# Patient Record
Sex: MALE | Race: WHITE | NOT HISPANIC OR LATINO | Employment: OTHER | URBAN - METROPOLITAN AREA
[De-identification: names, ages, dates, MRNs, and addresses within clinical notes are randomized per-mention and may not be internally consistent; named-entity substitution may affect disease eponyms.]

---

## 2016-12-28 LAB
BASOPHILS # BLD AUTO: 0.01 10*3/MM3 (ref 0–0.1)
BASOPHILS NFR BLD AUTO: 0.3 % (ref 0–1.1)
DEPRECATED RDW RBC AUTO: 58.2 FL (ref 37–49)
EOSINOPHIL # BLD AUTO: 0.06 10*3/MM3 (ref 0–0.36)
EOSINOPHIL NFR BLD AUTO: 1.6 % (ref 1–5)
ERYTHROCYTE [DISTWIDTH] IN BLOOD BY AUTOMATED COUNT: 17.3 % (ref 11.7–14.5)
FERRITIN SERPL-MCNC: 17.2 NG/ML (ref 30–400)
HCT VFR BLD AUTO: 36 % (ref 40–49)
HGB BLD-MCNC: 11.7 G/DL (ref 13.5–16.5)
IMM GRANULOCYTES # BLD: 0.02 10*3/MM3 (ref 0–0.03)
IMM GRANULOCYTES NFR BLD: 0.5 % (ref 0–0.5)
IRON 24H UR-MRATE: 62 MCG/DL (ref 59–158)
IRON SATN MFR SERPL: 14 % (ref 14–48)
LYMPHOCYTES # BLD AUTO: 0.34 10*3/MM3 (ref 1–3.5)
LYMPHOCYTES NFR BLD AUTO: 9.3 % (ref 20–49)
MCH RBC QN AUTO: 30 PG (ref 27–33)
MCHC RBC AUTO-ENTMCNC: 32.5 G/DL (ref 32–35)
MCV RBC AUTO: 92.3 FL (ref 83–97)
MONOCYTES # BLD AUTO: 0.46 10*3/MM3 (ref 0.25–0.8)
MONOCYTES NFR BLD AUTO: 12.6 % (ref 4–12)
NEUTROPHILS # BLD AUTO: 2.76 10*3/MM3 (ref 1.5–7)
NEUTROPHILS NFR BLD AUTO: 75.7 % (ref 39–75)
NRBC BLD MANUAL-RTO: 0 /100 WBC (ref 0–0)
PLATELET # BLD AUTO: 46 10*3/MM3 (ref 150–375)
RBC # BLD AUTO: 3.9 10*6/MM3 (ref 4.3–5.5)
TIBC SERPL-MCNC: 454 MCG/DL (ref 249–505)
TRANSFERRIN SERPL-MCNC: 324 MG/DL (ref 200–360)
WBC NRBC COR # BLD: 3.65 10*3/MM3 (ref 4–10)

## 2017-01-01 ENCOUNTER — LAB (OUTPATIENT)
Dept: ONCOLOGY | Facility: HOSPITAL | Age: 72
End: 2017-01-01

## 2017-01-01 ENCOUNTER — RESULTS ENCOUNTER (OUTPATIENT)
Dept: GASTROENTEROLOGY | Facility: CLINIC | Age: 72
End: 2017-01-01

## 2017-01-01 ENCOUNTER — TELEPHONE (OUTPATIENT)
Dept: GASTROENTEROLOGY | Facility: CLINIC | Age: 72
End: 2017-01-01

## 2017-01-01 ENCOUNTER — OFFICE VISIT (OUTPATIENT)
Dept: ONCOLOGY | Facility: CLINIC | Age: 72
End: 2017-01-01

## 2017-01-01 ENCOUNTER — TELEPHONE (OUTPATIENT)
Dept: ONCOLOGY | Facility: HOSPITAL | Age: 72
End: 2017-01-01

## 2017-01-01 ENCOUNTER — INFUSION (OUTPATIENT)
Dept: ONCOLOGY | Facility: HOSPITAL | Age: 72
End: 2017-01-01

## 2017-01-01 ENCOUNTER — TELEPHONE (OUTPATIENT)
Dept: GENERAL RADIOLOGY | Facility: HOSPITAL | Age: 72
End: 2017-01-01

## 2017-01-01 ENCOUNTER — OFFICE VISIT (OUTPATIENT)
Dept: FAMILY MEDICINE CLINIC | Facility: CLINIC | Age: 72
End: 2017-01-01

## 2017-01-01 VITALS
TEMPERATURE: 97.7 F | HEIGHT: 69 IN | BODY MASS INDEX: 27.25 KG/M2 | HEART RATE: 49 BPM | WEIGHT: 184 LBS | RESPIRATION RATE: 14 BRPM | DIASTOLIC BLOOD PRESSURE: 48 MMHG | SYSTOLIC BLOOD PRESSURE: 125 MMHG

## 2017-01-01 VITALS
WEIGHT: 183.2 LBS | SYSTOLIC BLOOD PRESSURE: 115 MMHG | TEMPERATURE: 98.4 F | DIASTOLIC BLOOD PRESSURE: 53 MMHG | HEART RATE: 55 BPM | BODY MASS INDEX: 27.86 KG/M2

## 2017-01-01 VITALS
DIASTOLIC BLOOD PRESSURE: 72 MMHG | HEART RATE: 49 BPM | OXYGEN SATURATION: 98 % | TEMPERATURE: 97.8 F | SYSTOLIC BLOOD PRESSURE: 146 MMHG | WEIGHT: 182.2 LBS | RESPIRATION RATE: 16 BRPM | HEIGHT: 68 IN | BODY MASS INDEX: 27.61 KG/M2

## 2017-01-01 DIAGNOSIS — D50.0 IRON DEFICIENCY ANEMIA DUE TO CHRONIC BLOOD LOSS: ICD-10-CM

## 2017-01-01 DIAGNOSIS — D70.8 OTHER NEUTROPENIA (HCC): ICD-10-CM

## 2017-01-01 DIAGNOSIS — Z00.00 MEDICARE ANNUAL WELLNESS VISIT, SUBSEQUENT: Primary | ICD-10-CM

## 2017-01-01 DIAGNOSIS — K74.69 OTHER CIRRHOSIS OF LIVER (HCC): ICD-10-CM

## 2017-01-01 DIAGNOSIS — B20 HUMAN IMMUNODEFICIENCY VIRUS I INFECTION (HCC): ICD-10-CM

## 2017-01-01 DIAGNOSIS — D64.9 ANEMIA, UNSPECIFIED TYPE: ICD-10-CM

## 2017-01-01 DIAGNOSIS — K74.60 CIRRHOSIS OF LIVER WITHOUT ASCITES, UNSPECIFIED HEPATIC CIRRHOSIS TYPE (HCC): Primary | ICD-10-CM

## 2017-01-01 DIAGNOSIS — I25.10 CHRONIC CORONARY ARTERY DISEASE: ICD-10-CM

## 2017-01-01 DIAGNOSIS — IMO0001 IRON AND ITS COMPOUNDS CAUSING ADVERSE EFFECT IN THERAPEUTIC USE, SUBSEQUENT ENCOUNTER: Primary | ICD-10-CM

## 2017-01-01 DIAGNOSIS — R06.02 SHORTNESS OF BREATH: ICD-10-CM

## 2017-01-01 DIAGNOSIS — B20 THROMBOCYTOPENIA ASSOCIATED WITH AIDS (HCC): ICD-10-CM

## 2017-01-01 DIAGNOSIS — C61 PROSTATE CA (HCC): Primary | ICD-10-CM

## 2017-01-01 DIAGNOSIS — K74.60 CIRRHOSIS OF LIVER WITHOUT ASCITES, UNSPECIFIED HEPATIC CIRRHOSIS TYPE (HCC): ICD-10-CM

## 2017-01-01 DIAGNOSIS — D50.0 IRON DEFICIENCY ANEMIA DUE TO CHRONIC BLOOD LOSS: Primary | ICD-10-CM

## 2017-01-01 DIAGNOSIS — D69.59 THROMBOCYTOPENIA ASSOCIATED WITH AIDS (HCC): ICD-10-CM

## 2017-01-01 DIAGNOSIS — R73.09 ABNORMAL GLUCOSE: ICD-10-CM

## 2017-01-01 DIAGNOSIS — D61.818 PANCYTOPENIA (HCC): ICD-10-CM

## 2017-01-01 DIAGNOSIS — IMO0001 IRON AND ITS COMPOUNDS CAUSING ADVERSE EFFECT IN THERAPEUTIC USE, SUBSEQUENT ENCOUNTER: ICD-10-CM

## 2017-01-01 DIAGNOSIS — I77.9 PERIPHERAL ARTERIAL OCCLUSIVE DISEASE (HCC): ICD-10-CM

## 2017-01-01 DIAGNOSIS — M51.36 LUMBAR DEGENERATIVE DISC DISEASE: ICD-10-CM

## 2017-01-01 LAB
AFP-TM SERPL-MCNC: 3.9 NG/ML (ref 0–8.3)
ALBUMIN SERPL-MCNC: 4 G/DL (ref 3.5–4.8)
ALBUMIN UR-MCNC: 2.7 MG/L
ALBUMIN/GLOB SERPL: 1.1 {RATIO} (ref 1.2–2.2)
ALP SERPL-CCNC: 163 IU/L (ref 39–117)
ALT SERPL-CCNC: 46 IU/L (ref 0–44)
AST SERPL-CCNC: 92 IU/L (ref 0–40)
BASOPHILS # BLD AUTO: 0 X10E3/UL (ref 0–0.2)
BASOPHILS # BLD AUTO: 0.02 10*3/MM3 (ref 0–0.1)
BASOPHILS NFR BLD AUTO: 0 %
BASOPHILS NFR BLD AUTO: 0.7 % (ref 0–1.1)
BILIRUB SERPL-MCNC: 1.8 MG/DL (ref 0–1.2)
BILIRUB UR QL STRIP: NEGATIVE
BUN SERPL-MCNC: 11 MG/DL (ref 8–27)
BUN/CREAT SERPL: 10 (ref 10–24)
CALCIUM SERPL-MCNC: 9.3 MG/DL (ref 8.6–10.2)
CD3+CD4+ CELLS # BLD: 85 /UL (ref 359–1519)
CD3+CD4+ CELLS NFR BLD: 28.4 % (ref 30.8–58.5)
CHLORIDE SERPL-SCNC: 100 MMOL/L (ref 96–106)
CLARITY UR: CLEAR
CO2 SERPL-SCNC: 22 MMOL/L (ref 18–29)
COLOR UR: YELLOW
CREAT SERPL-MCNC: 1.08 MG/DL (ref 0.76–1.27)
DEPRECATED RDW RBC AUTO: 52.9 FL (ref 37–49)
EOSINOPHIL # BLD AUTO: 0.09 10*3/MM3 (ref 0–0.36)
EOSINOPHIL # BLD AUTO: 0.1 X10E3/UL (ref 0–0.4)
EOSINOPHIL # BLD AUTO: 3 %
EOSINOPHIL NFR BLD AUTO: 3.1 % (ref 1–5)
ERYTHROCYTE [DISTWIDTH] IN BLOOD BY AUTOMATED COUNT: 15.7 % (ref 12.3–15.4)
ERYTHROCYTE [DISTWIDTH] IN BLOOD BY AUTOMATED COUNT: 16 % (ref 11.7–14.5)
FERRITIN SERPL-MCNC: 26.2 NG/ML (ref 30–400)
GLOBULIN SER CALC-MCNC: 3.8 G/DL (ref 1.5–4.5)
GLUCOSE SERPL-MCNC: 131 MG/DL (ref 65–99)
GLUCOSE UR STRIP-MCNC: NEGATIVE MG/DL
HBA1C MFR BLD: 7.4 % (ref 4.8–5.6)
HCT VFR BLD AUTO: 37.5 % (ref 37.5–51)
HCT VFR BLD AUTO: 39.4 % (ref 40–49)
HGB BLD-MCNC: 12 G/DL (ref 12.6–17.7)
HGB BLD-MCNC: 12 G/DL (ref 13.5–16.5)
HGB UR QL STRIP.AUTO: NEGATIVE
HIV1 RNA # SERPL NAA+PROBE: 130 COPIES/ML
HOLD SPECIMEN: NORMAL
IMM GRANULOCYTES # BLD: 0 X10E3/UL (ref 0–0.1)
IMM GRANULOCYTES # BLD: 0.01 10*3/MM3 (ref 0–0.03)
IMM GRANULOCYTES NFR BLD: 0 %
IMM GRANULOCYTES NFR BLD: 0.3 % (ref 0–0.5)
INR PPP: 1.1 (ref 0.9–1.1)
IRON 24H UR-MRATE: 66 MCG/DL (ref 59–158)
IRON SATN MFR SERPL: 15 % (ref 14–48)
KETONES UR QL STRIP: NEGATIVE
LEUKOCYTE ESTERASE UR QL STRIP.AUTO: NEGATIVE
LOG10 HIV-1 RNA: 2.11 LOG10COPY/ML
LYMPHOCYTES # BLD AUTO: 0.23 10*3/MM3 (ref 1–3.5)
LYMPHOCYTES # BLD AUTO: 0.3 X10E3/UL (ref 0.7–3.1)
LYMPHOCYTES NFR BLD AUTO: 11 %
LYMPHOCYTES NFR BLD AUTO: 7.8 % (ref 20–49)
MCH RBC QN AUTO: 27.4 PG (ref 26.6–33)
MCH RBC QN AUTO: 27.6 PG (ref 27–33)
MCHC RBC AUTO-ENTMCNC: 30.5 G/DL (ref 32–35)
MCHC RBC AUTO-ENTMCNC: 32 G/DL (ref 31.5–35.7)
MCV RBC AUTO: 86 FL (ref 79–97)
MCV RBC AUTO: 90.6 FL (ref 83–97)
MONOCYTES # BLD AUTO: 0.3 X10E3/UL (ref 0.1–0.9)
MONOCYTES # BLD AUTO: 0.32 10*3/MM3 (ref 0.25–0.8)
MONOCYTES NFR BLD AUTO: 10 %
MONOCYTES NFR BLD AUTO: 10.9 % (ref 4–12)
MORPHOLOGY BLD-IMP: ABNORMAL
NEUTROPHILS # BLD AUTO: 2.26 10*3/MM3 (ref 1.5–7)
NEUTROPHILS # BLD AUTO: 2.4 X10E3/UL (ref 1.4–7)
NEUTROPHILS NFR BLD AUTO: 76 %
NEUTROPHILS NFR BLD AUTO: 77.2 % (ref 39–75)
NITRITE UR QL STRIP: NEGATIVE
NRBC BLD MANUAL-RTO: 0 /100 WBC (ref 0–0)
PH UR STRIP.AUTO: 7 [PH] (ref 4.5–8)
PLATELET # BLD AUTO: 45 10*3/MM3 (ref 150–375)
PLATELET # BLD AUTO: 51 X10E3/UL (ref 150–379)
PMV BLD AUTO: 10.5 FL (ref 8.9–12.1)
POTASSIUM SERPL-SCNC: 4.5 MMOL/L (ref 3.5–5.2)
PROT SERPL-MCNC: 7.8 G/DL (ref 6–8.5)
PROT UR QL STRIP: ABNORMAL
PROTHROMBIN TIME: 13.7 SECONDS (ref 11–13.5)
PSA SERPL-MCNC: 0.91 NG/ML (ref 0–4)
RBC # BLD AUTO: 4.35 10*6/MM3 (ref 4.3–5.5)
RBC # BLD AUTO: 4.38 X10E6/UL (ref 4.14–5.8)
SODIUM SERPL-SCNC: 139 MMOL/L (ref 134–144)
SP GR UR STRIP: 1.02 (ref 1–1.03)
T4 FREE SERPL-MCNC: 1.69 NG/DL (ref 0.93–1.7)
TESTOST FREE SERPL-MCNC: 2.5 PG/ML (ref 6.6–18.1)
TESTOST SERPL-MCNC: 37.3 NG/DL (ref 264–916)
TIBC SERPL-MCNC: 447 MCG/DL (ref 249–505)
TRANSFERRIN SERPL-MCNC: 319 MG/DL (ref 200–360)
TSH SERPL DL<=0.05 MIU/L-ACNC: 2.3 MIU/ML (ref 0.27–4.2)
UROBILINOGEN UR QL STRIP: ABNORMAL
WBC # BLD AUTO: 3.2 X10E3/UL (ref 3.4–10.8)
WBC NRBC COR # BLD: 2.93 10*3/MM3 (ref 4–10)

## 2017-01-01 PROCEDURE — 99213 OFFICE O/P EST LOW 20 MIN: CPT | Performed by: INTERNAL MEDICINE

## 2017-01-01 PROCEDURE — 25010000002 FERUMOXYTOL 510 MG/17ML SOLUTION 510 MG VIAL: Performed by: INTERNAL MEDICINE

## 2017-01-01 PROCEDURE — 82043 UR ALBUMIN QUANTITATIVE: CPT | Performed by: INTERNAL MEDICINE

## 2017-01-01 PROCEDURE — 84466 ASSAY OF TRANSFERRIN: CPT | Performed by: INTERNAL MEDICINE

## 2017-01-01 PROCEDURE — 83036 HEMOGLOBIN GLYCOSYLATED A1C: CPT | Performed by: INTERNAL MEDICINE

## 2017-01-01 PROCEDURE — 36415 COLL VENOUS BLD VENIPUNCTURE: CPT | Performed by: INTERNAL MEDICINE

## 2017-01-01 PROCEDURE — 83540 ASSAY OF IRON: CPT | Performed by: INTERNAL MEDICINE

## 2017-01-01 PROCEDURE — 84443 ASSAY THYROID STIM HORMONE: CPT | Performed by: INTERNAL MEDICINE

## 2017-01-01 PROCEDURE — 81003 URINALYSIS AUTO W/O SCOPE: CPT | Performed by: INTERNAL MEDICINE

## 2017-01-01 PROCEDURE — 96374 THER/PROPH/DIAG INJ IV PUSH: CPT

## 2017-01-01 PROCEDURE — 84439 ASSAY OF FREE THYROXINE: CPT | Performed by: INTERNAL MEDICINE

## 2017-01-01 PROCEDURE — 84153 ASSAY OF PSA TOTAL: CPT | Performed by: UROLOGY

## 2017-01-01 PROCEDURE — 85025 COMPLETE CBC W/AUTO DIFF WBC: CPT | Performed by: INTERNAL MEDICINE

## 2017-01-01 PROCEDURE — 82728 ASSAY OF FERRITIN: CPT | Performed by: INTERNAL MEDICINE

## 2017-01-01 PROCEDURE — 99213 OFFICE O/P EST LOW 20 MIN: CPT | Performed by: FAMILY MEDICINE

## 2017-01-01 PROCEDURE — G0439 PPPS, SUBSEQ VISIT: HCPCS | Performed by: FAMILY MEDICINE

## 2017-01-01 PROCEDURE — 63710000001 DIPHENHYDRAMINE PER 50 MG: Performed by: INTERNAL MEDICINE

## 2017-01-01 PROCEDURE — 85610 PROTHROMBIN TIME: CPT | Performed by: INTERNAL MEDICINE

## 2017-01-01 RX ORDER — SODIUM CHLORIDE 9 MG/ML
250 INJECTION, SOLUTION INTRAVENOUS ONCE
Status: COMPLETED | OUTPATIENT
Start: 2017-01-01 | End: 2017-01-01

## 2017-01-01 RX ORDER — SODIUM CHLORIDE 9 MG/ML
250 INJECTION, SOLUTION INTRAVENOUS ONCE
Status: CANCELLED | OUTPATIENT
Start: 2017-01-01

## 2017-01-01 RX ORDER — FAMOTIDINE 10 MG/ML
20 INJECTION, SOLUTION INTRAVENOUS ONCE
Status: DISCONTINUED | OUTPATIENT
Start: 2017-01-01 | End: 2017-01-01 | Stop reason: HOSPADM

## 2017-01-01 RX ORDER — LEVOTHYROXINE SODIUM 300 MCG
TABLET ORAL
Refills: 0 | Status: ON HOLD | COMMUNITY
Start: 2017-01-01 | End: 2018-01-01

## 2017-01-01 RX ORDER — DIPHENHYDRAMINE HCL 25 MG
25 CAPSULE ORAL ONCE
Status: DISCONTINUED | OUTPATIENT
Start: 2017-01-01 | End: 2017-01-01 | Stop reason: HOSPADM

## 2017-01-01 RX ORDER — DIPHENHYDRAMINE HCL 25 MG
25 CAPSULE ORAL ONCE
Status: CANCELLED | OUTPATIENT
Start: 2017-01-01

## 2017-01-01 RX ORDER — FAMOTIDINE 10 MG/ML
20 INJECTION, SOLUTION INTRAVENOUS ONCE
Status: CANCELLED | OUTPATIENT
Start: 2017-01-01

## 2017-01-01 RX ADMIN — FERUMOXYTOL 510 MG: 510 INJECTION INTRAVENOUS at 15:34

## 2017-01-01 RX ADMIN — FERUMOXYTOL 510 MG: 510 INJECTION INTRAVENOUS at 14:46

## 2017-01-01 RX ADMIN — SODIUM CHLORIDE 250 ML: 900 INJECTION, SOLUTION INTRAVENOUS at 15:29

## 2017-01-01 RX ADMIN — SODIUM CHLORIDE 250 ML: 900 INJECTION, SOLUTION INTRAVENOUS at 14:46

## 2017-01-03 ENCOUNTER — RESULTS ENCOUNTER (OUTPATIENT)
Dept: GASTROENTEROLOGY | Facility: CLINIC | Age: 72
End: 2017-01-03

## 2017-01-03 DIAGNOSIS — E87.5 HYPERKALEMIA: ICD-10-CM

## 2017-01-04 ENCOUNTER — INFUSION (OUTPATIENT)
Dept: ONCOLOGY | Facility: HOSPITAL | Age: 72
End: 2017-01-04
Attending: INTERNAL MEDICINE

## 2017-01-04 ENCOUNTER — HOSPITAL ENCOUNTER (OUTPATIENT)
Dept: ULTRASOUND IMAGING | Facility: HOSPITAL | Age: 72
Discharge: HOME | End: 2017-01-04
Attending: INTERNAL MEDICINE | Admitting: INTERNAL MEDICINE

## 2017-01-04 VITALS
BODY MASS INDEX: 25.88 KG/M2 | SYSTOLIC BLOOD PRESSURE: 105 MMHG | TEMPERATURE: 98.1 F | WEIGHT: 170.2 LBS | HEART RATE: 58 BPM | DIASTOLIC BLOOD PRESSURE: 69 MMHG

## 2017-01-04 DIAGNOSIS — K74.60 CIRRHOSIS OF LIVER WITHOUT ASCITES, UNSPECIFIED HEPATIC CIRRHOSIS TYPE (HCC): ICD-10-CM

## 2017-01-04 DIAGNOSIS — D50.0 IRON DEFICIENCY ANEMIA DUE TO CHRONIC BLOOD LOSS: Primary | ICD-10-CM

## 2017-01-04 PROCEDURE — 76705 ECHO EXAM OF ABDOMEN: CPT

## 2017-01-04 PROCEDURE — 25010000002 FERUMOXYTOL 510 MG/17ML SOLUTION 510 MG VIAL: Performed by: INTERNAL MEDICINE

## 2017-01-04 PROCEDURE — 96374 THER/PROPH/DIAG INJ IV PUSH: CPT | Performed by: INTERNAL MEDICINE

## 2017-01-04 RX ORDER — SODIUM CHLORIDE 9 MG/ML
250 INJECTION, SOLUTION INTRAVENOUS ONCE
Status: CANCELLED | OUTPATIENT
Start: 2017-01-11

## 2017-01-04 RX ORDER — SODIUM CHLORIDE 9 MG/ML
250 INJECTION, SOLUTION INTRAVENOUS ONCE
Status: COMPLETED | OUTPATIENT
Start: 2017-01-04 | End: 2017-01-04

## 2017-01-04 RX ADMIN — FERUMOXYTOL 510 MG: 510 INJECTION INTRAVENOUS at 14:49

## 2017-01-04 RX ADMIN — SODIUM CHLORIDE 250 ML: 900 INJECTION, SOLUTION INTRAVENOUS at 14:35

## 2017-01-12 ENCOUNTER — LAB (OUTPATIENT)
Dept: LAB | Facility: HOSPITAL | Age: 72
End: 2017-01-12

## 2017-01-12 ENCOUNTER — TELEPHONE (OUTPATIENT)
Dept: GASTROENTEROLOGY | Facility: CLINIC | Age: 72
End: 2017-01-12

## 2017-01-12 DIAGNOSIS — K74.60 CIRRHOSIS OF LIVER WITHOUT ASCITES, UNSPECIFIED HEPATIC CIRRHOSIS TYPE (HCC): ICD-10-CM

## 2017-01-12 DIAGNOSIS — E87.5 HYPERKALEMIA: ICD-10-CM

## 2017-01-12 DIAGNOSIS — N17.9 AKI (ACUTE KIDNEY INJURY) (HCC): Primary | ICD-10-CM

## 2017-01-12 LAB
ANION GAP SERPL CALCULATED.3IONS-SCNC: 10.8 MMOL/L
BUN BLD-MCNC: 19 MG/DL (ref 8–23)
BUN/CREAT SERPL: 12.9 (ref 7–25)
CALCIUM SPEC-SCNC: 9.8 MG/DL (ref 8.6–10.5)
CHLORIDE SERPL-SCNC: 96 MMOL/L (ref 98–107)
CO2 SERPL-SCNC: 22.2 MMOL/L (ref 22–29)
CREAT BLD-MCNC: 1.47 MG/DL (ref 0.76–1.27)
GFR SERPL CREATININE-BSD FRML MDRD: 47 ML/MIN/1.73
GLUCOSE BLD-MCNC: 335 MG/DL (ref 65–99)
INR PPP: 1.21 (ref 0.9–1.1)
POTASSIUM BLD-SCNC: 5.6 MMOL/L (ref 3.5–5.2)
PROTHROMBIN TIME: 14.8 SECONDS (ref 11.7–14.2)
SODIUM BLD-SCNC: 129 MMOL/L (ref 136–145)

## 2017-01-12 PROCEDURE — 36415 COLL VENOUS BLD VENIPUNCTURE: CPT

## 2017-01-12 PROCEDURE — 80048 BASIC METABOLIC PNL TOTAL CA: CPT | Performed by: INTERNAL MEDICINE

## 2017-01-12 PROCEDURE — 85610 PROTHROMBIN TIME: CPT

## 2017-01-12 NOTE — TELEPHONE ENCOUNTER
pls let him know that his potassium continues to be elevated but stable and his kidney function is worsening.  He needs to discontinue completely both of his water pills (lasix and aldactone as these can cause worsening kidney function) - he needs to have BMP repeated in 1 week

## 2017-01-12 NOTE — TELEPHONE ENCOUNTER
----- Message from Robyn Butler MD sent at 1/5/2017 10:34 AM EST -----  Liver ultrasound stable - no new findings

## 2017-01-13 NOTE — TELEPHONE ENCOUNTER
Called pt and advised per Dr Butler that his potassium continues to be elevated but stable and his kidney function is worsening.  He needs to discontinue completely both of his water pills ( lasix and aldactone as these can cause worsening kidney function ) he need to have a repeat bmp in one wk.  Pt verb understanding.

## 2017-01-17 ENCOUNTER — RESULTS ENCOUNTER (OUTPATIENT)
Dept: GASTROENTEROLOGY | Facility: CLINIC | Age: 72
End: 2017-01-17

## 2017-01-17 DIAGNOSIS — N17.9 AKI (ACUTE KIDNEY INJURY) (HCC): ICD-10-CM

## 2017-01-24 ENCOUNTER — TELEPHONE (OUTPATIENT)
Dept: GASTROENTEROLOGY | Facility: CLINIC | Age: 72
End: 2017-01-24

## 2017-01-24 NOTE — TELEPHONE ENCOUNTER
Spoke with the patient assistance line and verified new prescription for 90 days plus one refill. They will send it out today. Spoke with the patient to let him know the medication is on it's way.

## 2017-01-24 NOTE — TELEPHONE ENCOUNTER
Patient is out of his Xifaxan script. He gets it through the [employer] patient assistance program. I've sent in paperwork to renew his patient assistance. Okay to give him a 90 day supply and one refill? He takes it twice a day for HE.

## 2017-02-13 ENCOUNTER — TELEPHONE (OUTPATIENT)
Dept: ONCOLOGY | Facility: HOSPITAL | Age: 72
End: 2017-02-13

## 2017-02-13 DIAGNOSIS — D50.8 OTHER IRON DEFICIENCY ANEMIA: Primary | ICD-10-CM

## 2017-02-13 NOTE — TELEPHONE ENCOUNTER
----- Message from Franny Hernandez sent at 2/13/2017 11:32 AM EST -----   Pt is calling about having his lab orders sent to [facility] to have done      [phone]

## 2017-02-13 NOTE — TELEPHONE ENCOUNTER
Returned pt's call, no answer. Left message that lab orders are in the computer and he can go to [facility] to have them drawn.

## 2017-02-14 ENCOUNTER — TRANSCRIBE ORDERS (OUTPATIENT)
Dept: LAB | Facility: HOSPITAL | Age: 72
End: 2017-02-14

## 2017-02-14 ENCOUNTER — TELEPHONE (OUTPATIENT)
Dept: GASTROENTEROLOGY | Facility: CLINIC | Age: 72
End: 2017-02-14

## 2017-02-14 ENCOUNTER — APPOINTMENT (OUTPATIENT)
Dept: LAB | Facility: HOSPITAL | Age: 72
End: 2017-02-14

## 2017-02-14 ENCOUNTER — LAB (OUTPATIENT)
Dept: LAB | Facility: HOSPITAL | Age: 72
End: 2017-02-14

## 2017-02-14 DIAGNOSIS — E87.5 HYPERKALEMIA: ICD-10-CM

## 2017-02-14 DIAGNOSIS — C61 PROSTATE CANCER (HCC): ICD-10-CM

## 2017-02-14 DIAGNOSIS — C61 PROSTATE CANCER (HCC): Primary | ICD-10-CM

## 2017-02-14 DIAGNOSIS — K74.60 CIRRHOSIS OF LIVER WITHOUT ASCITES, UNSPECIFIED HEPATIC CIRRHOSIS TYPE (HCC): ICD-10-CM

## 2017-02-14 DIAGNOSIS — K74.60 CIRRHOSIS OF LIVER WITH ASCITES, UNSPECIFIED HEPATIC CIRRHOSIS TYPE (HCC): Primary | ICD-10-CM

## 2017-02-14 DIAGNOSIS — R18.8 CIRRHOSIS OF LIVER WITH ASCITES, UNSPECIFIED HEPATIC CIRRHOSIS TYPE (HCC): Primary | ICD-10-CM

## 2017-02-14 DIAGNOSIS — D50.8 OTHER IRON DEFICIENCY ANEMIA: ICD-10-CM

## 2017-02-14 LAB
ANION GAP SERPL CALCULATED.3IONS-SCNC: 7.6 MMOL/L
BASOPHILS # BLD AUTO: 0.02 10*3/MM3 (ref 0–0.2)
BASOPHILS NFR BLD AUTO: 1 % (ref 0–1.5)
BUN BLD-MCNC: 9 MG/DL (ref 8–23)
BUN/CREAT SERPL: 7.5 (ref 7–25)
CALCIUM SPEC-SCNC: 8.9 MG/DL (ref 8.6–10.5)
CHLORIDE SERPL-SCNC: 102 MMOL/L (ref 98–107)
CO2 SERPL-SCNC: 25.4 MMOL/L (ref 22–29)
CREAT BLD-MCNC: 1.2 MG/DL (ref 0.76–1.27)
DEPRECATED RDW RBC AUTO: 57 FL (ref 37–54)
EOSINOPHIL # BLD AUTO: 0.06 10*3/MM3 (ref 0–0.7)
EOSINOPHIL NFR BLD AUTO: 2.9 % (ref 0.3–6.2)
ERYTHROCYTE [DISTWIDTH] IN BLOOD BY AUTOMATED COUNT: 16.4 % (ref 11.5–14.5)
FERRITIN SERPL-MCNC: 25.81 NG/ML (ref 30–400)
GFR SERPL CREATININE-BSD FRML MDRD: 60 ML/MIN/1.73
GLUCOSE BLD-MCNC: 220 MG/DL (ref 65–99)
HCT VFR BLD AUTO: 33.9 % (ref 40.4–52.2)
HGB BLD-MCNC: 10.8 G/DL (ref 13.7–17.6)
IMM GRANULOCYTES # BLD: 0 10*3/MM3 (ref 0–0.03)
IMM GRANULOCYTES NFR BLD: 0 % (ref 0–0.5)
INR PPP: 1.33 (ref 0.9–1.1)
IRON 24H UR-MRATE: 64 MCG/DL (ref 59–158)
IRON SATN MFR SERPL: 16 % (ref 20–50)
LYMPHOCYTES # BLD AUTO: 0.24 10*3/MM3 (ref 0.9–4.8)
LYMPHOCYTES NFR BLD AUTO: 11.7 % (ref 19.6–45.3)
MCH RBC QN AUTO: 30.1 PG (ref 27–32.7)
MCHC RBC AUTO-ENTMCNC: 31.9 G/DL (ref 32.6–36.4)
MCV RBC AUTO: 94.4 FL (ref 79.8–96.2)
MONOCYTES # BLD AUTO: 0.27 10*3/MM3 (ref 0.2–1.2)
MONOCYTES NFR BLD AUTO: 13.2 % (ref 5–12)
NEUTROPHILS # BLD AUTO: 1.46 10*3/MM3 (ref 1.9–8.1)
NEUTROPHILS NFR BLD AUTO: 71.2 % (ref 42.7–76)
PLATELET # BLD AUTO: 37 10*3/MM3 (ref 140–500)
POTASSIUM BLD-SCNC: 4.3 MMOL/L (ref 3.5–5.2)
PROTHROMBIN TIME: 15.9 SECONDS (ref 11.7–14.2)
PSA SERPL-MCNC: 0.5 NG/ML (ref 0–4)
RBC # BLD AUTO: 3.59 10*6/MM3 (ref 4.6–6)
SODIUM BLD-SCNC: 135 MMOL/L (ref 136–145)
TIBC SERPL-MCNC: 404 MCG/DL (ref 298–536)
TRANSFERRIN SERPL-MCNC: 271 MG/DL (ref 200–360)
WBC NRBC COR # BLD: 2.05 10*3/MM3 (ref 4.5–10.7)

## 2017-02-14 PROCEDURE — 84443 ASSAY THYROID STIM HORMONE: CPT

## 2017-02-14 PROCEDURE — 80061 LIPID PANEL: CPT

## 2017-02-14 PROCEDURE — 85610 PROTHROMBIN TIME: CPT

## 2017-02-14 PROCEDURE — 82607 VITAMIN B-12: CPT

## 2017-02-14 PROCEDURE — 36415 COLL VENOUS BLD VENIPUNCTURE: CPT

## 2017-02-14 PROCEDURE — 83036 HEMOGLOBIN GLYCOSYLATED A1C: CPT

## 2017-02-14 PROCEDURE — 83540 ASSAY OF IRON: CPT

## 2017-02-14 PROCEDURE — 84466 ASSAY OF TRANSFERRIN: CPT

## 2017-02-14 PROCEDURE — 85025 COMPLETE CBC W/AUTO DIFF WBC: CPT

## 2017-02-14 PROCEDURE — 80053 COMPREHEN METABOLIC PANEL: CPT

## 2017-02-14 PROCEDURE — 82728 ASSAY OF FERRITIN: CPT

## 2017-02-14 PROCEDURE — 84153 ASSAY OF PSA TOTAL: CPT

## 2017-02-14 NOTE — TELEPHONE ENCOUNTER
Insurance may not pay for this bc he had recent nml lab in nov and he is not on any lipid lowering medication - he should check with his pcp-prob only needs to be checked annually

## 2017-02-14 NOTE — TELEPHONE ENCOUNTER
Call from pt.  He reports is going to [facility] today to have BMP done and would like to add on Lipid Panel.  Advise pt will send this request to DR Butler.  Pt verb understanding.

## 2017-02-14 NOTE — TELEPHONE ENCOUNTER
Call to pt. Advise per Dr Butler that insurance may not pay for this because he had recent normal lab in November and he is not on any lipid lowering medication.  Pt should check with his PCP - probably only needs to be checked annually.  Pt verb understanding.

## 2017-02-15 ENCOUNTER — TELEPHONE (OUTPATIENT)
Dept: GASTROENTEROLOGY | Facility: CLINIC | Age: 72
End: 2017-02-15

## 2017-02-15 ENCOUNTER — LAB (OUTPATIENT)
Dept: LAB | Facility: HOSPITAL | Age: 72
End: 2017-02-15

## 2017-02-15 ENCOUNTER — TRANSCRIBE ORDERS (OUTPATIENT)
Dept: ADMINISTRATIVE | Facility: HOSPITAL | Age: 72
End: 2017-02-15

## 2017-02-15 DIAGNOSIS — E03.9 UNSPECIFIED HYPOTHYROIDISM: ICD-10-CM

## 2017-02-15 DIAGNOSIS — K74.60 CIRRHOSIS OF LIVER WITH ASCITES, UNSPECIFIED HEPATIC CIRRHOSIS TYPE (HCC): Primary | ICD-10-CM

## 2017-02-15 DIAGNOSIS — IMO0002 SEVERE UNCONTROLLED DIABETES MELLITUS: ICD-10-CM

## 2017-02-15 DIAGNOSIS — R18.8 CIRRHOSIS OF LIVER WITH ASCITES, UNSPECIFIED HEPATIC CIRRHOSIS TYPE (HCC): Primary | ICD-10-CM

## 2017-02-15 DIAGNOSIS — IMO0002 SEVERE UNCONTROLLED DIABETES MELLITUS: Primary | ICD-10-CM

## 2017-02-15 LAB
ALBUMIN SERPL-MCNC: 3.7 G/DL (ref 3.5–5.2)
ALBUMIN/GLOB SERPL: 1.1 G/DL
ALP SERPL-CCNC: 155 U/L (ref 39–117)
ALT SERPL W P-5'-P-CCNC: 26 U/L (ref 1–41)
ANION GAP SERPL CALCULATED.3IONS-SCNC: 9.3 MMOL/L
AST SERPL-CCNC: 50 U/L (ref 1–40)
BILIRUB SERPL-MCNC: 1.2 MG/DL (ref 0.1–1.2)
BUN BLD-MCNC: 10 MG/DL (ref 8–23)
BUN/CREAT SERPL: 8.6 (ref 7–25)
CALCIUM SPEC-SCNC: 9.2 MG/DL (ref 8.6–10.5)
CHLORIDE SERPL-SCNC: 103 MMOL/L (ref 98–107)
CHOLEST SERPL-MCNC: 165 MG/DL (ref 0–200)
CO2 SERPL-SCNC: 25.7 MMOL/L (ref 22–29)
CREAT BLD-MCNC: 1.16 MG/DL (ref 0.76–1.27)
GFR SERPL CREATININE-BSD FRML MDRD: 62 ML/MIN/1.73
GLOBULIN UR ELPH-MCNC: 3.3 GM/DL
GLUCOSE BLD-MCNC: 219 MG/DL (ref 65–99)
HBA1C MFR BLD: 7.37 % (ref 4.8–5.6)
HDLC SERPL-MCNC: 44 MG/DL (ref 40–60)
LDLC SERPL CALC-MCNC: 96 MG/DL (ref 0–100)
LDLC/HDLC SERPL: 2.19 {RATIO}
POTASSIUM BLD-SCNC: 4.5 MMOL/L (ref 3.5–5.2)
PROT SERPL-MCNC: 7 G/DL (ref 6–8.5)
SODIUM BLD-SCNC: 138 MMOL/L (ref 136–145)
TRIGL SERPL-MCNC: 124 MG/DL (ref 0–150)
TSH SERPL DL<=0.05 MIU/L-ACNC: 2.44 MIU/ML (ref 0.27–4.2)
VIT B12 BLD-MCNC: 1386 PG/ML (ref 211–946)
VLDLC SERPL-MCNC: 24.8 MG/DL (ref 5–40)

## 2017-02-15 NOTE — TELEPHONE ENCOUNTER
----- Message from Robyn Butler MD sent at 2/15/2017  8:51 AM EST -----  His renal function has improved. K is now normal - pls confirm that he has stopped his diuretics (lasix/aldactone)- would continue to stay off these meds for now

## 2017-02-15 NOTE — TELEPHONE ENCOUNTER
Call to pt.  Advise per Dr Butler that his renal function has improved. Potassium is now normal. Pt states has stopped diuretics.  Advise per Dr Butler to continue to stay off these meds for now.  Pt asking if could take either lasix or aldactone every other day because feels that is gaining wt in stomach.  Pt does not check daily am wts - advise pt to check wts.  He asks again if could take diuretic every other day.  Advise pt will send this question to Dr Butler.  Pt verb understanding.

## 2017-02-16 ENCOUNTER — LAB (OUTPATIENT)
Dept: LAB | Facility: HOSPITAL | Age: 72
End: 2017-02-16
Attending: INTERNAL MEDICINE

## 2017-02-16 ENCOUNTER — OFFICE VISIT (OUTPATIENT)
Dept: ONCOLOGY | Facility: CLINIC | Age: 72
End: 2017-02-16
Attending: INTERNAL MEDICINE

## 2017-02-16 VITALS
HEART RATE: 48 BPM | DIASTOLIC BLOOD PRESSURE: 72 MMHG | OXYGEN SATURATION: 99 % | TEMPERATURE: 98.2 F | BODY MASS INDEX: 27.46 KG/M2 | SYSTOLIC BLOOD PRESSURE: 110 MMHG | HEIGHT: 68 IN | WEIGHT: 181.2 LBS | RESPIRATION RATE: 12 BRPM

## 2017-02-16 DIAGNOSIS — D61.818 PANCYTOPENIA (HCC): ICD-10-CM

## 2017-02-16 DIAGNOSIS — D69.59 THROMBOCYTOPENIA ASSOCIATED WITH AIDS (HCC): ICD-10-CM

## 2017-02-16 DIAGNOSIS — D50.0 IRON DEFICIENCY ANEMIA DUE TO CHRONIC BLOOD LOSS: Primary | ICD-10-CM

## 2017-02-16 DIAGNOSIS — B20 THROMBOCYTOPENIA ASSOCIATED WITH AIDS (HCC): ICD-10-CM

## 2017-02-16 DIAGNOSIS — D70.9 NEUTROPENIA, UNSPECIFIED TYPE (HCC): ICD-10-CM

## 2017-02-16 PROCEDURE — 99214 OFFICE O/P EST MOD 30 MIN: CPT | Performed by: INTERNAL MEDICINE

## 2017-02-16 RX ORDER — PEN NEEDLE, DIABETIC 31 GX5/16"
NEEDLE, DISPOSABLE MISCELLANEOUS
Refills: 3 | COMMUNITY
Start: 2017-01-11 | End: 2017-01-01

## 2017-02-16 RX ORDER — INSULIN GLARGINE 100 [IU]/ML
INJECTION, SOLUTION SUBCUTANEOUS
COMMUNITY
Start: 2017-02-10 | End: 2017-01-01

## 2017-02-16 RX ORDER — PEN NEEDLE, DIABETIC 32GX 5/32"
NEEDLE, DISPOSABLE MISCELLANEOUS
COMMUNITY
Start: 2017-02-14

## 2017-02-19 PROBLEM — D61.818 PANCYTOPENIA (HCC): Status: ACTIVE | Noted: 2017-02-19

## 2017-02-19 NOTE — PROGRESS NOTES
REASONS FOR FOLLOWUP:    1. Worsening pancytopenia, especially with leukocytopenia and thrombocytopenia. Bone marrow aspiration and biopsy obtained on 01/21/2014, with no evidence of hematologic malignancy, no myelodysplastic syndrome.  He has liver cirrhosis and splenomegaly.   2. Recurrent iron deficiency anemia Anemia, not able to tolerate oral iron supplementation, status post multiple Ferahemes.  He also had transfusion of PRBC in October 2014.   3. Empiric treatment for ITP using IVIG, in March 2014 and no response.        HISTORY OF PRESENT ILLNESS: Mr. Aranda is a 71-year-old  male returning today for 4-month reevaluation of recurrent iron deficiency. He reports profound fatigue, he denies any signs of bleeding.  For possible 6 months, patient roughly receives IV Feraheme treatment about every 2 months.  He continues taking HIV medication. His diabetes is not tightly controlled. Patient reports no recent bacterial infection fever chills, etc.  No easy bleeding.      Laboratory study 2 days ago on 02/14/2017 reported worsening hemoglobin 10.8, platelets 37,000, and neutrophils 1460 and lymphocytes 240.  He also had recurrent iron deficiency, with ferritin 25.8, iron saturation 16%.  His vitamin B12 level was supratherapeutic.       PAST MEDICAL HISTORY:    1. HIV diagnosed in 1997, on antiretroviral therapy; followed by Dr. Klarissa Thomas.    2. Liver cirrhosis and splenomegaly.    3. Diabetes.    4. Coronary artery disease.    5. Hypertension.    6. Pancytopenia.    7. Iron deficiency anemia.    8. Hypothyroidism.    9. History of prostate cancer, status post radiation therapy   10. Osteoporosis documented on DEXA bone density scan 02/29/2016.        HEMATOLOGIC HISTORY: History from previous dates can be found in a separate document.        Laboratory study on 10/05/2015 reported hemoglobin of 9.2, platelets 41,000 and WBC 3320 including neutrophils 1600, lymphocytes 400. Serum ferritin was 12.6, down  from 72.9 on 08/10/2015. Serum iron level was 39, also down from 69 on 08/10/2015. His HIV test was improved and negative PSA and improved lipid panel. CMP panel was unremarkable except marginally elevated bilirubin of 1.4 and glucose 135.        The patient was evaluated in our clinic on 10/15/2015. The patient is reporting fatigue. We will arrange for treatment of Feraheme. The patient will continue followup with Dr. Thomas every 3 months for laboratory studies, including iron panel.  Patient was given 2 doses of Feraheme.       04/19/2016 which showed iron deficiency, ferritin level was 10.4 NG/ML and iron level 36. She was given Feraheme treatment in May 2016.       His laboratory study last week on 08/16/2016 showed recurrent severe iron deficiency with a ferritin 6.52 NG/ML, and a serum iron 31. His hemoglobin was 8.8, platelets 57,000 and WBC of 3100. His B12 level was 1097 PG/ML. He also reported taking levothyroxine 300 µg daily. This patient also lost a significant weight about 20 pounds in the past 3 months, because he was on 2 different diuretics.  Patient was seen on 08/22/2016 and the schedule for 2 doses of Feraheme treatment.         MEDICATIONS: The current medication list was reviewed with the patient and updated in the EMR this date per the medical assistant. Medication dosages and frequencies were confirmed to be accurate.        ALLERGIES:    1. PENICILLIN.    2. PREDNISONE.        SOCIAL HISTORY: Remote smoking history. He does not drink. No history of drug use. He contracted HIV through sexual contact.        FAMILY HISTORY: Reviewed and is negative to this episode of care.        REVIEW OF SYSTEMS:     PAIN: intermittent abdomen discomfort, no sharp pains.    GENERAL: No change in appetite or weight, no fevers, chills or sweats.    SKIN: No rashes or nonhealing lesions.    HEME/LYMPH: See Hematology History.    EYES: No vision changes or diplopia.    ENT: No tinnitus, hearing loss, gum  "bleeding, epistaxis, hoarseness or dysphagia.    RESPIRATORY: No cough, shortness of breath, hemoptysis or wheezing.    CVS: No chest pain, palpitations, orthopnea, dyspnea on exertion.    GI: Abdomen distention secondary to ascites fluids. No nausea, vomiting, constipation, diarrhea, melena or hematochezia.    : No dysuria or hematuria.    MUSCULOSKELETAL: No bone pain or joint stiffness.    NEUROLOGICAL: No dizziness, global weakness, loss of consciousness or seizures.    PSYCHIATRIC: No increased nervousness, mood changes or depression.        VITAL SIGNS:    Vitals:    17 1434   BP: 110/72   Pulse: (!) 48  Comment: Pt states this is typical times 2 years   Resp: 12   Temp: 98.2 °F (36.8 °C)   TempSrc: Oral   SpO2: 99%   Weight: 181 lb 3.2 oz (82.2 kg)   Height: 68\" (172.7 cm)   PainSc: 5  Comment: right arm aches   ECO        PHYSICAL EXAMINATION:    GENERAL: Well-developed and well-nourished male in no acute distress. .   SKIN: Warm and dry without rashes, No purpura.   HEAD: Normocephalic.    EYES: Pupils equal, round and reactive to light. EOMs intact. Conjunctivae normal.    EARS: Hearing intact.    NOSE: No excoriations or nasal discharge.    MOUTH: Tongue is well-papillated; no stomatitis or ulcers. Lips normal.    THROAT: Oropharynx without lesions or exudates.    NECK: Supple with good range of motion; no thyromegaly or masses.    LYMPHATICS: No cervical, supraclavicular adenopathy.    CHEST: Lungs clear to auscultation.    CARDIAC: Regular rate and rhythm without murmurs, rubs or gallops.    ABDOMEN: Distended but nontender with no organomegaly or masses. Bowel sounds present.    EXTREMITIES: No clubbing, cyanosis. No edema.    NEURO: No focal neurological deficits.        LABORATORY DATA:    Lab Results   Component Value Date    WBC 2.05 (L) 2017    HGB 10.8 (L) 2017    HCT 33.9 (L) 2017    MCV 94.4 2017    PLT 37 (C) 2017     Lab Results   Component Value Date "    NEUTROABS 1.46 (L) 02/14/2017     Lab Results   Component Value Date    IRON 64 02/14/2017    TIBC 404 02/14/2017    FERRITIN 25.81 (L) 02/14/2017     Saturation 16%    Lab Results   Component Value Date    DSMGEXNB24 1386 (H) 02/14/2017     Lab Results   Component Value Date    GLUCOSE 220 (H) 02/14/2017    BUN 9 02/14/2017    CREATININE 1.20 02/14/2017    EGFRIFNONA 60 (L) 02/14/2017    EGFRIFAFRI  08/29/2016      Comment:      <15 Indicative of kidney failure.    BCR 7.5 02/14/2017    K 4.3 02/14/2017    CO2 25.4 02/14/2017    CALCIUM 8.9 02/14/2017    ALBUMIN 3.70 02/14/2017    LABIL2 1.1 02/14/2017    AST 50 (H) 02/14/2017    ALT 26 02/14/2017     SODIUM   Date Value Ref Range Status   02/14/2017 135 (L) 136 - 145 mmol/L Final   01/04/2016 136 136 - 145 mmol/L Final     POTASSIUM   Date Value Ref Range Status   02/14/2017 4.3 3.5 - 5.2 mmol/L Final   01/04/2016 4.4 3.5 - 5.2 mmol/L Final     TOTAL BILIRUBIN   Date Value Ref Range Status   02/14/2017 1.2 0.1 - 1.2 mg/dL Final   01/04/2016 1.1 0.1 - 1.2 mg/dL Final     ALKALINE PHOSPHATASE   Date Value Ref Range Status   02/14/2017 155 (H) 39 - 117 U/L Final   01/04/2016 141 (H) 39 - 117 U/L Final   ]  ASSESSMENT:      1.  Recurrent Iron deficiency anemia required repeated IV iron therapy, roughly about every 2 months.  He denies any visible bleeding, however it is seems like he leaks iron. He complains of significant fatigue. Laboratory study to days ago on 02/14/2017 showed worsening hemoglobin 10.8, and recurrent iron deficiency ferritin 25.8 and iron saturation 16%.  He received multiple IV iron therapy was in late August and early September 2016, then early November 2016 and early January 2017. Based on his history, I will schedule this patient return every 2 months with lab studies including CBC ferritin and iron level, and scheduled him for Feraheme treatment.      2.  Severe Thrombocytopenia and leukopenia/neutropenia related to his liver cirrhosis and  HIV treatment. His platelet counts fluctuates but at baseline level. His WBC is worse this time, mostly due to worsened neutrophil counts.  Will continue to observe.           PLAN:    1. Feraheme weekly for 2 doses ASAP.    2. Return every 2 months with lab studies including CBC ferritin and iron level, and scheduled him for Feraheme treatment.   3. Patient will return in 6 months for MD visit. He will have repeat laboratory studies and iv iron therapy.          JAMIE BOWMAN M.D., Ph.D.   02/16/2017      CC: Klarissa Thomas M.D.        Dragon disclaimer:  Much of this encounter note is an electronic transcription/translation of spoken language to printed text. The electronic translation of spoken language may permit erroneous, or at times, nonsensical words or phrases to be inadvertently transcribed; Although I have reviewed the note for such errors, some may still exist.

## 2017-02-21 DIAGNOSIS — IMO0001 IRON AND ITS COMPOUNDS CAUSING ADVERSE EFFECT IN THERAPEUTIC USE, SUBSEQUENT ENCOUNTER: ICD-10-CM

## 2017-02-21 DIAGNOSIS — D50.0 IRON DEFICIENCY ANEMIA DUE TO CHRONIC BLOOD LOSS: Primary | ICD-10-CM

## 2017-02-21 PROBLEM — T45.4X5A IRON AND ITS COMPOUNDS CAUSING ADVERSE EFFECT IN THERAPEUTIC USE: Status: ACTIVE | Noted: 2017-02-21

## 2017-02-21 RX ORDER — SODIUM CHLORIDE 9 MG/ML
250 INJECTION, SOLUTION INTRAVENOUS ONCE
Status: CANCELLED | OUTPATIENT
Start: 2017-03-01

## 2017-02-21 RX ORDER — FAMOTIDINE 10 MG/ML
20 INJECTION, SOLUTION INTRAVENOUS ONCE
Status: CANCELLED | OUTPATIENT
Start: 2017-02-22

## 2017-02-21 RX ORDER — DIPHENHYDRAMINE HCL 25 MG
25 CAPSULE ORAL ONCE
Status: CANCELLED | OUTPATIENT
Start: 2017-03-01

## 2017-02-21 RX ORDER — SODIUM CHLORIDE 9 MG/ML
250 INJECTION, SOLUTION INTRAVENOUS ONCE
Status: CANCELLED | OUTPATIENT
Start: 2017-02-22

## 2017-02-21 RX ORDER — DIPHENHYDRAMINE HCL 25 MG
25 CAPSULE ORAL ONCE
Status: CANCELLED | OUTPATIENT
Start: 2017-02-22

## 2017-02-22 ENCOUNTER — INFUSION (OUTPATIENT)
Dept: ONCOLOGY | Facility: HOSPITAL | Age: 72
End: 2017-02-22

## 2017-02-22 VITALS
TEMPERATURE: 98 F | HEART RATE: 52 BPM | DIASTOLIC BLOOD PRESSURE: 51 MMHG | SYSTOLIC BLOOD PRESSURE: 100 MMHG | WEIGHT: 186.2 LBS | BODY MASS INDEX: 28.31 KG/M2

## 2017-02-22 DIAGNOSIS — D50.0 IRON DEFICIENCY ANEMIA DUE TO CHRONIC BLOOD LOSS: ICD-10-CM

## 2017-02-22 DIAGNOSIS — IMO0001 IRON AND ITS COMPOUNDS CAUSING ADVERSE EFFECT IN THERAPEUTIC USE, SUBSEQUENT ENCOUNTER: Primary | ICD-10-CM

## 2017-02-22 LAB
ALBUMIN SERPL-MCNC: 3.3 G/DL (ref 3.5–5.2)
ALBUMIN/GLOB SERPL: 0.9 G/DL (ref 1.1–2.4)
ALP SERPL-CCNC: 156 U/L (ref 38–116)
ALT SERPL W P-5'-P-CCNC: 22 U/L (ref 0–41)
ANION GAP SERPL CALCULATED.3IONS-SCNC: 11.1 MMOL/L
AST SERPL-CCNC: 43 U/L (ref 0–40)
BASOPHILS # BLD AUTO: 0.01 10*3/MM3 (ref 0–0.1)
BASOPHILS NFR BLD AUTO: 0.5 % (ref 0–1.1)
BILIRUB SERPL-MCNC: 1.4 MG/DL (ref 0.1–1.2)
BUN BLD-MCNC: 11 MG/DL (ref 6–20)
BUN/CREAT SERPL: 11.6 (ref 7.3–30)
CALCIUM SPEC-SCNC: 8.7 MG/DL (ref 8.5–10.2)
CHLORIDE SERPL-SCNC: 103 MMOL/L (ref 98–107)
CO2 SERPL-SCNC: 22.9 MMOL/L (ref 22–29)
CREAT BLD-MCNC: 0.95 MG/DL (ref 0.7–1.3)
DEPRECATED RDW RBC AUTO: 58.3 FL (ref 37–49)
EOSINOPHIL # BLD AUTO: 0.04 10*3/MM3 (ref 0–0.36)
EOSINOPHIL NFR BLD AUTO: 2.1 % (ref 1–5)
ERYTHROCYTE [DISTWIDTH] IN BLOOD BY AUTOMATED COUNT: 16.1 % (ref 11.7–14.5)
GFR SERPL CREATININE-BSD FRML MDRD: 78 ML/MIN/1.73
GLOBULIN UR ELPH-MCNC: 3.6 GM/DL (ref 1.8–3.5)
GLUCOSE BLD-MCNC: 204 MG/DL (ref 74–124)
HCT VFR BLD AUTO: 32.4 % (ref 40–49)
HGB BLD-MCNC: 9.9 G/DL (ref 13.5–16.5)
IMM GRANULOCYTES # BLD: 0.01 10*3/MM3 (ref 0–0.03)
IMM GRANULOCYTES NFR BLD: 0.5 % (ref 0–0.5)
LYMPHOCYTES # BLD AUTO: 0.18 10*3/MM3 (ref 1–3.5)
LYMPHOCYTES NFR BLD AUTO: 9.5 % (ref 20–49)
MCH RBC QN AUTO: 29.9 PG (ref 27–33)
MCHC RBC AUTO-ENTMCNC: 30.6 G/DL (ref 32–35)
MCV RBC AUTO: 97.9 FL (ref 83–97)
MONOCYTES # BLD AUTO: 0.24 10*3/MM3 (ref 0.25–0.8)
MONOCYTES NFR BLD AUTO: 12.7 % (ref 4–12)
NEUTROPHILS # BLD AUTO: 1.41 10*3/MM3 (ref 1.5–7)
NEUTROPHILS NFR BLD AUTO: 74.7 % (ref 39–75)
NRBC BLD MANUAL-RTO: 0 /100 WBC (ref 0–0)
PLATELET # BLD AUTO: 37 10*3/MM3 (ref 150–375)
POTASSIUM BLD-SCNC: 3.9 MMOL/L (ref 3.5–4.7)
PROT SERPL-MCNC: 6.9 G/DL (ref 6.3–8)
RBC # BLD AUTO: 3.31 10*6/MM3 (ref 4.3–5.5)
SODIUM BLD-SCNC: 137 MMOL/L (ref 134–145)
WBC NRBC COR # BLD: 1.89 10*3/MM3 (ref 4–10)

## 2017-02-22 PROCEDURE — 36415 COLL VENOUS BLD VENIPUNCTURE: CPT

## 2017-02-22 PROCEDURE — 80053 COMPREHEN METABOLIC PANEL: CPT

## 2017-02-22 PROCEDURE — 96365 THER/PROPH/DIAG IV INF INIT: CPT

## 2017-02-22 PROCEDURE — 25010000002 FERUMOXYTOL 510 MG/17ML SOLUTION 510 MG VIAL: Performed by: INTERNAL MEDICINE

## 2017-02-22 PROCEDURE — 85025 COMPLETE CBC W/AUTO DIFF WBC: CPT

## 2017-02-22 RX ORDER — FAMOTIDINE 10 MG/ML
20 INJECTION, SOLUTION INTRAVENOUS ONCE
Status: DISCONTINUED | OUTPATIENT
Start: 2017-02-22 | End: 2017-02-28 | Stop reason: HOSPADM

## 2017-02-22 RX ORDER — DIPHENHYDRAMINE HCL 25 MG
25 CAPSULE ORAL ONCE
Status: DISCONTINUED | OUTPATIENT
Start: 2017-02-22 | End: 2017-02-28 | Stop reason: HOSPADM

## 2017-02-22 RX ORDER — SODIUM CHLORIDE 9 MG/ML
250 INJECTION, SOLUTION INTRAVENOUS ONCE
Status: COMPLETED | OUTPATIENT
Start: 2017-02-22 | End: 2017-02-22

## 2017-02-22 RX ADMIN — SODIUM CHLORIDE 250 ML: 900 INJECTION, SOLUTION INTRAVENOUS at 15:00

## 2017-02-22 RX ADMIN — FERUMOXYTOL 510 MG: 510 INJECTION INTRAVENOUS at 15:00

## 2017-02-22 NOTE — TELEPHONE ENCOUNTER
Do not take aldactone bc this will increase potassium.  Ok to try lasix every other day - will need to recheck kidney function to make sure he is tolerating this in 2-3 weeks - bmp ordered

## 2017-02-22 NOTE — TELEPHONE ENCOUNTER
"Call to pt.  Advise per Dr Butler that do not take aldactone because this will increase potassium.  Ok to try lasix every other day - will need to recheck kidney function to make sure he is tolerating this in 2-3 weeks.    Pt verb understanding.  Appt scheduled for 3/8/17 @ 1300 for lab.  Pt reports has been completing daily AM wts and has noted wt increase of \"about 8 lbs\".    Pt asking for reminder of which Dermatologist Dr Butler recommended to him with o/v of 12/6/16.  Message sent to Dr Butler.    Pt states may leave VM with answer.  "

## 2017-02-23 LAB
BASOPHILS # BLD AUTO: 0 X10E3/UL (ref 0–0.2)
BASOPHILS NFR BLD AUTO: 1 %
CD3+CD4+ CELLS # BLD: 91 /UL (ref 359–1519)
CD3+CD4+ CELLS NFR BLD: 30.3 % (ref 30.8–58.5)
EOSINOPHIL # BLD AUTO: 0 X10E3/UL (ref 0–0.4)
EOSINOPHIL # BLD AUTO: 2 %
ERYTHROCYTE [DISTWIDTH] IN BLOOD BY AUTOMATED COUNT: 16.7 % (ref 12.3–15.4)
HCT VFR BLD AUTO: 31.9 % (ref 37.5–51)
HGB BLD-MCNC: 10 G/DL (ref 12.6–17.7)
IMM GRANULOCYTES # BLD: 0 X10E3/UL (ref 0–0.1)
IMM GRANULOCYTES NFR BLD: 0 %
LYMPHOCYTES # BLD AUTO: 0.3 X10E3/UL (ref 0.7–3.1)
LYMPHOCYTES NFR BLD AUTO: 13 %
MCH RBC QN AUTO: 29.2 PG (ref 26.6–33)
MCHC RBC AUTO-ENTMCNC: 31.3 G/DL (ref 31.5–35.7)
MCV RBC AUTO: 93 FL (ref 79–97)
MONOCYTES # BLD AUTO: 0.2 X10E3/UL (ref 0.1–0.9)
MONOCYTES NFR BLD AUTO: 9 %
MORPHOLOGY BLD-IMP: ABNORMAL
NEUTROPHILS # BLD AUTO: 1.5 X10E3/UL (ref 1.4–7)
NEUTROPHILS NFR BLD AUTO: 75 %
PLATELET # BLD AUTO: 38 X10E3/UL (ref 150–379)
RBC # BLD AUTO: 3.43 X10E6/UL (ref 4.14–5.8)
WBC # BLD AUTO: 2 X10E3/UL (ref 3.4–10.8)

## 2017-02-24 LAB
HIV1 RNA # SERPL NAA+PROBE: <20 COPIES/ML
LOG10 HIV-1 RNA: NORMAL LOG10COPY/ML

## 2017-02-26 ENCOUNTER — RESULTS ENCOUNTER (OUTPATIENT)
Dept: GASTROENTEROLOGY | Facility: CLINIC | Age: 72
End: 2017-02-26

## 2017-02-26 DIAGNOSIS — R18.8 CIRRHOSIS OF LIVER WITH ASCITES, UNSPECIFIED HEPATIC CIRRHOSIS TYPE (HCC): ICD-10-CM

## 2017-02-26 DIAGNOSIS — K74.60 CIRRHOSIS OF LIVER WITH ASCITES, UNSPECIFIED HEPATIC CIRRHOSIS TYPE (HCC): ICD-10-CM

## 2017-03-01 ENCOUNTER — INFUSION (OUTPATIENT)
Dept: ONCOLOGY | Facility: HOSPITAL | Age: 72
End: 2017-03-01

## 2017-03-01 VITALS
DIASTOLIC BLOOD PRESSURE: 68 MMHG | WEIGHT: 186 LBS | SYSTOLIC BLOOD PRESSURE: 106 MMHG | BODY MASS INDEX: 28.28 KG/M2 | HEART RATE: 45 BPM | TEMPERATURE: 98 F

## 2017-03-01 DIAGNOSIS — D50.0 IRON DEFICIENCY ANEMIA DUE TO CHRONIC BLOOD LOSS: ICD-10-CM

## 2017-03-01 DIAGNOSIS — IMO0001 IRON AND ITS COMPOUNDS CAUSING ADVERSE EFFECT IN THERAPEUTIC USE, SUBSEQUENT ENCOUNTER: Primary | ICD-10-CM

## 2017-03-01 PROCEDURE — 96374 THER/PROPH/DIAG INJ IV PUSH: CPT

## 2017-03-01 PROCEDURE — 25010000002 FERUMOXYTOL 510 MG/17ML SOLUTION 510 MG VIAL: Performed by: INTERNAL MEDICINE

## 2017-03-01 RX ORDER — SODIUM CHLORIDE 9 MG/ML
250 INJECTION, SOLUTION INTRAVENOUS ONCE
Status: COMPLETED | OUTPATIENT
Start: 2017-03-01 | End: 2017-03-01

## 2017-03-01 RX ADMIN — SODIUM CHLORIDE 250 ML: 900 INJECTION, SOLUTION INTRAVENOUS at 14:38

## 2017-03-01 RX ADMIN — FERUMOXYTOL 510 MG: 510 INJECTION INTRAVENOUS at 14:39

## 2017-03-09 ENCOUNTER — TELEPHONE (OUTPATIENT)
Dept: GASTROENTEROLOGY | Facility: CLINIC | Age: 72
End: 2017-03-09

## 2017-03-09 LAB
BUN SERPL-MCNC: 15 MG/DL (ref 8–23)
BUN/CREAT SERPL: 12.7 (ref 7–25)
CALCIUM SERPL-MCNC: 10 MG/DL (ref 8.6–10.5)
CHLORIDE SERPL-SCNC: 102 MMOL/L (ref 98–107)
CO2 SERPL-SCNC: 24.1 MMOL/L (ref 22–29)
CREAT SERPL-MCNC: 1.18 MG/DL (ref 0.76–1.27)
GLUCOSE SERPL-MCNC: 202 MG/DL (ref 65–99)
POTASSIUM SERPL-SCNC: 4.8 MMOL/L (ref 3.5–5.2)
SODIUM SERPL-SCNC: 137 MMOL/L (ref 136–145)

## 2017-03-09 NOTE — TELEPHONE ENCOUNTER
Called pt and advised per Dr Butler that the kidney function stable and it ok to continue lasix only every other day.  Pt verb understanding.

## 2017-03-09 NOTE — TELEPHONE ENCOUNTER
----- Message from Robyn Butler MD sent at 3/9/2017 10:25 AM EST -----  Kidney function stable - ok to cont lasix only every other day

## 2017-03-24 ENCOUNTER — OFFICE VISIT (OUTPATIENT)
Dept: NEUROSURGERY | Facility: CLINIC | Age: 72
End: 2017-03-24

## 2017-03-24 VITALS
HEART RATE: 54 BPM | BODY MASS INDEX: 28.19 KG/M2 | HEIGHT: 68 IN | SYSTOLIC BLOOD PRESSURE: 105 MMHG | WEIGHT: 186 LBS | DIASTOLIC BLOOD PRESSURE: 55 MMHG

## 2017-03-24 DIAGNOSIS — M54.2 CERVICAL PAIN: ICD-10-CM

## 2017-03-24 DIAGNOSIS — M54.12 CERVICAL RADICULOPATHY: Primary | ICD-10-CM

## 2017-03-24 DIAGNOSIS — M54.50 LUMBAR PAIN ON PALPATION: ICD-10-CM

## 2017-03-24 DIAGNOSIS — M81.0 OSTEOPOROSIS: ICD-10-CM

## 2017-03-24 PROCEDURE — 99213 OFFICE O/P EST LOW 20 MIN: CPT | Performed by: NURSE PRACTITIONER

## 2017-03-24 NOTE — PROGRESS NOTES
Subjective   Patient ID: Kye Aranda is a 71 y.o. male is being seen for consultation today at the request of Klarissa Thomas MD for back pain. He is not currently taking any pain medication.    Back Pain   This is a chronic problem. The current episode started more than 1 year ago. The problem occurs daily. The problem has been gradually worsening since onset. The pain is present in the lumbar spine. The quality of the pain is described as aching. The pain does not radiate. The pain is at a severity of 6/10. The pain is moderate. The symptoms are aggravated by bending and lying down. Stiffness is present in the morning. Associated symptoms include pelvic pain and weakness (Difficulty walking; falls). Pertinent negatives include no abdominal pain, bladder incontinence, bowel incontinence, chest pain, dysuria, fever, headaches, leg pain, numbness, paresis, paresthesias, perianal numbness, tingling or weight loss. Risk factors include history of cancer and history of osteoporosis. He has tried heat and ice (TENS unit) for the symptoms. The treatment provided no relief.       The following portions of the patient's history were reviewed and updated as appropriate: allergies, current medications, past family history, past medical history, past social history, past surgical history and problem list.    Review of Systems   Constitutional: Positive for activity change. Negative for fever and weight loss.   Cardiovascular: Negative for chest pain.   Gastrointestinal: Negative for abdominal pain and bowel incontinence.   Genitourinary: Positive for pelvic pain. Negative for bladder incontinence and dysuria.   Musculoskeletal: Positive for arthralgias, back pain and neck pain.   Neurological: Positive for weakness (Difficulty walking; falls). Negative for tingling, numbness, headaches and paresthesias.   All other systems reviewed and are negative.      Objective   Physical Exam   Constitutional: He is oriented to person,  place, and time. He appears well-developed and well-nourished. He is cooperative. No distress.   HENT:   Head: Normocephalic and atraumatic.   Eyes: Conjunctivae are normal.   Neck: Normal range of motion. Neck supple.   Cardiovascular: Normal rate.    Pulmonary/Chest: Effort normal. No respiratory distress.   Abdominal: Soft. He exhibits no distension. There is no tenderness.   Musculoskeletal: Normal range of motion. He exhibits tenderness (In the lower lumbo sacral spine particularly on the right side). He exhibits no edema.   Neurological: He is alert and oriented to person, place, and time. He has normal strength. He displays normal reflexes. No sensory deficit. He exhibits normal muscle tone. Gait normal. Coordination and gait normal. GCS eye subscore is 4. GCS verbal subscore is 5. GCS motor subscore is 6.   Reflex Scores:       Tricep reflexes are 2+ on the right side and 2+ on the left side.       Bicep reflexes are 2+ on the right side and 2+ on the left side.       Brachioradialis reflexes are 2+ on the right side and 2+ on the left side.       Patellar reflexes are 2+ on the right side and 2+ on the left side.       Achilles reflexes are 2+ on the right side and 2+ on the left side.  Negative Mann's; negative clonus   Skin: Skin is warm and dry. No rash noted. He is not diaphoretic.   Psychiatric: He has a normal mood and affect. His speech is normal. Thought content normal.   Vitals reviewed.    Neurologic Exam     Mental Status   Oriented to person, place, and time.   Speech: speech is normal   Level of consciousness: alert    Motor Exam   Muscle bulk: normal  Overall muscle tone: normal    Strength   Strength 5/5 throughout.     Sensory Exam   Light touch normal.     Gait, Coordination, and Reflexes     Gait  Gait: normal    Reflexes   Right brachioradialis: 2+  Left brachioradialis: 2+  Right biceps: 2+  Left biceps: 2+  Right triceps: 2+  Left triceps: 2+  Right patellar: 2+  Left patellar:  2+  Right achilles: 2+  Left achilles: 2+  Right Mann: absent  Left Mann: absent  Right ankle clonus: absent  Left ankle clonus: absent      Assessment/Plan   Independent Review of Radiographic Studies:      I reviewed a whole body bone scan that was performed March 1, 2016 today in the office.  The bone scan was negative for malignancy or fracture.    I reviewed a DEXA scan performed February 29, 2016.  The study did show osteoporosis.    Medical Decision Making:       was seen today for neurosurgical opinion at the request of Dr. Klarissa Thomas.  Notes from today's visit will be forwarded upon completion.  The patient complains of intermittent back pain over the last 8 years.  He states that he is been having increasing falls over the last 2 months.  With the falls he has had increased episodes of back pain.  He also complains of neck pain with radiation into the right upper arm.     The patient has a history of multiple medical problems including osteoporosis; cirrhosis; pancytopenia; iron deficiency anemia, prostate cancer and HIV.     Given the history of falls Past medical history, I feel that an MRI of the cervical and lumbar spine is warranted.  The patient does have a history of prostate cancer but is requesting not to have his MRI performed with contrast.  He would rather not have to undergo IV insertion.  He does understand that the contrast would be more helpful in the event that there is any metastatic disease in his spine.  He understands that but still wishes to avoid the contrast.    We will get the MRIs without contrast.  I will see Mr. Aranda back in the office thereafter.      Kye was seen today for back pain.    Diagnoses and all orders for this visit:    Cervical radiculopathy  -     MRI Lumbar Spine Without Contrast; Future  -     MRI Cervical Spine Without Contrast; Future    Lumbar pain on palpation  -     MRI Lumbar Spine Without Contrast; Future  -     MRI Cervical Spine Without  Contrast; Future    Cervical pain  -     MRI Lumbar Spine Without Contrast; Future  -     MRI Cervical Spine Without Contrast; Future    Osteoporosis  -     MRI Lumbar Spine Without Contrast; Future  -     MRI Cervical Spine Without Contrast; Future      Return for after radiographic imaging.

## 2017-04-03 ENCOUNTER — HOSPITAL ENCOUNTER (OUTPATIENT)
Dept: MRI IMAGING | Facility: HOSPITAL | Age: 72
Discharge: HOME | End: 2017-04-03

## 2017-04-03 ENCOUNTER — HOSPITAL ENCOUNTER (OUTPATIENT)
Dept: MRI IMAGING | Facility: HOSPITAL | Age: 72
Discharge: HOME | End: 2017-04-03
Admitting: NURSE PRACTITIONER

## 2017-04-03 DIAGNOSIS — M81.0 OSTEOPOROSIS: ICD-10-CM

## 2017-04-03 DIAGNOSIS — M54.50 LUMBAR PAIN ON PALPATION: ICD-10-CM

## 2017-04-03 DIAGNOSIS — M54.12 CERVICAL RADICULOPATHY: ICD-10-CM

## 2017-04-03 DIAGNOSIS — M54.2 CERVICAL PAIN: ICD-10-CM

## 2017-04-03 PROCEDURE — 72141 MRI NECK SPINE W/O DYE: CPT

## 2017-04-03 PROCEDURE — 72148 MRI LUMBAR SPINE W/O DYE: CPT

## 2017-04-19 ENCOUNTER — OFFICE VISIT (OUTPATIENT)
Dept: NEUROSURGERY | Facility: CLINIC | Age: 72
End: 2017-04-19

## 2017-04-19 VITALS
BODY MASS INDEX: 28.19 KG/M2 | HEART RATE: 48 BPM | WEIGHT: 186 LBS | DIASTOLIC BLOOD PRESSURE: 55 MMHG | HEIGHT: 68 IN | SYSTOLIC BLOOD PRESSURE: 100 MMHG

## 2017-04-19 DIAGNOSIS — M54.50 LUMBAR PAIN ON PALPATION: Primary | ICD-10-CM

## 2017-04-19 DIAGNOSIS — M47.26 OSTEOARTHRITIS OF SPINE WITH RADICULOPATHY, LUMBAR REGION: ICD-10-CM

## 2017-04-19 PROCEDURE — 99213 OFFICE O/P EST LOW 20 MIN: CPT | Performed by: NURSE PRACTITIONER

## 2017-04-19 RX ORDER — INSULIN DEGLUDEC 200 U/ML
INJECTION, SOLUTION SUBCUTANEOUS
Refills: 3 | Status: ON HOLD | COMMUNITY
Start: 2017-03-31 | End: 2018-01-01

## 2017-04-19 NOTE — PROGRESS NOTES
Subjective   Patient ID: Kye Aranda is a 71 y.o. male is here today for follow-up on back pain after having an MRI.He is not currently taking any pain medication.    Back Pain   This is a recurrent problem. The current episode started more than 1 year ago. The problem occurs daily. The problem has been gradually worsening since onset. The pain is present in the lumbar spine and sacro-iliac. The quality of the pain is described as aching and shooting. The pain does not radiate. The pain is at a severity of 7/10. The pain is moderate. The pain is worse during the night. The symptoms are aggravated by bending and lying down. Stiffness is present in the morning. Associated symptoms include weakness (difficulty walking). Pertinent negatives include no bladder incontinence, bowel incontinence or weight loss. Risk factors include history of cancer and history of osteoporosis. He has tried heat and ice (tens unit) for the symptoms. The treatment provided no relief.       The following portions of the patient's history were reviewed and updated as appropriate: allergies, current medications, past family history, past medical history, past social history, past surgical history and problem list.    Review of Systems   Constitutional: Positive for activity change. Negative for weight loss.   Gastrointestinal: Negative for bowel incontinence.   Genitourinary: Negative for bladder incontinence.   Musculoskeletal: Positive for back pain.   Neurological: Positive for weakness (difficulty walking).   All other systems reviewed and are negative.      Objective   Physical Exam   Constitutional: He is oriented to person, place, and time. He appears well-developed and well-nourished. He is cooperative. No distress.   HENT:   Head: Normocephalic and atraumatic.   Eyes: Conjunctivae are normal.   Neck: Normal range of motion. Neck supple.   Cardiovascular: Normal rate.    Pulmonary/Chest: Effort normal. No respiratory distress.    Abdominal: Soft. He exhibits no distension. There is no tenderness.   Musculoskeletal: Normal range of motion. He exhibits no edema.   Neurological: He is alert and oriented to person, place, and time. He has normal strength. He displays normal reflexes. No sensory deficit. Gait normal. Coordination normal. GCS eye subscore is 4. GCS verbal subscore is 5. GCS motor subscore is 6.   Reflex Scores:       Tricep reflexes are 2+ on the right side and 2+ on the left side.       Bicep reflexes are 2+ on the right side and 2+ on the left side.       Brachioradialis reflexes are 2+ on the right side and 2+ on the left side.       Patellar reflexes are 2+ on the right side and 2+ on the left side.       Achilles reflexes are 2+ on the right side and 2+ on the left side.  Negative Mann's; negative clonus   Skin: Skin is warm and dry. No rash noted. He is not diaphoretic.   Psychiatric: He has a normal mood and affect. His speech is normal. Thought content normal.   Vitals reviewed.    Neurologic Exam     Mental Status   Oriented to person, place, and time.   Speech: speech is normal   Level of consciousness: alert    Motor Exam   Muscle bulk: normal  Overall muscle tone: normal    Strength   Strength 5/5 throughout.     Sensory Exam   Light touch normal.     Gait, Coordination, and Reflexes     Gait  Gait: normal    Reflexes   Right brachioradialis: 2+  Left brachioradialis: 2+  Right biceps: 2+  Left biceps: 2+  Right triceps: 2+  Left triceps: 2+  Right patellar: 2+  Left patellar: 2+  Right achilles: 2+  Left achilles: 2+  Right Mann: absent  Left Mann: absent  Right ankle clonus: absent  Left ankle clonus: absent      Assessment/Plan   Independent Review of Radiographic Studies:      I reviewed the MRI images of the cervical and lumbar spine without contrast performed April 5, 2017 today in the office.  The MRI showed multilevel degenerative changes in the cervical spine mild to moderate left foraminal  narrowing at C3-4, mild foraminal narrowing on the left at C4-5.  No evidence of disc herniation or canal compromise.  The lumbar MRI also showed multilevel degenerative changes particularly at L3-4, L4-5 and L5-S1.  There is moderate facet arthropathy at L4-5 with degenerative anterior spondylolisthesis of L4 on L5 by approximately 3 mm.  There is mild right greater than left foraminal narrowing at L4-5.    Medical Decision Making:      Mr. Aranda returns to the office with a new MRI of the cervical and lumbar spine.  The cervical MRI did not show anything surgical noted the lumbar.  His major complaint is of low back pain.  He denies any leg pain symptoms.  He states that his cervical symptoms have improved.    I recommended conservative management for his symptoms.  Given that his pain is primarily in the back and his symptoms are worse on exam with hyperextension and right and left lateral bending, I thought that facet injections might be a good option however the patient does have a history of diabetes and states that it has been very difficult to get his blood sugars at a more stable level.  For this reason we will hold off on injection therapies.  I therefore recommended some physical therapy and possible dry needling.  The patient is willing to give it a try.  If his symptoms worsen, he will call the office.  We will see him as needed from here.    yKe was seen today for back pain.    Diagnoses and all orders for this visit:    Lumbar pain on palpation  -     Ambulatory Referral to Physical Therapy Evaluate and treat (for core strengthening and DRY NEEDLING with other modalities and intro to HEP)    Osteoarthritis of spine with radiculopathy, lumbar region      Return if symptoms worsen or fail to improve.

## 2017-04-20 RX ORDER — METOPROLOL SUCCINATE 100 MG
TABLET, EXTENDED RELEASE 24 HR ORAL
Qty: 90 TABLET | Refills: 3 | Status: SHIPPED | OUTPATIENT
Start: 2017-04-20 | End: 2018-01-01

## 2017-04-21 DIAGNOSIS — D50.0 IRON DEFICIENCY ANEMIA DUE TO CHRONIC BLOOD LOSS: ICD-10-CM

## 2017-04-21 DIAGNOSIS — IMO0001 IRON AND ITS COMPOUNDS CAUSING ADVERSE EFFECT IN THERAPEUTIC USE, SUBSEQUENT ENCOUNTER: Primary | ICD-10-CM

## 2017-04-26 ENCOUNTER — INFUSION (OUTPATIENT)
Dept: ONCOLOGY | Facility: HOSPITAL | Age: 72
End: 2017-04-26

## 2017-04-26 VITALS
WEIGHT: 181.4 LBS | HEART RATE: 55 BPM | DIASTOLIC BLOOD PRESSURE: 62 MMHG | SYSTOLIC BLOOD PRESSURE: 103 MMHG | BODY MASS INDEX: 27.58 KG/M2

## 2017-04-26 DIAGNOSIS — R06.89 DIFFICULTY BREATHING: ICD-10-CM

## 2017-04-26 DIAGNOSIS — I25.10 CHRONIC CORONARY ARTERY DISEASE: ICD-10-CM

## 2017-04-26 DIAGNOSIS — D61.818 PANCYTOPENIA (HCC): ICD-10-CM

## 2017-04-26 DIAGNOSIS — D69.59 THROMBOCYTOPENIA ASSOCIATED WITH AIDS (HCC): ICD-10-CM

## 2017-04-26 DIAGNOSIS — C61 MALIGNANT NEOPLASM PROSTATE (HCC): ICD-10-CM

## 2017-04-26 DIAGNOSIS — E11.9 CONTROLLED TYPE 2 DIABETES MELLITUS WITHOUT COMPLICATION, UNSPECIFIED LONG TERM INSULIN USE STATUS: ICD-10-CM

## 2017-04-26 DIAGNOSIS — B20 THROMBOCYTOPENIA ASSOCIATED WITH AIDS (HCC): ICD-10-CM

## 2017-04-26 DIAGNOSIS — R06.02 SHORTNESS OF BREATH: ICD-10-CM

## 2017-04-26 DIAGNOSIS — IMO0001 IRON AND ITS COMPOUNDS CAUSING ADVERSE EFFECT IN THERAPEUTIC USE, SUBSEQUENT ENCOUNTER: Primary | ICD-10-CM

## 2017-04-26 DIAGNOSIS — I77.9 PERIPHERAL ARTERIAL OCCLUSIVE DISEASE (HCC): ICD-10-CM

## 2017-04-26 DIAGNOSIS — I73.9 INTERMITTENT CLAUDICATION (HCC): ICD-10-CM

## 2017-04-26 DIAGNOSIS — D50.0 IRON DEFICIENCY ANEMIA DUE TO CHRONIC BLOOD LOSS: ICD-10-CM

## 2017-04-26 LAB
BASOPHILS # BLD AUTO: 0.01 10*3/MM3 (ref 0–0.1)
BASOPHILS NFR BLD AUTO: 0.5 % (ref 0–1.1)
CHOLEST SERPL-MCNC: 189 MG/DL (ref 0–200)
DEPRECATED RDW RBC AUTO: 56.7 FL (ref 37–49)
EOSINOPHIL # BLD AUTO: 0.04 10*3/MM3 (ref 0–0.36)
EOSINOPHIL NFR BLD AUTO: 1.9 % (ref 1–5)
ERYTHROCYTE [DISTWIDTH] IN BLOOD BY AUTOMATED COUNT: 15.9 % (ref 11.7–14.5)
FERRITIN SERPL-MCNC: 34 NG/ML (ref 30–400)
HBA1C MFR BLD: 6.16 % (ref 4.8–5.6)
HCT VFR BLD AUTO: 37.1 % (ref 40–49)
HDLC SERPL-MCNC: 48 MG/DL (ref 40–60)
HGB BLD-MCNC: 11.7 G/DL (ref 13.5–16.5)
IMM GRANULOCYTES # BLD: 0.01 10*3/MM3 (ref 0–0.03)
IMM GRANULOCYTES NFR BLD: 0.5 % (ref 0–0.5)
IRON 24H UR-MRATE: 56 MCG/DL (ref 59–158)
IRON SATN MFR SERPL: 14 % (ref 14–48)
LDLC SERPL CALC-MCNC: 118 MG/DL (ref 0–100)
LDLC/HDLC SERPL: 2.46 {RATIO}
LYMPHOCYTES # BLD AUTO: 0.22 10*3/MM3 (ref 1–3.5)
LYMPHOCYTES NFR BLD AUTO: 10.3 % (ref 20–49)
MCH RBC QN AUTO: 30.4 PG (ref 27–33)
MCHC RBC AUTO-ENTMCNC: 31.5 G/DL (ref 32–35)
MCV RBC AUTO: 96.4 FL (ref 83–97)
MONOCYTES # BLD AUTO: 0.22 10*3/MM3 (ref 0.25–0.8)
MONOCYTES NFR BLD AUTO: 10.3 % (ref 4–12)
NEUTROPHILS # BLD AUTO: 1.63 10*3/MM3 (ref 1.5–7)
NEUTROPHILS NFR BLD AUTO: 76.5 % (ref 39–75)
NRBC BLD MANUAL-RTO: 0 /100 WBC (ref 0–0)
PLATELET # BLD AUTO: 38 10*3/MM3 (ref 150–375)
PSA SERPL-MCNC: 0.72 NG/ML (ref 0–4)
RBC # BLD AUTO: 3.85 10*6/MM3 (ref 4.3–5.5)
TIBC SERPL-MCNC: 391 MCG/DL (ref 249–505)
TRANSFERRIN SERPL-MCNC: 279 MG/DL (ref 200–360)
TRIGL SERPL-MCNC: 115 MG/DL (ref 0–150)
VLDLC SERPL-MCNC: 23 MG/DL (ref 5–40)
WBC NRBC COR # BLD: 2.13 10*3/MM3 (ref 4–10)

## 2017-04-26 PROCEDURE — 84153 ASSAY OF PSA TOTAL: CPT | Performed by: UROLOGY

## 2017-04-26 PROCEDURE — 25010000002 FERUMOXYTOL 510 MG/17ML SOLUTION 510 MG VIAL: Performed by: INTERNAL MEDICINE

## 2017-04-26 PROCEDURE — 83036 HEMOGLOBIN GLYCOSYLATED A1C: CPT | Performed by: UROLOGY

## 2017-04-26 PROCEDURE — 96374 THER/PROPH/DIAG INJ IV PUSH: CPT | Performed by: INTERNAL MEDICINE

## 2017-04-26 PROCEDURE — 80061 LIPID PANEL: CPT | Performed by: UROLOGY

## 2017-04-26 PROCEDURE — 82728 ASSAY OF FERRITIN: CPT | Performed by: INTERNAL MEDICINE

## 2017-04-26 PROCEDURE — 83540 ASSAY OF IRON: CPT | Performed by: INTERNAL MEDICINE

## 2017-04-26 PROCEDURE — 84466 ASSAY OF TRANSFERRIN: CPT | Performed by: INTERNAL MEDICINE

## 2017-04-26 PROCEDURE — 85025 COMPLETE CBC W/AUTO DIFF WBC: CPT | Performed by: INTERNAL MEDICINE

## 2017-04-26 RX ORDER — SODIUM CHLORIDE 9 MG/ML
250 INJECTION, SOLUTION INTRAVENOUS ONCE
Status: COMPLETED | OUTPATIENT
Start: 2017-04-26 | End: 2017-04-26

## 2017-04-26 RX ORDER — DIPHENHYDRAMINE HCL 25 MG
25 CAPSULE ORAL ONCE
Status: DISCONTINUED | OUTPATIENT
Start: 2017-04-26 | End: 2017-04-26 | Stop reason: HOSPADM

## 2017-04-26 RX ORDER — FAMOTIDINE 10 MG/ML
20 INJECTION, SOLUTION INTRAVENOUS ONCE
Status: DISCONTINUED | OUTPATIENT
Start: 2017-04-26 | End: 2017-04-26 | Stop reason: HOSPADM

## 2017-04-26 RX ADMIN — SODIUM CHLORIDE 250 ML: 900 INJECTION, SOLUTION INTRAVENOUS at 14:30

## 2017-04-26 RX ADMIN — FERUMOXYTOL 510 MG: 510 INJECTION INTRAVENOUS at 15:31

## 2017-04-27 LAB — MICROALBUMIN UR-MCNC: 29.5 UG/ML

## 2017-04-28 ENCOUNTER — TREATMENT (OUTPATIENT)
Dept: PHYSICAL THERAPY | Facility: CLINIC | Age: 72
End: 2017-04-28

## 2017-04-28 DIAGNOSIS — M54.50 CHRONIC BILATERAL LOW BACK PAIN WITHOUT SCIATICA: Primary | ICD-10-CM

## 2017-04-28 DIAGNOSIS — M51.36 DEGENERATIVE DISC DISEASE, LUMBAR: ICD-10-CM

## 2017-04-28 DIAGNOSIS — M99.03 LUMBOSACRAL DYSFUNCTION: ICD-10-CM

## 2017-04-28 DIAGNOSIS — G89.29 CHRONIC BILATERAL LOW BACK PAIN WITHOUT SCIATICA: Primary | ICD-10-CM

## 2017-04-28 PROCEDURE — 97110 THERAPEUTIC EXERCISES: CPT | Performed by: PHYSICAL THERAPIST

## 2017-04-28 PROCEDURE — G8978 MOBILITY CURRENT STATUS: HCPCS | Performed by: PHYSICAL THERAPIST

## 2017-04-28 PROCEDURE — 97161 PT EVAL LOW COMPLEX 20 MIN: CPT | Performed by: PHYSICAL THERAPIST

## 2017-04-28 PROCEDURE — G8979 MOBILITY GOAL STATUS: HCPCS | Performed by: PHYSICAL THERAPIST

## 2017-04-28 NOTE — PROGRESS NOTES
Physical Therapy Initial Evaluation and Plan of Care    Patient: Kye Aranda   : 1945  Diagnosis/ICD-10 Code:  No primary diagnosis found.  Referring practitioner: HANG Mckinney  Past medical Hx reviewed: 2017     Subjective Evaluation    History of Present Illness  Onset date: 7-8 YEARS OF BACK PAIN.  Mechanism of injury: No JANELL but over the last 7-8 years or so I have been experiencing more back pain.  I have a really hard time getting out of bed and having back pain in the mornings.  Getting to the bathroom is really difficult in the mornings and sometimes have to lean on furniture.  I did buy a cane but it doesn't really help much.  I usually use the over the counter creams (icy-hot) and store bought TENS unit to help with the pain.  The pain eases up as the day goes.  The pain isn't there all the time.    I sleep on my sides and wake up frequently due to using restroom (10x/night depending the night, hx of prostate CA).    Self care is okay but with more pain in the morning. No issues with driving, standing 1/2 hour is difficult but pain stays to the back.    At home not cutting grass and have difficulty with vacuuming. I get some pain afterwards.    I do have some decreased balance and have fallen going down the last few stairs.  I stumble and was able to break my fall with my arms.  I fell 2x like that in the last 6 months.         Pain  Current pain ratin  At best pain ratin  At worst pain ratin  Location: Across the lower back.   (Pressure)  Quality: tight  Relieving factors: heat and relaxation  Aggravating factors: lifting (15-20 lbs, bending at the waist.  )    Diagnostic Tests  MRI studies: abnormal    Patient Goals  Patient goals for therapy: increased strength, independence with ADLs/IADLs, decreased pain and increased motion        Objective     Static Posture     Thoracic Spine  Hypokyphosis.    Lumbar Spine   Flattened.     Active Range of Motion     Additional  Active Range of Motion Details  Flexion: 75%, Extension: <25%, SB: (B): < 25%, Rotation (B): 25%     Strength/Myotome Testing     Left Hip   Planes of Motion   Flexion: 3+  Extension: 4-  Abduction: 4-  Adduction: 3+  External rotation: 4-  Internal rotation: 4-    Right Hip   Planes of Motion   Flexion: 4-  Extension: 4-  Abduction: 4-  Adduction: 3+  External rotation: 4-  Internal rotation: 4-    Left Knee   Flexion: 4  Extension: 4    Right Knee   Flexion: 4  Extension: 4    Left Ankle/Foot   Dorsiflexion: 4+  Plantar flexion: 4- (knees bend to perform )  Inversion: 4  Eversion: 4-    Right Ankle/Foot   Dorsiflexion: 4+  Plantar flexion: 4- (knees bend to perform )  Inversion: 4  Eversion: 4-    Ambulation   Weight-Bearing Status   Weight-Bearing Status (Left): weight-bearing as tolerated     Ambulation: Level Surfaces   Ambulation without assistive device: independent    Ambulation: Stairs   Ascend stairs: independent  Pattern: reciprocal  Descend stairs: independent  Pattern: reciprocal  Railings: one rail  Curbs: independent    Observational Gait   Decreased walking speed and stride length.     Quality of Movement During Gait     Additional Quality of Movement During Gait Details  No trunk rotation in gait.  Good arm swing.      Functional Assessment     Single Leg Stance   Left: 2 seconds  Right: 1 seconds    General Comments     Hip Comments   (B) LE severe lack of muscle flexibility of HS, Hip abductors (hip ER motion < 20 degrees in 90/90 position)   Assessment & Plan     Assessment  Impairments: abnormal or restricted ROM, activity intolerance, impaired balance, impaired physical strength, lacks appropriate home exercise program, pain with function and weight-bearing intolerance  Assessment details: Pt presents to PT with symptoms consistent with hypomobility of the spine secondary to degenerative changes.  Additionally, (B) LE weakness and lack of flexibility have contributed to pain and compression of  the lumbosacral area.  He has not had any formal PT for the current issue and the pt would benefit from skilled PT intervention to address the deficits noted.  Utilization of traditional, conservative forms of PT are indicated prior to introduction of alternative modalities.      Barriers to therapy: Extensive history/chronicity of condition.    Prognosis: fair  Prognosis details:     SHORT TERM GOALS: Time for Goal Achievement: 3-4 weeks    1.  Patient to be compliant and progression of HEP                             2.  Pain level < 6/10 at worst with mentioned activities to improve function in the AM  3.  Increased thoracic, lumbar and SIJ mobility to allow for increased lumbar AROM by 25%with less pain.  4.  Increased LE strength to at least 4/5 all planes for improved bed transfers and WBing times.      LONG TERM GOALS: Time for Goal Achievement: 6-8 weeks  1.  Pt. to score < 25 % on Back Index  2.  Pain level < 4/10 at worst with all listed activities to return to normal.  3.  Lumbar AROM to WFL (75% all directions) to allow for return to household & self care activities w/o increased symptoms >4/10,  45 min.    4.  (B) LE and lower abdominal strength to 4+/5 to allow for pushing, pulling and activities to occur without pain (Standing, walking, household  & self care requirements).  5. Increased hip mobility/flexibility (hip ER to 40 degrees, 90/90 HS to > 70) to decrease strain on lumbosacral region.    6.  Increased LE/trunk stability to demonstrate SLS from 1-2 sec. To 10 sec.  For improved safety.              Plan  Therapy options: will be seen for skilled physical therapy services  Planned modality interventions: TENS, electrical stimulation/Russian stimulation, thermotherapy (hydrocollator packs), traction, ultrasound and cryotherapy  Other planned modality interventions: Dry Needling  Planned therapy interventions: body mechanics training, flexibility, manual therapy, neuromuscular re-education,  postural training, soft tissue mobilization, spinal/joint mobilization, strengthening, stretching, therapeutic activities, home exercise program and functional ROM exercises  Frequency: 2x week  Duration in weeks: 8  Treatment plan discussed with: patient        Manual Therapy:    -     mins  08347;  Therapeutic Exercise:    15     mins  71899;     Neuromuscular Olena:    -    mins  22050;    Therapeutic Activity:     -     mins  09182;     Gait Training:      -     mins  29549;     Ultrasound:     -     mins  33458;    Electrical Stimulation:    -     mins  97232 ( );  Dry Needling     -     mins self-pay    Timed Treatment:   15   mins   Total Treatment:     65   mins    PT SIGNATURE: VY Silva License #: [id number]    DATE TREATMENT INITIATED: 4/28/2017    Medicare Initial Certification  Certification Period: 7/27/2017  I certify that the therapy services are furnished while this patient is under my care.  The services outlined above are required by this patient, and will be reviewed every 90 days.     PHYSICIAN: Arabella Walden, HANG      DATE:     Please sign and return via fax to [phone].. Thank you, [facility].

## 2017-05-03 ENCOUNTER — INFUSION (OUTPATIENT)
Dept: ONCOLOGY | Facility: HOSPITAL | Age: 72
End: 2017-05-03

## 2017-05-03 VITALS
BODY MASS INDEX: 27.7 KG/M2 | DIASTOLIC BLOOD PRESSURE: 55 MMHG | SYSTOLIC BLOOD PRESSURE: 113 MMHG | TEMPERATURE: 97.7 F | HEART RATE: 45 BPM | WEIGHT: 182.2 LBS

## 2017-05-03 DIAGNOSIS — D69.59 THROMBOCYTOPENIA ASSOCIATED WITH AIDS (HCC): ICD-10-CM

## 2017-05-03 DIAGNOSIS — R06.89 DIFFICULTY BREATHING: ICD-10-CM

## 2017-05-03 DIAGNOSIS — D50.0 IRON DEFICIENCY ANEMIA DUE TO CHRONIC BLOOD LOSS: ICD-10-CM

## 2017-05-03 DIAGNOSIS — R06.02 SHORTNESS OF BREATH: ICD-10-CM

## 2017-05-03 DIAGNOSIS — D61.818 PANCYTOPENIA (HCC): ICD-10-CM

## 2017-05-03 DIAGNOSIS — C61 MALIGNANT NEOPLASM PROSTATE (HCC): ICD-10-CM

## 2017-05-03 DIAGNOSIS — B20 THROMBOCYTOPENIA ASSOCIATED WITH AIDS (HCC): ICD-10-CM

## 2017-05-03 DIAGNOSIS — IMO0001 IRON AND ITS COMPOUNDS CAUSING ADVERSE EFFECT IN THERAPEUTIC USE, SUBSEQUENT ENCOUNTER: ICD-10-CM

## 2017-05-03 DIAGNOSIS — I25.10 CHRONIC CORONARY ARTERY DISEASE: ICD-10-CM

## 2017-05-03 DIAGNOSIS — I73.9 INTERMITTENT CLAUDICATION (HCC): ICD-10-CM

## 2017-05-03 DIAGNOSIS — I77.9 PERIPHERAL ARTERIAL OCCLUSIVE DISEASE (HCC): ICD-10-CM

## 2017-05-03 DIAGNOSIS — E11.9 CONTROLLED TYPE 2 DIABETES MELLITUS WITHOUT COMPLICATION, UNSPECIFIED LONG TERM INSULIN USE STATUS: Primary | ICD-10-CM

## 2017-05-03 PROCEDURE — 25010000002 FERUMOXYTOL 510 MG/17ML SOLUTION 510 MG VIAL: Performed by: INTERNAL MEDICINE

## 2017-05-03 PROCEDURE — 96374 THER/PROPH/DIAG INJ IV PUSH: CPT | Performed by: INTERNAL MEDICINE

## 2017-05-03 RX ORDER — DIPHENHYDRAMINE HCL 25 MG
25 CAPSULE ORAL ONCE
Status: DISCONTINUED | OUTPATIENT
Start: 2017-05-03 | End: 2017-05-03 | Stop reason: HOSPADM

## 2017-05-03 RX ORDER — SODIUM CHLORIDE 9 MG/ML
250 INJECTION, SOLUTION INTRAVENOUS ONCE
Status: COMPLETED | OUTPATIENT
Start: 2017-05-03 | End: 2017-05-03

## 2017-05-03 RX ADMIN — SODIUM CHLORIDE 250 ML: 900 INJECTION, SOLUTION INTRAVENOUS at 14:52

## 2017-05-03 RX ADMIN — FERUMOXYTOL 510 MG: 510 INJECTION INTRAVENOUS at 14:58

## 2017-06-26 ENCOUNTER — TELEPHONE (OUTPATIENT)
Dept: GASTROENTEROLOGY | Facility: CLINIC | Age: 72
End: 2017-06-26

## 2017-06-26 DIAGNOSIS — K74.60 CIRRHOSIS OF LIVER WITHOUT ASCITES, UNSPECIFIED HEPATIC CIRRHOSIS TYPE (HCC): ICD-10-CM

## 2017-06-26 DIAGNOSIS — D50.9 IRON DEFICIENCY ANEMIA, UNSPECIFIED IRON DEFICIENCY ANEMIA TYPE: Primary | ICD-10-CM

## 2017-06-26 NOTE — TELEPHONE ENCOUNTER
----- Message from Monserrat White sent at 6/26/2017  3:01 PM EDT -----  Regarding: PT CALLED - QUESTION REGARDING LABS  Contact: [phone]  HE HAS INFUSION RL ON WED. DOES DR CARSON NEED ANY TYPE OF LABS FOR HIS NEXT O/V? IF SO PT ASK IF WE COULD SEND ORDER TO [facility]. PT HASN'T RL O/V HERE YET.

## 2017-06-27 DIAGNOSIS — D50.0 IRON DEFICIENCY ANEMIA DUE TO CHRONIC BLOOD LOSS: Primary | ICD-10-CM

## 2017-06-27 DIAGNOSIS — IMO0001 IRON AND ITS COMPOUNDS CAUSING ADVERSE EFFECT IN THERAPEUTIC USE, SUBSEQUENT ENCOUNTER: ICD-10-CM

## 2017-06-27 NOTE — TELEPHONE ENCOUNTER
Called pt and advised pt she does want him to have lab work .  Lab orders entered and pt to have done at [facility] office.

## 2017-06-28 ENCOUNTER — INFUSION (OUTPATIENT)
Dept: ONCOLOGY | Facility: HOSPITAL | Age: 72
End: 2017-06-28

## 2017-06-28 VITALS
TEMPERATURE: 98 F | DIASTOLIC BLOOD PRESSURE: 63 MMHG | SYSTOLIC BLOOD PRESSURE: 107 MMHG | BODY MASS INDEX: 28.37 KG/M2 | HEART RATE: 50 BPM | WEIGHT: 186.6 LBS

## 2017-06-28 DIAGNOSIS — IMO0001 IRON AND ITS COMPOUNDS CAUSING ADVERSE EFFECT IN THERAPEUTIC USE, SUBSEQUENT ENCOUNTER: ICD-10-CM

## 2017-06-28 DIAGNOSIS — E03.9 UNSPECIFIED HYPOTHYROIDISM: Primary | ICD-10-CM

## 2017-06-28 DIAGNOSIS — D50.0 IRON DEFICIENCY ANEMIA DUE TO CHRONIC BLOOD LOSS: ICD-10-CM

## 2017-06-28 DIAGNOSIS — B20 THROMBOCYTOPENIA ASSOCIATED WITH AIDS (HCC): ICD-10-CM

## 2017-06-28 DIAGNOSIS — C61 PROSTATE CANCER (HCC): ICD-10-CM

## 2017-06-28 DIAGNOSIS — D69.59 THROMBOCYTOPENIA ASSOCIATED WITH AIDS (HCC): ICD-10-CM

## 2017-06-28 DIAGNOSIS — E13.8 DIABETES MELLITUS OF OTHER TYPE WITH COMPLICATION: ICD-10-CM

## 2017-06-28 DIAGNOSIS — R97.20 ELEVATED PSA: ICD-10-CM

## 2017-06-28 DIAGNOSIS — D61.818 PANCYTOPENIA (HCC): ICD-10-CM

## 2017-06-28 LAB
BASOPHILS # BLD AUTO: 0.01 10*3/MM3 (ref 0–0.1)
BASOPHILS NFR BLD AUTO: 0.5 % (ref 0–1.1)
DEPRECATED RDW RBC AUTO: 55.1 FL (ref 37–49)
EOSINOPHIL # BLD AUTO: 0.05 10*3/MM3 (ref 0–0.36)
EOSINOPHIL NFR BLD AUTO: 2.3 % (ref 1–5)
ERYTHROCYTE [DISTWIDTH] IN BLOOD BY AUTOMATED COUNT: 16 % (ref 11.7–14.5)
FERRITIN SERPL-MCNC: 48.8 NG/ML (ref 30–400)
HCT VFR BLD AUTO: 36.8 % (ref 40–49)
HGB BLD-MCNC: 11.8 G/DL (ref 13.5–16.5)
IMM GRANULOCYTES # BLD: 0 10*3/MM3 (ref 0–0.03)
IMM GRANULOCYTES NFR BLD: 0 % (ref 0–0.5)
IRON 24H UR-MRATE: 87 MCG/DL (ref 59–158)
IRON SATN MFR SERPL: 24 % (ref 14–48)
LYMPHOCYTES # BLD AUTO: 0.21 10*3/MM3 (ref 1–3.5)
LYMPHOCYTES NFR BLD AUTO: 9.8 % (ref 20–49)
MCH RBC QN AUTO: 30.3 PG (ref 27–33)
MCHC RBC AUTO-ENTMCNC: 32.1 G/DL (ref 32–35)
MCV RBC AUTO: 94.6 FL (ref 83–97)
MONOCYTES # BLD AUTO: 0.22 10*3/MM3 (ref 0.25–0.8)
MONOCYTES NFR BLD AUTO: 10.3 % (ref 4–12)
NEUTROPHILS # BLD AUTO: 1.65 10*3/MM3 (ref 1.5–7)
NEUTROPHILS NFR BLD AUTO: 77.1 % (ref 39–75)
NRBC BLD MANUAL-RTO: 0 /100 WBC (ref 0–0)
PLATELET # BLD AUTO: 34 10*3/MM3 (ref 150–375)
PSA SERPL-MCNC: 1.11 NG/ML (ref 0–4)
RBC # BLD AUTO: 3.89 10*6/MM3 (ref 4.3–5.5)
TIBC SERPL-MCNC: 364 MCG/DL (ref 249–505)
TRANSFERRIN SERPL-MCNC: 260 MG/DL (ref 200–360)
WBC NRBC COR # BLD: 2.14 10*3/MM3 (ref 4–10)

## 2017-06-28 PROCEDURE — 84466 ASSAY OF TRANSFERRIN: CPT

## 2017-06-28 PROCEDURE — 85025 COMPLETE CBC W/AUTO DIFF WBC: CPT

## 2017-06-28 PROCEDURE — 83540 ASSAY OF IRON: CPT

## 2017-06-28 PROCEDURE — 36415 COLL VENOUS BLD VENIPUNCTURE: CPT | Performed by: INTERNAL MEDICINE

## 2017-06-28 PROCEDURE — 82728 ASSAY OF FERRITIN: CPT

## 2017-06-28 PROCEDURE — 84153 ASSAY OF PSA TOTAL: CPT | Performed by: INTERNAL MEDICINE

## 2017-06-28 NOTE — PROGRESS NOTES
Reviewed ferritin and iron profile. Iron sat 24. Pt will not qualify for feraheme. Pt not agreeable. Spoke with Dr. Lucas. Dr. Lucas states pt will not get feraheme today, reschedule for one month.

## 2017-06-29 LAB
AFP-TM SERPL-MCNC: 4.8 NG/ML (ref 0–8.3)
ALBUMIN SERPL-MCNC: 3.6 G/DL (ref 3.5–4.8)
ALBUMIN/GLOB SERPL: 0.9 {RATIO} (ref 1.2–2.2)
ALP SERPL-CCNC: 139 IU/L (ref 39–117)
ALT SERPL-CCNC: 38 IU/L (ref 0–44)
AST SERPL-CCNC: 72 IU/L (ref 0–40)
BILIRUB SERPL-MCNC: 1.4 MG/DL (ref 0–1.2)
BUN SERPL-MCNC: 16 MG/DL (ref 8–27)
BUN/CREAT SERPL: 15 (ref 10–24)
CALCIUM SERPL-MCNC: 8.8 MG/DL (ref 8.6–10.2)
CHLORIDE SERPL-SCNC: 101 MMOL/L (ref 96–106)
CO2 SERPL-SCNC: 19 MMOL/L (ref 18–29)
CREAT SERPL-MCNC: 1.05 MG/DL (ref 0.76–1.27)
GLOBULIN SER CALC-MCNC: 3.8 G/DL (ref 1.5–4.5)
GLUCOSE SERPL-MCNC: 181 MG/DL (ref 65–99)
INR PPP: NORMAL
POTASSIUM SERPL-SCNC: 5 MMOL/L (ref 3.5–5.2)
PROT SERPL-MCNC: 7.4 G/DL (ref 6–8.5)
PROTHROMBIN TIME: NORMAL S
SODIUM SERPL-SCNC: 136 MMOL/L (ref 134–144)

## 2017-07-05 ENCOUNTER — APPOINTMENT (OUTPATIENT)
Dept: ONCOLOGY | Facility: HOSPITAL | Age: 72
End: 2017-07-05

## 2017-07-07 ENCOUNTER — TELEPHONE (OUTPATIENT)
Dept: GASTROENTEROLOGY | Facility: CLINIC | Age: 72
End: 2017-07-07

## 2017-07-07 NOTE — TELEPHONE ENCOUNTER
Pt called and was asking for his lab results.  Also pt states he currently is not taking any diuretics . Confirmed with pt that he is not taking lasix either.  He is asking if he can go back on the spironlactone. Advised would send message to Dr Butler.

## 2017-07-11 ENCOUNTER — TELEPHONE (OUTPATIENT)
Dept: GASTROENTEROLOGY | Facility: CLINIC | Age: 72
End: 2017-07-11

## 2017-07-11 NOTE — TELEPHONE ENCOUNTER
----- Message from Robyn Butler MD sent at 7/11/2017 11:30 AM EDT -----  Please let him know that recent labs are stable from prior checks -

## 2017-07-11 NOTE — TELEPHONE ENCOUNTER
"Call to pt.  Advise per Dr Carrie box recent labs are stable from prior checks.    Pt verb understanding and asking what he should do about Lasix and Spironolactone (see note of 7/7).  States is noting that ascites \"coming back\".  Message to Dr Butler.  "

## 2017-07-12 NOTE — TELEPHONE ENCOUNTER
"Call to pt.  Advise per Dr Butler that recommend taking Lasix every other day.  Do not take Aldactone becaue this has increased potassium in the past.    Pt states that Lasix has not helped him get rid of fluid in the past, and wants to take Aldactone for 1-2 weeks \"to get rid of ascites\", and then go to Lasix QOD.  Unwilling to try Dr Butler recommendations as above.  Message to Dr Butler.  "

## 2017-07-12 NOTE — TELEPHONE ENCOUNTER
Recommend taking Lasix every other day.  Do not take  Aldactone because this has increased his potassium in the past.

## 2017-07-31 DIAGNOSIS — D50.0 IRON DEFICIENCY ANEMIA DUE TO CHRONIC BLOOD LOSS: Primary | ICD-10-CM

## 2017-08-02 ENCOUNTER — LAB (OUTPATIENT)
Dept: LAB | Facility: HOSPITAL | Age: 72
End: 2017-08-02

## 2017-08-02 ENCOUNTER — INFUSION (OUTPATIENT)
Dept: ONCOLOGY | Facility: HOSPITAL | Age: 72
End: 2017-08-02

## 2017-08-02 VITALS
BODY MASS INDEX: 28.86 KG/M2 | DIASTOLIC BLOOD PRESSURE: 49 MMHG | SYSTOLIC BLOOD PRESSURE: 105 MMHG | WEIGHT: 189.8 LBS | TEMPERATURE: 98.7 F | HEART RATE: 56 BPM

## 2017-08-02 DIAGNOSIS — D50.0 IRON DEFICIENCY ANEMIA DUE TO CHRONIC BLOOD LOSS: ICD-10-CM

## 2017-08-02 DIAGNOSIS — IMO0001 IRON AND ITS COMPOUNDS CAUSING ADVERSE EFFECT IN THERAPEUTIC USE, SUBSEQUENT ENCOUNTER: ICD-10-CM

## 2017-08-02 DIAGNOSIS — D50.0 IRON DEFICIENCY ANEMIA DUE TO CHRONIC BLOOD LOSS: Primary | ICD-10-CM

## 2017-08-02 LAB
BASOPHILS # BLD AUTO: 0.01 10*3/MM3 (ref 0–0.1)
BASOPHILS NFR BLD AUTO: 0.4 % (ref 0–1.1)
DEPRECATED RDW RBC AUTO: 55.2 FL (ref 37–49)
EOSINOPHIL # BLD AUTO: 0.07 10*3/MM3 (ref 0–0.36)
EOSINOPHIL NFR BLD AUTO: 3 % (ref 1–5)
ERYTHROCYTE [DISTWIDTH] IN BLOOD BY AUTOMATED COUNT: 16.4 % (ref 11.7–14.5)
FERRITIN SERPL-MCNC: 30 NG/ML (ref 30–400)
HCT VFR BLD AUTO: 31 % (ref 40–49)
HGB BLD-MCNC: 10 G/DL (ref 13.5–16.5)
IMM GRANULOCYTES # BLD: 0.01 10*3/MM3 (ref 0–0.03)
IMM GRANULOCYTES NFR BLD: 0.4 % (ref 0–0.5)
IRON 24H UR-MRATE: 63 MCG/DL (ref 59–158)
IRON SATN MFR SERPL: 17 % (ref 14–48)
LYMPHOCYTES # BLD AUTO: 0.26 10*3/MM3 (ref 1–3.5)
LYMPHOCYTES NFR BLD AUTO: 11.2 % (ref 20–49)
MCH RBC QN AUTO: 29.9 PG (ref 27–33)
MCHC RBC AUTO-ENTMCNC: 32.3 G/DL (ref 32–35)
MCV RBC AUTO: 92.8 FL (ref 83–97)
MONOCYTES # BLD AUTO: 0.31 10*3/MM3 (ref 0.25–0.8)
MONOCYTES NFR BLD AUTO: 13.3 % (ref 4–12)
NEUTROPHILS # BLD AUTO: 1.67 10*3/MM3 (ref 1.5–7)
NEUTROPHILS NFR BLD AUTO: 71.7 % (ref 39–75)
NRBC BLD MANUAL-RTO: 0 /100 WBC (ref 0–0)
PLATELET # BLD AUTO: 32 10*3/MM3 (ref 150–375)
RBC # BLD AUTO: 3.34 10*6/MM3 (ref 4.3–5.5)
TIBC SERPL-MCNC: 371 MCG/DL (ref 249–505)
TRANSFERRIN SERPL-MCNC: 265 MG/DL (ref 200–360)
TSH SERPL DL<=0.05 MIU/L-ACNC: 7.79 MIU/ML (ref 0.27–4.2)
WBC NRBC COR # BLD: 2.33 10*3/MM3 (ref 4–10)

## 2017-08-02 PROCEDURE — 25010000002 FERUMOXYTOL 510 MG/17ML SOLUTION 510 MG VIAL: Performed by: INTERNAL MEDICINE

## 2017-08-02 PROCEDURE — 84443 ASSAY THYROID STIM HORMONE: CPT | Performed by: INTERNAL MEDICINE

## 2017-08-02 PROCEDURE — 83540 ASSAY OF IRON: CPT

## 2017-08-02 PROCEDURE — 36415 COLL VENOUS BLD VENIPUNCTURE: CPT | Performed by: INTERNAL MEDICINE

## 2017-08-02 PROCEDURE — 84466 ASSAY OF TRANSFERRIN: CPT

## 2017-08-02 PROCEDURE — 96374 THER/PROPH/DIAG INJ IV PUSH: CPT

## 2017-08-02 PROCEDURE — 82728 ASSAY OF FERRITIN: CPT

## 2017-08-02 PROCEDURE — 85025 COMPLETE CBC W/AUTO DIFF WBC: CPT | Performed by: INTERNAL MEDICINE

## 2017-08-02 PROCEDURE — 36416 COLLJ CAPILLARY BLOOD SPEC: CPT | Performed by: INTERNAL MEDICINE

## 2017-08-02 RX ORDER — FAMOTIDINE 10 MG/ML
20 INJECTION, SOLUTION INTRAVENOUS ONCE
Status: DISCONTINUED | OUTPATIENT
Start: 2017-08-02 | End: 2017-08-02 | Stop reason: HOSPADM

## 2017-08-02 RX ORDER — SODIUM CHLORIDE 9 MG/ML
250 INJECTION, SOLUTION INTRAVENOUS ONCE
Status: DISCONTINUED | OUTPATIENT
Start: 2017-08-02 | End: 2017-08-02 | Stop reason: HOSPADM

## 2017-08-02 RX ORDER — DIPHENHYDRAMINE HCL 25 MG
25 CAPSULE ORAL ONCE
Status: DISCONTINUED | OUTPATIENT
Start: 2017-08-02 | End: 2017-08-02 | Stop reason: HOSPADM

## 2017-08-02 RX ADMIN — FERUMOXYTOL 510 MG: 510 INJECTION INTRAVENOUS at 15:22

## 2017-08-03 ENCOUNTER — APPOINTMENT (OUTPATIENT)
Dept: LAB | Facility: HOSPITAL | Age: 72
End: 2017-08-03

## 2017-08-03 LAB
BASOPHILS # BLD AUTO: 0 X10E3/UL (ref 0–0.2)
BASOPHILS NFR BLD AUTO: 0 %
CD3+CD4+ CELLS # BLD: 65 /UL (ref 359–1519)
CD3+CD4+ CELLS NFR BLD: 32.7 % (ref 30.8–58.5)
CHOLEST SERPL-MCNC: 187 MG/DL (ref 100–199)
EOSINOPHIL # BLD AUTO: 0 X10E3/UL (ref 0–0.4)
EOSINOPHIL # BLD AUTO: 2 %
ERYTHROCYTE [DISTWIDTH] IN BLOOD BY AUTOMATED COUNT: 16.6 % (ref 12.3–15.4)
HBA1C MFR BLD: 7.9 % (ref 4.8–5.6)
HCT VFR BLD AUTO: 30.7 % (ref 37.5–51)
HDLC SERPL-MCNC: 46 MG/DL
HGB BLD-MCNC: 9.8 G/DL (ref 12.6–17.7)
IMM GRANULOCYTES # BLD: 0 X10E3/UL (ref 0–0.1)
IMM GRANULOCYTES NFR BLD: 0 %
INR PPP: 1.1 (ref 0.8–1.2)
LDLC SERPL CALC-MCNC: 110 MG/DL (ref 0–99)
LYMPHOCYTES # BLD AUTO: 0.2 X10E3/UL (ref 0.7–3.1)
LYMPHOCYTES NFR BLD AUTO: 12 %
MCH RBC QN AUTO: 29.6 PG (ref 26.6–33)
MCHC RBC AUTO-ENTMCNC: 31.9 G/DL (ref 31.5–35.7)
MCV RBC AUTO: 93 FL (ref 79–97)
MICROALBUMIN UR-MCNC: 29.1 UG/ML
MONOCYTES # BLD AUTO: 0.2 X10E3/UL (ref 0.1–0.9)
MONOCYTES NFR BLD AUTO: 11 %
MORPHOLOGY BLD-IMP: ABNORMAL
NEUTROPHILS # BLD AUTO: 1.2 X10E3/UL (ref 1.4–7)
NEUTROPHILS NFR BLD AUTO: 75 %
PLATELET # BLD AUTO: 28 X10E3/UL (ref 150–379)
PROTHROMBIN TIME: 11.7 SEC (ref 9.1–12)
RBC # BLD AUTO: 3.31 X10E6/UL (ref 4.14–5.8)
TRIGL SERPL-MCNC: 154 MG/DL (ref 0–149)
VLDLC SERPL-MCNC: 31 MG/DL (ref 5–40)
WBC # BLD AUTO: 1.6 X10E3/UL (ref 3.4–10.8)

## 2017-08-04 RX ORDER — DIPHENHYDRAMINE HCL 25 MG
25 CAPSULE ORAL ONCE
Status: CANCELLED | OUTPATIENT
Start: 2017-08-09

## 2017-08-04 RX ORDER — SODIUM CHLORIDE 9 MG/ML
250 INJECTION, SOLUTION INTRAVENOUS ONCE
Status: CANCELLED | OUTPATIENT
Start: 2017-08-09

## 2017-08-05 LAB — HIV1 RNA SERPL QL NAA+PROBE: POSITIVE

## 2017-08-09 ENCOUNTER — INFUSION (OUTPATIENT)
Dept: ONCOLOGY | Facility: HOSPITAL | Age: 72
End: 2017-08-09

## 2017-08-09 ENCOUNTER — OFFICE VISIT (OUTPATIENT)
Dept: ONCOLOGY | Facility: CLINIC | Age: 72
End: 2017-08-09

## 2017-08-09 VITALS
WEIGHT: 186.6 LBS | DIASTOLIC BLOOD PRESSURE: 66 MMHG | HEART RATE: 58 BPM | BODY MASS INDEX: 28.37 KG/M2 | TEMPERATURE: 98.4 F | SYSTOLIC BLOOD PRESSURE: 110 MMHG

## 2017-08-09 DIAGNOSIS — D69.59 THROMBOCYTOPENIA ASSOCIATED WITH AIDS (HCC): ICD-10-CM

## 2017-08-09 DIAGNOSIS — D50.0 IRON DEFICIENCY ANEMIA DUE TO CHRONIC BLOOD LOSS: Primary | ICD-10-CM

## 2017-08-09 DIAGNOSIS — K74.60 CIRRHOSIS OF LIVER WITHOUT ASCITES, UNSPECIFIED HEPATIC CIRRHOSIS TYPE (HCC): ICD-10-CM

## 2017-08-09 DIAGNOSIS — D70.9 NEUTROPENIA, UNSPECIFIED TYPE (HCC): ICD-10-CM

## 2017-08-09 DIAGNOSIS — IMO0001 IRON AND ITS COMPOUNDS CAUSING ADVERSE EFFECT IN THERAPEUTIC USE, SUBSEQUENT ENCOUNTER: Primary | ICD-10-CM

## 2017-08-09 DIAGNOSIS — IMO0001 IRON AND ITS COMPOUNDS CAUSING ADVERSE EFFECT IN THERAPEUTIC USE, SEQUELA: ICD-10-CM

## 2017-08-09 DIAGNOSIS — D50.0 IRON DEFICIENCY ANEMIA DUE TO CHRONIC BLOOD LOSS: ICD-10-CM

## 2017-08-09 DIAGNOSIS — B20 THROMBOCYTOPENIA ASSOCIATED WITH AIDS (HCC): ICD-10-CM

## 2017-08-09 PROCEDURE — 25010000002 FERUMOXYTOL 510 MG/17ML SOLUTION 510 MG VIAL: Performed by: INTERNAL MEDICINE

## 2017-08-09 PROCEDURE — 96374 THER/PROPH/DIAG INJ IV PUSH: CPT

## 2017-08-09 PROCEDURE — 99213 OFFICE O/P EST LOW 20 MIN: CPT | Performed by: INTERNAL MEDICINE

## 2017-08-09 RX ORDER — SODIUM CHLORIDE 9 MG/ML
250 INJECTION, SOLUTION INTRAVENOUS ONCE
Status: COMPLETED | OUTPATIENT
Start: 2017-08-09 | End: 2017-08-09

## 2017-08-09 RX ORDER — DIPHENHYDRAMINE HCL 25 MG
25 CAPSULE ORAL ONCE
Status: DISCONTINUED | OUTPATIENT
Start: 2017-08-09 | End: 2017-08-09 | Stop reason: HOSPADM

## 2017-08-09 RX ADMIN — FERUMOXYTOL 510 MG: 510 INJECTION INTRAVENOUS at 13:34

## 2017-08-09 RX ADMIN — SODIUM CHLORIDE 250 ML: 900 INJECTION, SOLUTION INTRAVENOUS at 13:25

## 2017-08-09 NOTE — PROGRESS NOTES
REASONS FOR FOLLOWUP:    1. Worsening pancytopenia, especially with leukocytopenia and thrombocytopenia. Bone marrow aspiration and biopsy obtained on 01/21/2014, with no evidence of hematologic malignancy, no myelodysplastic syndrome.  He has liver cirrhosis and splenomegaly.   2. Recurrent iron deficiency anemia Anemia, not able to tolerate oral iron supplementation, status post multiple Ferahemes.  He also had transfusion of PRBC in October 2014.   3. Empiric treatment for ITP using IVIG, in March 2014 and no response.        HISTORY OF PRESENT ILLNESS: Mr. Aranda is a 71-year-old  male returning today for 6-month reevaluation of recurrent iron deficiency. He reports profound fatigue, he denies any signs of bleeding.  Patient was given IV iron therapy in early May 2017.  Laboratory study on 6/28/2017 showed a slightly improved ferritin at 40.8, and iron saturation 24% and improved hemoglobin at 11.8.  Last week on 8/2/2017 laboratory study showed worsening iron with ferritin 30, iron saturation 17, and a hemoglobin at 10.  He was started on Feraheme on.  2 2017.  He is due for second dose today.      For the past 6 months, patient roughly receives IV Feraheme treatment about every 2-3 months.  He continues taking HIV medication. His diabetes is not tightly controlled. Patient reports no recent bacterial infection fever chills, etc.  No easy bleeding.     PAST MEDICAL HISTORY:    1. HIV diagnosed in 1997, on antiretroviral therapy; followed by Dr. Klarissa Thomas.    2. Liver cirrhosis and splenomegaly.    3. Diabetes.    4. Coronary artery disease.    5. Hypertension.    6. Pancytopenia.    7. Iron deficiency anemia.    8. Hypothyroidism.    9. History of prostate cancer, status post radiation therapy   10. Osteoporosis documented on DEXA bone density scan 02/29/2016.        HEMATOLOGIC HISTORY: History from previous dates can be found in a separate document.        Laboratory study on 10/05/2015 reported  hemoglobin of 9.2, platelets 41,000 and WBC 3320 including neutrophils 1600, lymphocytes 400. Serum ferritin was 12.6, down from 72.9 on 08/10/2015. Serum iron level was 39, also down from 69 on 08/10/2015. His HIV test was improved and negative PSA and improved lipid panel. CMP panel was unremarkable except marginally elevated bilirubin of 1.4 and glucose 135.    The patient was evaluated in our clinic on 10/15/2015. The patient is reporting fatigue. We will arrange for treatment of Feraheme. The patient will continue followup with Dr. Thomas every 3 months for laboratory studies, including iron panel.  Patient was given 2 doses of Feraheme.       04/19/2016 which showed iron deficiency, ferritin level was 10.4 NG/ML and iron level 36. She was given Feraheme treatment in May 2016.       His laboratory study last week on 08/16/2016 showed recurrent severe iron deficiency with a ferritin 6.52 NG/ML, and a serum iron 31. His hemoglobin was 8.8, platelets 57,000 and WBC of 3100. His B12 level was 1097 PG/ML. He also reported taking levothyroxine 300 µg daily. This patient also lost a significant weight about 20 pounds in the past 3 months, because he was on 2 different diuretics.  Patient was seen on 08/22/2016 and the schedule for 2 doses of Feraheme treatment.      Laboratory study on 02/14/2017 reported worsening hemoglobin 10.8, platelets 37,000, and neutrophils 1460 and lymphocytes 240.  He also had recurrent iron deficiency, with ferritin 25.8, iron saturation 16%.  His vitamin B12 level was supratherapeutic.     He received multiple IV iron therapy was in late August and early September 2016, then early November 2016 and early January and May 2017.     Laboratory study on 6/28/2017 showed a slightly improved ferritin at 40.8, and iron saturation 24% and improved hemoglobin at 11.8.      MEDICATIONS: The current medication list was reviewed with the patient and updated in the EMR this date per the medical  "assistant. Medication dosages and frequencies were confirmed to be accurate.        ALLERGIES:    1. PENICILLIN.    2. PREDNISONE.        SOCIAL HISTORY: Remote smoking history. He does not drink. No history of drug use. He contracted HIV through sexual contact.        FAMILY HISTORY: Reviewed and is negative to this episode of care.        REVIEW OF SYSTEMS:     PAIN: intermittent abdomen discomfort, no sharp pains.    GENERAL: No change in appetite or weight, no fevers, chills or sweats.    SKIN: No rashes or nonhealing lesions.    HEME/LYMPH: See Hematology History.    EYES: No vision changes or diplopia.    ENT: No tinnitus, hearing loss, gum bleeding, epistaxis, hoarseness or dysphagia.    RESPIRATORY: No cough, shortness of breath, hemoptysis or wheezing.    CVS: No chest pain, palpitations, orthopnea, dyspnea on exertion. GI: Abdomen distention secondary to ascites fluids. No nausea, vomiting, constipation, diarrhea, melena or hematochezia.    : No dysuria or hematuria.    MUSCULOSKELETAL: No bone pain or joint stiffness.    NEUROLOGICAL: No dizziness, global weakness, loss of consciousness or seizures.    PSYCHIATRIC: No increased nervousness, mood changes or depression.        VITAL SIGNS:     Vitals    Vitals:     17    BP: 110/66   Pulse: 58  Comment: Pt states this is typical times 2 years   Resp: 12   Temp: 98.4 °F (36.9 °C)   TempSrc: Oral   SpO2:    Weight: 186 lb 3.2 oz (82.2 kg)   Height: 68\" (172.7 cm)   PainSc: 0         ECO        PHYSICAL EXAMINATION:    GENERAL: Well-developed and well-nourished male in no acute distress. .   SKIN: Warm and dry without rashes, No purpura.   HEAD: Normocephalic.    EYES: Pupils equal, round and reactive to light. EOMs intact. Conjunctivae normal.    EARS: Hearing intact.    NOSE: No excoriations or nasal discharge.    MOUTH: Tongue is well-papillated; no stomatitis or ulcers. Lips normal.    THROAT: Oropharynx without lesions or exudates.    NECK: " Supple with good range of motion; no thyromegaly or masses.    LYMPHATICS: No cervical, supraclavicular adenopathy.    CHEST: Lungs clear to auscultation.    CARDIAC: Regular rate and rhythm without murmurs, rubs or gallops.    ABDOMEN: Distended but nontender with no organomegaly or masses. Bowel sounds present.    EXTREMITIES: No clubbing, cyanosis. No edema.    NEURO: No focal neurological deficits.        LABORATORY DATA:    Lab Results   Component Value Date    WBC 1.6 (L) 08/02/2017    HGB 9.8 (L) 08/02/2017    HCT 30.7 (L) 08/02/2017    MCV 93 08/02/2017    PLT 28 (L) 08/02/2017     Lab Results   Component Value Date    NEUTROABS 1.2 (L) 08/02/2017     Lab Results   Component Value Date    IRON 63 08/02/2017    TIBC 371 08/02/2017    FERRITIN 30.00 08/02/2017     Saturation 16%    Lab Results   Component Value Date    FSOZCVSO24 1386 (H) 02/14/2017     Lab Results   Component Value Date    GLUCOSE 204 (H) 02/22/2017    BUN 16 06/28/2017    CREATININE 1.05 06/28/2017    EGFRIFNONA 72 06/28/2017    EGFRIFAFRI 83 06/28/2017    BCR 15 06/28/2017    K 5.0 06/28/2017    CO2 19 06/28/2017    CALCIUM 8.8 06/28/2017    PROTENTOTREF 7.4 06/28/2017    ALBUMIN 3.6 06/28/2017    LABIL2 0.9 (L) 06/28/2017    AST 72 (H) 06/28/2017    ALT 38 06/28/2017     Sodium   Date Value Ref Range Status   06/28/2017 136 134 - 144 mmol/L Final   02/22/2017 137 134 - 145 mmol/L Final     Potassium   Date Value Ref Range Status   06/28/2017 5.0 3.5 - 5.2 mmol/L Final   02/22/2017 3.9 3.5 - 4.7 mmol/L Final     Total Bilirubin   Date Value Ref Range Status   06/28/2017 1.4 (H) 0.0 - 1.2 mg/dL Final   02/22/2017 1.4 (H) 0.1 - 1.2 mg/dL Final     Alkaline Phosphatase   Date Value Ref Range Status   06/28/2017 139 (H) 39 - 117 IU/L Final   02/22/2017 156 (H) 38 - 116 U/L Final   ]      ASSESSMENT:      1.  Recurrent Iron deficiency anemia required repeated IV iron therapy, roughly about every 2-3 months.  He denies any visible bleeding,  however it seems like he leaks iron. He complains of significant fatigue. Laboratory study to days ago on 8/2/2017 showed worsening hemoglobin 9.8, and recurrent iron deficiency ferritin 30 and iron saturation 17%.   Based on his history, I will schedule this patient return in 3 months with lab studies including CBC ferritin and iron level.  He may need Feraheme treatment.      2.  Severe thrombocytopenia and leukopenia/neutropenia related to his liver cirrhosis and HIV treatment. Both of his platelet and ANC count fluctuates but at lower level.  Will continue to observe.           PLAN:    1. Feraheme dose #2 today.     2. Return in 3 months for MD visit with lab studies including CBC, ferritin and iron level.  May again need iv. Feraheme treatment.            JAMIE BOWMAN M.D., Ph.D.   08/09/2017      CC: Klarissa Thomas M.D.        Dragon disclaimer:  Much of this encounter note is an electronic transcription/translation of spoken language to printed text. The electronic translation of spoken language may permit erroneous, or at times, nonsensical words or phrases to be inadvertently transcribed; Although I have reviewed the note for such errors, some may still exist.

## 2017-08-10 ENCOUNTER — APPOINTMENT (OUTPATIENT)
Dept: LAB | Facility: HOSPITAL | Age: 72
End: 2017-08-10

## 2017-08-10 ENCOUNTER — APPOINTMENT (OUTPATIENT)
Dept: ONCOLOGY | Facility: CLINIC | Age: 72
End: 2017-08-10

## 2017-08-14 PROBLEM — K74.60 LIVER CIRRHOSIS (HCC): Status: ACTIVE | Noted: 2017-08-14

## 2017-08-22 ENCOUNTER — DOCUMENTATION (OUTPATIENT)
Dept: PHYSICAL THERAPY | Facility: CLINIC | Age: 72
End: 2017-08-22

## 2017-08-22 NOTE — PROGRESS NOTES
NAME: Kye Aranda     PATIENT MRN: [id number]  DATE: 8/22/2017    Discharge Note   Visits: 1    Subjective     N/A    Objective     Unable to formally assess objective measures or outcome measure due to unscheduled discharge.     Goals: Not Met    Assessment/Plan /Discharge status.    Pt was only seen for evaluation and did not make/keep any further appointments and will be discharged.         Ruben Lozano, PT  Physical Therapist  [place] #[id number]

## 2017-08-28 ENCOUNTER — TELEPHONE (OUTPATIENT)
Dept: GASTROENTEROLOGY | Facility: CLINIC | Age: 72
End: 2017-08-28

## 2017-08-28 NOTE — TELEPHONE ENCOUNTER
----- Message from Monserrat White sent at 8/28/2017  2:28 PM EDT -----  Regarding: PT CALLED - XIFAXAN 550MG SCRIPT  Contact: [phone]  [facility]. FAX#[phone].

## 2017-08-28 NOTE — TELEPHONE ENCOUNTER
Called pt and advised we would be calling his xifaxan refill to [facility]. Pt verb understanding.     Called [facility] at [phone] and spoke with pharmacist Oc and refill xifaxan 550mg take one tab po every 12 hrs #180 with 1 refill.

## 2017-08-28 NOTE — TELEPHONE ENCOUNTER
Called pt and advised pt would send message to Dr Butler regarding refilling of xifaxan.  Pt verb understanding and reports that [facility] number is [phone].

## 2017-09-06 ENCOUNTER — LAB (OUTPATIENT)
Dept: LAB | Facility: HOSPITAL | Age: 72
End: 2017-09-06
Attending: INTERNAL MEDICINE

## 2017-09-06 ENCOUNTER — TRANSCRIBE ORDERS (OUTPATIENT)
Dept: ADMINISTRATIVE | Facility: HOSPITAL | Age: 72
End: 2017-09-06

## 2017-09-06 ENCOUNTER — LAB (OUTPATIENT)
Dept: LAB | Facility: HOSPITAL | Age: 72
End: 2017-09-06
Attending: UROLOGY

## 2017-09-06 DIAGNOSIS — C61 MALIGNANT NEOPLASM OF PROSTATE (HCC): Primary | ICD-10-CM

## 2017-09-06 DIAGNOSIS — Z79.899 POLYPHARMACY: ICD-10-CM

## 2017-09-06 DIAGNOSIS — B20 HUMAN IMMUNODEFICIENCY VIRUS (HIV) DISEASE (HCC): Primary | ICD-10-CM

## 2017-09-06 DIAGNOSIS — B20 HUMAN IMMUNODEFICIENCY VIRUS (HIV) DISEASE (HCC): ICD-10-CM

## 2017-09-06 DIAGNOSIS — C61 MALIGNANT NEOPLASM OF PROSTATE (HCC): ICD-10-CM

## 2017-09-06 LAB
CALCIUM SPEC-SCNC: 9.7 MG/DL (ref 8.6–10.5)
PSA SERPL-MCNC: 0.89 NG/ML (ref 0–4)

## 2017-09-06 PROCEDURE — 84153 ASSAY OF PSA TOTAL: CPT

## 2017-09-06 PROCEDURE — 87536 HIV-1 QUANT&REVRSE TRNSCRPJ: CPT

## 2017-09-06 PROCEDURE — 36415 COLL VENOUS BLD VENIPUNCTURE: CPT

## 2017-09-06 PROCEDURE — 82310 ASSAY OF CALCIUM: CPT

## 2017-09-10 LAB
HIV1 RNA # SERPL NAA+PROBE: 80 COPIES/ML
LOG10 HIV-1 RNA: 1.9 LOG10COPY/ML

## 2017-09-14 ENCOUNTER — TRANSCRIBE ORDERS (OUTPATIENT)
Dept: ADMINISTRATIVE | Facility: HOSPITAL | Age: 72
End: 2017-09-14

## 2017-09-14 ENCOUNTER — LAB (OUTPATIENT)
Dept: LAB | Facility: HOSPITAL | Age: 72
End: 2017-09-14

## 2017-09-14 DIAGNOSIS — E03.9 UNSPECIFIED HYPOTHYROIDISM: ICD-10-CM

## 2017-09-14 DIAGNOSIS — E11.9 DIABETES MELLITUS WITHOUT COMPLICATION (HCC): ICD-10-CM

## 2017-09-14 DIAGNOSIS — E11.9 DIABETES MELLITUS WITHOUT COMPLICATION (HCC): Primary | ICD-10-CM

## 2017-09-14 LAB — TSH SERPL DL<=0.05 MIU/L-ACNC: 0.48 MIU/ML (ref 0.27–4.2)

## 2017-09-14 PROCEDURE — 36415 COLL VENOUS BLD VENIPUNCTURE: CPT

## 2017-09-14 PROCEDURE — 84443 ASSAY THYROID STIM HORMONE: CPT

## 2017-10-26 NOTE — TELEPHONE ENCOUNTER
----- Message from Monserrat White sent at 10/25/2017  4:08 PM EDT -----  Regarding: PT CALLED - ORDER  Contact: [phone]  PT HAVE INFUSION AT [facility]. NEED ORDER FOR LABS FROM DR CARSON. HE NORMALLY DOES THIS B4 HIS INFUSION TO GET WHATEVER LABS SHE NEED.

## 2017-10-26 NOTE — TELEPHONE ENCOUNTER
Call to pt.  States has upcoming appt at [facility] - will have lab drawn.  Asking if Dr Butler would like to have any labs drawn with that visit (easier for blood to be obtained with infusion visit).  Message to Dr Butler.

## 2017-10-27 NOTE — TELEPHONE ENCOUNTER
Order placed for cbc, cmp, inr, afp - message to DR Butler.    Call to pt to advise that labs ordered.  Verb understanding.

## 2017-11-07 NOTE — TELEPHONE ENCOUNTER
----- Message from Carey Callaway RN sent at 11/7/2017  1:46 PM EST -----  Please add infusion appt for tomorrow and the following week for fernanda, Per Dr Lucas. thanks

## 2017-11-07 NOTE — TELEPHONE ENCOUNTER
Pt called noting that he has no infusion appt for tomorrow for IV iron.  Per Dr Lucas's previous office note, pt to return in 3 months for labs and possible infusion.  Message sent to appt desk to add infusion appt for tomorrow and the following week

## 2017-11-13 NOTE — PROGRESS NOTES
REASONS FOR FOLLOWUP:    1. Worsening pancytopenia, especially with leukocytopenia and thrombocytopenia. Bone marrow aspiration and biopsy obtained on 01/21/2014, with no evidence of hematologic malignancy, no myelodysplastic syndrome.  He has liver cirrhosis and splenomegaly.   2. Recurrent iron deficiency anemia, not able to tolerate oral iron supplementation, status post multiple Ferahemes.  He also had transfusion of PRBC in October 2014.   3. Empiric treatment for ITP using IVIG, in March 2014 and no response.    4. Ongoing care for frequent iron deficiency required repeated intravenous iron therapy.       HISTORY OF PRESENT ILLNESS: Mr. Aranda is a 72-year-old  male returning today for 3-month reevaluation of recurrent iron deficiency.  He reports profound fatigue, he denies any signs of bleeding, including no melena, he admits occasional fresh blood on toilet paper but nothing significant.  Patient was given IV iron therapy frequently, the last time was in early August 2017.      For this year, patient roughly receives IV Feraheme treatment about every 2-3 months.  He continues taking HIV medication. His diabetes is not tightly controlled. Patient reports no recent bacterial infection fever chills, etc.  No easy bleeding.     PAST MEDICAL HISTORY:    1. HIV diagnosed in 1997, on antiretroviral therapy; followed by Dr. Klarissa Thomas.    2. Liver cirrhosis and splenomegaly.   3. Diabetes.    4. Coronary artery disease.    5. Hypertension.    6. Pancytopenia.    7. Iron deficiency anemia.    8. Hypothyroidism.    9. History of prostate cancer, status post radiation therapy   10. Osteoporosis documented on DEXA bone density scan 02/29/2016.        HEMATOLOGIC HISTORY: History from previous dates can be found in a separate document.        Laboratory study on 10/05/2015 reported hemoglobin of 9.2, platelets 41,000 and WBC 3320 including neutrophils 1600, lymphocytes 400. Serum ferritin was 12.6, down from  72.9 on 08/10/2015. Serum iron level was 39, also down from 69 on 08/10/2015. His HIV test was improved and negative PSA and improved lipid panel. CMP panel was unremarkable except marginally elevated bilirubin of 1.4 and glucose 135.    The patient was evaluated in our clinic on 10/15/2015. The patient is reporting fatigue. We will arrange for treatment of Feraheme. The patient will continue followup with Dr. Thomas every 3 months for laboratory studies, including iron panel.  Patient was given 2 doses of Feraheme.       04/19/2016 which showed iron deficiency, ferritin level was 10.4 NG/ML and iron level 36. She was given Feraheme treatment in May 2016.       His laboratory study last week on 08/16/2016 showed recurrent severe iron deficiency with a ferritin 6.52 NG/ML, and a serum iron 31. His hemoglobin was 8.8, platelets 57,000 and WBC of 3100. His B12 level was 1097 PG/ML. He also reported taking levothyroxine 300 µg daily. This patient also lost a significant weight about 20 pounds in the past 3 months, because he was on 2 different diuretics.  Patient was seen on 08/22/2016 and the schedule for 2 doses of Feraheme treatment.      Laboratory study on 02/14/2017 reported worsening hemoglobin 10.8, platelets 37,000, and neutrophils 1460 and lymphocytes 240.  He also had recurrent iron deficiency, with ferritin 25.8, iron saturation 16%.  His vitamin B12 level was supratherapeutic.     He received multiple IV iron therapy was in late August and early September 2016, then early November 2016 and early January and May 2017.     Laboratory study on 6/28/2017 showed a slightly improved ferritin at 40.8, and iron saturation 24% and improved hemoglobin at 11.8.     On 8/2/2017 laboratory study showed worsening iron with ferritin 30, iron saturation 17, and worsening hemoglobin at 10.  He was given 2 doses ferriheme in early August 2017.      MEDICATIONS: The current medication list was reviewed with the patient and  "updated in the EMR this date per the medical assistant. Medication dosages and frequencies were confirmed to be accurate.        ALLERGIES:    1. PENICILLIN.    2. PREDNISONE.        SOCIAL HISTORY: Remote smoking history. He does not drink. No history of drug use. He contracted HIV through sexual contact.        FAMILY HISTORY: Reviewed and is negative to this episode of care.        REVIEW OF SYSTEMS:     PAIN: intermittent abdomen discomfort, no sharp pains.    GENERAL: No change in appetite or weight, no fevers, chills or sweats.    SKIN: No rashes or nonhealing lesions.    HEME/LYMPH: See Hematology History.    EYES: No vision changes or diplopia.    ENT: No tinnitus, hearing loss, gum bleeding, epistaxis, hoarseness or dysphagia.    RESPIRATORY: No cough, shortness of breath, hemoptysis or wheezing.    CVS: No chest pain, palpitations, orthopnea, dyspnea on exertion. GI: Abdomen distention secondary to ascites fluids. No nausea, vomiting, constipation, diarrhea, melena or hematochezia.    : No dysuria or hematuria.    MUSCULOSKELETAL: No bone pain or joint stiffness.    NEUROLOGICAL: No dizziness, global weakness, loss of consciousness or seizures.    PSYCHIATRIC: No increased nervousness, mood changes or depression.        VITAL SIGNS:    Vitals:    17 1343   BP: 146/72   Pulse: (!) 49   Resp: 16   Temp: 97.8 °F (36.6 °C)   TempSrc: Oral   SpO2: 98%   Weight: 182 lb 3.2 oz (82.6 kg)   Height: 68\" (172.7 cm)  Comment: pt stated   PainSc: 5  Comment: been experiencing more intense back pain for the last couple of weeks.   PainLoc: Back   ECO        PHYSICAL EXAMINATION:    GENERAL: Well-developed and well-nourished male in no acute distress. .   SKIN: Warm and dry without rashes, No purpura.   HEAD: Normocephalic.    EYES: EOMs intact. Conjunctivae normal.    EARS: Hearing intact.    NOSE: No nasal discharge.    MOUTH: Tongue is well-papillated; no stomatitis or ulcers. Lips normal.    THROAT: " Oropharynx without lesions or exudates.    NECK: Supple with good range of motion; no thyromegaly or masses.    LYMPHATICS: No cervical, supraclavicular adenopathy.    CHEST: Lungs clear to auscultation.    CARDIAC: Regular rate and rhythm without murmurs, rubs or gallops.    ABDOMEN: Soft, nontender with no organomegaly or masses. Bowel sounds present.    EXTREMITIES: No clubbing, cyanosis. No edema.    NEURO: No focal neurological deficits.        LABORATORY DATA:    Lab Results   Component Value Date    WBC 3.2 (L) 11/08/2017    HGB 12.0 (L) 11/08/2017    HCT 37.5 11/08/2017    MCV 86 11/08/2017    PLT 51 (L) 11/08/2017     Lab Results   Component Value Date    NEUTROABS 2.4 11/08/2017     Lab Results   Component Value Date    IRON 66 11/08/2017    TIBC 447 11/08/2017    FERRITIN 26.20 (L) 11/08/2017     Saturation 15%      Lab Results   Component Value Date    GLUCOSE 204 (H) 02/22/2017    BUN 11 11/08/2017    CREATININE 1.08 11/08/2017    EGFRIFNONA 69 11/08/2017    EGFRIFAFRI 79 11/08/2017    BCR 10 11/08/2017    K 4.5 11/08/2017    CO2 22 11/08/2017    CALCIUM 9.3 11/08/2017    PROTENTOTREF 7.8 11/08/2017    ALBUMIN 4.0 11/08/2017    LABIL2 1.1 (L) 11/08/2017    AST 92 (H) 11/08/2017    ALT 46 (H) 11/08/2017     Sodium   Date Value Ref Range Status   11/08/2017 139 134 - 144 mmol/L Final   02/22/2017 137 134 - 145 mmol/L Final     Potassium   Date Value Ref Range Status   11/08/2017 4.5 3.5 - 5.2 mmol/L Final   02/22/2017 3.9 3.5 - 4.7 mmol/L Final     Total Bilirubin   Date Value Ref Range Status   11/08/2017 1.8 (H) 0.0 - 1.2 mg/dL Final   02/22/2017 1.4 (H) 0.1 - 1.2 mg/dL Final     Alkaline Phosphatase   Date Value Ref Range Status   11/08/2017 163 (H) 39 - 117 IU/L Final   02/22/2017 156 (H) 38 - 116 U/L Final   ]      ASSESSMENT:      1.  Recurrent Iron deficiency anemia required repeated IV iron therapy, roughly about every 2-3 months.  Laboratory study again reported iron deficiency today despite a  reasonable hemoglobin at 12.  Last time he received Feraheme treatment was early August 2017 roughly about 3 months ago.  He denies any visible bleeding besides may be occasional a bit of blood on toilet paper but nothing significant.  He complains of significant fatigue.  We'll treat him with Feraheme today and her next week.      Based on his history, I will schedule this patient return in 3 months with lab studies including CBC ferritin and iron level.  He likely will  need Feraheme treatment at that time.      2.  Severe thrombocytopenia and leukopenia/neutropenia related to his liver cirrhosis and HIV treatment. Both of his platelet and ANC count fluctuates, this time his WBC seems much better including his neutrophil counts.  He has no recurrent bacterial infection.  Will continue to observe.           PLAN:     1.  Start Feraheme today and repeat in 1 week.       2.  Return in 3 months for MD visit with lab studies including CBC, ferritin and iron level.  May again need iv. Feraheme treatment.     More than 15 minutes for patient care, over half of that time were for counseling.      JAMIE BOWMAN M.D., Ph.D.   11/08/2017      CC: Klarissa Thomas M.D.      Dictated using Dragon Dictation.

## 2017-11-15 NOTE — TELEPHONE ENCOUNTER
----- Message from Robyn Butler MD sent at 11/10/2017  3:23 PM EST -----  Liver labs show fairly stable elevations related to chronic liver disease

## 2017-11-15 NOTE — TELEPHONE ENCOUNTER
Called pt and advised per Dr Butler that liver labs show fairly stable elevations related to chronic liver disease.  Pt verb understanding.

## 2017-12-05 PROBLEM — M51.36 LUMBAR DEGENERATIVE DISC DISEASE: Status: ACTIVE | Noted: 2017-01-01

## 2017-12-05 NOTE — PATIENT INSTRUCTIONS
Medicare Wellness  Personal Prevention Plan of Service     Date of Office Visit:  2017  Encounter Provider:  Kendrick Griggs MD  Place of Service:  [facility]  Patient Name: Kye Aranda  :  1945    As part of the Medicare Wellness portion of your visit today, we are providing you with this personalized preventive plan of services (PPPS). This plan is based upon recommendations of the [place] Preventive Services Task Force (USPSTF) and the Advisory Committee on Immunization Practices (ACIP).    This lists the preventive care services that should be considered, and provides dates of when you are due. Items listed as completed are up-to-date and do not require any further intervention.    Health Maintenance   Topic Date Due   • [facility] Lung Cancer Screening  10/27/2001   • TDAP/TD VACCINES (1 - Tdap) 01/15/2018 (Originally 10/27/1964)   • COLONOSCOPY  2018 (Originally 2017)   • INFLUENZA VACCINE  2018 (Originally 2017)   • PNEUMOCOCCAL VACCINE (65+ HIGH RISK) (1 of 2 - PCV13) 2018 (Originally 10/27/2011)   • DIABETIC FOOT EXAM  2018 (Originally 5/3/2017)   • DIABETIC EYE EXAM  2018 (Originally 5/3/2017)   • HEMOGLOBIN A1C  2018   • LIPID PANEL  2018   • URINE MICROALBUMIN  2018   • MEDICARE ANNUAL WELLNESS  2018   • AAA SCREEN (ONE-TIME)  Completed   • HEPATITIS C SCREENING  Excluded   • ZOSTER VACCINE  Excluded       Orders Placed This Encounter   Procedures   • Ambulatory Referral to Pain Management     Referral Priority:   Routine     Referral Type:   Pain Management     Referral Reason:   Specialty Services Required     Requested Specialty:   Pain Medicine     Number of Visits Requested:   1       Return if symptoms worsen or fail to improve.

## 2017-12-05 NOTE — PROGRESS NOTES
"Subjective   Kye Aranda is a 72 y.o. male.     CC: Management of Chronic Lumbar Back Pain    History of Present Illness     Pt, new to me, comes in today to discuss some chronic lumbar back issues. Most recent MRI was 3/24 (reviewed today showing multi-level DDD. Has cirrhosis of the liver and is limited in medication use.      The following portions of the patient's history were reviewed and updated as appropriate: allergies, current medications, past family history, past medical history, past social history, past surgical history and problem list.    Review of Systems   Constitutional: Negative for fever.   Cardiovascular: Negative for chest pain.   Gastrointestinal: Negative for abdominal pain.   Genitourinary: Negative for dysuria.   Musculoskeletal: Positive for back pain.   Neurological: Positive for weakness and numbness. Negative for headaches.     /48  Pulse (!) 49  Temp 97.7 °F (36.5 °C) (Oral)   Resp 14  Ht 175.3 cm (69\")  Wt 83.5 kg (184 lb)  BMI 27.17 kg/m2    Objective   Physical Exam   Constitutional: He appears well-developed and well-nourished.   Neck: Neck supple. No thyromegaly present.   Cardiovascular: Normal rate and regular rhythm.    No murmur heard.  Pulmonary/Chest: Effort normal and breath sounds normal.   Abdominal: Bowel sounds are normal.   Musculoskeletal: He exhibits no tenderness.   Psychiatric: He has a normal mood and affect. His behavior is normal.   Nursing note and vitals reviewed.  MRI reports reviewed by me today.    Assessment/Plan   Kye was seen today for medicare wellness exam and back pain.    Diagnoses and all orders for this visit:    Medicare annual wellness visit, subsequent    Lumbar degenerative disc disease  -     Ambulatory Referral to Pain Management               "

## 2017-12-28 NOTE — TELEPHONE ENCOUNTER
See note of 8/28/17 - filled for 6 mo's.      Call to pt to advise of above.  Pt requesting # to call - advise was called to [employer] at .  Pt verb understanding.

## 2017-12-28 NOTE — TELEPHONE ENCOUNTER
Called pt and pt reports that [employer] is no longer handling the pt asst for xifaxan.  He reports that it now is a company called [employer].  Pt left phone numbers [phone] for pt assistance and [phone] for customer service.  Pt states he is out of xifaxan.  Advised pt he is due for a f/u appt. Pt made appt for 02/14 at 3pm with Dr Butler.  Also advised the pt that I will try and call the [employer] company in the am and in the meantime we will have 2 boxes of xifaxan 550mg samples at the  for him.  Pt verb understanding and states he will drop off the new paper work for the new pt asst.

## 2017-12-28 NOTE — TELEPHONE ENCOUNTER
----- Message from Kyle Mendez sent at 12/28/2017 12:03 PM EST -----  Regarding: REFILL   Contact: [phone]  PT CALLED NEED A REFILL FOR XIFAXAN 500 MG..     PHARM# [phone]

## 2017-12-29 NOTE — TELEPHONE ENCOUNTER
Called [facility] Pt asst customer service at [phone] and spoke with Ana who advised that she does not have a number for the pharmacy and does not know how to get in contact with them.  She states the pharmacy is called [facility].      Called pt and advised of the above. Advised the pt to let us know when he drops off the pt asst form..  Also advised the pt that we will contact the drug rep to see if we can acquire more samples to get him by.     Called Xifaxan rep Campbell Hurtado at [phone] and left vm requesting xifaxan samples to help the pt to get by till his pt asst form has been processed.

## 2017-12-29 NOTE — TELEPHONE ENCOUNTER
Pt dropped off form for pt asst from [facility].  Form completed and placed on Dr Butler's desk for signature.

## 2018-01-01 ENCOUNTER — HOSPITAL ENCOUNTER (EMERGENCY)
Facility: HOSPITAL | Age: 73
Discharge: HOME | End: 2018-03-29
Attending: EMERGENCY MEDICINE | Admitting: EMERGENCY MEDICINE

## 2018-01-01 ENCOUNTER — TELEPHONE (OUTPATIENT)
Dept: ORTHOPEDIC SURGERY | Facility: CLINIC | Age: 73
End: 2018-01-01

## 2018-01-01 ENCOUNTER — TELEPHONE (OUTPATIENT)
Dept: GASTROENTEROLOGY | Facility: CLINIC | Age: 73
End: 2018-01-01

## 2018-01-01 ENCOUNTER — TELEPHONE (OUTPATIENT)
Dept: ONCOLOGY | Facility: CLINIC | Age: 73
End: 2018-01-01

## 2018-01-01 ENCOUNTER — TRANSCRIBE ORDERS (OUTPATIENT)
Dept: ADMINISTRATIVE | Facility: HOSPITAL | Age: 73
End: 2018-01-01

## 2018-01-01 ENCOUNTER — INFUSION (OUTPATIENT)
Dept: ONCOLOGY | Facility: HOSPITAL | Age: 73
End: 2018-01-01

## 2018-01-01 ENCOUNTER — OFFICE VISIT (OUTPATIENT)
Dept: NEUROSURGERY | Facility: CLINIC | Age: 73
End: 2018-01-01

## 2018-01-01 ENCOUNTER — APPOINTMENT (OUTPATIENT)
Dept: ULTRASOUND IMAGING | Facility: HOSPITAL | Age: 73
End: 2018-01-01

## 2018-01-01 ENCOUNTER — TELEPHONE (OUTPATIENT)
Dept: NEUROSURGERY | Facility: CLINIC | Age: 73
End: 2018-01-01

## 2018-01-01 ENCOUNTER — OFFICE VISIT (OUTPATIENT)
Dept: GASTROENTEROLOGY | Facility: CLINIC | Age: 73
End: 2018-01-01

## 2018-01-01 ENCOUNTER — LAB (OUTPATIENT)
Dept: LAB | Facility: HOSPITAL | Age: 73
End: 2018-01-01

## 2018-01-01 ENCOUNTER — HOSPITAL ENCOUNTER (INPATIENT)
Facility: HOSPITAL | Age: 73
LOS: 5 days | Discharge: HOSPICE | End: 2018-10-16
Attending: EMERGENCY MEDICINE | Admitting: HOSPITALIST

## 2018-01-01 ENCOUNTER — APPOINTMENT (OUTPATIENT)
Dept: GENERAL RADIOLOGY | Facility: HOSPITAL | Age: 73
End: 2018-01-01

## 2018-01-01 ENCOUNTER — OFFICE VISIT (OUTPATIENT)
Dept: ONCOLOGY | Facility: CLINIC | Age: 73
End: 2018-01-01

## 2018-01-01 ENCOUNTER — HOSPITAL ENCOUNTER (OUTPATIENT)
Dept: CARDIOLOGY | Facility: HOSPITAL | Age: 73
Discharge: HOME | End: 2018-08-15
Attending: INTERNAL MEDICINE | Admitting: INTERNAL MEDICINE

## 2018-01-01 ENCOUNTER — APPOINTMENT (OUTPATIENT)
Dept: CARDIOLOGY | Facility: HOSPITAL | Age: 73
End: 2018-01-01
Attending: EMERGENCY MEDICINE

## 2018-01-01 ENCOUNTER — LAB (OUTPATIENT)
Dept: LAB | Facility: HOSPITAL | Age: 73
End: 2018-01-01
Attending: UROLOGY

## 2018-01-01 ENCOUNTER — APPOINTMENT (OUTPATIENT)
Dept: ONCOLOGY | Facility: CLINIC | Age: 73
End: 2018-01-01

## 2018-01-01 ENCOUNTER — OFFICE VISIT (OUTPATIENT)
Dept: CARDIOLOGY | Facility: CLINIC | Age: 73
End: 2018-01-01

## 2018-01-01 ENCOUNTER — READMISSION MANAGEMENT (OUTPATIENT)
Dept: CALL CENTER | Facility: HOSPITAL | Age: 73
End: 2018-01-01

## 2018-01-01 ENCOUNTER — TRANSCRIBE ORDERS (OUTPATIENT)
Dept: LAB | Facility: HOSPITAL | Age: 73
End: 2018-01-01

## 2018-01-01 ENCOUNTER — HOSPITAL ENCOUNTER (INPATIENT)
Facility: HOSPITAL | Age: 73
LOS: 7 days | End: 2018-10-23
Attending: HOSPITALIST | Admitting: INTERNAL MEDICINE

## 2018-01-01 ENCOUNTER — EPISODE CHANGES (OUTPATIENT)
Dept: CASE MANAGEMENT | Facility: OTHER | Age: 73
End: 2018-01-01

## 2018-01-01 ENCOUNTER — LAB (OUTPATIENT)
Dept: ONCOLOGY | Facility: HOSPITAL | Age: 73
End: 2018-01-01

## 2018-01-01 ENCOUNTER — APPOINTMENT (OUTPATIENT)
Dept: CT IMAGING | Facility: HOSPITAL | Age: 73
End: 2018-01-01

## 2018-01-01 ENCOUNTER — ANESTHESIA EVENT (OUTPATIENT)
Dept: PERIOP | Facility: HOSPITAL | Age: 73
End: 2018-01-01

## 2018-01-01 ENCOUNTER — ANESTHESIA (OUTPATIENT)
Dept: PERIOP | Facility: HOSPITAL | Age: 73
End: 2018-01-01

## 2018-01-01 ENCOUNTER — TELEPHONE (OUTPATIENT)
Dept: SOCIAL WORK | Facility: HOSPITAL | Age: 73
End: 2018-01-01

## 2018-01-01 ENCOUNTER — APPOINTMENT (OUTPATIENT)
Dept: ONCOLOGY | Facility: HOSPITAL | Age: 73
End: 2018-01-01

## 2018-01-01 ENCOUNTER — TELEPHONE (OUTPATIENT)
Dept: FAMILY MEDICINE CLINIC | Facility: CLINIC | Age: 73
End: 2018-01-01

## 2018-01-01 ENCOUNTER — DOCUMENTATION (OUTPATIENT)
Dept: ONCOLOGY | Facility: CLINIC | Age: 73
End: 2018-01-01

## 2018-01-01 ENCOUNTER — PREP FOR SURGERY (OUTPATIENT)
Dept: OTHER | Facility: HOSPITAL | Age: 73
End: 2018-01-01

## 2018-01-01 ENCOUNTER — HOSPITAL ENCOUNTER (EMERGENCY)
Facility: HOSPITAL | Age: 73
Discharge: HOME | End: 2018-08-08
Attending: EMERGENCY MEDICINE | Admitting: EMERGENCY MEDICINE

## 2018-01-01 ENCOUNTER — HOSPITAL ENCOUNTER (OUTPATIENT)
Dept: ULTRASOUND IMAGING | Facility: HOSPITAL | Age: 73
Discharge: HOME | End: 2018-03-07
Attending: INTERNAL MEDICINE

## 2018-01-01 ENCOUNTER — HOSPITAL ENCOUNTER (OUTPATIENT)
Dept: MRI IMAGING | Facility: HOSPITAL | Age: 73
Discharge: HOME | End: 2018-05-20
Admitting: NURSE PRACTITIONER

## 2018-01-01 ENCOUNTER — APPOINTMENT (OUTPATIENT)
Dept: LAB | Facility: HOSPITAL | Age: 73
End: 2018-01-01

## 2018-01-01 ENCOUNTER — OFFICE VISIT (OUTPATIENT)
Dept: FAMILY MEDICINE CLINIC | Facility: CLINIC | Age: 73
End: 2018-01-01

## 2018-01-01 ENCOUNTER — TELEPHONE (OUTPATIENT)
Dept: ONCOLOGY | Facility: HOSPITAL | Age: 73
End: 2018-01-01

## 2018-01-01 ENCOUNTER — HOSPITAL ENCOUNTER (INPATIENT)
Facility: HOSPITAL | Age: 73
LOS: 9 days | Discharge: NURSING, REHAB OR LONG-TERM CARE | End: 2018-04-18
Attending: EMERGENCY MEDICINE | Admitting: HOSPITALIST

## 2018-01-01 ENCOUNTER — LAB (OUTPATIENT)
Dept: LAB | Facility: HOSPITAL | Age: 73
End: 2018-01-01
Attending: INTERNAL MEDICINE

## 2018-01-01 ENCOUNTER — HOSPITAL ENCOUNTER (EMERGENCY)
Facility: HOSPITAL | Age: 73
Discharge: HOME | End: 2018-06-29
Attending: EMERGENCY MEDICINE | Admitting: EMERGENCY MEDICINE

## 2018-01-01 ENCOUNTER — HOSPITAL ENCOUNTER (OUTPATIENT)
Facility: HOSPITAL | Age: 73
Discharge: NURSING, REHAB OR LONG-TERM CARE | End: 2018-05-04
Attending: NEUROLOGICAL SURGERY | Admitting: NEUROLOGICAL SURGERY

## 2018-01-01 ENCOUNTER — HOSPITAL ENCOUNTER (OUTPATIENT)
Facility: HOSPITAL | Age: 73
Setting detail: OBSERVATION
LOS: 1 days | Discharge: HOME | End: 2018-06-12
Attending: EMERGENCY MEDICINE | Admitting: HOSPITALIST

## 2018-01-01 ENCOUNTER — TRANSCRIBE ORDERS (OUTPATIENT)
Dept: NEUROSURGERY | Facility: CLINIC | Age: 73
End: 2018-01-01

## 2018-01-01 ENCOUNTER — APPOINTMENT (OUTPATIENT)
Dept: GENERAL RADIOLOGY | Facility: HOSPITAL | Age: 73
End: 2018-01-01
Attending: HOSPITALIST

## 2018-01-01 ENCOUNTER — HOSPITAL ENCOUNTER (INPATIENT)
Facility: HOSPITAL | Age: 73
LOS: 5 days | Discharge: HOSPICE | End: 2018-09-16
Attending: HOSPITALIST | Admitting: HOSPITALIST

## 2018-01-01 ENCOUNTER — HOSPITAL ENCOUNTER (OUTPATIENT)
Dept: BONE DENSITY | Facility: HOSPITAL | Age: 73
Discharge: HOME | End: 2018-03-07
Attending: UROLOGY | Admitting: UROLOGY

## 2018-01-01 ENCOUNTER — RESULTS ENCOUNTER (OUTPATIENT)
Dept: GASTROENTEROLOGY | Facility: CLINIC | Age: 73
End: 2018-01-01

## 2018-01-01 ENCOUNTER — HOSPITAL ENCOUNTER (OUTPATIENT)
Facility: HOSPITAL | Age: 73
Discharge: HOME | End: 2018-06-01
Attending: NEUROLOGICAL SURGERY | Admitting: NEUROLOGICAL SURGERY

## 2018-01-01 ENCOUNTER — APPOINTMENT (OUTPATIENT)
Dept: MRI IMAGING | Facility: HOSPITAL | Age: 73
End: 2018-01-01

## 2018-01-01 VITALS
HEIGHT: 67 IN | TEMPERATURE: 97.9 F | WEIGHT: 158.1 LBS | OXYGEN SATURATION: 94 % | SYSTOLIC BLOOD PRESSURE: 155 MMHG | BODY MASS INDEX: 24.81 KG/M2 | RESPIRATION RATE: 16 BRPM | HEART RATE: 68 BPM | DIASTOLIC BLOOD PRESSURE: 70 MMHG

## 2018-01-01 VITALS
RESPIRATION RATE: 16 BRPM | WEIGHT: 184.4 LBS | SYSTOLIC BLOOD PRESSURE: 130 MMHG | HEIGHT: 67 IN | HEART RATE: 56 BPM | BODY MASS INDEX: 28.94 KG/M2 | DIASTOLIC BLOOD PRESSURE: 68 MMHG | TEMPERATURE: 98.3 F

## 2018-01-01 VITALS
TEMPERATURE: 98.2 F | WEIGHT: 188.2 LBS | HEART RATE: 54 BPM | BODY MASS INDEX: 29.19 KG/M2 | SYSTOLIC BLOOD PRESSURE: 146 MMHG | DIASTOLIC BLOOD PRESSURE: 76 MMHG

## 2018-01-01 VITALS
OXYGEN SATURATION: 92 % | DIASTOLIC BLOOD PRESSURE: 74 MMHG | BODY MASS INDEX: 26.13 KG/M2 | RESPIRATION RATE: 22 BRPM | HEIGHT: 66 IN | WEIGHT: 162.6 LBS | HEART RATE: 84 BPM | TEMPERATURE: 97.9 F | SYSTOLIC BLOOD PRESSURE: 136 MMHG

## 2018-01-01 VITALS
DIASTOLIC BLOOD PRESSURE: 60 MMHG | WEIGHT: 185 LBS | SYSTOLIC BLOOD PRESSURE: 125 MMHG | HEIGHT: 68 IN | BODY MASS INDEX: 28.04 KG/M2 | RESPIRATION RATE: 16 BRPM | OXYGEN SATURATION: 92 % | TEMPERATURE: 97.5 F | HEART RATE: 53 BPM

## 2018-01-01 VITALS
OXYGEN SATURATION: 93 % | SYSTOLIC BLOOD PRESSURE: 138 MMHG | DIASTOLIC BLOOD PRESSURE: 52 MMHG | TEMPERATURE: 98.6 F | BODY MASS INDEX: 26.68 KG/M2 | HEIGHT: 67 IN | WEIGHT: 170 LBS | RESPIRATION RATE: 16 BRPM | HEART RATE: 55 BPM

## 2018-01-01 VITALS
DIASTOLIC BLOOD PRESSURE: 70 MMHG | SYSTOLIC BLOOD PRESSURE: 110 MMHG | TEMPERATURE: 98.5 F | WEIGHT: 159 LBS | HEIGHT: 67 IN | BODY MASS INDEX: 24.96 KG/M2

## 2018-01-01 VITALS
TEMPERATURE: 97.5 F | HEIGHT: 67 IN | SYSTOLIC BLOOD PRESSURE: 142 MMHG | WEIGHT: 141 LBS | BODY MASS INDEX: 22.13 KG/M2 | DIASTOLIC BLOOD PRESSURE: 82 MMHG

## 2018-01-01 VITALS
TEMPERATURE: 98 F | OXYGEN SATURATION: 95 % | RESPIRATION RATE: 18 BRPM | SYSTOLIC BLOOD PRESSURE: 113 MMHG | BODY MASS INDEX: 24.5 KG/M2 | DIASTOLIC BLOOD PRESSURE: 56 MMHG | HEIGHT: 67 IN | WEIGHT: 156.09 LBS | HEART RATE: 50 BPM

## 2018-01-01 VITALS
WEIGHT: 159 LBS | DIASTOLIC BLOOD PRESSURE: 65 MMHG | HEIGHT: 67 IN | SYSTOLIC BLOOD PRESSURE: 128 MMHG | BODY MASS INDEX: 24.96 KG/M2 | HEART RATE: 56 BPM

## 2018-01-01 VITALS
HEART RATE: 66 BPM | RESPIRATION RATE: 16 BRPM | OXYGEN SATURATION: 97 % | HEIGHT: 67 IN | TEMPERATURE: 97.6 F | DIASTOLIC BLOOD PRESSURE: 79 MMHG | WEIGHT: 147 LBS | BODY MASS INDEX: 23.07 KG/M2 | SYSTOLIC BLOOD PRESSURE: 150 MMHG

## 2018-01-01 VITALS
WEIGHT: 157.13 LBS | TEMPERATURE: 98.1 F | BODY MASS INDEX: 24.66 KG/M2 | RESPIRATION RATE: 18 BRPM | OXYGEN SATURATION: 97 % | SYSTOLIC BLOOD PRESSURE: 128 MMHG | HEART RATE: 61 BPM | DIASTOLIC BLOOD PRESSURE: 35 MMHG | HEIGHT: 67 IN

## 2018-01-01 VITALS
WEIGHT: 149 LBS | RESPIRATION RATE: 16 BRPM | HEART RATE: 67 BPM | BODY MASS INDEX: 23.39 KG/M2 | TEMPERATURE: 98.1 F | DIASTOLIC BLOOD PRESSURE: 62 MMHG | SYSTOLIC BLOOD PRESSURE: 134 MMHG | HEIGHT: 67 IN

## 2018-01-01 VITALS
WEIGHT: 190 LBS | DIASTOLIC BLOOD PRESSURE: 74 MMHG | HEIGHT: 68 IN | BODY MASS INDEX: 28.79 KG/M2 | SYSTOLIC BLOOD PRESSURE: 120 MMHG

## 2018-01-01 VITALS
DIASTOLIC BLOOD PRESSURE: 56 MMHG | WEIGHT: 157 LBS | HEIGHT: 67 IN | HEART RATE: 48 BPM | BODY MASS INDEX: 24.64 KG/M2 | SYSTOLIC BLOOD PRESSURE: 133 MMHG

## 2018-01-01 VITALS
WEIGHT: 169.75 LBS | HEART RATE: 73 BPM | OXYGEN SATURATION: 94 % | HEIGHT: 67 IN | DIASTOLIC BLOOD PRESSURE: 72 MMHG | TEMPERATURE: 98.7 F | BODY MASS INDEX: 26.64 KG/M2 | RESPIRATION RATE: 18 BRPM | SYSTOLIC BLOOD PRESSURE: 144 MMHG

## 2018-01-01 VITALS
RESPIRATION RATE: 15 BRPM | SYSTOLIC BLOOD PRESSURE: 155 MMHG | BODY MASS INDEX: 23.31 KG/M2 | WEIGHT: 148.5 LBS | OXYGEN SATURATION: 99 % | HEIGHT: 67 IN | HEART RATE: 61 BPM | DIASTOLIC BLOOD PRESSURE: 60 MMHG | TEMPERATURE: 97.4 F

## 2018-01-01 VITALS
SYSTOLIC BLOOD PRESSURE: 123 MMHG | HEART RATE: 67 BPM | BODY MASS INDEX: 23.3 KG/M2 | HEIGHT: 66 IN | TEMPERATURE: 97.4 F | RESPIRATION RATE: 16 BRPM | WEIGHT: 145 LBS | OXYGEN SATURATION: 93 % | DIASTOLIC BLOOD PRESSURE: 59 MMHG

## 2018-01-01 VITALS
DIASTOLIC BLOOD PRESSURE: 62 MMHG | SYSTOLIC BLOOD PRESSURE: 114 MMHG | OXYGEN SATURATION: 98 % | DIASTOLIC BLOOD PRESSURE: 60 MMHG | WEIGHT: 188 LBS | HEART RATE: 46 BPM | BODY MASS INDEX: 29.51 KG/M2 | HEIGHT: 67 IN | SYSTOLIC BLOOD PRESSURE: 98 MMHG

## 2018-01-01 VITALS
RESPIRATION RATE: 16 BRPM | WEIGHT: 157.2 LBS | TEMPERATURE: 98.6 F | OXYGEN SATURATION: 97 % | HEART RATE: 74 BPM | DIASTOLIC BLOOD PRESSURE: 70 MMHG | BODY MASS INDEX: 24.67 KG/M2 | SYSTOLIC BLOOD PRESSURE: 130 MMHG | HEIGHT: 67 IN

## 2018-01-01 VITALS — WEIGHT: 169 LBS | BODY MASS INDEX: 26.53 KG/M2 | HEIGHT: 67 IN

## 2018-01-01 VITALS
HEIGHT: 67 IN | WEIGHT: 150.8 LBS | BODY MASS INDEX: 23.67 KG/M2 | DIASTOLIC BLOOD PRESSURE: 60 MMHG | SYSTOLIC BLOOD PRESSURE: 124 MMHG | TEMPERATURE: 98.1 F

## 2018-01-01 VITALS — TEMPERATURE: 103.9 F | DIASTOLIC BLOOD PRESSURE: 65 MMHG | SYSTOLIC BLOOD PRESSURE: 132 MMHG | OXYGEN SATURATION: 54 %

## 2018-01-01 VITALS
BODY MASS INDEX: 25.05 KG/M2 | WEIGHT: 159.6 LBS | TEMPERATURE: 97.9 F | OXYGEN SATURATION: 96 % | HEART RATE: 59 BPM | DIASTOLIC BLOOD PRESSURE: 78 MMHG | RESPIRATION RATE: 16 BRPM | SYSTOLIC BLOOD PRESSURE: 140 MMHG | HEIGHT: 67 IN

## 2018-01-01 VITALS
WEIGHT: 157 LBS | DIASTOLIC BLOOD PRESSURE: 60 MMHG | HEIGHT: 67 IN | HEART RATE: 58 BPM | SYSTOLIC BLOOD PRESSURE: 120 MMHG | BODY MASS INDEX: 24.64 KG/M2

## 2018-01-01 VITALS — SYSTOLIC BLOOD PRESSURE: 122 MMHG | DIASTOLIC BLOOD PRESSURE: 73 MMHG

## 2018-01-01 DIAGNOSIS — I25.10 CHRONIC CORONARY ARTERY DISEASE: ICD-10-CM

## 2018-01-01 DIAGNOSIS — S72.142A CLOSED COMMINUTED INTERTROCHANTERIC FRACTURE OF LEFT FEMUR, INITIAL ENCOUNTER (HCC): Primary | ICD-10-CM

## 2018-01-01 DIAGNOSIS — D70.3 NEUTROPENIA ASSOCIATED WITH INFECTION (HCC): ICD-10-CM

## 2018-01-01 DIAGNOSIS — D12.6 ADENOMATOUS POLYP OF COLON, UNSPECIFIED PART OF COLON: ICD-10-CM

## 2018-01-01 DIAGNOSIS — D69.6 THROMBOCYTOPENIA (HCC): ICD-10-CM

## 2018-01-01 DIAGNOSIS — R06.02 SHORTNESS OF BREATH: ICD-10-CM

## 2018-01-01 DIAGNOSIS — K74.60 CIRRHOSIS OF LIVER WITH ASCITES, UNSPECIFIED HEPATIC CIRRHOSIS TYPE (HCC): ICD-10-CM

## 2018-01-01 DIAGNOSIS — D69.59 THROMBOCYTOPENIA ASSOCIATED WITH AIDS (HCC): ICD-10-CM

## 2018-01-01 DIAGNOSIS — C61 PROSTATE CANCER (HCC): Primary | ICD-10-CM

## 2018-01-01 DIAGNOSIS — R18.8 CIRRHOSIS OF LIVER WITH ASCITES, UNSPECIFIED HEPATIC CIRRHOSIS TYPE (HCC): ICD-10-CM

## 2018-01-01 DIAGNOSIS — D61.818 PANCYTOPENIA (HCC): ICD-10-CM

## 2018-01-01 DIAGNOSIS — M51.36 LUMBAR DEGENERATIVE DISC DISEASE: ICD-10-CM

## 2018-01-01 DIAGNOSIS — B20 HIV (HUMAN IMMUNODEFICIENCY VIRUS INFECTION) (HCC): ICD-10-CM

## 2018-01-01 DIAGNOSIS — R68.89 DECREASED STRENGTH, ENDURANCE, AND MOBILITY: ICD-10-CM

## 2018-01-01 DIAGNOSIS — R53.83 FATIGUE, UNSPECIFIED TYPE: ICD-10-CM

## 2018-01-01 DIAGNOSIS — Z79.899 ENCOUNTER FOR LONG-TERM (CURRENT) USE OF MEDICATIONS: Primary | ICD-10-CM

## 2018-01-01 DIAGNOSIS — Z74.09 IMPAIRED MOBILITY: ICD-10-CM

## 2018-01-01 DIAGNOSIS — M54.50 LUMBAR SPINE PAIN: ICD-10-CM

## 2018-01-01 DIAGNOSIS — M80.08XA AGE-RELATED OSTEOPOROSIS WITH CURRENT PATHOLOGICAL FRACTURE OF VERTEBRA, INITIAL ENCOUNTER (HCC): Primary | ICD-10-CM

## 2018-01-01 DIAGNOSIS — R60.0 LEG EDEMA: Primary | ICD-10-CM

## 2018-01-01 DIAGNOSIS — C61 MALIGNANT NEOPLASM OF PROSTATE (HCC): ICD-10-CM

## 2018-01-01 DIAGNOSIS — K76.82 HEPATIC ENCEPHALOPATHY (HCC): ICD-10-CM

## 2018-01-01 DIAGNOSIS — R79.89 ELEVATED LFTS: ICD-10-CM

## 2018-01-01 DIAGNOSIS — K64.9 HEMORRHOIDS, UNSPECIFIED HEMORRHOID TYPE: ICD-10-CM

## 2018-01-01 DIAGNOSIS — IMO0001 IRON AND ITS COMPOUNDS CAUSING ADVERSE EFFECT IN THERAPEUTIC USE, SEQUELA: Primary | ICD-10-CM

## 2018-01-01 DIAGNOSIS — E87.1 HYPONATREMIA: Primary | ICD-10-CM

## 2018-01-01 DIAGNOSIS — M80.00XA AGE-RELATED OSTEOPOROSIS WITH CURRENT PATHOLOGICAL FRACTURE, INITIAL ENCOUNTER: ICD-10-CM

## 2018-01-01 DIAGNOSIS — IMO0001 IRON AND ITS COMPOUNDS CAUSING ADVERSE EFFECT IN THERAPEUTIC USE, SUBSEQUENT ENCOUNTER: Primary | ICD-10-CM

## 2018-01-01 DIAGNOSIS — E43 SEVERE PROTEIN-CALORIE MALNUTRITION (HCC): ICD-10-CM

## 2018-01-01 DIAGNOSIS — D61.818 PANCYTOPENIA (HCC): Primary | ICD-10-CM

## 2018-01-01 DIAGNOSIS — R10.9 ABDOMINAL PAIN, UNSPECIFIED ABDOMINAL LOCATION: ICD-10-CM

## 2018-01-01 DIAGNOSIS — R26.89 DECREASED MOBILITY: Primary | ICD-10-CM

## 2018-01-01 DIAGNOSIS — B20 HIV INFECTION (HCC): ICD-10-CM

## 2018-01-01 DIAGNOSIS — E11.9 DIABETES MELLITUS WITHOUT COMPLICATION (HCC): ICD-10-CM

## 2018-01-01 DIAGNOSIS — Z79.899 ENCOUNTER FOR LONG-TERM (CURRENT) USE OF MEDICATIONS: ICD-10-CM

## 2018-01-01 DIAGNOSIS — R53.1 DECREASED STRENGTH, ENDURANCE, AND MOBILITY: ICD-10-CM

## 2018-01-01 DIAGNOSIS — R18.8 CIRRHOSIS OF LIVER WITH ASCITES, UNSPECIFIED HEPATIC CIRRHOSIS TYPE (HCC): Primary | ICD-10-CM

## 2018-01-01 DIAGNOSIS — Z95.5 HISTORY OF CORONARY ARTERY STENT PLACEMENT: ICD-10-CM

## 2018-01-01 DIAGNOSIS — D50.0 IRON DEFICIENCY ANEMIA DUE TO CHRONIC BLOOD LOSS: ICD-10-CM

## 2018-01-01 DIAGNOSIS — K74.60 CIRRHOSIS OF LIVER WITH ASCITES, UNSPECIFIED HEPATIC CIRRHOSIS TYPE (HCC): Primary | ICD-10-CM

## 2018-01-01 DIAGNOSIS — Z74.09 DECREASED STRENGTH, ENDURANCE, AND MOBILITY: ICD-10-CM

## 2018-01-01 DIAGNOSIS — M54.6 ACUTE MIDLINE THORACIC BACK PAIN: ICD-10-CM

## 2018-01-01 DIAGNOSIS — D70.9 NEUTROPENIA, UNSPECIFIED TYPE (HCC): ICD-10-CM

## 2018-01-01 DIAGNOSIS — D12.4 ADENOMATOUS POLYP OF DESCENDING COLON: Primary | ICD-10-CM

## 2018-01-01 DIAGNOSIS — D50.0 IRON DEFICIENCY ANEMIA DUE TO CHRONIC BLOOD LOSS: Primary | ICD-10-CM

## 2018-01-01 DIAGNOSIS — IMO0001 IRON AND ITS COMPOUNDS CAUSING ADVERSE EFFECT IN THERAPEUTIC USE, SEQUELA: ICD-10-CM

## 2018-01-01 DIAGNOSIS — R18.8 OTHER ASCITES: ICD-10-CM

## 2018-01-01 DIAGNOSIS — B20 THROMBOCYTOPENIA ASSOCIATED WITH AIDS (HCC): ICD-10-CM

## 2018-01-01 DIAGNOSIS — K74.69 OTHER CIRRHOSIS OF LIVER (HCC): Primary | ICD-10-CM

## 2018-01-01 DIAGNOSIS — I77.9 PERIPHERAL ARTERIAL OCCLUSIVE DISEASE (HCC): ICD-10-CM

## 2018-01-01 DIAGNOSIS — E08.3521: ICD-10-CM

## 2018-01-01 DIAGNOSIS — I73.9 INTERMITTENT CLAUDICATION (HCC): ICD-10-CM

## 2018-01-01 DIAGNOSIS — R60.0 PEDAL EDEMA: Primary | ICD-10-CM

## 2018-01-01 DIAGNOSIS — B37.89 CANDIDA RASH OF GROIN: ICD-10-CM

## 2018-01-01 DIAGNOSIS — M80.00XD AGE-RELATED OSTEOPOROSIS WITH CURRENT PATHOLOGICAL FRACTURE WITH ROUTINE HEALING, SUBSEQUENT ENCOUNTER: Primary | ICD-10-CM

## 2018-01-01 DIAGNOSIS — Z12.5 PROSTATE CANCER SCREENING: ICD-10-CM

## 2018-01-01 DIAGNOSIS — E83.39 LOW BLOOD PHOSPHATE: ICD-10-CM

## 2018-01-01 DIAGNOSIS — Z09 SURGICAL FOLLOWUP VISIT: ICD-10-CM

## 2018-01-01 DIAGNOSIS — B20 HIV INFECTION (HCC): Primary | ICD-10-CM

## 2018-01-01 DIAGNOSIS — IMO0001 IRON AND ITS COMPOUNDS CAUSING ADVERSE EFFECT IN THERAPEUTIC USE, SUBSEQUENT ENCOUNTER: ICD-10-CM

## 2018-01-01 DIAGNOSIS — S22.080A T12 COMPRESSION FRACTURE (HCC): ICD-10-CM

## 2018-01-01 DIAGNOSIS — E03.2 HYPOTHYROIDISM DUE TO MEDICATION: ICD-10-CM

## 2018-01-01 DIAGNOSIS — R53.83 TIRED: ICD-10-CM

## 2018-01-01 DIAGNOSIS — N18.2 CKD (CHRONIC KIDNEY DISEASE) STAGE 2, GFR 60-89 ML/MIN: ICD-10-CM

## 2018-01-01 DIAGNOSIS — G89.29 CHRONIC BILATERAL LOW BACK PAIN WITHOUT SCIATICA: Primary | ICD-10-CM

## 2018-01-01 DIAGNOSIS — I25.10 CHRONIC CORONARY ARTERY DISEASE: Primary | ICD-10-CM

## 2018-01-01 DIAGNOSIS — K74.69 OTHER CIRRHOSIS OF LIVER (HCC): ICD-10-CM

## 2018-01-01 DIAGNOSIS — S22.080A T12 COMPRESSION FRACTURE (HCC): Primary | ICD-10-CM

## 2018-01-01 DIAGNOSIS — M80.08XA AGE-RELATED OSTEOPOROSIS WITH CURRENT PATHOLOGICAL FRACTURE OF VERTEBRA, INITIAL ENCOUNTER (HCC): ICD-10-CM

## 2018-01-01 DIAGNOSIS — R60.0 LEG EDEMA: ICD-10-CM

## 2018-01-01 DIAGNOSIS — M80.00XA OSTEOPOROSIS WITH CURRENT PATHOLOGICAL FRACTURE, UNSPECIFIED OSTEOPOROSIS TYPE, INITIAL ENCOUNTER: ICD-10-CM

## 2018-01-01 DIAGNOSIS — M80.00XA AGE-RELATED OSTEOPOROSIS WITH CURRENT PATHOLOGICAL FRACTURE, INITIAL ENCOUNTER: Primary | ICD-10-CM

## 2018-01-01 DIAGNOSIS — E03.9 HYPOTHYROIDISM, UNSPECIFIED TYPE: ICD-10-CM

## 2018-01-01 DIAGNOSIS — M54.6 ACUTE BILATERAL THORACIC BACK PAIN: ICD-10-CM

## 2018-01-01 DIAGNOSIS — E11.9 DIABETES MELLITUS WITHOUT COMPLICATION (HCC): Primary | ICD-10-CM

## 2018-01-01 DIAGNOSIS — M80.00XA OSTEOPOROSIS WITH CURRENT PATHOLOGICAL FRACTURE, UNSPECIFIED OSTEOPOROSIS TYPE, INITIAL ENCOUNTER: Primary | ICD-10-CM

## 2018-01-01 DIAGNOSIS — M54.50 LUMBAR SPINE PAIN: Primary | ICD-10-CM

## 2018-01-01 DIAGNOSIS — D69.59 SECONDARY THROMBOCYTOPENIA: ICD-10-CM

## 2018-01-01 DIAGNOSIS — R97.20 ABNORMAL PSA: ICD-10-CM

## 2018-01-01 DIAGNOSIS — K71.7 HEPATIC CIRRHOSIS DUE TO TOXIN: ICD-10-CM

## 2018-01-01 DIAGNOSIS — E11.01 TYPE 2 DIABETES MELLITUS WITH HYPEROSMOLAR COMA, WITHOUT LONG-TERM CURRENT USE OF INSULIN (HCC): ICD-10-CM

## 2018-01-01 DIAGNOSIS — S22.080A COMPRESSION FRACTURE OF T12 VERTEBRA (HCC): ICD-10-CM

## 2018-01-01 DIAGNOSIS — Z09 HOSPITAL DISCHARGE FOLLOW-UP: ICD-10-CM

## 2018-01-01 DIAGNOSIS — D69.6 THROMBOCYTOPENIA (HCC): Primary | ICD-10-CM

## 2018-01-01 DIAGNOSIS — C61 PROSTATE CANCER (HCC): ICD-10-CM

## 2018-01-01 DIAGNOSIS — R60.0 PEDAL EDEMA: ICD-10-CM

## 2018-01-01 DIAGNOSIS — Z01.818 PREOP EXAMINATION: Primary | ICD-10-CM

## 2018-01-01 DIAGNOSIS — M54.50 CHRONIC BILATERAL LOW BACK PAIN WITHOUT SCIATICA: Primary | ICD-10-CM

## 2018-01-01 DIAGNOSIS — R97.20 ELEVATED PSA: ICD-10-CM

## 2018-01-01 DIAGNOSIS — K76.6 PORTAL HYPERTENSION (HCC): ICD-10-CM

## 2018-01-01 DIAGNOSIS — K74.60 CIRRHOSIS OF LIVER WITHOUT ASCITES, UNSPECIFIED HEPATIC CIRRHOSIS TYPE (HCC): Primary | ICD-10-CM

## 2018-01-01 DIAGNOSIS — R06.89 DIFFICULTY BREATHING: ICD-10-CM

## 2018-01-01 DIAGNOSIS — D72.818 OTHER DECREASED WHITE BLOOD CELL (WBC) COUNT: Primary | ICD-10-CM

## 2018-01-01 DIAGNOSIS — N28.9 RENAL INSUFFICIENCY: ICD-10-CM

## 2018-01-01 DIAGNOSIS — M80.00XD AGE-RELATED OSTEOPOROSIS WITH CURRENT PATHOLOGICAL FRACTURE WITH ROUTINE HEALING, SUBSEQUENT ENCOUNTER: ICD-10-CM

## 2018-01-01 LAB
25(OH)D3 SERPL-MCNC: 46.7 NG/ML (ref 30–100)
ABO + RH BLD: NORMAL
ABO GROUP BLD: NORMAL
ABO GROUP BLD: NORMAL
AFP-TM SERPL-MCNC: 3 NG/ML (ref 0–8.3)
AFP-TM SERPL-MCNC: 3.2 NG/ML (ref 0–8.3)
ALBUMIN SERPL-MCNC: 2.9 G/DL (ref 3.5–5.2)
ALBUMIN SERPL-MCNC: 2.9 G/DL (ref 3.5–5.2)
ALBUMIN SERPL-MCNC: 3 G/DL (ref 3.5–5.2)
ALBUMIN SERPL-MCNC: 3 G/DL (ref 3.5–5.2)
ALBUMIN SERPL-MCNC: 3.1 G/DL (ref 3.5–5.2)
ALBUMIN SERPL-MCNC: 3.2 G/DL (ref 2.9–4.4)
ALBUMIN SERPL-MCNC: 3.3 G/DL (ref 3.5–5.2)
ALBUMIN SERPL-MCNC: 3.4 G/DL (ref 3.5–5.2)
ALBUMIN SERPL-MCNC: 3.4 G/DL (ref 3.5–5.2)
ALBUMIN SERPL-MCNC: 3.5 G/DL (ref 3.5–5.2)
ALBUMIN SERPL-MCNC: 3.6 G/DL (ref 3.5–5.2)
ALBUMIN SERPL-MCNC: 3.8 G/DL (ref 3.5–5.2)
ALBUMIN SERPL-MCNC: 3.8 G/DL (ref 3.5–5.2)
ALBUMIN SERPL-MCNC: 3.9 G/DL (ref 3.5–5.2)
ALBUMIN SERPL-MCNC: 4.3 G/DL (ref 3.5–5.2)
ALBUMIN/GLOB SERPL: 0.9 G/DL
ALBUMIN/GLOB SERPL: 1 G/DL
ALBUMIN/GLOB SERPL: 1 G/DL (ref 1.1–2.4)
ALBUMIN/GLOB SERPL: 1.1 G/DL
ALBUMIN/GLOB SERPL: 1.1 {RATIO} (ref 0.7–1.7)
ALBUMIN/GLOB SERPL: 1.2 G/DL
ALP SERPL-CCNC: 154 U/L (ref 39–117)
ALP SERPL-CCNC: 162 U/L (ref 39–117)
ALP SERPL-CCNC: 168 U/L (ref 39–117)
ALP SERPL-CCNC: 170 U/L (ref 39–117)
ALP SERPL-CCNC: 193 U/L (ref 39–117)
ALP SERPL-CCNC: 225 U/L (ref 39–117)
ALP SERPL-CCNC: 226 U/L (ref 39–117)
ALP SERPL-CCNC: 235 U/L (ref 39–117)
ALP SERPL-CCNC: 244 U/L (ref 39–117)
ALP SERPL-CCNC: 260 U/L (ref 39–117)
ALP SERPL-CCNC: 298 U/L (ref 39–117)
ALP SERPL-CCNC: 300 U/L (ref 39–117)
ALP SERPL-CCNC: 315 U/L (ref 38–116)
ALP SERPL-CCNC: 328 U/L (ref 39–117)
ALP SERPL-CCNC: 343 U/L (ref 39–117)
ALP SERPL-CCNC: 344 U/L (ref 39–117)
ALP SERPL-CCNC: 352 U/L (ref 39–117)
ALP SERPL-CCNC: 413 U/L (ref 39–117)
ALPHA1 GLOB FLD ELPH-MCNC: 0.2 G/DL (ref 0–0.4)
ALPHA2 GLOB SERPL ELPH-MCNC: 0.4 G/DL (ref 0.4–1)
ALT SERPL W P-5'-P-CCNC: 17 U/L (ref 1–41)
ALT SERPL W P-5'-P-CCNC: 18 U/L (ref 1–41)
ALT SERPL W P-5'-P-CCNC: 19 U/L (ref 1–41)
ALT SERPL W P-5'-P-CCNC: 23 U/L (ref 0–41)
ALT SERPL W P-5'-P-CCNC: 25 U/L (ref 1–41)
ALT SERPL W P-5'-P-CCNC: 26 U/L (ref 1–41)
ALT SERPL W P-5'-P-CCNC: 28 U/L (ref 1–41)
ALT SERPL W P-5'-P-CCNC: 31 U/L (ref 1–41)
ALT SERPL W P-5'-P-CCNC: 31 U/L (ref 1–41)
ALT SERPL W P-5'-P-CCNC: 33 U/L (ref 1–41)
ALT SERPL W P-5'-P-CCNC: 34 U/L (ref 1–41)
ALT SERPL W P-5'-P-CCNC: 34 U/L (ref 1–41)
ALT SERPL W P-5'-P-CCNC: 36 U/L (ref 1–41)
ALT SERPL W P-5'-P-CCNC: 37 U/L (ref 1–41)
ALT SERPL W P-5'-P-CCNC: 45 U/L (ref 1–41)
ALT SERPL W P-5'-P-CCNC: 46 U/L (ref 1–41)
ALT SERPL W P-5'-P-CCNC: 51 U/L (ref 1–41)
ALT SERPL W P-5'-P-CCNC: 55 U/L (ref 1–41)
AMMONIA BLD-SCNC: 30 UMOL/L (ref 16–60)
AMMONIA BLD-SCNC: 39 UMOL/L (ref 16–60)
AMMONIA BLD-SCNC: 70 UMOL/L (ref 16–60)
ANION GAP SERPL CALCULATED.3IONS-SCNC: 10 MMOL/L
ANION GAP SERPL CALCULATED.3IONS-SCNC: 10 MMOL/L
ANION GAP SERPL CALCULATED.3IONS-SCNC: 10.1 MMOL/L
ANION GAP SERPL CALCULATED.3IONS-SCNC: 10.2 MMOL/L
ANION GAP SERPL CALCULATED.3IONS-SCNC: 10.5 MMOL/L
ANION GAP SERPL CALCULATED.3IONS-SCNC: 11 MMOL/L
ANION GAP SERPL CALCULATED.3IONS-SCNC: 11 MMOL/L
ANION GAP SERPL CALCULATED.3IONS-SCNC: 11.1 MMOL/L
ANION GAP SERPL CALCULATED.3IONS-SCNC: 11.3 MMOL/L
ANION GAP SERPL CALCULATED.3IONS-SCNC: 11.5 MMOL/L
ANION GAP SERPL CALCULATED.3IONS-SCNC: 12.2 MMOL/L
ANION GAP SERPL CALCULATED.3IONS-SCNC: 12.3 MMOL/L
ANION GAP SERPL CALCULATED.3IONS-SCNC: 12.7 MMOL/L
ANION GAP SERPL CALCULATED.3IONS-SCNC: 14.1 MMOL/L
ANION GAP SERPL CALCULATED.3IONS-SCNC: 14.3 MMOL/L
ANION GAP SERPL CALCULATED.3IONS-SCNC: 6.5 MMOL/L
ANION GAP SERPL CALCULATED.3IONS-SCNC: 7.6 MMOL/L
ANION GAP SERPL CALCULATED.3IONS-SCNC: 7.7 MMOL/L
ANION GAP SERPL CALCULATED.3IONS-SCNC: 9 MMOL/L
ANION GAP SERPL CALCULATED.3IONS-SCNC: 9.1 MMOL/L
ANION GAP SERPL CALCULATED.3IONS-SCNC: 9.4 MMOL/L
ANION GAP SERPL CALCULATED.3IONS-SCNC: 9.5 MMOL/L
ANION GAP SERPL CALCULATED.3IONS-SCNC: 9.5 MMOL/L
ANION GAP SERPL CALCULATED.3IONS-SCNC: 9.7 MMOL/L
ANION GAP SERPL CALCULATED.3IONS-SCNC: 9.9 MMOL/L
ANISOCYTOSIS BLD QL: NORMAL
APTT PPP: 40 SECONDS (ref 22.7–35.4)
APTT PPP: 41.5 SECONDS (ref 22.7–35.4)
AST SERPL-CCNC: 37 U/L (ref 1–40)
AST SERPL-CCNC: 41 U/L (ref 1–40)
AST SERPL-CCNC: 42 U/L (ref 1–40)
AST SERPL-CCNC: 43 U/L (ref 1–40)
AST SERPL-CCNC: 47 U/L (ref 0–40)
AST SERPL-CCNC: 47 U/L (ref 1–40)
AST SERPL-CCNC: 48 U/L (ref 1–40)
AST SERPL-CCNC: 49 U/L (ref 1–40)
AST SERPL-CCNC: 51 U/L (ref 1–40)
AST SERPL-CCNC: 55 U/L (ref 1–40)
AST SERPL-CCNC: 56 U/L (ref 1–40)
AST SERPL-CCNC: 56 U/L (ref 1–40)
AST SERPL-CCNC: 57 U/L (ref 1–40)
AST SERPL-CCNC: 60 U/L (ref 1–40)
AST SERPL-CCNC: 61 U/L (ref 1–40)
AST SERPL-CCNC: 62 U/L (ref 1–40)
AST SERPL-CCNC: 67 U/L (ref 1–40)
AST SERPL-CCNC: 71 U/L (ref 1–40)
B-GLOBULIN SERPL ELPH-MCNC: 0.8 G/DL (ref 0.7–1.3)
B2 MICROGLOB SERPL-MCNC: 4 MG/L (ref 0.8–2.2)
BACTERIA SPEC AEROBE CULT: NORMAL
BACTERIA SPEC AEROBE CULT: NORMAL
BACTERIA UR QL AUTO: ABNORMAL /HPF
BACTERIA UR QL AUTO: NORMAL /HPF
BACTERIA UR QL AUTO: NORMAL /HPF
BASOPHILS # BLD AUTO: 0 10*3/MM3 (ref 0–0.2)
BASOPHILS # BLD AUTO: 0 X10E3/UL (ref 0–0.2)
BASOPHILS # BLD AUTO: 0.01 10*3/MM3 (ref 0–0.1)
BASOPHILS # BLD AUTO: 0.01 10*3/MM3 (ref 0–0.1)
BASOPHILS # BLD AUTO: 0.01 10*3/MM3 (ref 0–0.2)
BASOPHILS # BLD AUTO: 0.02 10*3/MM3 (ref 0–0.1)
BASOPHILS # BLD AUTO: 0.02 10*3/MM3 (ref 0–0.2)
BASOPHILS # BLD AUTO: 0.02 10*3/MM3 (ref 0–0.2)
BASOPHILS NFR BLD AUTO: 0 %
BASOPHILS NFR BLD AUTO: 0 %
BASOPHILS NFR BLD AUTO: 0 % (ref 0–1.5)
BASOPHILS NFR BLD AUTO: 0.2 % (ref 0–1.5)
BASOPHILS NFR BLD AUTO: 0.3 % (ref 0–1.1)
BASOPHILS NFR BLD AUTO: 0.3 % (ref 0–1.5)
BASOPHILS NFR BLD AUTO: 0.4 % (ref 0–1.1)
BASOPHILS NFR BLD AUTO: 0.4 % (ref 0–1.1)
BASOPHILS NFR BLD AUTO: 0.4 % (ref 0–1.5)
BASOPHILS NFR BLD AUTO: 0.4 % (ref 0–1.5)
BASOPHILS NFR BLD AUTO: 0.5 % (ref 0–1.5)
BASOPHILS NFR BLD AUTO: 0.6 % (ref 0–1.5)
BASOPHILS NFR BLD AUTO: 0.7 % (ref 0–1.5)
BASOPHILS NFR BLD AUTO: 1 %
BH BB BLOOD EXPIRATION DATE: NORMAL
BH BB BLOOD TYPE BARCODE: 2800
BH BB BLOOD TYPE BARCODE: 600
BH BB BLOOD TYPE BARCODE: 6200
BH BB BLOOD TYPE BARCODE: 7300
BH BB BLOOD TYPE BARCODE: 7300
BH BB BLOOD TYPE BARCODE: 8400
BH BB BLOOD TYPE BARCODE: 8400
BH BB BLOOD TYPE BARCODE: NORMAL
BH BB DISPENSE STATUS: NORMAL
BH BB PRODUCT CODE: NORMAL
BH BB UNIT NUMBER: NORMAL
BH CV ECHO MEAS - ACS: 1.9 CM
BH CV ECHO MEAS - AO MAX PG: 7 MMHG
BH CV ECHO MEAS - AO MEAN PG (FULL): 1 MMHG
BH CV ECHO MEAS - AO MEAN PG: 3 MMHG
BH CV ECHO MEAS - AO ROOT AREA (BSA CORRECTED): 1.8
BH CV ECHO MEAS - AO ROOT AREA: 8.6 CM^2
BH CV ECHO MEAS - AO ROOT DIAM: 3.3 CM
BH CV ECHO MEAS - AO V2 MAX: 132 CM/SEC
BH CV ECHO MEAS - AO V2 MEAN: 83 CM/SEC
BH CV ECHO MEAS - AO V2 VTI: 27.7 CM
BH CV ECHO MEAS - AVA(I,A): 2.5 CM^2
BH CV ECHO MEAS - AVA(I,D): 2.5 CM^2
BH CV ECHO MEAS - BSA(HAYCOCK): 1.8 M^2
BH CV ECHO MEAS - BSA: 1.8 M^2
BH CV ECHO MEAS - BZI_BMI: 24.7 KILOGRAMS/M^2
BH CV ECHO MEAS - BZI_METRIC_HEIGHT: 170.2 CM
BH CV ECHO MEAS - BZI_METRIC_WEIGHT: 71.7 KG
BH CV ECHO MEAS - EDV(CUBED): 97.3 ML
BH CV ECHO MEAS - EDV(MOD-SP2): 87 ML
BH CV ECHO MEAS - EDV(MOD-SP4): 79 ML
BH CV ECHO MEAS - EDV(TEICH): 97.3 ML
BH CV ECHO MEAS - EF(CUBED): 85.8 %
BH CV ECHO MEAS - EF(MOD-BP): 65 %
BH CV ECHO MEAS - EF(MOD-SP2): 65.5 %
BH CV ECHO MEAS - EF(MOD-SP4): 68.4 %
BH CV ECHO MEAS - EF(TEICH): 79.3 %
BH CV ECHO MEAS - ESV(CUBED): 13.8 ML
BH CV ECHO MEAS - ESV(MOD-SP2): 30 ML
BH CV ECHO MEAS - ESV(MOD-SP4): 25 ML
BH CV ECHO MEAS - ESV(TEICH): 20.2 ML
BH CV ECHO MEAS - FS: 47.8 %
BH CV ECHO MEAS - IVS/LVPW: 1
BH CV ECHO MEAS - IVSD: 1.2 CM
BH CV ECHO MEAS - LAT PEAK E' VEL: 5 CM/SEC
BH CV ECHO MEAS - LV DIASTOLIC VOL/BSA (35-75): 43.2 ML/M^2
BH CV ECHO MEAS - LV MASS(C)D: 205 GRAMS
BH CV ECHO MEAS - LV MASS(C)DI: 112.1 GRAMS/M^2
BH CV ECHO MEAS - LV MEAN PG: 2 MMHG
BH CV ECHO MEAS - LV SYSTOLIC VOL/BSA (12-30): 13.7 ML/M^2
BH CV ECHO MEAS - LV V1 MAX: 113 CM/SEC
BH CV ECHO MEAS - LV V1 MEAN: 70.5 CM/SEC
BH CV ECHO MEAS - LV V1 VTI: 24.2 CM
BH CV ECHO MEAS - LVIDD: 4.6 CM
BH CV ECHO MEAS - LVIDS: 2.4 CM
BH CV ECHO MEAS - LVLD AP2: 7.2 CM
BH CV ECHO MEAS - LVLD AP4: 7.5 CM
BH CV ECHO MEAS - LVLS AP2: 5.4 CM
BH CV ECHO MEAS - LVLS AP4: 5.8 CM
BH CV ECHO MEAS - LVOT AREA (M): 2.8 CM^2
BH CV ECHO MEAS - LVOT AREA: 2.8 CM^2
BH CV ECHO MEAS - LVOT DIAM: 1.9 CM
BH CV ECHO MEAS - LVPWD: 1.2 CM
BH CV ECHO MEAS - MED PEAK E' VEL: 11 CM/SEC
BH CV ECHO MEAS - MV A DUR: 0.16 SEC
BH CV ECHO MEAS - MV A MAX VEL: 73.1 CM/SEC
BH CV ECHO MEAS - MV DEC SLOPE: 129 CM/SEC^2
BH CV ECHO MEAS - MV DEC TIME: 0.31 SEC
BH CV ECHO MEAS - MV E MAX VEL: 57.8 CM/SEC
BH CV ECHO MEAS - MV E/A: 0.79
BH CV ECHO MEAS - MV MEAN PG: 1 MMHG
BH CV ECHO MEAS - MV P1/2T MAX VEL: 61.4 CM/SEC
BH CV ECHO MEAS - MV P1/2T: 139.4 MSEC
BH CV ECHO MEAS - MV V2 MEAN: 47.2 CM/SEC
BH CV ECHO MEAS - MV V2 VTI: 29.1 CM
BH CV ECHO MEAS - MVA P1/2T LCG: 3.6 CM^2
BH CV ECHO MEAS - MVA(P1/2T): 1.6 CM^2
BH CV ECHO MEAS - MVA(VTI): 2.4 CM^2
BH CV ECHO MEAS - PA ACC SLOPE: 28.5 CM/SEC^2
BH CV ECHO MEAS - PA ACC TIME: 0.1 SEC
BH CV ECHO MEAS - PA MAX PG: 4.9 MMHG
BH CV ECHO MEAS - PA PR(ACCEL): 33.1 MMHG
BH CV ECHO MEAS - PA V2 MAX: 111 CM/SEC
BH CV ECHO MEAS - PULM A REVS DUR: 0.12 SEC
BH CV ECHO MEAS - PULM A REVS VEL: 21.7 CM/SEC
BH CV ECHO MEAS - PULM DIAS VEL: 41.5 CM/SEC
BH CV ECHO MEAS - PULM S/D: 1.3
BH CV ECHO MEAS - PULM SYS VEL: 54.3 CM/SEC
BH CV ECHO MEAS - QP/QS: 0.76
BH CV ECHO MEAS - RAP SYSTOLE: 3 MMHG
BH CV ECHO MEAS - RV MEAN PG: 1 MMHG
BH CV ECHO MEAS - RV V1 MEAN: 51.8 CM/SEC
BH CV ECHO MEAS - RV V1 VTI: 16.6 CM
BH CV ECHO MEAS - RVOT AREA: 3.1 CM^2
BH CV ECHO MEAS - RVOT DIAM: 2 CM
BH CV ECHO MEAS - RVSP: 25 MMHG
BH CV ECHO MEAS - SI(AO): 129.5 ML/M^2
BH CV ECHO MEAS - SI(CUBED): 45.7 ML/M^2
BH CV ECHO MEAS - SI(LVOT): 37.5 ML/M^2
BH CV ECHO MEAS - SI(MOD-SP2): 31.2 ML/M^2
BH CV ECHO MEAS - SI(MOD-SP4): 29.5 ML/M^2
BH CV ECHO MEAS - SI(TEICH): 42.2 ML/M^2
BH CV ECHO MEAS - SV(AO): 236.9 ML
BH CV ECHO MEAS - SV(CUBED): 83.5 ML
BH CV ECHO MEAS - SV(LVOT): 68.6 ML
BH CV ECHO MEAS - SV(MOD-SP2): 57 ML
BH CV ECHO MEAS - SV(MOD-SP4): 54 ML
BH CV ECHO MEAS - SV(RVOT): 52.2 ML
BH CV ECHO MEAS - SV(TEICH): 77.2 ML
BH CV ECHO MEAS - TAPSE (>1.6): 1.7 CM2
BH CV ECHO MEAS - TR MAX VEL: 233 CM/SEC
BH CV ECHO MEASUREMENTS AVERAGE E/E' RATIO: 7.23
BH CV LOWER VASCULAR LEFT COMMON FEMORAL AUGMENT: NORMAL
BH CV LOWER VASCULAR LEFT COMMON FEMORAL COMPETENT: NORMAL
BH CV LOWER VASCULAR LEFT COMMON FEMORAL COMPRESS: NORMAL
BH CV LOWER VASCULAR LEFT COMMON FEMORAL PHASIC: NORMAL
BH CV LOWER VASCULAR LEFT COMMON FEMORAL SPONT: NORMAL
BH CV LOWER VASCULAR LEFT DISTAL FEMORAL COMPRESS: NORMAL
BH CV LOWER VASCULAR LEFT GASTRONEMIUS COMPRESS: NORMAL
BH CV LOWER VASCULAR LEFT GREATER SAPH AK COMPRESS: NORMAL
BH CV LOWER VASCULAR LEFT GREATER SAPH BK COMPRESS: NORMAL
BH CV LOWER VASCULAR LEFT LESSER SAPH COMPRESS: NORMAL
BH CV LOWER VASCULAR LEFT MID FEMORAL AUGMENT: NORMAL
BH CV LOWER VASCULAR LEFT MID FEMORAL COMPETENT: NORMAL
BH CV LOWER VASCULAR LEFT MID FEMORAL COMPRESS: NORMAL
BH CV LOWER VASCULAR LEFT MID FEMORAL PHASIC: NORMAL
BH CV LOWER VASCULAR LEFT MID FEMORAL SPONT: NORMAL
BH CV LOWER VASCULAR LEFT PERONEAL COMPRESS: NORMAL
BH CV LOWER VASCULAR LEFT POPLITEAL AUGMENT: NORMAL
BH CV LOWER VASCULAR LEFT POPLITEAL COMPETENT: NORMAL
BH CV LOWER VASCULAR LEFT POPLITEAL COMPRESS: NORMAL
BH CV LOWER VASCULAR LEFT POPLITEAL PHASIC: NORMAL
BH CV LOWER VASCULAR LEFT POPLITEAL SPONT: NORMAL
BH CV LOWER VASCULAR LEFT POSTERIOR TIBIAL COMPRESS: NORMAL
BH CV LOWER VASCULAR LEFT PROXIMAL FEMORAL COMPRESS: NORMAL
BH CV LOWER VASCULAR LEFT SAPHENOFEMORAL JUNCTION AUGMENT: NORMAL
BH CV LOWER VASCULAR LEFT SAPHENOFEMORAL JUNCTION COMPETENT: NORMAL
BH CV LOWER VASCULAR LEFT SAPHENOFEMORAL JUNCTION COMPRESS: NORMAL
BH CV LOWER VASCULAR LEFT SAPHENOFEMORAL JUNCTION PHASIC: NORMAL
BH CV LOWER VASCULAR LEFT SAPHENOFEMORAL JUNCTION SPONT: NORMAL
BH CV LOWER VASCULAR RIGHT COMMON FEMORAL AUGMENT: NORMAL
BH CV LOWER VASCULAR RIGHT COMMON FEMORAL COMPETENT: NORMAL
BH CV LOWER VASCULAR RIGHT COMMON FEMORAL COMPRESS: NORMAL
BH CV LOWER VASCULAR RIGHT COMMON FEMORAL PHASIC: NORMAL
BH CV LOWER VASCULAR RIGHT COMMON FEMORAL SPONT: NORMAL
BH CV LOWER VASCULAR RIGHT DISTAL FEMORAL COMPRESS: NORMAL
BH CV LOWER VASCULAR RIGHT GASTRONEMIUS COMPRESS: NORMAL
BH CV LOWER VASCULAR RIGHT GREATER SAPH AK COMPRESS: NORMAL
BH CV LOWER VASCULAR RIGHT GREATER SAPH BK COMPRESS: NORMAL
BH CV LOWER VASCULAR RIGHT LESSER SAPH COMPRESS: NORMAL
BH CV LOWER VASCULAR RIGHT MID FEMORAL AUGMENT: NORMAL
BH CV LOWER VASCULAR RIGHT MID FEMORAL COMPETENT: NORMAL
BH CV LOWER VASCULAR RIGHT MID FEMORAL COMPRESS: NORMAL
BH CV LOWER VASCULAR RIGHT MID FEMORAL PHASIC: NORMAL
BH CV LOWER VASCULAR RIGHT MID FEMORAL SPONT: NORMAL
BH CV LOWER VASCULAR RIGHT PERONEAL COMPRESS: NORMAL
BH CV LOWER VASCULAR RIGHT POPLITEAL AUGMENT: NORMAL
BH CV LOWER VASCULAR RIGHT POPLITEAL COMPETENT: NORMAL
BH CV LOWER VASCULAR RIGHT POPLITEAL COMPRESS: NORMAL
BH CV LOWER VASCULAR RIGHT POPLITEAL PHASIC: NORMAL
BH CV LOWER VASCULAR RIGHT POPLITEAL SPONT: NORMAL
BH CV LOWER VASCULAR RIGHT POSTERIOR TIBIAL COMPRESS: NORMAL
BH CV LOWER VASCULAR RIGHT PROXIMAL FEMORAL COMPRESS: NORMAL
BH CV LOWER VASCULAR RIGHT SAPHENOFEMORAL JUNCTION AUGMENT: NORMAL
BH CV LOWER VASCULAR RIGHT SAPHENOFEMORAL JUNCTION COMPETENT: NORMAL
BH CV LOWER VASCULAR RIGHT SAPHENOFEMORAL JUNCTION COMPRESS: NORMAL
BH CV LOWER VASCULAR RIGHT SAPHENOFEMORAL JUNCTION PHASIC: NORMAL
BH CV LOWER VASCULAR RIGHT SAPHENOFEMORAL JUNCTION SPONT: NORMAL
BH CV VAS BP RIGHT ARM: NORMAL MMHG
BH CV XLRA - TDI S': 11 CM/SEC
BILIRUB SERPL-MCNC: 1.1 MG/DL (ref 0.1–1.2)
BILIRUB SERPL-MCNC: 1.4 MG/DL (ref 0.1–1.2)
BILIRUB SERPL-MCNC: 1.5 MG/DL (ref 0.1–1.2)
BILIRUB SERPL-MCNC: 1.6 MG/DL (ref 0.1–1.2)
BILIRUB SERPL-MCNC: 1.7 MG/DL (ref 0.1–1.2)
BILIRUB SERPL-MCNC: 1.8 MG/DL (ref 0.1–1.2)
BILIRUB SERPL-MCNC: 2 MG/DL (ref 0.1–1.2)
BILIRUB SERPL-MCNC: 2.9 MG/DL (ref 0.1–1.2)
BILIRUB SERPL-MCNC: 4 MG/DL (ref 0.1–1.2)
BILIRUB UR QL STRIP: NEGATIVE
BLD GP AB SCN SERPL QL: NEGATIVE
BLD GP AB SCN SERPL QL: NEGATIVE
BUN BLD-MCNC: 10 MG/DL (ref 8–23)
BUN BLD-MCNC: 11 MG/DL (ref 8–23)
BUN BLD-MCNC: 12 MG/DL (ref 8–23)
BUN BLD-MCNC: 13 MG/DL (ref 8–23)
BUN BLD-MCNC: 13 MG/DL (ref 8–23)
BUN BLD-MCNC: 14 MG/DL (ref 8–23)
BUN BLD-MCNC: 15 MG/DL (ref 8–23)
BUN BLD-MCNC: 17 MG/DL (ref 8–23)
BUN BLD-MCNC: 20 MG/DL (ref 8–23)
BUN BLD-MCNC: 20 MG/DL (ref 8–23)
BUN BLD-MCNC: 21 MG/DL (ref 8–23)
BUN BLD-MCNC: 22 MG/DL (ref 8–23)
BUN BLD-MCNC: 8 MG/DL (ref 6–20)
BUN BLD-MCNC: 8 MG/DL (ref 8–23)
BUN BLD-MCNC: 9 MG/DL (ref 8–23)
BUN SERPL-MCNC: 19 MG/DL (ref 8–23)
BUN/CREAT SERPL: 10.7 (ref 7–25)
BUN/CREAT SERPL: 11 (ref 7–25)
BUN/CREAT SERPL: 11.5 (ref 7–25)
BUN/CREAT SERPL: 11.5 (ref 7–25)
BUN/CREAT SERPL: 11.9 (ref 7–25)
BUN/CREAT SERPL: 12.1 (ref 7–25)
BUN/CREAT SERPL: 12.3 (ref 7–25)
BUN/CREAT SERPL: 12.9 (ref 7–25)
BUN/CREAT SERPL: 13.1 (ref 7–25)
BUN/CREAT SERPL: 13.9 (ref 7–25)
BUN/CREAT SERPL: 16 (ref 7–25)
BUN/CREAT SERPL: 18.9 (ref 7–25)
BUN/CREAT SERPL: 20 (ref 7–25)
BUN/CREAT SERPL: 20.2 (ref 7–25)
BUN/CREAT SERPL: 6.6 (ref 7.3–30)
BUN/CREAT SERPL: 6.7 (ref 7–25)
BUN/CREAT SERPL: 7.2 (ref 7–25)
BUN/CREAT SERPL: 7.9 (ref 7–25)
BUN/CREAT SERPL: 8 (ref 7–25)
BUN/CREAT SERPL: 8 (ref 7–25)
BUN/CREAT SERPL: 8.3 (ref 7–25)
BUN/CREAT SERPL: 8.5 (ref 7–25)
BUN/CREAT SERPL: 8.6 (ref 7–25)
BUN/CREAT SERPL: 8.7 (ref 7–25)
BUN/CREAT SERPL: 9.8 (ref 7–25)
BUN/CREAT SERPL: 9.9 (ref 7–25)
CA-I BLD-MCNC: 5.5 MG/DL (ref 4.6–5.4)
CA-I SERPL ISE-MCNC: 1.37 MMOL/L (ref 1.15–1.35)
CALCIUM SERPL-MCNC: 10 MG/DL (ref 8.6–10.5)
CALCIUM SPEC-SCNC: 10 MG/DL (ref 8.6–10.5)
CALCIUM SPEC-SCNC: 10.4 MG/DL (ref 8.6–10.5)
CALCIUM SPEC-SCNC: 7.5 MG/DL (ref 8.6–10.5)
CALCIUM SPEC-SCNC: 7.5 MG/DL (ref 8.6–10.5)
CALCIUM SPEC-SCNC: 8 MG/DL (ref 8.6–10.5)
CALCIUM SPEC-SCNC: 8.3 MG/DL (ref 8.6–10.5)
CALCIUM SPEC-SCNC: 8.4 MG/DL (ref 8.6–10.5)
CALCIUM SPEC-SCNC: 8.5 MG/DL (ref 8.6–10.5)
CALCIUM SPEC-SCNC: 8.6 MG/DL (ref 8.6–10.5)
CALCIUM SPEC-SCNC: 8.7 MG/DL (ref 8.6–10.5)
CALCIUM SPEC-SCNC: 8.7 MG/DL (ref 8.6–10.5)
CALCIUM SPEC-SCNC: 8.8 MG/DL (ref 8.6–10.5)
CALCIUM SPEC-SCNC: 8.8 MG/DL (ref 8.6–10.5)
CALCIUM SPEC-SCNC: 8.9 MG/DL (ref 8.5–10.2)
CALCIUM SPEC-SCNC: 8.9 MG/DL (ref 8.6–10.5)
CALCIUM SPEC-SCNC: 9 MG/DL (ref 8.6–10.5)
CALCIUM SPEC-SCNC: 9 MG/DL (ref 8.6–10.5)
CALCIUM SPEC-SCNC: 9.1 MG/DL (ref 8.6–10.5)
CALCIUM SPEC-SCNC: 9.2 MG/DL (ref 8.6–10.5)
CALCIUM SPEC-SCNC: 9.3 MG/DL (ref 8.6–10.5)
CALCIUM SPEC-SCNC: 9.4 MG/DL (ref 8.6–10.5)
CALCIUM SPEC-SCNC: 9.5 MG/DL (ref 8.6–10.5)
CALCIUM SPEC-SCNC: 9.8 MG/DL (ref 8.6–10.5)
CALCIUM SPEC-SCNC: 9.9 MG/DL (ref 8.6–10.5)
CALCIUM SPEC-SCNC: 9.9 MG/DL (ref 8.6–10.5)
CD3+CD4+ CELLS # BLD: 56 /UL (ref 359–1519)
CD3+CD4+ CELLS # BLD: 58 /UL (ref 359–1519)
CD3+CD4+ CELLS # BLD: 95 /UL (ref 359–1519)
CD3+CD4+ CELLS NFR BLD: 27.9 % (ref 30.8–58.5)
CD3+CD4+ CELLS NFR BLD: 28.9 % (ref 30.8–58.5)
CD3+CD4+ CELLS NFR BLD: 31.6 % (ref 30.8–58.5)
CHLORIDE SERPL-SCNC: 100 MMOL/L (ref 98–107)
CHLORIDE SERPL-SCNC: 101 MMOL/L (ref 98–107)
CHLORIDE SERPL-SCNC: 102 MMOL/L (ref 98–107)
CHLORIDE SERPL-SCNC: 103 MMOL/L (ref 98–107)
CHLORIDE SERPL-SCNC: 104 MMOL/L (ref 98–107)
CHLORIDE SERPL-SCNC: 105 MMOL/L (ref 98–107)
CHLORIDE SERPL-SCNC: 106 MMOL/L (ref 98–107)
CHLORIDE SERPL-SCNC: 95 MMOL/L (ref 98–107)
CHLORIDE SERPL-SCNC: 96 MMOL/L (ref 98–107)
CHLORIDE SERPL-SCNC: 98 MMOL/L (ref 98–107)
CHLORIDE SERPL-SCNC: 98 MMOL/L (ref 98–107)
CHLORIDE SERPL-SCNC: 99 MMOL/L (ref 98–107)
CHLORIDE UR-SCNC: 43 MMOL/L
CHOLEST SERPL-MCNC: 126 MG/DL (ref 0–200)
CHOLEST SERPL-MCNC: 128 MG/DL (ref 0–200)
CHOLEST SERPL-MCNC: 133 MG/DL (ref 0–200)
CHOLEST SERPL-MCNC: 210 MG/DL (ref 0–200)
CHOLEST SERPL-MCNC: 212 MG/DL (ref 0–200)
CLARITY UR: CLEAR
CO2 SERPL-SCNC: 19.7 MMOL/L (ref 22–29)
CO2 SERPL-SCNC: 21 MMOL/L (ref 22–29)
CO2 SERPL-SCNC: 21.3 MMOL/L (ref 22–29)
CO2 SERPL-SCNC: 21.9 MMOL/L (ref 22–29)
CO2 SERPL-SCNC: 22.5 MMOL/L (ref 22–29)
CO2 SERPL-SCNC: 22.9 MMOL/L (ref 22–29)
CO2 SERPL-SCNC: 23.7 MMOL/L (ref 22–29)
CO2 SERPL-SCNC: 23.7 MMOL/L (ref 22–29)
CO2 SERPL-SCNC: 24.5 MMOL/L (ref 22–29)
CO2 SERPL-SCNC: 24.5 MMOL/L (ref 22–29)
CO2 SERPL-SCNC: 24.6 MMOL/L (ref 22–29)
CO2 SERPL-SCNC: 24.9 MMOL/L (ref 22–29)
CO2 SERPL-SCNC: 25 MMOL/L (ref 22–29)
CO2 SERPL-SCNC: 25.1 MMOL/L (ref 22–29)
CO2 SERPL-SCNC: 25.2 MMOL/L (ref 22–29)
CO2 SERPL-SCNC: 25.4 MMOL/L (ref 22–29)
CO2 SERPL-SCNC: 25.5 MMOL/L (ref 22–29)
CO2 SERPL-SCNC: 25.9 MMOL/L (ref 22–29)
CO2 SERPL-SCNC: 26 MMOL/L (ref 22–29)
CO2 SERPL-SCNC: 26.5 MMOL/L (ref 22–29)
CO2 SERPL-SCNC: 26.8 MMOL/L (ref 22–29)
CO2 SERPL-SCNC: 27 MMOL/L (ref 22–29)
CO2 SERPL-SCNC: 27.3 MMOL/L (ref 22–29)
CO2 SERPL-SCNC: 27.3 MMOL/L (ref 22–29)
CO2 SERPL-SCNC: 29.8 MMOL/L (ref 22–29)
CO2 SERPL-SCNC: 30 MMOL/L (ref 22–29)
COLOR UR: YELLOW
CREAT BLD-MCNC: 0.96 MG/DL (ref 0.76–1.27)
CREAT BLD-MCNC: 0.96 MG/DL (ref 0.76–1.27)
CREAT BLD-MCNC: 1.01 MG/DL (ref 0.76–1.27)
CREAT BLD-MCNC: 1.01 MG/DL (ref 0.76–1.27)
CREAT BLD-MCNC: 1.04 MG/DL (ref 0.76–1.27)
CREAT BLD-MCNC: 1.06 MG/DL (ref 0.76–1.27)
CREAT BLD-MCNC: 1.06 MG/DL (ref 0.76–1.27)
CREAT BLD-MCNC: 1.07 MG/DL (ref 0.76–1.27)
CREAT BLD-MCNC: 1.08 MG/DL (ref 0.76–1.27)
CREAT BLD-MCNC: 1.09 MG/DL (ref 0.76–1.27)
CREAT BLD-MCNC: 1.1 MG/DL (ref 0.76–1.27)
CREAT BLD-MCNC: 1.12 MG/DL (ref 0.76–1.27)
CREAT BLD-MCNC: 1.12 MG/DL (ref 0.76–1.27)
CREAT BLD-MCNC: 1.16 MG/DL (ref 0.76–1.27)
CREAT BLD-MCNC: 1.2 MG/DL (ref 0.76–1.27)
CREAT BLD-MCNC: 1.21 MG/DL (ref 0.76–1.27)
CREAT BLD-MCNC: 1.22 MG/DL (ref 0.7–1.3)
CREAT BLD-MCNC: 1.25 MG/DL (ref 0.76–1.27)
CREAT BLD-MCNC: 1.26 MG/DL (ref 0.76–1.27)
CREAT BLD-MCNC: 1.26 MG/DL (ref 0.76–1.27)
CREAT BLD-MCNC: 1.32 MG/DL (ref 0.76–1.27)
CREAT BLD-MCNC: 1.38 MG/DL (ref 0.76–1.27)
CREAT BLD-MCNC: 1.41 MG/DL (ref 0.76–1.27)
CREAT BLD-MCNC: 1.65 MG/DL (ref 0.76–1.27)
CREAT BLD-MCNC: 1.73 MG/DL (ref 0.76–1.27)
CREAT SERPL-MCNC: 1.19 MG/DL (ref 0.76–1.27)
CREAT UR-MCNC: 52.8 MG/DL
CRP SERPL-MCNC: 0.8 MG/DL (ref 0–0.5)
CYTO UR: NORMAL
CYTO UR: NORMAL
D-LACTATE SERPL-SCNC: 1.6 MMOL/L (ref 0.5–2)
DACRYOCYTES BLD QL SMEAR: NORMAL
DEPRECATED RDW RBC AUTO: 53 FL (ref 37–49)
DEPRECATED RDW RBC AUTO: 54.7 FL (ref 37–54)
DEPRECATED RDW RBC AUTO: 55 FL (ref 37–54)
DEPRECATED RDW RBC AUTO: 55.1 FL (ref 37–54)
DEPRECATED RDW RBC AUTO: 55.4 FL (ref 37–54)
DEPRECATED RDW RBC AUTO: 55.8 FL (ref 37–54)
DEPRECATED RDW RBC AUTO: 56 FL (ref 37–54)
DEPRECATED RDW RBC AUTO: 56.3 FL (ref 37–49)
DEPRECATED RDW RBC AUTO: 56.5 FL (ref 37–54)
DEPRECATED RDW RBC AUTO: 57.3 FL (ref 37–49)
DEPRECATED RDW RBC AUTO: 57.4 FL (ref 37–54)
DEPRECATED RDW RBC AUTO: 57.4 FL (ref 37–54)
DEPRECATED RDW RBC AUTO: 57.9 FL (ref 37–54)
DEPRECATED RDW RBC AUTO: 58.2 FL (ref 37–54)
DEPRECATED RDW RBC AUTO: 58.6 FL (ref 37–54)
DEPRECATED RDW RBC AUTO: 59.2 FL (ref 37–54)
DEPRECATED RDW RBC AUTO: 59.3 FL (ref 37–54)
DEPRECATED RDW RBC AUTO: 59.5 FL (ref 37–54)
DEPRECATED RDW RBC AUTO: 59.6 FL (ref 37–54)
DEPRECATED RDW RBC AUTO: 60.1 FL (ref 37–54)
DEPRECATED RDW RBC AUTO: 60.2 FL (ref 37–54)
DEPRECATED RDW RBC AUTO: 60.2 FL (ref 37–54)
DEPRECATED RDW RBC AUTO: 60.3 FL (ref 37–54)
DEPRECATED RDW RBC AUTO: 60.7 FL (ref 37–54)
DEPRECATED RDW RBC AUTO: 60.8 FL (ref 37–54)
DEPRECATED RDW RBC AUTO: 61.6 FL (ref 37–54)
DEPRECATED RDW RBC AUTO: 61.8 FL (ref 37–54)
DEPRECATED RDW RBC AUTO: 62.3 FL (ref 37–54)
DEPRECATED RDW RBC AUTO: 62.3 FL (ref 37–54)
DEPRECATED RDW RBC AUTO: 62.6 FL (ref 37–54)
DEPRECATED RDW RBC AUTO: 62.9 FL (ref 37–54)
DEPRECATED RDW RBC AUTO: 63.5 FL (ref 37–54)
DEPRECATED RDW RBC AUTO: 63.6 FL (ref 37–54)
DEPRECATED RDW RBC AUTO: 68.3 FL (ref 37–54)
DEPRECATED RDW RBC AUTO: 68.4 FL (ref 37–54)
DX PRELIMINARY: NORMAL
EOSINOPHIL # BLD AUTO: 0 10*3/MM3 (ref 0–0.7)
EOSINOPHIL # BLD AUTO: 0 X10E3/UL (ref 0–0.4)
EOSINOPHIL # BLD AUTO: 0.02 10*3/MM3 (ref 0–0.7)
EOSINOPHIL # BLD AUTO: 0.03 10*3/MM3 (ref 0–0.36)
EOSINOPHIL # BLD AUTO: 0.03 10*3/MM3 (ref 0–0.7)
EOSINOPHIL # BLD AUTO: 0.04 10*3/MM3 (ref 0–0.7)
EOSINOPHIL # BLD AUTO: 0.05 10*3/MM3 (ref 0–0.7)
EOSINOPHIL # BLD AUTO: 0.05 10*3/MM3 (ref 0–0.7)
EOSINOPHIL # BLD AUTO: 0.06 10*3/MM3 (ref 0–0.36)
EOSINOPHIL # BLD AUTO: 0.06 10*3/MM3 (ref 0–0.36)
EOSINOPHIL # BLD AUTO: 0.06 10*3/MM3 (ref 0–0.7)
EOSINOPHIL # BLD AUTO: 0.08 10*3/MM3 (ref 0–0.7)
EOSINOPHIL # BLD AUTO: 0.09 10*3/MM3 (ref 0–0.7)
EOSINOPHIL # BLD AUTO: 0.1 10*3/MM3 (ref 0–0.7)
EOSINOPHIL # BLD AUTO: 1 %
EOSINOPHIL NFR BLD AUTO: 0 % (ref 0.3–6.2)
EOSINOPHIL NFR BLD AUTO: 0.2 % (ref 0.3–6.2)
EOSINOPHIL NFR BLD AUTO: 0.7 % (ref 0.3–6.2)
EOSINOPHIL NFR BLD AUTO: 0.8 % (ref 0.3–6.2)
EOSINOPHIL NFR BLD AUTO: 0.8 % (ref 0.3–6.2)
EOSINOPHIL NFR BLD AUTO: 0.9 % (ref 0.3–6.2)
EOSINOPHIL NFR BLD AUTO: 1 % (ref 0.3–6.2)
EOSINOPHIL NFR BLD AUTO: 1 % (ref 0.3–6.2)
EOSINOPHIL NFR BLD AUTO: 1.1 % (ref 0.3–6.2)
EOSINOPHIL NFR BLD AUTO: 1.1 % (ref 0.3–6.2)
EOSINOPHIL NFR BLD AUTO: 1.1 % (ref 1–5)
EOSINOPHIL NFR BLD AUTO: 1.2 % (ref 0.3–6.2)
EOSINOPHIL NFR BLD AUTO: 1.3 % (ref 0.3–6.2)
EOSINOPHIL NFR BLD AUTO: 1.3 % (ref 1–5)
EOSINOPHIL NFR BLD AUTO: 1.4 % (ref 0.3–6.2)
EOSINOPHIL NFR BLD AUTO: 1.5 % (ref 0.3–6.2)
EOSINOPHIL NFR BLD AUTO: 1.5 % (ref 0.3–6.2)
EOSINOPHIL NFR BLD AUTO: 1.5 % (ref 1–5)
EOSINOPHIL NFR BLD AUTO: 1.6 % (ref 0.3–6.2)
EOSINOPHIL NFR BLD AUTO: 1.8 % (ref 0.3–6.2)
EOSINOPHIL NFR BLD AUTO: 1.8 % (ref 0.3–6.2)
EOSINOPHIL NFR BLD AUTO: 1.9 % (ref 0.3–6.2)
EOSINOPHIL NFR BLD AUTO: 2 % (ref 0.3–6.2)
EOSINOPHIL NFR BLD AUTO: 2.3 % (ref 0.3–6.2)
EOSINOPHIL NFR BLD AUTO: 2.4 % (ref 0.3–6.2)
EOSINOPHIL NFR BLD AUTO: 2.8 % (ref 0.3–6.2)
EOSINOPHIL SPEC QL MICRO: 0 % EOS/100 CELLS (ref 0–0)
ERYTHROCYTE [DISTWIDTH] IN BLOOD BY AUTOMATED COUNT: 16 % (ref 11.7–14.5)
ERYTHROCYTE [DISTWIDTH] IN BLOOD BY AUTOMATED COUNT: 16.4 % (ref 11.5–14.5)
ERYTHROCYTE [DISTWIDTH] IN BLOOD BY AUTOMATED COUNT: 16.5 % (ref 11.5–14.5)
ERYTHROCYTE [DISTWIDTH] IN BLOOD BY AUTOMATED COUNT: 16.5 % (ref 12.3–15.4)
ERYTHROCYTE [DISTWIDTH] IN BLOOD BY AUTOMATED COUNT: 16.6 % (ref 11.5–14.5)
ERYTHROCYTE [DISTWIDTH] IN BLOOD BY AUTOMATED COUNT: 17 % (ref 11.5–14.5)
ERYTHROCYTE [DISTWIDTH] IN BLOOD BY AUTOMATED COUNT: 17.1 % (ref 11.5–14.5)
ERYTHROCYTE [DISTWIDTH] IN BLOOD BY AUTOMATED COUNT: 17.1 % (ref 11.7–14.5)
ERYTHROCYTE [DISTWIDTH] IN BLOOD BY AUTOMATED COUNT: 17.3 % (ref 11.5–14.5)
ERYTHROCYTE [DISTWIDTH] IN BLOOD BY AUTOMATED COUNT: 17.3 % (ref 11.5–14.5)
ERYTHROCYTE [DISTWIDTH] IN BLOOD BY AUTOMATED COUNT: 17.3 % (ref 11.7–14.5)
ERYTHROCYTE [DISTWIDTH] IN BLOOD BY AUTOMATED COUNT: 17.3 % (ref 12.3–15.4)
ERYTHROCYTE [DISTWIDTH] IN BLOOD BY AUTOMATED COUNT: 17.5 % (ref 11.5–14.5)
ERYTHROCYTE [DISTWIDTH] IN BLOOD BY AUTOMATED COUNT: 17.6 % (ref 11.5–14.5)
ERYTHROCYTE [DISTWIDTH] IN BLOOD BY AUTOMATED COUNT: 17.6 % (ref 11.5–14.5)
ERYTHROCYTE [DISTWIDTH] IN BLOOD BY AUTOMATED COUNT: 17.7 % (ref 11.5–14.5)
ERYTHROCYTE [DISTWIDTH] IN BLOOD BY AUTOMATED COUNT: 17.7 % (ref 11.5–14.5)
ERYTHROCYTE [DISTWIDTH] IN BLOOD BY AUTOMATED COUNT: 17.8 % (ref 11.5–14.5)
ERYTHROCYTE [DISTWIDTH] IN BLOOD BY AUTOMATED COUNT: 17.8 % (ref 11.5–14.5)
ERYTHROCYTE [DISTWIDTH] IN BLOOD BY AUTOMATED COUNT: 17.9 % (ref 11.5–14.5)
ERYTHROCYTE [DISTWIDTH] IN BLOOD BY AUTOMATED COUNT: 17.9 % (ref 12.3–15.4)
ERYTHROCYTE [DISTWIDTH] IN BLOOD BY AUTOMATED COUNT: 18 % (ref 11.5–14.5)
ERYTHROCYTE [DISTWIDTH] IN BLOOD BY AUTOMATED COUNT: 18.1 % (ref 11.5–14.5)
ERYTHROCYTE [DISTWIDTH] IN BLOOD BY AUTOMATED COUNT: 18.2 % (ref 11.5–14.5)
ERYTHROCYTE [DISTWIDTH] IN BLOOD BY AUTOMATED COUNT: 18.4 % (ref 11.5–14.5)
ERYTHROCYTE [DISTWIDTH] IN BLOOD BY AUTOMATED COUNT: 18.6 % (ref 11.5–14.5)
ERYTHROCYTE [DISTWIDTH] IN BLOOD BY AUTOMATED COUNT: 18.8 % (ref 11.5–14.5)
ERYTHROCYTE [DISTWIDTH] IN BLOOD BY AUTOMATED COUNT: 19 % (ref 11.5–14.5)
ERYTHROCYTE [DISTWIDTH] IN BLOOD BY AUTOMATED COUNT: 19.8 % (ref 11.5–14.5)
ERYTHROCYTE [DISTWIDTH] IN BLOOD BY AUTOMATED COUNT: 20.2 % (ref 11.5–14.5)
ERYTHROCYTE [SEDIMENTATION RATE] IN BLOOD: 24 MM/HR (ref 0–20)
FERRITIN SERPL-MCNC: 112.5 NG/ML (ref 30–400)
FERRITIN SERPL-MCNC: 127.3 NG/ML (ref 30–400)
FERRITIN SERPL-MCNC: 23.4 NG/ML (ref 30–400)
FERRITIN SERPL-MCNC: 27.12 NG/ML (ref 30–400)
FERRITIN SERPL-MCNC: 30.9 NG/ML (ref 30–400)
FOLATE SERPL-MCNC: 12.52 NG/ML (ref 4.78–24.2)
GAMMA GLOB SERPL ELPH-MCNC: 1.7 G/DL (ref 0.4–1.8)
GFR SERPL CREATININE-BSD FRML MDRD: 39 ML/MIN/1.73
GFR SERPL CREATININE-BSD FRML MDRD: 41 ML/MIN/1.73
GFR SERPL CREATININE-BSD FRML MDRD: 50 ML/MIN/1.73
GFR SERPL CREATININE-BSD FRML MDRD: 51 ML/MIN/1.73
GFR SERPL CREATININE-BSD FRML MDRD: 53 ML/MIN/1.73
GFR SERPL CREATININE-BSD FRML MDRD: 56 ML/MIN/1.73
GFR SERPL CREATININE-BSD FRML MDRD: 56 ML/MIN/1.73
GFR SERPL CREATININE-BSD FRML MDRD: 57 ML/MIN/1.73
GFR SERPL CREATININE-BSD FRML MDRD: 59 ML/MIN/1.73
GFR SERPL CREATININE-BSD FRML MDRD: 59 ML/MIN/1.73
GFR SERPL CREATININE-BSD FRML MDRD: 60 ML/MIN/1.73
GFR SERPL CREATININE-BSD FRML MDRD: 62 ML/MIN/1.73
GFR SERPL CREATININE-BSD FRML MDRD: 65 ML/MIN/1.73
GFR SERPL CREATININE-BSD FRML MDRD: 65 ML/MIN/1.73
GFR SERPL CREATININE-BSD FRML MDRD: 66 ML/MIN/1.73
GFR SERPL CREATININE-BSD FRML MDRD: 67 ML/MIN/1.73
GFR SERPL CREATININE-BSD FRML MDRD: 67 ML/MIN/1.73
GFR SERPL CREATININE-BSD FRML MDRD: 68 ML/MIN/1.73
GFR SERPL CREATININE-BSD FRML MDRD: 69 ML/MIN/1.73
GFR SERPL CREATININE-BSD FRML MDRD: 69 ML/MIN/1.73
GFR SERPL CREATININE-BSD FRML MDRD: 70 ML/MIN/1.73
GFR SERPL CREATININE-BSD FRML MDRD: 73 ML/MIN/1.73
GFR SERPL CREATININE-BSD FRML MDRD: 73 ML/MIN/1.73
GFR SERPL CREATININE-BSD FRML MDRD: 77 ML/MIN/1.73
GFR SERPL CREATININE-BSD FRML MDRD: 77 ML/MIN/1.73
GFR SERPLBLD CREATININE-BSD FMLA CKD-EPI: 60 ML/MIN/1.73
GFR SERPLBLD CREATININE-BSD FMLA CKD-EPI: 73 ML/MIN/1.73
GLOBULIN SER CALC-MCNC: 3 G/DL (ref 2.2–3.9)
GLOBULIN UR ELPH-MCNC: 2.9 GM/DL
GLOBULIN UR ELPH-MCNC: 3 GM/DL
GLOBULIN UR ELPH-MCNC: 3 GM/DL
GLOBULIN UR ELPH-MCNC: 3.1 GM/DL
GLOBULIN UR ELPH-MCNC: 3.2 GM/DL
GLOBULIN UR ELPH-MCNC: 3.2 GM/DL
GLOBULIN UR ELPH-MCNC: 3.3 GM/DL
GLOBULIN UR ELPH-MCNC: 3.4 GM/DL
GLOBULIN UR ELPH-MCNC: 3.6 GM/DL
GLOBULIN UR ELPH-MCNC: 3.7 GM/DL
GLOBULIN UR ELPH-MCNC: 3.7 GM/DL (ref 1.8–3.5)
GLOBULIN UR ELPH-MCNC: 3.8 GM/DL
GLOBULIN UR ELPH-MCNC: 3.8 GM/DL
GLUCOSE BLD-MCNC: 108 MG/DL (ref 65–99)
GLUCOSE BLD-MCNC: 112 MG/DL (ref 65–99)
GLUCOSE BLD-MCNC: 120 MG/DL (ref 65–99)
GLUCOSE BLD-MCNC: 123 MG/DL (ref 65–99)
GLUCOSE BLD-MCNC: 128 MG/DL (ref 65–99)
GLUCOSE BLD-MCNC: 134 MG/DL (ref 65–99)
GLUCOSE BLD-MCNC: 135 MG/DL (ref 65–99)
GLUCOSE BLD-MCNC: 147 MG/DL (ref 65–99)
GLUCOSE BLD-MCNC: 149 MG/DL (ref 65–99)
GLUCOSE BLD-MCNC: 154 MG/DL (ref 65–99)
GLUCOSE BLD-MCNC: 157 MG/DL (ref 74–124)
GLUCOSE BLD-MCNC: 165 MG/DL (ref 65–99)
GLUCOSE BLD-MCNC: 176 MG/DL (ref 65–99)
GLUCOSE BLD-MCNC: 183 MG/DL (ref 65–99)
GLUCOSE BLD-MCNC: 185 MG/DL (ref 65–99)
GLUCOSE BLD-MCNC: 187 MG/DL (ref 65–99)
GLUCOSE BLD-MCNC: 198 MG/DL (ref 65–99)
GLUCOSE BLD-MCNC: 200 MG/DL (ref 65–99)
GLUCOSE BLD-MCNC: 203 MG/DL (ref 65–99)
GLUCOSE BLD-MCNC: 216 MG/DL (ref 65–99)
GLUCOSE BLD-MCNC: 219 MG/DL (ref 65–99)
GLUCOSE BLD-MCNC: 260 MG/DL (ref 65–99)
GLUCOSE BLD-MCNC: 93 MG/DL (ref 65–99)
GLUCOSE BLD-MCNC: 96 MG/DL (ref 65–99)
GLUCOSE BLD-MCNC: 97 MG/DL (ref 65–99)
GLUCOSE BLDC GLUCOMTR-MCNC: 107 MG/DL (ref 70–130)
GLUCOSE BLDC GLUCOMTR-MCNC: 112 MG/DL (ref 70–130)
GLUCOSE BLDC GLUCOMTR-MCNC: 112 MG/DL (ref 70–130)
GLUCOSE BLDC GLUCOMTR-MCNC: 114 MG/DL (ref 70–130)
GLUCOSE BLDC GLUCOMTR-MCNC: 122 MG/DL (ref 70–130)
GLUCOSE BLDC GLUCOMTR-MCNC: 132 MG/DL (ref 70–130)
GLUCOSE BLDC GLUCOMTR-MCNC: 135 MG/DL (ref 70–130)
GLUCOSE BLDC GLUCOMTR-MCNC: 135 MG/DL (ref 70–130)
GLUCOSE BLDC GLUCOMTR-MCNC: 139 MG/DL (ref 70–130)
GLUCOSE BLDC GLUCOMTR-MCNC: 139 MG/DL (ref 70–130)
GLUCOSE BLDC GLUCOMTR-MCNC: 141 MG/DL (ref 70–130)
GLUCOSE BLDC GLUCOMTR-MCNC: 144 MG/DL (ref 70–130)
GLUCOSE BLDC GLUCOMTR-MCNC: 145 MG/DL (ref 70–130)
GLUCOSE BLDC GLUCOMTR-MCNC: 149 MG/DL (ref 70–130)
GLUCOSE BLDC GLUCOMTR-MCNC: 151 MG/DL (ref 70–130)
GLUCOSE BLDC GLUCOMTR-MCNC: 157 MG/DL (ref 70–130)
GLUCOSE BLDC GLUCOMTR-MCNC: 159 MG/DL (ref 70–130)
GLUCOSE BLDC GLUCOMTR-MCNC: 164 MG/DL (ref 70–130)
GLUCOSE BLDC GLUCOMTR-MCNC: 165 MG/DL (ref 70–130)
GLUCOSE BLDC GLUCOMTR-MCNC: 168 MG/DL (ref 70–130)
GLUCOSE BLDC GLUCOMTR-MCNC: 170 MG/DL (ref 70–130)
GLUCOSE BLDC GLUCOMTR-MCNC: 170 MG/DL (ref 70–130)
GLUCOSE BLDC GLUCOMTR-MCNC: 175 MG/DL (ref 70–130)
GLUCOSE BLDC GLUCOMTR-MCNC: 176 MG/DL (ref 70–130)
GLUCOSE BLDC GLUCOMTR-MCNC: 176 MG/DL (ref 70–130)
GLUCOSE BLDC GLUCOMTR-MCNC: 181 MG/DL (ref 70–130)
GLUCOSE BLDC GLUCOMTR-MCNC: 183 MG/DL (ref 70–130)
GLUCOSE BLDC GLUCOMTR-MCNC: 185 MG/DL (ref 70–130)
GLUCOSE BLDC GLUCOMTR-MCNC: 187 MG/DL (ref 70–130)
GLUCOSE BLDC GLUCOMTR-MCNC: 188 MG/DL (ref 70–130)
GLUCOSE BLDC GLUCOMTR-MCNC: 190 MG/DL (ref 70–130)
GLUCOSE BLDC GLUCOMTR-MCNC: 191 MG/DL (ref 70–130)
GLUCOSE BLDC GLUCOMTR-MCNC: 192 MG/DL (ref 70–130)
GLUCOSE BLDC GLUCOMTR-MCNC: 193 MG/DL (ref 70–130)
GLUCOSE BLDC GLUCOMTR-MCNC: 193 MG/DL (ref 70–130)
GLUCOSE BLDC GLUCOMTR-MCNC: 197 MG/DL (ref 70–130)
GLUCOSE BLDC GLUCOMTR-MCNC: 200 MG/DL (ref 70–130)
GLUCOSE BLDC GLUCOMTR-MCNC: 202 MG/DL (ref 70–130)
GLUCOSE BLDC GLUCOMTR-MCNC: 203 MG/DL (ref 70–130)
GLUCOSE BLDC GLUCOMTR-MCNC: 209 MG/DL (ref 70–130)
GLUCOSE BLDC GLUCOMTR-MCNC: 209 MG/DL (ref 70–130)
GLUCOSE BLDC GLUCOMTR-MCNC: 211 MG/DL (ref 70–130)
GLUCOSE BLDC GLUCOMTR-MCNC: 211 MG/DL (ref 70–130)
GLUCOSE BLDC GLUCOMTR-MCNC: 212 MG/DL (ref 70–130)
GLUCOSE BLDC GLUCOMTR-MCNC: 214 MG/DL (ref 70–130)
GLUCOSE BLDC GLUCOMTR-MCNC: 215 MG/DL (ref 70–130)
GLUCOSE BLDC GLUCOMTR-MCNC: 218 MG/DL (ref 70–130)
GLUCOSE BLDC GLUCOMTR-MCNC: 219 MG/DL (ref 70–130)
GLUCOSE BLDC GLUCOMTR-MCNC: 220 MG/DL (ref 70–130)
GLUCOSE BLDC GLUCOMTR-MCNC: 222 MG/DL (ref 70–130)
GLUCOSE BLDC GLUCOMTR-MCNC: 222 MG/DL (ref 70–130)
GLUCOSE BLDC GLUCOMTR-MCNC: 223 MG/DL (ref 70–130)
GLUCOSE BLDC GLUCOMTR-MCNC: 223 MG/DL (ref 70–130)
GLUCOSE BLDC GLUCOMTR-MCNC: 227 MG/DL (ref 70–130)
GLUCOSE BLDC GLUCOMTR-MCNC: 228 MG/DL (ref 70–130)
GLUCOSE BLDC GLUCOMTR-MCNC: 232 MG/DL (ref 70–130)
GLUCOSE BLDC GLUCOMTR-MCNC: 233 MG/DL (ref 70–130)
GLUCOSE BLDC GLUCOMTR-MCNC: 234 MG/DL (ref 70–130)
GLUCOSE BLDC GLUCOMTR-MCNC: 239 MG/DL (ref 70–130)
GLUCOSE BLDC GLUCOMTR-MCNC: 245 MG/DL (ref 70–130)
GLUCOSE BLDC GLUCOMTR-MCNC: 254 MG/DL (ref 70–130)
GLUCOSE BLDC GLUCOMTR-MCNC: 254 MG/DL (ref 70–130)
GLUCOSE BLDC GLUCOMTR-MCNC: 256 MG/DL (ref 70–130)
GLUCOSE BLDC GLUCOMTR-MCNC: 259 MG/DL (ref 70–130)
GLUCOSE BLDC GLUCOMTR-MCNC: 266 MG/DL (ref 70–130)
GLUCOSE BLDC GLUCOMTR-MCNC: 266 MG/DL (ref 70–130)
GLUCOSE BLDC GLUCOMTR-MCNC: 271 MG/DL (ref 70–130)
GLUCOSE BLDC GLUCOMTR-MCNC: 274 MG/DL (ref 70–130)
GLUCOSE BLDC GLUCOMTR-MCNC: 280 MG/DL (ref 70–130)
GLUCOSE BLDC GLUCOMTR-MCNC: 291 MG/DL (ref 70–130)
GLUCOSE BLDC GLUCOMTR-MCNC: 70 MG/DL (ref 70–130)
GLUCOSE BLDC GLUCOMTR-MCNC: 85 MG/DL (ref 70–130)
GLUCOSE SERPL-MCNC: 266 MG/DL (ref 65–99)
GLUCOSE UR STRIP-MCNC: ABNORMAL MG/DL
HAV AB SER QL IA: POSITIVE
HBA1C MFR BLD: 4.7 % (ref 4.8–5.6)
HBA1C MFR BLD: 5 % (ref 4.8–5.6)
HBA1C MFR BLD: 5.4 % (ref 4.8–5.6)
HBA1C MFR BLD: 6.9 % (ref 4.8–5.6)
HBA1C MFR BLD: 7.35 % (ref 4.8–5.6)
HBA1C MFR BLD: 7.86 % (ref 4.8–5.6)
HBV SURFACE AG SERPL QL IA: NORMAL
HCT VFR BLD AUTO: 22.2 % (ref 40.4–52.2)
HCT VFR BLD AUTO: 22.3 % (ref 40.4–52.2)
HCT VFR BLD AUTO: 27.2 % (ref 40.4–52.2)
HCT VFR BLD AUTO: 27.7 % (ref 40.4–52.2)
HCT VFR BLD AUTO: 27.8 % (ref 40.4–52.2)
HCT VFR BLD AUTO: 28.3 % (ref 40.4–52.2)
HCT VFR BLD AUTO: 29.3 % (ref 40.4–52.2)
HCT VFR BLD AUTO: 29.6 % (ref 40.4–52.2)
HCT VFR BLD AUTO: 30.3 % (ref 37.5–51)
HCT VFR BLD AUTO: 30.6 % (ref 40.4–52.2)
HCT VFR BLD AUTO: 30.6 % (ref 40.4–52.2)
HCT VFR BLD AUTO: 31.3 % (ref 40.4–52.2)
HCT VFR BLD AUTO: 31.4 % (ref 40.4–52.2)
HCT VFR BLD AUTO: 31.7 % (ref 40–49)
HCT VFR BLD AUTO: 31.8 % (ref 37.5–51)
HCT VFR BLD AUTO: 31.9 % (ref 40.4–52.2)
HCT VFR BLD AUTO: 32 % (ref 40.4–52.2)
HCT VFR BLD AUTO: 32.1 % (ref 40.4–52.2)
HCT VFR BLD AUTO: 32.2 % (ref 40.4–52.2)
HCT VFR BLD AUTO: 32.5 % (ref 40.4–52.2)
HCT VFR BLD AUTO: 32.5 % (ref 40.4–52.2)
HCT VFR BLD AUTO: 33.3 % (ref 40.4–52.2)
HCT VFR BLD AUTO: 33.7 % (ref 40.4–52.2)
HCT VFR BLD AUTO: 33.8 % (ref 40.4–52.2)
HCT VFR BLD AUTO: 33.9 % (ref 40.4–52.2)
HCT VFR BLD AUTO: 34.3 % (ref 37.5–51)
HCT VFR BLD AUTO: 34.3 % (ref 40.4–52.2)
HCT VFR BLD AUTO: 34.4 % (ref 40.4–52.2)
HCT VFR BLD AUTO: 34.5 % (ref 40.4–52.2)
HCT VFR BLD AUTO: 34.8 % (ref 40.4–52.2)
HCT VFR BLD AUTO: 34.9 % (ref 40.4–52.2)
HCT VFR BLD AUTO: 35.4 % (ref 40.4–52.2)
HCT VFR BLD AUTO: 35.8 % (ref 40–49)
HCT VFR BLD AUTO: 36.6 % (ref 40.4–52.2)
HCT VFR BLD AUTO: 36.8 % (ref 40.4–52.2)
HCT VFR BLD AUTO: 37.9 % (ref 40.4–52.2)
HCT VFR BLD AUTO: 41.1 % (ref 40–49)
HCV AB SER DONR QL: NORMAL
HDLC SERPL-MCNC: 47 MG/DL (ref 40–60)
HDLC SERPL-MCNC: 52 MG/DL (ref 40–60)
HDLC SERPL-MCNC: 52 MG/DL (ref 40–60)
HDLC SERPL-MCNC: 53 MG/DL (ref 40–60)
HDLC SERPL-MCNC: 60 MG/DL (ref 40–60)
HEPATITIS C RNA-NAA: NEGATIVE
HGB BLD-MCNC: 10.1 G/DL (ref 13.7–17.6)
HGB BLD-MCNC: 10.1 G/DL (ref 13.7–17.6)
HGB BLD-MCNC: 10.2 G/DL (ref 13.7–17.6)
HGB BLD-MCNC: 10.3 G/DL (ref 13.7–17.6)
HGB BLD-MCNC: 10.4 G/DL (ref 13.7–17.6)
HGB BLD-MCNC: 10.5 G/DL (ref 13.7–17.6)
HGB BLD-MCNC: 10.6 G/DL (ref 13.7–17.6)
HGB BLD-MCNC: 10.6 G/DL (ref 13.7–17.6)
HGB BLD-MCNC: 10.7 G/DL (ref 13.7–17.6)
HGB BLD-MCNC: 10.7 G/DL (ref 13.7–17.6)
HGB BLD-MCNC: 10.7 G/DL (ref 13–17.7)
HGB BLD-MCNC: 10.9 G/DL (ref 13–17.7)
HGB BLD-MCNC: 11 G/DL (ref 13.7–17.6)
HGB BLD-MCNC: 11.3 G/DL (ref 13.5–16.5)
HGB BLD-MCNC: 11.3 G/DL (ref 13.7–17.6)
HGB BLD-MCNC: 11.7 G/DL (ref 13.7–17.6)
HGB BLD-MCNC: 12 G/DL (ref 13.7–17.6)
HGB BLD-MCNC: 12.6 G/DL (ref 13.5–16.5)
HGB BLD-MCNC: 7 G/DL (ref 13.7–17.6)
HGB BLD-MCNC: 7.1 G/DL (ref 13.7–17.6)
HGB BLD-MCNC: 8.2 G/DL (ref 13.7–17.6)
HGB BLD-MCNC: 8.5 G/DL (ref 13.7–17.6)
HGB BLD-MCNC: 8.8 G/DL (ref 13.7–17.6)
HGB BLD-MCNC: 9 G/DL (ref 13.7–17.6)
HGB BLD-MCNC: 9.1 G/DL (ref 13.7–17.6)
HGB BLD-MCNC: 9.3 G/DL (ref 13.7–17.6)
HGB BLD-MCNC: 9.3 G/DL (ref 13.7–17.6)
HGB BLD-MCNC: 9.5 G/DL (ref 13.7–17.6)
HGB BLD-MCNC: 9.6 G/DL (ref 13.5–16.5)
HGB BLD-MCNC: 9.6 G/DL (ref 13.7–17.6)
HGB BLD-MCNC: 9.7 G/DL (ref 13.7–17.6)
HGB BLD-MCNC: 9.7 G/DL (ref 13.7–17.6)
HGB BLD-MCNC: 9.7 G/DL (ref 13–17.7)
HGB BLD-MCNC: 9.8 G/DL (ref 13.7–17.6)
HGB BLD-MCNC: 9.9 G/DL (ref 13.7–17.6)
HGB UR QL STRIP.AUTO: ABNORMAL
HGB UR QL STRIP.AUTO: NEGATIVE
HGB UR QL STRIP.AUTO: NEGATIVE
HIV 1 & 2 AB SERPLBLD IA.RAPID: ABNORMAL
HIV 2 AB SERPLBLD QL IA.RAPID: ABNORMAL
HIV GENOSURE PRIME(R): NORMAL
HIV SUSC PNL ISLT GENOTYP: NORMAL
HIV1 AB SERPLBLD QL IA.RAPID: POSITIVE
HIV1 P24 AG SER QL: ABNORMAL
HIV1 RNA # SERPL NAA+PROBE: 40 COPIES/ML
HIV1 RNA # SERPL NAA+PROBE: 60 COPIES/ML
HIV1+2 AB SER QL: REACTIVE
HOLD SPECIMEN: NORMAL
HYALINE CASTS UR QL AUTO: ABNORMAL /LPF
HYALINE CASTS UR QL AUTO: NORMAL /LPF
HYALINE CASTS UR QL AUTO: NORMAL /LPF
HYPOCHROMIA BLD QL: NORMAL
HYPOCHROMIA BLD QL: NORMAL
IGA SERPL-MCNC: 201 MG/DL (ref 61–437)
IGG SERPL-MCNC: 1312 MG/DL (ref 700–1600)
IGM SERPL-MCNC: 192 MG/DL (ref 15–143)
IMM GRANULOCYTES # BLD: 0 10*3/MM3 (ref 0–0.03)
IMM GRANULOCYTES # BLD: 0 X10E3/UL (ref 0–0.1)
IMM GRANULOCYTES # BLD: 0.01 10*3/MM3 (ref 0–0.03)
IMM GRANULOCYTES # BLD: 0.02 10*3/MM3 (ref 0–0.03)
IMM GRANULOCYTES # BLD: 0.03 10*3/MM3 (ref 0–0.03)
IMM GRANULOCYTES # BLD: 0.03 10*3/MM3 (ref 0–0.03)
IMM GRANULOCYTES NFR BLD: 0 %
IMM GRANULOCYTES NFR BLD: 0 % (ref 0–0.5)
IMM GRANULOCYTES NFR BLD: 0.3 % (ref 0–0.5)
IMM GRANULOCYTES NFR BLD: 0.4 % (ref 0–0.5)
IMM GRANULOCYTES NFR BLD: 0.4 % (ref 0–0.5)
IMM GRANULOCYTES NFR BLD: 0.5 % (ref 0–0.5)
IMM GRANULOCYTES NFR BLD: 0.5 % (ref 0–0.5)
IMM GRANULOCYTES NFR BLD: 0.6 % (ref 0–0.5)
IMM GRANULOCYTES NFR BLD: 0.7 % (ref 0–0.5)
INR PPP: 1.34 (ref 0.9–1.1)
INR PPP: 1.35 (ref 0.9–1.1)
INR PPP: 1.44 (ref 0.9–1.1)
INR PPP: 1.44 (ref 0.9–1.1)
INR PPP: 1.45 (ref 0.9–1.1)
INR PPP: 1.46 (ref 0.9–1.1)
INR PPP: 1.47 (ref 0.9–1.1)
INR PPP: 1.47 (ref 0.9–1.1)
INR PPP: 1.48 (ref 0.9–1.1)
INTERPRETATION SERPL IEP-IMP: ABNORMAL
IRON 24H UR-MRATE: 32 MCG/DL (ref 59–158)
IRON 24H UR-MRATE: 39 MCG/DL (ref 59–158)
IRON 24H UR-MRATE: 45 MCG/DL (ref 59–158)
IRON 24H UR-MRATE: 86 MCG/DL (ref 59–158)
IRON 24H UR-MRATE: 96 MCG/DL (ref 59–158)
IRON SATN MFR SERPL: 10 % (ref 14–48)
IRON SATN MFR SERPL: 12 % (ref 20–50)
IRON SATN MFR SERPL: 14 % (ref 20–50)
IRON SATN MFR SERPL: 19 % (ref 20–50)
IRON SATN MFR SERPL: 21 % (ref 14–48)
KAPPA LC SERPL-MCNC: 56.8 MG/L (ref 3.3–19.4)
KAPPA LC/LAMBDA SER: 1.83 {RATIO} (ref 0.26–1.65)
KETONES UR QL STRIP: ABNORMAL
KETONES UR QL STRIP: NEGATIVE
KETONES UR QL STRIP: NEGATIVE
LAB AP CASE REPORT: NORMAL
LAB AP CASE REPORT: NORMAL
LAMBDA LC FREE SERPL-MCNC: 31.1 MG/L (ref 5.7–26.3)
LDLC SERPL CALC-MCNC: 119 MG/DL (ref 0–100)
LDLC SERPL CALC-MCNC: 132 MG/DL (ref 0–100)
LDLC SERPL CALC-MCNC: 56 MG/DL (ref 0–100)
LDLC SERPL CALC-MCNC: 64 MG/DL (ref 0–100)
LDLC SERPL CALC-MCNC: 68 MG/DL (ref 0–100)
LDLC/HDLC SERPL: 1.08 {RATIO}
LDLC/HDLC SERPL: 1.22 {RATIO}
LDLC/HDLC SERPL: 1.46 {RATIO}
LDLC/HDLC SERPL: 1.99 {RATIO}
LDLC/HDLC SERPL: 2.49 {RATIO}
LEFT ATRIUM VOLUME INDEX: 31 ML/M2
LEUKOCYTE ESTERASE UR QL STRIP.AUTO: NEGATIVE
LIPASE SERPL-CCNC: 111 U/L (ref 13–60)
LIPASE SERPL-CCNC: 139 U/L (ref 13–60)
LIPASE SERPL-CCNC: 161 U/L (ref 13–60)
LIPASE SERPL-CCNC: 88 U/L (ref 13–60)
LOG10 HIV-1 RNA: 1.6 LOG10COPY/ML
LOG10 HIV-1 RNA: 1.78 LOG10COPY/ML
LYMPHOCYTES # BLD AUTO: 0.1 10*3/MM3 (ref 0.9–4.8)
LYMPHOCYTES # BLD AUTO: 0.17 10*3/MM3 (ref 0.9–4.8)
LYMPHOCYTES # BLD AUTO: 0.18 10*3/MM3 (ref 1–3.5)
LYMPHOCYTES # BLD AUTO: 0.2 10*3/MM3 (ref 0.9–4.8)
LYMPHOCYTES # BLD AUTO: 0.2 10*3/MM3 (ref 0.9–4.8)
LYMPHOCYTES # BLD AUTO: 0.2 X10E3/UL (ref 0.7–3.1)
LYMPHOCYTES # BLD AUTO: 0.2 X10E3/UL (ref 0.7–3.1)
LYMPHOCYTES # BLD AUTO: 0.22 10*3/MM3 (ref 0.9–4.8)
LYMPHOCYTES # BLD AUTO: 0.22 10*3/MM3 (ref 0.9–4.8)
LYMPHOCYTES # BLD AUTO: 0.23 10*3/MM3 (ref 0.9–4.8)
LYMPHOCYTES # BLD AUTO: 0.24 10*3/MM3 (ref 1–3.5)
LYMPHOCYTES # BLD AUTO: 0.25 10*3/MM3 (ref 0.9–4.8)
LYMPHOCYTES # BLD AUTO: 0.26 10*3/MM3 (ref 0.9–4.8)
LYMPHOCYTES # BLD AUTO: 0.26 10*3/MM3 (ref 0.9–4.8)
LYMPHOCYTES # BLD AUTO: 0.28 10*3/MM3 (ref 0.9–4.8)
LYMPHOCYTES # BLD AUTO: 0.29 10*3/MM3 (ref 0.9–4.8)
LYMPHOCYTES # BLD AUTO: 0.3 X10E3/UL (ref 0.7–3.1)
LYMPHOCYTES # BLD AUTO: 0.32 10*3/MM3 (ref 0.9–4.8)
LYMPHOCYTES # BLD AUTO: 0.33 10*3/MM3 (ref 0.9–4.8)
LYMPHOCYTES # BLD AUTO: 0.33 10*3/MM3 (ref 0.9–4.8)
LYMPHOCYTES # BLD AUTO: 0.36 10*3/MM3 (ref 0.9–4.8)
LYMPHOCYTES # BLD AUTO: 0.39 10*3/MM3 (ref 1–3.5)
LYMPHOCYTES # BLD AUTO: 0.43 10*3/MM3 (ref 0.9–4.8)
LYMPHOCYTES # BLD AUTO: 0.44 10*3/MM3 (ref 0.9–4.8)
LYMPHOCYTES # BLD AUTO: 0.45 10*3/MM3 (ref 0.9–4.8)
LYMPHOCYTES # BLD AUTO: 0.51 10*3/MM3 (ref 0.9–4.8)
LYMPHOCYTES # BLD AUTO: 0.53 10*3/MM3 (ref 0.9–4.8)
LYMPHOCYTES # BLD AUTO: 0.75 10*3/MM3 (ref 0.9–4.8)
LYMPHOCYTES NFR BLD AUTO: 10.2 % (ref 19.6–45.3)
LYMPHOCYTES NFR BLD AUTO: 10.4 % (ref 19.6–45.3)
LYMPHOCYTES NFR BLD AUTO: 10.7 % (ref 19.6–45.3)
LYMPHOCYTES NFR BLD AUTO: 10.9 % (ref 19.6–45.3)
LYMPHOCYTES NFR BLD AUTO: 11 %
LYMPHOCYTES NFR BLD AUTO: 11 %
LYMPHOCYTES NFR BLD AUTO: 11.1 % (ref 19.6–45.3)
LYMPHOCYTES NFR BLD AUTO: 11.3 % (ref 19.6–45.3)
LYMPHOCYTES NFR BLD AUTO: 11.3 % (ref 19.6–45.3)
LYMPHOCYTES NFR BLD AUTO: 11.6 % (ref 19.6–45.3)
LYMPHOCYTES NFR BLD AUTO: 12.3 % (ref 19.6–45.3)
LYMPHOCYTES NFR BLD AUTO: 12.7 % (ref 19.6–45.3)
LYMPHOCYTES NFR BLD AUTO: 13 % (ref 19.6–45.3)
LYMPHOCYTES NFR BLD AUTO: 13.2 % (ref 19.6–45.3)
LYMPHOCYTES NFR BLD AUTO: 13.4 % (ref 19.6–45.3)
LYMPHOCYTES NFR BLD AUTO: 13.8 % (ref 19.6–45.3)
LYMPHOCYTES NFR BLD AUTO: 14.7 % (ref 19.6–45.3)
LYMPHOCYTES NFR BLD AUTO: 15.4 % (ref 19.6–45.3)
LYMPHOCYTES NFR BLD AUTO: 15.8 % (ref 19.6–45.3)
LYMPHOCYTES NFR BLD AUTO: 16 % (ref 19.6–45.3)
LYMPHOCYTES NFR BLD AUTO: 4.8 % (ref 19.6–45.3)
LYMPHOCYTES NFR BLD AUTO: 6.2 % (ref 20–49)
LYMPHOCYTES NFR BLD AUTO: 6.4 % (ref 19.6–45.3)
LYMPHOCYTES NFR BLD AUTO: 6.4 % (ref 19.6–45.3)
LYMPHOCYTES NFR BLD AUTO: 7 %
LYMPHOCYTES NFR BLD AUTO: 7.4 % (ref 20–49)
LYMPHOCYTES NFR BLD AUTO: 7.7 % (ref 19.6–45.3)
LYMPHOCYTES NFR BLD AUTO: 7.7 % (ref 19.6–45.3)
LYMPHOCYTES NFR BLD AUTO: 8.1 % (ref 20–49)
LYMPHOCYTES NFR BLD AUTO: 9.6 % (ref 19.6–45.3)
Lab: ABNORMAL
Lab: NORMAL
Lab: NORMAL
M-SPIKE: ABNORMAL G/DL
MACROCYTES BLD QL SMEAR: NORMAL
MAGNESIUM SERPL-MCNC: 2.2 MG/DL (ref 1.6–2.4)
MAGNESIUM SERPL-MCNC: 2.3 MG/DL (ref 1.6–2.4)
MAGNESIUM SERPL-MCNC: 2.3 MG/DL (ref 1.6–2.4)
MAXIMAL PREDICTED HEART RATE: 149 BPM
MCH RBC QN AUTO: 27.5 PG (ref 27–33)
MCH RBC QN AUTO: 27.6 PG (ref 26.6–33)
MCH RBC QN AUTO: 28.1 PG (ref 27–32.7)
MCH RBC QN AUTO: 28.1 PG (ref 27–32.7)
MCH RBC QN AUTO: 28.2 PG (ref 27–32.7)
MCH RBC QN AUTO: 28.2 PG (ref 27–33)
MCH RBC QN AUTO: 28.3 PG (ref 27–32.7)
MCH RBC QN AUTO: 28.4 PG (ref 27–32.7)
MCH RBC QN AUTO: 28.4 PG (ref 27–33)
MCH RBC QN AUTO: 28.5 PG (ref 27–32.7)
MCH RBC QN AUTO: 28.6 PG (ref 27–32.7)
MCH RBC QN AUTO: 28.7 PG (ref 27–32.7)
MCH RBC QN AUTO: 28.8 PG (ref 27–32.7)
MCH RBC QN AUTO: 28.9 PG (ref 27–32.7)
MCH RBC QN AUTO: 28.9 PG (ref 27–32.7)
MCH RBC QN AUTO: 29 PG (ref 27–32.7)
MCH RBC QN AUTO: 29.1 PG (ref 27–32.7)
MCH RBC QN AUTO: 29.1 PG (ref 27–32.7)
MCH RBC QN AUTO: 29.2 PG (ref 26.6–33)
MCH RBC QN AUTO: 29.2 PG (ref 27–32.7)
MCH RBC QN AUTO: 30.1 PG (ref 26.6–33)
MCHC RBC AUTO-ENTMCNC: 29.7 G/DL (ref 32.6–36.4)
MCHC RBC AUTO-ENTMCNC: 29.8 G/DL (ref 32.6–36.4)
MCHC RBC AUTO-ENTMCNC: 29.9 G/DL (ref 32.6–36.4)
MCHC RBC AUTO-ENTMCNC: 29.9 G/DL (ref 32.6–36.4)
MCHC RBC AUTO-ENTMCNC: 30 G/DL (ref 32.6–36.4)
MCHC RBC AUTO-ENTMCNC: 30.1 G/DL (ref 32.6–36.4)
MCHC RBC AUTO-ENTMCNC: 30.2 G/DL (ref 32.6–36.4)
MCHC RBC AUTO-ENTMCNC: 30.3 G/DL (ref 32.6–36.4)
MCHC RBC AUTO-ENTMCNC: 30.3 G/DL (ref 32–35)
MCHC RBC AUTO-ENTMCNC: 30.4 G/DL (ref 32.6–36.4)
MCHC RBC AUTO-ENTMCNC: 30.4 G/DL (ref 32.6–36.4)
MCHC RBC AUTO-ENTMCNC: 30.5 G/DL (ref 32.6–36.4)
MCHC RBC AUTO-ENTMCNC: 30.7 G/DL (ref 32.6–36.4)
MCHC RBC AUTO-ENTMCNC: 30.7 G/DL (ref 32–35)
MCHC RBC AUTO-ENTMCNC: 30.9 G/DL (ref 32.6–36.4)
MCHC RBC AUTO-ENTMCNC: 30.9 G/DL (ref 32.6–36.4)
MCHC RBC AUTO-ENTMCNC: 31 G/DL (ref 32.6–36.4)
MCHC RBC AUTO-ENTMCNC: 31.1 G/DL (ref 32.6–36.4)
MCHC RBC AUTO-ENTMCNC: 31.1 G/DL (ref 32.6–36.4)
MCHC RBC AUTO-ENTMCNC: 31.2 G/DL (ref 31.5–35.7)
MCHC RBC AUTO-ENTMCNC: 31.2 G/DL (ref 32.6–36.4)
MCHC RBC AUTO-ENTMCNC: 31.2 G/DL (ref 32.6–36.4)
MCHC RBC AUTO-ENTMCNC: 31.4 G/DL (ref 32.6–36.4)
MCHC RBC AUTO-ENTMCNC: 31.6 G/DL (ref 32–35)
MCHC RBC AUTO-ENTMCNC: 31.7 G/DL (ref 32.6–36.4)
MCHC RBC AUTO-ENTMCNC: 31.8 G/DL (ref 32.6–36.4)
MCHC RBC AUTO-ENTMCNC: 31.8 G/DL (ref 32.6–36.4)
MCHC RBC AUTO-ENTMCNC: 32 G/DL (ref 31.5–35.7)
MCHC RBC AUTO-ENTMCNC: 32.9 G/DL (ref 32.6–36.4)
MCHC RBC AUTO-ENTMCNC: 32.9 G/DL (ref 32.6–36.4)
MCHC RBC AUTO-ENTMCNC: 34.3 G/DL (ref 31.5–35.7)
MCV RBC AUTO: 86 FL (ref 79–97)
MCV RBC AUTO: 87.4 FL (ref 79.8–96.2)
MCV RBC AUTO: 87.4 FL (ref 79.8–96.2)
MCV RBC AUTO: 88 FL (ref 79–97)
MCV RBC AUTO: 89.3 FL (ref 83–97)
MCV RBC AUTO: 89.7 FL (ref 79.8–96.2)
MCV RBC AUTO: 89.8 FL (ref 79.8–96.2)
MCV RBC AUTO: 90.5 FL (ref 79.8–96.2)
MCV RBC AUTO: 90.5 FL (ref 79.8–96.2)
MCV RBC AUTO: 90.8 FL (ref 83–97)
MCV RBC AUTO: 91 FL (ref 79.8–96.2)
MCV RBC AUTO: 91.2 FL (ref 79.8–96.2)
MCV RBC AUTO: 91.2 FL (ref 79.8–96.2)
MCV RBC AUTO: 91.3 FL (ref 79.8–96.2)
MCV RBC AUTO: 91.6 FL (ref 79.8–96.2)
MCV RBC AUTO: 91.7 FL (ref 79.8–96.2)
MCV RBC AUTO: 92.1 FL (ref 79.8–96.2)
MCV RBC AUTO: 92.4 FL (ref 79.8–96.2)
MCV RBC AUTO: 92.4 FL (ref 79.8–96.2)
MCV RBC AUTO: 92.6 FL (ref 83–97)
MCV RBC AUTO: 92.8 FL (ref 79.8–96.2)
MCV RBC AUTO: 92.9 FL (ref 79.8–96.2)
MCV RBC AUTO: 92.9 FL (ref 79.8–96.2)
MCV RBC AUTO: 93 FL (ref 79.8–96.2)
MCV RBC AUTO: 93.1 FL (ref 79.8–96.2)
MCV RBC AUTO: 93.6 FL (ref 79.8–96.2)
MCV RBC AUTO: 93.7 FL (ref 79.8–96.2)
MCV RBC AUTO: 93.7 FL (ref 79.8–96.2)
MCV RBC AUTO: 94 FL (ref 79.8–96.2)
MCV RBC AUTO: 94 FL (ref 79–97)
MCV RBC AUTO: 94.1 FL (ref 79.8–96.2)
MCV RBC AUTO: 94.1 FL (ref 79.8–96.2)
MCV RBC AUTO: 94.4 FL (ref 79.8–96.2)
MCV RBC AUTO: 94.6 FL (ref 79.8–96.2)
MCV RBC AUTO: 95 FL (ref 79.8–96.2)
MCV RBC AUTO: 95.1 FL (ref 79.8–96.2)
MCV RBC AUTO: 95.3 FL (ref 79.8–96.2)
MCV RBC AUTO: 95.5 FL (ref 79.8–96.2)
MCV RBC AUTO: 95.5 FL (ref 79.8–96.2)
MCV RBC AUTO: 95.9 FL (ref 79.8–96.2)
MONOCYTES # BLD AUTO: 0.13 10*3/MM3 (ref 0.2–1.2)
MONOCYTES # BLD AUTO: 0.17 10*3/MM3 (ref 0.2–1.2)
MONOCYTES # BLD AUTO: 0.19 10*3/MM3 (ref 0.2–1.2)
MONOCYTES # BLD AUTO: 0.19 10*3/MM3 (ref 0.2–1.2)
MONOCYTES # BLD AUTO: 0.2 X10E3/UL (ref 0.1–0.9)
MONOCYTES # BLD AUTO: 0.2 X10E3/UL (ref 0.1–0.9)
MONOCYTES # BLD AUTO: 0.22 10*3/MM3 (ref 0.2–1.2)
MONOCYTES # BLD AUTO: 0.24 10*3/MM3 (ref 0.25–0.8)
MONOCYTES # BLD AUTO: 0.24 10*3/MM3 (ref 0.2–1.2)
MONOCYTES # BLD AUTO: 0.24 10*3/MM3 (ref 0.2–1.2)
MONOCYTES # BLD AUTO: 0.25 10*3/MM3 (ref 0.2–1.2)
MONOCYTES # BLD AUTO: 0.26 10*3/MM3 (ref 0.2–1.2)
MONOCYTES # BLD AUTO: 0.26 10*3/MM3 (ref 0.2–1.2)
MONOCYTES # BLD AUTO: 0.27 10*3/MM3 (ref 0.2–1.2)
MONOCYTES # BLD AUTO: 0.27 10*3/MM3 (ref 0.2–1.2)
MONOCYTES # BLD AUTO: 0.28 10*3/MM3 (ref 0.2–1.2)
MONOCYTES # BLD AUTO: 0.29 10*3/MM3 (ref 0.2–1.2)
MONOCYTES # BLD AUTO: 0.29 10*3/MM3 (ref 0.2–1.2)
MONOCYTES # BLD AUTO: 0.3 X10E3/UL (ref 0.1–0.9)
MONOCYTES # BLD AUTO: 0.31 10*3/MM3 (ref 0.2–1.2)
MONOCYTES # BLD AUTO: 0.35 10*3/MM3 (ref 0.2–1.2)
MONOCYTES # BLD AUTO: 0.35 10*3/MM3 (ref 0.2–1.2)
MONOCYTES # BLD AUTO: 0.36 10*3/MM3 (ref 0.2–1.2)
MONOCYTES # BLD AUTO: 0.36 10*3/MM3 (ref 0.2–1.2)
MONOCYTES # BLD AUTO: 0.38 10*3/MM3 (ref 0.25–0.8)
MONOCYTES # BLD AUTO: 0.38 10*3/MM3 (ref 0.2–1.2)
MONOCYTES # BLD AUTO: 0.4 10*3/MM3 (ref 0.2–1.2)
MONOCYTES # BLD AUTO: 0.41 10*3/MM3 (ref 0.2–1.2)
MONOCYTES # BLD AUTO: 0.5 10*3/MM3 (ref 0.2–1.2)
MONOCYTES # BLD AUTO: 0.65 10*3/MM3 (ref 0.2–1.2)
MONOCYTES # BLD AUTO: 0.77 10*3/MM3 (ref 0.25–0.8)
MONOCYTES # BLD AUTO: 1.06 10*3/MM3 (ref 0.2–1.2)
MONOCYTES NFR BLD AUTO: 10.2 % (ref 5–12)
MONOCYTES NFR BLD AUTO: 10.3 % (ref 5–12)
MONOCYTES NFR BLD AUTO: 10.4 % (ref 5–12)
MONOCYTES NFR BLD AUTO: 10.8 % (ref 4–12)
MONOCYTES NFR BLD AUTO: 10.8 % (ref 5–12)
MONOCYTES NFR BLD AUTO: 11 %
MONOCYTES NFR BLD AUTO: 11 % (ref 5–12)
MONOCYTES NFR BLD AUTO: 11.2 % (ref 5–12)
MONOCYTES NFR BLD AUTO: 11.9 % (ref 5–12)
MONOCYTES NFR BLD AUTO: 12 % (ref 5–12)
MONOCYTES NFR BLD AUTO: 12.7 % (ref 5–12)
MONOCYTES NFR BLD AUTO: 13.7 % (ref 5–12)
MONOCYTES NFR BLD AUTO: 14 % (ref 5–12)
MONOCYTES NFR BLD AUTO: 14.1 % (ref 5–12)
MONOCYTES NFR BLD AUTO: 14.6 % (ref 4–12)
MONOCYTES NFR BLD AUTO: 14.8 % (ref 5–12)
MONOCYTES NFR BLD AUTO: 15.3 % (ref 5–12)
MONOCYTES NFR BLD AUTO: 15.4 % (ref 5–12)
MONOCYTES NFR BLD AUTO: 16 % (ref 5–12)
MONOCYTES NFR BLD AUTO: 16.4 % (ref 5–12)
MONOCYTES NFR BLD AUTO: 16.7 % (ref 5–12)
MONOCYTES NFR BLD AUTO: 18.8 % (ref 5–12)
MONOCYTES NFR BLD AUTO: 5.5 % (ref 5–12)
MONOCYTES NFR BLD AUTO: 6.4 % (ref 5–12)
MONOCYTES NFR BLD AUTO: 6.9 % (ref 5–12)
MONOCYTES NFR BLD AUTO: 8 %
MONOCYTES NFR BLD AUTO: 8.6 % (ref 5–12)
MONOCYTES NFR BLD AUTO: 9 %
MONOCYTES NFR BLD AUTO: 9 % (ref 5–12)
MONOCYTES NFR BLD AUTO: 9.1 % (ref 5–12)
MONOCYTES NFR BLD AUTO: 9.8 % (ref 4–12)
MONOCYTES NFR BLD AUTO: 9.9 % (ref 5–12)
MORPHOLOGY BLD-IMP: ABNORMAL
NEUTROPHILS # BLD AUTO: 0.96 10*3/MM3 (ref 1.9–8.1)
NEUTROPHILS # BLD AUTO: 1.05 10*3/MM3 (ref 1.9–8.1)
NEUTROPHILS # BLD AUTO: 1.17 10*3/MM3 (ref 1.9–8.1)
NEUTROPHILS # BLD AUTO: 1.22 10*3/MM3 (ref 1.9–8.1)
NEUTROPHILS # BLD AUTO: 1.38 10*3/MM3 (ref 1.9–8.1)
NEUTROPHILS # BLD AUTO: 1.42 10*3/MM3 (ref 1.9–8.1)
NEUTROPHILS # BLD AUTO: 1.51 10*3/MM3 (ref 1.9–8.1)
NEUTROPHILS # BLD AUTO: 1.52 10*3/MM3 (ref 1.9–8.1)
NEUTROPHILS # BLD AUTO: 1.53 10*3/MM3 (ref 1.9–8.1)
NEUTROPHILS # BLD AUTO: 1.6 10*3/MM3 (ref 1.9–8.1)
NEUTROPHILS # BLD AUTO: 1.61 10*3/MM3 (ref 1.9–8.1)
NEUTROPHILS # BLD AUTO: 1.64 10*3/MM3 (ref 1.9–8.1)
NEUTROPHILS # BLD AUTO: 1.66 10*3/MM3 (ref 1.9–8.1)
NEUTROPHILS # BLD AUTO: 1.66 10*3/MM3 (ref 1.9–8.1)
NEUTROPHILS # BLD AUTO: 1.7 X10E3/UL (ref 1.4–7)
NEUTROPHILS # BLD AUTO: 1.76 10*3/MM3 (ref 1.5–7)
NEUTROPHILS # BLD AUTO: 1.8 X10E3/UL (ref 1.4–7)
NEUTROPHILS # BLD AUTO: 2.05 10*3/MM3 (ref 1.9–8.1)
NEUTROPHILS # BLD AUTO: 2.15 10*3/MM3 (ref 1.9–8.1)
NEUTROPHILS # BLD AUTO: 2.33 10*3/MM3 (ref 1.9–8.1)
NEUTROPHILS # BLD AUTO: 2.37 10*3/MM3 (ref 1.9–8.1)
NEUTROPHILS # BLD AUTO: 2.39 10*3/MM3 (ref 1.9–8.1)
NEUTROPHILS # BLD AUTO: 2.54 10*3/MM3 (ref 1.9–8.1)
NEUTROPHILS # BLD AUTO: 2.56 10*3/MM3 (ref 1.9–8.1)
NEUTROPHILS # BLD AUTO: 2.6 X10E3/UL (ref 1.4–7)
NEUTROPHILS # BLD AUTO: 3.06 10*3/MM3 (ref 1.9–8.1)
NEUTROPHILS # BLD AUTO: 3.18 10*3/MM3 (ref 1.5–7)
NEUTROPHILS # BLD AUTO: 3.24 10*3/MM3 (ref 1.9–8.1)
NEUTROPHILS # BLD AUTO: 3.53 10*3/MM3 (ref 1.9–8.1)
NEUTROPHILS # BLD AUTO: 3.73 10*3/MM3 (ref 1.9–8.1)
NEUTROPHILS # BLD AUTO: 4.01 10*3/MM3 (ref 1.5–7)
NEUTROPHILS # BLD AUTO: 7.95 10*3/MM3 (ref 1.9–8.1)
NEUTROPHILS NFR BLD AUTO: 66 % (ref 42.7–76)
NEUTROPHILS NFR BLD AUTO: 68.9 % (ref 42.7–76)
NEUTROPHILS NFR BLD AUTO: 69.1 % (ref 42.7–76)
NEUTROPHILS NFR BLD AUTO: 69.8 % (ref 42.7–76)
NEUTROPHILS NFR BLD AUTO: 70.2 % (ref 42.7–76)
NEUTROPHILS NFR BLD AUTO: 71 % (ref 42.7–76)
NEUTROPHILS NFR BLD AUTO: 71.4 % (ref 42.7–76)
NEUTROPHILS NFR BLD AUTO: 72.4 % (ref 42.7–76)
NEUTROPHILS NFR BLD AUTO: 72.5 % (ref 42.7–76)
NEUTROPHILS NFR BLD AUTO: 72.8 % (ref 42.7–76)
NEUTROPHILS NFR BLD AUTO: 72.9 % (ref 42.7–76)
NEUTROPHILS NFR BLD AUTO: 73.3 % (ref 42.7–76)
NEUTROPHILS NFR BLD AUTO: 73.4 % (ref 42.7–76)
NEUTROPHILS NFR BLD AUTO: 74 % (ref 42.7–76)
NEUTROPHILS NFR BLD AUTO: 74 % (ref 42.7–76)
NEUTROPHILS NFR BLD AUTO: 75.6 % (ref 42.7–76)
NEUTROPHILS NFR BLD AUTO: 75.9 % (ref 39–75)
NEUTROPHILS NFR BLD AUTO: 76.7 % (ref 42.7–76)
NEUTROPHILS NFR BLD AUTO: 77.1 % (ref 42.7–76)
NEUTROPHILS NFR BLD AUTO: 77.4 % (ref 42.7–76)
NEUTROPHILS NFR BLD AUTO: 77.8 % (ref 42.7–76)
NEUTROPHILS NFR BLD AUTO: 77.9 % (ref 42.7–76)
NEUTROPHILS NFR BLD AUTO: 79 %
NEUTROPHILS NFR BLD AUTO: 79 % (ref 39–75)
NEUTROPHILS NFR BLD AUTO: 79.8 % (ref 42.7–76)
NEUTROPHILS NFR BLD AUTO: 80 %
NEUTROPHILS NFR BLD AUTO: 80 %
NEUTROPHILS NFR BLD AUTO: 80.7 % (ref 42.7–76)
NEUTROPHILS NFR BLD AUTO: 81.1 % (ref 42.7–76)
NEUTROPHILS NFR BLD AUTO: 81.6 % (ref 42.7–76)
NEUTROPHILS NFR BLD AUTO: 81.9 % (ref 39–75)
NEUTROPHILS NFR BLD AUTO: 87.2 % (ref 42.7–76)
NITRITE UR QL STRIP: NEGATIVE
NRBC BLD MANUAL-RTO: 0 /100 WBC (ref 0–0)
NRBC BLD MANUAL-RTO: 1.1 /100 WBC (ref 0–0)
NT-PROBNP SERPL-MCNC: 362.8 PG/ML (ref 5–900)
NT-PROBNP SERPL-MCNC: 485.1 PG/ML (ref 5–900)
OVALOCYTES BLD QL SMEAR: NORMAL
PATH REPORT.ADDENDUM SPEC: NORMAL
PATH REPORT.FINAL DX SPEC: NORMAL
PATH REPORT.FINAL DX SPEC: NORMAL
PATH REPORT.GROSS SPEC: NORMAL
PATH REPORT.GROSS SPEC: NORMAL
PH UR STRIP.AUTO: 6 [PH] (ref 5–8)
PH UR STRIP.AUTO: 7 [PH] (ref 5–8)
PH UR STRIP.AUTO: 7.5 [PH] (ref 5–8)
PHOSPHATE SERPL-MCNC: 1.6 MG/DL (ref 2.5–4.5)
PHOSPHATE SERPL-MCNC: 2.3 MG/DL (ref 2.5–4.5)
PHOSPHATE SERPL-MCNC: 2.6 MG/DL (ref 2.5–4.5)
PLAT MORPH BLD: NORMAL
PLATELET # BLD AUTO: 104 10*3/MM3 (ref 140–500)
PLATELET # BLD AUTO: 25 10*3/MM3 (ref 140–500)
PLATELET # BLD AUTO: 26 10*3/MM3 (ref 140–500)
PLATELET # BLD AUTO: 28 10*3/MM3 (ref 140–500)
PLATELET # BLD AUTO: 30 10*3/MM3 (ref 140–500)
PLATELET # BLD AUTO: 32 10*3/MM3 (ref 140–500)
PLATELET # BLD AUTO: 33 10*3/MM3 (ref 140–500)
PLATELET # BLD AUTO: 33 10*3/MM3 (ref 140–500)
PLATELET # BLD AUTO: 34 10*3/MM3 (ref 140–500)
PLATELET # BLD AUTO: 35 10*3/MM3 (ref 140–500)
PLATELET # BLD AUTO: 35 10*3/MM3 (ref 140–500)
PLATELET # BLD AUTO: 35 10*3/MM3 (ref 150–375)
PLATELET # BLD AUTO: 36 10*3/MM3 (ref 140–500)
PLATELET # BLD AUTO: 37 10*3/MM3 (ref 140–500)
PLATELET # BLD AUTO: 40 10*3/MM3 (ref 140–500)
PLATELET # BLD AUTO: 41 10*3/MM3 (ref 150–375)
PLATELET # BLD AUTO: 42 10*3/MM3 (ref 140–500)
PLATELET # BLD AUTO: 43 10*3/MM3 (ref 140–500)
PLATELET # BLD AUTO: 44 10*3/MM3 (ref 140–500)
PLATELET # BLD AUTO: 45 10*3/MM3 (ref 140–500)
PLATELET # BLD AUTO: 47 10*3/MM3 (ref 140–500)
PLATELET # BLD AUTO: 47 10*3/MM3 (ref 140–500)
PLATELET # BLD AUTO: 47 X10E3/UL (ref 150–379)
PLATELET # BLD AUTO: 48 10*3/MM3 (ref 140–500)
PLATELET # BLD AUTO: 50 X10E3/UL (ref 150–379)
PLATELET # BLD AUTO: 51 X10E3/UL (ref 150–379)
PLATELET # BLD AUTO: 52 10*3/MM3 (ref 140–500)
PLATELET # BLD AUTO: 53 10*3/MM3 (ref 140–500)
PLATELET # BLD AUTO: 55 10*3/MM3 (ref 140–500)
PLATELET # BLD AUTO: 62 10*3/MM3 (ref 140–500)
PLATELET # BLD AUTO: 67 10*3/MM3 (ref 140–500)
PLATELET # BLD AUTO: 70 10*3/MM3 (ref 140–500)
PLATELET # BLD AUTO: 72 10*3/MM3 (ref 150–375)
PLATELET # BLD AUTO: 93 10*3/MM3 (ref 140–500)
PLATELETS.RETICULATED NFR BLD AUTO: 7 % (ref 0.9–6.5)
PMV BLD AUTO: 10.2 FL (ref 6–12)
PMV BLD AUTO: 10.2 FL (ref 6–12)
PMV BLD AUTO: 10.3 FL (ref 6–12)
PMV BLD AUTO: 10.6 FL (ref 6–12)
PMV BLD AUTO: 10.6 FL (ref 6–12)
PMV BLD AUTO: 10.7 FL (ref 6–12)
PMV BLD AUTO: 10.8 FL (ref 6–12)
PMV BLD AUTO: 10.8 FL (ref 6–12)
PMV BLD AUTO: 11 FL (ref 6–12)
PMV BLD AUTO: 11.3 FL (ref 6–12)
PMV BLD AUTO: 11.4 FL (ref 6–12)
PMV BLD AUTO: 11.4 FL (ref 8.9–12.1)
PMV BLD AUTO: 11.4 FL (ref 8.9–12.1)
PMV BLD AUTO: 12.5 FL (ref 6–12)
PMV BLD AUTO: ABNORMAL FL (ref 6–12)
POLYCHROMASIA BLD QL SMEAR: NORMAL
POTASSIUM BLD-SCNC: 3.1 MMOL/L (ref 3.5–5.2)
POTASSIUM BLD-SCNC: 3.1 MMOL/L (ref 3.5–5.2)
POTASSIUM BLD-SCNC: 3.4 MMOL/L (ref 3.5–4.7)
POTASSIUM BLD-SCNC: 3.4 MMOL/L (ref 3.5–5.2)
POTASSIUM BLD-SCNC: 3.5 MMOL/L (ref 3.5–5.2)
POTASSIUM BLD-SCNC: 3.6 MMOL/L (ref 3.5–5.2)
POTASSIUM BLD-SCNC: 3.7 MMOL/L (ref 3.5–5.2)
POTASSIUM BLD-SCNC: 3.8 MMOL/L (ref 3.5–5.2)
POTASSIUM BLD-SCNC: 3.9 MMOL/L (ref 3.5–5.2)
POTASSIUM BLD-SCNC: 4 MMOL/L (ref 3.5–5.2)
POTASSIUM BLD-SCNC: 4 MMOL/L (ref 3.5–5.2)
POTASSIUM BLD-SCNC: 4.2 MMOL/L (ref 3.5–5.2)
POTASSIUM BLD-SCNC: 4.3 MMOL/L (ref 3.5–5.2)
POTASSIUM BLD-SCNC: 4.4 MMOL/L (ref 3.5–5.2)
POTASSIUM BLD-SCNC: 4.5 MMOL/L (ref 3.5–5.2)
POTASSIUM BLD-SCNC: 4.6 MMOL/L (ref 3.5–5.2)
POTASSIUM SERPL-SCNC: 5.1 MMOL/L (ref 3.5–5.2)
PROCALCITONIN SERPL-MCNC: 0.11 NG/ML (ref 0.1–0.25)
PROT SERPL-MCNC: 6 G/DL (ref 6–8.5)
PROT SERPL-MCNC: 6.1 G/DL (ref 6–8.5)
PROT SERPL-MCNC: 6.2 G/DL (ref 6–8.5)
PROT SERPL-MCNC: 6.3 G/DL (ref 6–8.5)
PROT SERPL-MCNC: 6.5 G/DL (ref 6–8.5)
PROT SERPL-MCNC: 6.5 G/DL (ref 6–8.5)
PROT SERPL-MCNC: 6.6 G/DL (ref 6–8.5)
PROT SERPL-MCNC: 6.8 G/DL (ref 6–8.5)
PROT SERPL-MCNC: 6.9 G/DL (ref 6–8.5)
PROT SERPL-MCNC: 7.1 G/DL (ref 6–8.5)
PROT SERPL-MCNC: 7.1 G/DL (ref 6–8.5)
PROT SERPL-MCNC: 7.3 G/DL (ref 6.3–8)
PROT SERPL-MCNC: 7.3 G/DL (ref 6–8.5)
PROT SERPL-MCNC: 7.7 G/DL (ref 6–8.5)
PROT SERPL-MCNC: 7.9 G/DL (ref 6–8.5)
PROT UR QL STRIP: ABNORMAL
PROT UR-MCNC: 41 MG/DL
PROTHROMBIN TIME: 16.3 SECONDS (ref 11.7–14.2)
PROTHROMBIN TIME: 16.4 SECONDS (ref 11.7–14.2)
PROTHROMBIN TIME: 17.3 SECONDS (ref 11.7–14.2)
PROTHROMBIN TIME: 17.3 SECONDS (ref 11.7–14.2)
PROTHROMBIN TIME: 17.4 SECONDS (ref 11.7–14.2)
PROTHROMBIN TIME: 17.4 SECONDS (ref 11.7–14.2)
PROTHROMBIN TIME: 17.5 SECONDS (ref 11.7–14.2)
PROTHROMBIN TIME: 17.6 SECONDS (ref 11.7–14.2)
PROTHROMBIN TIME: 17.7 SECONDS (ref 11.7–14.2)
PSA FREE MFR SERPL: 6 %
PSA FREE SERPL-MCNC: 0.03 NG/ML
PSA SERPL-MCNC: 0.29 NG/ML (ref 0–4)
PSA SERPL-MCNC: 0.43 NG/ML (ref 0–4)
PSA SERPL-MCNC: 0.5 NG/ML (ref 0–4)
RBC # BLD AUTO: 2.45 10*6/MM3 (ref 4.6–6)
RBC # BLD AUTO: 2.89 10*6/MM3 (ref 4.6–6)
RBC # BLD AUTO: 3.01 10*6/MM3 (ref 4.6–6)
RBC # BLD AUTO: 3.1 10*6/MM3 (ref 4.6–6)
RBC # BLD AUTO: 3.1 10*6/MM3 (ref 4.6–6)
RBC # BLD AUTO: 3.13 10*6/MM3 (ref 4.6–6)
RBC # BLD AUTO: 3.17 10*6/MM3 (ref 4.6–6)
RBC # BLD AUTO: 3.17 10*6/MM3 (ref 4.6–6)
RBC # BLD AUTO: 3.27 10*6/MM3 (ref 4.6–6)
RBC # BLD AUTO: 3.31 10*6/MM3 (ref 4.6–6)
RBC # BLD AUTO: 3.34 10*6/MM3 (ref 4.6–6)
RBC # BLD AUTO: 3.35 10*6/MM3 (ref 4.6–6)
RBC # BLD AUTO: 3.38 10*6/MM3 (ref 4.6–6)
RBC # BLD AUTO: 3.39 10*6/MM3 (ref 4.6–6)
RBC # BLD AUTO: 3.41 10*6/MM3 (ref 4.6–6)
RBC # BLD AUTO: 3.43 10*6/MM3 (ref 4.6–6)
RBC # BLD AUTO: 3.47 10*6/MM3 (ref 4.6–6)
RBC # BLD AUTO: 3.48 10*6/MM3 (ref 4.6–6)
RBC # BLD AUTO: 3.49 10*6/MM3 (ref 4.3–5.5)
RBC # BLD AUTO: 3.5 10*6/MM3 (ref 4.6–6)
RBC # BLD AUTO: 3.52 X10E6/UL (ref 4.14–5.8)
RBC # BLD AUTO: 3.53 10*6/MM3 (ref 4.6–6)
RBC # BLD AUTO: 3.53 10*6/MM3 (ref 4.6–6)
RBC # BLD AUTO: 3.58 10*6/MM3 (ref 4.6–6)
RBC # BLD AUTO: 3.62 10*6/MM3 (ref 4.6–6)
RBC # BLD AUTO: 3.62 10*6/MM3 (ref 4.6–6)
RBC # BLD AUTO: 3.62 X10E6/UL (ref 4.14–5.8)
RBC # BLD AUTO: 3.63 10*6/MM3 (ref 4.6–6)
RBC # BLD AUTO: 3.63 10*6/MM3 (ref 4.6–6)
RBC # BLD AUTO: 3.65 10*6/MM3 (ref 4.6–6)
RBC # BLD AUTO: 3.66 X10E6/UL (ref 4.14–5.8)
RBC # BLD AUTO: 3.69 10*6/MM3 (ref 4.6–6)
RBC # BLD AUTO: 3.73 10*6/MM3 (ref 4.6–6)
RBC # BLD AUTO: 3.76 10*6/MM3 (ref 4.6–6)
RBC # BLD AUTO: 3.78 10*6/MM3 (ref 4.6–6)
RBC # BLD AUTO: 3.93 10*6/MM3 (ref 4.6–6)
RBC # BLD AUTO: 4.01 10*6/MM3 (ref 4.3–5.5)
RBC # BLD AUTO: 4.1 10*6/MM3 (ref 4.6–6)
RBC # BLD AUTO: 4.19 10*6/MM3 (ref 4.6–6)
RBC # BLD AUTO: 4.44 10*6/MM3 (ref 4.3–5.5)
RBC # UR: ABNORMAL /HPF
RBC # UR: NORMAL /HPF
RBC # UR: NORMAL /HPF
REF LAB TEST METHOD: ABNORMAL
REF LAB TEST METHOD: NORMAL
REF LAB TEST METHOD: NORMAL
RH BLD: NEGATIVE
RH BLD: NEGATIVE
SODIUM BLD-SCNC: 130 MMOL/L (ref 136–145)
SODIUM BLD-SCNC: 133 MMOL/L (ref 136–145)
SODIUM BLD-SCNC: 133 MMOL/L (ref 136–145)
SODIUM BLD-SCNC: 134 MMOL/L (ref 136–145)
SODIUM BLD-SCNC: 135 MMOL/L (ref 136–145)
SODIUM BLD-SCNC: 135 MMOL/L (ref 136–145)
SODIUM BLD-SCNC: 136 MMOL/L (ref 136–145)
SODIUM BLD-SCNC: 137 MMOL/L (ref 136–145)
SODIUM BLD-SCNC: 138 MMOL/L (ref 134–145)
SODIUM BLD-SCNC: 138 MMOL/L (ref 136–145)
SODIUM BLD-SCNC: 139 MMOL/L (ref 136–145)
SODIUM BLD-SCNC: 141 MMOL/L (ref 136–145)
SODIUM BLD-SCNC: 141 MMOL/L (ref 136–145)
SODIUM SERPL-SCNC: 132 MMOL/L (ref 136–145)
SODIUM UR-SCNC: 111 MMOL/L
SP GR UR STRIP: 1.01 (ref 1–1.03)
SP GR UR STRIP: 1.02 (ref 1–1.03)
SP GR UR STRIP: >=1.03 (ref 1–1.03)
SPECIMEN STATUS: NORMAL
SPHEROCYTES BLD QL SMEAR: NORMAL
SQUAMOUS #/AREA URNS HPF: ABNORMAL /HPF
SQUAMOUS #/AREA URNS HPF: NORMAL /HPF
SQUAMOUS #/AREA URNS HPF: NORMAL /HPF
STRESS TARGET HR: 127 BPM
T&S EXPIRATION DATE: NORMAL
T&S EXPIRATION DATE: NORMAL
T3FREE SERPL-MCNC: 1.94 PG/ML (ref 2–4.4)
T4 FREE SERPL-MCNC: 0.71 NG/DL (ref 0.93–1.7)
T4 FREE SERPL-MCNC: 1.52 NG/DL (ref 0.93–1.7)
TARGETS BLD QL SMEAR: NORMAL
TEST PERFORMANCE INFO SPEC: NORMAL
TESTOST FREE SERPL-MCNC: 1.6 PG/ML (ref 6.6–18.1)
TESTOST SERPL-MCNC: 7 NG/DL (ref 264–916)
TIBC SERPL-MCNC: 268 MCG/DL
TIBC SERPL-MCNC: 314 MCG/DL (ref 298–536)
TIBC SERPL-MCNC: 375 MCG/DL (ref 249–505)
TIBC SERPL-MCNC: 458 MCG/DL (ref 249–505)
TIBC SERPL-MCNC: 462 MCG/DL (ref 298–536)
TRANS CELLS #/AREA URNS HPF: ABNORMAL /HPF
TRANSFERRIN SERPL-MCNC: 180 MG/DL (ref 200–360)
TRANSFERRIN SERPL-MCNC: 211 MG/DL (ref 200–360)
TRANSFERRIN SERPL-MCNC: 268 MG/DL (ref 200–360)
TRANSFERRIN SERPL-MCNC: 310 MG/DL (ref 200–360)
TRANSFERRIN SERPL-MCNC: 327 MG/DL (ref 200–360)
TRIGL SERPL-MCNC: 134 MG/DL (ref 0–150)
TRIGL SERPL-MCNC: 154 MG/DL (ref 0–150)
TRIGL SERPL-MCNC: 62 MG/DL (ref 0–150)
TRIGL SERPL-MCNC: 88 MG/DL (ref 0–150)
TRIGL SERPL-MCNC: 89 MG/DL (ref 0–150)
TROPONIN T SERPL-MCNC: 0.02 NG/ML (ref 0–0.03)
TROPONIN T SERPL-MCNC: 0.02 NG/ML (ref 0–0.03)
TROPONIN T SERPL-MCNC: <0.01 NG/ML (ref 0–0.03)
TSH SERPL DL<=0.05 MIU/L-ACNC: 0.45 MIU/ML (ref 0.27–4.2)
TSH SERPL DL<=0.05 MIU/L-ACNC: 1.71 MIU/ML (ref 0.27–4.2)
TSH SERPL DL<=0.05 MIU/L-ACNC: 20.98 MIU/ML (ref 0.27–4.2)
TSH SERPL DL<=0.05 MIU/L-ACNC: 29.91 MIU/ML (ref 0.27–4.2)
TSH SERPL DL<=0.05 MIU/L-ACNC: 43.79 MIU/ML (ref 0.27–4.2)
TSH SERPL DL<=0.05 MIU/L-ACNC: 9.17 MIU/ML (ref 0.27–4.2)
UNIT  ABO: NORMAL
UNIT  RH: NORMAL
URATE SERPL-MCNC: 2.9 MG/DL (ref 3.4–7)
UROBILINOGEN UR QL STRIP: ABNORMAL
UUN 24H UR-MCNC: 314 MG/DL
VIT B12 BLD-MCNC: 1065 PG/ML (ref 211–946)
VLDLC SERPL-MCNC: 12.4 MG/DL (ref 5–40)
VLDLC SERPL-MCNC: 17.6 MG/DL (ref 5–40)
VLDLC SERPL-MCNC: 17.8 MG/DL (ref 5–40)
VLDLC SERPL-MCNC: 26.8 MG/DL (ref 5–40)
VLDLC SERPL-MCNC: 30.8 MG/DL (ref 5–40)
WBC # BLD AUTO: 2.1 X10E3/UL (ref 3.4–10.8)
WBC # BLD AUTO: 2.2 X10E3/UL (ref 3.4–10.8)
WBC # BLD AUTO: 3.2 X10E3/UL (ref 3.4–10.8)
WBC MORPH BLD: NORMAL
WBC NRBC COR # BLD: 1.39 10*3/MM3 (ref 4.5–10.7)
WBC NRBC COR # BLD: 1.44 10*3/MM3 (ref 4.5–10.7)
WBC NRBC COR # BLD: 1.77 10*3/MM3 (ref 4.5–10.7)
WBC NRBC COR # BLD: 1.77 10*3/MM3 (ref 4.5–10.7)
WBC NRBC COR # BLD: 1.93 10*3/MM3 (ref 4.5–10.7)
WBC NRBC COR # BLD: 1.96 10*3/MM3 (ref 4.5–10.7)
WBC NRBC COR # BLD: 2.04 10*3/MM3 (ref 4.5–10.7)
WBC NRBC COR # BLD: 2.07 10*3/MM3 (ref 4.5–10.7)
WBC NRBC COR # BLD: 2.08 10*3/MM3 (ref 4.5–10.7)
WBC NRBC COR # BLD: 2.09 10*3/MM3 (ref 4.5–10.7)
WBC NRBC COR # BLD: 2.1 10*3/MM3 (ref 4.5–10.7)
WBC NRBC COR # BLD: 2.13 10*3/MM3 (ref 4.5–10.7)
WBC NRBC COR # BLD: 2.14 10*3/MM3 (ref 4.5–10.7)
WBC NRBC COR # BLD: 2.14 10*3/MM3 (ref 4.5–10.7)
WBC NRBC COR # BLD: 2.23 10*3/MM3 (ref 4–10)
WBC NRBC COR # BLD: 2.28 10*3/MM3 (ref 4.5–10.7)
WBC NRBC COR # BLD: 2.38 10*3/MM3 (ref 4.5–10.7)
WBC NRBC COR # BLD: 2.44 10*3/MM3 (ref 4.5–10.7)
WBC NRBC COR # BLD: 2.67 10*3/MM3 (ref 4.5–10.7)
WBC NRBC COR # BLD: 2.82 10*3/MM3 (ref 4.5–10.7)
WBC NRBC COR # BLD: 2.83 10*3/MM3 (ref 4.5–10.7)
WBC NRBC COR # BLD: 2.84 10*3/MM3 (ref 4.5–10.7)
WBC NRBC COR # BLD: 2.84 10*3/MM3 (ref 4.5–10.7)
WBC NRBC COR # BLD: 3.04 10*3/MM3 (ref 4.5–10.7)
WBC NRBC COR # BLD: 3.26 10*3/MM3 (ref 4.5–10.7)
WBC NRBC COR # BLD: 3.46 10*3/MM3 (ref 4.5–10.7)
WBC NRBC COR # BLD: 3.46 10*3/MM3 (ref 4.5–10.7)
WBC NRBC COR # BLD: 3.59 10*3/MM3 (ref 4.5–10.7)
WBC NRBC COR # BLD: 3.79 10*3/MM3 (ref 4.5–10.7)
WBC NRBC COR # BLD: 3.8 10*3/MM3 (ref 4.5–10.7)
WBC NRBC COR # BLD: 3.88 10*3/MM3 (ref 4–10)
WBC NRBC COR # BLD: 4.18 10*3/MM3 (ref 4.5–10.7)
WBC NRBC COR # BLD: 4.33 10*3/MM3 (ref 4.5–10.7)
WBC NRBC COR # BLD: 4.79 10*3/MM3 (ref 4.5–10.7)
WBC NRBC COR # BLD: 5.28 10*3/MM3 (ref 4–10)
WBC NRBC COR # BLD: 5.34 10*3/MM3 (ref 4.5–10.7)
WBC NRBC COR # BLD: 9.8 10*3/MM3 (ref 4.5–10.7)
WBC UR QL AUTO: ABNORMAL /HPF
WBC UR QL AUTO: NORMAL /HPF
WBC UR QL AUTO: NORMAL /HPF
WHOLE BLOOD HOLD SPECIMEN: NORMAL

## 2018-01-01 PROCEDURE — 99232 SBSQ HOSP IP/OBS MODERATE 35: CPT | Performed by: NURSE PRACTITIONER

## 2018-01-01 PROCEDURE — 85610 PROTHROMBIN TIME: CPT | Performed by: NURSE PRACTITIONER

## 2018-01-01 PROCEDURE — 25010000002 HYDROMORPHONE 1 MG/ML SOLUTION: Performed by: ORTHOPAEDIC SURGERY

## 2018-01-01 PROCEDURE — 99215 OFFICE O/P EST HI 40 MIN: CPT | Performed by: INTERNAL MEDICINE

## 2018-01-01 PROCEDURE — 22513 PERQ VERTEBRAL AUGMENTATION: CPT | Performed by: NEUROLOGICAL SURGERY

## 2018-01-01 PROCEDURE — 63710000001 INSULIN ASPART PER 5 UNITS: Performed by: HOSPITALIST

## 2018-01-01 PROCEDURE — 84439 ASSAY OF FREE THYROXINE: CPT | Performed by: EMERGENCY MEDICINE

## 2018-01-01 PROCEDURE — 94799 UNLISTED PULMONARY SVC/PX: CPT

## 2018-01-01 PROCEDURE — 84466 ASSAY OF TRANSFERRIN: CPT | Performed by: INTERNAL MEDICINE

## 2018-01-01 PROCEDURE — 25010000002 LORAZEPAM PER 2 MG: Performed by: HOSPITALIST

## 2018-01-01 PROCEDURE — 0 IOPAMIDOL 41 % SOLUTION: Performed by: NEUROLOGICAL SURGERY

## 2018-01-01 PROCEDURE — 97110 THERAPEUTIC EXERCISES: CPT

## 2018-01-01 PROCEDURE — 25010000003 CEFAZOLIN IN DEXTROSE 2-4 GM/100ML-% SOLUTION: Performed by: NEUROLOGICAL SURGERY

## 2018-01-01 PROCEDURE — A9270 NON-COVERED ITEM OR SERVICE: HCPCS | Performed by: INTERNAL MEDICINE

## 2018-01-01 PROCEDURE — 82140 ASSAY OF AMMONIA: CPT | Performed by: INTERNAL MEDICINE

## 2018-01-01 PROCEDURE — 82962 GLUCOSE BLOOD TEST: CPT

## 2018-01-01 PROCEDURE — C1713 ANCHOR/SCREW BN/BN,TIS/BN: HCPCS | Performed by: NEUROLOGICAL SURGERY

## 2018-01-01 PROCEDURE — 25010000002 HYDROMORPHONE 1 MG/ML SOLUTION: Performed by: EMERGENCY MEDICINE

## 2018-01-01 PROCEDURE — 25010000002 HYDROMORPHONE 1 MG/ML SOLUTION: Performed by: HOSPITALIST

## 2018-01-01 PROCEDURE — 25010000002 FONDAPARINUX PER 0.5 MG: Performed by: HOSPITALIST

## 2018-01-01 PROCEDURE — 63710000001: Performed by: PHYSICIAN ASSISTANT

## 2018-01-01 PROCEDURE — 88364 INSITU HYBRIDIZATION (FISH): CPT

## 2018-01-01 PROCEDURE — 25010000002 FUROSEMIDE PER 20 MG: Performed by: EMERGENCY MEDICINE

## 2018-01-01 PROCEDURE — 85027 COMPLETE CBC AUTOMATED: CPT | Performed by: HOSPITALIST

## 2018-01-01 PROCEDURE — 96374 THER/PROPH/DIAG INJ IV PUSH: CPT

## 2018-01-01 PROCEDURE — 80053 COMPREHEN METABOLIC PANEL: CPT | Performed by: HOSPITALIST

## 2018-01-01 PROCEDURE — 25010000002 PROPOFOL 10 MG/ML EMULSION: Performed by: ANESTHESIOLOGY

## 2018-01-01 PROCEDURE — 63710000001 RIFAXIMIN 550 MG TABLET: Performed by: INTERNAL MEDICINE

## 2018-01-01 PROCEDURE — A9270 NON-COVERED ITEM OR SERVICE: HCPCS | Performed by: PHYSICIAN ASSISTANT

## 2018-01-01 PROCEDURE — 99024 POSTOP FOLLOW-UP VISIT: CPT | Performed by: NURSE PRACTITIONER

## 2018-01-01 PROCEDURE — 25010000002 ONDANSETRON PER 1 MG: Performed by: ORTHOPAEDIC SURGERY

## 2018-01-01 PROCEDURE — 82232 ASSAY OF BETA-2 PROTEIN: CPT | Performed by: INTERNAL MEDICINE

## 2018-01-01 PROCEDURE — A9270 NON-COVERED ITEM OR SERVICE: HCPCS | Performed by: NEUROLOGICAL SURGERY

## 2018-01-01 PROCEDURE — 86361 T CELL ABSOLUTE COUNT: CPT | Performed by: INTERNAL MEDICINE

## 2018-01-01 PROCEDURE — 73560 X-RAY EXAM OF KNEE 1 OR 2: CPT

## 2018-01-01 PROCEDURE — 88342 IMHCHEM/IMCYTCHM 1ST ANTB: CPT | Performed by: NEUROLOGICAL SURGERY

## 2018-01-01 PROCEDURE — P9035 PLATELET PHERES LEUKOREDUCED: HCPCS

## 2018-01-01 PROCEDURE — 85027 COMPLETE CBC AUTOMATED: CPT | Performed by: NEUROLOGICAL SURGERY

## 2018-01-01 PROCEDURE — 80048 BASIC METABOLIC PNL TOTAL CA: CPT | Performed by: INTERNAL MEDICINE

## 2018-01-01 PROCEDURE — 83036 HEMOGLOBIN GLYCOSYLATED A1C: CPT | Performed by: HOSPITALIST

## 2018-01-01 PROCEDURE — 87536 HIV-1 QUANT&REVRSE TRNSCRPJ: CPT

## 2018-01-01 PROCEDURE — 83540 ASSAY OF IRON: CPT | Performed by: INTERNAL MEDICINE

## 2018-01-01 PROCEDURE — 25010000002 SUCCINYLCHOLINE PER 20 MG: Performed by: NURSE ANESTHETIST, CERTIFIED REGISTERED

## 2018-01-01 PROCEDURE — 94762 N-INVAS EAR/PLS OXIMTRY CONT: CPT

## 2018-01-01 PROCEDURE — 63710000001 DOCUSATE SODIUM 100 MG CAPSULE: Performed by: NURSE PRACTITIONER

## 2018-01-01 PROCEDURE — 25010000002 METHYLPREDNISOLONE PER 125 MG

## 2018-01-01 PROCEDURE — 93005 ELECTROCARDIOGRAM TRACING: CPT | Performed by: INTERNAL MEDICINE

## 2018-01-01 PROCEDURE — 83605 ASSAY OF LACTIC ACID: CPT | Performed by: EMERGENCY MEDICINE

## 2018-01-01 PROCEDURE — 85007 BL SMEAR W/DIFF WBC COUNT: CPT | Performed by: NURSE PRACTITIONER

## 2018-01-01 PROCEDURE — 86850 RBC ANTIBODY SCREEN: CPT | Performed by: PHYSICIAN ASSISTANT

## 2018-01-01 PROCEDURE — 99214 OFFICE O/P EST MOD 30 MIN: CPT | Performed by: NEUROLOGICAL SURGERY

## 2018-01-01 PROCEDURE — 84481 FREE ASSAY (FT-3): CPT | Performed by: HOSPITALIST

## 2018-01-01 PROCEDURE — 85730 THROMBOPLASTIN TIME PARTIAL: CPT | Performed by: PHYSICIAN ASSISTANT

## 2018-01-01 PROCEDURE — 25010000002 FENTANYL CITRATE (PF) 100 MCG/2ML SOLUTION: Performed by: ANESTHESIOLOGY

## 2018-01-01 PROCEDURE — 86900 BLOOD TYPING SEROLOGIC ABO: CPT | Performed by: PHYSICIAN ASSISTANT

## 2018-01-01 PROCEDURE — 36415 COLL VENOUS BLD VENIPUNCTURE: CPT

## 2018-01-01 PROCEDURE — 87899 AGENT NOS ASSAY W/OPTIC: CPT | Performed by: INTERNAL MEDICINE

## 2018-01-01 PROCEDURE — 25010000002 VITAMIN K1 1 MG/1 ML SOLUTION: Performed by: HOSPITALIST

## 2018-01-01 PROCEDURE — 25010000002 FERUMOXYTOL 510 MG/17ML SOLUTION 510 MG VIAL: Performed by: INTERNAL MEDICINE

## 2018-01-01 PROCEDURE — 63710000001 FUROSEMIDE 20 MG TABLET: Performed by: INTERNAL MEDICINE

## 2018-01-01 PROCEDURE — 83883 ASSAY NEPHELOMETRY NOT SPEC: CPT | Performed by: INTERNAL MEDICINE

## 2018-01-01 PROCEDURE — 25010000002 MORPHINE PER 10 MG: Performed by: HOSPITALIST

## 2018-01-01 PROCEDURE — 85007 BL SMEAR W/DIFF WBC COUNT: CPT

## 2018-01-01 PROCEDURE — 83690 ASSAY OF LIPASE: CPT | Performed by: PHYSICIAN ASSISTANT

## 2018-01-01 PROCEDURE — 83735 ASSAY OF MAGNESIUM: CPT | Performed by: EMERGENCY MEDICINE

## 2018-01-01 PROCEDURE — 93010 ELECTROCARDIOGRAM REPORT: CPT | Performed by: INTERNAL MEDICINE

## 2018-01-01 PROCEDURE — 25010000002 ONDANSETRON PER 1 MG: Performed by: NURSE ANESTHETIST, CERTIFIED REGISTERED

## 2018-01-01 PROCEDURE — 85610 PROTHROMBIN TIME: CPT | Performed by: EMERGENCY MEDICINE

## 2018-01-01 PROCEDURE — 82140 ASSAY OF AMMONIA: CPT | Performed by: NURSE PRACTITIONER

## 2018-01-01 PROCEDURE — G0378 HOSPITAL OBSERVATION PER HR: HCPCS

## 2018-01-01 PROCEDURE — 85025 COMPLETE CBC W/AUTO DIFF WBC: CPT | Performed by: NEUROLOGICAL SURGERY

## 2018-01-01 PROCEDURE — 84439 ASSAY OF FREE THYROXINE: CPT | Performed by: HOSPITALIST

## 2018-01-01 PROCEDURE — 99285 EMERGENCY DEPT VISIT HI MDM: CPT

## 2018-01-01 PROCEDURE — 82728 ASSAY OF FERRITIN: CPT | Performed by: INTERNAL MEDICINE

## 2018-01-01 PROCEDURE — 84100 ASSAY OF PHOSPHORUS: CPT | Performed by: EMERGENCY MEDICINE

## 2018-01-01 PROCEDURE — 99232 SBSQ HOSP IP/OBS MODERATE 35: CPT | Performed by: INTERNAL MEDICINE

## 2018-01-01 PROCEDURE — G8979 MOBILITY GOAL STATUS: HCPCS

## 2018-01-01 PROCEDURE — 22515 PERQ VERTEBRAL AUGMENTATION: CPT | Performed by: NEUROLOGICAL SURGERY

## 2018-01-01 PROCEDURE — 85610 PROTHROMBIN TIME: CPT | Performed by: PHYSICIAN ASSISTANT

## 2018-01-01 PROCEDURE — 85007 BL SMEAR W/DIFF WBC COUNT: CPT | Performed by: INTERNAL MEDICINE

## 2018-01-01 PROCEDURE — 99223 1ST HOSP IP/OBS HIGH 75: CPT | Performed by: INTERNAL MEDICINE

## 2018-01-01 PROCEDURE — 72070 X-RAY EXAM THORAC SPINE 2VWS: CPT

## 2018-01-01 PROCEDURE — 76000 FLUOROSCOPY <1 HR PHYS/QHP: CPT

## 2018-01-01 PROCEDURE — 88365 INSITU HYBRIDIZATION (FISH): CPT

## 2018-01-01 PROCEDURE — 22510 PERQ CERVICOTHORACIC INJECT: CPT

## 2018-01-01 PROCEDURE — 85610 PROTHROMBIN TIME: CPT | Performed by: INTERNAL MEDICINE

## 2018-01-01 PROCEDURE — 80048 BASIC METABOLIC PNL TOTAL CA: CPT | Performed by: HOSPITALIST

## 2018-01-01 PROCEDURE — 88311 DECALCIFY TISSUE: CPT | Performed by: NEUROLOGICAL SURGERY

## 2018-01-01 PROCEDURE — 99214 OFFICE O/P EST MOD 30 MIN: CPT | Performed by: INTERNAL MEDICINE

## 2018-01-01 PROCEDURE — 85025 COMPLETE CBC W/AUTO DIFF WBC: CPT | Performed by: INTERNAL MEDICINE

## 2018-01-01 PROCEDURE — 87340 HEPATITIS B SURFACE AG IA: CPT | Performed by: INTERNAL MEDICINE

## 2018-01-01 PROCEDURE — 63710000001 FLUCONAZOLE 100 MG TABLET: Performed by: INTERNAL MEDICINE

## 2018-01-01 PROCEDURE — 85025 COMPLETE CBC W/AUTO DIFF WBC: CPT | Performed by: HOSPITALIST

## 2018-01-01 PROCEDURE — 25010000002 IOPAMIDOL 61 % SOLUTION: Performed by: EMERGENCY MEDICINE

## 2018-01-01 PROCEDURE — 99233 SBSQ HOSP IP/OBS HIGH 50: CPT | Performed by: INTERNAL MEDICINE

## 2018-01-01 PROCEDURE — 36430 TRANSFUSION BLD/BLD COMPNT: CPT

## 2018-01-01 PROCEDURE — 86900 BLOOD TYPING SEROLOGIC ABO: CPT | Performed by: EMERGENCY MEDICINE

## 2018-01-01 PROCEDURE — 72132 CT LUMBAR SPINE W/DYE: CPT

## 2018-01-01 PROCEDURE — 85025 COMPLETE CBC W/AUTO DIFF WBC: CPT | Performed by: PHYSICIAN ASSISTANT

## 2018-01-01 PROCEDURE — 80053 COMPREHEN METABOLIC PANEL: CPT | Performed by: EMERGENCY MEDICINE

## 2018-01-01 PROCEDURE — 63710000001: Performed by: NEUROLOGICAL SURGERY

## 2018-01-01 PROCEDURE — 63710000001 DOCUSATE SODIUM 100 MG CAPSULE: Performed by: NEUROLOGICAL SURGERY

## 2018-01-01 PROCEDURE — 25010000003 CEFAZOLIN IN DEXTROSE 2-4 GM/100ML-% SOLUTION: Performed by: ORTHOPAEDIC SURGERY

## 2018-01-01 PROCEDURE — 99284 EMERGENCY DEPT VISIT MOD MDM: CPT

## 2018-01-01 PROCEDURE — 63710000001 INSULIN ASPART PER 5 UNITS: Performed by: INTERNAL MEDICINE

## 2018-01-01 PROCEDURE — 86901 BLOOD TYPING SEROLOGIC RH(D): CPT | Performed by: EMERGENCY MEDICINE

## 2018-01-01 PROCEDURE — 99222 1ST HOSP IP/OBS MODERATE 55: CPT | Performed by: INTERNAL MEDICINE

## 2018-01-01 PROCEDURE — 25010000002 ONDANSETRON PER 1 MG: Performed by: HOSPITALIST

## 2018-01-01 PROCEDURE — 83880 ASSAY OF NATRIURETIC PEPTIDE: CPT | Performed by: PHYSICIAN ASSISTANT

## 2018-01-01 PROCEDURE — 80053 COMPREHEN METABOLIC PANEL: CPT | Performed by: NURSE PRACTITIONER

## 2018-01-01 PROCEDURE — 72148 MRI LUMBAR SPINE W/O DYE: CPT

## 2018-01-01 PROCEDURE — 85027 COMPLETE CBC AUTOMATED: CPT | Performed by: INTERNAL MEDICINE

## 2018-01-01 PROCEDURE — 99024 POSTOP FOLLOW-UP VISIT: CPT | Performed by: PHYSICIAN ASSISTANT

## 2018-01-01 PROCEDURE — 84153 ASSAY OF PSA TOTAL: CPT | Performed by: UROLOGY

## 2018-01-01 PROCEDURE — 63710000001 LEVOTHYROXINE 150 MCG TABLET: Performed by: NEUROLOGICAL SURGERY

## 2018-01-01 PROCEDURE — 63710000001 INSULIN DETEMER PER 5 UNITS: Performed by: HOSPITALIST

## 2018-01-01 PROCEDURE — 81001 URINALYSIS AUTO W/SCOPE: CPT | Performed by: INTERNAL MEDICINE

## 2018-01-01 PROCEDURE — 87040 BLOOD CULTURE FOR BACTERIA: CPT | Performed by: EMERGENCY MEDICINE

## 2018-01-01 PROCEDURE — 80048 BASIC METABOLIC PNL TOTAL CA: CPT

## 2018-01-01 PROCEDURE — 63710000001 OXYCODONE-ACETAMINOPHEN 7.5-325 MG TABLET: Performed by: NURSE PRACTITIONER

## 2018-01-01 PROCEDURE — 80048 BASIC METABOLIC PNL TOTAL CA: CPT | Performed by: NEUROLOGICAL SURGERY

## 2018-01-01 PROCEDURE — 80053 COMPREHEN METABOLIC PANEL: CPT | Performed by: PHYSICIAN ASSISTANT

## 2018-01-01 PROCEDURE — 99214 OFFICE O/P EST MOD 30 MIN: CPT | Performed by: FAMILY MEDICINE

## 2018-01-01 PROCEDURE — 97162 PT EVAL MOD COMPLEX 30 MIN: CPT

## 2018-01-01 PROCEDURE — 84443 ASSAY THYROID STIM HORMONE: CPT | Performed by: INTERNAL MEDICINE

## 2018-01-01 PROCEDURE — 84156 ASSAY OF PROTEIN URINE: CPT | Performed by: INTERNAL MEDICINE

## 2018-01-01 PROCEDURE — 85025 COMPLETE CBC W/AUTO DIFF WBC: CPT | Performed by: NURSE PRACTITIONER

## 2018-01-01 PROCEDURE — 71045 X-RAY EXAM CHEST 1 VIEW: CPT

## 2018-01-01 PROCEDURE — 25010000002 FUROSEMIDE PER 20 MG: Performed by: HOSPITALIST

## 2018-01-01 PROCEDURE — 25010000002 HYDROMORPHONE PER 4 MG: Performed by: INTERNAL MEDICINE

## 2018-01-01 PROCEDURE — 25010000002 PROPOFOL 10 MG/ML EMULSION: Performed by: NURSE ANESTHETIST, CERTIFIED REGISTERED

## 2018-01-01 PROCEDURE — 63710000001 OXYCODONE-ACETAMINOPHEN 7.5-325 MG TABLET: Performed by: PHYSICIAN ASSISTANT

## 2018-01-01 PROCEDURE — 36415 COLL VENOUS BLD VENIPUNCTURE: CPT | Performed by: NURSE PRACTITIONER

## 2018-01-01 PROCEDURE — A9270 NON-COVERED ITEM OR SERVICE: HCPCS | Performed by: NURSE PRACTITIONER

## 2018-01-01 PROCEDURE — 84443 ASSAY THYROID STIM HORMONE: CPT

## 2018-01-01 PROCEDURE — 63710000001 METOPROLOL SUCCINATE XL 50 MG TABLET SUSTAINED-RELEASE 24 HOUR: Performed by: NEUROLOGICAL SURGERY

## 2018-01-01 PROCEDURE — 85025 COMPLETE CBC W/AUTO DIFF WBC: CPT

## 2018-01-01 PROCEDURE — 84145 PROCALCITONIN (PCT): CPT | Performed by: EMERGENCY MEDICINE

## 2018-01-01 PROCEDURE — 76705 ECHO EXAM OF ABDOMEN: CPT

## 2018-01-01 PROCEDURE — 72146 MRI CHEST SPINE W/O DYE: CPT

## 2018-01-01 PROCEDURE — 99231 SBSQ HOSP IP/OBS SF/LOW 25: CPT | Performed by: INTERNAL MEDICINE

## 2018-01-01 PROCEDURE — 84443 ASSAY THYROID STIM HORMONE: CPT | Performed by: EMERGENCY MEDICINE

## 2018-01-01 PROCEDURE — 93000 ELECTROCARDIOGRAM COMPLETE: CPT | Performed by: PHYSICIAN ASSISTANT

## 2018-01-01 PROCEDURE — 84100 ASSAY OF PHOSPHORUS: CPT | Performed by: HOSPITALIST

## 2018-01-01 PROCEDURE — 73502 X-RAY EXAM HIP UNI 2-3 VIEWS: CPT

## 2018-01-01 PROCEDURE — 63710000001 INSULIN DETEMER PER 5 UNITS: Performed by: INTERNAL MEDICINE

## 2018-01-01 PROCEDURE — 25010000002 FENTANYL CITRATE (PF) 100 MCG/2ML SOLUTION: Performed by: NURSE ANESTHETIST, CERTIFIED REGISTERED

## 2018-01-01 PROCEDURE — 80053 COMPREHEN METABOLIC PANEL: CPT | Performed by: INTERNAL MEDICINE

## 2018-01-01 PROCEDURE — 83036 HEMOGLOBIN GLYCOSYLATED A1C: CPT | Performed by: INTERNAL MEDICINE

## 2018-01-01 PROCEDURE — 88307 TISSUE EXAM BY PATHOLOGIST: CPT | Performed by: NEUROLOGICAL SURGERY

## 2018-01-01 PROCEDURE — 93005 ELECTROCARDIOGRAM TRACING: CPT | Performed by: EMERGENCY MEDICINE

## 2018-01-01 PROCEDURE — 85007 BL SMEAR W/DIFF WBC COUNT: CPT | Performed by: EMERGENCY MEDICINE

## 2018-01-01 PROCEDURE — 74177 CT ABD & PELVIS W/CONTRAST: CPT

## 2018-01-01 PROCEDURE — 25010000002 MIDAZOLAM PER 1 MG: Performed by: ANESTHESIOLOGY

## 2018-01-01 PROCEDURE — 93005 ELECTROCARDIOGRAM TRACING: CPT | Performed by: PHYSICIAN ASSISTANT

## 2018-01-01 PROCEDURE — 85007 BL SMEAR W/DIFF WBC COUNT: CPT | Performed by: PHYSICIAN ASSISTANT

## 2018-01-01 PROCEDURE — 82570 ASSAY OF URINE CREATININE: CPT | Performed by: INTERNAL MEDICINE

## 2018-01-01 PROCEDURE — 25010000002 VANCOMYCIN PER 500 MG: Performed by: NEUROLOGICAL SURGERY

## 2018-01-01 PROCEDURE — 80048 BASIC METABOLIC PNL TOTAL CA: CPT | Performed by: PHYSICIAN ASSISTANT

## 2018-01-01 PROCEDURE — 81001 URINALYSIS AUTO W/SCOPE: CPT | Performed by: EMERGENCY MEDICINE

## 2018-01-01 PROCEDURE — 85018 HEMOGLOBIN: CPT | Performed by: HOSPITALIST

## 2018-01-01 PROCEDURE — 36415 COLL VENOUS BLD VENIPUNCTURE: CPT | Performed by: INTERNAL MEDICINE

## 2018-01-01 PROCEDURE — 86361 T CELL ABSOLUTE COUNT: CPT

## 2018-01-01 PROCEDURE — G8978 MOBILITY CURRENT STATUS: HCPCS

## 2018-01-01 PROCEDURE — P9016 RBC LEUKOCYTES REDUCED: HCPCS

## 2018-01-01 PROCEDURE — C1713 ANCHOR/SCREW BN/BN,TIS/BN: HCPCS | Performed by: ORTHOPAEDIC SURGERY

## 2018-01-01 PROCEDURE — 25010000002 IRON SUCROSE PER 1 MG: Performed by: INTERNAL MEDICINE

## 2018-01-01 PROCEDURE — 72100 X-RAY EXAM L-S SPINE 2/3 VWS: CPT

## 2018-01-01 PROCEDURE — 93306 TTE W/DOPPLER COMPLETE: CPT | Performed by: INTERNAL MEDICINE

## 2018-01-01 PROCEDURE — 74176 CT ABD & PELVIS W/O CONTRAST: CPT

## 2018-01-01 PROCEDURE — 63710000001 RIFAXIMIN 550 MG TABLET: Performed by: NEUROLOGICAL SURGERY

## 2018-01-01 PROCEDURE — 86803 HEPATITIS C AB TEST: CPT | Performed by: INTERNAL MEDICINE

## 2018-01-01 PROCEDURE — 80061 LIPID PANEL: CPT | Performed by: INTERNAL MEDICINE

## 2018-01-01 PROCEDURE — 87536 HIV-1 QUANT&REVRSE TRNSCRPJ: CPT | Performed by: INTERNAL MEDICINE

## 2018-01-01 PROCEDURE — 63710000001 METOPROLOL SUCCINATE XL 50 MG TABLET SUSTAINED-RELEASE 24 HOUR: Performed by: INTERNAL MEDICINE

## 2018-01-01 PROCEDURE — 25010000002 DIPHENHYDRAMINE PER 50 MG: Performed by: NEUROLOGICAL SURGERY

## 2018-01-01 PROCEDURE — 80061 LIPID PANEL: CPT | Performed by: HOSPITALIST

## 2018-01-01 PROCEDURE — 85055 RETICULATED PLATELET ASSAY: CPT | Performed by: INTERNAL MEDICINE

## 2018-01-01 PROCEDURE — 84550 ASSAY OF BLOOD/URIC ACID: CPT | Performed by: INTERNAL MEDICINE

## 2018-01-01 PROCEDURE — 99214 OFFICE O/P EST MOD 30 MIN: CPT | Performed by: PHYSICIAN ASSISTANT

## 2018-01-01 PROCEDURE — 86850 RBC ANTIBODY SCREEN: CPT | Performed by: EMERGENCY MEDICINE

## 2018-01-01 PROCEDURE — 71046 X-RAY EXAM CHEST 2 VIEWS: CPT

## 2018-01-01 PROCEDURE — 86140 C-REACTIVE PROTEIN: CPT | Performed by: EMERGENCY MEDICINE

## 2018-01-01 PROCEDURE — 94640 AIRWAY INHALATION TREATMENT: CPT

## 2018-01-01 PROCEDURE — 73030 X-RAY EXAM OF SHOULDER: CPT | Performed by: FAMILY MEDICINE

## 2018-01-01 PROCEDURE — 86900 BLOOD TYPING SEROLOGIC ABO: CPT

## 2018-01-01 PROCEDURE — 84100 ASSAY OF PHOSPHORUS: CPT | Performed by: INTERNAL MEDICINE

## 2018-01-01 PROCEDURE — 97530 THERAPEUTIC ACTIVITIES: CPT | Performed by: PHYSICAL THERAPIST

## 2018-01-01 PROCEDURE — 86334 IMMUNOFIX E-PHORESIS SERUM: CPT | Performed by: INTERNAL MEDICINE

## 2018-01-01 PROCEDURE — 63710000001 FLUCONAZOLE 100 MG TABLET: Performed by: NEUROLOGICAL SURGERY

## 2018-01-01 PROCEDURE — 80061 LIPID PANEL: CPT

## 2018-01-01 PROCEDURE — 84484 ASSAY OF TROPONIN QUANT: CPT | Performed by: PHYSICIAN ASSISTANT

## 2018-01-01 PROCEDURE — 97161 PT EVAL LOW COMPLEX 20 MIN: CPT

## 2018-01-01 PROCEDURE — 84165 PROTEIN E-PHORESIS SERUM: CPT | Performed by: INTERNAL MEDICINE

## 2018-01-01 PROCEDURE — C1769 GUIDE WIRE: HCPCS | Performed by: ORTHOPAEDIC SURGERY

## 2018-01-01 PROCEDURE — 99214 OFFICE O/P EST MOD 30 MIN: CPT | Performed by: NURSE PRACTITIONER

## 2018-01-01 PROCEDURE — 99221 1ST HOSP IP/OBS SF/LOW 40: CPT | Performed by: ORTHOPAEDIC SURGERY

## 2018-01-01 PROCEDURE — 84484 ASSAY OF TROPONIN QUANT: CPT | Performed by: HOSPITALIST

## 2018-01-01 PROCEDURE — 63710000001 INSULIN DETEMER PER 5 UNITS: Performed by: NEUROLOGICAL SURGERY

## 2018-01-01 PROCEDURE — P9037 PLATE PHERES LEUKOREDU IRRAD: HCPCS

## 2018-01-01 PROCEDURE — 25010000002 ONDANSETRON PER 1 MG: Performed by: ANESTHESIOLOGY

## 2018-01-01 PROCEDURE — 96374 THER/PROPH/DIAG INJ IV PUSH: CPT | Performed by: INTERNAL MEDICINE

## 2018-01-01 PROCEDURE — 82607 VITAMIN B-12: CPT | Performed by: INTERNAL MEDICINE

## 2018-01-01 PROCEDURE — 63710000001 OXYCODONE-ACETAMINOPHEN 7.5-325 MG TABLET: Performed by: NEUROLOGICAL SURGERY

## 2018-01-01 PROCEDURE — 81001 URINALYSIS AUTO W/SCOPE: CPT | Performed by: PHYSICIAN ASSISTANT

## 2018-01-01 PROCEDURE — G0432 EIA HIV-1/HIV-2 SCREEN: HCPCS | Performed by: INTERNAL MEDICINE

## 2018-01-01 PROCEDURE — 63710000001 FAMOTIDINE 20 MG TABLET: Performed by: INTERNAL MEDICINE

## 2018-01-01 PROCEDURE — 25010000002 KETOROLAC TROMETHAMINE PER 15 MG: Performed by: ANESTHESIOLOGY

## 2018-01-01 PROCEDURE — 82330 ASSAY OF CALCIUM: CPT

## 2018-01-01 PROCEDURE — 82306 VITAMIN D 25 HYDROXY: CPT | Performed by: INTERNAL MEDICINE

## 2018-01-01 PROCEDURE — 83690 ASSAY OF LIPASE: CPT | Performed by: HOSPITALIST

## 2018-01-01 PROCEDURE — 63710000001 DIPHENHYDRAMINE PER 50 MG: Performed by: INTERNAL MEDICINE

## 2018-01-01 PROCEDURE — 83690 ASSAY OF LIPASE: CPT | Performed by: EMERGENCY MEDICINE

## 2018-01-01 PROCEDURE — G0103 PSA SCREENING: HCPCS

## 2018-01-01 PROCEDURE — 84540 ASSAY OF URINE/UREA-N: CPT | Performed by: INTERNAL MEDICINE

## 2018-01-01 PROCEDURE — 86901 BLOOD TYPING SEROLOGIC RH(D): CPT | Performed by: PHYSICIAN ASSISTANT

## 2018-01-01 PROCEDURE — 83735 ASSAY OF MAGNESIUM: CPT | Performed by: HOSPITALIST

## 2018-01-01 PROCEDURE — 97162 PT EVAL MOD COMPLEX 30 MIN: CPT | Performed by: PHYSICAL THERAPIST

## 2018-01-01 PROCEDURE — 25010000002 HYDROMORPHONE PER 4 MG: Performed by: HOSPITALIST

## 2018-01-01 PROCEDURE — 82105 ALPHA-FETOPROTEIN SERUM: CPT | Performed by: INTERNAL MEDICINE

## 2018-01-01 PROCEDURE — 25010000002 HYDROMORPHONE PER 4 MG: Performed by: NURSE ANESTHETIST, CERTIFIED REGISTERED

## 2018-01-01 PROCEDURE — 25010000002 HYDROMORPHONE PER 4 MG: Performed by: NURSE PRACTITIONER

## 2018-01-01 PROCEDURE — 88342 IMHCHEM/IMCYTCHM 1ST ANTB: CPT

## 2018-01-01 PROCEDURE — 83880 ASSAY OF NATRIURETIC PEPTIDE: CPT | Performed by: INTERNAL MEDICINE

## 2018-01-01 PROCEDURE — 96372 THER/PROPH/DIAG INJ SC/IM: CPT

## 2018-01-01 PROCEDURE — 86923 COMPATIBILITY TEST ELECTRIC: CPT

## 2018-01-01 PROCEDURE — 63710000001 LEVOTHYROXINE 150 MCG TABLET: Performed by: INTERNAL MEDICINE

## 2018-01-01 PROCEDURE — 85014 HEMATOCRIT: CPT | Performed by: HOSPITALIST

## 2018-01-01 PROCEDURE — 25010000002 HYDROMORPHONE HCL PF 500 MG/50ML SOLUTION: Performed by: INTERNAL MEDICINE

## 2018-01-01 PROCEDURE — 99231 SBSQ HOSP IP/OBS SF/LOW 25: CPT | Performed by: NURSE PRACTITIONER

## 2018-01-01 PROCEDURE — 77080 DXA BONE DENSITY AXIAL: CPT

## 2018-01-01 PROCEDURE — 82436 ASSAY OF URINE CHLORIDE: CPT | Performed by: INTERNAL MEDICINE

## 2018-01-01 PROCEDURE — 25010000002 HYDROMORPHONE HCL PF 500 MG/50ML SOLUTION: Performed by: NEUROLOGICAL SURGERY

## 2018-01-01 PROCEDURE — 93306 TTE W/DOPPLER COMPLETE: CPT

## 2018-01-01 PROCEDURE — 25010000002 NEOSTIGMINE 0.5 MG/ML SOLUTION: Performed by: ANESTHESIOLOGY

## 2018-01-01 PROCEDURE — G0463 HOSPITAL OUTPT CLINIC VISIT: HCPCS | Performed by: ORTHOPAEDIC SURGERY

## 2018-01-01 PROCEDURE — 84300 ASSAY OF URINE SODIUM: CPT | Performed by: INTERNAL MEDICINE

## 2018-01-01 PROCEDURE — 85027 COMPLETE CBC AUTOMATED: CPT | Performed by: NURSE PRACTITIONER

## 2018-01-01 PROCEDURE — 25010000002 DEXAMETHASONE PER 1 MG: Performed by: ANESTHESIOLOGY

## 2018-01-01 PROCEDURE — 87521 HEPATITIS C PROBE&RVRS TRNSC: CPT | Performed by: INTERNAL MEDICINE

## 2018-01-01 PROCEDURE — 85007 BL SMEAR W/DIFF WBC COUNT: CPT | Performed by: NEUROLOGICAL SURGERY

## 2018-01-01 PROCEDURE — 25010000002 PHENYLEPHRINE PER 1 ML: Performed by: ANESTHESIOLOGY

## 2018-01-01 PROCEDURE — 76700 US EXAM ABDOM COMPLETE: CPT

## 2018-01-01 PROCEDURE — 25010000002 ONDANSETRON PER 1 MG: Performed by: PHYSICIAN ASSISTANT

## 2018-01-01 PROCEDURE — 93005 ELECTROCARDIOGRAM TRACING: CPT | Performed by: HOSPITALIST

## 2018-01-01 PROCEDURE — 85025 COMPLETE CBC W/AUTO DIFF WBC: CPT | Performed by: EMERGENCY MEDICINE

## 2018-01-01 PROCEDURE — 99222 1ST HOSP IP/OBS MODERATE 55: CPT | Performed by: PHYSICIAN ASSISTANT

## 2018-01-01 PROCEDURE — 82746 ASSAY OF FOLIC ACID SERUM: CPT | Performed by: INTERNAL MEDICINE

## 2018-01-01 PROCEDURE — 82784 ASSAY IGA/IGD/IGG/IGM EACH: CPT | Performed by: INTERNAL MEDICINE

## 2018-01-01 PROCEDURE — 87205 SMEAR GRAM STAIN: CPT | Performed by: INTERNAL MEDICINE

## 2018-01-01 PROCEDURE — 0QS706Z REPOSITION LEFT UPPER FEMUR WITH INTRAMEDULLARY INTERNAL FIXATION DEVICE, OPEN APPROACH: ICD-10-PCS | Performed by: ORTHOPAEDIC SURGERY

## 2018-01-01 PROCEDURE — 93970 EXTREMITY STUDY: CPT

## 2018-01-01 PROCEDURE — 83036 HEMOGLOBIN GLYCOSYLATED A1C: CPT

## 2018-01-01 PROCEDURE — 85049 AUTOMATED PLATELET COUNT: CPT | Performed by: ORTHOPAEDIC SURGERY

## 2018-01-01 PROCEDURE — 80053 COMPREHEN METABOLIC PANEL: CPT

## 2018-01-01 PROCEDURE — 88341 IMHCHEM/IMCYTCHM EA ADD ANTB: CPT

## 2018-01-01 PROCEDURE — 63710000001 SULFAMETHOXAZOLE-TRIMETHOPRIM 800-160 MG TABLET: Performed by: INTERNAL MEDICINE

## 2018-01-01 PROCEDURE — 85652 RBC SED RATE AUTOMATED: CPT | Performed by: EMERGENCY MEDICINE

## 2018-01-01 DEVICE — CEMENT C01A KYPHX HV-R BONE CEMENT EN
Type: IMPLANTABLE DEVICE | Site: SPINE LUMBAR | Status: FUNCTIONAL
Brand: KYPHON® HV-R® BONE CEMENT

## 2018-01-01 DEVICE — BONE CEMENT C01B HV-R WITH MIXER  US
Type: IMPLANTABLE DEVICE | Site: SPINE LUMBAR | Status: FUNCTIONAL
Brand: KYPHON® HV-R® BONE CEMENT AND KYPHON® MIXER PACK

## 2018-01-01 DEVICE — ZNN CMN NAIL 10MMX21.5CM 125L
Type: IMPLANTABLE DEVICE | Status: FUNCTIONAL
Brand: ZIMMER® NATURAL NAIL® SYSTEM

## 2018-01-01 DEVICE — ZNN CMN LAG SCREW 10.5X100
Type: IMPLANTABLE DEVICE | Status: FUNCTIONAL
Brand: ZIMMER® NATURAL NAIL® SYSTEM

## 2018-01-01 DEVICE — SCRW CORT FA FUL/THRD HEX3.5 TI 5X32.5MM: Type: IMPLANTABLE DEVICE | Status: FUNCTIONAL

## 2018-01-01 RX ORDER — METOPROLOL SUCCINATE 50 MG/1
50 TABLET, EXTENDED RELEASE ORAL 2 TIMES DAILY
Status: DISCONTINUED | OUTPATIENT
Start: 2018-01-01 | End: 2018-01-01 | Stop reason: HOSPADM

## 2018-01-01 RX ORDER — DIPHENHYDRAMINE HYDROCHLORIDE 50 MG/ML
25 INJECTION INTRAMUSCULAR; INTRAVENOUS EVERY 6 HOURS PRN
Status: DISCONTINUED | OUTPATIENT
Start: 2018-01-01 | End: 2018-01-01 | Stop reason: HOSPADM

## 2018-01-01 RX ORDER — PROMETHAZINE HYDROCHLORIDE 12.5 MG/1
12.5 SUPPOSITORY RECTAL EVERY 4 HOURS PRN
Status: DISCONTINUED | OUTPATIENT
Start: 2018-01-01 | End: 2018-01-01 | Stop reason: HOSPADM

## 2018-01-01 RX ORDER — PROMETHAZINE HYDROCHLORIDE 25 MG/ML
12.5 INJECTION, SOLUTION INTRAMUSCULAR; INTRAVENOUS ONCE AS NEEDED
Status: DISCONTINUED | OUTPATIENT
Start: 2018-01-01 | End: 2018-01-01 | Stop reason: HOSPADM

## 2018-01-01 RX ORDER — LORAZEPAM 2 MG/ML
2 INJECTION INTRAMUSCULAR
Status: DISCONTINUED | OUTPATIENT
Start: 2018-01-01 | End: 2018-01-01 | Stop reason: HOSPADM

## 2018-01-01 RX ORDER — VANCOMYCIN HYDROCHLORIDE 500 MG/10ML
INJECTION, POWDER, LYOPHILIZED, FOR SOLUTION INTRAVENOUS AS NEEDED
Status: DISCONTINUED | OUTPATIENT
Start: 2018-01-01 | End: 2018-01-01 | Stop reason: HOSPADM

## 2018-01-01 RX ORDER — FUROSEMIDE 10 MG/ML
80 INJECTION INTRAMUSCULAR; INTRAVENOUS ONCE
Status: COMPLETED | OUTPATIENT
Start: 2018-01-01 | End: 2018-01-01

## 2018-01-01 RX ORDER — SODIUM CHLORIDE, SODIUM LACTATE, POTASSIUM CHLORIDE, CALCIUM CHLORIDE 600; 310; 30; 20 MG/100ML; MG/100ML; MG/100ML; MG/100ML
100 INJECTION, SOLUTION INTRAVENOUS CONTINUOUS
Status: DISCONTINUED | OUTPATIENT
Start: 2018-01-01 | End: 2018-01-01

## 2018-01-01 RX ORDER — HYDROMORPHONE HCL 110MG/55ML
1.5 PATIENT CONTROLLED ANALGESIA SYRINGE INTRAVENOUS
Status: DISCONTINUED | OUTPATIENT
Start: 2018-01-01 | End: 2018-01-01

## 2018-01-01 RX ORDER — ONDANSETRON 4 MG/1
4 TABLET, ORALLY DISINTEGRATING ORAL EVERY 6 HOURS PRN
Status: DISCONTINUED | OUTPATIENT
Start: 2018-01-01 | End: 2018-01-01 | Stop reason: HOSPADM

## 2018-01-01 RX ORDER — PROMETHAZINE HYDROCHLORIDE 25 MG/1
12.5 TABLET ORAL EVERY 4 HOURS PRN
Status: CANCELLED | OUTPATIENT
Start: 2018-01-01

## 2018-01-01 RX ORDER — FUROSEMIDE 10 MG/ML
20 INJECTION INTRAMUSCULAR; INTRAVENOUS ONCE
Status: COMPLETED | OUTPATIENT
Start: 2018-01-01 | End: 2018-01-01

## 2018-01-01 RX ORDER — LORAZEPAM 0.5 MG/1
0.5 TABLET ORAL EVERY 6 HOURS PRN
Status: DISCONTINUED | OUTPATIENT
Start: 2018-01-01 | End: 2018-01-01 | Stop reason: HOSPADM

## 2018-01-01 RX ORDER — PANTOPRAZOLE SODIUM 40 MG/1
40 TABLET, DELAYED RELEASE ORAL
Status: DISCONTINUED | OUTPATIENT
Start: 2018-01-01 | End: 2018-01-01 | Stop reason: HOSPADM

## 2018-01-01 RX ORDER — MORPHINE SULFATE 15 MG/1
15 TABLET ORAL EVERY 4 HOURS PRN
Status: DISCONTINUED | OUTPATIENT
Start: 2018-01-01 | End: 2018-01-01 | Stop reason: HOSPADM

## 2018-01-01 RX ORDER — LEVOTHYROXINE SODIUM 0.15 MG/1
300 TABLET ORAL
Status: DISCONTINUED | OUTPATIENT
Start: 2018-01-01 | End: 2018-01-01 | Stop reason: HOSPADM

## 2018-01-01 RX ORDER — DIPHENHYDRAMINE HYDROCHLORIDE 50 MG/ML
12.5 INJECTION INTRAMUSCULAR; INTRAVENOUS
Status: DISCONTINUED | OUTPATIENT
Start: 2018-01-01 | End: 2018-01-01 | Stop reason: HOSPADM

## 2018-01-01 RX ORDER — ICOSAPENT ETHYL 1000 MG/1
1 CAPSULE ORAL 2 TIMES DAILY
Status: DISCONTINUED | OUTPATIENT
Start: 2018-01-01 | End: 2018-01-01 | Stop reason: HOSPADM

## 2018-01-01 RX ORDER — DIPHENHYDRAMINE HCL 25 MG
25 CAPSULE ORAL ONCE
Status: COMPLETED | OUTPATIENT
Start: 2018-01-01 | End: 2018-01-01

## 2018-01-01 RX ORDER — LORAZEPAM 2 MG/ML
0.5 INJECTION INTRAMUSCULAR
Status: DISCONTINUED | OUTPATIENT
Start: 2018-01-01 | End: 2018-01-01 | Stop reason: HOSPADM

## 2018-01-01 RX ORDER — ALUMINA, MAGNESIA, AND SIMETHICONE 2400; 2400; 240 MG/30ML; MG/30ML; MG/30ML
15 SUSPENSION ORAL EVERY 6 HOURS PRN
Status: DISCONTINUED | OUTPATIENT
Start: 2018-01-01 | End: 2018-01-01 | Stop reason: HOSPADM

## 2018-01-01 RX ORDER — HYDRALAZINE HYDROCHLORIDE 20 MG/ML
5 INJECTION INTRAMUSCULAR; INTRAVENOUS
Status: DISCONTINUED | OUTPATIENT
Start: 2018-01-01 | End: 2018-01-01 | Stop reason: HOSPADM

## 2018-01-01 RX ORDER — MORPHINE SULFATE 20 MG/ML
10 SOLUTION ORAL
Status: DISCONTINUED | OUTPATIENT
Start: 2018-01-01 | End: 2018-01-01 | Stop reason: HOSPADM

## 2018-01-01 RX ORDER — ONDANSETRON 4 MG/1
4 TABLET, FILM COATED ORAL EVERY 6 HOURS PRN
Status: DISCONTINUED | OUTPATIENT
Start: 2018-01-01 | End: 2018-01-01 | Stop reason: HOSPADM

## 2018-01-01 RX ORDER — SPIRONOLACTONE 100 MG/1
100 TABLET, FILM COATED ORAL DAILY
Status: DISCONTINUED | OUTPATIENT
Start: 2018-01-01 | End: 2018-01-01 | Stop reason: HOSPADM

## 2018-01-01 RX ORDER — GLYCOPYRROLATE 0.2 MG/ML
0.4 INJECTION INTRAMUSCULAR; INTRAVENOUS
Status: CANCELLED | OUTPATIENT
Start: 2018-01-01

## 2018-01-01 RX ORDER — LEVOTHYROXINE SODIUM 300 UG/1
300 TABLET ORAL EVERY MORNING
Status: ON HOLD | COMMUNITY
End: 2018-01-01

## 2018-01-01 RX ORDER — OXYCODONE AND ACETAMINOPHEN 7.5; 325 MG/1; MG/1
1 TABLET ORAL EVERY 4 HOURS PRN
Qty: 40 TABLET | Refills: 0 | COMMUNITY
Start: 2018-01-01 | End: 2018-01-01 | Stop reason: SDUPTHER

## 2018-01-01 RX ORDER — ROCURONIUM BROMIDE 10 MG/ML
INJECTION, SOLUTION INTRAVENOUS AS NEEDED
Status: DISCONTINUED | OUTPATIENT
Start: 2018-01-01 | End: 2018-01-01 | Stop reason: SURG

## 2018-01-01 RX ORDER — ONDANSETRON 2 MG/ML
4 INJECTION INTRAMUSCULAR; INTRAVENOUS EVERY 4 HOURS PRN
Status: DISCONTINUED | OUTPATIENT
Start: 2018-01-01 | End: 2018-01-01

## 2018-01-01 RX ORDER — PROMETHAZINE HYDROCHLORIDE 25 MG/ML
6.25 INJECTION, SOLUTION INTRAMUSCULAR; INTRAVENOUS EVERY 4 HOURS PRN
Status: CANCELLED | OUTPATIENT
Start: 2018-01-01

## 2018-01-01 RX ORDER — ZINC SULFATE 50(220)MG
220 CAPSULE ORAL DAILY
Qty: 30 CAPSULE | Refills: 11 | Status: SHIPPED | OUTPATIENT
Start: 2018-01-01

## 2018-01-01 RX ORDER — PROMETHAZINE HYDROCHLORIDE 25 MG/1
6.25 TABLET ORAL EVERY 4 HOURS PRN
Status: DISCONTINUED | OUTPATIENT
Start: 2018-01-01 | End: 2018-01-01 | Stop reason: HOSPADM

## 2018-01-01 RX ORDER — NICOTINE POLACRILEX 4 MG
15 LOZENGE BUCCAL
Status: DISCONTINUED | OUTPATIENT
Start: 2018-01-01 | End: 2018-01-01

## 2018-01-01 RX ORDER — PROMETHAZINE HYDROCHLORIDE 25 MG/1
6.25 TABLET ORAL EVERY 4 HOURS PRN
Status: CANCELLED | OUTPATIENT
Start: 2018-01-01

## 2018-01-01 RX ORDER — PROPOFOL 10 MG/ML
VIAL (ML) INTRAVENOUS AS NEEDED
Status: DISCONTINUED | OUTPATIENT
Start: 2018-01-01 | End: 2018-01-01 | Stop reason: SURG

## 2018-01-01 RX ORDER — ACETAMINOPHEN 325 MG/1
325 TABLET ORAL EVERY 4 HOURS PRN
Status: DISCONTINUED | OUTPATIENT
Start: 2018-01-01 | End: 2018-01-01

## 2018-01-01 RX ORDER — MORPHINE SULFATE 2 MG/ML
6 INJECTION, SOLUTION INTRAMUSCULAR; INTRAVENOUS
Status: DISCONTINUED | OUTPATIENT
Start: 2018-01-01 | End: 2018-01-01 | Stop reason: HOSPADM

## 2018-01-01 RX ORDER — TAMSULOSIN HYDROCHLORIDE 0.4 MG/1
0.4 CAPSULE ORAL DAILY
Status: DISCONTINUED | OUTPATIENT
Start: 2018-01-01 | End: 2018-01-01 | Stop reason: HOSPADM

## 2018-01-01 RX ORDER — OXYCODONE AND ACETAMINOPHEN 7.5; 325 MG/1; MG/1
1 TABLET ORAL EVERY 6 HOURS PRN
Status: DISCONTINUED | OUTPATIENT
Start: 2018-01-01 | End: 2018-01-01

## 2018-01-01 RX ORDER — DEXTROSE MONOHYDRATE 25 G/50ML
25 INJECTION, SOLUTION INTRAVENOUS
Status: DISCONTINUED | OUTPATIENT
Start: 2018-01-01 | End: 2018-01-01

## 2018-01-01 RX ORDER — HYDROCODONE BITARTRATE AND ACETAMINOPHEN 7.5; 325 MG/1; MG/1
1 TABLET ORAL ONCE AS NEEDED
Status: DISCONTINUED | OUTPATIENT
Start: 2018-01-01 | End: 2018-01-01 | Stop reason: HOSPADM

## 2018-01-01 RX ORDER — EPHEDRINE SULFATE 50 MG/ML
5 INJECTION, SOLUTION INTRAVENOUS ONCE AS NEEDED
Status: DISCONTINUED | OUTPATIENT
Start: 2018-01-01 | End: 2018-01-01 | Stop reason: HOSPADM

## 2018-01-01 RX ORDER — MAGNESIUM HYDROXIDE 1200 MG/15ML
LIQUID ORAL AS NEEDED
Status: DISCONTINUED | OUTPATIENT
Start: 2018-01-01 | End: 2018-01-01 | Stop reason: HOSPADM

## 2018-01-01 RX ORDER — HYDROMORPHONE HCL 110MG/55ML
2 PATIENT CONTROLLED ANALGESIA SYRINGE INTRAVENOUS ONCE
Status: COMPLETED | OUTPATIENT
Start: 2018-01-01 | End: 2018-01-01

## 2018-01-01 RX ORDER — METOPROLOL SUCCINATE 50 MG/1
50 TABLET, EXTENDED RELEASE ORAL 2 TIMES DAILY
Qty: 180 TABLET | Refills: 3 | Status: SHIPPED | OUTPATIENT
Start: 2018-01-01 | End: 2018-01-01 | Stop reason: SDUPTHER

## 2018-01-01 RX ORDER — NYSTATIN 100000 [USP'U]/G
POWDER TOPICAL 4 TIMES DAILY
Qty: 1 EACH | Refills: 5 | Status: SHIPPED | OUTPATIENT
Start: 2018-01-01

## 2018-01-01 RX ORDER — MIDAZOLAM HYDROCHLORIDE 1 MG/ML
1 INJECTION INTRAMUSCULAR; INTRAVENOUS
Status: DISCONTINUED | OUTPATIENT
Start: 2018-01-01 | End: 2018-01-01 | Stop reason: HOSPADM

## 2018-01-01 RX ORDER — POTASSIUM CHLORIDE 1.5 G/1.77G
40 POWDER, FOR SOLUTION ORAL AS NEEDED
Status: DISCONTINUED | OUTPATIENT
Start: 2018-01-01 | End: 2018-01-01

## 2018-01-01 RX ORDER — DOCUSATE SODIUM 100 MG/1
100 CAPSULE, LIQUID FILLED ORAL 2 TIMES DAILY PRN
Status: DISCONTINUED | OUTPATIENT
Start: 2018-01-01 | End: 2018-01-01

## 2018-01-01 RX ORDER — HYDROMORPHONE HCL 110MG/55ML
1.5 PATIENT CONTROLLED ANALGESIA SYRINGE INTRAVENOUS
Status: CANCELLED | OUTPATIENT
Start: 2018-01-01 | End: 2018-10-26

## 2018-01-01 RX ORDER — SODIUM CHLORIDE 9 MG/ML
250 INJECTION, SOLUTION INTRAVENOUS ONCE
Status: CANCELLED | OUTPATIENT
Start: 2018-01-01

## 2018-01-01 RX ORDER — MORPHINE SULFATE 20 MG/ML
20 SOLUTION ORAL
Status: DISCONTINUED | OUTPATIENT
Start: 2018-01-01 | End: 2018-01-01 | Stop reason: HOSPADM

## 2018-01-01 RX ORDER — ONDANSETRON 2 MG/ML
4 INJECTION INTRAMUSCULAR; INTRAVENOUS EVERY 6 HOURS PRN
Status: DISCONTINUED | OUTPATIENT
Start: 2018-01-01 | End: 2018-01-01

## 2018-01-01 RX ORDER — DIPHENOXYLATE HYDROCHLORIDE AND ATROPINE SULFATE 2.5; .025 MG/1; MG/1
1 TABLET ORAL
Status: DISCONTINUED | OUTPATIENT
Start: 2018-01-01 | End: 2018-01-01

## 2018-01-01 RX ORDER — METOPROLOL SUCCINATE 50 MG/1
50 TABLET, EXTENDED RELEASE ORAL 2 TIMES DAILY
Qty: 60 TABLET | Refills: 11 | Status: SHIPPED | OUTPATIENT
Start: 2018-01-01 | End: 2018-01-01 | Stop reason: SDUPTHER

## 2018-01-01 RX ORDER — GLYCOPYRROLATE 0.2 MG/ML
INJECTION INTRAMUSCULAR; INTRAVENOUS AS NEEDED
Status: DISCONTINUED | OUTPATIENT
Start: 2018-01-01 | End: 2018-01-01 | Stop reason: SURG

## 2018-01-01 RX ORDER — OXYCODONE HYDROCHLORIDE 5 MG/1
10 TABLET ORAL EVERY 4 HOURS PRN
Status: DISCONTINUED | OUTPATIENT
Start: 2018-01-01 | End: 2018-01-01

## 2018-01-01 RX ORDER — FLUCONAZOLE 100 MG/1
100 TABLET ORAL EVERY OTHER DAY
Status: DISCONTINUED | OUTPATIENT
Start: 2018-01-01 | End: 2018-01-01 | Stop reason: HOSPADM

## 2018-01-01 RX ORDER — PROMETHAZINE HYDROCHLORIDE 25 MG/1
25 SUPPOSITORY RECTAL ONCE AS NEEDED
Status: DISCONTINUED | OUTPATIENT
Start: 2018-01-01 | End: 2018-01-01 | Stop reason: HOSPADM

## 2018-01-01 RX ORDER — FENTANYL CITRATE 50 UG/ML
50 INJECTION, SOLUTION INTRAMUSCULAR; INTRAVENOUS
Status: DISCONTINUED | OUTPATIENT
Start: 2018-01-01 | End: 2018-01-01 | Stop reason: HOSPADM

## 2018-01-01 RX ORDER — POTASSIUM CHLORIDE 750 MG/1
40 CAPSULE, EXTENDED RELEASE ORAL AS NEEDED
Status: DISCONTINUED | OUTPATIENT
Start: 2018-01-01 | End: 2018-01-01

## 2018-01-01 RX ORDER — FUROSEMIDE 40 MG/1
40 TABLET ORAL DAILY
Status: DISCONTINUED | OUTPATIENT
Start: 2018-01-01 | End: 2018-01-01 | Stop reason: HOSPADM

## 2018-01-01 RX ORDER — ACETAMINOPHEN 160 MG/5ML
650 SOLUTION ORAL EVERY 4 HOURS PRN
Status: DISCONTINUED | OUTPATIENT
Start: 2018-01-01 | End: 2018-01-01 | Stop reason: HOSPADM

## 2018-01-01 RX ORDER — SODIUM CHLORIDE 9 MG/ML
250 INJECTION, SOLUTION INTRAVENOUS ONCE
Status: COMPLETED | OUTPATIENT
Start: 2018-01-01 | End: 2018-01-01

## 2018-01-01 RX ORDER — GUAIFENESIN 600 MG/1
1200 TABLET, EXTENDED RELEASE ORAL EVERY 12 HOURS SCHEDULED
Status: DISCONTINUED | OUTPATIENT
Start: 2018-01-01 | End: 2018-01-01 | Stop reason: HOSPADM

## 2018-01-01 RX ORDER — MIDAZOLAM HYDROCHLORIDE 1 MG/ML
2 INJECTION INTRAMUSCULAR; INTRAVENOUS
Status: DISCONTINUED | OUTPATIENT
Start: 2018-01-01 | End: 2018-01-01 | Stop reason: HOSPADM

## 2018-01-01 RX ORDER — HYDROCODONE BITARTRATE AND ACETAMINOPHEN 5; 325 MG/1; MG/1
1 TABLET ORAL EVERY 4 HOURS PRN
Status: DISCONTINUED | OUTPATIENT
Start: 2018-01-01 | End: 2018-01-01

## 2018-01-01 RX ORDER — SODIUM CHLORIDE 0.9 % (FLUSH) 0.9 %
1-10 SYRINGE (ML) INJECTION AS NEEDED
Status: DISCONTINUED | OUTPATIENT
Start: 2018-01-01 | End: 2018-01-01 | Stop reason: HOSPADM

## 2018-01-01 RX ORDER — FAMOTIDINE 40 MG/1
40 TABLET, FILM COATED ORAL DAILY
Status: DISCONTINUED | OUTPATIENT
Start: 2018-01-01 | End: 2018-01-01

## 2018-01-01 RX ORDER — LORAZEPAM 0.5 MG/1
0.5 TABLET ORAL EVERY 6 HOURS PRN
Status: DISCONTINUED | OUTPATIENT
Start: 2018-01-01 | End: 2018-01-01

## 2018-01-01 RX ORDER — DEXTROSE MONOHYDRATE 25 G/50ML
25 INJECTION, SOLUTION INTRAVENOUS
Status: DISCONTINUED | OUTPATIENT
Start: 2018-01-01 | End: 2018-01-01 | Stop reason: HOSPADM

## 2018-01-01 RX ORDER — LACTULOSE 10 G/15ML
20 SOLUTION ORAL 2 TIMES DAILY
Status: DISCONTINUED | OUTPATIENT
Start: 2018-01-01 | End: 2018-01-01

## 2018-01-01 RX ORDER — ACETAMINOPHEN 325 MG/1
650 TABLET ORAL ONCE
Status: DISCONTINUED | OUTPATIENT
Start: 2018-01-01 | End: 2018-01-01 | Stop reason: HOSPADM

## 2018-01-01 RX ORDER — HYDROMORPHONE HYDROCHLORIDE 1 MG/ML
0.5 INJECTION, SOLUTION INTRAMUSCULAR; INTRAVENOUS; SUBCUTANEOUS EVERY 4 HOURS PRN
Status: DISCONTINUED | OUTPATIENT
Start: 2018-01-01 | End: 2018-01-01 | Stop reason: HOSPADM

## 2018-01-01 RX ORDER — ONDANSETRON 2 MG/ML
4 INJECTION INTRAMUSCULAR; INTRAVENOUS ONCE AS NEEDED
Status: DISCONTINUED | OUTPATIENT
Start: 2018-01-01 | End: 2018-01-01 | Stop reason: HOSPADM

## 2018-01-01 RX ORDER — LEVOTHYROXINE SODIUM 0.15 MG/1
300 TABLET ORAL EVERY MORNING
Status: DISCONTINUED | OUTPATIENT
Start: 2018-01-01 | End: 2018-01-01 | Stop reason: HOSPADM

## 2018-01-01 RX ORDER — LORAZEPAM 2 MG/ML
2 CONCENTRATE ORAL
Status: DISCONTINUED | OUTPATIENT
Start: 2018-01-01 | End: 2018-01-01 | Stop reason: HOSPADM

## 2018-01-01 RX ORDER — OXYCODONE AND ACETAMINOPHEN 7.5; 325 MG/1; MG/1
1 TABLET ORAL ONCE AS NEEDED
Status: DISCONTINUED | OUTPATIENT
Start: 2018-01-01 | End: 2018-01-01 | Stop reason: HOSPADM

## 2018-01-01 RX ORDER — HYDROMORPHONE HYDROCHLORIDE 1 MG/ML
0.5 INJECTION, SOLUTION INTRAMUSCULAR; INTRAVENOUS; SUBCUTANEOUS
Status: DISCONTINUED | OUTPATIENT
Start: 2018-01-01 | End: 2018-01-01 | Stop reason: HOSPADM

## 2018-01-01 RX ORDER — ONDANSETRON 2 MG/ML
4 INJECTION INTRAMUSCULAR; INTRAVENOUS EVERY 6 HOURS PRN
Status: DISCONTINUED | OUTPATIENT
Start: 2018-01-01 | End: 2018-01-01 | Stop reason: HOSPADM

## 2018-01-01 RX ORDER — SODIUM CHLORIDE, SODIUM LACTATE, POTASSIUM CHLORIDE, CALCIUM CHLORIDE 600; 310; 30; 20 MG/100ML; MG/100ML; MG/100ML; MG/100ML
9 INJECTION, SOLUTION INTRAVENOUS CONTINUOUS
Status: DISCONTINUED | OUTPATIENT
Start: 2018-01-01 | End: 2018-01-01

## 2018-01-01 RX ORDER — ACETAMINOPHEN 325 MG/1
650 TABLET ORAL ONCE
Status: COMPLETED | OUTPATIENT
Start: 2018-01-01 | End: 2018-01-01

## 2018-01-01 RX ORDER — BISACODYL 10 MG
10 SUPPOSITORY, RECTAL RECTAL DAILY PRN
Status: DISCONTINUED | OUTPATIENT
Start: 2018-01-01 | End: 2018-01-01

## 2018-01-01 RX ORDER — POTASSIUM CHLORIDE 750 MG/1
TABLET, EXTENDED RELEASE ORAL
COMMUNITY
Start: 2018-01-01 | End: 2018-01-01 | Stop reason: SDUPTHER

## 2018-01-01 RX ORDER — METOPROLOL SUCCINATE 50 MG/1
TABLET, EXTENDED RELEASE ORAL
Qty: 180 TABLET | Refills: 10 | Status: SHIPPED | OUTPATIENT
Start: 2018-01-01 | End: 2018-01-01 | Stop reason: SDUPTHER

## 2018-01-01 RX ORDER — EMTRICITABINE AND TENOFOVIR DISOPROXIL FUMARATE 200; 300 MG/1; MG/1
1 TABLET, FILM COATED ORAL DAILY
Status: DISCONTINUED | OUTPATIENT
Start: 2018-01-01 | End: 2018-01-01 | Stop reason: HOSPADM

## 2018-01-01 RX ORDER — SULFAMETHOXAZOLE AND TRIMETHOPRIM 800; 160 MG/1; MG/1
1 TABLET ORAL EVERY OTHER DAY
Status: DISCONTINUED | OUTPATIENT
Start: 2018-01-01 | End: 2018-01-01 | Stop reason: HOSPADM

## 2018-01-01 RX ORDER — ACETAMINOPHEN 325 MG/1
650 TABLET ORAL EVERY 4 HOURS PRN
Status: DISCONTINUED | OUTPATIENT
Start: 2018-01-01 | End: 2018-01-01 | Stop reason: HOSPADM

## 2018-01-01 RX ORDER — BUPIVACAINE HYDROCHLORIDE AND EPINEPHRINE 2.5; 5 MG/ML; UG/ML
INJECTION, SOLUTION INFILTRATION; PERINEURAL AS NEEDED
Status: DISCONTINUED | OUTPATIENT
Start: 2018-01-01 | End: 2018-01-01 | Stop reason: HOSPADM

## 2018-01-01 RX ORDER — OXYCODONE HYDROCHLORIDE 5 MG/1
5 TABLET ORAL EVERY 4 HOURS PRN
Status: DISCONTINUED | OUTPATIENT
Start: 2018-01-01 | End: 2018-01-01 | Stop reason: HOSPADM

## 2018-01-01 RX ORDER — FAMOTIDINE 10 MG/ML
20 INJECTION, SOLUTION INTRAVENOUS ONCE
Status: DISCONTINUED | OUTPATIENT
Start: 2018-01-01 | End: 2018-01-01 | Stop reason: HOSPADM

## 2018-01-01 RX ORDER — TENOFOVIR DISOPROXIL FUMARATE 300 MG/1
300 TABLET, FILM COATED ORAL DAILY
COMMUNITY

## 2018-01-01 RX ORDER — OXYCODONE HCL 10 MG/1
10 TABLET, FILM COATED, EXTENDED RELEASE ORAL EVERY 12 HOURS SCHEDULED
Status: DISCONTINUED | OUTPATIENT
Start: 2018-01-01 | End: 2018-01-01

## 2018-01-01 RX ORDER — METHYLPREDNISOLONE SODIUM SUCCINATE 125 MG/2ML
INJECTION, POWDER, LYOPHILIZED, FOR SOLUTION INTRAMUSCULAR; INTRAVENOUS
Status: COMPLETED
Start: 2018-01-01 | End: 2018-01-01

## 2018-01-01 RX ORDER — LACTULOSE 10 G/15ML
20 SOLUTION ORAL 2 TIMES DAILY
Qty: 1892 ML | Refills: 0 | Status: SHIPPED | OUTPATIENT
Start: 2018-01-01 | End: 2018-01-01 | Stop reason: SDUPTHER

## 2018-01-01 RX ORDER — ONDANSETRON 2 MG/ML
INJECTION INTRAMUSCULAR; INTRAVENOUS AS NEEDED
Status: DISCONTINUED | OUTPATIENT
Start: 2018-01-01 | End: 2018-01-01 | Stop reason: SURG

## 2018-01-01 RX ORDER — GLYCOPYRROLATE 0.2 MG/ML
0.2 INJECTION INTRAMUSCULAR; INTRAVENOUS
Status: CANCELLED | OUTPATIENT
Start: 2018-01-01

## 2018-01-01 RX ORDER — NICOTINE POLACRILEX 4 MG
15 LOZENGE BUCCAL
Status: DISCONTINUED | OUTPATIENT
Start: 2018-01-01 | End: 2018-01-01 | Stop reason: HOSPADM

## 2018-01-01 RX ORDER — LIDOCAINE HYDROCHLORIDE 10 MG/ML
0.5 INJECTION, SOLUTION EPIDURAL; INFILTRATION; INTRACAUDAL; PERINEURAL ONCE AS NEEDED
Status: DISCONTINUED | OUTPATIENT
Start: 2018-01-01 | End: 2018-01-01 | Stop reason: HOSPADM

## 2018-01-01 RX ORDER — FLUCONAZOLE 100 MG/1
100 TABLET ORAL EVERY OTHER DAY
Status: DISCONTINUED | OUTPATIENT
Start: 2018-01-01 | End: 2018-01-01

## 2018-01-01 RX ORDER — DICYCLOMINE HCL 20 MG
20 TABLET ORAL EVERY 6 HOURS
Qty: 12 TABLET | Refills: 0 | Status: SHIPPED | OUTPATIENT
Start: 2018-01-01 | End: 2018-01-01

## 2018-01-01 RX ORDER — IPRATROPIUM BROMIDE AND ALBUTEROL SULFATE 2.5; .5 MG/3ML; MG/3ML
3 SOLUTION RESPIRATORY (INHALATION)
Status: DISCONTINUED | OUTPATIENT
Start: 2018-01-01 | End: 2018-01-01 | Stop reason: HOSPADM

## 2018-01-01 RX ORDER — LORAZEPAM 2 MG/ML
1 CONCENTRATE ORAL
Status: DISCONTINUED | OUTPATIENT
Start: 2018-01-01 | End: 2018-01-01 | Stop reason: HOSPADM

## 2018-01-01 RX ORDER — PHYTONADIONE 2 MG/ML
5 INJECTION, EMULSION INTRAMUSCULAR; INTRAVENOUS; SUBCUTANEOUS ONCE
Status: COMPLETED | OUTPATIENT
Start: 2018-01-01 | End: 2018-01-01

## 2018-01-01 RX ORDER — SPIRONOLACTONE 50 MG/1
50 TABLET, FILM COATED ORAL DAILY
Qty: 30 TABLET | Refills: 5 | Status: SHIPPED | OUTPATIENT
Start: 2018-01-01 | End: 2018-01-01 | Stop reason: SDUPTHER

## 2018-01-01 RX ORDER — MORPHINE SULFATE 2 MG/ML
4 INJECTION, SOLUTION INTRAMUSCULAR; INTRAVENOUS
Status: DISCONTINUED | OUTPATIENT
Start: 2018-01-01 | End: 2018-01-01 | Stop reason: HOSPADM

## 2018-01-01 RX ORDER — TENOFOVIR DISOPROXIL FUMARATE 300 MG/1
300 TABLET, FILM COATED ORAL DAILY
Status: DISCONTINUED | OUTPATIENT
Start: 2018-01-01 | End: 2018-01-01 | Stop reason: HOSPADM

## 2018-01-01 RX ORDER — MELATONIN
1000 DAILY
Status: DISCONTINUED | OUTPATIENT
Start: 2018-01-01 | End: 2018-01-01 | Stop reason: HOSPADM

## 2018-01-01 RX ORDER — SODIUM CHLORIDE, SODIUM LACTATE, POTASSIUM CHLORIDE, CALCIUM CHLORIDE 600; 310; 30; 20 MG/100ML; MG/100ML; MG/100ML; MG/100ML
75 INJECTION, SOLUTION INTRAVENOUS CONTINUOUS
Status: DISCONTINUED | OUTPATIENT
Start: 2018-01-01 | End: 2018-01-01 | Stop reason: HOSPADM

## 2018-01-01 RX ORDER — ACETAMINOPHEN 325 MG/1
650 TABLET ORAL EVERY 4 HOURS PRN
Status: DISCONTINUED | OUTPATIENT
Start: 2018-01-01 | End: 2018-01-01

## 2018-01-01 RX ORDER — FUROSEMIDE 20 MG/1
40 TABLET ORAL DAILY
Qty: 30 TABLET | Refills: 5 | Status: SHIPPED | OUTPATIENT
Start: 2018-01-01 | End: 2018-01-01 | Stop reason: SDUPTHER

## 2018-01-01 RX ORDER — ONDANSETRON 4 MG/1
4 TABLET, FILM COATED ORAL EVERY 6 HOURS PRN
Status: DISCONTINUED | OUTPATIENT
Start: 2018-01-01 | End: 2018-01-01

## 2018-01-01 RX ORDER — FLUCONAZOLE 100 MG/1
100 TABLET ORAL ONCE
Status: DISCONTINUED | OUTPATIENT
Start: 2018-01-01 | End: 2018-01-01

## 2018-01-01 RX ORDER — LABETALOL HYDROCHLORIDE 5 MG/ML
5 INJECTION, SOLUTION INTRAVENOUS
Status: DISCONTINUED | OUTPATIENT
Start: 2018-01-01 | End: 2018-01-01 | Stop reason: HOSPADM

## 2018-01-01 RX ORDER — PROMETHAZINE HYDROCHLORIDE 25 MG/ML
12.5 INJECTION, SOLUTION INTRAMUSCULAR; INTRAVENOUS EVERY 4 HOURS PRN
Status: DISCONTINUED | OUTPATIENT
Start: 2018-01-01 | End: 2018-01-01 | Stop reason: HOSPADM

## 2018-01-01 RX ORDER — LORAZEPAM 2 MG/ML
2 INJECTION INTRAMUSCULAR
Status: CANCELLED | OUTPATIENT
Start: 2018-01-01 | End: 2018-10-26

## 2018-01-01 RX ORDER — NALOXONE HCL 0.4 MG/ML
0.4 VIAL (ML) INJECTION
Status: DISCONTINUED | OUTPATIENT
Start: 2018-01-01 | End: 2018-01-01 | Stop reason: HOSPADM

## 2018-01-01 RX ORDER — MAGNESIUM CARB/ALUMINUM HYDROX 105-160MG
150 TABLET,CHEWABLE ORAL DAILY PRN
Status: DISCONTINUED | OUTPATIENT
Start: 2018-01-01 | End: 2018-01-01 | Stop reason: HOSPADM

## 2018-01-01 RX ORDER — FUROSEMIDE 20 MG/1
20 TABLET ORAL DAILY
Status: DISCONTINUED | OUTPATIENT
Start: 2018-01-01 | End: 2018-01-01

## 2018-01-01 RX ORDER — ACETAMINOPHEN 650 MG/1
650 SUPPOSITORY RECTAL EVERY 4 HOURS PRN
Status: CANCELLED | OUTPATIENT
Start: 2018-01-01

## 2018-01-01 RX ORDER — PANTOPRAZOLE SODIUM 40 MG/1
40 TABLET, DELAYED RELEASE ORAL DAILY
Qty: 30 TABLET | Refills: 0 | Status: SHIPPED | OUTPATIENT
Start: 2018-01-01 | End: 2018-01-01

## 2018-01-01 RX ORDER — DIPHENHYDRAMINE HCL 25 MG
25 CAPSULE ORAL DAILY
Status: COMPLETED | OUTPATIENT
Start: 2018-01-01 | End: 2018-01-01

## 2018-01-01 RX ORDER — SENNA AND DOCUSATE SODIUM 50; 8.6 MG/1; MG/1
2 TABLET, FILM COATED ORAL 2 TIMES DAILY PRN
Status: DISCONTINUED | OUTPATIENT
Start: 2018-01-01 | End: 2018-01-01

## 2018-01-01 RX ORDER — ZINC SULFATE 50(220)MG
220 CAPSULE ORAL DAILY
Qty: 30 CAPSULE | Refills: 0 | Status: SHIPPED | OUTPATIENT
Start: 2018-01-01 | End: 2018-01-01 | Stop reason: SDUPTHER

## 2018-01-01 RX ORDER — DIPHENHYDRAMINE HCL 25 MG
25 CAPSULE ORAL ONCE
Status: DISCONTINUED | OUTPATIENT
Start: 2018-01-01 | End: 2018-01-01 | Stop reason: HOSPADM

## 2018-01-01 RX ORDER — SODIUM CHLORIDE 0.9 % (FLUSH) 0.9 %
10 SYRINGE (ML) INJECTION AS NEEDED
Status: DISCONTINUED | OUTPATIENT
Start: 2018-01-01 | End: 2018-01-01 | Stop reason: HOSPADM

## 2018-01-01 RX ORDER — SENNA AND DOCUSATE SODIUM 50; 8.6 MG/1; MG/1
2 TABLET, FILM COATED ORAL 2 TIMES DAILY
COMMUNITY
End: 2018-01-01

## 2018-01-01 RX ORDER — LORAZEPAM 2 MG/ML
0.5 CONCENTRATE ORAL
Status: DISCONTINUED | OUTPATIENT
Start: 2018-01-01 | End: 2018-01-01 | Stop reason: HOSPADM

## 2018-01-01 RX ORDER — LORAZEPAM 2 MG/ML
1 INJECTION INTRAMUSCULAR
Status: DISCONTINUED | OUTPATIENT
Start: 2018-01-01 | End: 2018-01-01 | Stop reason: HOSPADM

## 2018-01-01 RX ORDER — TAMSULOSIN HYDROCHLORIDE 0.4 MG/1
0.4 CAPSULE ORAL NIGHTLY
Status: DISCONTINUED | OUTPATIENT
Start: 2018-01-01 | End: 2018-01-01 | Stop reason: HOSPADM

## 2018-01-01 RX ORDER — DIPHENHYDRAMINE HCL 25 MG
25 CAPSULE ORAL ONCE
Status: CANCELLED | OUTPATIENT
Start: 2018-01-01

## 2018-01-01 RX ORDER — DIPHENHYDRAMINE HYDROCHLORIDE 50 MG/ML
25 INJECTION INTRAMUSCULAR; INTRAVENOUS ONCE
Status: COMPLETED | OUTPATIENT
Start: 2018-01-01 | End: 2018-01-01

## 2018-01-01 RX ORDER — PROMETHAZINE HYDROCHLORIDE 25 MG/ML
6.25 INJECTION, SOLUTION INTRAMUSCULAR; INTRAVENOUS EVERY 4 HOURS PRN
Status: DISCONTINUED | OUTPATIENT
Start: 2018-01-01 | End: 2018-01-01 | Stop reason: HOSPADM

## 2018-01-01 RX ORDER — MORPHINE SULFATE 10 MG/ML
6 INJECTION INTRAMUSCULAR; INTRAVENOUS; SUBCUTANEOUS
Status: DISCONTINUED | OUTPATIENT
Start: 2018-01-01 | End: 2018-01-01 | Stop reason: HOSPADM

## 2018-01-01 RX ORDER — FLUCONAZOLE 100 MG/1
100 TABLET ORAL EVERY 24 HOURS
Status: DISCONTINUED | OUTPATIENT
Start: 2018-01-01 | End: 2018-01-01

## 2018-01-01 RX ORDER — FAMOTIDINE 10 MG/ML
20 INJECTION, SOLUTION INTRAVENOUS ONCE
Status: COMPLETED | OUTPATIENT
Start: 2018-01-01 | End: 2018-01-01

## 2018-01-01 RX ORDER — SODIUM CHLORIDE 0.9 % (FLUSH) 0.9 %
3-10 SYRINGE (ML) INJECTION AS NEEDED
Status: DISCONTINUED | OUTPATIENT
Start: 2018-01-01 | End: 2018-01-01 | Stop reason: HOSPADM

## 2018-01-01 RX ORDER — SODIUM CHLORIDE, SODIUM LACTATE, POTASSIUM CHLORIDE, CALCIUM CHLORIDE 600; 310; 30; 20 MG/100ML; MG/100ML; MG/100ML; MG/100ML
75 INJECTION, SOLUTION INTRAVENOUS CONTINUOUS
Status: DISCONTINUED | OUTPATIENT
Start: 2018-01-01 | End: 2018-01-01

## 2018-01-01 RX ORDER — SCOLOPAMINE TRANSDERMAL SYSTEM 1 MG/1
1 PATCH, EXTENDED RELEASE TRANSDERMAL
Status: CANCELLED | OUTPATIENT
Start: 2018-01-01

## 2018-01-01 RX ORDER — DIPHENHYDRAMINE HYDROCHLORIDE 50 MG/ML
25 INJECTION INTRAMUSCULAR; INTRAVENOUS ONCE
Status: DISCONTINUED | OUTPATIENT
Start: 2018-01-01 | End: 2018-01-01 | Stop reason: HOSPADM

## 2018-01-01 RX ORDER — CEFAZOLIN SODIUM 2 G/100ML
2 INJECTION, SOLUTION INTRAVENOUS EVERY 8 HOURS
Status: COMPLETED | OUTPATIENT
Start: 2018-01-01 | End: 2018-01-01

## 2018-01-01 RX ORDER — POTASSIUM CHLORIDE 750 MG/1
10 TABLET, FILM COATED, EXTENDED RELEASE ORAL DAILY
Qty: 30 TABLET | Refills: 5 | Status: SHIPPED | OUTPATIENT
Start: 2018-01-01 | End: 2018-01-01 | Stop reason: SDUPTHER

## 2018-01-01 RX ORDER — SULFAMETHOXAZOLE AND TRIMETHOPRIM 800; 160 MG/1; MG/1
1 TABLET ORAL
Status: DISCONTINUED | OUTPATIENT
Start: 2018-01-01 | End: 2018-01-01 | Stop reason: HOSPADM

## 2018-01-01 RX ORDER — NYSTATIN 100000 [USP'U]/G
POWDER TOPICAL EVERY 12 HOURS SCHEDULED
Status: DISCONTINUED | OUTPATIENT
Start: 2018-01-01 | End: 2018-01-01 | Stop reason: HOSPADM

## 2018-01-01 RX ORDER — LACTULOSE 10 G/15ML
20 SOLUTION ORAL 2 TIMES DAILY
Status: DISCONTINUED | OUTPATIENT
Start: 2018-01-01 | End: 2018-01-01 | Stop reason: HOSPADM

## 2018-01-01 RX ORDER — SPIRONOLACTONE 50 MG/1
50 TABLET, FILM COATED ORAL DAILY
COMMUNITY
End: 2018-01-01

## 2018-01-01 RX ORDER — PROCHLORPERAZINE MALEATE 10 MG
10 TABLET ORAL EVERY 6 HOURS PRN
COMMUNITY

## 2018-01-01 RX ORDER — DIPHENHYDRAMINE HCL 25 MG
25 CAPSULE ORAL EVERY 6 HOURS PRN
Status: DISCONTINUED | OUTPATIENT
Start: 2018-01-01 | End: 2018-01-01 | Stop reason: HOSPADM

## 2018-01-01 RX ORDER — OXYCODONE HYDROCHLORIDE 5 MG/1
5 TABLET ORAL EVERY 4 HOURS PRN
Qty: 10 TABLET | Refills: 0 | Status: SHIPPED | OUTPATIENT
Start: 2018-01-01 | End: 2018-01-01

## 2018-01-01 RX ORDER — CLONAZEPAM 0.5 MG/1
0.25 TABLET ORAL AS NEEDED
Status: DISCONTINUED | OUTPATIENT
Start: 2018-01-01 | End: 2018-01-01

## 2018-01-01 RX ORDER — GLYCOPYRROLATE 0.2 MG/ML
0.2 INJECTION INTRAMUSCULAR; INTRAVENOUS
Status: DISCONTINUED | OUTPATIENT
Start: 2018-01-01 | End: 2018-01-01 | Stop reason: HOSPADM

## 2018-01-01 RX ORDER — EMTRICITABINE AND TENOFOVIR DISOPROXIL FUMARATE 200; 300 MG/1; MG/1
1 TABLET, FILM COATED ORAL
Status: DISCONTINUED | OUTPATIENT
Start: 2018-01-01 | End: 2018-01-01 | Stop reason: HOSPADM

## 2018-01-01 RX ORDER — GUAIFENESIN AND DEXTROMETHORPHAN HYDROBROMIDE 600; 30 MG/1; MG/1
2 TABLET, EXTENDED RELEASE ORAL 2 TIMES DAILY PRN
Status: DISCONTINUED | OUTPATIENT
Start: 2018-01-01 | End: 2018-01-01

## 2018-01-01 RX ORDER — FUROSEMIDE 10 MG/ML
40 INJECTION INTRAMUSCULAR; INTRAVENOUS ONCE
Status: COMPLETED | OUTPATIENT
Start: 2018-01-01 | End: 2018-01-01

## 2018-01-01 RX ORDER — METOPROLOL SUCCINATE 50 MG/1
TABLET, EXTENDED RELEASE ORAL
Qty: 180 TABLET | Refills: 11 | Status: SHIPPED | OUTPATIENT
Start: 2018-01-01 | End: 2018-01-01 | Stop reason: SDUPTHER

## 2018-01-01 RX ORDER — HYDROMORPHONE HCL 110MG/55ML
0.5 PATIENT CONTROLLED ANALGESIA SYRINGE INTRAVENOUS
Status: DISCONTINUED | OUTPATIENT
Start: 2018-01-01 | End: 2018-01-01 | Stop reason: HOSPADM

## 2018-01-01 RX ORDER — HYDROMORPHONE HCL 110MG/55ML
2 PATIENT CONTROLLED ANALGESIA SYRINGE INTRAVENOUS
Status: DISCONTINUED | OUTPATIENT
Start: 2018-01-01 | End: 2018-01-01 | Stop reason: HOSPADM

## 2018-01-01 RX ORDER — PROMETHAZINE HYDROCHLORIDE 12.5 MG/1
6.25 SUPPOSITORY RECTAL EVERY 4 HOURS PRN
Status: CANCELLED | OUTPATIENT
Start: 2018-01-01

## 2018-01-01 RX ORDER — GUAIFENESIN AND DEXTROMETHORPHAN HYDROBROMIDE 600; 30 MG/1; MG/1
2 TABLET, EXTENDED RELEASE ORAL 2 TIMES DAILY
Status: DISCONTINUED | OUTPATIENT
Start: 2018-01-01 | End: 2018-01-01 | Stop reason: HOSPADM

## 2018-01-01 RX ORDER — PROMETHAZINE HYDROCHLORIDE 25 MG/1
25 TABLET ORAL ONCE AS NEEDED
Status: DISCONTINUED | OUTPATIENT
Start: 2018-01-01 | End: 2018-01-01 | Stop reason: HOSPADM

## 2018-01-01 RX ORDER — MORPHINE SULFATE 4 MG/ML
1 INJECTION, SOLUTION INTRAMUSCULAR; INTRAVENOUS EVERY 4 HOURS PRN
Status: DISCONTINUED | OUTPATIENT
Start: 2018-01-01 | End: 2018-01-01

## 2018-01-01 RX ORDER — ICOSAPENT ETHYL 1000 MG/1
1 CAPSULE ORAL 2 TIMES DAILY WITH MEALS
Status: DISCONTINUED | OUTPATIENT
Start: 2018-01-01 | End: 2018-01-01

## 2018-01-01 RX ORDER — SODIUM CHLORIDE 9 MG/ML
250 INJECTION, SOLUTION INTRAVENOUS AS NEEDED
Status: CANCELLED | OUTPATIENT
Start: 2018-01-01

## 2018-01-01 RX ORDER — FAMOTIDINE 10 MG/ML
INJECTION, SOLUTION INTRAVENOUS
Status: COMPLETED
Start: 2018-01-01 | End: 2018-01-01

## 2018-01-01 RX ORDER — SODIUM CHLORIDE 9 MG/ML
75 INJECTION, SOLUTION INTRAVENOUS CONTINUOUS
Status: DISCONTINUED | OUTPATIENT
Start: 2018-01-01 | End: 2018-01-01

## 2018-01-01 RX ORDER — TENOFOVIR DISOPROXIL FUMARATE 300 MG/1
300 TABLET, FILM COATED ORAL
Status: DISCONTINUED | OUTPATIENT
Start: 2018-01-01 | End: 2018-01-01 | Stop reason: HOSPADM

## 2018-01-01 RX ORDER — FENTANYL CITRATE 50 UG/ML
INJECTION, SOLUTION INTRAMUSCULAR; INTRAVENOUS
Status: COMPLETED
Start: 2018-01-01 | End: 2018-01-01

## 2018-01-01 RX ORDER — POTASSIUM CHLORIDE 750 MG/1
10 CAPSULE, EXTENDED RELEASE ORAL DAILY
Status: DISCONTINUED | OUTPATIENT
Start: 2018-01-01 | End: 2018-01-01 | Stop reason: HOSPADM

## 2018-01-01 RX ORDER — FENTANYL CITRATE 50 UG/ML
INJECTION, SOLUTION INTRAMUSCULAR; INTRAVENOUS AS NEEDED
Status: DISCONTINUED | OUTPATIENT
Start: 2018-01-01 | End: 2018-01-01 | Stop reason: SURG

## 2018-01-01 RX ORDER — HYDROCODONE BITARTRATE AND ACETAMINOPHEN 7.5; 325 MG/1; MG/1
TABLET ORAL
Refills: 0 | COMMUNITY
Start: 2018-01-01 | End: 2018-01-01

## 2018-01-01 RX ORDER — SENNA AND DOCUSATE SODIUM 50; 8.6 MG/1; MG/1
1 TABLET, FILM COATED ORAL NIGHTLY PRN
Status: DISCONTINUED | OUTPATIENT
Start: 2018-01-01 | End: 2018-01-01 | Stop reason: HOSPADM

## 2018-01-01 RX ORDER — PROMETHAZINE HYDROCHLORIDE 12.5 MG/1
6.25 SUPPOSITORY RECTAL EVERY 4 HOURS PRN
Status: DISCONTINUED | OUTPATIENT
Start: 2018-01-01 | End: 2018-01-01 | Stop reason: HOSPADM

## 2018-01-01 RX ORDER — SPIRONOLACTONE 50 MG/1
50 TABLET, FILM COATED ORAL DAILY
Qty: 30 TABLET | Refills: 0 | Status: SHIPPED | OUTPATIENT
Start: 2018-01-01 | End: 2018-01-01

## 2018-01-01 RX ORDER — SPIRONOLACTONE 50 MG/1
50 TABLET, FILM COATED ORAL DAILY
Status: DISCONTINUED | OUTPATIENT
Start: 2018-01-01 | End: 2018-01-01 | Stop reason: HOSPADM

## 2018-01-01 RX ORDER — CLONAZEPAM 0.5 MG/1
0.25 TABLET ORAL 2 TIMES DAILY PRN
Status: DISCONTINUED | OUTPATIENT
Start: 2018-01-01 | End: 2018-01-01 | Stop reason: HOSPADM

## 2018-01-01 RX ORDER — NALOXONE HCL 0.4 MG/ML
0.2 VIAL (ML) INJECTION AS NEEDED
Status: DISCONTINUED | OUTPATIENT
Start: 2018-01-01 | End: 2018-01-01 | Stop reason: HOSPADM

## 2018-01-01 RX ORDER — MORPHINE SULFATE 10 MG/ML
6 INJECTION INTRAMUSCULAR; INTRAVENOUS; SUBCUTANEOUS
Status: CANCELLED | OUTPATIENT
Start: 2018-01-01 | End: 2018-10-26

## 2018-01-01 RX ORDER — METOPROLOL SUCCINATE 50 MG/1
50 TABLET, EXTENDED RELEASE ORAL EVERY 12 HOURS SCHEDULED
Status: DISCONTINUED | OUTPATIENT
Start: 2018-01-01 | End: 2018-01-01 | Stop reason: HOSPADM

## 2018-01-01 RX ORDER — ZINC SULFATE 50(220)MG
220 CAPSULE ORAL DAILY
Status: DISCONTINUED | OUTPATIENT
Start: 2018-01-01 | End: 2018-01-01 | Stop reason: HOSPADM

## 2018-01-01 RX ORDER — SPIRONOLACTONE 50 MG/1
50 TABLET, FILM COATED ORAL DAILY
Qty: 30 TABLET | Refills: 5 | Status: SHIPPED | OUTPATIENT
Start: 2018-01-01 | End: 2018-01-01

## 2018-01-01 RX ORDER — ACETAMINOPHEN 325 MG/1
650 TABLET ORAL EVERY 4 HOURS PRN
Status: CANCELLED | OUTPATIENT
Start: 2018-01-01

## 2018-01-01 RX ORDER — CEFAZOLIN SODIUM 2 G/100ML
2 INJECTION, SOLUTION INTRAVENOUS ONCE
Status: COMPLETED | OUTPATIENT
Start: 2018-01-01 | End: 2018-01-01

## 2018-01-01 RX ORDER — PROMETHAZINE HYDROCHLORIDE 25 MG/1
12.5 TABLET ORAL ONCE AS NEEDED
Status: DISCONTINUED | OUTPATIENT
Start: 2018-01-01 | End: 2018-01-01 | Stop reason: HOSPADM

## 2018-01-01 RX ORDER — ACETAMINOPHEN 650 MG/1
650 SUPPOSITORY RECTAL EVERY 4 HOURS PRN
Status: DISCONTINUED | OUTPATIENT
Start: 2018-01-01 | End: 2018-01-01 | Stop reason: HOSPADM

## 2018-01-01 RX ORDER — SENNA AND DOCUSATE SODIUM 50; 8.6 MG/1; MG/1
2 TABLET, FILM COATED ORAL 2 TIMES DAILY
Qty: 120 TABLET | Refills: 0 | Status: SHIPPED | OUTPATIENT
Start: 2018-01-01 | End: 2018-01-01

## 2018-01-01 RX ORDER — BACLOFEN 10 MG/1
10 TABLET ORAL 3 TIMES DAILY PRN
Qty: 21 TABLET | Refills: 0 | Status: SHIPPED | OUTPATIENT
Start: 2018-01-01 | End: 2018-01-01

## 2018-01-01 RX ORDER — OXYCODONE HYDROCHLORIDE 15 MG/1
10 TABLET ORAL EVERY 4 HOURS PRN
COMMUNITY

## 2018-01-01 RX ORDER — FAMOTIDINE 20 MG/1
20 TABLET, FILM COATED ORAL ONCE
Status: COMPLETED | OUTPATIENT
Start: 2018-01-01 | End: 2018-01-01

## 2018-01-01 RX ORDER — UREA 10 %
1 LOTION (ML) TOPICAL NIGHTLY PRN
Status: DISCONTINUED | OUTPATIENT
Start: 2018-01-01 | End: 2018-01-01

## 2018-01-01 RX ORDER — HYDROCODONE BITARTRATE AND ACETAMINOPHEN 5; 325 MG/1; MG/1
1 TABLET ORAL EVERY 6 HOURS PRN
Status: DISCONTINUED | OUTPATIENT
Start: 2018-01-01 | End: 2018-01-01

## 2018-01-01 RX ORDER — SODIUM CHLORIDE 0.9 % (FLUSH) 0.9 %
3 SYRINGE (ML) INJECTION EVERY 12 HOURS SCHEDULED
Status: DISCONTINUED | OUTPATIENT
Start: 2018-01-01 | End: 2018-01-01 | Stop reason: HOSPADM

## 2018-01-01 RX ORDER — POTASSIUM CHLORIDE 750 MG/1
20 CAPSULE, EXTENDED RELEASE ORAL DAILY
Status: DISCONTINUED | OUTPATIENT
Start: 2018-01-01 | End: 2018-01-01

## 2018-01-01 RX ORDER — FLUMAZENIL 0.1 MG/ML
0.2 INJECTION INTRAVENOUS AS NEEDED
Status: DISCONTINUED | OUTPATIENT
Start: 2018-01-01 | End: 2018-01-01 | Stop reason: HOSPADM

## 2018-01-01 RX ORDER — LORAZEPAM 2 MG/ML
1 INJECTION INTRAMUSCULAR
Status: CANCELLED | OUTPATIENT
Start: 2018-01-01 | End: 2018-10-26

## 2018-01-01 RX ORDER — GLYCOPYRROLATE 0.2 MG/ML
0.4 INJECTION INTRAMUSCULAR; INTRAVENOUS
Status: DISCONTINUED | OUTPATIENT
Start: 2018-01-01 | End: 2018-01-01 | Stop reason: HOSPADM

## 2018-01-01 RX ORDER — CLONAZEPAM 0.5 MG/1
0.5 TABLET ORAL NIGHTLY PRN
Refills: 0 | COMMUNITY
Start: 2018-01-01

## 2018-01-01 RX ORDER — FONDAPARINUX SODIUM 2.5 MG/.5ML
2.5 INJECTION SUBCUTANEOUS
Status: DISCONTINUED | OUTPATIENT
Start: 2018-01-01 | End: 2018-01-01

## 2018-01-01 RX ORDER — HYDROCODONE BITARTRATE AND ACETAMINOPHEN 5; 325 MG/1; MG/1
1 TABLET ORAL ONCE AS NEEDED
Status: DISCONTINUED | OUTPATIENT
Start: 2018-01-01 | End: 2018-01-01 | Stop reason: HOSPADM

## 2018-01-01 RX ORDER — FUROSEMIDE 20 MG/1
20 TABLET ORAL AS NEEDED
Status: DISCONTINUED | OUTPATIENT
Start: 2018-01-01 | End: 2018-01-01 | Stop reason: HOSPADM

## 2018-01-01 RX ORDER — LACTULOSE 10 G/15ML
20 SOLUTION ORAL 2 TIMES DAILY
Qty: 1892 ML | Refills: 2 | Status: SHIPPED | OUTPATIENT
Start: 2018-01-01 | End: 2018-01-01 | Stop reason: SDUPTHER

## 2018-01-01 RX ORDER — ONDANSETRON 4 MG/1
4 TABLET, ORALLY DISINTEGRATING ORAL ONCE
Status: COMPLETED | OUTPATIENT
Start: 2018-01-01 | End: 2018-01-01

## 2018-01-01 RX ORDER — HYDROMORPHONE HYDROCHLORIDE 2 MG/1
4 TABLET ORAL
Status: DISCONTINUED | OUTPATIENT
Start: 2018-01-01 | End: 2018-01-01

## 2018-01-01 RX ORDER — LACTULOSE 10 G/15ML
20 SOLUTION ORAL 3 TIMES DAILY
Status: DISCONTINUED | OUTPATIENT
Start: 2018-01-01 | End: 2018-01-01 | Stop reason: HOSPADM

## 2018-01-01 RX ORDER — SPIRONOLACTONE 25 MG/1
25 TABLET ORAL DAILY
Status: DISCONTINUED | OUTPATIENT
Start: 2018-01-01 | End: 2018-01-01

## 2018-01-01 RX ORDER — FUROSEMIDE 20 MG/1
20 TABLET ORAL DAILY PRN
Status: DISCONTINUED | OUTPATIENT
Start: 2018-01-01 | End: 2018-01-01 | Stop reason: HOSPADM

## 2018-01-01 RX ORDER — FAMOTIDINE 10 MG/ML
20 INJECTION, SOLUTION INTRAVENOUS ONCE
Status: CANCELLED | OUTPATIENT
Start: 2018-01-01

## 2018-01-01 RX ORDER — SPIRONOLACTONE 50 MG/1
50 TABLET, FILM COATED ORAL DAILY
Status: DISCONTINUED | OUTPATIENT
Start: 2018-01-01 | End: 2018-01-01

## 2018-01-01 RX ORDER — POTASSIUM CHLORIDE 750 MG/1
40 CAPSULE, EXTENDED RELEASE ORAL ONCE
Status: COMPLETED | OUTPATIENT
Start: 2018-01-01 | End: 2018-01-01

## 2018-01-01 RX ORDER — IPRATROPIUM BROMIDE AND ALBUTEROL SULFATE 2.5; .5 MG/3ML; MG/3ML
3 SOLUTION RESPIRATORY (INHALATION)
Status: DISCONTINUED | OUTPATIENT
Start: 2018-01-01 | End: 2018-01-01

## 2018-01-01 RX ORDER — OXYCODONE AND ACETAMINOPHEN 7.5; 325 MG/1; MG/1
1 TABLET ORAL EVERY 4 HOURS PRN
Status: DISCONTINUED | OUTPATIENT
Start: 2018-01-01 | End: 2018-01-01 | Stop reason: HOSPADM

## 2018-01-01 RX ORDER — MORPHINE SULFATE 20 MG/ML
5 SOLUTION ORAL
Status: DISCONTINUED | OUTPATIENT
Start: 2018-01-01 | End: 2018-01-01 | Stop reason: HOSPADM

## 2018-01-01 RX ORDER — SCOLOPAMINE TRANSDERMAL SYSTEM 1 MG/1
1 PATCH, EXTENDED RELEASE TRANSDERMAL
Status: DISCONTINUED | OUTPATIENT
Start: 2018-01-01 | End: 2018-01-01 | Stop reason: HOSPADM

## 2018-01-01 RX ORDER — ZINC SULFATE 50(220)MG
220 CAPSULE ORAL DAILY
Qty: 30 CAPSULE | Refills: 11 | Status: SHIPPED | OUTPATIENT
Start: 2018-01-01 | End: 2018-01-01 | Stop reason: SDUPTHER

## 2018-01-01 RX ORDER — EPHEDRINE SULFATE 50 MG/ML
INJECTION, SOLUTION INTRAVENOUS AS NEEDED
Status: DISCONTINUED | OUTPATIENT
Start: 2018-01-01 | End: 2018-01-01 | Stop reason: SURG

## 2018-01-01 RX ORDER — FUROSEMIDE 20 MG/1
20 TABLET ORAL DAILY
Qty: 30 TABLET | Refills: 5 | Status: SHIPPED | OUTPATIENT
Start: 2018-01-01 | End: 2018-01-01 | Stop reason: SDUPTHER

## 2018-01-01 RX ORDER — OXYCODONE AND ACETAMINOPHEN 7.5; 325 MG/1; MG/1
1 TABLET ORAL EVERY 6 HOURS PRN
Status: DISCONTINUED | OUTPATIENT
Start: 2018-01-01 | End: 2018-01-01 | Stop reason: HOSPADM

## 2018-01-01 RX ORDER — LEVOTHYROXINE SODIUM 300 UG/1
150 TABLET ORAL EVERY MORNING
Start: 2018-01-01 | End: 2018-01-01

## 2018-01-01 RX ORDER — LEVOTHYROXINE SODIUM 0.15 MG/1
300 TABLET ORAL EVERY MORNING
Status: DISCONTINUED | OUTPATIENT
Start: 2018-01-01 | End: 2018-01-01

## 2018-01-01 RX ORDER — TENOFOVIR DISOPROXIL FUMARATE 300 MG/1
300 TABLET, FILM COATED ORAL DAILY
Status: DISCONTINUED | OUTPATIENT
Start: 2018-01-01 | End: 2018-01-01

## 2018-01-01 RX ORDER — DIPHENHYDRAMINE HYDROCHLORIDE 50 MG/ML
25 INJECTION INTRAMUSCULAR; INTRAVENOUS EVERY 6 HOURS PRN
Status: CANCELLED | OUTPATIENT
Start: 2018-01-01

## 2018-01-01 RX ORDER — PROMETHAZINE HYDROCHLORIDE 6.25 MG/5ML
12.5 SYRUP ORAL EVERY 4 HOURS PRN
Status: DISCONTINUED | OUTPATIENT
Start: 2018-01-01 | End: 2018-01-01 | Stop reason: HOSPADM

## 2018-01-01 RX ORDER — DIPHENOXYLATE HYDROCHLORIDE AND ATROPINE SULFATE 2.5; .025 MG/1; MG/1
1 TABLET ORAL
Status: DISCONTINUED | OUTPATIENT
Start: 2018-01-01 | End: 2018-01-01 | Stop reason: HOSPADM

## 2018-01-01 RX ORDER — FUROSEMIDE 20 MG/1
40 TABLET ORAL DAILY
Qty: 60 TABLET | Refills: 3 | Status: SHIPPED | OUTPATIENT
Start: 2018-01-01 | End: 2018-01-01 | Stop reason: DRUGHIGH

## 2018-01-01 RX ORDER — IPRATROPIUM BROMIDE AND ALBUTEROL SULFATE 2.5; .5 MG/3ML; MG/3ML
3 SOLUTION RESPIRATORY (INHALATION) ONCE AS NEEDED
Status: DISCONTINUED | OUTPATIENT
Start: 2018-01-01 | End: 2018-01-01 | Stop reason: HOSPADM

## 2018-01-01 RX ORDER — FUROSEMIDE 20 MG/1
20 TABLET ORAL DAILY
Status: DISCONTINUED | OUTPATIENT
Start: 2018-01-01 | End: 2018-01-01 | Stop reason: HOSPADM

## 2018-01-01 RX ORDER — ACETAMINOPHEN 160 MG/5ML
650 SOLUTION ORAL EVERY 4 HOURS PRN
Status: CANCELLED | OUTPATIENT
Start: 2018-01-01

## 2018-01-01 RX ORDER — POTASSIUM CHLORIDE 750 MG/1
10 TABLET, FILM COATED, EXTENDED RELEASE ORAL DAILY
Qty: 30 TABLET | Refills: 5 | Status: SHIPPED | OUTPATIENT
Start: 2018-01-01

## 2018-01-01 RX ORDER — NITROGLYCERIN 0.4 MG/1
0.4 TABLET SUBLINGUAL
Status: DISCONTINUED | OUTPATIENT
Start: 2018-01-01 | End: 2018-01-01 | Stop reason: HOSPADM

## 2018-01-01 RX ORDER — HYDROMORPHONE HYDROCHLORIDE 1 MG/ML
0.5 INJECTION, SOLUTION INTRAMUSCULAR; INTRAVENOUS; SUBCUTANEOUS
Status: DISCONTINUED | OUTPATIENT
Start: 2018-01-01 | End: 2018-01-01

## 2018-01-01 RX ORDER — LEVOTHYROXINE SODIUM 0.15 MG/1
150 TABLET ORAL EVERY MORNING
Status: DISCONTINUED | OUTPATIENT
Start: 2018-01-01 | End: 2018-01-01 | Stop reason: HOSPADM

## 2018-01-01 RX ORDER — MORPHINE SULFATE 20 MG/ML
20 SOLUTION ORAL
Status: CANCELLED | OUTPATIENT
Start: 2018-01-01 | End: 2018-10-26

## 2018-01-01 RX ORDER — LACTULOSE 10 G/15ML
20 SOLUTION ORAL 2 TIMES DAILY
Qty: 1892 ML | Refills: 2 | Status: SHIPPED | OUTPATIENT
Start: 2018-01-01

## 2018-01-01 RX ORDER — INSULIN DEGLUDEC 200 U/ML
35 INJECTION, SOLUTION SUBCUTANEOUS NIGHTLY
Refills: 3
Start: 2018-01-01

## 2018-01-01 RX ORDER — MORPHINE SULFATE 2 MG/ML
2 INJECTION, SOLUTION INTRAMUSCULAR; INTRAVENOUS
Status: DISCONTINUED | OUTPATIENT
Start: 2018-01-01 | End: 2018-01-01 | Stop reason: HOSPADM

## 2018-01-01 RX ORDER — TAMSULOSIN HYDROCHLORIDE 0.4 MG/1
0.4 CAPSULE ORAL NIGHTLY PRN
Status: DISCONTINUED | OUTPATIENT
Start: 2018-01-01 | End: 2018-01-01 | Stop reason: HOSPADM

## 2018-01-01 RX ORDER — FLUCONAZOLE 100 MG/1
100 TABLET ORAL EVERY OTHER DAY
COMMUNITY

## 2018-01-01 RX ORDER — DEXAMETHASONE SODIUM PHOSPHATE 10 MG/ML
INJECTION INTRAMUSCULAR; INTRAVENOUS AS NEEDED
Status: DISCONTINUED | OUTPATIENT
Start: 2018-01-01 | End: 2018-01-01 | Stop reason: SURG

## 2018-01-01 RX ORDER — ERGOCALCIFEROL 1.25 MG/1
50000 CAPSULE ORAL WEEKLY
COMMUNITY

## 2018-01-01 RX ORDER — MORPHINE SULFATE 20 MG/ML
5 SOLUTION ORAL
Status: CANCELLED | OUTPATIENT
Start: 2018-01-01 | End: 2018-10-26

## 2018-01-01 RX ORDER — DIPHENOXYLATE HYDROCHLORIDE AND ATROPINE SULFATE 2.5; .025 MG/1; MG/1
1 TABLET ORAL
Status: CANCELLED | OUTPATIENT
Start: 2018-01-01

## 2018-01-01 RX ORDER — FAMOTIDINE 10 MG/ML
20 INJECTION, SOLUTION INTRAVENOUS ONCE
Status: CANCELLED | OUTPATIENT
Start: 2018-01-01 | End: 2018-01-01

## 2018-01-01 RX ORDER — SENNA AND DOCUSATE SODIUM 50; 8.6 MG/1; MG/1
2 TABLET, FILM COATED ORAL 2 TIMES DAILY
Status: DISCONTINUED | OUTPATIENT
Start: 2018-01-01 | End: 2018-01-01 | Stop reason: HOSPADM

## 2018-01-01 RX ORDER — ONDANSETRON 2 MG/ML
4 INJECTION INTRAMUSCULAR; INTRAVENOUS ONCE
Status: COMPLETED | OUTPATIENT
Start: 2018-01-01 | End: 2018-01-01

## 2018-01-01 RX ORDER — OXYCODONE AND ACETAMINOPHEN 7.5; 325 MG/1; MG/1
1 TABLET ORAL EVERY 12 HOURS PRN
Qty: 20 TABLET | Refills: 0
Start: 2018-01-01 | End: 2018-01-01 | Stop reason: SDUPTHER

## 2018-01-01 RX ORDER — MORPHINE SULFATE 10 MG/ML
6 INJECTION INTRAMUSCULAR; INTRAVENOUS; SUBCUTANEOUS
Status: DISCONTINUED | OUTPATIENT
Start: 2018-01-01 | End: 2018-01-01

## 2018-01-01 RX ORDER — TAMSULOSIN HYDROCHLORIDE 0.4 MG/1
0.4 CAPSULE ORAL 2 TIMES DAILY
Status: DISCONTINUED | OUTPATIENT
Start: 2018-01-01 | End: 2018-01-01 | Stop reason: HOSPADM

## 2018-01-01 RX ORDER — DOCUSATE SODIUM 100 MG/1
100 CAPSULE, LIQUID FILLED ORAL 2 TIMES DAILY PRN
Status: DISCONTINUED | OUTPATIENT
Start: 2018-01-01 | End: 2018-01-01 | Stop reason: HOSPADM

## 2018-01-01 RX ORDER — MORPHINE SULFATE 15 MG/1
15 TABLET ORAL EVERY 6 HOURS PRN
Qty: 56 TABLET | Refills: 0 | Status: SHIPPED | OUTPATIENT
Start: 2018-01-01 | End: 2018-01-01

## 2018-01-01 RX ORDER — LORAZEPAM 2 MG/ML
0.5 CONCENTRATE ORAL
Status: CANCELLED | OUTPATIENT
Start: 2018-01-01 | End: 2018-10-26

## 2018-01-01 RX ORDER — DIAZEPAM 5 MG/1
5 TABLET ORAL ONCE
Status: COMPLETED | OUTPATIENT
Start: 2018-01-01 | End: 2018-01-01

## 2018-01-01 RX ORDER — OXYCODONE HCL 10 MG/1
10 TABLET, FILM COATED, EXTENDED RELEASE ORAL EVERY 12 HOURS SCHEDULED
Status: DISCONTINUED | OUTPATIENT
Start: 2018-01-01 | End: 2018-01-01 | Stop reason: HOSPADM

## 2018-01-01 RX ORDER — MORPHINE SULFATE 2 MG/ML
2 INJECTION, SOLUTION INTRAMUSCULAR; INTRAVENOUS EVERY 4 HOURS PRN
Status: DISCONTINUED | OUTPATIENT
Start: 2018-01-01 | End: 2018-01-01

## 2018-01-01 RX ORDER — ONDANSETRON 4 MG/1
4 TABLET, ORALLY DISINTEGRATING ORAL EVERY 6 HOURS PRN
Status: DISCONTINUED | OUTPATIENT
Start: 2018-01-01 | End: 2018-01-01

## 2018-01-01 RX ORDER — PROMETHAZINE HYDROCHLORIDE 25 MG/ML
12.5 INJECTION, SOLUTION INTRAMUSCULAR; INTRAVENOUS EVERY 4 HOURS PRN
Status: CANCELLED | OUTPATIENT
Start: 2018-01-01

## 2018-01-01 RX ORDER — ALBUTEROL SULFATE 2.5 MG/3ML
2.5 SOLUTION RESPIRATORY (INHALATION) EVERY 6 HOURS PRN
Status: DISCONTINUED | OUTPATIENT
Start: 2018-01-01 | End: 2018-01-01 | Stop reason: HOSPADM

## 2018-01-01 RX ORDER — METOPROLOL SUCCINATE 50 MG/1
50 TABLET, EXTENDED RELEASE ORAL 2 TIMES DAILY
Qty: 180 TABLET | Refills: 1 | Status: SHIPPED | OUTPATIENT
Start: 2018-01-01

## 2018-01-01 RX ORDER — TAMSULOSIN HYDROCHLORIDE 0.4 MG/1
0.4 CAPSULE ORAL AS NEEDED
Status: DISCONTINUED | OUTPATIENT
Start: 2018-01-01 | End: 2018-01-01 | Stop reason: HOSPADM

## 2018-01-01 RX ORDER — LORAZEPAM 2 MG/ML
0.5 INJECTION INTRAMUSCULAR
Status: CANCELLED | OUTPATIENT
Start: 2018-01-01 | End: 2018-10-26

## 2018-01-01 RX ORDER — METOPROLOL SUCCINATE 50 MG/1
50 TABLET, EXTENDED RELEASE ORAL
Status: DISCONTINUED | OUTPATIENT
Start: 2018-01-01 | End: 2018-01-01 | Stop reason: HOSPADM

## 2018-01-01 RX ORDER — LORAZEPAM 0.5 MG/1
0.5 TABLET ORAL EVERY 6 HOURS PRN
COMMUNITY

## 2018-01-01 RX ORDER — GABAPENTIN 100 MG/1
200 CAPSULE ORAL 3 TIMES DAILY
Status: DISCONTINUED | OUTPATIENT
Start: 2018-01-01 | End: 2018-01-01 | Stop reason: HOSPADM

## 2018-01-01 RX ORDER — FUROSEMIDE 20 MG/1
20 TABLET ORAL DAILY
Qty: 30 TABLET | Refills: 3
Start: 2018-01-01 | End: 2018-01-01 | Stop reason: SDUPTHER

## 2018-01-01 RX ORDER — SPIRONOLACTONE 50 MG/1
TABLET, FILM COATED ORAL
Qty: 30 TABLET | Refills: 0 | Status: SHIPPED | OUTPATIENT
Start: 2018-01-01

## 2018-01-01 RX ORDER — DIPHENHYDRAMINE HYDROCHLORIDE 50 MG/ML
25 INJECTION INTRAMUSCULAR; INTRAVENOUS ONCE
Status: CANCELLED | OUTPATIENT
Start: 2018-01-01 | End: 2018-01-01

## 2018-01-01 RX ORDER — MORPHINE SULFATE 15 MG/1
15 TABLET ORAL EVERY 4 HOURS PRN
Status: DISCONTINUED | OUTPATIENT
Start: 2018-01-01 | End: 2018-01-01

## 2018-01-01 RX ORDER — BISACODYL 5 MG/1
5 TABLET, DELAYED RELEASE ORAL DAILY PRN
Status: DISCONTINUED | OUTPATIENT
Start: 2018-01-01 | End: 2018-01-01 | Stop reason: HOSPADM

## 2018-01-01 RX ORDER — SULFAMETHOXAZOLE AND TRIMETHOPRIM 800; 160 MG/1; MG/1
1 TABLET ORAL EVERY 12 HOURS SCHEDULED
Status: DISCONTINUED | OUTPATIENT
Start: 2018-01-01 | End: 2018-01-01

## 2018-01-01 RX ORDER — CLONAZEPAM 0.5 MG/1
0.5 TABLET ORAL DAILY
Status: DISCONTINUED | OUTPATIENT
Start: 2018-01-01 | End: 2018-01-01 | Stop reason: HOSPADM

## 2018-01-01 RX ORDER — MORPHINE SULFATE 4 MG/ML
2 INJECTION, SOLUTION INTRAMUSCULAR; INTRAVENOUS EVERY 4 HOURS PRN
Status: DISCONTINUED | OUTPATIENT
Start: 2018-01-01 | End: 2018-01-01

## 2018-01-01 RX ORDER — OXYCODONE AND ACETAMINOPHEN 7.5; 325 MG/1; MG/1
1 TABLET ORAL EVERY 6 HOURS PRN
COMMUNITY
End: 2018-01-01 | Stop reason: HOSPADM

## 2018-01-01 RX ORDER — PROMETHAZINE HYDROCHLORIDE 6.25 MG/5ML
6.25 SYRUP ORAL EVERY 4 HOURS PRN
Status: CANCELLED | OUTPATIENT
Start: 2018-01-01

## 2018-01-01 RX ORDER — BACLOFEN 10 MG/1
10 TABLET ORAL 3 TIMES DAILY PRN
COMMUNITY
End: 2018-01-01

## 2018-01-01 RX ORDER — POTASSIUM CHLORIDE 750 MG/1
20 CAPSULE, EXTENDED RELEASE ORAL ONCE
Status: COMPLETED | OUTPATIENT
Start: 2018-01-01 | End: 2018-01-01

## 2018-01-01 RX ORDER — CLINDAMYCIN HYDROCHLORIDE 300 MG/1
CAPSULE ORAL
Refills: 0 | COMMUNITY
Start: 2018-01-01 | End: 2018-01-01

## 2018-01-01 RX ORDER — ICOSAPENT ETHYL 1000 MG/1
1 CAPSULE ORAL 2 TIMES DAILY WITH MEALS
Status: DISCONTINUED | OUTPATIENT
Start: 2018-01-01 | End: 2018-01-01 | Stop reason: HOSPADM

## 2018-01-01 RX ORDER — METHYLPREDNISOLONE SODIUM SUCCINATE 40 MG/ML
40 INJECTION, POWDER, LYOPHILIZED, FOR SOLUTION INTRAMUSCULAR; INTRAVENOUS ONCE
Status: COMPLETED | OUTPATIENT
Start: 2018-01-01 | End: 2018-01-01

## 2018-01-01 RX ORDER — LORAZEPAM 2 MG/ML
2 CONCENTRATE ORAL
Status: CANCELLED | OUTPATIENT
Start: 2018-01-01 | End: 2018-10-26

## 2018-01-01 RX ORDER — OXYCODONE HCL 10 MG/1
20 TABLET, FILM COATED, EXTENDED RELEASE ORAL NIGHTLY PRN
COMMUNITY

## 2018-01-01 RX ORDER — BISACODYL 10 MG
10 SUPPOSITORY, RECTAL RECTAL DAILY PRN
Status: DISCONTINUED | OUTPATIENT
Start: 2018-01-01 | End: 2018-01-01 | Stop reason: HOSPADM

## 2018-01-01 RX ORDER — SODIUM CHLORIDE 9 MG/ML
250 INJECTION, SOLUTION INTRAVENOUS ONCE
Status: DISCONTINUED | OUTPATIENT
Start: 2018-01-01 | End: 2018-01-01 | Stop reason: HOSPADM

## 2018-01-01 RX ORDER — MORPHINE SULFATE 15 MG/1
15 TABLET ORAL EVERY 6 HOURS PRN
Qty: 12 TABLET | Refills: 0 | Status: ON HOLD | OUTPATIENT
Start: 2018-01-01 | End: 2018-01-01

## 2018-01-01 RX ORDER — KETOROLAC TROMETHAMINE 30 MG/ML
INJECTION, SOLUTION INTRAMUSCULAR; INTRAVENOUS AS NEEDED
Status: DISCONTINUED | OUTPATIENT
Start: 2018-01-01 | End: 2018-01-01 | Stop reason: SURG

## 2018-01-01 RX ORDER — IPRATROPIUM BROMIDE AND ALBUTEROL SULFATE 2.5; .5 MG/3ML; MG/3ML
3 SOLUTION RESPIRATORY (INHALATION) EVERY 4 HOURS PRN
Status: DISCONTINUED | OUTPATIENT
Start: 2018-01-01 | End: 2018-01-01

## 2018-01-01 RX ORDER — GABAPENTIN 100 MG/1
200 CAPSULE ORAL 3 TIMES DAILY
COMMUNITY
End: 2018-01-01 | Stop reason: HOSPADM

## 2018-01-01 RX ORDER — SODIUM CHLORIDE 9 MG/ML
250 INJECTION, SOLUTION INTRAVENOUS AS NEEDED
Status: DISCONTINUED | OUTPATIENT
Start: 2018-01-01 | End: 2018-01-01 | Stop reason: HOSPADM

## 2018-01-01 RX ORDER — OXYCODONE HYDROCHLORIDE 5 MG/1
10 TABLET ORAL EVERY 4 HOURS PRN
Status: DISCONTINUED | OUTPATIENT
Start: 2018-01-01 | End: 2018-01-01 | Stop reason: HOSPADM

## 2018-01-01 RX ORDER — PROMETHAZINE HYDROCHLORIDE 25 MG/1
12.5 TABLET ORAL EVERY 4 HOURS PRN
Status: DISCONTINUED | OUTPATIENT
Start: 2018-01-01 | End: 2018-01-01 | Stop reason: HOSPADM

## 2018-01-01 RX ORDER — MORPHINE SULFATE 20 MG/ML
20 SOLUTION ORAL
Status: DISCONTINUED | OUTPATIENT
Start: 2018-01-01 | End: 2018-01-01

## 2018-01-01 RX ORDER — MORPHINE SULFATE 20 MG/ML
10 SOLUTION ORAL
Status: CANCELLED | OUTPATIENT
Start: 2018-01-01 | End: 2018-10-26

## 2018-01-01 RX ORDER — TENOFOVIR DISOPROXIL FUMARATE 300 MG/1
300 TABLET, FILM COATED ORAL
Status: DISCONTINUED | OUTPATIENT
Start: 2018-01-01 | End: 2018-01-01

## 2018-01-01 RX ORDER — SUCCINYLCHOLINE CHLORIDE 20 MG/ML
INJECTION INTRAMUSCULAR; INTRAVENOUS AS NEEDED
Status: DISCONTINUED | OUTPATIENT
Start: 2018-01-01 | End: 2018-01-01 | Stop reason: SURG

## 2018-01-01 RX ORDER — OXYCODONE HYDROCHLORIDE 10 MG/1
10 TABLET ORAL 2 TIMES DAILY PRN
Refills: 0 | COMMUNITY
Start: 2018-01-01 | End: 2018-01-01

## 2018-01-01 RX ORDER — MORPHINE SULFATE 15 MG/1
30 TABLET ORAL EVERY 4 HOURS PRN
Status: DISCONTINUED | OUTPATIENT
Start: 2018-01-01 | End: 2018-01-01 | Stop reason: HOSPADM

## 2018-01-01 RX ORDER — DOCUSATE SODIUM 100 MG/1
200 CAPSULE, LIQUID FILLED ORAL 2 TIMES DAILY
Status: DISCONTINUED | OUTPATIENT
Start: 2018-01-01 | End: 2018-01-01 | Stop reason: HOSPADM

## 2018-01-01 RX ORDER — PROMETHAZINE HYDROCHLORIDE 12.5 MG/1
12.5 SUPPOSITORY RECTAL EVERY 4 HOURS PRN
Status: CANCELLED | OUTPATIENT
Start: 2018-01-01

## 2018-01-01 RX ORDER — LORAZEPAM 2 MG/ML
1 CONCENTRATE ORAL
Status: CANCELLED | OUTPATIENT
Start: 2018-01-01 | End: 2018-10-26

## 2018-01-01 RX ORDER — SODIUM CHLORIDE 0.9 % (FLUSH) 0.9 %
3-10 SYRINGE (ML) INJECTION AS NEEDED
Status: DISCONTINUED | OUTPATIENT
Start: 2018-01-01 | End: 2018-01-01

## 2018-01-01 RX ORDER — PROMETHAZINE HYDROCHLORIDE 6.25 MG/5ML
12.5 SYRUP ORAL EVERY 4 HOURS PRN
Status: CANCELLED | OUTPATIENT
Start: 2018-01-01

## 2018-01-01 RX ORDER — OXYCODONE AND ACETAMINOPHEN 7.5; 325 MG/1; MG/1
1 TABLET ORAL EVERY 12 HOURS PRN
Qty: 20 TABLET | Refills: 0 | Status: SHIPPED | OUTPATIENT
Start: 2018-01-01 | End: 2018-01-01 | Stop reason: HOSPADM

## 2018-01-01 RX ORDER — LACTULOSE 10 G/15ML
20 SOLUTION ORAL 2 TIMES DAILY
Qty: 1892 ML | Refills: 0 | Status: SHIPPED | OUTPATIENT
Start: 2018-01-01 | End: 2018-01-01

## 2018-01-01 RX ORDER — METOPROLOL SUCCINATE 50 MG/1
50 TABLET, EXTENDED RELEASE ORAL 2 TIMES DAILY
Qty: 60 TABLET | Refills: 10 | Status: ON HOLD | OUTPATIENT
Start: 2018-01-01 | End: 2018-01-01 | Stop reason: SDUPTHER

## 2018-01-01 RX ORDER — LIDOCAINE HYDROCHLORIDE 10 MG/ML
0.5 INJECTION, SOLUTION EPIDURAL; INFILTRATION; INTRACAUDAL; PERINEURAL ONCE AS NEEDED
Status: COMPLETED | OUTPATIENT
Start: 2018-01-01 | End: 2018-01-01

## 2018-01-01 RX ORDER — PROMETHAZINE HYDROCHLORIDE 6.25 MG/5ML
6.25 SYRUP ORAL EVERY 4 HOURS PRN
Status: DISCONTINUED | OUTPATIENT
Start: 2018-01-01 | End: 2018-01-01 | Stop reason: HOSPADM

## 2018-01-01 RX ORDER — BACLOFEN 10 MG/1
10 TABLET ORAL 3 TIMES DAILY PRN
Status: DISCONTINUED | OUTPATIENT
Start: 2018-01-01 | End: 2018-01-01 | Stop reason: HOSPADM

## 2018-01-01 RX ORDER — SODIUM CHLORIDE, SODIUM LACTATE, POTASSIUM CHLORIDE, CALCIUM CHLORIDE 600; 310; 30; 20 MG/100ML; MG/100ML; MG/100ML; MG/100ML
100 INJECTION, SOLUTION INTRAVENOUS CONTINUOUS
Status: DISCONTINUED | OUTPATIENT
Start: 2018-01-01 | End: 2018-01-01 | Stop reason: HOSPADM

## 2018-01-01 RX ORDER — OXYCODONE AND ACETAMINOPHEN 7.5; 325 MG/1; MG/1
1 TABLET ORAL EVERY 4 HOURS PRN
Qty: 40 TABLET | Refills: 0 | COMMUNITY
Start: 2018-01-01 | End: 2018-01-01

## 2018-01-01 RX ORDER — CLONAZEPAM 1 MG/1
0.5 TABLET ORAL NIGHTLY PRN
Status: DISCONTINUED | OUTPATIENT
Start: 2018-01-01 | End: 2018-01-01

## 2018-01-01 RX ORDER — LEVOTHYROXINE SODIUM 300 UG/1
300 TABLET ORAL EVERY MORNING
Qty: 30 TABLET | Refills: 0 | Status: SHIPPED | OUTPATIENT
Start: 2018-01-01

## 2018-01-01 RX ORDER — MORPHINE SULFATE 2 MG/ML
1 INJECTION, SOLUTION INTRAMUSCULAR; INTRAVENOUS EVERY 4 HOURS PRN
Status: DISCONTINUED | OUTPATIENT
Start: 2018-01-01 | End: 2018-01-01

## 2018-01-01 RX ORDER — SODIUM CHLORIDE 0.9 % (FLUSH) 0.9 %
3 SYRINGE (ML) INJECTION EVERY 12 HOURS SCHEDULED
Status: DISCONTINUED | OUTPATIENT
Start: 2018-01-01 | End: 2018-01-01

## 2018-01-01 RX ORDER — DIPHENHYDRAMINE HCL 25 MG
25 CAPSULE ORAL EVERY 6 HOURS PRN
Status: CANCELLED | OUTPATIENT
Start: 2018-01-01

## 2018-01-01 RX ORDER — FUROSEMIDE 40 MG/1
20 TABLET ORAL DAILY
Refills: 5 | COMMUNITY
Start: 2018-01-01

## 2018-01-01 RX ADMIN — RIFAXIMIN 550 MG: 550 TABLET ORAL at 08:48

## 2018-01-01 RX ADMIN — OXYCODONE HYDROCHLORIDE 10 MG: 10 TABLET, FILM COATED, EXTENDED RELEASE ORAL at 20:35

## 2018-01-01 RX ADMIN — FENTANYL CITRATE 25 MCG: 50 INJECTION INTRAMUSCULAR; INTRAVENOUS at 16:01

## 2018-01-01 RX ADMIN — GUAIFENESIN AND DEXTROMETHORPHAN HYDROBROMIDE 2 TABLET: 600; 30 TABLET, EXTENDED RELEASE ORAL at 08:17

## 2018-01-01 RX ADMIN — HYDROMORPHONE HYDROCHLORIDE 0.5 MG: 1 INJECTION, SOLUTION INTRAMUSCULAR; INTRAVENOUS; SUBCUTANEOUS at 21:43

## 2018-01-01 RX ADMIN — ONDANSETRON 4 MG: 2 INJECTION INTRAMUSCULAR; INTRAVENOUS at 19:44

## 2018-01-01 RX ADMIN — MORPHINE SULFATE 15 MG: 15 TABLET ORAL at 08:46

## 2018-01-01 RX ADMIN — MORPHINE SULFATE 4 MG: 4 INJECTION INTRAVENOUS at 13:09

## 2018-01-01 RX ADMIN — RIFAXIMIN 550 MG: 550 TABLET ORAL at 09:04

## 2018-01-01 RX ADMIN — Medication 3 ML: at 08:16

## 2018-01-01 RX ADMIN — GLYCOPYRROLATE 0.4 MG: 0.2 INJECTION, SOLUTION INTRAMUSCULAR; INTRAVENOUS at 16:20

## 2018-01-01 RX ADMIN — IPRATROPIUM BROMIDE AND ALBUTEROL SULFATE 3 ML: .5; 3 SOLUTION RESPIRATORY (INHALATION) at 22:21

## 2018-01-01 RX ADMIN — FLUCONAZOLE 100 MG: 100 TABLET ORAL at 09:06

## 2018-01-01 RX ADMIN — HYDROMORPHONE HYDROCHLORIDE 1 MG: 1 INJECTION, SOLUTION INTRAMUSCULAR; INTRAVENOUS; SUBCUTANEOUS at 08:45

## 2018-01-01 RX ADMIN — INSULIN ASPART 2 UNITS: 100 INJECTION, SOLUTION INTRAVENOUS; SUBCUTANEOUS at 17:49

## 2018-01-01 RX ADMIN — LEVOTHYROXINE SODIUM 150 MCG: 150 TABLET ORAL at 08:45

## 2018-01-01 RX ADMIN — SPIRONOLACTONE 100 MG: 100 TABLET ORAL at 09:26

## 2018-01-01 RX ADMIN — METOPROLOL SUCCINATE 50 MG: 50 TABLET, FILM COATED, EXTENDED RELEASE ORAL at 20:38

## 2018-01-01 RX ADMIN — IPRATROPIUM BROMIDE AND ALBUTEROL SULFATE 3 ML: .5; 3 SOLUTION RESPIRATORY (INHALATION) at 08:34

## 2018-01-01 RX ADMIN — METOPROLOL SUCCINATE 50 MG: 50 TABLET, FILM COATED, EXTENDED RELEASE ORAL at 10:41

## 2018-01-01 RX ADMIN — INSULIN ASPART 3 UNITS: 100 INJECTION, SOLUTION INTRAVENOUS; SUBCUTANEOUS at 22:09

## 2018-01-01 RX ADMIN — LEVOTHYROXINE SODIUM 150 MCG: 150 TABLET ORAL at 09:06

## 2018-01-01 RX ADMIN — LEVOTHYROXINE SODIUM 300 MCG: 150 TABLET ORAL at 05:15

## 2018-01-01 RX ADMIN — DARUNAVIR 800 MG: 800 TABLET, FILM COATED ORAL at 10:48

## 2018-01-01 RX ADMIN — FUROSEMIDE 40 MG: 40 TABLET ORAL at 09:24

## 2018-01-01 RX ADMIN — OXYCODONE HYDROCHLORIDE AND ACETAMINOPHEN 1 TABLET: 7.5; 325 TABLET ORAL at 03:20

## 2018-01-01 RX ADMIN — INSULIN ASPART 3 UNITS: 100 INJECTION, SOLUTION INTRAVENOUS; SUBCUTANEOUS at 08:15

## 2018-01-01 RX ADMIN — OXYCODONE HYDROCHLORIDE AND ACETAMINOPHEN 1 TABLET: 7.5; 325 TABLET ORAL at 08:00

## 2018-01-01 RX ADMIN — RIFAXIMIN 550 MG: 550 TABLET ORAL at 10:55

## 2018-01-01 RX ADMIN — FLUCONAZOLE 100 MG: 100 TABLET ORAL at 08:48

## 2018-01-01 RX ADMIN — IPRATROPIUM BROMIDE AND ALBUTEROL SULFATE 3 ML: .5; 3 SOLUTION RESPIRATORY (INHALATION) at 19:49

## 2018-01-01 RX ADMIN — MIDAZOLAM 1 MG: 1 INJECTION INTRAMUSCULAR; INTRAVENOUS at 08:38

## 2018-01-01 RX ADMIN — EPHEDRINE SULFATE 5 MG: 50 INJECTION INTRAMUSCULAR; INTRAVENOUS; SUBCUTANEOUS at 18:47

## 2018-01-01 RX ADMIN — RALTEGRAVIR 400 MG: 400 TABLET, FILM COATED ORAL at 08:36

## 2018-01-01 RX ADMIN — FONDAPARINUX SODIUM 2.5 MG: 2.5 INJECTION, SOLUTION SUBCUTANEOUS at 13:19

## 2018-01-01 RX ADMIN — SPIRONOLACTONE 50 MG: 50 TABLET ORAL at 10:13

## 2018-01-01 RX ADMIN — INSULIN ASPART 5 UNITS: 100 INJECTION, SOLUTION INTRAVENOUS; SUBCUTANEOUS at 04:01

## 2018-01-01 RX ADMIN — INSULIN ASPART 4 UNITS: 100 INJECTION, SOLUTION INTRAVENOUS; SUBCUTANEOUS at 17:34

## 2018-01-01 RX ADMIN — LACTULOSE 20 G: 20 SOLUTION ORAL at 16:03

## 2018-01-01 RX ADMIN — RIFAXIMIN 550 MG: 550 TABLET ORAL at 21:00

## 2018-01-01 RX ADMIN — DOCUSATE SODIUM -SENNOSIDES 2 TABLET: 50; 8.6 TABLET, COATED ORAL at 08:59

## 2018-01-01 RX ADMIN — GLYCOPYRROLATE 0.4 MG: 0.2 INJECTION, SOLUTION INTRAMUSCULAR; INTRAVENOUS at 23:32

## 2018-01-01 RX ADMIN — HYDROMORPHONE HYDROCHLORIDE 0.5 MG: 1 INJECTION, SOLUTION INTRAMUSCULAR; INTRAVENOUS; SUBCUTANEOUS at 14:49

## 2018-01-01 RX ADMIN — SPIRONOLACTONE 50 MG: 50 TABLET, FILM COATED ORAL at 08:36

## 2018-01-01 RX ADMIN — MORPHINE SULFATE 4 MG: 4 INJECTION INTRAVENOUS at 08:41

## 2018-01-01 RX ADMIN — INSULIN ASPART 4 UNITS: 100 INJECTION, SOLUTION INTRAVENOUS; SUBCUTANEOUS at 12:04

## 2018-01-01 RX ADMIN — EMTRICITABINE AND TENOFOVIR DISOPROXIL FUMARATE 1 TABLET: 200; 300 TABLET, FILM COATED ORAL at 08:47

## 2018-01-01 RX ADMIN — RALTEGRAVIR 400 MG: 400 TABLET, FILM COATED ORAL at 21:30

## 2018-01-01 RX ADMIN — CEFAZOLIN SODIUM 2 G: 2 INJECTION, SOLUTION INTRAVENOUS at 05:01

## 2018-01-01 RX ADMIN — METOPROLOL SUCCINATE 50 MG: 50 TABLET, FILM COATED, EXTENDED RELEASE ORAL at 09:00

## 2018-01-01 RX ADMIN — RIFAXIMIN 550 MG: 550 TABLET ORAL at 10:48

## 2018-01-01 RX ADMIN — RIFAXIMIN 550 MG: 550 TABLET ORAL at 22:48

## 2018-01-01 RX ADMIN — SPIRONOLACTONE 50 MG: 50 TABLET, FILM COATED ORAL at 10:08

## 2018-01-01 RX ADMIN — TAMSULOSIN HYDROCHLORIDE 0.4 MG: 0.4 CAPSULE ORAL at 09:25

## 2018-01-01 RX ADMIN — LORAZEPAM 1 MG: 2 INJECTION INTRAMUSCULAR; INTRAVENOUS at 13:40

## 2018-01-01 RX ADMIN — GLYCOPYRROLATE 0.4 MG: 0.2 INJECTION, SOLUTION INTRAMUSCULAR; INTRAVENOUS at 12:21

## 2018-01-01 RX ADMIN — LORAZEPAM 0.5 MG: 2 INJECTION INTRAMUSCULAR; INTRAVENOUS at 17:00

## 2018-01-01 RX ADMIN — GUAIFENESIN AND DEXTROMETHORPHAN HYDROBROMIDE 2 TABLET: 600; 30 TABLET, EXTENDED RELEASE ORAL at 21:52

## 2018-01-01 RX ADMIN — TAMSULOSIN HYDROCHLORIDE 0.4 MG: 0.4 CAPSULE ORAL at 21:38

## 2018-01-01 RX ADMIN — SULFAMETHOXAZOLE AND TRIMETHOPRIM 160 MG: 800; 160 TABLET ORAL at 08:54

## 2018-01-01 RX ADMIN — HYDROMORPHONE HYDROCHLORIDE 0.5 MG: 1 INJECTION, SOLUTION INTRAMUSCULAR; INTRAVENOUS; SUBCUTANEOUS at 06:42

## 2018-01-01 RX ADMIN — RIFAXIMIN 550 MG: 550 TABLET ORAL at 09:05

## 2018-01-01 RX ADMIN — DOCUSATE SODIUM -SENNOSIDES 2 TABLET: 50; 8.6 TABLET, COATED ORAL at 10:11

## 2018-01-01 RX ADMIN — METOPROLOL SUCCINATE 50 MG: 50 TABLET, FILM COATED, EXTENDED RELEASE ORAL at 10:08

## 2018-01-01 RX ADMIN — GLYCOPYRROLATE 0.4 MG: 0.2 INJECTION, SOLUTION INTRAMUSCULAR; INTRAVENOUS at 15:36

## 2018-01-01 RX ADMIN — RIFAXIMIN 550 MG: 550 TABLET ORAL at 22:07

## 2018-01-01 RX ADMIN — IPRATROPIUM BROMIDE AND ALBUTEROL SULFATE 3 ML: .5; 3 SOLUTION RESPIRATORY (INHALATION) at 12:19

## 2018-01-01 RX ADMIN — INSULIN ASPART 3 UNITS: 100 INJECTION, SOLUTION INTRAVENOUS; SUBCUTANEOUS at 13:41

## 2018-01-01 RX ADMIN — INSULIN ASPART 3 UNITS: 100 INJECTION, SOLUTION INTRAVENOUS; SUBCUTANEOUS at 21:38

## 2018-01-01 RX ADMIN — LACTULOSE 20 G: 20 SOLUTION ORAL at 11:20

## 2018-01-01 RX ADMIN — MORPHINE SULFATE 2 MG: 2 INJECTION, SOLUTION INTRAMUSCULAR; INTRAVENOUS at 01:27

## 2018-01-01 RX ADMIN — IPRATROPIUM BROMIDE AND ALBUTEROL SULFATE 3 ML: .5; 3 SOLUTION RESPIRATORY (INHALATION) at 20:38

## 2018-01-01 RX ADMIN — EMTRICITABINE AND TENOFOVIR DISOPROXIL FUMARATE 1 TABLET: 200; 300 TABLET, FILM COATED ORAL at 08:17

## 2018-01-01 RX ADMIN — TAMSULOSIN HYDROCHLORIDE 0.4 MG: 0.4 CAPSULE ORAL at 10:41

## 2018-01-01 RX ADMIN — SULFAMETHOXAZOLE AND TRIMETHOPRIM 160 MG: 800; 160 TABLET ORAL at 09:00

## 2018-01-01 RX ADMIN — LORAZEPAM 1 MG: 2 INJECTION INTRAMUSCULAR; INTRAVENOUS at 05:26

## 2018-01-01 RX ADMIN — TENOFOVIR DISOPROXIL FUMARATE 300 MG: 300 TABLET, FILM COATED ORAL at 10:52

## 2018-01-01 RX ADMIN — RALTEGRAVIR 400 MG: 400 TABLET, FILM COATED ORAL at 09:34

## 2018-01-01 RX ADMIN — GLYCOPYRROLATE 0.4 MG: 0.2 INJECTION, SOLUTION INTRAMUSCULAR; INTRAVENOUS at 14:33

## 2018-01-01 RX ADMIN — RIFAXIMIN 550 MG: 550 TABLET ORAL at 09:00

## 2018-01-01 RX ADMIN — OXYCODONE HYDROCHLORIDE 10 MG: 10 TABLET, FILM COATED, EXTENDED RELEASE ORAL at 08:48

## 2018-01-01 RX ADMIN — ONDANSETRON 4 MG: 2 INJECTION INTRAMUSCULAR; INTRAVENOUS at 07:49

## 2018-01-01 RX ADMIN — GUAIFENESIN 1200 MG: 600 TABLET, EXTENDED RELEASE ORAL at 21:00

## 2018-01-01 RX ADMIN — INSULIN DETEMIR 40 UNITS: 100 INJECTION, SOLUTION SUBCUTANEOUS at 09:24

## 2018-01-01 RX ADMIN — SODIUM CHLORIDE 250 ML: 900 INJECTION, SOLUTION INTRAVENOUS at 14:55

## 2018-01-01 RX ADMIN — METOPROLOL SUCCINATE 50 MG: 50 TABLET, FILM COATED, EXTENDED RELEASE ORAL at 09:34

## 2018-01-01 RX ADMIN — CLONAZEPAM 0.5 MG: 0.5 TABLET ORAL at 10:08

## 2018-01-01 RX ADMIN — LORAZEPAM 2 MG: 2 INJECTION INTRAMUSCULAR; INTRAVENOUS at 02:21

## 2018-01-01 RX ADMIN — IPRATROPIUM BROMIDE AND ALBUTEROL SULFATE 3 ML: .5; 3 SOLUTION RESPIRATORY (INHALATION) at 16:36

## 2018-01-01 RX ADMIN — IPRATROPIUM BROMIDE AND ALBUTEROL SULFATE 3 ML: .5; 3 SOLUTION RESPIRATORY (INHALATION) at 16:33

## 2018-01-01 RX ADMIN — COBICISTAT 150 MG: 150 TABLET, FILM COATED ORAL at 09:27

## 2018-01-01 RX ADMIN — Medication 3 ML: at 09:05

## 2018-01-01 RX ADMIN — RIFAXIMIN 550 MG: 550 TABLET ORAL at 19:55

## 2018-01-01 RX ADMIN — FLUCONAZOLE 100 MG: 100 TABLET ORAL at 10:08

## 2018-01-01 RX ADMIN — SODIUM CHLORIDE, POTASSIUM CHLORIDE, SODIUM LACTATE AND CALCIUM CHLORIDE 9 ML/HR: 600; 310; 30; 20 INJECTION, SOLUTION INTRAVENOUS at 09:52

## 2018-01-01 RX ADMIN — SPIRONOLACTONE 50 MG: 50 TABLET, FILM COATED ORAL at 09:00

## 2018-01-01 RX ADMIN — OXYCODONE HYDROCHLORIDE 10 MG: 10 TABLET, FILM COATED, EXTENDED RELEASE ORAL at 20:52

## 2018-01-01 RX ADMIN — RALTEGRAVIR 400 MG: 400 TABLET, FILM COATED ORAL at 09:11

## 2018-01-01 RX ADMIN — COBICISTAT 150 MG: 150 TABLET, FILM COATED ORAL at 10:09

## 2018-01-01 RX ADMIN — OXYCODONE HYDROCHLORIDE AND ACETAMINOPHEN 1 TABLET: 7.5; 325 TABLET ORAL at 09:02

## 2018-01-01 RX ADMIN — INSULIN ASPART 4 UNITS: 100 INJECTION, SOLUTION INTRAVENOUS; SUBCUTANEOUS at 12:45

## 2018-01-01 RX ADMIN — ONDANSETRON 4 MG: 2 INJECTION INTRAMUSCULAR; INTRAVENOUS at 22:49

## 2018-01-01 RX ADMIN — EMTRICITABINE AND TENOFOVIR DISOPROXIL FUMARATE 1 TABLET: 200; 300 TABLET, FILM COATED ORAL at 08:54

## 2018-01-01 RX ADMIN — MORPHINE SULFATE 2 MG: 2 INJECTION, SOLUTION INTRAMUSCULAR; INTRAVENOUS at 15:12

## 2018-01-01 RX ADMIN — RIFAXIMIN 550 MG: 550 TABLET ORAL at 08:16

## 2018-01-01 RX ADMIN — FENTANYL CITRATE 100 MCG: 50 INJECTION INTRAMUSCULAR; INTRAVENOUS at 18:33

## 2018-01-01 RX ADMIN — GLYCOPYRROLATE 0.4 MG: 0.2 INJECTION, SOLUTION INTRAMUSCULAR; INTRAVENOUS at 05:42

## 2018-01-01 RX ADMIN — LORAZEPAM 1 MG: 2 INJECTION INTRAMUSCULAR; INTRAVENOUS at 08:43

## 2018-01-01 RX ADMIN — ONDANSETRON 4 MG: 2 INJECTION INTRAMUSCULAR; INTRAVENOUS at 11:53

## 2018-01-01 RX ADMIN — ONDANSETRON 4 MG: 2 INJECTION INTRAMUSCULAR; INTRAVENOUS at 09:47

## 2018-01-01 RX ADMIN — RALTEGRAVIR 400 MG: 400 TABLET, FILM COATED ORAL at 21:38

## 2018-01-01 RX ADMIN — RALTEGRAVIR 400 MG: 400 TABLET, FILM COATED ORAL at 08:54

## 2018-01-01 RX ADMIN — FENTANYL CITRATE 50 MCG: 50 INJECTION INTRAMUSCULAR; INTRAVENOUS at 20:16

## 2018-01-01 RX ADMIN — HYDROMORPHONE HYDROCHLORIDE 1 MG: 1 INJECTION, SOLUTION INTRAMUSCULAR; INTRAVENOUS; SUBCUTANEOUS at 00:16

## 2018-01-01 RX ADMIN — OXYCODONE HYDROCHLORIDE 10 MG: 5 TABLET ORAL at 03:35

## 2018-01-01 RX ADMIN — SPIRONOLACTONE 25 MG: 25 TABLET ORAL at 10:02

## 2018-01-01 RX ADMIN — LEVOTHYROXINE SODIUM 300 MCG: 150 TABLET ORAL at 05:10

## 2018-01-01 RX ADMIN — HYDROMORPHONE HYDROCHLORIDE 0.5 MG: 10 INJECTION INTRAMUSCULAR; INTRAVENOUS; SUBCUTANEOUS at 05:01

## 2018-01-01 RX ADMIN — LEVOTHYROXINE SODIUM 300 MCG: 150 TABLET ORAL at 10:09

## 2018-01-01 RX ADMIN — OXYCODONE HYDROCHLORIDE 10 MG: 10 TABLET, FILM COATED, EXTENDED RELEASE ORAL at 09:27

## 2018-01-01 RX ADMIN — OXYCODONE HYDROCHLORIDE 10 MG: 5 TABLET ORAL at 23:35

## 2018-01-01 RX ADMIN — INSULIN ASPART 3 UNITS: 100 INJECTION, SOLUTION INTRAVENOUS; SUBCUTANEOUS at 08:17

## 2018-01-01 RX ADMIN — OXYCODONE HYDROCHLORIDE AND ACETAMINOPHEN 1 TABLET: 7.5; 325 TABLET ORAL at 21:03

## 2018-01-01 RX ADMIN — GUAIFENESIN 1200 MG: 600 TABLET, EXTENDED RELEASE ORAL at 21:50

## 2018-01-01 RX ADMIN — LEVOTHYROXINE SODIUM 300 MCG: 150 TABLET ORAL at 08:37

## 2018-01-01 RX ADMIN — LACTULOSE 20 G: 20 SOLUTION ORAL at 14:15

## 2018-01-01 RX ADMIN — METOPROLOL SUCCINATE 50 MG: 50 TABLET, FILM COATED, EXTENDED RELEASE ORAL at 18:41

## 2018-01-01 RX ADMIN — SPIRONOLACTONE 50 MG: 50 TABLET, FILM COATED ORAL at 08:16

## 2018-01-01 RX ADMIN — PROPOFOL 150 MG: 10 INJECTION, EMULSION INTRAVENOUS at 12:09

## 2018-01-01 RX ADMIN — Medication 3 ML: at 08:17

## 2018-01-01 RX ADMIN — DARUNAVIR 800 MG: 800 TABLET, FILM COATED ORAL at 10:55

## 2018-01-01 RX ADMIN — FUROSEMIDE 40 MG: 40 TABLET ORAL at 10:52

## 2018-01-01 RX ADMIN — OXYCODONE HYDROCHLORIDE AND ACETAMINOPHEN 1 TABLET: 7.5; 325 TABLET ORAL at 16:28

## 2018-01-01 RX ADMIN — LORAZEPAM 0.5 MG: 0.5 TABLET ORAL at 01:22

## 2018-01-01 RX ADMIN — INSULIN ASPART 3 UNITS: 100 INJECTION, SOLUTION INTRAVENOUS; SUBCUTANEOUS at 16:08

## 2018-01-01 RX ADMIN — METOPROLOL SUCCINATE 50 MG: 50 TABLET, FILM COATED, EXTENDED RELEASE ORAL at 21:00

## 2018-01-01 RX ADMIN — LORAZEPAM 2 MG: 2 INJECTION INTRAMUSCULAR; INTRAVENOUS at 09:55

## 2018-01-01 RX ADMIN — TAMSULOSIN HYDROCHLORIDE 0.4 MG: 0.4 CAPSULE ORAL at 18:41

## 2018-01-01 RX ADMIN — LACTULOSE 20 G: 20 SOLUTION ORAL at 09:27

## 2018-01-01 RX ADMIN — SODIUM CHLORIDE 250 ML: 900 INJECTION, SOLUTION INTRAVENOUS at 14:44

## 2018-01-01 RX ADMIN — INSULIN DETEMIR 30 UNITS: 100 INJECTION, SOLUTION SUBCUTANEOUS at 10:31

## 2018-01-01 RX ADMIN — GLYCOPYRROLATE 0.4 MG: 0.2 INJECTION, SOLUTION INTRAMUSCULAR; INTRAVENOUS at 08:50

## 2018-01-01 RX ADMIN — HYDROMORPHONE HYDROCHLORIDE 1 MG: 1 INJECTION, SOLUTION INTRAMUSCULAR; INTRAVENOUS; SUBCUTANEOUS at 07:50

## 2018-01-01 RX ADMIN — RALTEGRAVIR 400 MG: 400 TABLET, FILM COATED ORAL at 08:47

## 2018-01-01 RX ADMIN — HYDROMORPHONE HYDROCHLORIDE 1 MG: 1 INJECTION, SOLUTION INTRAMUSCULAR; INTRAVENOUS; SUBCUTANEOUS at 09:01

## 2018-01-01 RX ADMIN — FLUCONAZOLE 100 MG: 100 TABLET ORAL at 10:09

## 2018-01-01 RX ADMIN — FENTANYL CITRATE 50 MCG: 50 INJECTION INTRAMUSCULAR; INTRAVENOUS at 19:00

## 2018-01-01 RX ADMIN — RALTEGRAVIR 400 MG: 400 TABLET, FILM COATED ORAL at 10:08

## 2018-01-01 RX ADMIN — OXYCODONE HYDROCHLORIDE AND ACETAMINOPHEN 1 TABLET: 7.5; 325 TABLET ORAL at 18:15

## 2018-01-01 RX ADMIN — MORPHINE SULFATE 15 MG: 15 TABLET ORAL at 20:59

## 2018-01-01 RX ADMIN — COBICISTAT 150 MG: 150 TABLET, FILM COATED ORAL at 00:30

## 2018-01-01 RX ADMIN — MORPHINE SULFATE 4 MG: 4 INJECTION INTRAVENOUS at 08:36

## 2018-01-01 RX ADMIN — HYDROMORPHONE HYDROCHLORIDE 1 MG: 1 INJECTION, SOLUTION INTRAMUSCULAR; INTRAVENOUS; SUBCUTANEOUS at 22:01

## 2018-01-01 RX ADMIN — MORPHINE SULFATE 4 MG: 4 INJECTION INTRAVENOUS at 05:10

## 2018-01-01 RX ADMIN — RALTEGRAVIR 400 MG: 400 TABLET, FILM COATED ORAL at 10:56

## 2018-01-01 RX ADMIN — INSULIN ASPART 3 UNITS: 100 INJECTION, SOLUTION INTRAVENOUS; SUBCUTANEOUS at 21:52

## 2018-01-01 RX ADMIN — TAMSULOSIN HYDROCHLORIDE 0.4 MG: 0.4 CAPSULE ORAL at 21:00

## 2018-01-01 RX ADMIN — MORPHINE SULFATE 4 MG: 4 INJECTION INTRAVENOUS at 14:45

## 2018-01-01 RX ADMIN — RALTEGRAVIR 400 MG: 400 TABLET, FILM COATED ORAL at 00:32

## 2018-01-01 RX ADMIN — COBICISTAT 150 MG: 150 TABLET, FILM COATED ORAL at 10:02

## 2018-01-01 RX ADMIN — DARUNAVIR 800 MG: 800 TABLET, FILM COATED ORAL at 09:26

## 2018-01-01 RX ADMIN — OXYCODONE HYDROCHLORIDE 10 MG: 10 TABLET, FILM COATED, EXTENDED RELEASE ORAL at 20:40

## 2018-01-01 RX ADMIN — LACTULOSE 20 G: 20 SOLUTION ORAL at 16:15

## 2018-01-01 RX ADMIN — INSULIN DETEMIR 20 UNITS: 100 INJECTION, SOLUTION SUBCUTANEOUS at 21:08

## 2018-01-01 RX ADMIN — FLUCONAZOLE 100 MG: 100 TABLET ORAL at 09:10

## 2018-01-01 RX ADMIN — HYDROMORPHONE HYDROCHLORIDE 0.5 MG: 1 INJECTION, SOLUTION INTRAMUSCULAR; INTRAVENOUS; SUBCUTANEOUS at 06:02

## 2018-01-01 RX ADMIN — DIPHENHYDRAMINE HYDROCHLORIDE 25 MG: 25 CAPSULE ORAL at 14:43

## 2018-01-01 RX ADMIN — FENTANYL CITRATE 50 MCG: 50 INJECTION, SOLUTION INTRAMUSCULAR; INTRAVENOUS at 14:48

## 2018-01-01 RX ADMIN — SODIUM CHLORIDE, POTASSIUM CHLORIDE, SODIUM LACTATE AND CALCIUM CHLORIDE 75 ML/HR: 600; 310; 30; 20 INJECTION, SOLUTION INTRAVENOUS at 15:15

## 2018-01-01 RX ADMIN — PANTOPRAZOLE SODIUM 40 MG: 40 TABLET, DELAYED RELEASE ORAL at 07:45

## 2018-01-01 RX ADMIN — IRON SUCROSE 200 MG: 20 INJECTION, SOLUTION INTRAVENOUS at 14:13

## 2018-01-01 RX ADMIN — RALTEGRAVIR 400 MG: 400 TABLET, FILM COATED ORAL at 20:57

## 2018-01-01 RX ADMIN — FAMOTIDINE 40 MG: 40 TABLET, FILM COATED ORAL at 08:16

## 2018-01-01 RX ADMIN — EPHEDRINE SULFATE 5 MG: 50 INJECTION INTRAMUSCULAR; INTRAVENOUS; SUBCUTANEOUS at 18:44

## 2018-01-01 RX ADMIN — SPIRONOLACTONE 100 MG: 100 TABLET ORAL at 10:54

## 2018-01-01 RX ADMIN — CEFAZOLIN SODIUM 2 G: 2 INJECTION, SOLUTION INTRAVENOUS at 18:23

## 2018-01-01 RX ADMIN — GLYCOPYRROLATE 0.4 MG: 0.2 INJECTION, SOLUTION INTRAMUSCULAR; INTRAVENOUS at 18:44

## 2018-01-01 RX ADMIN — POTASSIUM CHLORIDE 10 MEQ: 750 CAPSULE, EXTENDED RELEASE ORAL at 08:55

## 2018-01-01 RX ADMIN — DEXAMETHASONE SODIUM PHOSPHATE 8 MG: 10 INJECTION INTRAMUSCULAR; INTRAVENOUS at 15:54

## 2018-01-01 RX ADMIN — IRON SUCROSE 300 MG: 20 INJECTION, SOLUTION INTRAVENOUS at 14:44

## 2018-01-01 RX ADMIN — METOPROLOL SUCCINATE 50 MG: 50 TABLET, FILM COATED, EXTENDED RELEASE ORAL at 09:26

## 2018-01-01 RX ADMIN — TAMSULOSIN HYDROCHLORIDE 0.4 MG: 0.4 CAPSULE ORAL at 21:20

## 2018-01-01 RX ADMIN — DIAZEPAM 5 MG: 5 TABLET ORAL at 19:38

## 2018-01-01 RX ADMIN — CLONAZEPAM 0.5 MG: 0.5 TABLET ORAL at 10:06

## 2018-01-01 RX ADMIN — DIPHENHYDRAMINE HYDROCHLORIDE 25 MG: 50 INJECTION INTRAMUSCULAR; INTRAVENOUS at 11:42

## 2018-01-01 RX ADMIN — OXYCODONE HYDROCHLORIDE 5 MG: 5 TABLET ORAL at 06:45

## 2018-01-01 RX ADMIN — LACTULOSE 20 G: 20 SOLUTION ORAL at 10:39

## 2018-01-01 RX ADMIN — FUROSEMIDE 40 MG: 40 TABLET ORAL at 09:27

## 2018-01-01 RX ADMIN — FAMOTIDINE 20 MG: 10 INJECTION, SOLUTION INTRAVENOUS at 15:23

## 2018-01-01 RX ADMIN — METOPROLOL SUCCINATE 50 MG: 50 TABLET, FILM COATED, EXTENDED RELEASE ORAL at 09:06

## 2018-01-01 RX ADMIN — HYDROMORPHONE HYDROCHLORIDE 0.5 MG: 10 INJECTION INTRAMUSCULAR; INTRAVENOUS; SUBCUTANEOUS at 16:01

## 2018-01-01 RX ADMIN — EMTRICITABINE AND TENOFOVIR DISOPROXIL FUMARATE 1 TABLET: 200; 300 TABLET, FILM COATED ORAL at 08:36

## 2018-01-01 RX ADMIN — RIFAXIMIN 550 MG: 550 TABLET ORAL at 09:23

## 2018-01-01 RX ADMIN — OXYCODONE HYDROCHLORIDE AND ACETAMINOPHEN 1 TABLET: 7.5; 325 TABLET ORAL at 21:30

## 2018-01-01 RX ADMIN — RIFAXIMIN 550 MG: 550 TABLET ORAL at 21:20

## 2018-01-01 RX ADMIN — TAMSULOSIN HYDROCHLORIDE 0.4 MG: 0.4 CAPSULE ORAL at 21:50

## 2018-01-01 RX ADMIN — MORPHINE SULFATE 2 MG: 2 INJECTION, SOLUTION INTRAMUSCULAR; INTRAVENOUS at 19:03

## 2018-01-01 RX ADMIN — IPRATROPIUM BROMIDE AND ALBUTEROL SULFATE 3 ML: .5; 3 SOLUTION RESPIRATORY (INHALATION) at 08:22

## 2018-01-01 RX ADMIN — LACTULOSE 20 G: 20 SOLUTION ORAL at 08:55

## 2018-01-01 RX ADMIN — FUROSEMIDE 40 MG: 40 TABLET ORAL at 18:41

## 2018-01-01 RX ADMIN — ONDANSETRON 4 MG: 2 INJECTION INTRAMUSCULAR; INTRAVENOUS at 12:51

## 2018-01-01 RX ADMIN — EMTRICITABINE AND TENOFOVIR DISOPROXIL FUMARATE 1 TABLET: 200; 300 TABLET, FILM COATED ORAL at 01:03

## 2018-01-01 RX ADMIN — IPRATROPIUM BROMIDE AND ALBUTEROL SULFATE 3 ML: .5; 3 SOLUTION RESPIRATORY (INHALATION) at 11:36

## 2018-01-01 RX ADMIN — COBICISTAT 150 MG: 150 TABLET, FILM COATED ORAL at 09:33

## 2018-01-01 RX ADMIN — MORPHINE SULFATE 4 MG: 4 INJECTION INTRAVENOUS at 23:32

## 2018-01-01 RX ADMIN — ROCURONIUM BROMIDE 15 MG: 10 INJECTION INTRAVENOUS at 18:35

## 2018-01-01 RX ADMIN — FUROSEMIDE 40 MG: 10 INJECTION, SOLUTION INTRAMUSCULAR; INTRAVENOUS at 23:59

## 2018-01-01 RX ADMIN — HYDROMORPHONE HYDROCHLORIDE 1 MG: 1 INJECTION, SOLUTION INTRAMUSCULAR; INTRAVENOUS; SUBCUTANEOUS at 16:20

## 2018-01-01 RX ADMIN — Medication 3 ML: at 21:50

## 2018-01-01 RX ADMIN — INSULIN ASPART 3 UNITS: 100 INJECTION, SOLUTION INTRAVENOUS; SUBCUTANEOUS at 09:23

## 2018-01-01 RX ADMIN — HYDROMORPHONE HYDROCHLORIDE 0.5 MG: 10 INJECTION INTRAMUSCULAR; INTRAVENOUS; SUBCUTANEOUS at 07:38

## 2018-01-01 RX ADMIN — LACTULOSE 20 G: 20 SOLUTION ORAL at 10:52

## 2018-01-01 RX ADMIN — RIFAXIMIN 550 MG: 550 TABLET ORAL at 20:00

## 2018-01-01 RX ADMIN — EMTRICITABINE AND TENOFOVIR DISOPROXIL FUMARATE 1 TABLET: 200; 300 TABLET, FILM COATED ORAL at 09:00

## 2018-01-01 RX ADMIN — ACETAMINOPHEN 650 MG: 325 TABLET ORAL at 14:34

## 2018-01-01 RX ADMIN — GLYCOPYRROLATE 0.4 MG: 0.2 INJECTION, SOLUTION INTRAMUSCULAR; INTRAVENOUS at 05:26

## 2018-01-01 RX ADMIN — TENOFOVIR DISOPROXIL FUMARATE 300 MG: 300 TABLET, FILM COATED ORAL at 09:31

## 2018-01-01 RX ADMIN — ONDANSETRON 4 MG: 2 INJECTION INTRAMUSCULAR; INTRAVENOUS at 21:37

## 2018-01-01 RX ADMIN — POTASSIUM CHLORIDE 40 MEQ: 750 CAPSULE, EXTENDED RELEASE ORAL at 13:25

## 2018-01-01 RX ADMIN — DOCUSATE SODIUM 200 MG: 100 CAPSULE, LIQUID FILLED ORAL at 10:12

## 2018-01-01 RX ADMIN — MORPHINE SULFATE 30 MG: 15 TABLET ORAL at 09:26

## 2018-01-01 RX ADMIN — SODIUM CHLORIDE, POTASSIUM CHLORIDE, SODIUM LACTATE AND CALCIUM CHLORIDE 9 ML/HR: 600; 310; 30; 20 INJECTION, SOLUTION INTRAVENOUS at 13:41

## 2018-01-01 RX ADMIN — GUAIFENESIN 1200 MG: 600 TABLET, EXTENDED RELEASE ORAL at 08:17

## 2018-01-01 RX ADMIN — HYDROMORPHONE HYDROCHLORIDE 0.5 MG: 1 INJECTION, SOLUTION INTRAMUSCULAR; INTRAVENOUS; SUBCUTANEOUS at 16:48

## 2018-01-01 RX ADMIN — PROPOFOL 170 MG: 10 INJECTION, EMULSION INTRAVENOUS at 18:33

## 2018-01-01 RX ADMIN — HYDROMORPHONE HYDROCHLORIDE 2 MG: 2 INJECTION INTRAMUSCULAR; INTRAVENOUS; SUBCUTANEOUS at 12:02

## 2018-01-01 RX ADMIN — MORPHINE SULFATE 30 MG: 15 TABLET ORAL at 18:05

## 2018-01-01 RX ADMIN — EMTRICITABINE AND TENOFOVIR DISOPROXIL FUMARATE 1 TABLET: 200; 300 TABLET, FILM COATED ORAL at 14:21

## 2018-01-01 RX ADMIN — FUROSEMIDE 40 MG: 40 TABLET ORAL at 09:00

## 2018-01-01 RX ADMIN — DARUNAVIR 800 MG: 800 TABLET, FILM COATED ORAL at 16:07

## 2018-01-01 RX ADMIN — IPRATROPIUM BROMIDE AND ALBUTEROL SULFATE 3 ML: .5; 3 SOLUTION RESPIRATORY (INHALATION) at 15:34

## 2018-01-01 RX ADMIN — NYSTATIN: 100000 POWDER TOPICAL at 20:06

## 2018-01-01 RX ADMIN — RIFAXIMIN 550 MG: 550 TABLET ORAL at 09:26

## 2018-01-01 RX ADMIN — HYDROCODONE BITARTRATE AND ACETAMINOPHEN 1 TABLET: 5; 325 TABLET ORAL at 14:34

## 2018-01-01 RX ADMIN — SODIUM CHLORIDE, POTASSIUM CHLORIDE, SODIUM LACTATE AND CALCIUM CHLORIDE 100 ML/HR: 600; 310; 30; 20 INJECTION, SOLUTION INTRAVENOUS at 19:22

## 2018-01-01 RX ADMIN — RALTEGRAVIR 400 MG: 400 TABLET, FILM COATED ORAL at 18:40

## 2018-01-01 RX ADMIN — IPRATROPIUM BROMIDE AND ALBUTEROL SULFATE 3 ML: .5; 3 SOLUTION RESPIRATORY (INHALATION) at 08:58

## 2018-01-01 RX ADMIN — COBICISTAT 150 MG: 150 TABLET, FILM COATED ORAL at 08:16

## 2018-01-01 RX ADMIN — HYDROMORPHONE HYDROCHLORIDE 1 MG: 1 INJECTION, SOLUTION INTRAMUSCULAR; INTRAVENOUS; SUBCUTANEOUS at 20:58

## 2018-01-01 RX ADMIN — PROPOFOL 100 MG: 10 INJECTION, EMULSION INTRAVENOUS at 15:36

## 2018-01-01 RX ADMIN — SUCCINYLCHOLINE CHLORIDE 140 MG: 20 INJECTION, SOLUTION INTRAMUSCULAR; INTRAVENOUS; PARENTERAL at 18:33

## 2018-01-01 RX ADMIN — DARUNAVIR 800 MG: 800 TABLET, FILM COATED ORAL at 12:27

## 2018-01-01 RX ADMIN — DOCUSATE SODIUM -SENNOSIDES 2 TABLET: 50; 8.6 TABLET, COATED ORAL at 21:20

## 2018-01-01 RX ADMIN — FUROSEMIDE 20 MG: 20 TABLET ORAL at 08:17

## 2018-01-01 RX ADMIN — IPRATROPIUM BROMIDE AND ALBUTEROL SULFATE 3 ML: .5; 3 SOLUTION RESPIRATORY (INHALATION) at 12:27

## 2018-01-01 RX ADMIN — COBICISTAT 150 MG: 150 TABLET, FILM COATED ORAL at 10:10

## 2018-01-01 RX ADMIN — HYDROMORPHONE HYDROCHLORIDE 1 MG: 10 INJECTION, SOLUTION INTRAMUSCULAR; INTRAVENOUS; SUBCUTANEOUS at 15:53

## 2018-01-01 RX ADMIN — CLONAZEPAM 0.5 MG: 0.5 TABLET ORAL at 09:42

## 2018-01-01 RX ADMIN — RIFAXIMIN 550 MG: 550 TABLET ORAL at 09:06

## 2018-01-01 RX ADMIN — Medication 220 MG: at 14:15

## 2018-01-01 RX ADMIN — OXYCODONE HYDROCHLORIDE 10 MG: 5 TABLET ORAL at 13:01

## 2018-01-01 RX ADMIN — DARUNAVIR 800 MG: 800 TABLET, FILM COATED ORAL at 10:08

## 2018-01-01 RX ADMIN — METOPROLOL SUCCINATE 50 MG: 50 TABLET, FILM COATED, EXTENDED RELEASE ORAL at 22:07

## 2018-01-01 RX ADMIN — OXYCODONE HYDROCHLORIDE 5 MG: 5 TABLET ORAL at 18:07

## 2018-01-01 RX ADMIN — INSULIN ASPART 3 UNITS: 100 INJECTION, SOLUTION INTRAVENOUS; SUBCUTANEOUS at 08:50

## 2018-01-01 RX ADMIN — INSULIN ASPART 5 UNITS: 100 INJECTION, SOLUTION INTRAVENOUS; SUBCUTANEOUS at 12:19

## 2018-01-01 RX ADMIN — LEVOTHYROXINE SODIUM 300 MCG: 150 TABLET ORAL at 08:50

## 2018-01-01 RX ADMIN — HYDROMORPHONE HYDROCHLORIDE 0.5 MG: 1 INJECTION, SOLUTION INTRAMUSCULAR; INTRAVENOUS; SUBCUTANEOUS at 17:34

## 2018-01-01 RX ADMIN — RALTEGRAVIR 400 MG: 400 TABLET, FILM COATED ORAL at 08:16

## 2018-01-01 RX ADMIN — FUROSEMIDE 20 MG: 20 TABLET ORAL at 09:04

## 2018-01-01 RX ADMIN — HYDROMORPHONE HYDROCHLORIDE 0.5 MG: 10 INJECTION INTRAMUSCULAR; INTRAVENOUS; SUBCUTANEOUS at 22:46

## 2018-01-01 RX ADMIN — ALUMINUM HYDROXIDE, MAGNESIUM HYDROXIDE, AND DIMETHICONE 15 ML: 400; 400; 40 SUSPENSION ORAL at 18:51

## 2018-01-01 RX ADMIN — HYDROMORPHONE HYDROCHLORIDE 1 MG: 1 INJECTION, SOLUTION INTRAMUSCULAR; INTRAVENOUS; SUBCUTANEOUS at 06:57

## 2018-01-01 RX ADMIN — IPRATROPIUM BROMIDE AND ALBUTEROL SULFATE 3 ML: .5; 3 SOLUTION RESPIRATORY (INHALATION) at 06:57

## 2018-01-01 RX ADMIN — NYSTATIN: 100000 POWDER TOPICAL at 14:32

## 2018-01-01 RX ADMIN — ROCURONIUM BROMIDE 5 MG: 10 INJECTION INTRAVENOUS at 18:33

## 2018-01-01 RX ADMIN — PANTOPRAZOLE SODIUM 40 MG: 40 TABLET, DELAYED RELEASE ORAL at 06:45

## 2018-01-01 RX ADMIN — INSULIN DETEMIR 20 UNITS: 100 INJECTION, SOLUTION SUBCUTANEOUS at 20:38

## 2018-01-01 RX ADMIN — SODIUM CHLORIDE, POTASSIUM CHLORIDE, SODIUM LACTATE AND CALCIUM CHLORIDE: 600; 310; 30; 20 INJECTION, SOLUTION INTRAVENOUS at 19:11

## 2018-01-01 RX ADMIN — SULFAMETHOXAZOLE AND TRIMETHOPRIM 160 MG: 800; 160 TABLET ORAL at 08:46

## 2018-01-01 RX ADMIN — INSULIN ASPART 2 UNITS: 100 INJECTION, SOLUTION INTRAVENOUS; SUBCUTANEOUS at 17:17

## 2018-01-01 RX ADMIN — METOPROLOL SUCCINATE 50 MG: 50 TABLET, FILM COATED, EXTENDED RELEASE ORAL at 21:38

## 2018-01-01 RX ADMIN — MORPHINE SULFATE 30 MG: 15 TABLET ORAL at 08:16

## 2018-01-01 RX ADMIN — OXYCODONE HYDROCHLORIDE 10 MG: 5 TABLET ORAL at 19:21

## 2018-01-01 RX ADMIN — OXYCODONE HYDROCHLORIDE 10 MG: 5 TABLET ORAL at 23:28

## 2018-01-01 RX ADMIN — INSULIN ASPART 3 UNITS: 100 INJECTION, SOLUTION INTRAVENOUS; SUBCUTANEOUS at 18:16

## 2018-01-01 RX ADMIN — INSULIN DETEMIR 20 UNITS: 100 INJECTION, SOLUTION SUBCUTANEOUS at 21:38

## 2018-01-01 RX ADMIN — HYDROCODONE BITARTRATE AND ACETAMINOPHEN 1 TABLET: 5; 325 TABLET ORAL at 08:56

## 2018-01-01 RX ADMIN — OXYCODONE HYDROCHLORIDE AND ACETAMINOPHEN 1 TABLET: 7.5; 325 TABLET ORAL at 10:08

## 2018-01-01 RX ADMIN — MORPHINE SULFATE 2 MG: 2 INJECTION, SOLUTION INTRAMUSCULAR; INTRAVENOUS at 13:15

## 2018-01-01 RX ADMIN — SPIRONOLACTONE 100 MG: 100 TABLET ORAL at 10:42

## 2018-01-01 RX ADMIN — OXYCODONE HYDROCHLORIDE AND ACETAMINOPHEN 1 TABLET: 7.5; 325 TABLET ORAL at 13:28

## 2018-01-01 RX ADMIN — OXYCODONE HYDROCHLORIDE AND ACETAMINOPHEN 1 TABLET: 7.5; 325 TABLET ORAL at 06:56

## 2018-01-01 RX ADMIN — NYSTATIN: 100000 POWDER TOPICAL at 09:00

## 2018-01-01 RX ADMIN — OXYCODONE HYDROCHLORIDE 10 MG: 5 TABLET ORAL at 11:09

## 2018-01-01 RX ADMIN — GLYCOPYRROLATE 0.4 MG: 0.2 INJECTION, SOLUTION INTRAMUSCULAR; INTRAVENOUS at 15:30

## 2018-01-01 RX ADMIN — INSULIN ASPART 4 UNITS: 100 INJECTION, SOLUTION INTRAVENOUS; SUBCUTANEOUS at 11:25

## 2018-01-01 RX ADMIN — Medication 3 ML: at 21:02

## 2018-01-01 RX ADMIN — COBICISTAT 150 MG: 150 TABLET, FILM COATED ORAL at 09:35

## 2018-01-01 RX ADMIN — OXYCODONE HYDROCHLORIDE AND ACETAMINOPHEN 1 TABLET: 7.5; 325 TABLET ORAL at 22:32

## 2018-01-01 RX ADMIN — RALTEGRAVIR 400 MG: 400 TABLET, FILM COATED ORAL at 09:06

## 2018-01-01 RX ADMIN — FENTANYL CITRATE 25 MCG: 50 INJECTION INTRAMUSCULAR; INTRAVENOUS at 16:06

## 2018-01-01 RX ADMIN — RIFAXIMIN 550 MG: 550 TABLET ORAL at 20:52

## 2018-01-01 RX ADMIN — RIFAXIMIN 550 MG: 550 TABLET ORAL at 09:29

## 2018-01-01 RX ADMIN — RIFAXIMIN 550 MG: 550 TABLET ORAL at 21:50

## 2018-01-01 RX ADMIN — GLYCOPYRROLATE 0.4 MG: 0.2 INJECTION, SOLUTION INTRAMUSCULAR; INTRAVENOUS at 12:01

## 2018-01-01 RX ADMIN — INSULIN ASPART 2 UNITS: 100 INJECTION, SOLUTION INTRAVENOUS; SUBCUTANEOUS at 10:09

## 2018-01-01 RX ADMIN — IPRATROPIUM BROMIDE AND ALBUTEROL SULFATE 3 ML: .5; 3 SOLUTION RESPIRATORY (INHALATION) at 16:05

## 2018-01-01 RX ADMIN — TAMSULOSIN HYDROCHLORIDE 0.4 MG: 0.4 CAPSULE ORAL at 10:53

## 2018-01-01 RX ADMIN — CLONAZEPAM 0.5 MG: 0.5 TABLET ORAL at 08:50

## 2018-01-01 RX ADMIN — LORAZEPAM 0.5 MG: 2 INJECTION INTRAMUSCULAR; INTRAVENOUS at 18:44

## 2018-01-01 RX ADMIN — CEFAZOLIN SODIUM 2 G: 2 INJECTION, SOLUTION INTRAVENOUS at 19:55

## 2018-01-01 RX ADMIN — LEVOTHYROXINE SODIUM 300 MCG: 150 TABLET ORAL at 07:38

## 2018-01-01 RX ADMIN — HYDROMORPHONE HYDROCHLORIDE 0.5 MG: 10 INJECTION INTRAMUSCULAR; INTRAVENOUS; SUBCUTANEOUS at 01:37

## 2018-01-01 RX ADMIN — OXYCODONE HYDROCHLORIDE 10 MG: 5 TABLET ORAL at 08:56

## 2018-01-01 RX ADMIN — MIDAZOLAM HYDROCHLORIDE 2 MG: 2 INJECTION, SOLUTION INTRAMUSCULAR; INTRAVENOUS at 14:57

## 2018-01-01 RX ADMIN — FAMOTIDINE 40 MG: 40 TABLET, FILM COATED ORAL at 09:04

## 2018-01-01 RX ADMIN — DARUNAVIR 800 MG: 800 TABLET, FILM COATED ORAL at 09:00

## 2018-01-01 RX ADMIN — LEVOTHYROXINE SODIUM 300 MCG: 150 TABLET ORAL at 09:25

## 2018-01-01 RX ADMIN — LACTULOSE 20 G: 20 SOLUTION ORAL at 18:42

## 2018-01-01 RX ADMIN — OXYCODONE HYDROCHLORIDE AND ACETAMINOPHEN 1 TABLET: 7.5; 325 TABLET ORAL at 12:52

## 2018-01-01 RX ADMIN — INSULIN ASPART 4 UNITS: 100 INJECTION, SOLUTION INTRAVENOUS; SUBCUTANEOUS at 17:27

## 2018-01-01 RX ADMIN — EMTRICITABINE AND TENOFOVIR DISOPROXIL FUMARATE 1 TABLET: 200; 300 TABLET, FILM COATED ORAL at 09:10

## 2018-01-01 RX ADMIN — RIFAXIMIN 550 MG: 550 TABLET ORAL at 22:25

## 2018-01-01 RX ADMIN — RIFAXIMIN 550 MG: 550 TABLET ORAL at 20:38

## 2018-01-01 RX ADMIN — GLYCOPYRROLATE 0.4 MG: 0.2 INJECTION, SOLUTION INTRAMUSCULAR; INTRAVENOUS at 20:27

## 2018-01-01 RX ADMIN — IPRATROPIUM BROMIDE AND ALBUTEROL SULFATE 3 ML: .5; 3 SOLUTION RESPIRATORY (INHALATION) at 13:09

## 2018-01-01 RX ADMIN — RIFAXIMIN 550 MG: 550 TABLET ORAL at 00:40

## 2018-01-01 RX ADMIN — INSULIN ASPART 3 UNITS: 100 INJECTION, SOLUTION INTRAVENOUS; SUBCUTANEOUS at 11:56

## 2018-01-01 RX ADMIN — HYDROMORPHONE HYDROCHLORIDE 1 MG: 1 INJECTION, SOLUTION INTRAMUSCULAR; INTRAVENOUS; SUBCUTANEOUS at 13:40

## 2018-01-01 RX ADMIN — HYDROMORPHONE HYDROCHLORIDE 0.5 MG: 10 INJECTION INTRAMUSCULAR; INTRAVENOUS; SUBCUTANEOUS at 19:55

## 2018-01-01 RX ADMIN — MORPHINE SULFATE 2 MG: 4 INJECTION, SOLUTION INTRAMUSCULAR; INTRAVENOUS at 00:18

## 2018-01-01 RX ADMIN — POTASSIUM CHLORIDE 10 MEQ: 750 CAPSULE, EXTENDED RELEASE ORAL at 19:03

## 2018-01-01 RX ADMIN — MORPHINE SULFATE 4 MG: 4 INJECTION INTRAVENOUS at 13:16

## 2018-01-01 RX ADMIN — RALTEGRAVIR 400 MG: 400 TABLET, FILM COATED ORAL at 09:23

## 2018-01-01 RX ADMIN — COBICISTAT 150 MG: 150 TABLET, FILM COATED ORAL at 10:53

## 2018-01-01 RX ADMIN — SPIRONOLACTONE 50 MG: 50 TABLET, FILM COATED ORAL at 08:51

## 2018-01-01 RX ADMIN — INSULIN DETEMIR 30 UNITS: 100 INJECTION, SOLUTION SUBCUTANEOUS at 08:00

## 2018-01-01 RX ADMIN — INSULIN DETEMIR 30 UNITS: 100 INJECTION, SOLUTION SUBCUTANEOUS at 09:09

## 2018-01-01 RX ADMIN — RALTEGRAVIR 400 MG: 400 TABLET, FILM COATED ORAL at 01:11

## 2018-01-01 RX ADMIN — HYDROMORPHONE HYDROCHLORIDE 1 MG: 1 INJECTION, SOLUTION INTRAMUSCULAR; INTRAVENOUS; SUBCUTANEOUS at 01:56

## 2018-01-01 RX ADMIN — INSULIN ASPART 2 UNITS: 100 INJECTION, SOLUTION INTRAVENOUS; SUBCUTANEOUS at 09:29

## 2018-01-01 RX ADMIN — LORAZEPAM 1 MG: 2 INJECTION INTRAMUSCULAR; INTRAVENOUS at 18:58

## 2018-01-01 RX ADMIN — TAMSULOSIN HYDROCHLORIDE 0.4 MG: 0.4 CAPSULE ORAL at 21:23

## 2018-01-01 RX ADMIN — DARUNAVIR 800 MG: 800 TABLET, FILM COATED ORAL at 08:36

## 2018-01-01 RX ADMIN — FUROSEMIDE 20 MG: 20 TABLET ORAL at 08:16

## 2018-01-01 RX ADMIN — IPRATROPIUM BROMIDE AND ALBUTEROL SULFATE 3 ML: .5; 3 SOLUTION RESPIRATORY (INHALATION) at 07:36

## 2018-01-01 RX ADMIN — FUROSEMIDE 20 MG: 10 INJECTION, SOLUTION INTRAMUSCULAR; INTRAVENOUS at 13:13

## 2018-01-01 RX ADMIN — HYDROMORPHONE HYDROCHLORIDE 2 MG: 2 INJECTION INTRAMUSCULAR; INTRAVENOUS; SUBCUTANEOUS at 20:47

## 2018-01-01 RX ADMIN — PHENYLEPHRINE HYDROCHLORIDE 200 MCG: 10 INJECTION INTRAVENOUS at 15:47

## 2018-01-01 RX ADMIN — OXYCODONE HYDROCHLORIDE AND ACETAMINOPHEN 1 TABLET: 7.5; 325 TABLET ORAL at 14:20

## 2018-01-01 RX ADMIN — LEVOTHYROXINE SODIUM 300 MCG: 150 TABLET ORAL at 06:50

## 2018-01-01 RX ADMIN — METOPROLOL SUCCINATE 50 MG: 50 TABLET, FILM COATED, EXTENDED RELEASE ORAL at 08:37

## 2018-01-01 RX ADMIN — CLONAZEPAM 0.5 MG: 0.5 TABLET ORAL at 08:54

## 2018-01-01 RX ADMIN — FAMOTIDINE 20 MG: 10 INJECTION, SOLUTION INTRAVENOUS at 14:56

## 2018-01-01 RX ADMIN — DARUNAVIR 800 MG: 800 TABLET, FILM COATED ORAL at 13:15

## 2018-01-01 RX ADMIN — RIFAXIMIN 550 MG: 550 TABLET ORAL at 21:42

## 2018-01-01 RX ADMIN — MORPHINE SULFATE 4 MG: 4 INJECTION INTRAVENOUS at 18:58

## 2018-01-01 RX ADMIN — SODIUM CHLORIDE, POTASSIUM CHLORIDE, SODIUM LACTATE AND CALCIUM CHLORIDE 75 ML/HR: 600; 310; 30; 20 INJECTION, SOLUTION INTRAVENOUS at 21:57

## 2018-01-01 RX ADMIN — OXYCODONE HYDROCHLORIDE 10 MG: 10 TABLET, FILM COATED, EXTENDED RELEASE ORAL at 20:42

## 2018-01-01 RX ADMIN — INSULIN ASPART 3 UNITS: 100 INJECTION, SOLUTION INTRAVENOUS; SUBCUTANEOUS at 12:53

## 2018-01-01 RX ADMIN — HYDROMORPHONE HYDROCHLORIDE 1 MG: 1 INJECTION, SOLUTION INTRAMUSCULAR; INTRAVENOUS; SUBCUTANEOUS at 20:27

## 2018-01-01 RX ADMIN — MORPHINE SULFATE 2 MG: 4 INJECTION, SOLUTION INTRAMUSCULAR; INTRAVENOUS at 08:51

## 2018-01-01 RX ADMIN — HYDROMORPHONE HYDROCHLORIDE 1 MG: 1 INJECTION, SOLUTION INTRAMUSCULAR; INTRAVENOUS; SUBCUTANEOUS at 17:08

## 2018-01-01 RX ADMIN — Medication 3 ML: at 21:58

## 2018-01-01 RX ADMIN — CLONAZEPAM 0.5 MG: 1 TABLET ORAL at 21:53

## 2018-01-01 RX ADMIN — LORAZEPAM 1 MG: 2 INJECTION INTRAMUSCULAR; INTRAVENOUS at 13:17

## 2018-01-01 RX ADMIN — GLYCOPYRROLATE 0.4 MG: 0.2 INJECTION, SOLUTION INTRAMUSCULAR; INTRAVENOUS at 00:41

## 2018-01-01 RX ADMIN — OXYCODONE HYDROCHLORIDE AND ACETAMINOPHEN 1 TABLET: 7.5; 325 TABLET ORAL at 05:42

## 2018-01-01 RX ADMIN — FLUCONAZOLE 100 MG: 100 TABLET ORAL at 08:37

## 2018-01-01 RX ADMIN — RIFAXIMIN 550 MG: 550 TABLET ORAL at 10:08

## 2018-01-01 RX ADMIN — METOPROLOL SUCCINATE 50 MG: 50 TABLET, FILM COATED, EXTENDED RELEASE ORAL at 09:29

## 2018-01-01 RX ADMIN — FAMOTIDINE 20 MG: 10 INJECTION, SOLUTION INTRAVENOUS at 08:39

## 2018-01-01 RX ADMIN — TENOFOVIR DISOPROXIL FUMARATE 300 MG: 300 TABLET, COATED ORAL at 18:42

## 2018-01-01 RX ADMIN — LACTULOSE 20 G: 20 SOLUTION ORAL at 15:20

## 2018-01-01 RX ADMIN — SPIRONOLACTONE 25 MG: 25 TABLET ORAL at 08:16

## 2018-01-01 RX ADMIN — FUROSEMIDE 40 MG: 40 TABLET ORAL at 08:48

## 2018-01-01 RX ADMIN — LACTULOSE 20 G: 20 SOLUTION ORAL at 21:21

## 2018-01-01 RX ADMIN — HYDROMORPHONE HYDROCHLORIDE 1 MG: 1 INJECTION, SOLUTION INTRAMUSCULAR; INTRAVENOUS; SUBCUTANEOUS at 12:32

## 2018-01-01 RX ADMIN — DARUNAVIR 800 MG: 800 TABLET, FILM COATED ORAL at 10:07

## 2018-01-01 RX ADMIN — METOPROLOL SUCCINATE 50 MG: 50 TABLET, FILM COATED, EXTENDED RELEASE ORAL at 21:20

## 2018-01-01 RX ADMIN — POTASSIUM CHLORIDE 10 MEQ: 750 CAPSULE, EXTENDED RELEASE ORAL at 10:42

## 2018-01-01 RX ADMIN — POTASSIUM CHLORIDE 20 MEQ: 750 CAPSULE, EXTENDED RELEASE ORAL at 17:51

## 2018-01-01 RX ADMIN — MORPHINE SULFATE 30 MG: 15 TABLET ORAL at 08:59

## 2018-01-01 RX ADMIN — EMTRICITABINE AND TENOFOVIR DISOPROXIL FUMARATE 1 TABLET: 200; 300 TABLET, FILM COATED ORAL at 09:05

## 2018-01-01 RX ADMIN — RALTEGRAVIR 400 MG: 400 TABLET, FILM COATED ORAL at 21:20

## 2018-01-01 RX ADMIN — INSULIN ASPART 2 UNITS: 100 INJECTION, SOLUTION INTRAVENOUS; SUBCUTANEOUS at 20:37

## 2018-01-01 RX ADMIN — RIFAXIMIN 550 MG: 550 TABLET ORAL at 08:54

## 2018-01-01 RX ADMIN — RALTEGRAVIR 400 MG: 400 TABLET, FILM COATED ORAL at 09:26

## 2018-01-01 RX ADMIN — TENOFOVIR DISOPROXIL FUMARATE 300 MG: 300 TABLET, FILM COATED ORAL at 08:50

## 2018-01-01 RX ADMIN — ONDANSETRON 4 MG: 4 TABLET, ORALLY DISINTEGRATING ORAL at 19:38

## 2018-01-01 RX ADMIN — GLYCOPYRROLATE 0.2 MG: 0.2 INJECTION, SOLUTION INTRAMUSCULAR; INTRAVENOUS at 05:23

## 2018-01-01 RX ADMIN — OXYCODONE HYDROCHLORIDE 10 MG: 5 TABLET ORAL at 04:02

## 2018-01-01 RX ADMIN — METOPROLOL SUCCINATE 50 MG: 50 TABLET, FILM COATED, EXTENDED RELEASE ORAL at 08:50

## 2018-01-01 RX ADMIN — OXYCODONE HYDROCHLORIDE 10 MG: 5 TABLET ORAL at 18:15

## 2018-01-01 RX ADMIN — RIFAXIMIN 550 MG: 550 TABLET ORAL at 21:02

## 2018-01-01 RX ADMIN — SPIRONOLACTONE 50 MG: 50 TABLET, FILM COATED ORAL at 09:25

## 2018-01-01 RX ADMIN — IPRATROPIUM BROMIDE AND ALBUTEROL SULFATE 3 ML: .5; 3 SOLUTION RESPIRATORY (INHALATION) at 01:35

## 2018-01-01 RX ADMIN — OXYCODONE HYDROCHLORIDE 10 MG: 10 TABLET, FILM COATED, EXTENDED RELEASE ORAL at 09:11

## 2018-01-01 RX ADMIN — METHYLPREDNISOLONE SODIUM SUCCINATE 40 MG: 125 INJECTION, POWDER, FOR SOLUTION INTRAMUSCULAR; INTRAVENOUS at 12:02

## 2018-01-01 RX ADMIN — HYDROMORPHONE HYDROCHLORIDE 1 MG: 1 INJECTION, SOLUTION INTRAMUSCULAR; INTRAVENOUS; SUBCUTANEOUS at 19:56

## 2018-01-01 RX ADMIN — METOPROLOL SUCCINATE 50 MG: 50 TABLET, FILM COATED, EXTENDED RELEASE ORAL at 20:03

## 2018-01-01 RX ADMIN — RALTEGRAVIR 400 MG: 400 TABLET, FILM COATED ORAL at 20:05

## 2018-01-01 RX ADMIN — INSULIN ASPART 2 UNITS: 100 INJECTION, SOLUTION INTRAVENOUS; SUBCUTANEOUS at 08:16

## 2018-01-01 RX ADMIN — LORAZEPAM 0.5 MG: 0.5 TABLET ORAL at 23:27

## 2018-01-01 RX ADMIN — LEVOTHYROXINE SODIUM 300 MCG: 150 TABLET ORAL at 07:45

## 2018-01-01 RX ADMIN — SPIRONOLACTONE 50 MG: 50 TABLET, FILM COATED ORAL at 10:11

## 2018-01-01 RX ADMIN — COBICISTAT 150 MG: 150 TABLET, FILM COATED ORAL at 09:05

## 2018-01-01 RX ADMIN — IPRATROPIUM BROMIDE AND ALBUTEROL SULFATE 3 ML: .5; 3 SOLUTION RESPIRATORY (INHALATION) at 23:49

## 2018-01-01 RX ADMIN — SODIUM CHLORIDE, POTASSIUM CHLORIDE, SODIUM LACTATE AND CALCIUM CHLORIDE 75 ML/HR: 600; 310; 30; 20 INJECTION, SOLUTION INTRAVENOUS at 04:03

## 2018-01-01 RX ADMIN — RIFAXIMIN 550 MG: 550 TABLET ORAL at 08:37

## 2018-01-01 RX ADMIN — LORAZEPAM 0.5 MG: 2 INJECTION INTRAMUSCULAR; INTRAVENOUS at 09:00

## 2018-01-01 RX ADMIN — DARUNAVIR 800 MG: 800 TABLET, FILM COATED ORAL at 09:11

## 2018-01-01 RX ADMIN — INSULIN ASPART 2 UNITS: 100 INJECTION, SOLUTION INTRAVENOUS; SUBCUTANEOUS at 20:31

## 2018-01-01 RX ADMIN — RIFAXIMIN 550 MG: 550 TABLET ORAL at 20:40

## 2018-01-01 RX ADMIN — HYDROMORPHONE HYDROCHLORIDE 4 MG: 2 TABLET ORAL at 13:41

## 2018-01-01 RX ADMIN — FUROSEMIDE 20 MG: 20 TABLET ORAL at 09:36

## 2018-01-01 RX ADMIN — PANTOPRAZOLE SODIUM 40 MG: 40 TABLET, DELAYED RELEASE ORAL at 10:42

## 2018-01-01 RX ADMIN — HYDROMORPHONE HYDROCHLORIDE 1 MG: 1 INJECTION, SOLUTION INTRAMUSCULAR; INTRAVENOUS; SUBCUTANEOUS at 17:00

## 2018-01-01 RX ADMIN — INSULIN ASPART 4 UNITS: 100 INJECTION, SOLUTION INTRAVENOUS; SUBCUTANEOUS at 13:03

## 2018-01-01 RX ADMIN — HYDROMORPHONE HYDROCHLORIDE 0.5 MG: 1 INJECTION, SOLUTION INTRAMUSCULAR; INTRAVENOUS; SUBCUTANEOUS at 14:21

## 2018-01-01 RX ADMIN — EMTRICITABINE AND TENOFOVIR DISOPROXIL FUMARATE 1 TABLET: 200; 300 TABLET, FILM COATED ORAL at 09:34

## 2018-01-01 RX ADMIN — RIFAXIMIN 550 MG: 550 TABLET ORAL at 20:02

## 2018-01-01 RX ADMIN — HYDROCODONE BITARTRATE AND ACETAMINOPHEN 1 TABLET: 5; 325 TABLET ORAL at 01:11

## 2018-01-01 RX ADMIN — RIFAXIMIN 550 MG: 550 TABLET ORAL at 21:30

## 2018-01-01 RX ADMIN — GLYCOPYRROLATE 0.2 MG: 0.2 INJECTION INTRAMUSCULAR; INTRAVENOUS at 15:35

## 2018-01-01 RX ADMIN — FERUMOXYTOL 510 MG: 510 INJECTION INTRAVENOUS at 15:55

## 2018-01-01 RX ADMIN — HYDROMORPHONE HYDROCHLORIDE 1 MG: 1 INJECTION, SOLUTION INTRAMUSCULAR; INTRAVENOUS; SUBCUTANEOUS at 05:27

## 2018-01-01 RX ADMIN — FLUCONAZOLE 100 MG: 100 TABLET ORAL at 10:42

## 2018-01-01 RX ADMIN — RIFAXIMIN 550 MG: 550 TABLET ORAL at 08:58

## 2018-01-01 RX ADMIN — RIFAXIMIN 550 MG: 550 TABLET ORAL at 09:35

## 2018-01-01 RX ADMIN — LACTULOSE 20 G: 20 SOLUTION ORAL at 09:04

## 2018-01-01 RX ADMIN — COBICISTAT 150 MG: 150 TABLET, FILM COATED ORAL at 10:06

## 2018-01-01 RX ADMIN — IPRATROPIUM BROMIDE AND ALBUTEROL SULFATE 3 ML: .5; 3 SOLUTION RESPIRATORY (INHALATION) at 07:29

## 2018-01-01 RX ADMIN — RIFAXIMIN 550 MG: 550 TABLET ORAL at 10:11

## 2018-01-01 RX ADMIN — NEOSTIGMINE METHYLSULFATE 2 MG: 5 INJECTION, SOLUTION INTRAMUSCULAR; INTRAVENOUS; SUBCUTANEOUS at 13:01

## 2018-01-01 RX ADMIN — RALTEGRAVIR 400 MG: 400 TABLET, FILM COATED ORAL at 20:32

## 2018-01-01 RX ADMIN — POTASSIUM CHLORIDE 10 MEQ: 750 CAPSULE, EXTENDED RELEASE ORAL at 10:56

## 2018-01-01 RX ADMIN — FENTANYL CITRATE 50 MCG: 50 INJECTION INTRAMUSCULAR; INTRAVENOUS at 20:05

## 2018-01-01 RX ADMIN — RIFAXIMIN 550 MG: 550 TABLET ORAL at 20:37

## 2018-01-01 RX ADMIN — ACETAMINOPHEN 650 MG: 325 TABLET ORAL at 14:43

## 2018-01-01 RX ADMIN — GLYCOPYRROLATE 0.4 MG: 0.2 INJECTION, SOLUTION INTRAMUSCULAR; INTRAVENOUS at 08:36

## 2018-01-01 RX ADMIN — GLYCOPYRROLATE 0.4 MG: 0.2 INJECTION, SOLUTION INTRAMUSCULAR; INTRAVENOUS at 13:16

## 2018-01-01 RX ADMIN — FLUCONAZOLE 100 MG: 100 TABLET ORAL at 16:17

## 2018-01-01 RX ADMIN — RALTEGRAVIR 400 MG: 400 TABLET, FILM COATED ORAL at 09:00

## 2018-01-01 RX ADMIN — TENOFOVIR DISOPROXIL FUMARATE 300 MG: 300 TABLET, FILM COATED ORAL at 09:34

## 2018-01-01 RX ADMIN — LACTULOSE 20 G: 20 SOLUTION ORAL at 21:03

## 2018-01-01 RX ADMIN — FLUCONAZOLE 100 MG: 100 TABLET ORAL at 09:27

## 2018-01-01 RX ADMIN — HYDROMORPHONE HYDROCHLORIDE 1 MG: 1 INJECTION, SOLUTION INTRAMUSCULAR; INTRAVENOUS; SUBCUTANEOUS at 09:55

## 2018-01-01 RX ADMIN — INSULIN ASPART 2 UNITS: 100 INJECTION, SOLUTION INTRAVENOUS; SUBCUTANEOUS at 13:13

## 2018-01-01 RX ADMIN — COBICISTAT 150 MG: 150 TABLET, FILM COATED ORAL at 09:04

## 2018-01-01 RX ADMIN — RALTEGRAVIR 400 MG: 400 TABLET, FILM COATED ORAL at 10:49

## 2018-01-01 RX ADMIN — EMTRICITABINE AND TENOFOVIR DISOPROXIL FUMARATE 1 TABLET: 200; 300 TABLET, FILM COATED ORAL at 10:07

## 2018-01-01 RX ADMIN — GUAIFENESIN 1200 MG: 600 TABLET, EXTENDED RELEASE ORAL at 13:15

## 2018-01-01 RX ADMIN — FUROSEMIDE 40 MG: 40 TABLET ORAL at 10:42

## 2018-01-01 RX ADMIN — GUAIFENESIN 1200 MG: 600 TABLET, EXTENDED RELEASE ORAL at 08:39

## 2018-01-01 RX ADMIN — LEVOTHYROXINE SODIUM 300 MCG: 150 TABLET ORAL at 08:59

## 2018-01-01 RX ADMIN — LORAZEPAM 1 MG: 2 INJECTION INTRAMUSCULAR; INTRAVENOUS at 14:45

## 2018-01-01 RX ADMIN — OXYCODONE HYDROCHLORIDE 10 MG: 10 TABLET, FILM COATED, EXTENDED RELEASE ORAL at 21:42

## 2018-01-01 RX ADMIN — RIFAXIMIN 550 MG: 550 TABLET ORAL at 09:25

## 2018-01-01 RX ADMIN — INSULIN ASPART 8 UNITS: 100 INJECTION, SOLUTION INTRAVENOUS; SUBCUTANEOUS at 13:11

## 2018-01-01 RX ADMIN — DARUNAVIR 800 MG: 800 TABLET, FILM COATED ORAL at 08:47

## 2018-01-01 RX ADMIN — GLYCOPYRROLATE 0.2 MG: 0.2 INJECTION, SOLUTION INTRAMUSCULAR; INTRAVENOUS at 09:01

## 2018-01-01 RX ADMIN — LORAZEPAM 2 MG: 2 INJECTION INTRAMUSCULAR; INTRAVENOUS at 08:36

## 2018-01-01 RX ADMIN — INSULIN ASPART 4 UNITS: 100 INJECTION, SOLUTION INTRAVENOUS; SUBCUTANEOUS at 12:25

## 2018-01-01 RX ADMIN — DARUNAVIR 800 MG: 800 TABLET, FILM COATED ORAL at 08:59

## 2018-01-01 RX ADMIN — HYDROMORPHONE HYDROCHLORIDE 0.5 MG: 1 INJECTION, SOLUTION INTRAMUSCULAR; INTRAVENOUS; SUBCUTANEOUS at 09:43

## 2018-01-01 RX ADMIN — ALBUTEROL SULFATE 2.5 MG: 2.5 SOLUTION RESPIRATORY (INHALATION) at 23:38

## 2018-01-01 RX ADMIN — TAMSULOSIN HYDROCHLORIDE 0.4 MG: 0.4 CAPSULE ORAL at 08:39

## 2018-01-01 RX ADMIN — FAMOTIDINE 20 MG: 10 INJECTION, SOLUTION INTRAVENOUS at 14:47

## 2018-01-01 RX ADMIN — OXYCODONE HYDROCHLORIDE 10 MG: 5 TABLET ORAL at 18:54

## 2018-01-01 RX ADMIN — CLONAZEPAM 0.5 MG: 0.5 TABLET ORAL at 09:00

## 2018-01-01 RX ADMIN — MORPHINE SULFATE 15 MG: 15 TABLET ORAL at 01:01

## 2018-01-01 RX ADMIN — INSULIN ASPART 3 UNITS: 100 INJECTION, SOLUTION INTRAVENOUS; SUBCUTANEOUS at 21:34

## 2018-01-01 RX ADMIN — OXYCODONE HYDROCHLORIDE 10 MG: 10 TABLET, FILM COATED, EXTENDED RELEASE ORAL at 10:52

## 2018-01-01 RX ADMIN — IPRATROPIUM BROMIDE AND ALBUTEROL SULFATE 3 ML: .5; 3 SOLUTION RESPIRATORY (INHALATION) at 12:05

## 2018-01-01 RX ADMIN — METOPROLOL SUCCINATE 50 MG: 50 TABLET, FILM COATED, EXTENDED RELEASE ORAL at 19:56

## 2018-01-01 RX ADMIN — OXYCODONE HYDROCHLORIDE 10 MG: 5 TABLET ORAL at 18:37

## 2018-01-01 RX ADMIN — HYDROMORPHONE HYDROCHLORIDE 0.5 MG: 1 INJECTION, SOLUTION INTRAMUSCULAR; INTRAVENOUS; SUBCUTANEOUS at 20:37

## 2018-01-01 RX ADMIN — METOPROLOL SUCCINATE 50 MG: 50 TABLET, FILM COATED, EXTENDED RELEASE ORAL at 22:25

## 2018-01-01 RX ADMIN — OXYCODONE HYDROCHLORIDE 10 MG: 5 TABLET ORAL at 08:16

## 2018-01-01 RX ADMIN — FERUMOXYTOL 510 MG: 510 INJECTION INTRAVENOUS at 14:25

## 2018-01-01 RX ADMIN — MORPHINE SULFATE 30 MG: 15 TABLET ORAL at 07:22

## 2018-01-01 RX ADMIN — HYDROMORPHONE HYDROCHLORIDE 0.5 MG: 1 INJECTION, SOLUTION INTRAMUSCULAR; INTRAVENOUS; SUBCUTANEOUS at 08:45

## 2018-01-01 RX ADMIN — GLYCOPYRROLATE 0.4 MG: 0.2 INJECTION INTRAMUSCULAR; INTRAVENOUS at 13:01

## 2018-01-01 RX ADMIN — FUROSEMIDE 20 MG: 20 TABLET ORAL at 09:05

## 2018-01-01 RX ADMIN — LORAZEPAM 1 MG: 2 INJECTION INTRAMUSCULAR; INTRAVENOUS at 21:37

## 2018-01-01 RX ADMIN — HYDROMORPHONE HYDROCHLORIDE 1 MG: 1 INJECTION, SOLUTION INTRAMUSCULAR; INTRAVENOUS; SUBCUTANEOUS at 08:50

## 2018-01-01 RX ADMIN — INSULIN ASPART 3 UNITS: 100 INJECTION, SOLUTION INTRAVENOUS; SUBCUTANEOUS at 13:06

## 2018-01-01 RX ADMIN — INSULIN ASPART 3 UNITS: 100 INJECTION, SOLUTION INTRAVENOUS; SUBCUTANEOUS at 23:22

## 2018-01-01 RX ADMIN — TAMSULOSIN HYDROCHLORIDE 0.4 MG: 0.4 CAPSULE ORAL at 08:17

## 2018-01-01 RX ADMIN — FUROSEMIDE 20 MG: 20 TABLET ORAL at 20:02

## 2018-01-01 RX ADMIN — FENTANYL CITRATE 50 MCG: 50 INJECTION, SOLUTION INTRAMUSCULAR; INTRAVENOUS at 16:13

## 2018-01-01 RX ADMIN — TENOFOVIR DISOPROXIL FUMARATE 300 MG: 300 TABLET, FILM COATED ORAL at 10:43

## 2018-01-01 RX ADMIN — LORAZEPAM 0.5 MG: 2 INJECTION INTRAMUSCULAR; INTRAVENOUS at 12:01

## 2018-01-01 RX ADMIN — SPIRONOLACTONE 25 MG: 25 TABLET ORAL at 10:53

## 2018-01-01 RX ADMIN — METOPROLOL SUCCINATE 50 MG: 50 TABLET, FILM COATED, EXTENDED RELEASE ORAL at 10:56

## 2018-01-01 RX ADMIN — FUROSEMIDE 20 MG: 20 TABLET ORAL at 08:39

## 2018-01-01 RX ADMIN — IPRATROPIUM BROMIDE AND ALBUTEROL SULFATE 3 ML: .5; 3 SOLUTION RESPIRATORY (INHALATION) at 19:35

## 2018-01-01 RX ADMIN — HYDROMORPHONE HYDROCHLORIDE 0.5 MG: 1 INJECTION, SOLUTION INTRAMUSCULAR; INTRAVENOUS; SUBCUTANEOUS at 04:39

## 2018-01-01 RX ADMIN — OXYCODONE HYDROCHLORIDE 10 MG: 5 TABLET ORAL at 21:53

## 2018-01-01 RX ADMIN — COBICISTAT 150 MG: 150 TABLET, FILM COATED ORAL at 18:42

## 2018-01-01 RX ADMIN — FLUCONAZOLE 100 MG: 100 TABLET ORAL at 09:25

## 2018-01-01 RX ADMIN — COBICISTAT 150 MG: 150 TABLET, FILM COATED ORAL at 09:06

## 2018-01-01 RX ADMIN — FONDAPARINUX SODIUM 2.5 MG: 2.5 INJECTION, SOLUTION SUBCUTANEOUS at 10:02

## 2018-01-01 RX ADMIN — MORPHINE SULFATE 4 MG: 4 INJECTION INTRAVENOUS at 20:07

## 2018-01-01 RX ADMIN — FAMOTIDINE 20 MG: 20 TABLET, FILM COATED ORAL at 14:14

## 2018-01-01 RX ADMIN — TENOFOVIR DISOPROXIL FUMARATE 300 MG: 300 TABLET, FILM COATED ORAL at 08:56

## 2018-01-01 RX ADMIN — TAMSULOSIN HYDROCHLORIDE 0.4 MG: 0.4 CAPSULE ORAL at 13:16

## 2018-01-01 RX ADMIN — COBICISTAT 150 MG: 150 TABLET, FILM COATED ORAL at 08:00

## 2018-01-01 RX ADMIN — HYDROMORPHONE HYDROCHLORIDE 1 MG: 1 INJECTION, SOLUTION INTRAMUSCULAR; INTRAVENOUS; SUBCUTANEOUS at 05:19

## 2018-01-01 RX ADMIN — DARUNAVIR 800 MG: 800 TABLET, FILM COATED ORAL at 09:05

## 2018-01-01 RX ADMIN — CEFAZOLIN SODIUM 2 G: 2 INJECTION, SOLUTION INTRAVENOUS at 21:56

## 2018-01-01 RX ADMIN — INSULIN ASPART 2 UNITS: 100 INJECTION, SOLUTION INTRAVENOUS; SUBCUTANEOUS at 08:37

## 2018-01-01 RX ADMIN — HYDROMORPHONE HYDROCHLORIDE 0.5 MG: 1 INJECTION, SOLUTION INTRAMUSCULAR; INTRAVENOUS; SUBCUTANEOUS at 17:53

## 2018-01-01 RX ADMIN — FUROSEMIDE 40 MG: 10 INJECTION, SOLUTION INTRAMUSCULAR; INTRAVENOUS at 12:13

## 2018-01-01 RX ADMIN — RIFAXIMIN 550 MG: 550 TABLET ORAL at 20:31

## 2018-01-01 RX ADMIN — HYDROMORPHONE HYDROCHLORIDE 0.5 MG: 1 INJECTION, SOLUTION INTRAMUSCULAR; INTRAVENOUS; SUBCUTANEOUS at 12:25

## 2018-01-01 RX ADMIN — CLONAZEPAM 0.25 MG: 0.5 TABLET ORAL at 22:37

## 2018-01-01 RX ADMIN — HYDROMORPHONE HYDROCHLORIDE 2 MG: 2 INJECTION INTRAMUSCULAR; INTRAVENOUS; SUBCUTANEOUS at 00:41

## 2018-01-01 RX ADMIN — RIFAXIMIN 550 MG: 550 TABLET ORAL at 09:27

## 2018-01-01 RX ADMIN — RALTEGRAVIR 400 MG: 400 TABLET, FILM COATED ORAL at 20:02

## 2018-01-01 RX ADMIN — COBICISTAT 150 MG: 150 TABLET, FILM COATED ORAL at 08:53

## 2018-01-01 RX ADMIN — FUROSEMIDE 80 MG: 10 INJECTION, SOLUTION INTRAMUSCULAR; INTRAVENOUS at 22:08

## 2018-01-01 RX ADMIN — LACTULOSE 20 G: 20 SOLUTION ORAL at 08:59

## 2018-01-01 RX ADMIN — LACTULOSE 20 G: 20 SOLUTION ORAL at 08:49

## 2018-01-01 RX ADMIN — LORAZEPAM 1 MG: 2 INJECTION INTRAMUSCULAR; INTRAVENOUS at 00:59

## 2018-01-01 RX ADMIN — COBICISTAT 150 MG: 150 TABLET, FILM COATED ORAL at 08:55

## 2018-01-01 RX ADMIN — TAMSULOSIN HYDROCHLORIDE 0.4 MG: 0.4 CAPSULE ORAL at 22:07

## 2018-01-01 RX ADMIN — MORPHINE SULFATE 2 MG: 4 INJECTION, SOLUTION INTRAMUSCULAR; INTRAVENOUS at 05:05

## 2018-01-01 RX ADMIN — Medication 3 ML: at 21:11

## 2018-01-01 RX ADMIN — LORAZEPAM 2 MG: 2 INJECTION INTRAMUSCULAR; INTRAVENOUS at 23:32

## 2018-01-01 RX ADMIN — FAMOTIDINE 20 MG: 10 INJECTION, SOLUTION INTRAVENOUS at 12:02

## 2018-01-01 RX ADMIN — HYDROMORPHONE HYDROCHLORIDE 1 MG: 1 INJECTION, SOLUTION INTRAMUSCULAR; INTRAVENOUS; SUBCUTANEOUS at 21:37

## 2018-01-01 RX ADMIN — RALTEGRAVIR 400 MG: 400 TABLET, FILM COATED ORAL at 22:25

## 2018-01-01 RX ADMIN — MORPHINE SULFATE 30 MG: 15 TABLET ORAL at 18:42

## 2018-01-01 RX ADMIN — FONDAPARINUX SODIUM 2.5 MG: 2.5 INJECTION, SOLUTION SUBCUTANEOUS at 08:15

## 2018-01-01 RX ADMIN — RALTEGRAVIR 400 MG: 400 TABLET, FILM COATED ORAL at 21:25

## 2018-01-01 RX ADMIN — SODIUM CHLORIDE, POTASSIUM CHLORIDE, SODIUM LACTATE AND CALCIUM CHLORIDE: 600; 310; 30; 20 INJECTION, SOLUTION INTRAVENOUS at 09:49

## 2018-01-01 RX ADMIN — POTASSIUM CHLORIDE 40 MEQ: 750 CAPSULE, EXTENDED RELEASE ORAL at 22:34

## 2018-01-01 RX ADMIN — NYSTATIN: 100000 POWDER TOPICAL at 10:50

## 2018-01-01 RX ADMIN — FUROSEMIDE 20 MG: 20 TABLET ORAL at 09:06

## 2018-01-01 RX ADMIN — RALTEGRAVIR 400 MG: 400 TABLET, FILM COATED ORAL at 22:00

## 2018-01-01 RX ADMIN — SCOPOLAMINE 1 PATCH: 1 PATCH, EXTENDED RELEASE TRANSDERMAL at 11:32

## 2018-01-01 RX ADMIN — Medication 3 ML: at 13:01

## 2018-01-01 RX ADMIN — METHYLPREDNISOLONE SODIUM SUCCINATE 40 MG: 40 INJECTION, POWDER, LYOPHILIZED, FOR SOLUTION INTRAMUSCULAR; INTRAVENOUS at 12:02

## 2018-01-01 RX ADMIN — TAMSULOSIN HYDROCHLORIDE 0.4 MG: 0.4 CAPSULE ORAL at 08:48

## 2018-01-01 RX ADMIN — LEVOTHYROXINE SODIUM 300 MCG: 150 TABLET ORAL at 08:46

## 2018-01-01 RX ADMIN — METOPROLOL SUCCINATE 50 MG: 50 TABLET, FILM COATED, EXTENDED RELEASE ORAL at 09:24

## 2018-01-01 RX ADMIN — MORPHINE SULFATE 30 MG: 15 TABLET ORAL at 19:59

## 2018-01-01 RX ADMIN — ALUMINUM HYDROXIDE, MAGNESIUM HYDROXIDE, AND DIMETHICONE 15 ML: 400; 400; 40 SUSPENSION ORAL at 22:07

## 2018-01-01 RX ADMIN — MORPHINE SULFATE 4 MG: 4 INJECTION INTRAVENOUS at 02:21

## 2018-01-01 RX ADMIN — TENOFOVIR DISOPROXIL FUMARATE 300 MG: 300 TABLET, FILM COATED ORAL at 09:27

## 2018-01-01 RX ADMIN — RALTEGRAVIR 400 MG: 400 TABLET, FILM COATED ORAL at 20:38

## 2018-01-01 RX ADMIN — RIFAXIMIN 550 MG: 550 TABLET ORAL at 08:50

## 2018-01-01 RX ADMIN — RIFAXIMIN 550 MG: 550 TABLET ORAL at 10:52

## 2018-01-01 RX ADMIN — COBICISTAT 150 MG: 150 TABLET, FILM COATED ORAL at 08:52

## 2018-01-01 RX ADMIN — METOPROLOL SUCCINATE 50 MG: 50 TABLET, FILM COATED, EXTENDED RELEASE ORAL at 00:41

## 2018-01-01 RX ADMIN — SPIRONOLACTONE 25 MG: 25 TABLET ORAL at 09:04

## 2018-01-01 RX ADMIN — SODIUM CHLORIDE, POTASSIUM CHLORIDE, SODIUM LACTATE AND CALCIUM CHLORIDE 9 ML/HR: 600; 310; 30; 20 INJECTION, SOLUTION INTRAVENOUS at 14:57

## 2018-01-01 RX ADMIN — OXYCODONE HYDROCHLORIDE 10 MG: 5 TABLET ORAL at 08:17

## 2018-01-01 RX ADMIN — RALTEGRAVIR 400 MG: 400 TABLET, FILM COATED ORAL at 09:33

## 2018-01-01 RX ADMIN — FLUCONAZOLE 100 MG: 100 TABLET ORAL at 00:35

## 2018-01-01 RX ADMIN — INSULIN ASPART 3 UNITS: 100 INJECTION, SOLUTION INTRAVENOUS; SUBCUTANEOUS at 21:20

## 2018-01-01 RX ADMIN — DIPHENHYDRAMINE HYDROCHLORIDE 25 MG: 25 CAPSULE ORAL at 15:20

## 2018-01-01 RX ADMIN — ROCURONIUM BROMIDE 40 MG: 10 INJECTION INTRAVENOUS at 12:09

## 2018-01-01 RX ADMIN — HYDROMORPHONE HYDROCHLORIDE 1 MG: 1 INJECTION, SOLUTION INTRAMUSCULAR; INTRAVENOUS; SUBCUTANEOUS at 01:05

## 2018-01-01 RX ADMIN — RIFAXIMIN 550 MG: 550 TABLET ORAL at 10:02

## 2018-01-01 RX ADMIN — HYDROMORPHONE HYDROCHLORIDE 4 MG: 2 TABLET ORAL at 04:59

## 2018-01-01 RX ADMIN — DOCUSATE SODIUM 200 MG: 100 CAPSULE, LIQUID FILLED ORAL at 21:03

## 2018-01-01 RX ADMIN — INSULIN ASPART 4 UNITS: 100 INJECTION, SOLUTION INTRAVENOUS; SUBCUTANEOUS at 18:33

## 2018-01-01 RX ADMIN — DARUNAVIR 800 MG: 800 TABLET, FILM COATED ORAL at 08:54

## 2018-01-01 RX ADMIN — Medication 150 ML: at 18:28

## 2018-01-01 RX ADMIN — HYDROMORPHONE HYDROCHLORIDE 1 MG: 1 INJECTION, SOLUTION INTRAMUSCULAR; INTRAVENOUS; SUBCUTANEOUS at 05:12

## 2018-01-01 RX ADMIN — FENTANYL CITRATE 25 MCG: 50 INJECTION INTRAMUSCULAR; INTRAVENOUS at 15:33

## 2018-01-01 RX ADMIN — SULFAMETHOXAZOLE AND TRIMETHOPRIM 160 MG: 800; 160 TABLET ORAL at 16:07

## 2018-01-01 RX ADMIN — DIPHENHYDRAMINE HYDROCHLORIDE 25 MG: 25 CAPSULE ORAL at 15:24

## 2018-01-01 RX ADMIN — MORPHINE SULFATE 4 MG: 4 INJECTION INTRAVENOUS at 05:42

## 2018-01-01 RX ADMIN — CEFAZOLIN SODIUM 2 G: 2 INJECTION, SOLUTION INTRAVENOUS at 04:02

## 2018-01-01 RX ADMIN — METOPROLOL SUCCINATE 50 MG: 50 TABLET, FILM COATED, EXTENDED RELEASE ORAL at 10:10

## 2018-01-01 RX ADMIN — EMTRICITABINE AND TENOFOVIR DISOPROXIL FUMARATE 1 TABLET: 200; 300 TABLET, FILM COATED ORAL at 09:26

## 2018-01-01 RX ADMIN — RILPIVIRINE HYDROCHLORIDE 25 MG: 25 TABLET, FILM COATED ORAL at 21:36

## 2018-01-01 RX ADMIN — HYDROMORPHONE HYDROCHLORIDE 2 MG: 2 INJECTION INTRAMUSCULAR; INTRAVENOUS; SUBCUTANEOUS at 19:38

## 2018-01-01 RX ADMIN — INSULIN ASPART 2 UNITS: 100 INJECTION, SOLUTION INTRAVENOUS; SUBCUTANEOUS at 17:51

## 2018-01-01 RX ADMIN — TENOFOVIR DISOPROXIL FUMARATE 300 MG: 300 TABLET, FILM COATED ORAL at 08:15

## 2018-01-01 RX ADMIN — DIPHENHYDRAMINE HYDROCHLORIDE 25 MG: 50 INJECTION INTRAMUSCULAR; INTRAVENOUS at 15:21

## 2018-01-01 RX ADMIN — HYDROMORPHONE HYDROCHLORIDE 1 MG: 1 INJECTION, SOLUTION INTRAMUSCULAR; INTRAVENOUS; SUBCUTANEOUS at 05:22

## 2018-01-01 RX ADMIN — HYDROMORPHONE HYDROCHLORIDE 0.5 MG: 1 INJECTION, SOLUTION INTRAMUSCULAR; INTRAVENOUS; SUBCUTANEOUS at 22:11

## 2018-01-01 RX ADMIN — SODIUM CHLORIDE 250 ML: 900 INJECTION, SOLUTION INTRAVENOUS at 15:50

## 2018-01-01 RX ADMIN — SCOPOLAMINE 1 PATCH: 1 PATCH, EXTENDED RELEASE TRANSDERMAL at 01:02

## 2018-01-01 RX ADMIN — CLONAZEPAM 0.5 MG: 0.5 TABLET ORAL at 09:26

## 2018-01-01 RX ADMIN — INSULIN ASPART 3 UNITS: 100 INJECTION, SOLUTION INTRAVENOUS; SUBCUTANEOUS at 11:39

## 2018-01-01 RX ADMIN — RIFAXIMIN 550 MG: 550 TABLET ORAL at 09:11

## 2018-01-01 RX ADMIN — LEVOTHYROXINE SODIUM 300 MCG: 150 TABLET ORAL at 09:11

## 2018-01-01 RX ADMIN — CLONAZEPAM 0.5 MG: 0.5 TABLET ORAL at 08:47

## 2018-01-01 RX ADMIN — LACTULOSE 20 G: 20 SOLUTION ORAL at 21:42

## 2018-01-01 RX ADMIN — DARUNAVIR 800 MG: 800 TABLET, FILM COATED ORAL at 09:33

## 2018-01-01 RX ADMIN — LORAZEPAM 1 MG: 2 INJECTION INTRAMUSCULAR; INTRAVENOUS at 17:08

## 2018-01-01 RX ADMIN — OXYCODONE HYDROCHLORIDE AND ACETAMINOPHEN 1 TABLET: 7.5; 325 TABLET ORAL at 15:41

## 2018-01-01 RX ADMIN — SPIRONOLACTONE 50 MG: 50 TABLET ORAL at 09:06

## 2018-01-01 RX ADMIN — HYDROMORPHONE HYDROCHLORIDE 2 MG: 2 INJECTION INTRAMUSCULAR; INTRAVENOUS; SUBCUTANEOUS at 08:44

## 2018-01-01 RX ADMIN — HYDROMORPHONE HYDROCHLORIDE 1 MG: 1 INJECTION, SOLUTION INTRAMUSCULAR; INTRAVENOUS; SUBCUTANEOUS at 11:53

## 2018-01-01 RX ADMIN — INSULIN ASPART 2 UNITS: 100 INJECTION, SOLUTION INTRAVENOUS; SUBCUTANEOUS at 17:04

## 2018-01-01 RX ADMIN — ONDANSETRON 4 MG: 4 TABLET, FILM COATED ORAL at 17:27

## 2018-01-01 RX ADMIN — MORPHINE SULFATE 30 MG: 15 TABLET ORAL at 11:29

## 2018-01-01 RX ADMIN — CEFAZOLIN SODIUM 2 G: 2 INJECTION, SOLUTION INTRAVENOUS at 12:19

## 2018-01-01 RX ADMIN — LEVOTHYROXINE SODIUM 300 MCG: 150 TABLET ORAL at 06:18

## 2018-01-01 RX ADMIN — GLYCOPYRROLATE 0.4 MG: 0.2 INJECTION, SOLUTION INTRAMUSCULAR; INTRAVENOUS at 08:43

## 2018-01-01 RX ADMIN — IRON SUCROSE 300 MG: 20 INJECTION, SOLUTION INTRAVENOUS at 16:22

## 2018-01-01 RX ADMIN — MORPHINE SULFATE 2 MG: 4 INJECTION, SOLUTION INTRAMUSCULAR; INTRAVENOUS at 16:52

## 2018-01-01 RX ADMIN — METOPROLOL SUCCINATE 50 MG: 50 TABLET, FILM COATED, EXTENDED RELEASE ORAL at 20:31

## 2018-01-01 RX ADMIN — CLONAZEPAM 0.5 MG: 0.5 TABLET ORAL at 08:37

## 2018-01-01 RX ADMIN — LORAZEPAM 2 MG: 2 INJECTION INTRAMUSCULAR; INTRAVENOUS at 05:42

## 2018-01-01 RX ADMIN — HYDROMORPHONE HYDROCHLORIDE 0.5 MG: 2 INJECTION INTRAMUSCULAR; INTRAVENOUS; SUBCUTANEOUS at 20:29

## 2018-01-01 RX ADMIN — FENTANYL CITRATE 50 MCG: 50 INJECTION, SOLUTION INTRAMUSCULAR; INTRAVENOUS at 14:56

## 2018-01-01 RX ADMIN — HYDROMORPHONE HYDROCHLORIDE 0.5 MG: 1 INJECTION, SOLUTION INTRAMUSCULAR; INTRAVENOUS; SUBCUTANEOUS at 05:10

## 2018-01-01 RX ADMIN — TENOFOVIR DISOPROXIL FUMARATE 300 MG: 300 TABLET, FILM COATED ORAL at 11:20

## 2018-01-01 RX ADMIN — SPIRONOLACTONE 50 MG: 50 TABLET ORAL at 08:48

## 2018-01-01 RX ADMIN — INSULIN ASPART 2 UNITS: 100 INJECTION, SOLUTION INTRAVENOUS; SUBCUTANEOUS at 18:03

## 2018-01-01 RX ADMIN — INSULIN ASPART 3 UNITS: 100 INJECTION, SOLUTION INTRAVENOUS; SUBCUTANEOUS at 12:49

## 2018-01-01 RX ADMIN — COBICISTAT 150 MG: 150 TABLET, FILM COATED ORAL at 09:00

## 2018-01-01 RX ADMIN — METOPROLOL SUCCINATE 50 MG: 50 TABLET, FILM COATED, EXTENDED RELEASE ORAL at 09:27

## 2018-01-01 RX ADMIN — METOPROLOL SUCCINATE 50 MG: 50 TABLET, FILM COATED, EXTENDED RELEASE ORAL at 09:23

## 2018-01-01 RX ADMIN — LACTULOSE 20 G: 20 SOLUTION ORAL at 20:02

## 2018-01-01 RX ADMIN — ICOSAPENT ETHYL 1 G: 1000 CAPSULE ORAL at 09:06

## 2018-01-01 RX ADMIN — POTASSIUM CHLORIDE 10 MEQ: 750 CAPSULE, EXTENDED RELEASE ORAL at 08:47

## 2018-01-01 RX ADMIN — LORAZEPAM 1 MG: 2 INJECTION INTRAMUSCULAR; INTRAVENOUS at 20:47

## 2018-01-01 RX ADMIN — RALTEGRAVIR 400 MG: 400 TABLET, FILM COATED ORAL at 08:59

## 2018-01-01 RX ADMIN — MORPHINE SULFATE 2 MG: 4 INJECTION, SOLUTION INTRAMUSCULAR; INTRAVENOUS at 03:13

## 2018-01-01 RX ADMIN — FERUMOXYTOL 510 MG: 510 INJECTION INTRAVENOUS at 14:57

## 2018-01-01 RX ADMIN — RIFAXIMIN 550 MG: 550 TABLET ORAL at 21:52

## 2018-01-01 RX ADMIN — DARUNAVIR 800 MG: 800 TABLET, FILM COATED ORAL at 18:40

## 2018-01-01 RX ADMIN — ONDANSETRON 4 MG: 2 INJECTION INTRAMUSCULAR; INTRAVENOUS at 19:10

## 2018-01-01 RX ADMIN — HYDROMORPHONE HYDROCHLORIDE 1 MG: 1 INJECTION, SOLUTION INTRAMUSCULAR; INTRAVENOUS; SUBCUTANEOUS at 02:32

## 2018-01-01 RX ADMIN — INSULIN ASPART 4 UNITS: 100 INJECTION, SOLUTION INTRAVENOUS; SUBCUTANEOUS at 11:44

## 2018-01-01 RX ADMIN — METOPROLOL SUCCINATE 50 MG: 50 TABLET, FILM COATED, EXTENDED RELEASE ORAL at 21:52

## 2018-01-01 RX ADMIN — SPIRONOLACTONE 50 MG: 50 TABLET, FILM COATED ORAL at 08:50

## 2018-01-01 RX ADMIN — FERUMOXYTOL 510 MG: 510 INJECTION INTRAVENOUS at 14:55

## 2018-01-01 RX ADMIN — RALTEGRAVIR 400 MG: 400 TABLET, FILM COATED ORAL at 09:05

## 2018-01-01 RX ADMIN — HYDROMORPHONE HYDROCHLORIDE 2 MG: 2 INJECTION INTRAMUSCULAR; INTRAVENOUS; SUBCUTANEOUS at 05:27

## 2018-01-01 RX ADMIN — LACTULOSE 20 G: 20 SOLUTION ORAL at 08:15

## 2018-01-01 RX ADMIN — LEVOTHYROXINE SODIUM 300 MCG: 150 TABLET ORAL at 06:45

## 2018-01-01 RX ADMIN — DOCUSATE SODIUM 100 MG: 100 CAPSULE, LIQUID FILLED ORAL at 14:18

## 2018-01-01 RX ADMIN — RALTEGRAVIR 400 MG: 400 TABLET, FILM COATED ORAL at 20:42

## 2018-01-01 RX ADMIN — RALTEGRAVIR 400 MG: 400 TABLET, FILM COATED ORAL at 00:52

## 2018-01-01 RX ADMIN — FAMOTIDINE 40 MG: 40 TABLET, FILM COATED ORAL at 10:02

## 2018-01-01 RX ADMIN — INSULIN ASPART 2 UNITS: 100 INJECTION, SOLUTION INTRAVENOUS; SUBCUTANEOUS at 21:08

## 2018-01-01 RX ADMIN — SPIRONOLACTONE 100 MG: 100 TABLET ORAL at 09:00

## 2018-01-01 RX ADMIN — OXYCODONE HYDROCHLORIDE 10 MG: 10 TABLET, FILM COATED, EXTENDED RELEASE ORAL at 10:53

## 2018-01-01 RX ADMIN — SODIUM CHLORIDE 250 ML: 900 INJECTION, SOLUTION INTRAVENOUS at 14:24

## 2018-01-01 RX ADMIN — FONDAPARINUX SODIUM 2.5 MG: 2.5 INJECTION, SOLUTION SUBCUTANEOUS at 18:14

## 2018-01-01 RX ADMIN — HYDROMORPHONE HYDROCHLORIDE 1 MG: 1 INJECTION, SOLUTION INTRAMUSCULAR; INTRAVENOUS; SUBCUTANEOUS at 17:42

## 2018-01-01 RX ADMIN — KETOROLAC TROMETHAMINE 30 MG: 30 INJECTION, SOLUTION INTRAMUSCULAR; INTRAVENOUS at 15:54

## 2018-01-01 RX ADMIN — SODIUM CHLORIDE, POTASSIUM CHLORIDE, SODIUM LACTATE AND CALCIUM CHLORIDE 9 ML/HR: 600; 310; 30; 20 INJECTION, SOLUTION INTRAVENOUS at 14:46

## 2018-01-01 RX ADMIN — LEVOTHYROXINE SODIUM 300 MCG: 150 TABLET ORAL at 16:07

## 2018-01-01 RX ADMIN — RALTEGRAVIR 400 MG: 400 TABLET, FILM COATED ORAL at 10:10

## 2018-01-01 RX ADMIN — COBICISTAT 150 MG: 150 TABLET, FILM COATED ORAL at 08:38

## 2018-01-01 RX ADMIN — HYDROMORPHONE HYDROCHLORIDE 1 MG: 1 INJECTION, SOLUTION INTRAMUSCULAR; INTRAVENOUS; SUBCUTANEOUS at 00:40

## 2018-01-01 RX ADMIN — FERUMOXYTOL 510 MG: 510 INJECTION INTRAVENOUS at 15:17

## 2018-01-01 RX ADMIN — MORPHINE SULFATE 4 MG: 4 INJECTION INTRAVENOUS at 15:36

## 2018-01-01 RX ADMIN — MIDAZOLAM HYDROCHLORIDE 1 MG: 2 INJECTION, SOLUTION INTRAMUSCULAR; INTRAVENOUS at 14:48

## 2018-01-01 RX ADMIN — FLUCONAZOLE 100 MG: 100 TABLET ORAL at 08:17

## 2018-01-01 RX ADMIN — IPRATROPIUM BROMIDE AND ALBUTEROL SULFATE 3 ML: .5; 3 SOLUTION RESPIRATORY (INHALATION) at 15:55

## 2018-01-01 RX ADMIN — IPRATROPIUM BROMIDE AND ALBUTEROL SULFATE 3 ML: .5; 3 SOLUTION RESPIRATORY (INHALATION) at 23:12

## 2018-01-01 RX ADMIN — COBICISTAT 150 MG: 150 TABLET, FILM COATED ORAL at 14:21

## 2018-01-01 RX ADMIN — FAMOTIDINE 40 MG: 40 TABLET, FILM COATED ORAL at 10:52

## 2018-01-01 RX ADMIN — SPIRONOLACTONE 50 MG: 50 TABLET, FILM COATED ORAL at 09:29

## 2018-01-01 RX ADMIN — RIFAXIMIN 550 MG: 550 TABLET ORAL at 21:38

## 2018-01-01 RX ADMIN — LEVOTHYROXINE SODIUM 300 MCG: 150 TABLET ORAL at 07:11

## 2018-01-01 RX ADMIN — OXYCODONE HYDROCHLORIDE 10 MG: 5 TABLET ORAL at 22:48

## 2018-01-01 RX ADMIN — DIPHENHYDRAMINE HYDROCHLORIDE 25 MG: 25 CAPSULE ORAL at 14:34

## 2018-01-01 RX ADMIN — DARUNAVIR 800 MG: 800 TABLET, FILM COATED ORAL at 08:17

## 2018-01-01 RX ADMIN — TAMSULOSIN HYDROCHLORIDE 0.4 MG: 0.4 CAPSULE ORAL at 08:58

## 2018-01-01 RX ADMIN — LORAZEPAM 1 MG: 2 INJECTION INTRAMUSCULAR; INTRAVENOUS at 05:23

## 2018-01-01 RX ADMIN — OXYCODONE HYDROCHLORIDE 10 MG: 5 TABLET ORAL at 15:12

## 2018-01-01 RX ADMIN — ALUMINUM HYDROXIDE, MAGNESIUM HYDROXIDE, AND DIMETHICONE 15 ML: 400; 400; 40 SUSPENSION ORAL at 12:12

## 2018-01-01 RX ADMIN — MORPHINE SULFATE 2 MG: 2 INJECTION, SOLUTION INTRAMUSCULAR; INTRAVENOUS at 18:30

## 2018-01-01 RX ADMIN — ONDANSETRON 4 MG: 2 INJECTION INTRAMUSCULAR; INTRAVENOUS at 13:00

## 2018-01-01 RX ADMIN — FENTANYL CITRATE 25 MCG: 50 INJECTION INTRAMUSCULAR; INTRAVENOUS at 15:45

## 2018-01-01 RX ADMIN — IPRATROPIUM BROMIDE AND ALBUTEROL SULFATE 3 ML: .5; 3 SOLUTION RESPIRATORY (INHALATION) at 15:51

## 2018-01-01 RX ADMIN — LEVOTHYROXINE SODIUM 300 MCG: 150 TABLET ORAL at 08:00

## 2018-01-01 RX ADMIN — POTASSIUM CHLORIDE 40 MEQ: 750 CAPSULE, EXTENDED RELEASE ORAL at 18:43

## 2018-01-01 RX ADMIN — HYDROCODONE BITARTRATE AND ACETAMINOPHEN 1 TABLET: 5; 325 TABLET ORAL at 10:08

## 2018-01-01 RX ADMIN — POTASSIUM CHLORIDE 10 MEQ: 750 CAPSULE, EXTENDED RELEASE ORAL at 09:27

## 2018-01-01 RX ADMIN — PANTOPRAZOLE SODIUM 40 MG: 40 TABLET, DELAYED RELEASE ORAL at 08:48

## 2018-01-01 RX ADMIN — PHYTONADIONE 5 MG: 1 INJECTION, EMULSION INTRAMUSCULAR; INTRAVENOUS; SUBCUTANEOUS at 14:44

## 2018-01-01 RX ADMIN — RIFAXIMIN 550 MG: 550 TABLET ORAL at 20:05

## 2018-01-01 RX ADMIN — HYDROMORPHONE HYDROCHLORIDE 1 MG: 1 INJECTION, SOLUTION INTRAMUSCULAR; INTRAVENOUS; SUBCUTANEOUS at 20:52

## 2018-01-01 RX ADMIN — TAMSULOSIN HYDROCHLORIDE 0.4 MG: 0.4 CAPSULE ORAL at 09:26

## 2018-01-01 RX ADMIN — SULFAMETHOXAZOLE AND TRIMETHOPRIM 160 MG: 800; 160 TABLET ORAL at 08:50

## 2018-01-01 RX ADMIN — RALTEGRAVIR 400 MG: 400 TABLET, FILM COATED ORAL at 20:40

## 2018-01-01 RX ADMIN — EMTRICITABINE AND TENOFOVIR DISOPROXIL FUMARATE 1 TABLET: 200; 300 TABLET, FILM COATED ORAL at 08:50

## 2018-01-01 RX ADMIN — SPIRONOLACTONE 50 MG: 50 TABLET ORAL at 09:25

## 2018-01-01 RX ADMIN — RIFAXIMIN 550 MG: 550 TABLET ORAL at 08:46

## 2018-01-01 RX ADMIN — RIFAXIMIN 550 MG: 550 TABLET ORAL at 21:23

## 2018-01-01 RX ADMIN — DARUNAVIR 800 MG: 800 TABLET, FILM COATED ORAL at 08:48

## 2018-01-01 RX ADMIN — LEVOTHYROXINE SODIUM 300 MCG: 150 TABLET ORAL at 07:21

## 2018-01-01 RX ADMIN — INSULIN DETEMIR 20 UNITS: 100 INJECTION, SOLUTION SUBCUTANEOUS at 21:20

## 2018-01-01 RX ADMIN — DARUNAVIR 800 MG: 800 TABLET, FILM COATED ORAL at 08:52

## 2018-01-01 RX ADMIN — RALTEGRAVIR 400 MG: 400 TABLET, FILM COATED ORAL at 21:54

## 2018-01-01 RX ADMIN — METOPROLOL SUCCINATE 50 MG: 50 TABLET, FILM COATED, EXTENDED RELEASE ORAL at 08:17

## 2018-01-01 RX ADMIN — ONDANSETRON 4 MG: 2 INJECTION INTRAMUSCULAR; INTRAVENOUS at 03:29

## 2018-01-01 RX ADMIN — LIDOCAINE HYDROCHLORIDE 50 MG: 10 INJECTION, SOLUTION EPIDURAL; INFILTRATION; INTRACAUDAL; PERINEURAL at 12:09

## 2018-01-01 RX ADMIN — HYDROMORPHONE HYDROCHLORIDE 0.5 MG: 1 INJECTION, SOLUTION INTRAMUSCULAR; INTRAVENOUS; SUBCUTANEOUS at 21:33

## 2018-01-01 RX ADMIN — OXYCODONE HYDROCHLORIDE AND ACETAMINOPHEN 1 TABLET: 7.5; 325 TABLET ORAL at 14:18

## 2018-01-01 RX ADMIN — OXYCODONE HYDROCHLORIDE 10 MG: 10 TABLET, FILM COATED, EXTENDED RELEASE ORAL at 20:05

## 2018-01-01 RX ADMIN — ONDANSETRON 4 MG: 2 INJECTION INTRAMUSCULAR; INTRAVENOUS at 15:54

## 2018-01-01 RX ADMIN — EMTRICITABINE AND TENOFOVIR DISOPROXIL FUMARATE 1 TABLET: 200; 300 TABLET, FILM COATED ORAL at 16:12

## 2018-01-01 RX ADMIN — IOPAMIDOL 85 ML: 612 INJECTION, SOLUTION INTRAVENOUS at 23:41

## 2018-01-01 RX ADMIN — OXYCODONE HYDROCHLORIDE 10 MG: 5 TABLET ORAL at 17:49

## 2018-01-01 RX ADMIN — OXYCODONE HYDROCHLORIDE AND ACETAMINOPHEN 1 TABLET: 7.5; 325 TABLET ORAL at 11:18

## 2018-01-01 RX ADMIN — RALTEGRAVIR 400 MG: 400 TABLET, FILM COATED ORAL at 08:50

## 2018-01-01 RX ADMIN — CLONAZEPAM 0.5 MG: 1 TABLET ORAL at 22:01

## 2018-01-01 RX ADMIN — LEVOTHYROXINE SODIUM 300 MCG: 150 TABLET ORAL at 06:53

## 2018-01-01 RX ADMIN — OXYCODONE HYDROCHLORIDE AND ACETAMINOPHEN 1 TABLET: 7.5; 325 TABLET ORAL at 22:46

## 2018-01-01 RX ADMIN — LORAZEPAM 0.5 MG: 0.5 TABLET ORAL at 17:27

## 2018-01-01 RX ADMIN — GLYCOPYRROLATE 0.4 MG: 0.2 INJECTION, SOLUTION INTRAMUSCULAR; INTRAVENOUS at 03:48

## 2018-01-01 RX ADMIN — FUROSEMIDE 80 MG: 10 INJECTION, SOLUTION INTRAMUSCULAR; INTRAVENOUS at 10:32

## 2018-01-01 RX ADMIN — GLYCOPYRROLATE 0.4 MG: 0.2 INJECTION, SOLUTION INTRAMUSCULAR; INTRAVENOUS at 02:21

## 2018-01-01 RX ADMIN — HYDROMORPHONE HYDROCHLORIDE 1 MG: 1 INJECTION, SOLUTION INTRAMUSCULAR; INTRAVENOUS; SUBCUTANEOUS at 18:44

## 2018-01-01 RX ADMIN — IRON SUCROSE 300 MG: 20 INJECTION, SOLUTION INTRAVENOUS at 16:03

## 2018-01-01 RX ADMIN — RIFAXIMIN 550 MG: 550 TABLET ORAL at 20:42

## 2018-01-24 NOTE — PROGRESS NOTES
REASONS FOR FOLLOWUP:      1.  Pancytopenia, especially with leukocytopenia and thrombocytopenia. Bone marrow aspiration and biopsy obtained on 01/21/2014, with no evidence of hematologic malignancy, no myelodysplastic syndrome.  He has liver cirrhosis and splenomegaly.      2.   Frequently recurrent iron deficiency anemia, not able to tolerate oral iron supplementation, status post multiple Feraheme treatments.  He also had transfusion of PRBC in October 2014.      3.  Empiric treatment for ITP using IVIG, in March 2014 and no response.    4.  Ongoing care for frequent iron deficiency required repeated intravenous iron therapy.       HISTORY OF PRESENT ILLNESS: Mr. Aranda is a 72-year-old  male returning today for 10-week re-evaluation of recurrent iron deficiency.  He reports profound fatigue, he denies any signs of bleeding, including no melena, he admits occasional fresh blood on toilet paper but nothing significant.  Patient was given IV iron therapy frequently, the last time was in early and mid-November 2017.      For the year of 2017, patient roughly receives IV Feraheme treatment about every 2-3 months.  He continues taking HIV medication. His diabetes is not tightly controlled. Patient reports no recent bacterial infection fever chills, etc.  No easy bleeding.     PAST MEDICAL HISTORY:    1. HIV diagnosed in 1997, on antiretroviral therapy; followed by Dr. Klarissa Thomas.    2. Liver cirrhosis and splenomegaly.   3. Diabetes.    4. Coronary artery disease.    5. Hypertension.    6. Pancytopenia.    7. Iron deficiency anemia.    8. Hypothyroidism.    9. History of prostate cancer, status post radiation therapy   10. Osteoporosis documented on DEXA bone density scan 02/29/2016.        HEMATOLOGIC HISTORY: History from previous dates can be found in a separate document.        Laboratory study on 10/05/2015 reported hemoglobin of 9.2, platelets 41,000 and WBC 3320 including neutrophils 1600, lymphocytes  400. Serum ferritin was 12.6, down from 72.9 on 08/10/2015. Serum iron level was 39, also down from 69 on 08/10/2015. His HIV test was improved and negative PSA and improved lipid panel. CMP panel was unremarkable except marginally elevated bilirubin of 1.4 and glucose 135.      The patient was evaluated in our clinic on 10/15/2015. The patient is reporting fatigue. We will arrange for treatment of Feraheme. The patient will continue followup with Dr. Thomas every 3 months for laboratory studies, including iron panel.  Patient was given 2 doses of Feraheme.       04/19/2016 which showed iron deficiency, ferritin level was 10.4 NG/ML and iron level 36. She was given Feraheme treatment in May 2016.       His laboratory study last week on 08/16/2016 showed recurrent severe iron deficiency with a ferritin 6.52 NG/ML, and a serum iron 31. His hemoglobin was 8.8, platelets 57,000 and WBC of 3100. His B12 level was 1097 PG/ML. He also reported taking levothyroxine 300 µg daily. This patient also lost a significant weight about 20 pounds in the past 3 months, because he was on 2 different diuretics.  Patient was seen on 08/22/2016 and the schedule for 2 doses of Feraheme treatment.      Laboratory study on 02/14/2017 reported worsening hemoglobin 10.8, platelets 37,000, and neutrophils 1460 and lymphocytes 240.  He also had recurrent iron deficiency, with ferritin 25.8, iron saturation 16%.  His vitamin B12 level was supratherapeutic.     He received multiple IV iron therapy was in late August and early September 2016, then early November 2016 and early January and May 2017.     Laboratory study on 6/28/2017 showed a slightly improved ferritin at 40.8, and iron saturation 24% and improved hemoglobin at 11.8.     On 8/2/2017 laboratory study showed worsening iron with ferritin 30, iron saturation 17, and worsening hemoglobin at 10.  He was given 2 doses ferriheme in early August 2017.      On 11/8/2017, ferritin 26.2 ng/mL,  "iron 66 TIBC 447 iron saturation 15%, hemoglobin 12.0, MCV 86, MCHC 32.0, and platelets 86,000, WBC 3200 including neutrophils 2400 and lymphocytes 300.  Feraheme 2 doses was given afterwards.    MEDICATIONS: The current medication list was reviewed with the patient and updated in the EMR this date per the medical assistant. Medication dosages and frequencies were confirmed to be accurate.        ALLERGIES:    1. PENICILLIN.    2. PREDNISONE.        SOCIAL HISTORY: Remote smoking history. He does not drink. No history of drug use. He contracted HIV through sexual contact.        FAMILY HISTORY: Reviewed and is negative to this episode of care.        REVIEW OF SYSTEMS:     PAIN: intermittent abdomen discomfort, no sharp pains.    GENERAL: No change in appetite or weight, no fevers, chills or sweats.    SKIN: No rashes or nonhealing lesions.    HEME/LYMPH: See Hematology History.    EYES: No vision changes or diplopia.    ENT: No tinnitus, hearing loss, gum bleeding, epistaxis, hoarseness or dysphagia.    RESPIRATORY: No cough, shortness of breath, hemoptysis or wheezing.    CVS: No chest pain, palpitations, orthopnea, dyspnea on exertion. GI: Abdomen distention secondary to ascites fluids. No nausea, vomiting, constipation, diarrhea, melena or hematochezia.    : No dysuria or hematuria.    MUSCULOSKELETAL: No bone pain or joint stiffness.    NEUROLOGICAL: No dizziness, global weakness, loss of consciousness or seizures.    PSYCHIATRIC: No increased nervousness, mood changes or depression.        VITAL SIGNS:    Vitals:    18 1346   BP: 130/68   Pulse: 56   Resp: 16   Temp: 98.3 °F (36.8 °C)   Weight: 83.6 kg (184 lb 6.4 oz)   Height: 171 cm (67.32\")  Comment: NEW HT.   PainSc:   5   PainLoc: Back   ECO        PHYSICAL EXAMINATION:    GENERAL: Well-developed and well-nourished male in no acute distress. .   SKIN: Warm and dry without rashes, No purpura.   HEAD: Normocephalic.    EYES: EOMs intact. " Conjunctivae normal.    EARS: Hearing intact.    NOSE: No nasal discharge.    MOUTH: Tongue is well-papillated; no stomatitis or ulcers. Lips normal.    THROAT: Oropharynx without lesions or exudates.    CHEST: Lungs clear to auscultation.    CARDIAC: Regular rate and rhythm without murmurs, rubs or gallops.    ABDOMEN: Soft, nontender with no organomegaly or masses. Bowel sounds present.    EXTREMITIES: No clubbing, cyanosis. No edema.    NEURO: No focal neurological deficits.        LABORATORY DATA:    Lab Results   Component Value Date    WBC 3.88 (L) 01/24/2018    HGB 12.6 (L) 01/24/2018    HCT 41.1 01/24/2018    MCV 92.6 01/24/2018    PLT 35 (L) 01/24/2018     Lab Results   Component Value Date    NEUTROABS 3.18 01/24/2018     Lab Results   Component Value Date    IRON 96 01/24/2018    TIBC 458 01/24/2018    FERRITIN 30.90 01/24/2018     Saturation 21%        ASSESSMENT:      1.  Recurrent Iron deficiency anemia required frequent IV iron therapy, roughly about every 3 months.  Patient complains of significant fatigue.  Laboratory study reported stable mild anemia with hemoglobin 12.6, but again iron deficiency with a ferritin 30.9 ng/mL and iron saturation 21%.  Last time he received Feraheme treatment was early and mid November 2017 slightly more than 2 months ago.  He denies any visible bleeding besides may be occasional a bit of blood on toilet paper but nothing significant.      Discussed with him again today, we'll treat him with Feraheme today and repeat next week.  His iron really depletes very quickly.      Based on his history, I will schedule this patient return in 3 months with lab studies including CBC ferritin and iron level.  He likely will  need Feraheme treatment at that time.      2.  Severe thrombocytopenia and mild to moderate leukopenia/neutropenia related to his liver cirrhosis and HIV treatment. Both of his platelet and ANC count fluctuates, this time his WBC seems much better including  his neutrophil counts, but his platelets has significantly decreased.  He has no spontaneous bleeding.  Will continue to observe.           PLAN:     1.  Start Feraheme today and repeat in 1 week.       2.  Return in 3 months (no earlier than that) for MD visit with lab studies including CBC, ferritin and iron level.  Likely he will need iv. Feraheme treatment again.     More than 25 minutes for patient care, over half of that time were for counseling.        JAMIE BOWMAN M.D., Ph.D.   1/24/2018    CC: Klarissa Thomas M.D.      Dictated using Dragon Dictation.

## 2018-01-28 PROBLEM — D69.59 SECONDARY THROMBOCYTOPENIA: Status: ACTIVE | Noted: 2018-01-01

## 2018-02-12 PROBLEM — Z95.5 HISTORY OF CORONARY ARTERY STENT PLACEMENT: Status: ACTIVE | Noted: 2018-01-01

## 2018-02-12 NOTE — PROGRESS NOTES
Date of Office Visit: 2018  Encounter Provider: ENMA Clark  Place of Service: [facility]  Patient Name: Kye Aranda  :1945    Chief Complaint   Patient presents with   • Coronary Artery Disease     1 year follow up   :     HPI: Kye Aranda is a 72 y.o. male , new to me, who presents today for follow-up.  Old records have been obtained and reviewed by me.  He is a patient of Dr. Singh's with a past cardiac history significant for coronary artery disease, status post acute MI with stent placement to his obtuse marginal branch.  He has a chronic total occlusion of his RCA that has been managed medically.  He also has HIV, which has been well-controlled in the past.  He was last in our office to see Dr. Singh on 2016.  At that visit, he had complaints of angina and dyspnea or exertional.  Because of this, Dr. Singh check an echocardiogram and a stress test.  His echocardiogram showed normal LV function with an EF of 59% and no significant valvular abnormalities.  His myocardial perfusion stress test was normal with no evidence of ischemia.  No changes were made to his medical regimen, and he is here today for follow-up.   He is actually here today because he is worried about his blood pressure and his heart rate.  He did bring his blood pressure readings with him from home.  At home his blood pressure has been running in the 140s to 170s systolic and the 60s to 80s diastolic.  His heart rate has been anywhere from 40 bpm up to 72 bpm.  He is concerned about his low heart rate, especially because he has been more fatigued for the past 5 months or so.  He's had a few twinges of chest pain, however nothing that sounds anginal in nature.  He also does have some shortness of breath on exertion that seems to be stable.  He has started to develop ascites, and he does see Dr. Butler for his liver dysfunction.  He is scheduled to see her on Wednesday.  He is followed  by the [facility] and does have some documented anemia for which he receives iron infusions.  His last CBC was on 1/24/2018 and was actually pretty stable and he was just slightly anemic.        Past Medical History:   Diagnosis Date   • Anemia    • Cirrhosis    • Coronary artery disease    • Diabetes mellitus    • Difficulty breathing    • H/O complete eye exam 10/2017   • H/O transfusion of packed red blood cells    • HIV infection, symptomatic 1997   • Hyperlipidemia    • Hypertension    • Pancytopenia    • Peripheral artery disease    • Prostate cancer    • Thyroid disease        Past Surgical History:   Procedure Laterality Date   • BONE MARROW BIOPSY W/ ASPIRATION  01/21/2014   • CARDIAC CATHETERIZATION     • COLONOSCOPY  09/20/2012    non-thrombosed EH, sessile polyp in ascending colon 3 mm in size.   • CORONARY ANGIOPLASTY WITH STENT PLACEMENT  06/2013   • ENDOSCOPY  09/20/2012    grade 1 varicesmiddle and lower 3rd of esophagus. Moderate portal hypertensive gastropathy in entire stomach.   • MOUTH SURGERY     • PROSTATE BIOPSY     • TONSILLECTOMY     • UPPER GASTROINTESTINAL ENDOSCOPY  09/20/2012    grd 1 varices, portal hypertensive gastropathy       Social History     Social History   • Marital status: Single     Spouse name: N/A   • Number of children: N/A   • Years of education: College     Occupational History   •  Retired     [employer]     Social History Main Topics   • Smoking status: Former Smoker     Packs/day: 2.00     Years: 30.00     Types: Cigarettes     Quit date: 1/1/2006   • Smokeless tobacco: Never Used   • Alcohol use No   • Drug use: No   • Sexual activity: Defer     Other Topics Concern   • Not on file     Social History Narrative       Family History   Problem Relation Age of Onset   • Heart disease Other    • No Known Problems Mother    • No Known Problems Father    • No Known Problems Maternal Grandmother    • No Known Problems Maternal Grandfather    • No Known Problems  Paternal Grandmother    • No Known Problems Paternal Grandfather        Review of Systems   Constitution: Negative for chills, fever, malaise/fatigue, weight gain and weight loss.   HENT: Negative for ear pain, hearing loss, nosebleeds and sore throat.    Eyes: Negative for double vision, pain and visual disturbance.   Cardiovascular: Positive for dyspnea on exertion. Negative for chest pain, irregular heartbeat, leg swelling, near-syncope, orthopnea, palpitations, paroxysmal nocturnal dyspnea and syncope.   Respiratory: Negative for cough, shortness of breath, sleep disturbances due to breathing, snoring and wheezing.    Endocrine: Negative for cold intolerance, heat intolerance and polyuria.   Skin: Negative for itching and rash.   Musculoskeletal: Negative for joint pain, joint swelling and myalgias.   Gastrointestinal: Positive for bloating and abdominal pain. Negative for diarrhea, melena, nausea and vomiting.   Genitourinary: Negative for frequency, hematuria and hesitancy.   Neurological: Negative for excessive daytime sleepiness, headaches, light-headedness, numbness, paresthesias and seizures.   Psychiatric/Behavioral: Negative for altered mental status and depression.   Allergic/Immunologic: Negative.    All other systems reviewed and are negative.      No Known Allergies      Current Outpatient Prescriptions:   •  BD PEN NEEDLE ONEAL U/F 32G X 4 MM misc, , Disp: , Rfl:   •  clonazePAM (KlonoPIN) 0.5 MG tablet, Take 1 tablet by mouth daily., Disp: , Rfl:   •  fluconazole (DIFLUCAN) 100 MG tablet, Take 100 mg by mouth Daily., Disp: , Rfl:   •  furosemide (LASIX) 20 MG tablet, Take 1 tablet by mouth Daily., Disp: 30 tablet, Rfl: 3  •  HYDROcodone-acetaminophen (NORCO) 7.5-325 MG per tablet, TK 1 T PO Q 4-6 H PRN, Disp: , Rfl: 0  •  Icosapent Ethyl (VASCEPA) 1 G capsule, Take 1 g by mouth., Disp: , Rfl:   •  ISENTRESS 400 MG tablet, Take 400 mg by mouth., Disp: , Rfl:   •  PREZISTA 800 MG tablet tablet, Take  "800 mg by mouth., Disp: , Rfl:   •  PROCTOZONE-HC 2.5 % rectal cream, APPLY TO AFFECTED AREA THREE TIMES DAILY AFTER BOWEL MOVEMENT, Disp: 30 g, Rfl: 0  •  rifaximin (XIFAXAN) 550 MG tablet, Take 1 tablet by mouth every 12 (twelve) hours., Disp: 60 tablet, Rfl: 5  •  sulfamethoxazole-trimethoprim (BACTRIM DS,SEPTRA DS) 800-160 MG per tablet, Take 1 tablet by mouth Every Other Day As Needed., Disp: , Rfl:   •  SYNTHROID 300 MCG tablet, TK 1 T PO QD, Disp: , Rfl: 0  •  tamsulosin (FLOMAX) 0.4 MG capsule, Take 1 capsule by mouth nightly., Disp: , Rfl:   •  TRESIBA FLEXTOUCH 200 UNIT/ML solution pen-injector, INJECT 40-80 UNITS UNDER THE SKIN QD, Disp: , Rfl: 3  •  TRUVADA 200-300 MG per tablet, Take 200-300 mg by mouth., Disp: , Rfl:   •  TYBOST 150 MG tablet, Take 150 mg by mouth., Disp: , Rfl:   •  vitamin D (ERGOCALCIFEROL) 51238 UNITS capsule capsule, TK 1 C PO ONCE A WEEK, Disp: , Rfl: 0  •  metoprolol succinate XL (TOPROL-XL) 50 MG 24 hr tablet, Take 1 tablet by mouth 2 (Two) Times a Day., Disp: 60 tablet, Rfl: 11     Objective:     Vitals:    02/12/18 1527 02/12/18 1541   BP: 100/68 98/60   BP Location: Right arm Left arm   Pulse: (!) 46    SpO2: 98%    Weight: 85.3 kg (188 lb)    Height: 170.2 cm (67\")      Body mass index is 29.44 kg/(m^2).    PHYSICAL EXAM:    Physical Exam   Constitutional: He is oriented to person, place, and time. He appears well-developed and well-nourished. No distress.   HENT:   Head: Normocephalic and atraumatic.   Eyes: Pupils are equal, round, and reactive to light.   Neck: No JVD present. No thyromegaly present.   Cardiovascular: Normal rate, regular rhythm, normal heart sounds and intact distal pulses.    No murmur heard.  Pulmonary/Chest: Effort normal and breath sounds normal. No respiratory distress.   Abdominal: Soft. Bowel sounds are normal. He exhibits distension. There is no splenomegaly or hepatomegaly. There is no tenderness.   Musculoskeletal: Normal range of motion. He " exhibits no edema.   Neurological: He is alert and oriented to person, place, and time.   Skin: Skin is warm and dry. He is not diaphoretic. No erythema.   Psychiatric: He has a normal mood and affect. His behavior is normal. Judgment normal.         ECG 12 Lead  Date/Time: 2/12/2018 3:52 PM  Performed by: SAPPHIRE WHITAKER.  Authorized by: SAPPHIRE WHITAKER.   Comparison: compared with previous ECG from 4/27/2016  Similar to previous ECG  Rhythm: sinus rhythm  Ectopy: PVCs  BPM: 46  T depression: III, aVF and II  Q waves: III and aVF  Clinical impression: abnormal ECG  Comments: Indication: Coronary artery disease, status post stent placement.              Assessment:       Diagnosis Plan   1. Chronic coronary artery disease  ECG 12 Lead   2. History of coronary artery stent placement  ECG 12 Lead   3. Fatigue, unspecified type       Orders Placed This Encounter   Procedures   • ECG 12 Lead     This order was created via procedure documentation          Plan:       1.  Coronary Artery Disease  Assessment  • The patient has no angina    Subjective - Objective  • There has been a previous stent procedure using ELIEL  • Overall I think he's stable from a cardiac standpoint.  I think that his twinges of chest pain or not anginal in nature, and he had a normal myocardial perfusion stress test and echocardiogram 2 years ago.  Continue current medical regimen.    2.  Fatigue.  I do not think that this is cardiac in etiology.  He has many other medical issues that could be the cause of his fatigue, including this recent development of ascites and presumed liver dysfunction.  Fortunately he will be seeing Dr. Butler on Wednesday.  As far as his blood pressure and bradycardia goes, he does have some heart rates in the 40s.  However his fatigue is constant, and the majority of his heart rates are in the 50s to 70s.  I do think that his blood pressure cuff at home it might be an accurate.  His blood pressure in our office today is  in the high 90s systolic, and his home readings are in the 140s to 150s.  I've asked him to get his blood pressure cuff checked.  I think that if we split his dose of Toprol- mg and half throughout the day, and take 50 mg in the morning and 50 mg in the evening, we may achieve were consistent blood pressure heart rate controlled throughout the day.  He agrees with this plan.    I'm not going to make any other changes to his medical regimen today.  He will follow-up with Dr. Singh in 1 year or sooner if needed.    As always, it has been a pleasure to participate in your patient's care.      Sincerely,         Luna Hernández PA-C

## 2018-02-14 NOTE — PROGRESS NOTES
Chief Complaint   Patient presents with   • Follow-up   • Abdominal Pain       Kye Aranda is a  72 y.o. male here for a follow up visit for Abdominal distention and discomfort.  He complains of discomfort in the right lower quadrant which she attributes to his liver.  He has not had an ultrasound since 2016.  He was previously on Aldactone and Lasix.  Aldactone was stopped because he was hyperkalemic.  He also developed a mild renal impairment.  He now takes Lasix as needed.  He seems unclear about when he is supposed take this.  He does watch his salt in his diet.  He has not seen any blood in the stool.  His Xifaxan was finally approved.  He is undergoing facet injections for his back.  His white blood cell count has improved.  His platelet count has been lower, last check was 35.  His AFP was normal in November 2017..   Abdominal Pain   This is a new problem. The current episode started 1 to 4 weeks ago. The onset quality is undetermined. The problem occurs every several days. The problem has been gradually worsening. The pain is located in the RLQ. The pain is at a severity of 3/10. The quality of the pain is sharp. The abdominal pain does not radiate. Associated symptoms include arthralgias, frequency and myalgias. Pertinent negatives include no anorexia, belching, constipation, diarrhea, dysuria, fever, flatus, headaches, hematochezia, hematuria, nausea, vomiting or weight loss. The pain is aggravated by certain positions. The pain is relieved by nothing.     Past Medical History:   Diagnosis Date   • Anemia    • Cirrhosis    • Coronary artery disease    • Diabetes mellitus    • Difficulty breathing    • H/O complete eye exam 10/2017   • H/O transfusion of packed red blood cells    • HIV infection, symptomatic 1997   • Hyperlipidemia    • Hypertension    • Pancytopenia    • Peripheral artery disease    • Prostate cancer    • Thyroid disease      Past Surgical History:   Procedure Laterality Date   • BONE  MARROW BIOPSY W/ ASPIRATION  01/21/2014   • CARDIAC CATHETERIZATION     • COLONOSCOPY  09/20/2012    non-thrombosed EH, sessile polyp in ascending colon 3 mm in size.   • CORONARY ANGIOPLASTY WITH STENT PLACEMENT  06/2013   • ENDOSCOPY  09/20/2012    grade 1 varicesmiddle and lower 3rd of esophagus. Moderate portal hypertensive gastropathy in entire stomach.   • MOUTH SURGERY     • PROSTATE BIOPSY     • TONSILLECTOMY     • UPPER GASTROINTESTINAL ENDOSCOPY  09/20/2012    grd 1 varices, portal hypertensive gastropathy       Current Outpatient Prescriptions:   •  BD PEN NEEDLE ONEAL U/F 32G X 4 MM misc, , Disp: , Rfl:   •  clonazePAM (KlonoPIN) 0.5 MG tablet, Take 1 tablet by mouth daily., Disp: , Rfl:   •  fluconazole (DIFLUCAN) 100 MG tablet, Take 100 mg by mouth Daily., Disp: , Rfl:   •  furosemide (LASIX) 20 MG tablet, Take 1 tablet by mouth Daily., Disp: 30 tablet, Rfl: 3  •  Icosapent Ethyl (VASCEPA) 1 G capsule, Take 1 g by mouth., Disp: , Rfl:   •  ISENTRESS 400 MG tablet, Take 400 mg by mouth., Disp: , Rfl:   •  metoprolol succinate XL (TOPROL-XL) 50 MG 24 hr tablet, Take 1 tablet by mouth 2 (Two) Times a Day., Disp: 60 tablet, Rfl: 11  •  PREZISTA 800 MG tablet tablet, Take 800 mg by mouth., Disp: , Rfl:   •  PROCTOZONE-HC 2.5 % rectal cream, APPLY TO AFFECTED AREA THREE TIMES DAILY AFTER BOWEL MOVEMENT, Disp: 30 g, Rfl: 0  •  rifaximin (XIFAXAN) 550 MG tablet, Take 1 tablet by mouth every 12 (twelve) hours., Disp: 60 tablet, Rfl: 5  •  sulfamethoxazole-trimethoprim (BACTRIM DS,SEPTRA DS) 800-160 MG per tablet, Take 1 tablet by mouth Every Other Day As Needed., Disp: , Rfl:   •  SYNTHROID 300 MCG tablet, TK 1 T PO QD, Disp: , Rfl: 0  •  tamsulosin (FLOMAX) 0.4 MG capsule, Take 1 capsule by mouth nightly., Disp: , Rfl:   •  TRESIBA FLEXTOUCH 200 UNIT/ML solution pen-injector, INJECT 40-80 UNITS UNDER THE SKIN QD, Disp: , Rfl: 3  •  TRUVADA 200-300 MG per tablet, Take 200-300 mg by mouth., Disp: , Rfl:   •   TYBOST 150 MG tablet, Take 150 mg by mouth., Disp: , Rfl:   •  vitamin D (ERGOCALCIFEROL) 10559 UNITS capsule capsule, TK 1 C PO ONCE A WEEK, Disp: , Rfl: 0  •  spironolactone (ALDACTONE) 50 MG tablet, Take 1 tablet by mouth Daily., Disp: 30 tablet, Rfl: 5  PRN Meds:.  No Known Allergies  Social History     Social History   • Marital status: Single     Spouse name: N/A   • Number of children: N/A   • Years of education: College     Occupational History   •  Retired     [employer]     Social History Main Topics   • Smoking status: Former Smoker     Packs/day: 2.00     Years: 30.00     Types: Cigarettes     Quit date: 1/1/2006   • Smokeless tobacco: Never Used   • Alcohol use No   • Drug use: No   • Sexual activity: Defer     Other Topics Concern   • Not on file     Social History Narrative     Family History   Problem Relation Age of Onset   • Heart disease Other    • No Known Problems Mother    • No Known Problems Father    • No Known Problems Maternal Grandmother    • No Known Problems Maternal Grandfather    • No Known Problems Paternal Grandmother    • No Known Problems Paternal Grandfather      Review of Systems   Constitutional: Negative for fever and weight loss.   Gastrointestinal: Positive for abdominal pain. Negative for anorexia, constipation, diarrhea, flatus, hematochezia, nausea and vomiting.   Genitourinary: Positive for frequency. Negative for dysuria and hematuria.   Musculoskeletal: Positive for arthralgias and myalgias.   Neurological: Negative for headaches.     Vitals:    02/14/18 1501   BP: 120/74     Last Weight    02/14/18  1501   Weight: 86.2 kg (190 lb)     Physical Exam   Constitutional: He appears well-developed and well-nourished.   HENT:   Head: Normocephalic and atraumatic.   Eyes: Conjunctivae are normal. No scleral icterus.   Pulmonary/Chest: Effort normal.   Abdominal: Soft. He exhibits distension. There is no tenderness.   Musculoskeletal: He exhibits no edema.   Skin:  Skin is warm and dry. No pallor.   Multiple bruises scattered   Psychiatric: He has a normal mood and affect.     No images are attached to the encounter.  Diagnoses and all orders for this visit:    Cirrhosis of liver with ascites, unspecified hepatic cirrhosis type  -     US Abdomen Complete; Future    Adenomatous polyp of colon, unspecified part of colon    Thrombocytopenia    Other ascites    Other orders  -     Discontinue: spironolactone (ALDACTONE) 50 MG tablet; Take 1 tablet by mouth Daily.  -     spironolactone (ALDACTONE) 50 MG tablet; Take 1 tablet by mouth Daily.    Plan-  Continue 2 g sodium diet  Resume low-dose Lasix and Aldactone-BMP in 2 weeks.  Lasix 20 mg/Aldactone 50 mg  -Abdominal ultrasound to assess for ascites-if he does have ascites as I expect, he will need a paracentesis with fluid analysis  He is due for EGD and colonoscopy for esophageal variceal surveillance and history of adenomatous polyps.  This is been delayed due to leukopenia and depressed ANC in the past.  His platelet count is now less than 50,000 he will need platelet transfusion prior to these procedures.  He will let me know when he is ready to schedule these.

## 2018-02-27 NOTE — TELEPHONE ENCOUNTER
Pt for lab appt tomorrow.  See o/v note of 2/14 - BMP in 2 weeks.    Order placed - message to DR Butler.

## 2018-03-09 NOTE — TELEPHONE ENCOUNTER
Called pt and advised per Dr Butler that his labs are ok and to continue current dose of meds.     Pt verb understanding , but reports his back was hurting so he stopped taking the spironlactone and lasix about a week ago.  He is asking if he needs to restart.  Advised would send message to Dr Butler.

## 2018-03-09 NOTE — TELEPHONE ENCOUNTER
----- Message from Robyn Butler MD sent at 3/9/2018  2:13 PM EST -----  Labs ok - continue current dose medications

## 2018-03-12 NOTE — TELEPHONE ENCOUNTER
Called pt and pt did confirm that he was on the meds at the time his labs were drawn.  Pt is asking if he is to take one daily of both the lasix and spironlactone.  Advised would send message to Dr Butler.

## 2018-03-12 NOTE — TELEPHONE ENCOUNTER
Called pt and pt reports that he was taking lasix one and then the next day he was taking the aldactone.  Advised pt that the medications are likely controlling the fluid which is why there was not much on us.     Spoke with Dr Butler and who advised the pt can continue alternating the meds.  Called pt and advised of this pt verb understanding.

## 2018-03-12 NOTE — TELEPHONE ENCOUNTER
Again, please confirm that he was taking both medications at the time of the ultrasound.  If he was taking the medications then they are likely controlling the fluid which is why there wasn't much on ultrasound.

## 2018-03-12 NOTE — TELEPHONE ENCOUNTER
Called pt and advised per Dr Butler that yes he is to take both pills daily.     Pt verb understanding and is now asking what were the results of his u/s of the abd that he done on 03/07.  He states he saw the results and it did not show much fluid.  Pt states he does not really want to take the diuretics if at all possible.  Advised would send message back to Dr Butler. Pt verb understanding.

## 2018-03-23 NOTE — TELEPHONE ENCOUNTER
----- Message from Robyn Butler MD sent at 3/9/2018  4:47 PM EST -----  Ultrasound with changes consistent with cirrhosis - minimal ascites seen.  No new findings

## 2018-03-23 NOTE — TELEPHONE ENCOUNTER
Called pt and advised per Dr Butler that the us showed changes consistent with cirrhosis - min ascities seen. No new findings. Pt verb understanding.

## 2018-03-23 NOTE — TELEPHONE ENCOUNTER
Faxed request received from [facility] for Xifaxan - form to DR Butler for signature.

## 2018-04-09 PROBLEM — S22.080A T12 COMPRESSION FRACTURE (HCC): Status: ACTIVE | Noted: 2018-01-01

## 2018-04-09 PROBLEM — E83.39 HYPOPHOSPHATEMIA: Chronic | Status: ACTIVE | Noted: 2018-01-01

## 2018-04-09 PROBLEM — E03.9 HYPOTHYROIDISM: Chronic | Status: ACTIVE | Noted: 2018-01-01

## 2018-04-09 PROBLEM — M54.9 INTRACTABLE BACK PAIN: Status: ACTIVE | Noted: 2018-01-01

## 2018-04-09 PROBLEM — E11.9 TYPE 2 DIABETES MELLITUS (HCC): Chronic | Status: ACTIVE | Noted: 2018-01-01

## 2018-04-09 PROBLEM — R63.4 WEIGHT LOSS, ABNORMAL: Status: ACTIVE | Noted: 2018-01-01

## 2018-04-09 NOTE — H&P
Patient Care Team:  Kendrick Griggs MD as PCP - General (Family Medicine)  Carrie Lucas MD PhD as Consulting Physician (Hematology and Oncology)  Travis Mckenzie MD as Referring Physician (Internal Medicine)  Baldo Conway MD as Consulting Physician (Endocrinology)  Robyn Butler MD as Consulting Physician (Gastroenterology)    Chief complaint back pain    Subjective     Very pleasant 73yo gentleman with multiple significant medical problems (see below), who presents to ER c/o 4-5 week history of bilateral low back pain. No injury that he can recall. Worse with rising from bed or chair, worse with walking. No urinary symptoms. Reports 25lb weight loss since back pain started. Has noticed some rectal bleeding (mainly on toilet paper after BMs) the last couple weeks as well. He stopped taking his L-T4 about a week ago when he noted his TSH was elevated (he thought that meant his thyroid hormone level was too high).        Review of Systems   Constitutional: Positive for activity change, appetite change and fatigue. Negative for chills, diaphoresis and fever.   HENT: Negative for ear pain, facial swelling, hearing loss, mouth sores, nosebleeds, sinus pressure, sore throat, tinnitus, trouble swallowing and voice change.    Eyes: Negative for pain and visual disturbance.   Respiratory: Positive for shortness of breath (with exertion). Negative for cough, choking, chest tightness and stridor.    Cardiovascular: Positive for palpitations. Negative for chest pain and leg swelling.   Gastrointestinal: Positive for blood in stool. Negative for abdominal distention, abdominal pain, constipation, diarrhea, nausea and vomiting.   Endocrine: Negative for cold intolerance and heat intolerance.   Genitourinary: Negative for decreased urine volume, difficulty urinating, dysuria and hematuria.   Musculoskeletal: Positive for back pain. Negative for neck pain.   Skin: Negative for color change, pallor and rash.    Allergic/Immunologic: Positive for immunocompromised state. Negative for environmental allergies and food allergies.   Neurological: Negative for tremors, seizures, syncope, facial asymmetry, speech difficulty, light-headedness and numbness.   Hematological: Negative for adenopathy. Does not bruise/bleed easily.   Psychiatric/Behavioral: Negative for behavioral problems, confusion and hallucinations.        Past Medical History:   Diagnosis Date   • Anemia    • Cirrhosis    • Coronary artery disease    • Diabetes mellitus    • Difficulty breathing    • H/O complete eye exam 10/2017   • H/O transfusion of packed red blood cells    • HIV infection, symptomatic 1997   • Hyperlipidemia    • Hypertension    • Pancytopenia    • Peripheral artery disease    • Prostate cancer    • Thyroid disease      Past Surgical History:   Procedure Laterality Date   • BONE MARROW BIOPSY W/ ASPIRATION  01/21/2014   • CARDIAC CATHETERIZATION     • COLONOSCOPY  09/20/2012    non-thrombosed EH, sessile polyp in ascending colon 3 mm in size.   • CORONARY ANGIOPLASTY WITH STENT PLACEMENT  06/2013   • ENDOSCOPY  09/20/2012    grade 1 varicesmiddle and lower 3rd of esophagus. Moderate portal hypertensive gastropathy in entire stomach.   • MOUTH SURGERY     • PROSTATE BIOPSY     • TONSILLECTOMY     • UPPER GASTROINTESTINAL ENDOSCOPY  09/20/2012    grd 1 varices, portal hypertensive gastropathy     Family History   Problem Relation Age of Onset   • Heart disease Other    • No Known Problems Mother    • No Known Problems Father    • No Known Problems Maternal Grandmother    • No Known Problems Maternal Grandfather    • No Known Problems Paternal Grandmother    • No Known Problems Paternal Grandfather      Social History   Substance Use Topics   • Smoking status: Former Smoker     Packs/day: 2.00     Years: 30.00     Types: Cigarettes     Quit date: 1998   • Smokeless tobacco: Never Used   • Alcohol use No     Prescriptions Prior to Admission    Medication Sig Dispense Refill Last Dose   • baclofen (LIORESAL) 10 MG tablet Take 1 tablet by mouth 3 (Three) Times a Day As Needed for Muscle Spasms. 21 tablet 0 Past Week at Unknown time   • BD PEN NEEDLE ONEAL U/F 32G X 4 MM misc    Past Week at Unknown time   • clonazePAM (KlonoPIN) 0.5 MG tablet Take 1 tablet by mouth daily.   Patient Taking Differently at Unknown time   • fluconazole (DIFLUCAN) 100 MG tablet Take 100 mg by mouth Every Other Day.   4/8/2018 at Unknown time   • furosemide (LASIX) 20 MG tablet Take 1 tablet by mouth Daily. (Patient taking differently: Take 20 mg by mouth Daily. Pt states he takes when he has swelling in ankles) 30 tablet 3 Past Week at Unknown time   • Icosapent Ethyl (VASCEPA) 1 G capsule Take 1 g by mouth 2 (Two) Times a Day.   4/8/2018 at Unknown time   • ISENTRESS 400 MG tablet Take 400 mg by mouth 2 (Two) Times a Day.   4/8/2018 at Unknown time   • metoprolol succinate XL (TOPROL-XL) 50 MG 24 hr tablet Take 1 tablet by mouth 2 (Two) Times a Day. 60 tablet 11 4/8/2018 at Unknown time   • PREZISTA 800 MG tablet tablet Take 800 mg by mouth.   4/8/2018 at Unknown time   • rifaximin (XIFAXAN) 550 MG tablet Take 1 tablet by mouth every 12 (twelve) hours. 60 tablet 5 4/8/2018 at Unknown time   • spironolactone (ALDACTONE) 50 MG tablet Take 1 tablet by mouth Daily. 30 tablet 5 Past Week at Unknown time   • sulfamethoxazole-trimethoprim (BACTRIM DS,SEPTRA DS) 800-160 MG per tablet Take 1 tablet by mouth Every Other Day As Needed.   Past Month at Unknown time   • SYNTHROID 300 MCG tablet TK 1 T PO QD  0 Past Week at Unknown time   • tamsulosin (FLOMAX) 0.4 MG capsule Take 1 capsule by mouth nightly.   4/8/2018 at Unknown time   • TRESIBA FLEXTOUCH 200 UNIT/ML solution pen-injector INJECT 40-80 UNITS UNDER THE SKIN QD  3 Past Week at Unknown time   • TRUVADA 200-300 MG per tablet Take 200-300 mg by mouth.   4/8/2018 at Unknown time   • TYBOST 150 MG tablet Take 150 mg by mouth.    4/8/2018 at Unknown time   • vitamin D (ERGOCALCIFEROL) 01945 UNITS capsule capsule TK 1 C PO ONCE A WEEK  0 Past Week at Unknown time   • PROCTOZONE-HC 2.5 % rectal cream APPLY TO AFFECTED AREA THREE TIMES DAILY AFTER BOWEL MOVEMENT 30 g 0 Unknown at Unknown time     Allergies:  Methylprednisolone    Objective      Vital Signs  Temp:  [97.4 °F (36.3 °C)-98.4 °F (36.9 °C)] 98.4 °F (36.9 °C)  Heart Rate:  [62-76] 63  Resp:  [16-20] 16  BP: (121-159)/(59-89) 129/59    Physical Exam   Constitutional: He is oriented to person, place, and time. He appears well-developed and well-nourished. No distress.   HENT:   Head: Normocephalic and atraumatic.   Mouth/Throat: Oropharynx is clear and moist. No oropharyngeal exudate.   Eyes: Conjunctivae and EOM are normal. Pupils are equal, round, and reactive to light. Right eye exhibits no discharge. Left eye exhibits no discharge. No scleral icterus.   Neck: Normal range of motion. Neck supple. No JVD present. No thyromegaly present.   Cardiovascular: Normal rate, regular rhythm, normal heart sounds and intact distal pulses.    No murmur heard.  Pulmonary/Chest: Effort normal and breath sounds normal. No respiratory distress. He has no wheezes. He has no rales.   Abdominal: Soft. Bowel sounds are normal. He exhibits no distension. There is no tenderness.   Musculoskeletal: Normal range of motion. He exhibits no edema or deformity.   No TTP along spine, no stepoff   Lymphadenopathy:     He has no cervical adenopathy.   Neurological: He is alert and oriented to person, place, and time. No cranial nerve deficit. He exhibits normal muscle tone.   Skin: Skin is warm and dry. Capillary refill takes less than 2 seconds. No rash noted. He is not diaphoretic. No erythema.   Psychiatric: He has a normal mood and affect. His behavior is normal. Thought content normal.   Nursing note and vitals reviewed.      Results Review:   I reviewed the patient's new clinical results.  I reviewed the  patient's new imaging results and agree with the interpretation.  I reviewed the patient's other test results and agree with the interpretation  I personally viewed and interpreted the patient's EKG/Telemetry data  Discussed with patient, RN, and Dr. Miranda      Assessment/Plan     Principal Problem:    T12 compression fracture  Active Problems:    Chronic coronary artery disease    Peripheral arterial occlusive disease    Iron deficiency anemia due to chronic blood loss    Thrombocytopenia associated with AIDS    Leukocytopenia    Liver cirrhosis    Lumbar degenerative disc disease    Weight loss, abnormal    Hypothyroidism    Intractable back pain    Type 2 diabetes mellitus    Hypophosphatemia          Plan:   (ZEESHAN to see  Pt says he has osteoporosis, also has h/o prostate CA  Will need platelet support if any procedures planned  Hematology and GI to see  IS ordered given atelectasis seen on imaging  Will ask Nutrition to see given weight loss and low Phos  Restart his L-T4 therapy  Further orders to follow as suggested by evolving hospital course).       I discussed the patients findings and my recommendations with patient, nursing staff and consulting provider    Mariano Delacruz MD  04/09/18  1:22 PM    Time: 45min

## 2018-04-09 NOTE — ED PROVIDER NOTES
EMERGENCY DEPARTMENT ENCOUNTER    CHIEF COMPLAINT  Chief Complaint: Flank Pain  History given by: Patient  History limited by: none  Room Number: 35/35  PMD: Kendrick Griggs MD      HPI:  Pt is a 72 y.o. male who presents complaining of bilateral flank pain for the past 4 weeks that has worsened in the past few days. Pt confirms blood in stool, and states pain makes it difficult to ambulate. Pt states ambulation causes palpitations. Pt denies urinary or fecal incontinence. Pt confirms hx of anemia, HIV, and cirrhosis, and states he has lost 40 lbs since onset of symptoms.    Duration:  4 weeks  Onset: gradual  Timing: constant  Location: bilateral flank  Radiation: none  Quality: pain  Intensity/Severity: moderate  Progression: unchanged  Associated Symptoms: blood in stool, 40 lb weight loss, palpitations  Aggravating Factors: ambulation  Alleviating Factors: none  Previous Episodes: Pt has hx of anemia, cirrhosis, and HIV.  Treatment before arrival: none    PAST MEDICAL HISTORY  Active Ambulatory Problems     Diagnosis Date Noted   • Chronic coronary artery disease 04/27/2016   • Difficulty breathing 04/27/2016   • Intermittent claudication 04/27/2016   • Peripheral arterial occlusive disease 04/27/2016   • Shortness of breath 04/27/2016   • Iron deficiency anemia due to chronic blood loss 05/06/2016   • Thrombocytopenia associated with AIDS 05/06/2016   • Leukocytopenia 05/06/2016   • Neutropenia 05/06/2016   • Pancytopenia 02/19/2017   • Iron and its compounds causing adverse effect in therapeutic use 02/21/2017   • Liver cirrhosis 08/14/2017   • Lumbar degenerative disc disease 12/05/2017   • Secondary thrombocytopenia 01/28/2018   • History of coronary artery stent placement 02/12/2018     Resolved Ambulatory Problems     Diagnosis Date Noted   • No Resolved Ambulatory Problems     Past Medical History:   Diagnosis Date   • Anemia    • Cirrhosis    • Coronary artery disease    • Diabetes mellitus    •  Difficulty breathing    • H/O complete eye exam 10/2017   • H/O transfusion of packed red blood cells    • HIV infection, symptomatic 1997   • Hyperlipidemia    • Hypertension    • Pancytopenia    • Peripheral artery disease    • Prostate cancer    • Thyroid disease        PAST SURGICAL HISTORY  Past Surgical History:   Procedure Laterality Date   • BONE MARROW BIOPSY W/ ASPIRATION  01/21/2014   • CARDIAC CATHETERIZATION     • COLONOSCOPY  09/20/2012    non-thrombosed EH, sessile polyp in ascending colon 3 mm in size.   • CORONARY ANGIOPLASTY WITH STENT PLACEMENT  06/2013   • ENDOSCOPY  09/20/2012    grade 1 varicesmiddle and lower 3rd of esophagus. Moderate portal hypertensive gastropathy in entire stomach.   • MOUTH SURGERY     • PROSTATE BIOPSY     • TONSILLECTOMY     • UPPER GASTROINTESTINAL ENDOSCOPY  09/20/2012    grd 1 varices, portal hypertensive gastropathy       FAMILY HISTORY  Family History   Problem Relation Age of Onset   • Heart disease Other    • No Known Problems Mother    • No Known Problems Father    • No Known Problems Maternal Grandmother    • No Known Problems Maternal Grandfather    • No Known Problems Paternal Grandmother    • No Known Problems Paternal Grandfather        SOCIAL HISTORY  Social History     Social History   • Marital status: Single     Spouse name: N/A   • Number of children: N/A   • Years of education: College     Occupational History   •  Retired     [employer]     Social History Main Topics   • Smoking status: Former Smoker     Packs/day: 2.00     Years: 30.00     Types: Cigarettes     Quit date: 1/1/2006   • Smokeless tobacco: Never Used   • Alcohol use No   • Drug use: No   • Sexual activity: Defer     Other Topics Concern   • Not on file     Social History Narrative   • No narrative on file       ALLERGIES  Methylprednisolone    REVIEW OF SYSTEMS  Review of Systems   Constitutional: Positive for unexpected weight change (loss of 40 lbs). Negative for  activity change, appetite change and fever.   HENT: Negative for congestion and sore throat.    Eyes: Negative.    Respiratory: Negative for cough and shortness of breath.    Cardiovascular: Positive for palpitations. Negative for chest pain and leg swelling.   Gastrointestinal: Positive for blood in stool. Negative for abdominal pain, diarrhea and vomiting.   Endocrine: Negative.    Genitourinary: Positive for flank pain (bilateral flank pain). Negative for decreased urine volume, dysuria and enuresis.   Musculoskeletal: Negative for neck pain.   Skin: Negative for rash and wound.   Allergic/Immunologic: Negative.    Neurological: Negative for weakness, numbness and headaches.   Hematological: Negative.    Psychiatric/Behavioral: Negative.    All other systems reviewed and are negative.      PHYSICAL EXAM  ED Triage Vitals   Temp Heart Rate Resp BP SpO2   04/08/18 2124 04/08/18 2124 04/08/18 2157 04/08/18 2157 04/08/18 2124   98 °F (36.7 °C) 76 20 159/60 95 %      Temp src Heart Rate Source Patient Position BP Location FiO2 (%)   04/08/18 2124 04/08/18 2124 04/08/18 2157 04/08/18 2157 --   Tympanic Monitor Sitting Left arm        Physical Exam   Constitutional: He is oriented to person, place, and time and well-developed, well-nourished, and in no distress.   HENT:   Head: Normocephalic and atraumatic.   Eyes: EOM are normal. Pupils are equal, round, and reactive to light.   Neck: Normal range of motion. Neck supple.   Cardiovascular: Normal rate, regular rhythm and normal heart sounds.    Pulmonary/Chest: Effort normal and breath sounds normal. No respiratory distress.   Abdominal: Soft. He exhibits distension. There is no tenderness. There is no rebound and no guarding.   Musculoskeletal: Normal range of motion. He exhibits no edema.   Neurological: He is alert and oriented to person, place, and time. He has normal sensation and normal strength.   Pt is areflexive bilaterally below the waist.    Skin: Skin is  warm and dry.   Psychiatric: Mood and affect normal.   Nursing note and vitals reviewed.      LAB RESULTS  Lab Results (last 24 hours)     Procedure Component Value Units Date/Time    CBC & Differential [[id number]] Collected:  04/08/18 2243    Specimen:  Blood Updated:  04/08/18 2311    Narrative:       The following orders were created for panel order CBC & Differential.  Procedure                               Abnormality         Status                     ---------                               -----------         ------                     Scan Slide[[id number]]                                       Final result               CBC Auto Differential[[id number]]        Abnormal            Final result                 Please view results for these tests on the individual orders.    Comprehensive Metabolic Panel [[id number]]  (Abnormal) Collected:  04/08/18 2243    Specimen:  Blood Updated:  04/08/18 2322     Glucose 219 (H) mg/dL      BUN 12 mg/dL      Creatinine 1.09 mg/dL      Sodium 135 (L) mmol/L      Potassium 3.6 mmol/L      Chloride 99 mmol/L      CO2 24.9 mmol/L      Calcium 9.0 mg/dL      Total Protein 7.1 g/dL      Albumin 3.80 g/dL      ALT (SGPT) 55 (H) U/L      AST (SGOT) 61 (H) U/L      Alkaline Phosphatase 193 (H) U/L      Total Bilirubin 1.5 (H) mg/dL      eGFR Non African Amer 67 mL/min/1.73      Globulin 3.3 gm/dL      A/G Ratio 1.2 g/dL      BUN/Creatinine Ratio 11.0     Anion Gap 11.1 mmol/L     Narrative:       The MDRD GFR formula is only valid for adults with stable renal function between ages 18 and 70.    Lipase [[id number]]  (Abnormal) Collected:  04/08/18 2243    Specimen:  Blood Updated:  04/08/18 2322     Lipase 161 (H) U/L     CBC Auto Differential [[id number]]  (Abnormal) Collected:  04/08/18 2243    Specimen:  Blood Updated:  04/08/18 2311     WBC 2.84 (L) 10*3/mm3      RBC 3.53 (L) 10*6/mm3      Hemoglobin 10.1 (L) g/dL      Hematocrit 32.2 (L) %      MCV 91.2 fL      MCH 28.6 pg       "MCHC 31.4 (L) g/dL      RDW 16.5 (H) %      RDW-SD 55.0 (H) fl      MPV -- fL      Comment: .         Platelets 40 (C) 10*3/mm3      Neutrophil % 75.6 %      Lymphocyte % 12.7 (L) %      Monocyte % 9.9 %      Eosinophil % 1.8 %      Basophil % 0.0 %      Immature Grans % 0.0 %      Neutrophils, Absolute 2.15 10*3/mm3      Lymphocytes, Absolute 0.36 (L) 10*3/mm3      Monocytes, Absolute 0.28 10*3/mm3      Eosinophils, Absolute 0.05 10*3/mm3      Basophils, Absolute 0.00 10*3/mm3      Immature Grans, Absolute 0.00 10*3/mm3      nRBC 0.0 /100 WBC     Procalcitonin [[id number]]  (Normal) Collected:  04/08/18 2243    Specimen:  Blood Updated:  04/08/18 2326     Procalcitonin 0.11 ng/mL     Narrative:       As a Marker for Sepsis (Non-Neonates):   1. <0.5 ng/mL represents a low risk of severe sepsis and/or septic shock.  1. >2 ng/mL represents a high risk of severe sepsis and/or septic shock.    As a Marker for Lower Respiratory Tract Infections that require antibiotic therapy:  PCT on Admission     Antibiotic Therapy             6-12 Hrs later  > 0.5                Strongly Recommended            >0.25 - <0.5         Recommended  0.1 - 0.25           Discouraged                   Remeasure/reassess PCT  <0.1                 Strongly Discouraged          Remeasure/reassess PCT      As 28 day mortality risk marker: \"Change in Procalcitonin Result\" (> 80 % or <=80 %) if Day 0 (or Day 1) and Day 4 values are available. Refer to http://www.[employer]-pct-calculator.com/   Change in PCT <=80 %   A decrease of PCT levels below or equal to 80 % defines a positive change in PCT test result representing a higher risk for 28-day all-cause mortality of patients diagnosed with severe sepsis or septic shock.  Change in PCT > 80 %   A decrease of PCT levels of more than 80 % defines a negative change in PCT result representing a lower risk for 28-day all-cause mortality of patients diagnosed with severe sepsis or septic shock.             "    C-reactive Protein [[id number]]  (Abnormal) Collected:  04/08/18 2243    Specimen:  Blood Updated:  04/08/18 2322     C-Reactive Protein 0.80 (H) mg/dL     Sedimentation Rate [[id number]]  (Abnormal) Collected:  04/08/18 2243    Specimen:  Blood Updated:  04/08/18 2318     Sed Rate 24 (H) mm/hr     Phosphorus [[id number]]  (Abnormal) Collected:  04/08/18 2243    Specimen:  Blood Updated:  04/08/18 2322     Phosphorus 1.6 (L) mg/dL     Magnesium [[id number]]  (Normal) Collected:  04/08/18 2243    Specimen:  Blood Updated:  04/08/18 2321     Magnesium 2.2 mg/dL     TSH [[id number]]  (Abnormal) Collected:  04/08/18 2243    Specimen:  Blood Updated:  04/08/18 2326     TSH 43.790 (H) mIU/mL     T4, Free [[id number]]  (Abnormal) Collected:  04/08/18 2243    Specimen:  Blood Updated:  04/08/18 2326     Free T4 0.71 (L) ng/dL     Scan Slide [[id number]] Collected:  04/08/18 2243    Specimen:  Blood Updated:  04/08/18 2311     Dacrocytes Slight/1+     Ovalocytes Slight/1+     Polychromasia Slight/1+     WBC Morphology Normal     Platelet Morphology Normal    Lactic Acid, Plasma [[id number]]  (Normal) Collected:  04/08/18 2252    Specimen:  Blood from Arm, Right Updated:  04/08/18 2317     Lactate 1.6 mmol/L     Blood Culture - Blood, [[id number]] Collected:  04/08/18 2252    Specimen:  Blood from Arm, Right Updated:  04/08/18 2303    Blood Culture - Blood, [[id number]] Collected:  04/08/18 2313    Specimen:  Blood from Arm, Left Updated:  04/08/18 2323    Urinalysis With / Culture If Indicated - Urine, Clean Catch [[id number]]  (Abnormal) Collected:  04/09/18 0017    Specimen:  Urine from Urine, Clean Catch Updated:  04/09/18 0035     Color, UA Yellow     Appearance, UA Clear     pH, UA 6.0     Specific Gravity, UA >=1.030     Glucose, UA >=1000 mg/dL (3+) (A)     Ketones, UA Negative     Bilirubin, UA Negative     Blood, UA Negative     Protein, UA 30 mg/dL (1+) (A)     Leuk Esterase, UA Negative     Nitrite, UA Negative      Urobilinogen, UA 1.0 E.U./dL    Urinalysis, Microscopic Only - Urine, Clean Catch [[id number]] Collected:  04/09/18 0017    Specimen:  Urine from Urine, Clean Catch Updated:  04/09/18 0035     RBC, UA 0-2 /HPF      WBC, UA 0-2 /HPF      Bacteria, UA None Seen /HPF      Squamous Epithelial Cells, UA 0-2 /HPF      Hyaline Casts, UA 3-6 /LPF      Methodology Automated Microscopy          I ordered the above labs and reviewed the results    RADIOLOGY  CT Lumbar Spine With Contrast   Final Result   1. Recent/acute compression fracture of the T12 vertebra with 4-5 mm of   bony retropulsion contributing to mild-to-moderate canal narrowing,   slightly eccentric to the right.   2. Multilevel degenerative and arthritic changes as individually   discussed.   3. There is no abnormal enhancement       This report was finalized on 4/9/2018 12:12 AM by Zia Wolf MD.          CT Abdomen Pelvis With Contrast   Final Result   IMPRESSION :    1. Left lung base consolidation favored to reflect atelectasis rather   than pneumonia.   2. Combination of findings suggests cirrhosis and portal hypertension.   Follow-up of the liver is recommended due to the marked heterogeneity.   3. Tiny renal lesions felt to reflect cysts.   4. Mild colonic diverticulosis with areas of colonic wall thickening   favored to be due to nondistention. Endoscopy could better evaluate.   5. Nonspecific gallbladder wall thickening without calcified gallstones   or associated inflammation.   6. Mild prostate gland enlargement.   7. Mild ascites           This report was finalized on 4/9/2018 12:01 AM by Zia Wolf MD.          XR Chest 2 View   Final Result   Poor inspiration with minimal bibasilar atelectasis       This report was finalized on 4/8/2018 11:06 PM by Zia Wolf MD.               I ordered the above noted radiological studies. Interpreted by radiologist. Reviewed by me in PACS.       PROCEDURES  Procedures  EKG           EKG time:  2227  Rhythm/Rate: SR 65  P waves and IL: nml  QRS, axis: Q waves in 3 and AVF   ST and T waves: prolonged QT interval     Interpreted Contemporaneously by me, independently viewed  Q waves new, changed compared to prior in 2008      PROGRESS AND CONSULTS  ED Course     2202  Labs ordered for further evaluation.     2245  CXR, CT Abd Pelvis, and CT Lumbar Spine ordered.    0017  Pt rechecked and resting comfortably. Discussed results of labs and CT with plan to admit due to pt's difficulty ambulating and possibility of spinal infection. Pt understands and agrees with plan, all questions addressed.     0032  Call placed to [facility].    0043  Discussed pt's case with Dr. Montes De Oca ([facility]) who agreed with plan to admit pt to monitor bed for MRI of spine and scan of gallbladder for further evaluation.       MEDICAL DECISION MAKING  Results were reviewed/discussed with the patient and they were also made aware of online access. Pt also made aware that some labs, such as cultures, will not be resulted during ER visit and follow up with PMD is necessary.     MDM  Number of Diagnoses or Management Options  T12 compression fracture:      Amount and/or Complexity of Data Reviewed  Clinical lab tests: ordered and reviewed (WBC - 2.84  Platelets - 40  Lipase 161  Free T4 - 0.71  TSH - 43.790  Phosphorus - 1.6  Sed Rate - 24  C-reactive protein - 0.80)  Tests in the radiology section of CPT®: ordered and reviewed (CXR - poor inspiration with minimal bibasilar atelectasis   CT - mild diverticulosis, nonspecific gall bladder wall thickening, mild ascites  CT Spine - recent compression fracture of T12   )  Tests in the medicine section of CPT®: ordered and reviewed (See note)  Discuss the patient with other providers: yes (Dr. Montes De Oca ([facility]))           DIAGNOSIS  Final diagnoses:   T12 compression fracture       DISPOSITION  ADMISSION    Discussed treatment plan and reason for admission with pt/family and admitting physician.  Pt/family  voiced understanding of the plan for admission for further testing/treatment as needed.         Latest Documented Vital Signs:  As of 12:47 AM  BP- 136/64 HR- 65 Temp- 98 °F (36.7 °C) (Tympanic) O2 sat- 95%    --  Documentation assistance provided by martin Escalona for Dr. Hernandez.  Information recorded by the scribe was done at my direction and has been verified and validated by me.               Suzanne Escalona  04/09/18 0047       Balbir Hernandez MD  04/09/18 0224

## 2018-04-09 NOTE — CONSULTS
Subjective   REASONS FOR FOLLOWUP:       1.  Pancytopenia, especially with leukocytopenia and thrombocytopenia. Bone marrow aspiration and biopsy obtained on 01/21/2014, with no evidence of hematologic malignancy, no myelodysplastic syndrome.  He has liver cirrhosis and splenomegaly.      2.   Frequently recurrent iron deficiency anemia, not able to tolerate oral iron supplementation, status post multiple Feraheme treatments.  He also had transfusion of PRBC in October 2014.      3.  Empiric treatment for ITP using IVIG, in March 2014 and no response.    4.  Ongoing care for frequent iron deficiency required repeated intravenous iron therapy.   5. History of prostate cancer on Eligard and melena  6. osteoporosis  7.  HIV-positive 3 drugs with negative viral load as of 2/18-CD4 count 120 on Bactrim prophylaxis    REASON FOR CONSULTATION:  Pancytopenia and weight loss and low back pain with T12 compression fracture    Provide an opinion on any further workup or treatment                             REQUESTING PHYSICIAN:  ANIKA    RECORDS OBTAINED:  Records of the patients history including those obtained from the referring provider were reviewed and summarized in detail.    HISTORY OF PRESENT ILLNESS:  The patient is a 72 y.o. year old male who is here for an opinion about the above issue.    History of Present Illness patient is a 72-year-old male with long-standing HIV infection under good control by Dr. Klarissa Thomas on 3 different medications including Isentress.  Prezista and Truvada.  He is been seen in our office for intermittent iron deficiency and receives IV iron last dose in January 2018  He is known to have osteoporosis and is on Prolia every 6 months alternating with Eligard the urologist for his prostate cancer    Is admitted this time with a 25 pound weight loss over the last month associated with low back pain was no obvious triggering event and was noted on imaging to have a compression fracture of  the T12 vertebral body was no cord compression.  He denies lifting anything heavy or jumping off anything are hurting his back.  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 and portal hypertension and this is essentially stable    Past Medical History:   Diagnosis Date   • Anemia    • Cirrhosis    • Coronary artery disease    • Diabetes mellitus    • Difficulty breathing    • H/O complete eye exam 10/2017   • H/O transfusion of packed red blood cells    • HIV infection, symptomatic 1997   • Hyperlipidemia    • Hypertension    • Pancytopenia    • Peripheral artery disease    • Prostate cancer    • Thyroid disease         Past Surgical History:   Procedure Laterality Date   • BONE MARROW BIOPSY W/ ASPIRATION  01/21/2014   • CARDIAC CATHETERIZATION     • COLONOSCOPY  09/20/2012    non-thrombosed EH, sessile polyp in ascending colon 3 mm in size.   • CORONARY ANGIOPLASTY WITH STENT PLACEMENT  06/2013   • ENDOSCOPY  09/20/2012    grade 1 varicesmiddle and lower 3rd of esophagus. Moderate portal hypertensive gastropathy in entire stomach.   • MOUTH SURGERY     • PROSTATE BIOPSY     • TONSILLECTOMY     • UPPER GASTROINTESTINAL ENDOSCOPY  09/20/2012    grd 1 varices, portal hypertensive gastropathy      HEME HISTORY:  HEMATOLOGIC HISTORY: History from previous dates can be found in a separate document.        Laboratory study on 10/05/2015 reported hemoglobin of 9.2, platelets 41,000 and WBC 3320 including neutrophils 1600, lymphocytes 400. Serum ferritin was 12.6, down from 72.9 on 08/10/2015. Serum iron level was 39, also down from 69 on 08/10/2015. His HIV test was improved and negative PSA and improved lipid panel. CMP panel was unremarkable except marginally elevated bilirubin of 1.4 and glucose 135.       The patient was evaluated in our clinic on 10/15/2015. The  patient is reporting fatigue. We will arrange for treatment of Feraheme. The patient will continue followup with Dr. Thomas every 3 months for laboratory studies, including iron panel.  Patient was given 2 doses of Feraheme.       04/19/2016 which showed iron deficiency, ferritin level was 10.4 NG/ML and iron level 36. She was given Feraheme treatment in May 2016.       His laboratory study last week on 08/16/2016 showed recurrent severe iron deficiency with a ferritin 6.52 NG/ML, and a serum iron 31. His hemoglobin was 8.8, platelets 57,000 and WBC of 3100. His B12 level was 1097 PG/ML. He also reported taking levothyroxine 300 µg daily. This patient also lost a significant weight about 20 pounds in the past 3 months, because he was on 2 different diuretics.  Patient was seen on 08/22/2016 and the schedule for 2 doses of Feraheme treatment.      Laboratory study on 02/14/2017 reported worsening hemoglobin 10.8, platelets 37,000, and neutrophils 1460 and lymphocytes 240.  He also had recurrent iron deficiency, with ferritin 25.8, iron saturation 16%.  His vitamin B12 level was supratherapeutic.      He received multiple IV iron therapy was in late August and early September 2016, then early November 2016 and early January and May 2017.     Laboratory study on 6/28/2017 showed a slightly improved ferritin at 40.8, and iron saturation 24% and improved hemoglobin at 11.8.      On 8/2/2017 laboratory study showed worsening iron with ferritin 30, iron saturation 17, and worsening hemoglobin at 10.  He was given 2 doses ferriheme in early August 2017.       On 11/8/2017, ferritin 26.2 ng/mL, iron 66 TIBC 447 iron saturation 15%, hemoglobin 12.0, MCV 86, MCHC 32.0, and platelets 86,000, WBC 3200 including neutrophils 2400 and lymphocytes 300.  Feraheme 2 doses was given afterwards.       No current facility-administered medications on file prior to encounter.      Current Outpatient Prescriptions on File Prior to Encounter  "  Medication Sig Dispense Refill   • baclofen (LIORESAL) 10 MG tablet Take 1 tablet by mouth 3 (Three) Times a Day As Needed for Muscle Spasms. 21 tablet 0   • BD PEN NEEDLE ONEAL U/F 32G X 4 MM misc      • clonazePAM (KlonoPIN) 0.5 MG tablet Take 1 tablet by mouth daily.     • fluconazole (DIFLUCAN) 100 MG tablet Take 100 mg by mouth Every Other Day.     • furosemide (LASIX) 20 MG tablet Take 1 tablet by mouth Daily. (Patient taking differently: Take 20 mg by mouth Daily. Pt states he takes when he has swelling in ankles) 30 tablet 3   • Icosapent Ethyl (VASCEPA) 1 G capsule Take 1 g by mouth 2 (Two) Times a Day.     • ISENTRESS 400 MG tablet Take 400 mg by mouth 2 (Two) Times a Day.     • metoprolol succinate XL (TOPROL-XL) 50 MG 24 hr tablet Take 1 tablet by mouth 2 (Two) Times a Day. 60 tablet 11   • PREZISTA 800 MG tablet tablet Take 800 mg by mouth.     • rifaximin (XIFAXAN) 550 MG tablet Take 1 tablet by mouth every 12 (twelve) hours. 60 tablet 5   • spironolactone (ALDACTONE) 50 MG tablet Take 1 tablet by mouth Daily. 30 tablet 5   • sulfamethoxazole-trimethoprim (BACTRIM DS,SEPTRA DS) 800-160 MG per tablet Take 1 tablet by mouth Every Other Day As Needed.     • SYNTHROID 300 MCG tablet TK 1 T PO QD  0   • tamsulosin (FLOMAX) 0.4 MG capsule Take 1 capsule by mouth nightly.     • TRESIBA FLEXTOUCH 200 UNIT/ML solution pen-injector INJECT 40-80 UNITS UNDER THE SKIN QD  3   • TRUVADA 200-300 MG per tablet Take 200-300 mg by mouth.     • TYBOST 150 MG tablet Take 150 mg by mouth.     • vitamin D (ERGOCALCIFEROL) 30092 UNITS capsule capsule TK 1 C PO ONCE A WEEK  0   • PROCTOZONE-HC 2.5 % rectal cream APPLY TO AFFECTED AREA THREE TIMES DAILY AFTER BOWEL MOVEMENT 30 g 0        ALLERGIES:    Allergies   Allergen Reactions   • Methylprednisolone Other (See Comments)     \"Everything shuts down\"        Social History     Social History   • Marital status: Single   • Years of education: College     Occupational History "   •  Retired     [employer]     Social History Main Topics   • Smoking status: Former Smoker     Packs/day: 2.00     Years: 30.00     Types: Cigarettes     Quit date: 1998   • Smokeless tobacco: Never Used   • Alcohol use No   • Drug use: No   • Sexual activity: Defer     Other Topics Concern   • Not on file        Family History   Problem Relation Age of Onset   • Heart disease Other    • No Known Problems Mother    • No Known Problems Father    • No Known Problems Maternal Grandmother    • No Known Problems Maternal Grandfather    • No Known Problems Paternal Grandmother    • No Known Problems Paternal Grandfather         Review of Systems   Constitutional: Positive for unexpected weight change.   Musculoskeletal: Positive for back pain.        Objective     Vitals:    04/09/18 0102 04/09/18 0133 04/09/18 0253 04/09/18 0823   BP: 145/65 121/89 129/59    BP Location:   Left arm    Patient Position:   Lying    Pulse: 63 65 62 63   Resp:   18 16   Temp:   97.4 °F (36.3 °C) 98.4 °F (36.9 °C)   TempSrc:   Oral Oral   SpO2: 96% 96% 94% 96%   Weight:   75.3 kg (166 lb)    Height:         Current Status 1/24/2018   ECOG score 0       Physical Exam    GENERAL:  Well-developed, well-nourished in no acute distress.   SKIN:  Warm, dry without rashes, purpura or petechiae.  EYES:  Pupils equal, round and reactive to light.  EOMs intact.  Conjunctivae normal.  EARS:  Hearing intact.  NOSE:  Septum midline.  No excoriations or nasal discharge.  MOUTH:  Tongue is well-papillated; no stomatitis or ulcers.  Lips normal.  THROAT:  Oropharynx without lesions or exudates.  NECK:  Supple with good range of motion; no thyromegaly or masses, no JVD.  LYMPHATICS:  No cervical, supraclavicular, axillary or inguinal adenopathy.  CHEST:  Lungs clear to auscultation. Good airflow.  CARDIAC:  Regular rate and rhythm without murmurs, rubs or gallops. Normal S1,S2.  ABDOMEN:  Soft, nontender with no splenomegaly 6cm no  masses.  EXTREMITIES:  No clubbing, cyanosis or edema.  NEUROLOGICAL:  Cranial Nerves II-XII grossly intact.  No focal neurological deficits.  PSYCHIATRIC:  Normal affect and mood.        RECENT LABS:  Hematology WBC   Date Value Ref Range Status   04/09/2018 1.93 (L) 4.50 - 10.70 10*3/mm3 Final     RBC   Date Value Ref Range Status   04/09/2018 3.27 (L) 4.60 - 6.00 10*6/mm3 Final     Hemoglobin   Date Value Ref Range Status   04/09/2018 9.3 (L) 13.7 - 17.6 g/dL Final     Hematocrit   Date Value Ref Range Status   04/09/2018 29.6 (L) 40.4 - 52.2 % Final     Platelets   Date Value Ref Range Status   04/09/2018 35 (C) 140 - 500 10*3/mm3 Final        Lab Results   Component Value Date    NEUTROABS 1.38 (L) 04/09/2018       CT ABD/PEL  IMPRESSION :   1. Left lung base consolidation favored to reflect atelectasis rather  than pneumonia.  2. Combination of findings suggests cirrhosis and portal hypertension.  Follow-up of the liver is recommended due to the marked heterogeneity.  3. Tiny renal lesions felt to reflect cysts.  4. Mild colonic diverticulosis with areas of colonic wall thickening  favored to be due to nondistention. Endoscopy could better evaluate.  5. Nonspecific gallbladder wall thickening without calcified gallstones  or associated inflammation.  6. Mild prostate gland enlargement.  7. Mild ascites     CT lumbar   FINDINGS:  There is a recent if not acute compression fracture of the  T12 vertebra mostly along its superior aspect. It is comminuted and  exhibits up to approximately 60% height loss centrally. The posterior  most aspect of the T12 vertebra is fairly well preserved in height.  There is some bony retropulsion extending approximately 4-5 mm into the  central canal, greater to the right of midline producing  mild-to-moderate canal narrowing. The bony canal narrowing is best seen  on axial images 14 and 15. There are milder and old appearing Schmorl's  type deformities of the L3 and L4 superior  endplates without significant  height loss of either vertebra overall. The remaining lumbar vertebrae  are intact and normal in height  This report was finalized on 4/9/2018     Assessment/Plan   1.   T12 compression fracture without obvious injury in a patient with osteoporosis on Prolia         Doubt underlying malignancy but will need to get a biopsy when kyphoplasty is done to be sure  2.  Chronic HIV infection on 3 drug treatment with negative viral load with low CD4 count on prophylactic Bactrim-Truvada has been known to worsen bone density.      3..  Severe thrombocytopenia and mild to moderate leukopenia/neutropenia related to his liver cirrhosis, hypersplenism  and HIV treatment. Both of his platelet and ANC count fluctuates,    He has no spontaneous bleeding.  Will continue to observe.     3.Recurrent Iron deficiency anemia required frequent IV iron therapy, roughly about every 3 months.  Patient complains of significant fatigue.   Last time he received Feraheme treatment was Jan 2018  He denies any visible bleeding besides may be occasional a bit of blood on toilet paper but nothing significant.       4 prostate cancer treated with radiation plus Eligard and Prolia last PSA in February 0.4    5 weight loss etiology unclear negative CT the abdomen except for nodules in the cirrhotic liver AFP pending     6.  Osteoporosis due to Eligard plus Truvada on Prolia per urology    . . Plan  1. Neurosurgery for kyphoplasty and biopsy of T12  2.  Check immunofixation and vitamin D levels.  3.  Agree with upper and lower endoscopy with history of colon polyps and will follow with you may need chest CT if we have no other explanation for his weight loss  4.

## 2018-04-09 NOTE — CONSULTS
Adult Nutrition  Assessment/PES    Patient Name:  Kye Aranda  YOB: 1945  MRN: [id number]  Admit Date:  4/8/2018    Assessment Date:  4/9/2018    Comments:  Completed nutrition assessment triggered by nurse admission screen.          Adult Nutrition Assessment     Row Name 04/09/18 0812       Nutrition Prescription PO    Current PO Diet Regular    Common Modifiers Consistent Carbohydrate       PO Evaluation    Number of Days PO Intake Evaluated Insufficient Data   no intake recorded    Row Name 04/09/18 0811       Physical Findings    Skin other (see comments)   intact with bruises; Galileo score 19       Calculation Measurements    Weight Used For Calculations 75.3 kg (166 lb 0.1 oz)       Estimated/Assessed Needs    Additional Documentation Calorie Requirements (Group);Fluid Requirements (Group);Protein Requirements (Group)       Calorie Requirements    Estimated Calorie Requirement (kcal/day) 1883    Estimated Calorie Need Method kcal/kg    Estimated Calorie Requirement Comment 25 carl/kg       KCAL/KG    14 Kcal/Kg (kcal) 1054.2    15 Kcal/Kg (kcal) 1129.5    18 Kcal/Kg (kcal) 1355.4    20 Kcal/Kg (kcal) 1506    25 Kcal/Kg (kcal) 1882.5    30 Kcal/Kg (kcal) 2259    35 Kcal/Kg (kcal) 2635.5    40 Kcal/Kg (kcal) 3012    45 Kcal/Kg (kcal) 3388.5    50 Kcal/Kg (kcal) 3765       Poinsett-St. Jeor Equation    RMR (Poinsett-St. Jeor Equation) 1466.63       Protein Requirements    Est Protein Requirement Amount (gms/kg) 1.0 gm protein    Estimated Protein Requirements (gms/day) 75.3       Fluid Requirements    Estimated Fluid Requirements (mL/day) 1883    Estimated Fluid Requirement Method RDA Method    RDA Method (mL) 1883    Janeth-Claribel Method (over 20 kg) 3006    Row Name 04/09/18 0810       Labs/Procedures/Meds    Lab Results Reviewed reviewed    Lab Results Comments glu high; imaging and vitals reviewed; meds: insulin    Row Name 04/09/18 0809       Admit Weight    Admit Weight 72.6 kg (160 lb 0.9  oz)       Usual Body Weight (UBW)    Weight Loss unintentional    Weight Loss Time Frame Weight February, 2018 188#. Today's weight 166#. 22# weight loss (13,25%) x 2 months       Body Mass Index (BMI)    BMI Assessment BMI 25-29.9: overweight    Row Name 04/09/18 0806       Reason for Assessment    Reason For Assessment nurse/nurse practitioner consult    Diagnosis other (see comments)   compression fx T12 vertebra    Identified At Risk by Screening Criteria MST SCORE 2+;unintentional loss of 10 lbs or more in the past 2 mos    Row Name 04/09/18 0253       Anthropometrics    Weight 75.3 kg (166 lb)          Problem/Interventions:        Problem 1     Row Name 04/09/18 0813       Nutrition Diagnoses Problem 1    Problem 1 Nutrition Appropriate for Condition at this Time    Etiology (related to) Medical Diagnosis    Endocrine DM    Signs/Symptoms (evidenced by) Report/Observation    Reported/Observed By MD                    Intervention Goal     Row Name 04/09/18 0813       Intervention Goal    General Maintain nutrition    PO Tolerate PO;PO intake (%)    PO Intake % 75 %    Weight No significant weight loss            Nutrition Intervention     Row Name 04/09/18 0813       Nutrition Intervention    RD/Tech Action Follow Tx progress;Care plan reviewd              Education/Evaluation     Row Name 04/09/18 0813       Education    Education Will Instruct as appropriate       Monitor/Evaluation    Monitor Per protocol        Electronically signed by:  Cora Valencia RD  04/09/18 8:15 AM

## 2018-04-09 NOTE — ED NOTES
Pt to ED with c/o bilat flank pain x 5 weeks, palpitations on exertion, denies n/v/d, abd pain at this time, no cp while laying in bed      Keri Malik RN  04/08/18 1749

## 2018-04-09 NOTE — CONSULTS
[facility]  Initial Inpatient Consult Note    Referring Provider: [facility]    Reason for Consultation:     Subjective     History of present illness:    72 y.o. male, whom I have followed as an outpatient for cirrhosis for many years, admitted with worsening lower back pain.  He reports that the back pain has progressively worsened over the past 4-5 weeks.  It is limited his ability to ambulate and has caused nausea secondary to the severity of the pain.  Pain is constant and unrelenting and radiates to both sides of his back.  It is worse with movement.  He had no obvious trauma to his back.  He does have a history of osteopenia.  Imaging this admission shows a acute/subacute T12 compression fracture.    I followed him in the past for cirrhosis to be related to previous HIV medications.  He has been fairly stable recently with some mild issues related to water retention and we have had difficulty balancing the use of diuretics with mild increases in creatinine.  He has been on Xifaxan for mild encephalopathy.  His last EGD for variceal screening was in 2012.  He did not have varices at that time.  He also had a colonoscopy at that time which was performed for rectal bleeding.  Multiple adenomatous polyps were removed at that time and a three-year follow-up was recommended.  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  He is overdue for endoscopic follow-up.  He's had multiple competing health issues over the years that have made a follow-up difficult including cardiac stent placement requiring Plavix use for a period of time, and more recently significant leukopenia with neutropenia.  He's also had worsening thrombocytopenia recently.    He reports seeing some bright red blood when he wipes.  He denies seeing any blood in the stool or melena.  His hemoglobin is slightly down from his baseline which is  usually 10-12.  He is followed by Denise at the [facility] and received iron infusions.  He reports that he has vomited a couple times recently which is unusual for him but he has not seen any blood in his vomit.  He also took a few ibuprofen for a few days for his back pain but it did not cause any relief and so he stopped taking them.    Past Medical History:  Past Medical History:   Diagnosis Date   • Anemia    • Cirrhosis    • Coronary artery disease    • Diabetes mellitus    • Difficulty breathing    • H/O complete eye exam 10/2017   • H/O transfusion of packed red blood cells    • HIV infection, symptomatic 1997   • Hyperlipidemia    • Hypertension    • Pancytopenia    • Peripheral artery disease    • Prostate cancer    • Thyroid disease      Past Surgical History:  Past Surgical History:   Procedure Laterality Date   • BONE MARROW BIOPSY W/ ASPIRATION  01/21/2014   • CARDIAC CATHETERIZATION     • COLONOSCOPY  09/20/2012    non-thrombosed EH, sessile polyp in ascending colon 3 mm in size.   • CORONARY ANGIOPLASTY WITH STENT PLACEMENT  06/2013   • ENDOSCOPY  09/20/2012    grade 1 varicesmiddle and lower 3rd of esophagus. Moderate portal hypertensive gastropathy in entire stomach.   • MOUTH SURGERY     • PROSTATE BIOPSY     • TONSILLECTOMY     • UPPER GASTROINTESTINAL ENDOSCOPY  09/20/2012    grd 1 varices, portal hypertensive gastropathy      Social History:   Social History   Substance Use Topics   • Smoking status: Former Smoker     Packs/day: 2.00     Years: 30.00     Types: Cigarettes     Quit date: 1998   • Smokeless tobacco: Never Used   • Alcohol use No      Family History:  Family History   Problem Relation Age of Onset   • Heart disease Other    • No Known Problems Mother    • No Known Problems Father    • No Known Problems Maternal Grandmother    • No Known Problems Maternal Grandfather    • No Known Problems Paternal Grandmother    • No Known Problems Paternal Grandfather        Home  Meds:  Prescriptions Prior to Admission   Medication Sig Dispense Refill Last Dose   • baclofen (LIORESAL) 10 MG tablet Take 1 tablet by mouth 3 (Three) Times a Day As Needed for Muscle Spasms. 21 tablet 0 Past Week at Unknown time   • BD PEN NEEDLE ONEAL U/F 32G X 4 MM misc    Past Week at Unknown time   • clonazePAM (KlonoPIN) 0.5 MG tablet Take 1 tablet by mouth daily.   Patient Taking Differently at Unknown time   • fluconazole (DIFLUCAN) 100 MG tablet Take 100 mg by mouth Every Other Day.   4/8/2018 at Unknown time   • furosemide (LASIX) 20 MG tablet Take 1 tablet by mouth Daily. (Patient taking differently: Take 20 mg by mouth Daily. Pt states he takes when he has swelling in ankles) 30 tablet 3 Past Week at Unknown time   • Icosapent Ethyl (VASCEPA) 1 G capsule Take 1 g by mouth 2 (Two) Times a Day.   4/8/2018 at Unknown time   • ISENTRESS 400 MG tablet Take 400 mg by mouth 2 (Two) Times a Day.   4/8/2018 at Unknown time   • metoprolol succinate XL (TOPROL-XL) 50 MG 24 hr tablet Take 1 tablet by mouth 2 (Two) Times a Day. 60 tablet 11 4/8/2018 at Unknown time   • PREZISTA 800 MG tablet tablet Take 800 mg by mouth.   4/8/2018 at Unknown time   • rifaximin (XIFAXAN) 550 MG tablet Take 1 tablet by mouth every 12 (twelve) hours. 60 tablet 5 4/8/2018 at Unknown time   • spironolactone (ALDACTONE) 50 MG tablet Take 1 tablet by mouth Daily. 30 tablet 5 Past Week at Unknown time   • sulfamethoxazole-trimethoprim (BACTRIM DS,SEPTRA DS) 800-160 MG per tablet Take 1 tablet by mouth Every Other Day As Needed.   Past Month at Unknown time   • SYNTHROID 300 MCG tablet TK 1 T PO QD  0 Past Week at Unknown time   • tamsulosin (FLOMAX) 0.4 MG capsule Take 1 capsule by mouth nightly.   4/8/2018 at Unknown time   • TRESIBA FLEXTOUCH 200 UNIT/ML solution pen-injector INJECT 40-80 UNITS UNDER THE SKIN QD  3 Past Week at Unknown time   • TRUVADA 200-300 MG per tablet Take 200-300 mg by mouth.   4/8/2018 at Unknown time   • TYBOST  "150 MG tablet Take 150 mg by mouth.   4/8/2018 at Unknown time   • vitamin D (ERGOCALCIFEROL) 59662 UNITS capsule capsule TK 1 C PO ONCE A WEEK  0 Past Week at Unknown time   • PROCTOZONE-HC 2.5 % rectal cream APPLY TO AFFECTED AREA THREE TIMES DAILY AFTER BOWEL MOVEMENT 30 g 0 Unknown at Unknown time     Current Meds:     clonazePAM 0.5 mg Oral Daily   cobicistat 150 mg Oral Daily With Breakfast   [START ON 4/10/2018] fluconazole 100 mg Oral Once   insulin aspart 0-7 Units Subcutaneous 4x Daily With Meals & Nightly   levothyroxine 300 mcg Oral Q AM   metoprolol succinate XL 50 mg Oral Q12H   raltegravir 400 mg Oral BID   rifaximin 550 mg Oral Q12H   spironolactone 50 mg Oral Daily   tamsulosin 0.4 mg Oral Nightly     Allergies:  Allergies   Allergen Reactions   • Methylprednisolone Other (See Comments)     \"Everything shuts down\"     Review of Systems  Pertinent items are noted in HPI, all other systems reviewed and negative     Objective     Vital Signs  Temp:  [97.4 °F (36.3 °C)-98.4 °F (36.9 °C)] 98.4 °F (36.9 °C)  Heart Rate:  [62-76] 63  Resp:  [16-20] 16  BP: (121-159)/(59-89) 129/59  Physical Exam:  General Appearance:    Alert, cooperative, in no acute distress   Head:    Normocephalic, without obvious abnormality, atraumatic   Eyes:            Lids and lashes normal, conjunctivae and sclerae normal, no   icterus   Throat:   No oral lesions, no thrush, oral mucosa dry       Lungs:     Clear to auscultation,respirations regular, even and                   unlabored    Heart:    Regular rhythm and normal rate, normal S1 and S2   Chest Wall:    No abnormalities observed   Abdomen:     Normal bowel sounds, no masses, no organomegaly, soft        non-tender, non-distended, no guarding, no rebound                 tenderness   Rectal:     Deferred   Extremities:   no edema    Skin:   No bleeding, bruising or rash       Psychiatric:  Judgement and insight: normal   Orientation to person place and time: normal   " Mood and affect: normal   Results Review:   I reviewed the patient's new clinical results.      Results from last 7 days  Lab Units 04/09/18  0916 04/08/18  2243   WBC 10*3/mm3 1.93* 2.84*   HEMOGLOBIN g/dL 9.3* 10.1*   HEMATOCRIT % 29.6* 32.2*   PLATELETS 10*3/mm3 35* 40*       Results from last 7 days  Lab Units 04/09/18  0916 04/08/18  2243   SODIUM mmol/L 134* 135*   POTASSIUM mmol/L 3.6 3.6   CHLORIDE mmol/L 99 99   CO2 mmol/L 25.1 24.9   BUN mg/dL 12 12   CREATININE mg/dL 1.12 1.09   CALCIUM mg/dL 8.6 9.0   BILIRUBIN mg/dL 1.6* 1.5*   ALK PHOS U/L 170* 193*   ALT (SGPT) U/L 51* 55*   AST (SGOT) U/L 57* 61*   GLUCOSE mg/dL 198* 219*           Lab Results  Lab Value Date/Time   LIPASE 161 (H) 04/08/2018 2243       Radiology:  CT Lumbar Spine With Contrast   Final Result   1. Recent/acute compression fracture of the T12 vertebra with 4-5 mm of   bony retropulsion contributing to mild-to-moderate canal narrowing,   slightly eccentric to the right.   2. Multilevel degenerative and arthritic changes as individually   discussed.   3. There is no abnormal enhancement       This report was finalized on 4/9/2018 12:12 AM by Zia Wolf MD.          CT Abdomen Pelvis With Contrast   Final Result   IMPRESSION :    1. Left lung base consolidation favored to reflect atelectasis rather   than pneumonia.   2. Combination of findings suggests cirrhosis and portal hypertension.   Follow-up of the liver is recommended due to the marked heterogeneity.   3. Tiny renal lesions felt to reflect cysts.   4. Mild colonic diverticulosis with areas of colonic wall thickening   favored to be due to nondistention. Endoscopy could better evaluate.   5. Nonspecific gallbladder wall thickening without calcified gallstones   or associated inflammation.   6. Mild prostate gland enlargement.   7. Mild ascites           This report was finalized on 4/9/2018 12:01 AM by Zia Wolf MD.          XR Chest 2 View   Final Result   Poor  inspiration with minimal bibasilar atelectasis       This report was finalized on 4/8/2018 11:06 PM by Zia Wolf MD.              Assessment/Plan   Patient Active Problem List   Diagnosis   • Chronic coronary artery disease   • Difficulty breathing   • Intermittent claudication   • Peripheral arterial occlusive disease   • Shortness of breath   • Iron deficiency anemia due to chronic blood loss   • Thrombocytopenia associated with AIDS   • Leukocytopenia   • Neutropenia   • Pancytopenia   • Iron and its compounds causing adverse effect in therapeutic use   • Liver cirrhosis   • Lumbar degenerative disc disease   • Secondary thrombocytopenia   • History of coronary artery stent placement   • T12 compression fracture     Impression-  1.  Rectal bleeding-history of radiation proctitis treated with APC  2.  Pancytopenia  3.  Cirrhosis, cryptogenic but likely related to previous HIV medications  4.  T12 compression fracture   5.  Abnormal CT of the liver  6. HIV   7. Weight loss    Plan-  - Await neurosurgery input regarding his compression fracture  - Given difficulties encountered trying to get his EGD and colonoscopy done as an outpatient, it would be nice to get this done while he is in the hospital.  His ANC is currently acceptable, he will need platelet transfusion prior to procedures.  We'll have to wait until he is cleared from a neurosurgical standpoint before proceeding.  - We'll check an AFP given abnormal CT of the liver.  I would like to get an MRI of his liver as well for further evaluation of the heterogeneity seen on CT-if MRI is planned of his back we could get these concurrently  - Agree with nutrition consult given recent significant weight loss-he will a 2 g sodium diet given his history of cirrhosis and ascites    I d iscussed the patients findings and my recommendations with patient.    Robyn Butler MD

## 2018-04-09 NOTE — PROGRESS NOTES
Malnutrition Severity Assessment    Patient Name:  Kye Aranda  YOB: 1945  MRN: [id number]  Admit Date:  4/8/2018    Patient meets criteria for : Severe malnutrition    Comments:  Patient believes weight loss is due to back pain which was not chronic. Muscle loss is demonstrated by slightly depressed dorsal hand, knee cap less prominent (more rounded) and posterior calf not well developed. March weight 185#, current weight 166#. 19# weight loss (11.4%) x 1 month. He states PO intake has been less than 50% for about a month.    Malnutrition Type: Acute Illness/Injury Malnutrition     Malnutrition Type (last 8 hours)      Malnutrition Severity Assessment     Row Name 04/09/18 1442       Malnutrition Severity Assessment    Malnutrition Type Acute Illness/Injury Malnutrition    Row Name 04/09/18 1442       Physical Signs of Malnutrition (Acute)    Muscle Wasting Mild    Row Name 04/09/18 1442       Weight Status (Acute)    Weight Loss Severe (>5% / 1 mo)    Row Name 04/09/18 1442       Energy Intake Status (Acute)    Energy Intake Severe (< or equal to 50% / > or equal to 5d)    Row Name 04/09/18 1442       Criteria Met (Must meet criteria for severity in at least 2 of these categories: M Wasting, Fat Loss, Fluid, Secondary Signs, Wt. Status, Intake)    Patient meets criteria for  Severe malnutrition          Electronically signed by:  Cora Valencia RD  04/09/18 2:52 PM

## 2018-04-09 NOTE — PLAN OF CARE
Problem: Patient Care Overview  Goal: Plan of Care Review  Outcome: Ongoing (interventions implemented as appropriate)   04/09/18 6957   Coping/Psychosocial   Plan of Care Reviewed With patient   Plan of Care Review   Progress improving   OTHER   Outcome Summary admitted patient to unit, VSS, no c/o pain presently, neuro surgery consulted, resting well throughout shift, will continue to monitor       Problem: Fall Risk (Adult)  Goal: Identify Related Risk Factors and Signs and Symptoms  Outcome: Ongoing (interventions implemented as appropriate)      Problem: Pain, Chronic (Adult)  Goal: Identify Related Risk Factors and Signs and Symptoms  Outcome: Ongoing (interventions implemented as appropriate)

## 2018-04-10 PROBLEM — E44.1 MILD PROTEIN-CALORIE MALNUTRITION (HCC): Status: ACTIVE | Noted: 2018-01-01

## 2018-04-10 NOTE — CONSULTS
Inpatient Neurosurgery Consult  Consult performed by: CHASITY RICCI.  Consult ordered by: ERNIE VILLARREAL  Reason for consult: back pain x 5wks  Assessment/Recommendations: Mr. Aranda is a 72 yr old male with PMH HIV/AIDS, prostate cancer, cirrhosis, esophageal varices, DM, HTN, previous tob abuse with a 60pck/yr tob hxm, osteoporosis on prolia (DEXA scan in march showed T score of -2.5). Pt has a hx of chronic LBP that he describes as a mild to moderate ache.  About 5wks ago he began having more severe LBP that has slowly progressed.  The radicular pain or numbness or tingling or bowel or bladder incontinence or fever or chills or night pain.  The pain is mild as long as she is laying down but quite severe with any attempts to stand or walk.  An MRI of the lumbar and thoracic spine was completed which demonstrates a late subacute appearing compression fracture at T12 approximately 60% height loss and mild retropulsion with mild stenosis.    The patient does have chronic issues with thrombocytopenia and his current platelet count is 28.    Dr. Miranda asked us to evaluate Mr. Aranda in regards to a possible kyphoplasty with biopsy.  I did speak with her on the telephone today and expressed that we would need to platelet count to be around 100 or higher to consider surgery.  She is going to give him some platelets to see how he responds to see if it is plausible to get him to that level.  I explained the MRI results to the patient.  He does express a desire to have the kyphoplasty due to the pain but understands that we may not be able to.  We will make a final surgical recommendations tomorrow. Will also check coags.           Patient Care Team:  Kendrick Griggs MD as PCP - General (Family Medicine)  Carrie Lucas MD PhD as Consulting Physician (Hematology and Oncology)  Travis Mckenzie MD as Referring Physician (Internal Medicine)  Baldo Conway MD as Consulting Physician (Endocrinology)  Robyn Butler MD as  Consulting Physician (Gastroenterology)    Chief complaint:back pain x 5wks    Subjective     I did review the thoracic and lumbar MRI done without contrast as discussed above.      ..Lab             04/10/18                       0330          WBC          2.04*         HEMOGLOBIN   9.0*          HEMATOCRIT   29.3*         PLATELETS    28*             ..Lab             04/10/18                       0330          SODIUM       134*          POTASSIUM    3.9           CHLORIDE     100           CO2          24.5          BUN          12            CREATININE   1.01          GLUCOSE      185*          CALCIUM      8.4*                  Back Pain   This is a new problem. The current episode started more than 1 month ago. The problem occurs constantly. The problem has been gradually worsening since onset. The pain is present in the lumbar spine. The pain is at a severity of 7/10. The pain is severe. The pain is the same all the time. The symptoms are aggravated by position and standing. Pertinent negatives include no bladder incontinence, bowel incontinence, leg pain, perianal numbness or weakness. Risk factors include history of cancer and history of osteoporosis. He has tried analgesics for the symptoms. The treatment provided no relief.       Review of Systems   Gastrointestinal: Negative for bowel incontinence.   Genitourinary: Negative for bladder incontinence and difficulty urinating.   Musculoskeletal: Positive for back pain.   Neurological: Negative for weakness.   All other systems reviewed and are negative.       Past Medical History:   Diagnosis Date   • Anemia    • Cirrhosis    • Coronary artery disease    • Diabetes mellitus    • Difficulty breathing    • H/O complete eye exam 10/2017   • H/O transfusion of packed red blood cells    • HIV infection, symptomatic 1997   • Hyperlipidemia    • Hypertension    • Pancytopenia    • Peripheral artery disease    • Prostate cancer    • Thyroid disease    ,   Past  Surgical History:   Procedure Laterality Date   • BONE MARROW BIOPSY W/ ASPIRATION  01/21/2014   • CARDIAC CATHETERIZATION     • COLONOSCOPY  09/20/2012    non-thrombosed EH, sessile polyp in ascending colon 3 mm in size.   • CORONARY ANGIOPLASTY WITH STENT PLACEMENT  06/2013   • ENDOSCOPY  09/20/2012    grade 1 varicesmiddle and lower 3rd of esophagus. Moderate portal hypertensive gastropathy in entire stomach.   • MOUTH SURGERY     • PROSTATE BIOPSY     • TONSILLECTOMY     • UPPER GASTROINTESTINAL ENDOSCOPY  09/20/2012    grd 1 varices, portal hypertensive gastropathy   ,   Family History   Problem Relation Age of Onset   • Heart disease Other    • No Known Problems Mother    • No Known Problems Father    • No Known Problems Maternal Grandmother    • No Known Problems Maternal Grandfather    • No Known Problems Paternal Grandmother    • No Known Problems Paternal Grandfather    ,   Social History   Substance Use Topics   • Smoking status: Former Smoker     Packs/day: 2.00     Years: 30.00     Types: Cigarettes     Quit date: 1998   • Smokeless tobacco: Never Used   • Alcohol use No   ,   Prescriptions Prior to Admission   Medication Sig Dispense Refill Last Dose   • baclofen (LIORESAL) 10 MG tablet Take 1 tablet by mouth 3 (Three) Times a Day As Needed for Muscle Spasms. 21 tablet 0 Past Week at Unknown time   • BD PEN NEEDLE ONEAL U/F 32G X 4 MM misc    Past Week at Unknown time   • clonazePAM (KlonoPIN) 0.5 MG tablet Take 1 tablet by mouth daily.   Patient Taking Differently at Unknown time   • fluconazole (DIFLUCAN) 100 MG tablet Take 100 mg by mouth Every Other Day.   4/8/2018 at Unknown time   • furosemide (LASIX) 20 MG tablet Take 1 tablet by mouth Daily. (Patient taking differently: Take 20 mg by mouth Daily. Pt states he takes when he has swelling in ankles) 30 tablet 3 Past Week at Unknown time   • Icosapent Ethyl (VASCEPA) 1 G capsule Take 1 g by mouth 2 (Two) Times a Day.   4/8/2018 at Unknown time   •  ISENTRESS 400 MG tablet Take 400 mg by mouth 2 (Two) Times a Day.   4/8/2018 at Unknown time   • metoprolol succinate XL (TOPROL-XL) 50 MG 24 hr tablet Take 1 tablet by mouth 2 (Two) Times a Day. 60 tablet 11 4/8/2018 at Unknown time   • PREZISTA 800 MG tablet tablet Take 800 mg by mouth.   4/8/2018 at Unknown time   • rifaximin (XIFAXAN) 550 MG tablet Take 1 tablet by mouth every 12 (twelve) hours. 60 tablet 5 4/8/2018 at Unknown time   • spironolactone (ALDACTONE) 50 MG tablet Take 1 tablet by mouth Daily. 30 tablet 5 Past Week at Unknown time   • sulfamethoxazole-trimethoprim (BACTRIM DS,SEPTRA DS) 800-160 MG per tablet Take 1 tablet by mouth Every Other Day As Needed.   Past Month at Unknown time   • SYNTHROID 300 MCG tablet TK 1 T PO QD  0 Past Week at Unknown time   • tamsulosin (FLOMAX) 0.4 MG capsule Take 1 capsule by mouth nightly.   4/8/2018 at Unknown time   • TRESIBA FLEXTOUCH 200 UNIT/ML solution pen-injector INJECT 40-80 UNITS UNDER THE SKIN QD  3 Past Week at Unknown time   • TRUVADA 200-300 MG per tablet Take 200-300 mg by mouth.   4/8/2018 at Unknown time   • TYBOST 150 MG tablet Take 150 mg by mouth.   4/8/2018 at Unknown time   • vitamin D (ERGOCALCIFEROL) 72429 UNITS capsule capsule TK 1 C PO ONCE A WEEK  0 Past Week at Unknown time   • PROCTOZONE-HC 2.5 % rectal cream APPLY TO AFFECTED AREA THREE TIMES DAILY AFTER BOWEL MOVEMENT 30 g 0 Unknown at Unknown time   , Scheduled Meds:    clonazePAM 0.5 mg Oral Daily   cobicistat 150 mg Oral Daily With Breakfast   darunavir 800 mg Oral Daily With Breakfast   emtricitabine-tenofovir 1 tablet Oral Q24H   fluconazole 100 mg Oral Every Other Day   insulin aspart 0-7 Units Subcutaneous 4x Daily With Meals & Nightly   levothyroxine 300 mcg Oral Q AM   metoprolol succinate XL 50 mg Oral Q12H   raltegravir 400 mg Oral BID   rifaximin 550 mg Oral Q12H   spironolactone 50 mg Oral Daily   sulfamethoxazole-trimethoprim 1 tablet Oral Every Other Day   tamsulosin  0.4 mg Oral Nightly   , Continuous Infusions:   , PRN Meds:  •  acetaminophen  •  dextrose  •  dextrose  •  glucagon (human recombinant)  •  HYDROcodone-acetaminophen  •  HYDROmorphone  •  ondansetron  •  sodium chloride  •  sodium chloride and Allergies:  Methylprednisolone    Objective      Vital Signs  Temp:  [97.3 °F (36.3 °C)-98 °F (36.7 °C)] 97.5 °F (36.4 °C)  Heart Rate:  [55-62] 62  Resp:  [16] 16  BP: (118-136)/(50-63) 129/63    Physical Exam   Constitutional: He is oriented to person, place, and time. He appears well-developed and well-nourished.   HENT:   Head: Normocephalic and atraumatic.   Right Ear: External ear normal.   Left Ear: External ear normal.   Eyes: Conjunctivae and EOM are normal. Pupils are equal, round, and reactive to light. Right eye exhibits no discharge. Left eye exhibits no discharge.   Neck: Normal range of motion. Neck supple. No tracheal deviation present.   Pulmonary/Chest: Effort normal. No stridor. No respiratory distress.   Musculoskeletal: Normal range of motion. He exhibits no edema, tenderness or deformity.   Neurological: He is alert and oriented to person, place, and time. He has normal strength and normal reflexes. He displays no atrophy, no tremor and normal reflexes. No cranial nerve deficit or sensory deficit. He exhibits normal muscle tone. He displays a negative Romberg sign. He displays no seizure activity. Coordination and gait normal.   No long tract signs   Skin: Skin is warm and dry.   Psychiatric: He has a normal mood and affect. His behavior is normal. Judgment and thought content normal.   Nursing note and vitals reviewed.      Results Review:    I reviewed the patient's new clinical results.  I reviewed the patient's new imaging results and agree with the interpretation.        Assessment/Plan     Principal Problem:    T12 compression fracture  Active Problems:    Chronic coronary artery disease    Peripheral arterial occlusive disease    Iron deficiency  anemia due to chronic blood loss    Thrombocytopenia associated with AIDS    Leukocytopenia    Liver cirrhosis    Lumbar degenerative disc disease    Weight loss, abnormal    Hypothyroidism    Intractable back pain    Type 2 diabetes mellitus    Hypophosphatemia    Mild protein-calorie malnutrition      Assessment:  (5wk hx of LBP (upper L spine/lower T spine)  Osteoporosis on prolia (T score last month -2.5)  HIV/AIDS  Thrombocytopenia  Hx of prostate CA and hx of tob abuse (60pck/yr)  cirrhosis).     Plan:   (We will check T/L XR and also coags    Again, see how he responds to the plts.  Make a final decision regarding the safety of any kypho/bx thereafter. ).       I discussed the patients findings and my recommendations with patient and consulting provider    Lacey Harvey PA-C  04/10/18  4:46 PM    Time: 40min

## 2018-04-10 NOTE — PROGRESS NOTES
Discharge Planning Assessment  [facility]     Patient Name: Kye Aranda  MRN: [id number]  Today's Date: 4/10/2018    Admit Date: 4/8/2018          Discharge Needs Assessment     Row Name 04/10/18 9702       Living Environment    Lives With alone    Name(s) of Who Lives With Patient --   Friend Melissa Miller ( [phone]) does assist    Current Living Arrangements home/apartment/condo    Primary Care Provided by self    Provides Primary Care For no one    Family Caregiver if Needed friend(s)    Family Caregiver Names --   riend Melissa Angela ( [phone])    Quality of Family Relationships supportive       Resource/Environmental Concerns    Resource/Environmental Concerns none       Transition Planning    Patient/Family Anticipates Transition to home    Patient/Family Anticipated Services at Transition other (see comments)   To be determined    Transportation Anticipated family or friend will provide       Discharge Needs Assessment    Readmission Within the Last 30 Days no previous admission in last 30 days    Concerns to be Addressed denies needs/concerns at this time    Equipment Currently Used at Home cane, straight    Equipment Needed After Discharge cane, straight            Discharge Plan     Row Name 04/10/18 0427       Plan    Plan Home with support of friend    Patient/Family in Agreement with Plan yes    Plan Comments IMM 4/9/2018.  Face sheet verified. Prior to admission patient was independent with his ADL's, with some assistance from friend Melissa Miller ([phone]).  Patient states that he uses the [facility], denies issues obtaining or affording his medications.  Patient does not have a POA or living.  Patient has been using a cane to get around his house recently.   Discharge plan is to return home with assistance from his friend Melissa.  Will discuss if additional needs are necessary after determination on surgery.  Will continue to monitor for discharge needs.          Destination     No  service coordination in this encounter.      Durable Medical Equipment     No service coordination in this encounter.      Dialysis/Infusion     No service coordination in this encounter.      Home Medical Care     No service coordination in this encounter.      Social Care     No service coordination in this encounter.                Demographic Summary     Row Name 04/10/18 8599       General Information    Admission Type inpatient    Arrived From emergency department    Required Notices Provided Important Message from Medicare    Referral Source admission list    Reason for Consult discharge planning    Preferred Language English     Used During This Interaction no            Functional Status     Row Name 04/10/18 8773       Functional Status    Usual Activity Tolerance moderate    Current Activity Tolerance fair       Functional Status, IADL    Medications independent    Meal Preparation assistive person    Housekeeping assistive person    Laundry assistive person    Shopping assistive person       Mental Status    General Appearance WDL WDL       Mental Status Summary    Recent Changes in Mental Status/Cognitive Functioning no changes            Psychosocial    No documentation.           Abuse/Neglect    No documentation.           Legal    No documentation.           Substance Abuse    No documentation.           Patient Forms    No documentation.         Steff Isbell RN

## 2018-04-10 NOTE — PROGRESS NOTES
"   LOS: 1 day   Patient Care Team:  Kendrick Griggs MD as PCP - General (Family Medicine)  Carrie Lucas MD PhD as Consulting Physician (Hematology and Oncology)  Travis Mckenzie MD as Referring Physician (Internal Medicine)  Baldo Conway MD as Consulting Physician (Endocrinology)  Robyn Butler MD as Consulting Physician (Gastroenterology)    Chief Complaint: back pain    Subjective     Still c/o back pain today. No improvement. Tolerating diet. No BRBPR        Subjective:  Symptoms:  Stable.  No shortness of breath, malaise, cough, chest pain, weakness, headache, chest pressure, anorexia, diarrhea or anxiety.    Diet:  Adequate intake.  No nausea or vomiting.    Activity level: Impaired due to pain.    Pain:  He complains of pain that is moderate.  He reports pain is improving.  Pain is well controlled.        History taken from: patient chart    Objective     Vital Signs  Temp:  [97.7 °F (36.5 °C)] 97.7 °F (36.5 °C)  Heart Rate:  [55-62] 55  Resp:  [16] 16  BP: (118-138)/(50-70) 133/59    Objective:  General Appearance:  Comfortable and in no acute distress.    Vital signs: (most recent): Blood pressure 133/59, pulse 55, temperature 97.7 °F (36.5 °C), temperature source Oral, resp. rate 16, height 170.2 cm (67\"), weight 77.1 kg (170 lb), SpO2 96 %.  Vital signs are normal.  No fever.    Output: Producing urine and producing stool.    HEENT: Normal HEENT exam.    Lungs:  Normal effort and normal respiratory rate.  Breath sounds clear to auscultation.    Heart: Normal rate.  Regular rhythm.    Abdomen: Abdomen is soft.  Bowel sounds are normal.   There is no abdominal tenderness.     Extremities: There is no dependent edema.    Pulses: Distal pulses are intact.    Neurological: Patient is alert and oriented to person, place and time.    Skin:  Warm and dry.              Results Review:     I reviewed the patient's new clinical results.  I reviewed the patient's other test results and agree with the " interpretation  Discussed with patient    Medication Review: reviewed    Assessment/Plan     Principal Problem:    T12 compression fracture  Active Problems:    Chronic coronary artery disease    Peripheral arterial occlusive disease    Iron deficiency anemia due to chronic blood loss    Thrombocytopenia associated with AIDS    Leukocytopenia    Liver cirrhosis    Lumbar degenerative disc disease    Weight loss, abnormal    Hypothyroidism    Intractable back pain    Type 2 diabetes mellitus    Hypophosphatemia          Plan:   ([facility] to see, await their recs  Pt says he has osteoporosis, also has h/o prostate CA  Will need platelet support if any procedures planned  Hematology to see  Appreciate GI attention to pt  MRI abdomen for abnl appearance of liver on CT, C-scope and EGD planned  IS ordered given atelectasis seen on imaging  Nutrition following  Restarted his L-T4 therapy  Home HIV meds continued  Prophylactic Bactrim and Diflucan continued).       Mariano Delacruz MD  04/10/18  11:34 AM    Time: 20min

## 2018-04-10 NOTE — CONSULTS
Patient Name: Kye Aranda  :1945  72 y.o.    Date of Admission: 2018  Date of Consultation:  04/10/18  Encounter Provider: David Singh MD  Place of Service: [facility] CARDIOLOGY  Referring Provider: Pepe Montes De Oca MD  Patient Care Team:  Kendrick Griggs MD as PCP - General (Family Medicine)  Carrie Lucas MD PhD as Consulting Physician (Hematology and Oncology)  Travis Mckenzie MD as Referring Physician (Internal Medicine)  Baldo Conway MD as Consulting Physician (Endocrinology)  Robyn Butler MD as Consulting Physician (Gastroenterology)      Chief complaint: Pre operative Evaluation    History of Present Illness:  This is a 72 year old male of mine with hx of CAD s/p stent to his obtuse marginal branch and has a hx of chronic total occlusion of his RCA that is medically managed. He also has a hx of prostate cancer, osteoporosis, HIV, cirrhosis, thrombocytopenia, & anemia. In 2016 he had an echocardiogram done which showed EF of 59% with no significant valve abnormalities. He also had a stress test done which was negative for ischemia. He was last seen for an office visit with Luna Hernández on 18 where at this visit he was worried about his blood pressures as the systolic had been getting as high as the 170s, and he noticed his heart rate dropping intermittently into the 40s at times and felt more fatigued as well. It was also noted that he had developed some ascites and was following up with Dr. Butler for his liver dysfunction. At this time he was told to split his dose of Toprol XL- 100 mg throughout the day, where he would take 50 mg in the morning and 50 mg in the evening to help keep his blood pressures more consistent.     He came into the ER yesterday for bilateral flank pain and back pain that had worsened to the point where he was unable to ambulate. He denied trauma injury to that area. He also felt palpitations at times, and  thought that he lost 25 lbs within the past month with dark stools. Imaging found for him to have a compression fracture of the T12.    Neurosurgery has been consulted for possible kyphoplasty. GI was also consulted where there are plans for upper and lower endoscopy.     EKG pending. Blood cultures pending.     CT of abd/pelvis on 4/8/18-  IMPRESSION :   1. Left lung base consolidation favored to reflect atelectasis rather  than pneumonia.  2. Combination of findings suggests cirrhosis and portal hypertension.  Follow-up of the liver is recommended due to the marked heterogeneity.  3. Tiny renal lesions felt to reflect cysts.  4. Mild colonic diverticulosis with areas of colonic wall thickening  favored to be due to nondistention. Endoscopy could better evaluate.  5. Nonspecific gallbladder wall thickening without calcified gallstones  or associated inflammation.  6. Mild prostate gland enlargement.  7. Mild ascites    XR of chest on 4/8/18-  FINDINGS: PA and lateral views of the chest were obtained. Lungs poorly  aerated. There is minimal linear bibasilar atelectasis. There is no  convincing evidence of active air space disease process otherwise. No  edema or pleural fluid. Normal heart size. Faint vascular calculi.    IMPRESSION:  Poor inspiration with minimal bibasilar atelectasis    MRI of lumbar on 4/9/18-  IMPRESSION:  1. Mild Schmorl's type deformities of L3 and L4 superiorly. The L4  deformity appears to be fairly recent as evidenced by some associated  edema. No significant height loss or bony retropulsion however.  2. Minor disc osteophyte complexes at L4-5 and L5-S1. Neither results in  significant canal narrowing.    MRI of thoracic on 4/9/18-  IMPRESSION:  T12 compression fracture, likely late subacute-early chronic  with some bony retropulsion contributing to some mild narrowing of the  central canal, greater to the right of midline. Again it does not appear  pathologic but follow-up is  recommended.    Previous Testing-  Echocardiogram on 5/18/16-  Interpretation Summary     · All left ventricular wall segments contract normally.  · Left ventricular wall thickness is consistent with borderline concentric hypertrophy.  · Left ventricular function is normal. Estimated EF = 59%.  · Left ventricular diastolic dysfunction (grade II) consistent with pseudonormalization.  · Mild mitral valve regurgitation is present     Stress Test on 5/18/16-  Interpretation Summary     · Left ventricular ejection fraction is normal (Calculated EF = 56%).  · Myocardial perfusion imaging indicates a normal myocardial perfusion study with no evidence of ischemia.  · Impressions are consistent with a low risk study.         Past Medical History:   Diagnosis Date   • Anemia    • Cirrhosis    • Coronary artery disease    • Diabetes mellitus    • Difficulty breathing    • H/O complete eye exam 10/2017   • H/O transfusion of packed red blood cells    • HIV infection, symptomatic 1997   • Hyperlipidemia    • Hypertension    • Pancytopenia    • Peripheral artery disease    • Prostate cancer    • Thyroid disease        Past Surgical History:   Procedure Laterality Date   • BONE MARROW BIOPSY W/ ASPIRATION  01/21/2014   • CARDIAC CATHETERIZATION     • COLONOSCOPY  09/20/2012    non-thrombosed EH, sessile polyp in ascending colon 3 mm in size.   • CORONARY ANGIOPLASTY WITH STENT PLACEMENT  06/2013   • ENDOSCOPY  09/20/2012    grade 1 varicesmiddle and lower 3rd of esophagus. Moderate portal hypertensive gastropathy in entire stomach.   • MOUTH SURGERY     • PROSTATE BIOPSY     • TONSILLECTOMY     • UPPER GASTROINTESTINAL ENDOSCOPY  09/20/2012    grd 1 varices, portal hypertensive gastropathy         Prior to Admission medications    Medication Sig Start Date End Date Taking? Authorizing Provider   baclofen (LIORESAL) 10 MG tablet Take 1 tablet by mouth 3 (Three) Times a Day As Needed for Muscle Spasms. 3/29/18  Yes Suzanne CAICEDO  HANG Rucker   BD PEN NEEDLE ONEAL U/F 32G X 4 MM misc  2/14/17  Yes Historical Provider, MD   clonazePAM (KlonoPIN) 0.5 MG tablet Take 1 tablet by mouth daily. 5/29/13  Yes Historical Provider, MD   fluconazole (DIFLUCAN) 100 MG tablet Take 100 mg by mouth Every Other Day. 11/2/16  Yes Historical Provider, MD   furosemide (LASIX) 20 MG tablet Take 1 tablet by mouth Daily.  Patient taking differently: Take 20 mg by mouth Daily. Pt states he takes when he has swelling in ankles 12/28/16  Yes Robyn Butler MD   Icosapent Ethyl (VASCEPA) 1 G capsule Take 1 g by mouth 2 (Two) Times a Day.   Yes Historical Provider, MD   ISENTRESS 400 MG tablet Take 400 mg by mouth 2 (Two) Times a Day. 5/16/16  Yes Historical Provider, MD   metoprolol succinate XL (TOPROL-XL) 50 MG 24 hr tablet Take 1 tablet by mouth 2 (Two) Times a Day. 2/12/18  Yes ENMA Clark   PREZISTA 800 MG tablet tablet Take 800 mg by mouth. 5/16/16  Yes Historical Provider, MD   rifaximin (XIFAXAN) 550 MG tablet Take 1 tablet by mouth every 12 (twelve) hours. 8/29/16  Yes Robyn Butler MD   spironolactone (ALDACTONE) 50 MG tablet Take 1 tablet by mouth Daily. 2/14/18  Yes Robyn Butler MD   sulfamethoxazole-trimethoprim (BACTRIM DS,SEPTRA DS) 800-160 MG per tablet Take 1 tablet by mouth Every Other Day As Needed. 11/2/16  Yes Historical Provider, MD   SYNTHROID 300 MCG tablet TK 1 T PO QD 10/26/17  Yes Historical Provider, MD   tamsulosin (FLOMAX) 0.4 MG capsule Take 1 capsule by mouth nightly. 5/29/13  Yes Historical Provider, MD   TRENELY FLEXTOUCH 200 UNIT/ML solution pen-injector INJECT 40-80 UNITS UNDER THE SKIN QD 3/31/17  Yes Historical Provider, MD   TRUVADA 200-300 MG per tablet Take 200-300 mg by mouth. 5/16/16  Yes Historical Provider, MD   TYBOST 150 MG tablet Take 150 mg by mouth. 5/16/16  Yes Historical Provider, MD   vitamin D (ERGOCALCIFEROL) 40930 UNITS capsule capsule TK 1 C PO ONCE A WEEK 9/28/16  Yes Historical  "Provider, MD   PROCTOZONE-HC 2.5 % rectal cream APPLY TO AFFECTED AREA THREE TIMES DAILY AFTER BOWEL MOVEMENT 1/8/18   Robyn Butler MD       Allergies   Allergen Reactions   • Methylprednisolone Other (See Comments)     \"Everything shuts down\"       Social History     Social History   • Marital status: Single   • Years of education: College     Occupational History   •  Retired     [employer]     Social History Main Topics   • Smoking status: Former Smoker     Packs/day: 2.00     Years: 30.00     Types: Cigarettes     Quit date: 1998   • Smokeless tobacco: Never Used   • Alcohol use No   • Drug use: No   • Sexual activity: Defer     Other Topics Concern   • Not on file       Family History   Problem Relation Age of Onset   • Heart disease Other    • No Known Problems Mother    • No Known Problems Father    • No Known Problems Maternal Grandmother    • No Known Problems Maternal Grandfather    • No Known Problems Paternal Grandmother    • No Known Problems Paternal Grandfather        REVIEW OF SYSTEMS:   All systems reviewed.  Pertinent positives identified in HPI.  All other systems are negative.      Objective:     Vitals:    04/10/18 1500 04/10/18 1533 04/10/18 1556 04/10/18 1600   BP: 130/61 125/60 125/60 129/63   BP Location:       Patient Position:       Pulse: 61 60 57 62   Resp: 16 16 16 16   Temp: 98 °F (36.7 °C)  97.3 °F (36.3 °C) 97.5 °F (36.4 °C)   TempSrc:  Oral Oral Oral   SpO2: 93% 94% 94% 93%   Weight:       Height:         Body mass index is 26.63 kg/m².    General Appearance:    Alert, cooperative, in no acute distress   Head:    Normocephalic, without obvious abnormality, atraumatic   Eyes:            Lids and lashes normal, conjunctivae and sclerae normal, no   icterus, no pallor, corneas clear, PERRLA   Ears:    Ears appear intact with no abnormalities noted   Throat:   No oral lesions, no thrush, oral mucosa moist   Neck:   No adenopathy, supple, trachea midline, no " thyromegaly, no   carotid bruit, no JVD   Back:     No kyphosis present, no scoliosis present, no skin lesions, erythema or scars, no tenderness to percussion or palpation, range of motion normal   Lungs:     Clear to auscultation,respirations regular, even and unlabored    Heart:    Regular rhythm and normal rate, normal S1 and S2, no murmur, no gallop, no rub, no click   Chest Wall:    No abnormalities observed   Abdomen:     Normal bowel sounds, no masses, no organomegaly, soft        non-tender, non-distended, no guarding, no rebound  tenderness   Extremities:   Moves all extremities well, no edema, no cyanosis, no redness   Pulses:   Pulses palpable and equal bilaterally. Normal radial, carotid, femoral, dorsalis pedis and posterior tibial pulses bilaterally. Normal abdominal aorta   Skin:  Psychiatric:   No bleeding, bruising or rash    Alert and oriented x 3, normal mood and affect   Lab Review:       Results from last 7 days  Lab Units 04/10/18  0330   SODIUM mmol/L 134*   POTASSIUM mmol/L 3.9   CHLORIDE mmol/L 100   CO2 mmol/L 24.5   BUN mg/dL 12   CREATININE mg/dL 1.01   CALCIUM mg/dL 8.4*   BILIRUBIN mg/dL 1.6*   ALK PHOS U/L 168*   ALT (SGPT) U/L 45*   AST (SGOT) U/L 49*   GLUCOSE mg/dL 185*           Results from last 7 days  Lab Units 04/10/18  0330   WBC 10*3/mm3 2.04*   HEMOGLOBIN g/dL 9.0*   HEMATOCRIT % 29.3*   PLATELETS 10*3/mm3 28*           Results from last 7 days  Lab Units 04/08/18  2243   MAGNESIUM mg/dL 2.2                   EKG on 4/8/18-    I personally viewed and interpreted the patient's EKG/Telemetry data.        Assessment and Plan:       History of CAD, s/p stenting of his OM1.  He has a  of the RCA that we are managing medically.  He has prostate CA, osteoprosis, HIV, and cirrhosis.    He is stable from the cardiac standpoint without symptoms of angina or heart failure.  OK to proceed with kyphoplasty.    David Singh MD  04/10/18  5:12 PM

## 2018-04-10 NOTE — PROGRESS NOTES
Subjective   REASONS FOR FOLLOWUP:       1.  Pancytopenia, especially with leukocytopenia and thrombocytopenia. Bone marrow aspiration and biopsy obtained on 01/21/2014, with no evidence of hematologic malignancy, no myelodysplastic syndrome.  He has liver cirrhosis and splenomegaly.      2.   Frequently recurrent iron deficiency anemia, not able to tolerate oral iron supplementation, status post multiple Feraheme treatments.  He also had transfusion of PRBC in October 2014.      3.  Empiric treatment for ITP using IVIG, in March 2014 and no response.    4.  Ongoing care for frequent iron deficiency required repeated intravenous iron therapy.   5. History of prostate cancer on Eligard and melena  6. osteoporosis  7.  HIV-positive 3 drugs with negative viral load as of 2/18-CD4 count 120 on Bactrim prophylaxis      REASON FOR CONSULTATION:  Pancytopenia and weight loss and low back pain with T12 compression fracture    History of Present Illness  patient is a 72-year-old male with long-standing HIV infection under good control by Dr. Klarissa Thomas on 3 different medications including Isentress.  Prezista and Truvada.  He is been seen in our office for intermittent iron deficiency and receives IV iron last dose in January 2018  He is known to have osteoporosis and is on Prolia every 6 months alternating with Eligard the urologist for his prostate cancer     Is admitted this time with a 25 pound weight loss over the last month associated with low back pain was no obvious triggering event and was noted on imaging to have a compression fracture of the T12 vertebral body was no cord compression.  He denies lifting anything heavy or jumping off anything are hurting his back.  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 and portal hypertension and this is essentially  "stable       Past Medical History, Past Surgical History, Social History, Family History have been reviewed and are without significant changes except as mentioned.    Review of Systems   Constitutional: Positive for unexpected weight change.   Musculoskeletal: Positive for back pain.      A comprehensive 14 point review of systems was performed and was negative except as mentioned.    Medications:  The current medication list was reviewed in the EMR    ALLERGIES:    Allergies   Allergen Reactions   • Methylprednisolone Other (See Comments)     \"Everything shuts down\"       Objective      Vitals:    04/09/18 1500 04/09/18 2204 04/09/18 2207 04/10/18 0900   BP:  118/50  133/59   BP Location:  Right arm  Right arm   Patient Position:  Lying  Lying   Pulse:  55 56 55   Resp:  16  16   Temp:       TempSrc:       SpO2:  96%  96%   Weight: 77.1 kg (170 lb)      Height:         Current Status 1/24/2018   ECOG score 0       Physical Exam    GENERAL:  Well-developed, well-nourished in no acute distress.   SKIN:  Warm, dry without rashes, purpura or petechiae.  EYES:  Pupils equal, round and reactive to light.  EOMs intact.  Conjunctivae normal.  EARS:  Hearing intact.  NOSE:  Septum midline.  No excoriations or nasal discharge.  MOUTH:  Tongue is well-papillated; no stomatitis or ulcers.  Lips normal.  THROAT:  Oropharynx without lesions or exudates.  NECK:  Supple with good range of motion; no thyromegaly or masses, no JVD.  LYMPHATICS:  No cervical, supraclavicular, axillary or inguinal adenopathy.  CHEST:  Lungs clear to auscultation. Good airflow.  CARDIAC:  Regular rate and rhythm without murmurs, rubs or gallops. Normal S1,S2.  ABDOMEN:  Soft, nontender with splenomegaly 6cm no masses.  EXTREMITIES:  No clubbing, cyanosis or edema.  NEUROLOGICAL:  Cranial Nerves II-XII grossly intact.  No focal neurological deficits.  PSYCHIATRIC:  Normal affect and mood.        RECENT LABS:  Hematology WBC   Date Value Ref Range " Status   04/10/2018 2.04 (L) 4.50 - 10.70 10*3/mm3 Final     RBC   Date Value Ref Range Status   04/10/2018 3.17 (L) 4.60 - 6.00 10*6/mm3 Final     Hemoglobin   Date Value Ref Range Status   04/10/2018 9.0 (L) 13.7 - 17.6 g/dL Final     Hematocrit   Date Value Ref Range Status   04/10/2018 29.3 (L) 40.4 - 52.2 % Final     Platelets   Date Value Ref Range Status   04/10/2018 28 (C) 140 - 500 10*3/mm3 Final       Lab Results   Component Value Date    NEUTROABS 1.51 (L) 04/10/2018          B2m 4.0  Ferritin 27.1  Immunofixation pending    Assessment/Plan   1.   T12 compression fracture without obvious injury in a patient with osteoporosis on Prolia         Doubt underlying malignancy but will need to get a biopsy when kyphoplasty is done to be sure  2.  Chronic HIV infection on 3 drug treatment with negative viral load with low CD4 count on prophylactic Bactrim-Truvada has been known to worsen bone density.      3..  Severe thrombocytopenia and mild to moderate leukopenia/neutropenia related to his liver cirrhosis, hypersplenism  and HIV treatment. Both of his platelet and ANC count fluctuates,    He has no spontaneous bleeding.  Will continue to observe.      3.Recurrent Iron deficiency anemia required frequent IV iron therapy, roughly about every 3 months.  Patient complains of significant fatigue.   Last time he received Feraheme treatment was Jan 2018  He denies any visible bleeding besides may be occasional a bit of blood on toilet paper but nothing significant.       4 prostate cancer treated with radiation plus Eligard and Prolia last PSA in February 0.4     5 weight loss etiology unclear negative CT the abdomen except for nodules in the cirrhotic liver AFP pending     6.  Osteoporosis due to Eligard plus Truvada on Prolia per urology     . . Plan  1. Neurosurgery for kyphoplasty and biopsy of T12-We'll give 2 units of platelets today and assess his response but I don't think we will ever get above 100,000 and  we may have to accept giving him platelets during the procedure and getting his platelets about 70-80,000  2.  Check immunofixation   3.  Agree with upper and lower endoscopy with history of colon polyps and will follow with you may need chest CT if we have no other explanation for his weight loss  4.  Venofer for iron depletion                4/10/2018      CC:

## 2018-04-10 NOTE — PLAN OF CARE
Problem: Patient Care Overview  Goal: Plan of Care Review  Outcome: Ongoing (interventions implemented as appropriate)   04/10/18 3649   Coping/Psychosocial   Plan of Care Reviewed With patient   Plan of Care Review   Progress no change   OTHER   Outcome Summary complaints of back pain. alternating PO with IV. pltelets given today and xrays to be done. awaiting decision on kypho/bx. will cont to monitor.

## 2018-04-10 NOTE — PROGRESS NOTES
BGA/GI Progress Note   Chief Complaint:  Hematochezia, weight loss, abd pain    Subjective     Interval History:   Only complaint is back pain. BM this morning without blood. No abdominal pain     History taken from: patient chart RN    Review of Systems:    The following systems were reviewed and negative;  respiratory, cardiovascular and gastrointestinal    Objective     Vital Signs  Temp:  [97.7 °F (36.5 °C)] 97.7 °F (36.5 °C)  Heart Rate:  [55-62] 56  Resp:  [16] 16  BP: (118-138)/(50-70) 118/50  Body mass index is 26.63 kg/m².    Intake/Output Summary (Last 24 hours) at 04/10/18 0917  Last data filed at 04/10/18 0510   Gross per 24 hour   Intake              240 ml   Output              350 ml   Net             -110 ml     No intake/output data recorded.    Physical Exam:   General: patient awake, alert and cooperative   Eyes: Normal lids and lashes, no scleral icterus, no conjunctival pallor   Neck: supple, normal ROM, no tracheal deviation, no thyromegaly   Skin: warm and dry, not jaundiced   Cardiovascular: regular rhythm and rate, no murmurs auscultated   Pulm: clear to auscultation bilaterally, regular and unlabored   Abdomen: soft, nontender, nondistended; normal bowel sounds   Rectal: deferred   Extremities: no rash or edema   Neurologic: Normal mood and behavior    All Medications Have Been Reviewed     Results Review:       Results from last 7 days  Lab Units 04/10/18  0330 04/09/18  0916 04/08/18  2243   WBC 10*3/mm3 2.04* 1.93* 2.84*   HEMOGLOBIN g/dL 9.0* 9.3* 10.1*   HEMATOCRIT % 29.3* 29.6* 32.2*   PLATELETS 10*3/mm3 28* 35* 40*         Results from last 7 days  Lab Units 04/10/18  0330 04/09/18  0916 04/08/18  2243   SODIUM mmol/L 134* 134* 135*   POTASSIUM mmol/L 3.9 3.6 3.6   CHLORIDE mmol/L 100 99 99   CO2 mmol/L 24.5 25.1 24.9   BUN mg/dL 12 12 12   CREATININE mg/dL 1.01 1.12 1.09   CALCIUM mg/dL 8.4* 8.6 9.0   BILIRUBIN mg/dL 1.6* 1.6* 1.5*   ALK PHOS U/L 168* 170* 193*   ALT (SGPT)  U/L 45* 51* 55*   AST (SGOT) U/L 49* 57* 61*   GLUCOSE mg/dL 185* 198* 219*             RADIOLOGY:    Imaging Results (last 72 hours)     Procedure Component Value Units Date/Time    MRI Lumbar Spine Without Contrast [[id number]] Collected:  04/09/18 2200     Updated:  04/10/18 0547    Narrative:       LUMBAR SPINE MRI      HISTORY:compression fracture eval; S22.080A-Wedge compression fracture  of t11-T12 vertebra, initial encounter for closed fracture;  S22.080A-Wedge compression fracture of t11-T12 vertebra, initial  encounter for closed fracture; E03.9-Hypothyroidism, unspecified;  E83.39-Other disorders of phosphorus metabolism; D61.818-Other  pancytopenia     COMPARISON: None.     FINDINGS: Multiplanar images of the lumbar spine were obtained without  the use of gadolinium. Axial images were obtained through the 7  lower-most disc levels which will be labeled L1-S3 for purposes of  counting.     There are Schmorl's type deformities of the L3 and L4 superior  endplates. Some mild edema persists along the more anterior and central  aspect of the L4 endplate deformity suggesting that it is fairly recent.  The L3 fracture is clearly old. There is some central depression but  overall no significant degree of vertebral height loss. The remaining  lumbar vertebrae are unremarkable. Mild multilevel spurring is present.  Most of the intervertebral discs exhibit some T2 signal loss suggesting  some degenerative desiccation. Some mild disc height loss is present at  L5-S1. Remaining disks are well-maintained in height. A focal disc  herniation/extrusion is not appreciated. Minor disc osteophyte complexes  are present posteriorly at L4-5 and L5-S1 but neither produces a  significant degree of canal narrowing. There are some arthritic changes  in the lower lumbar facets       Impression:       1. Mild Schmorl's type deformities of L3 and L4 superiorly. The L4  deformity appears to be fairly recent as evidenced by some  associated  edema. No significant height loss or bony retropulsion however.  2. Minor disc osteophyte complexes at L4-5 and L5-S1. Neither results in  significant canal narrowing.     This report was finalized on 4/10/2018 5:43 AM by Zia Wolf MD.       MRI Thoracic Spine Without Contrast [[id number]] Collected:  04/09/18 2153     Updated:  04/10/18 0546    Narrative:       THORACIC SPINE MRI     CLINICAL HISTORY: Fracture.     COMPARISON: None.     FINDINGS: Multiplanar images of the thoracic spine obtained without  gadolinium. There is a moderate-to-severe compression fracture of the  T12 vertebra with up to approximately 60% height loss along its most  severe aspect centrally. It is likely late subacute-early chronic in  timeframe as some mild patchy edema persists mostly along the superior  endplate and to a lesser degree centrally within the vertebra. Areas of  diminished T1 and T2 signal likely related to sclerosis from some  healing. It does not appear to be pathologic but follow-up is  recommended. There is some retropulsion of the posterior wall,  approximately 4 to 5 mm into the central canal, greater to the right of  midline. Canal narrowing is mild however.     The remaining thoracic vertebrae are well-maintained in height. Marrow  signal is somewhat heterogeneous with areas of fatty replacement but  without any additional fracture or evidence of pathologic marrow  replacement process.     Mild multilevel degenerative changes are present. Minor disc osteophyte  complex at T3-4 produces slight indentation along the ventral aspect of  the thecal sac but canal narrowing is minimal. Otherwise no significant  focal disc herniation/extrusion or significant protrusion is not  demonstrated at any level. There is no abnormal cord signal.     Advanced multilevel degenerative changes noted in the cervical spine.  Dedicated cervical imaging could better assess.       Impression:       T12 compression fracture,  likely late subacute-early chronic  with some bony retropulsion contributing to some mild narrowing of the  central canal, greater to the right of midline. Again it does not appear  pathologic but follow-up is recommended.     This report was finalized on 4/10/2018 5:43 AM by Zia Wolf MD.       CT Lumbar Spine With Contrast [[id number]] Collected:  04/09/18 0004     Updated:  04/09/18 0015    Narrative:       CONTRAST CT SCAN LUMBAR SPINE     CLINICAL HISTORY: Low back pain, rapidly progressive neuro deficit     COMPARISON: Correlation was made with a lumbar MRI from April 3, 2017     TECHNIQUE: Radiation dose reduction techniques were utilized, including  automated exposure control and exposure modulation based on body size.  Axial noncontrast images of the lumbar spine were obtained with  contrast. Sagittal reformatted images were supplemented.     FINDINGS:  There is a recent if not acute compression fracture of the  T12 vertebra mostly along its superior aspect. It is comminuted and  exhibits up to approximately 60% height loss centrally. The posterior  most aspect of the T12 vertebra is fairly well preserved in height.  There is some bony retropulsion extending approximately 4-5 mm into the  central canal, greater to the right of midline producing  mild-to-moderate canal narrowing. The bony canal narrowing is best seen  on axial images 14 and 15. There are milder and old appearing Schmorl's  type deformities of the L3 and L4 superior endplates without significant  height loss of either vertebra overall. The remaining lumbar vertebrae  are intact and normal in height.    .     The lumbar vertebrae are well aligned.  Multilevel degenerative changes  are present. Disc height loss is more severe at L4-5, with moderate disc  height loss and vacuum disc phenomenon. Spurring is more pronounced at  L2-3 and L3-4.     Minimal annular disc bulge present at L2-3 not contributing to any  significant canal narrowing..  Mild broad-based protrusion present at  L3-4, asymmetric to the right. Canal narrowing is felt to be minimal.  Some mild facet arthropathy is present. Mild broad-based protrusion  present at L4-5 again with only minimal canal narrowing. There is  foraminal extension bilaterally. More advanced facet arthropathy present  bilaterally. There is bilateral foraminal stenosis due to the disc  protrusion and facet spurring milder facet arthropathy at L5-S1.     There is no abnormal contrast enhancement appreciated.                      Impression:       1. Recent/acute compression fracture of the T12 vertebra with 4-5 mm of  bony retropulsion contributing to mild-to-moderate canal narrowing,  slightly eccentric to the right.  2. Multilevel degenerative and arthritic changes as individually  discussed.  3. There is no abnormal enhancement     This report was finalized on 4/9/2018 12:12 AM by Zia Wolf MD.       CT Abdomen Pelvis With Contrast [[id number]] Collected:  04/08/18 2354     Updated:  04/09/18 0004    Narrative:       CT SCANS ABDOMEN AND PELVIS WITH IV CONTRAST.     HISTORY: Abd pain, fever, abscess suspected     COMPARISON: None.     TECHNIQUE: Radiation dose reduction techniques were utilized, including  automated exposure control and exposure modulation based on body size.  Axial images were obtained from the lung bases to the symphysis pubis  with IV contrast only.. Oral contrast was not administered per request.     FINDINGS ABDOMEN CT:  Left diaphragm is elevated with adjacent left lung  base consolidation favored to reflect atelectasis rather than pneumonia.  Liver is small, nodular and very heterogeneous with several tiny  hyperdense nodules likely as sequelae of cirrhosis and nodular  regeneration respectively. Continued surveillance will be needed to  exclude mass lesion. Gallbladder is partially contracted with wall  thickening but no calcified gallstones or pericholecystic inflammation.  There is  evidence of portal hypertension including Marked splenomegaly  noted at 19 cm in [id number] length but without mass lesion. There are  solitary small upper pole renal cysts. Remaining solid organs have an  unremarkable appearance.     FINDINGS PELVIS CT:  Mild ascites in both the abdomen and pelvis without  loculated fluid collection. Normal appendix. Mild colonic  diverticulosis. There are multifocal areas of mild colonic wall  thickening but this is likely due to nondistention. No convincing  pericolonic inflammation to suggest definite colitis. Endoscopy could  better evaluate. Mild prostate gland enlargement. Aorta is  nonaneurysmal.                Impression:       IMPRESSION :   1. Left lung base consolidation favored to reflect atelectasis rather  than pneumonia.  2. Combination of findings suggests cirrhosis and portal hypertension.  Follow-up of the liver is recommended due to the marked heterogeneity.  3. Tiny renal lesions felt to reflect cysts.  4. Mild colonic diverticulosis with areas of colonic wall thickening  favored to be due to nondistention. Endoscopy could better evaluate.  5. Nonspecific gallbladder wall thickening without calcified gallstones  or associated inflammation.  6. Mild prostate gland enlargement.  7. Mild ascites        This report was finalized on 4/9/2018 12:01 AM by Zia Wolf MD.       XR Chest 2 View [[id number]] Collected:  04/08/18 2305     Updated:  04/08/18 2309    Narrative:       PA AND LATERAL CHEST X-RAY     HISTORY: weakness     COMPARISON: None.     FINDINGS: PA and lateral views of the chest were obtained. Lungs poorly  aerated. There is minimal linear bibasilar atelectasis. There is no  convincing evidence of active air space disease process otherwise. No  edema or pleural fluid. Normal heart size. Faint vascular calculi.             Impression:       Poor inspiration with minimal bibasilar atelectasis     This report was finalized on 4/8/2018 11:06 PM by  Zia Wolf MD.             Assessment/Plan     Patient Active Problem List   Diagnosis Code   • Chronic coronary artery disease I25.10   • Difficulty breathing R06.89   • Intermittent claudication I73.9   • Peripheral arterial occlusive disease I77.9   • Shortness of breath R06.02   • Iron deficiency anemia due to chronic blood loss D50.0   • Thrombocytopenia associated with AIDS B20, D69.59   • Leukocytopenia D72.819   • Neutropenia D70.9   • Pancytopenia D61.818   • Iron and its compounds causing adverse effect in therapeutic use T45.4X5A   • Liver cirrhosis K74.60   • Lumbar degenerative disc disease M51.36   • 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  Denies abd pain    abd - s/nt/nd    Labs and imaging reviewed    Impression-  1.  Rectal bleeding-history of radiation proctitis treated with APC  2.  Pancytopenia  3.  Cirrhosis, cryptogenic but likely related to previous HIV medications  4.  T12 compression fracture   5.  Abnormal CT of the liver  6. HIV   7. Weight loss    Plan:  - labs stable  - await plan from neurosurgery - to see him today  - f/u AFP  - will need egd/c/s this admission when stable from neurosurgical standpoint for f/u cirrhosis, colon polyps and weight loss with recent intermittent rectal bleeding  - will need additional liver imaging when he can tolerate MRI to further evaluate liver abnormal appearance on CT

## 2018-04-10 NOTE — PLAN OF CARE
Problem: Patient Care Overview  Goal: Plan of Care Review  Outcome: Ongoing (interventions implemented as appropriate)   04/1945 04/10/18 0249   Coping/Psychosocial   Plan of Care Reviewed With patient --    Plan of Care Review   Progress --  no change   OTHER   Outcome Summary --  Pt had MRI at start of shift. Neuro surgery to see in am. VSS. Pain controlled with Norco and Dilaudid. Encouraged pt to have PO pain meds. Safety maintained. Continue to monitor.      Goal: Individualization and Mutuality  Outcome: Ongoing (interventions implemented as appropriate)    Goal: Discharge Needs Assessment  Outcome: Ongoing (interventions implemented as appropriate)    Goal: Interprofessional Rounds/Family Conf  Outcome: Ongoing (interventions implemented as appropriate)      Problem: Fall Risk (Adult)  Goal: Identify Related Risk Factors and Signs and Symptoms  Outcome: Outcome(s) achieved Date Met: 04/10/18    Goal: Absence of Fall  Outcome: Ongoing (interventions implemented as appropriate)      Problem: Pain, Chronic (Adult)  Goal: Identify Related Risk Factors and Signs and Symptoms  Outcome: Outcome(s) achieved Date Met: 04/10/18    Goal: Acceptable Pain/Comfort Level and Functional Ability  Outcome: Ongoing (interventions implemented as appropriate)

## 2018-04-11 NOTE — PROGRESS NOTES
LOS: 2 days   Patient Care Team:  Kendrick Griggs MD as PCP - General (Family Medicine)  Carrie Lucas MD PhD as Consulting Physician (Hematology and Oncology)  Travis Mckenzie MD as Referring Physician (Internal Medicine)  Baldo Conway MD as Consulting Physician (Endocrinology)  Robyn Butler MD as Consulting Physician (Gastroenterology)    Chief Complaint: compression fracture       Interval History: He is sleeping comfortably.  Stable from the cardiac standpoint.      Objective   Vital Signs  Temp:  [97.3 °F (36.3 °C)-98 °F (36.7 °C)] 97.5 °F (36.4 °C)  Heart Rate:  [56-86] 86  Resp:  [16-18] 18  BP: (101-136)/(43-63) 101/43    Intake/Output Summary (Last 24 hours) at 04/11/18 1029  Last data filed at 04/11/18 0801   Gross per 24 hour   Intake              461 ml   Output              700 ml   Net             -239 ml       Comfortable NAD  PERRL, conjunctiva clear  Neck supple, no JVD or thyromegaly appreciated  S1/S2 RRR, no m/r/g  Lungs CTA B, normal effort  Abdomen S/NT/ND (+) BS, no HSM appreciated  Extremities warm, no clubbing, cyanosis, or edema  No visible or palpable skin lesions  A/Ox4, mood and affect appropriate    Results Review:        Results from last 7 days  Lab Units 04/11/18  0412 04/10/18  0330 04/09/18  0916   SODIUM mmol/L 137 134* 134*   POTASSIUM mmol/L 4.0 3.9 3.6   CHLORIDE mmol/L 100 100 99   CO2 mmol/L 26.5 24.5 25.1   BUN mg/dL 14 12 12   CREATININE mg/dL 1.07 1.01 1.12   GLUCOSE mg/dL 200* 185* 198*   CALCIUM mg/dL 8.7 8.4* 8.6           Results from last 7 days  Lab Units 04/11/18  0412 04/10/18  1802 04/10/18  0330   WBC 10*3/mm3 2.09* 2.84* 2.04*   HEMOGLOBIN g/dL 8.2* 9.3* 9.0*   HEMATOCRIT % 27.2* 30.6* 29.3*   PLATELETS 10*3/mm3 34* 44* 28*       Results from last 7 days  Lab Units 04/11/18  0412   INR  1.47*           Results from last 7 days  Lab Units 04/08/18  2243   MAGNESIUM mg/dL 2.2           I reviewed the patient's new clinical results.  I personally  viewed and interpreted the patient's EKG/Telemetry data        Medication Review:     clonazePAM 0.5 mg Oral Daily   cobicistat 150 mg Oral Daily With Breakfast   darunavir 800 mg Oral Daily With Breakfast   emtricitabine-tenofovir 1 tablet Oral Q24H   fluconazole 100 mg Oral Every Other Day   insulin aspart 0-7 Units Subcutaneous 4x Daily With Meals & Nightly   levothyroxine 300 mcg Oral Q AM   metoprolol succinate XL 50 mg Oral Q12H   raltegravir 400 mg Oral BID   rifaximin 550 mg Oral Q12H   spironolactone 50 mg Oral Daily   sulfamethoxazole-trimethoprim 1 tablet Oral Every Other Day   tamsulosin 0.4 mg Oral Nightly            Assessment/Plan     Principal Problem:    T12 compression fracture  Active Problems:    Chronic coronary artery disease    Peripheral arterial occlusive disease    Iron deficiency anemia due to chronic blood loss    Thrombocytopenia associated with AIDS    Leukocytopenia    Liver cirrhosis    Lumbar degenerative disc disease    Weight loss, abnormal    Hypothyroidism    Intractable back pain    Type 2 diabetes mellitus    Hypophosphatemia    Mild protein-calorie malnutrition    History of HIV, cirrhosis, thrombocytopenia.  Has had an OM stent and a chronic occlusion of the RCA.  Awaiting treatment plan on his T12 compression fracture.  He is cleared for surgery from my standpoint if it becomes necessary.    David Singh MD  04/11/18  10:29 AM

## 2018-04-11 NOTE — PROGRESS NOTES
"   LOS: 2 days   Patient Care Team:  Kendrick Griggs MD as PCP - General (Family Medicine)  Carrie Lucas MD PhD as Consulting Physician (Hematology and Oncology)  Travis Mckenzie MD as Referring Physician (Internal Medicine)  Baldo Conway MD as Consulting Physician (Endocrinology)  Robyn Butler MD as Consulting Physician (Gastroenterology)    Chief Complaint: back pain    Subjective     Feels the same today. Really wants relief from his back pain        Subjective:  Symptoms:  Stable.  No shortness of breath, malaise, cough, chest pain, weakness, headache, chest pressure, anorexia, diarrhea or anxiety.    Diet:  Adequate intake.  No nausea or vomiting.    Activity level: Impaired due to pain.    Pain:  He complains of pain that is moderate.  He reports pain is improving.  Pain is well controlled.        History taken from: patient chart    Objective     Vital Signs  Temp:  [97.3 °F (36.3 °C)-98 °F (36.7 °C)] 97.5 °F (36.4 °C)  Heart Rate:  [56-86] 86  Resp:  [16-18] 18  BP: (101-136)/(43-63) 101/43    Objective:  General Appearance:  Comfortable and in no acute distress.    Vital signs: (most recent): Blood pressure 101/43, pulse 86, temperature 97.5 °F (36.4 °C), temperature source Oral, resp. rate 18, height 170.2 cm (67\"), weight 77.1 kg (170 lb), SpO2 96 %.  Vital signs are normal.  No fever.    Output: Producing urine and producing stool.    HEENT: Normal HEENT exam.    Lungs:  Normal effort and normal respiratory rate.  Breath sounds clear to auscultation.    Heart: Normal rate.  Regular rhythm.    Abdomen: Abdomen is soft.  Bowel sounds are normal.   There is no abdominal tenderness.     Extremities: There is no dependent edema.    Pulses: Distal pulses are intact.    Neurological: Patient is alert and oriented to person, place and time.    Skin:  Warm and dry.              Results Review:     I reviewed the patient's new clinical results.  I reviewed the patient's new imaging results and agree " with the interpretation.  I reviewed the patient's other test results and agree with the interpretation  Discussed with patient    Medication Review: reviewed    Assessment/Plan     Principal Problem:    T12 compression fracture  Active Problems:    Chronic coronary artery disease    Peripheral arterial occlusive disease    Iron deficiency anemia due to chronic blood loss    Thrombocytopenia associated with AIDS    Leukocytopenia    Liver cirrhosis    Lumbar degenerative disc disease    Weight loss, abnormal    Hypothyroidism    Intractable back pain    Type 2 diabetes mellitus    Hypophosphatemia    Mild protein-calorie malnutrition          Plan:   ([facility] following, await their recs regarding kyphoplasty  Pt says he has osteoporosis, also has h/o prostate CA  Will need platelet support if any procedures planned, defer to Heme  Appreciate GI attention to pt  MRI abdomen planned for abnl appearance of liver on CT, C-scope and EGD planned  IS ordered given atelectasis seen on imaging  Nutrition following  Start long-acting insulin at 20 units QPM, takes 40-80 units of Tresiba at home  Restarted his L-T4 therapy  Home HIV meds continued  Prophylactic Bactrim and Diflucan continued).       Mariano Delacruz MD  04/11/18  11:50 AM    Time: 25min

## 2018-04-11 NOTE — PROGRESS NOTES
BGA/GI Progress Note   Chief Complaint:  Hematochezia, weight loss, abd pain    Subjective     Interval History:   Continues with back pain.  No melena/hematochezia    History taken from: patient chart RN    Review of Systems:    The following systems were reviewed and negative;  respiratory, cardiovascular and gastrointestinal    Objective     Vital Signs  Temp:  [97.3 °F (36.3 °C)-98 °F (36.7 °C)] 97.5 °F (36.4 °C)  Heart Rate:  [56-86] 86  Resp:  [16-18] 18  BP: (101-136)/(43-63) 101/43  Body mass index is 26.63 kg/m².    Intake/Output Summary (Last 24 hours) at 04/11/18 0945  Last data filed at 04/11/18 0801   Gross per 24 hour   Intake              461 ml   Output              700 ml   Net             -239 ml     I/O this shift:  In: -   Out: 300 [Urine:300]    Physical Exam:   General: patient awake, alert and cooperative   Cardiovascular: regular rhythm and rate, no murmurs auscultated   Pulm: clear to auscultation bilaterally, regular and unlabored   Abdomen: soft, nontender, nondistended; normal bowel sounds   Rectal: deferred   Extremities: no rash or edema   Neurologic: Normal mood and behavior    All Medications Have Been Reviewed     Results Review:       Results from last 7 days  Lab Units 04/11/18  0412 04/10/18  1802 04/10/18  0330   WBC 10*3/mm3 2.09* 2.84* 2.04*   HEMOGLOBIN g/dL 8.2* 9.3* 9.0*   HEMATOCRIT % 27.2* 30.6* 29.3*   PLATELETS 10*3/mm3 34* 44* 28*         Results from last 7 days  Lab Units 04/11/18  0412 04/10/18  0330 04/09/18  0916   SODIUM mmol/L 137 134* 134*   POTASSIUM mmol/L 4.0 3.9 3.6   CHLORIDE mmol/L 100 100 99   CO2 mmol/L 26.5 24.5 25.1   BUN mg/dL 14 12 12   CREATININE mg/dL 1.07 1.01 1.12   CALCIUM mg/dL 8.7 8.4* 8.6   BILIRUBIN mg/dL 1.4* 1.6* 1.6*   ALK PHOS U/L 154* 168* 170*   ALT (SGPT) U/L 36 45* 51*   AST (SGOT) U/L 37 49* 57*   GLUCOSE mg/dL 200* 185* 198*         Results from last 7 days  Lab Units 04/11/18  0412   INR  1.47*       RADIOLOGY:    Imaging  Results (last 72 hours)     Procedure Component Value Units Date/Time    MRI Lumbar Spine Without Contrast [[id number]] Collected:  04/09/18 2200     Updated:  04/10/18 8443    Narrative:       LUMBAR SPINE MRI      HISTORY:compression fracture eval; S22.080A-Wedge compression fracture  of t11-T12 vertebra, initial encounter for closed fracture;  S22.080A-Wedge compression fracture of t11-T12 vertebra, initial  encounter for closed fracture; E03.9-Hypothyroidism, unspecified;  E83.39-Other disorders of phosphorus metabolism; D61.818-Other  pancytopenia     COMPARISON: None.     FINDINGS: Multiplanar images of the lumbar spine were obtained without  the use of gadolinium. Axial images were obtained through the 7  lower-most disc levels which will be labeled L1-S3 for purposes of  counting.     There are Schmorl's type deformities of the L3 and L4 superior  endplates. Some mild edema persists along the more anterior and central  aspect of the L4 endplate deformity suggesting that it is fairly recent.  The L3 fracture is clearly old. There is some central depression but  overall no significant degree of vertebral height loss. The remaining  lumbar vertebrae are unremarkable. Mild multilevel spurring is present.  Most of the intervertebral discs exhibit some T2 signal loss suggesting  some degenerative desiccation. Some mild disc height loss is present at  L5-S1. Remaining disks are well-maintained in height. A focal disc  herniation/extrusion is not appreciated. Minor disc osteophyte complexes  are present posteriorly at L4-5 and L5-S1 but neither produces a  significant degree of canal narrowing. There are some arthritic changes  in the lower lumbar facets       Impression:       1. Mild Schmorl's type deformities of L3 and L4 superiorly. The L4  deformity appears to be fairly recent as evidenced by some associated  edema. No significant height loss or bony retropulsion however.  2. Minor disc osteophyte complexes at  L4-5 and L5-S1. Neither results in  significant canal narrowing.     This report was finalized on 4/10/2018 5:43 AM by Zia Wolf MD.       MRI Thoracic Spine Without Contrast [[id number]] Collected:  04/09/18 2153     Updated:  04/10/18 0546    Narrative:       THORACIC SPINE MRI     CLINICAL HISTORY: Fracture.     COMPARISON: None.     FINDINGS: Multiplanar images of the thoracic spine obtained without  gadolinium. There is a moderate-to-severe compression fracture of the  T12 vertebra with up to approximately 60% height loss along its most  severe aspect centrally. It is likely late subacute-early chronic in  timeframe as some mild patchy edema persists mostly along the superior  endplate and to a lesser degree centrally within the vertebra. Areas of  diminished T1 and T2 signal likely related to sclerosis from some  healing. It does not appear to be pathologic but follow-up is  recommended. There is some retropulsion of the posterior wall,  approximately 4 to 5 mm into the central canal, greater to the right of  midline. Canal narrowing is mild however.     The remaining thoracic vertebrae are well-maintained in height. Marrow  signal is somewhat heterogeneous with areas of fatty replacement but  without any additional fracture or evidence of pathologic marrow  replacement process.     Mild multilevel degenerative changes are present. Minor disc osteophyte  complex at T3-4 produces slight indentation along the ventral aspect of  the thecal sac but canal narrowing is minimal. Otherwise no significant  focal disc herniation/extrusion or significant protrusion is not  demonstrated at any level. There is no abnormal cord signal.     Advanced multilevel degenerative changes noted in the cervical spine.  Dedicated cervical imaging could better assess.       Impression:       T12 compression fracture, likely late subacute-early chronic  with some bony retropulsion contributing to some mild narrowing of the  central  canal, greater to the right of midline. Again it does not appear  pathologic but follow-up is recommended.     This report was finalized on 4/10/2018 5:43 AM by Zia Wolf MD.       CT Lumbar Spine With Contrast [[id number]] Collected:  04/09/18 0004     Updated:  04/09/18 0015    Narrative:       CONTRAST CT SCAN LUMBAR SPINE     CLINICAL HISTORY: Low back pain, rapidly progressive neuro deficit     COMPARISON: Correlation was made with a lumbar MRI from April 3, 2017     TECHNIQUE: Radiation dose reduction techniques were utilized, including  automated exposure control and exposure modulation based on body size.  Axial noncontrast images of the lumbar spine were obtained with  contrast. Sagittal reformatted images were supplemented.     FINDINGS:  There is a recent if not acute compression fracture of the  T12 vertebra mostly along its superior aspect. It is comminuted and  exhibits up to approximately 60% height loss centrally. The posterior  most aspect of the T12 vertebra is fairly well preserved in height.  There is some bony retropulsion extending approximately 4-5 mm into the  central canal, greater to the right of midline producing  mild-to-moderate canal narrowing. The bony canal narrowing is best seen  on axial images 14 and 15. There are milder and old appearing Schmorl's  type deformities of the L3 and L4 superior endplates without significant  height loss of either vertebra overall. The remaining lumbar vertebrae  are intact and normal in height.    .     The lumbar vertebrae are well aligned.  Multilevel degenerative changes  are present. Disc height loss is more severe at L4-5, with moderate disc  height loss and vacuum disc phenomenon. Spurring is more pronounced at  L2-3 and L3-4.     Minimal annular disc bulge present at L2-3 not contributing to any  significant canal narrowing.. Mild broad-based protrusion present at  L3-4, asymmetric to the right. Canal narrowing is felt to be minimal.  Some  mild facet arthropathy is present. Mild broad-based protrusion  present at L4-5 again with only minimal canal narrowing. There is  foraminal extension bilaterally. More advanced facet arthropathy present  bilaterally. There is bilateral foraminal stenosis due to the disc  protrusion and facet spurring milder facet arthropathy at L5-S1.     There is no abnormal contrast enhancement appreciated.                      Impression:       1. Recent/acute compression fracture of the T12 vertebra with 4-5 mm of  bony retropulsion contributing to mild-to-moderate canal narrowing,  slightly eccentric to the right.  2. Multilevel degenerative and arthritic changes as individually  discussed.  3. There is no abnormal enhancement     This report was finalized on 4/9/2018 12:12 AM by Zia Wolf MD.       CT Abdomen Pelvis With Contrast [[id number]] Collected:  04/08/18 2354     Updated:  04/09/18 0004    Narrative:       CT SCANS ABDOMEN AND PELVIS WITH IV CONTRAST.     HISTORY: Abd pain, fever, abscess suspected     COMPARISON: None.     TECHNIQUE: Radiation dose reduction techniques were utilized, including  automated exposure control and exposure modulation based on body size.  Axial images were obtained from the lung bases to the symphysis pubis  with IV contrast only.. Oral contrast was not administered per request.     FINDINGS ABDOMEN CT:  Left diaphragm is elevated with adjacent left lung  base consolidation favored to reflect atelectasis rather than pneumonia.  Liver is small, nodular and very heterogeneous with several tiny  hyperdense nodules likely as sequelae of cirrhosis and nodular  regeneration respectively. Continued surveillance will be needed to  exclude mass lesion. Gallbladder is partially contracted with wall  thickening but no calcified gallstones or pericholecystic inflammation.  There is evidence of portal hypertension including Marked splenomegaly  noted at 19 cm in [id number] length but without mass  lesion. There are  solitary small upper pole renal cysts. Remaining solid organs have an  unremarkable appearance.     FINDINGS PELVIS CT:  Mild ascites in both the abdomen and pelvis without  loculated fluid collection. Normal appendix. Mild colonic  diverticulosis. There are multifocal areas of mild colonic wall  thickening but this is likely due to nondistention. No convincing  pericolonic inflammation to suggest definite colitis. Endoscopy could  better evaluate. Mild prostate gland enlargement. Aorta is  nonaneurysmal.                Impression:       IMPRESSION :   1. Left lung base consolidation favored to reflect atelectasis rather  than pneumonia.  2. Combination of findings suggests cirrhosis and portal hypertension.  Follow-up of the liver is recommended due to the marked heterogeneity.  3. Tiny renal lesions felt to reflect cysts.  4. Mild colonic diverticulosis with areas of colonic wall thickening  favored to be due to nondistention. Endoscopy could better evaluate.  5. Nonspecific gallbladder wall thickening without calcified gallstones  or associated inflammation.  6. Mild prostate gland enlargement.  7. Mild ascites        This report was finalized on 4/9/2018 12:01 AM by Zia Wolf MD.       XR Chest 2 View [[id number]] Collected:  04/08/18 2305     Updated:  04/08/18 2309    Narrative:       PA AND LATERAL CHEST X-RAY     HISTORY: weakness     COMPARISON: None.     FINDINGS: PA and lateral views of the chest were obtained. Lungs poorly  aerated. There is minimal linear bibasilar atelectasis. There is no  convincing evidence of active air space disease process otherwise. No  edema or pleural fluid. Normal heart size. Faint vascular calculi.             Impression:       Poor inspiration with minimal bibasilar atelectasis     This report was finalized on 4/8/2018 11:06 PM by Zia Wolf MD.             Assessment/Plan     Patient Active Problem List   Diagnosis Code   • Chronic coronary artery  disease I25.10   • Difficulty breathing R06.89   • Intermittent claudication I73.9   • Peripheral arterial occlusive disease I77.9   • Shortness of breath R06.02   • Iron deficiency anemia due to chronic blood loss D50.0   • Thrombocytopenia associated with AIDS B20, D69.59   • Leukocytopenia D72.819   • Neutropenia D70.9   • Pancytopenia D61.818   • Iron and its compounds causing adverse effect in therapeutic use T45.4X5A   • Liver cirrhosis K74.60   • Lumbar degenerative disc disease M51.36   • 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  Rectal bleeding-history of radiation proctitis treated with APC  2.  Pancytopenia  3.  Cirrhosis, cryptogenic but likely related to previous HIV medications  4.  T12 compression fracture   5.  Abnormal CT of the liver  6. HIV   7. Weight loss    Plan:  - ZEESHAN note reviewed - await final decision on possible kyphoplasty  - f/u AFP  - suspect rectal bleeding secondary to radiation proctotis exacerbated by his significant thrombocytopenia - start anusol supp  - will need additional liver imaging when he can tolerate MRI to further evaluate liver abnormal appearance on CT          Jose Alfredo Haney M.D.  [facility]  [address] [place] [address]  Office: [phone]

## 2018-04-11 NOTE — PLAN OF CARE
Problem: Patient Care Overview  Goal: Plan of Care Review  Outcome: Ongoing (interventions implemented as appropriate)   04/11/18 1542   Coping/Psychosocial   Plan of Care Reviewed With patient   Plan of Care Review   Progress no change   OTHER   Outcome Summary Continues to complain of back pain requiring both oral and IV pain medications     Goal: Individualization and Mutuality  Outcome: Ongoing (interventions implemented as appropriate)    Goal: Interprofessional Rounds/Family Conf  Outcome: Ongoing (interventions implemented as appropriate)      Problem: Fall Risk (Adult)  Goal: Absence of Fall  Outcome: Ongoing (interventions implemented as appropriate)      Problem: Pain, Chronic (Adult)  Goal: Acceptable Pain/Comfort Level and Functional Ability  Outcome: Ongoing (interventions implemented as appropriate)

## 2018-04-11 NOTE — PLAN OF CARE
Problem: Patient Care Overview  Goal: Plan of Care Review  Outcome: Ongoing (interventions implemented as appropriate)   04/11/18 0556   Coping/Psychosocial   Plan of Care Reviewed With patient   Plan of Care Review   Progress no change       Problem: Fall Risk (Adult)  Goal: Absence of Fall  Outcome: Ongoing (interventions implemented as appropriate)      Problem: Pain, Chronic (Adult)  Goal: Acceptable Pain/Comfort Level and Functional Ability  Outcome: Ongoing (interventions implemented as appropriate)

## 2018-04-11 NOTE — PROGRESS NOTES
"   LOS: 2 days   Patient Care Team:  Kendrick Griggs MD as PCP - General (Family Medicine)  Carrie Lucas MD PhD as Consulting Physician (Hematology and Oncology)  Travis Mckenzie MD as Referring Physician (Internal Medicine)  Baldo Conway MD as Consulting Physician (Endocrinology)  Robyn Butler MD as Consulting Physician (Gastroenterology)    Chief Complaint: back pain; VCF T12    Subjective     Sitting up on edge of bed eating lunch.  States his back pain is \"terrible.\"  The only thing that helps his pain is \"Dilaudid.\"  Explained Dr. Silva's reservations about surgery to patient.  Platelet count is lower than yesterday at 34; INR elevated at 1.47.  Dr. Silva feels that the bleeding risk is far too high.      Back Pain   This is a new problem. The current episode started 1 to 4 weeks ago. The problem occurs constantly. The problem is unchanged. The pain is present in the thoracic spine. The quality of the pain is described as aching. Radiates to: Right and left flank. Pertinent negatives include no bladder incontinence, bowel incontinence, leg pain, numbness or tingling. Risk factors include history of osteoporosis and history of cancer. He has tried analgesics and bed rest for the symptoms. The treatment provided mild relief.       Subjective:  Symptoms:  Stable.    Diet:  Adequate intake.    Activity level: Impaired due to pain.    Pain:  He reports pain is unchanged.  Pain is requiring pain medication.        History taken from: patient chart    Objective     Vital Signs  Temp:  [97.3 °F (36.3 °C)-98 °F (36.7 °C)] 97.5 °F (36.4 °C)  Heart Rate:  [56-86] 86  Resp:  [16-18] 18  BP: (101-136)/(43-63) 101/43    Objective:  General Appearance:  Comfortable.    Vital signs: (most recent): Blood pressure 144/63, pulse 66, temperature 98.4 °F (36.9 °C), temperature source Oral, resp. rate 18, height 170.2 cm (67\"), weight 77.1 kg (170 lb), SpO2 97 %.  Vital signs are normal.    Output: Producing urine.  "   HEENT: Normal HEENT exam.    Lungs:  Normal effort.    Heart: Normal rate.    Abdomen: Abdomen is soft.    Extremities: Normal range of motion.    Neurological: Patient is alert and oriented to person, place and time.  Normal strength.  GCS score is 15.  (Tender with palpation in the lower thoracic, upper lumbar region.).    Skin:  Warm and dry.              Results Review:     I reviewed the patient's new clinical results.  I reviewed the patient's new imaging results and agree with the interpretation. Discussed imaging and clinical results with Dr. Silva.      XR SPINE LUMBAR AP AND LATERAL-, XR SPINE THORACIC 2 VW    Stable T12 compression deformity; stable height loss at L3 and L4 (Schmorl's nodes at these levels on MRI).     Results for TROY ENGLAND (MRN [id number]) as of 4/11/2018 11:52   Ref. Range 4/11/2018 04:12   Protime Latest Ref Range: 11.7 - 14.2 Seconds 17.6 (H)   INR Latest Ref Range: 0.90 - 1.10  1.47 (H)      Ref. Range 4/10/2018 03:30 4/10/2018 18:02 4/11/2018 04:12   Platelets Latest Ref Range: 140 - 500 10*3/mm3 28 (C) 44 (L) 34 (C)       Assessment/Plan     Principal Problem:    T12 compression fracture  Active Problems:    Chronic coronary artery disease    Peripheral arterial occlusive disease    Iron deficiency anemia due to chronic blood loss    Thrombocytopenia associated with AIDS    Leukocytopenia    Liver cirrhosis    Lumbar degenerative disc disease    Weight loss, abnormal    Hypothyroidism    Intractable back pain    Type 2 diabetes mellitus    Hypophosphatemia    Mild protein-calorie malnutrition      Assessment & Plan     5 week hx of lower thoracic, upper lumbar pain  Thrombocytopenia  HIV/AIDS  Hx of osteoporosis on Prolia  Hx of prostate CA, tobacco abuse  Hx of cirrhosis    Dr. Silva is declining surgery due to high risk of bleeding. Will ask Dr. Hodge to evaluate and give opinion.       Arabella Walden, HANG  04/11/18  11:54 AM

## 2018-04-11 NOTE — PROGRESS NOTES
Subjective   REASONS FOR FOLLOWUP:       1.  Pancytopenia, especially with leukocytopenia and thrombocytopenia. Bone marrow aspiration and biopsy obtained on 01/21/2014, with no evidence of hematologic malignancy, no myelodysplastic syndrome.  He has liver cirrhosis and splenomegaly.      2.   Frequently recurrent iron deficiency anemia, not able to tolerate oral iron supplementation, status post multiple Feraheme treatments.  He also had transfusion of PRBC in October 2014.      3.  Empiric treatment for ITP using IVIG, in March 2014 and no response.    4.  Ongoing care for frequent iron deficiency required repeated intravenous iron therapy.   5. History of prostate cancer on Eligard and melena  6. osteoporosis  7.  HIV-positive 3 drugs with negative viral load as of 2/18-CD4 count 120 on Bactrim prophylaxis      REASON FOR CONSULTATION:  Pancytopenia and weight loss and low back pain with T12 compression fracture    History of Present Illness  patient is a 72-year-old male with long-standing HIV infection under good control by Dr. Klarissa Thomas on 3 different medications including Isentress.  Prezista and Truvada.  He is been seen in our office for intermittent iron deficiency and receives IV iron last dose in January 2018  He is known to have osteoporosis and is on Prolia every 6 months alternating with Eligard the urologist for his prostate cancer     Is admitted this time with a 25 pound weight loss over the last month associated with low back pain was no obvious triggering event and was noted on imaging to have a compression fracture of the T12 vertebral body was no cord compression.  He denies lifting anything heavy or jumping off anything are hurting his back.  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 and portal hypertension and this is essentially  "stable     4/11  Complains of persistent back pain  We gave him a trial of 2 units of platelets yesterday and is post transfusion platelet count went to 44,000 and is back to 34,000 today  IIFE pending pending    Neurosurgery unwilling to do kyphoplasty unless his platelets are closer to 100 which I told him he could never achieve due to his splenomegaly-patient is willing to take the risk of bleeding with 2 units of platelets plus  third unit infusing during the procedure and he will discuss this with neurosurgeryINR down to 1.47 was given additional vitamin K    Past Medical History, Past Surgical History, Social History, Family History have been reviewed and are without significant changes except as mentioned.    Review of Systems   Constitutional: Positive for unexpected weight change.   Cardiovascular: Positive for palpitations.   Genitourinary: Positive for flank pain.   Musculoskeletal: Positive for back pain.      A comprehensive 14 point review of systems was performed and was negative except as mentioned.    Medications:  The current medication list was reviewed in the EMR    ALLERGIES:    Allergies   Allergen Reactions   • Methylprednisolone Other (See Comments)     \"Everything shuts down\"       Objective      Vitals:    04/10/18 2017 04/10/18 2127 04/11/18 0013 04/11/18 0823   BP:  121/59 135/50 101/43   BP Location:   Right arm Left arm   Patient Position:   Lying Sitting   Pulse:  56 61 86   Resp: 16  16 18   Temp: 97.7 °F (36.5 °C)  98 °F (36.7 °C) 97.5 °F (36.4 °C)   TempSrc: Oral  Oral Oral   SpO2: 93%  94% 96%   Weight:       Height:         Current Status 1/24/2018   ECOG score 0       Physical Exam    GENERAL:  Well-developed, well-nourished in no acute distress.   SKIN:  Warm, dry without rashes, purpura or petechiae.  EYES:  Pupils equal, round and reactive to light.  EOMs intact.  Conjunctivae normal.  EARS:  Hearing intact.  NOSE:  Septum midline.  No excoriations or nasal discharge.  MOUTH:  " Tongue is well-papillated; no stomatitis or ulcers.  Lips normal.  THROAT:  Oropharynx without lesions or exudates.  NECK:  Supple with good range of motion; no thyromegaly or masses, no JVD.  LYMPHATICS:  No cervical, supraclavicular, axillary or inguinal adenopathy.  CHEST:  Lungs clear to auscultation. Good airflow.  CARDIAC:  Regular rate and rhythm without murmurs, rubs or gallops. Normal S1,S2.  ABDOMEN:  Soft, nontender with splenomegaly 6cm no masses.  EXTREMITIES:  No clubbing, cyanosis or edema.  NEUROLOGICAL:  Cranial Nerves II-XII grossly intact.  No focal neurological deficits.  PSYCHIATRIC:  Normal affect and mood.     unchanged     RECENT LABS:  Hematology WBC   Date Value Ref Range Status   04/11/2018 2.09 (L) 4.50 - 10.70 10*3/mm3 Final     RBC   Date Value Ref Range Status   04/11/2018 2.89 (L) 4.60 - 6.00 10*6/mm3 Final     Hemoglobin   Date Value Ref Range Status   04/11/2018 8.2 (L) 13.7 - 17.6 g/dL Final     Hematocrit   Date Value Ref Range Status   04/11/2018 27.2 (L) 40.4 - 52.2 % Final     Platelets   Date Value Ref Range Status   04/11/2018 34 (C) 140 - 500 10*3/mm3 Final     Plat 44K POST 2 U PLAT PHERESIS     Lab Results   Component Value Date    NEUTROABS 1.61 (L) 04/11/2018          B2m 4.0  Ferritin 27.1  Immunofixation pending    Assessment/Plan   1.   T12 compression fracture without obvious injury in a patient with osteoporosis on Prolia         Doubt underlying malignancy but will need to get a biopsy when kyphoplasty is done to be sure  2.  Chronic HIV infection on 3 drug treatment with negative viral load with low CD4 count on prophylactic Bactrim-Truvada has been known to worsen bone density.      3..  Severe thrombocytopenia and mild to moderate leukopenia/neutropenia related to his liver cirrhosis, hypersplenism  and HIV treatment. Both of his platelet and ANC count fluctuates,    He has no spontaneous bleeding.  Will continue to observe.      3.Recurrent Iron deficiency  anemia required frequent IV iron therapy, roughly about every 3 months.  Patient complains of significant fatigue.   Last time he received Feraheme treatment was Jan 2018  He denies any visible bleeding besides may be occasional a bit of blood on toilet paper but nothing significant.       4 prostate cancer treated with radiation plus Eligard and Prolia last PSA in February 0.4     5 weight loss etiology unclear negative CT the abdomen except for nodules in the cirrhotic liver AFP pending     6.  Osteoporosis due to Eligard plus Truvada on Prolia per urology     . . Plan  1. Platelet count went from 32-44,000 and  back to 34,000 with 2 units of platelets  I don't think we will ever get above 100,000 and we may have to accept giving him platelets during the procedure -patient to discuss with neurosurgery  2.  Check immunofixation   3.   Venofer for iron depletion                4/11/2018      CC:

## 2018-04-12 NOTE — PLAN OF CARE
Problem: Patient Care Overview  Goal: Plan of Care Review  Outcome: Ongoing (interventions implemented as appropriate)   04/12/18 1539   Coping/Psychosocial   Plan of Care Reviewed With patient   Plan of Care Review   Progress improving   OTHER   Outcome Summary continuing to monitor blood glucose closely. pain controlled by medication. pt alert and oriented. vss will continue to monitor     Goal: Individualization and Mutuality  Outcome: Ongoing (interventions implemented as appropriate)    Goal: Discharge Needs Assessment  Outcome: Ongoing (interventions implemented as appropriate)    Goal: Interprofessional Rounds/Family Conf  Outcome: Ongoing (interventions implemented as appropriate)      Problem: Fall Risk (Adult)  Goal: Absence of Fall  Outcome: Ongoing (interventions implemented as appropriate)      Problem: Pain, Chronic (Adult)  Goal: Acceptable Pain/Comfort Level and Functional Ability  Outcome: Ongoing (interventions implemented as appropriate)

## 2018-04-12 NOTE — PROGRESS NOTES
LOS: 3 days   Patient Care Team:  Kendrick Griggs MD as PCP - General (Family Medicine)  Carrie Lucas MD PhD as Consulting Physician (Hematology and Oncology)  Travis Mckenzie MD as Referring Physician (Internal Medicine)  Baldo Conway MD as Consulting Physician (Endocrinology)  Robyn Butler MD as Consulting Physician (Gastroenterology)    Chief Complaint: compression fracture       Interval History: His back still hurts.  Wants to have kyphoplasty, but his platelets are low.    Objective   Vital Signs  Temp:  [97.6 °F (36.4 °C)-98.4 °F (36.9 °C)] 98.4 °F (36.9 °C)  Heart Rate:  [56-66] 64  Resp:  [16-18] 16  BP: (120-144)/(48-63) 134/57    Intake/Output Summary (Last 24 hours) at 04/12/18 0958  Last data filed at 04/12/18 0746   Gross per 24 hour   Intake                0 ml   Output             1225 ml   Net            -1225 ml       Comfortable NAD  PERRL, conjunctiva clear  Neck supple, no JVD or thyromegaly appreciated  S1/S2 RRR, no m/r/g  Lungs CTA B, normal effort  Abdomen S/NT/ND (+) BS, no HSM appreciated  Extremities warm, no clubbing, cyanosis, or edema  No visible or palpable skin lesions  A/Ox4, mood and affect appropriate    Results Review:        Results from last 7 days  Lab Units 04/12/18  0414 04/11/18  0412 04/10/18  0330   SODIUM mmol/L 135* 137 134*   POTASSIUM mmol/L 4.4 4.0 3.9   CHLORIDE mmol/L 103 100 100   CO2 mmol/L 22.5 26.5 24.5   BUN mg/dL 11 14 12   CREATININE mg/dL 0.96 1.07 1.01   GLUCOSE mg/dL 165* 200* 185*   CALCIUM mg/dL 8.3* 8.7 8.4*           Results from last 7 days  Lab Units 04/12/18  0414 04/11/18  0412 04/10/18  1802   WBC 10*3/mm3 2.28* 2.09* 2.84*   HEMOGLOBIN g/dL 8.8* 8.2* 9.3*   HEMATOCRIT % 29.6* 27.2* 30.6*   PLATELETS 10*3/mm3 37* 34* 44*       Results from last 7 days  Lab Units 04/11/18  0412   INR  1.47*           Results from last 7 days  Lab Units 04/08/18  2243   MAGNESIUM mg/dL 2.2           I reviewed the patient's new clinical results.  I  personally viewed and interpreted the patient's EKG/Telemetry data        Medication Review:     clonazePAM 0.5 mg Oral Daily   cobicistat 150 mg Oral Daily With Breakfast   darunavir 800 mg Oral Daily With Breakfast   emtricitabine-tenofovir 1 tablet Oral Q24H   fluconazole 100 mg Oral Every Other Day   insulin aspart 0-7 Units Subcutaneous 4x Daily With Meals & Nightly   insulin detemir 20 Units Subcutaneous Nightly   levothyroxine 300 mcg Oral Q AM   metoprolol succinate XL 50 mg Oral Q12H   raltegravir 400 mg Oral BID   rifaximin 550 mg Oral Q12H   spironolactone 50 mg Oral Daily   sulfamethoxazole-trimethoprim 1 tablet Oral Every Other Day   tamsulosin 0.4 mg Oral Nightly            Assessment/Plan     Principal Problem:    T12 compression fracture  Active Problems:    Chronic coronary artery disease    Peripheral arterial occlusive disease    Iron deficiency anemia due to chronic blood loss    Thrombocytopenia associated with AIDS    Leukocytopenia    Liver cirrhosis    Lumbar degenerative disc disease    Weight loss, abnormal    Hypothyroidism    Intractable back pain    Type 2 diabetes mellitus    Hypophosphatemia    Mild protein-calorie malnutrition    History of HIV, cirrhosis, thrombocytopenia.  Has had an OM stent and a chronic occlusion of the RCA.  Awaiting treatment plan on his T12 compression fracture.  He is cleared for surgery from my standpoint if it becomes necessary.    David Singh MD  04/12/18  9:58 AM

## 2018-04-12 NOTE — CONSULTS
"Orthopedic Consult      Patient: Kye Aranda    Date of Admission: 4/8/2018 10:17 PM    YOB: 1945    Medical Record Number: [id number]    Attending Physician: Zia Mcleod MD    Consulting Physician: Zia Mcleod MD      Chief Complaints: Low back pain      History of Present Illness: 72 y.o. male admitted to [facility] to services of Zia Mcleod MD with low back pain which she states is been present about 5 weeks and appears coincident with onset of the T12 compression fracture of unknown etiology.  He has no prior history of compression fracture but has been diagnosed in the past with osteopenia.  He denies numbness tingling weakness or balance difficulties but states that the pain interferes with walking.  He was seen last week in the ER and discharged home in this time was admitted for back pain.  He has been followed in the pain clinic by Dr. Alberto Morin.  He only underwent some type of injection procedure 6 weeks ago.      Allergies:   Allergies   Allergen Reactions   • Methylprednisolone Other (See Comments)     \"Everything shuts down\"       Medications:   Home Medications:  No current facility-administered medications on file prior to encounter.      Current Outpatient Prescriptions on File Prior to Encounter   Medication Sig   • baclofen (LIORESAL) 10 MG tablet Take 1 tablet by mouth 3 (Three) Times a Day As Needed for Muscle Spasms.   • BD PEN NEEDLE ONEAL U/F 32G X 4 MM misc    • clonazePAM (KlonoPIN) 0.5 MG tablet Take 1 tablet by mouth daily.   • fluconazole (DIFLUCAN) 100 MG tablet Take 100 mg by mouth Every Other Day.   • furosemide (LASIX) 20 MG tablet Take 1 tablet by mouth Daily. (Patient taking differently: Take 20 mg by mouth Daily. Pt states he takes when he has swelling in ankles)   • Icosapent Ethyl (VASCEPA) 1 G capsule Take 1 g by mouth 2 (Two) Times a Day.   • ISENTRESS 400 MG tablet Take 400 mg by mouth 2 (Two) Times a Day.   • metoprolol succinate XL " (TOPROL-XL) 50 MG 24 hr tablet Take 1 tablet by mouth 2 (Two) Times a Day.   • PREZISTA 800 MG tablet tablet Take 800 mg by mouth.   • rifaximin (XIFAXAN) 550 MG tablet Take 1 tablet by mouth every 12 (twelve) hours.   • spironolactone (ALDACTONE) 50 MG tablet Take 1 tablet by mouth Daily.   • sulfamethoxazole-trimethoprim (BACTRIM DS,SEPTRA DS) 800-160 MG per tablet Take 1 tablet by mouth Every Other Day As Needed.   • SYNTHROID 300 MCG tablet TK 1 T PO QD   • tamsulosin (FLOMAX) 0.4 MG capsule Take 1 capsule by mouth nightly.   • TRESIBA FLEXTOUCH 200 UNIT/ML solution pen-injector INJECT 40-80 UNITS UNDER THE SKIN QD   • TRUVADA 200-300 MG per tablet Take 200-300 mg by mouth.   • TYBOST 150 MG tablet Take 150 mg by mouth.   • vitamin D (ERGOCALCIFEROL) 37147 UNITS capsule capsule TK 1 C PO ONCE A WEEK   • PROCTOZONE-HC 2.5 % rectal cream APPLY TO AFFECTED AREA THREE TIMES DAILY AFTER BOWEL MOVEMENT     Current Medications:  Scheduled Meds:  clonazePAM 0.5 mg Oral Daily   cobicistat 150 mg Oral Daily With Breakfast   darunavir 800 mg Oral Daily With Breakfast   emtricitabine-tenofovir 1 tablet Oral Q24H   fluconazole 100 mg Oral Every Other Day   insulin aspart 0-7 Units Subcutaneous 4x Daily With Meals & Nightly   insulin detemir 20 Units Subcutaneous Nightly   levothyroxine 300 mcg Oral Q AM   metoprolol succinate XL 50 mg Oral Q12H   raltegravir 400 mg Oral BID   rifaximin 550 mg Oral Q12H   spironolactone 50 mg Oral Daily   sulfamethoxazole-trimethoprim 1 tablet Oral Every Other Day   tamsulosin 0.4 mg Oral Nightly     Continuous Infusions:   PRN Meds:.•  acetaminophen  •  dextrose  •  dextrose  •  glucagon (human recombinant)  •  HYDROcodone-acetaminophen  •  HYDROmorphone  •  ondansetron  •  sodium chloride  •  sodium chloride    Past Medical History:   Diagnosis Date   • Anemia    • Cirrhosis    • Coronary artery disease    • Diabetes mellitus    • Difficulty breathing    • H/O complete eye exam 10/2017   •  H/O transfusion of packed red blood cells    • HIV infection, symptomatic 1997   • Hyperlipidemia    • Hypertension    • Pancytopenia    • Peripheral artery disease    • Prostate cancer    • Thyroid disease      Past Surgical History:   Procedure Laterality Date   • BONE MARROW BIOPSY W/ ASPIRATION  01/21/2014   • CARDIAC CATHETERIZATION     • COLONOSCOPY  09/20/2012    non-thrombosed EH, sessile polyp in ascending colon 3 mm in size.   • CORONARY ANGIOPLASTY WITH STENT PLACEMENT  06/2013   • ENDOSCOPY  09/20/2012    grade 1 varicesmiddle and lower 3rd of esophagus. Moderate portal hypertensive gastropathy in entire stomach.   • MOUTH SURGERY     • PROSTATE BIOPSY     • TONSILLECTOMY     • UPPER GASTROINTESTINAL ENDOSCOPY  09/20/2012    grd 1 varices, portal hypertensive gastropathy     Social History     Occupational History   •  Retired     [employer]     Social History Main Topics   • Smoking status: Former Smoker     Packs/day: 2.00     Years: 30.00     Types: Cigarettes     Quit date: 1998   • Smokeless tobacco: Never Used   • Alcohol use No   • Drug use: No   • Sexual activity: Defer    Social History     Social History Narrative   • No narrative on file     Family History   Problem Relation Age of Onset   • Heart disease Other    • No Known Problems Mother    • No Known Problems Father    • No Known Problems Maternal Grandmother    • No Known Problems Maternal Grandfather    • No Known Problems Paternal Grandmother    • No Known Problems Paternal Grandfather          Review of Systems:     Constitutional:  Denies fever, shaking or chills   Eyes:  Denies change in visual acuity   HEENT:  Denies nasal congestion or sore throat   Respiratory:  Denies cough or shortness of breath   Cardiovascular:  Denies chest pain or edema  Endocrine: Denies tremors, palpitations, intolerance of heat or cold, polyuria, polydipsia.  GI:  Denies abdominal pain, nausea, vomiting, bloody stools or diarrhea  :   Denies frequency, urgency, incontinence, retention, or nocturia.  Musculoskeletal:  Denies numbness tingling or loss of motor function except as above  Integument:  Denies rash, lesion or ulceration   Neurologic:  Denies headache or focal weakness, deficits  Heme:  Denies epistaxis, spontaneous or excessive bleeding, epistaxis, hematuria, melena, fatigue, enlarged or tender lymph nodes.      All other pertinent positives and negatives as noted above in HPI.    Physical Exam: 72 y.o. male    General:  Awake, alert. No acute distress.      Head/Neck:  Normocephalic, atraumatic. Hearing adequate for the exam.     Psych:  Affect and demeanor appropriate.    CV:  Pulse regular    Lungs:  Good chest expansion, breathing unlabored.    Abdomen:  Soft.  Non-tender, non-distended.    Extremities:      Back: He has a slight fullness in the right thoracolumbar area which I think may be postural.  There is no tenderness at the thoracolumbar junction or above.  He has mildly tender to palpation at the lumbosacral junction.  The skin about the back is intact.  There is no palpable deformity.  No lower extremity exhibits good strength and sensation.      Diagnostic Tests:    Admission on 04/08/2018   No results displayed because visit has over 200 results.        Lab Results (last 24 hours)     Procedure Component Value Units Date/Time    POC Glucose Once [[id number]]  (Abnormal) Collected:  04/12/18 0653    Specimen:  Blood Updated:  04/12/18 0655     Glucose 176 (H) mg/dL     Narrative:       Meter: [id number] : [id number] Alex Aparicio CNA    Comprehensive Metabolic Panel [[id number]]  (Abnormal) Collected:  04/12/18 0414    Specimen:  Blood Updated:  04/12/18 0518     Glucose 165 (H) mg/dL      BUN 11 mg/dL      Creatinine 0.96 mg/dL      Sodium 135 (L) mmol/L      Potassium 4.4 mmol/L      Chloride 103 mmol/L      CO2 22.5 mmol/L      Calcium 8.3 (L) mg/dL      Total Protein 6.0 g/dL      Albumin 2.90 (L) g/dL      ALT  (SGPT) 37 U/L      AST (SGOT) 42 (H) U/L      Alkaline Phosphatase 162 (H) U/L      Total Bilirubin 1.1 mg/dL      eGFR Non African Amer 77 mL/min/1.73      Globulin 3.1 gm/dL      A/G Ratio 0.9 g/dL      BUN/Creatinine Ratio 11.5     Anion Gap 9.5 mmol/L     Narrative:       The MDRD GFR formula is only valid for adults with stable renal function between ages 18 and 70.    CBC & Differential [[id number]] Collected:  04/12/18 0414    Specimen:  Blood Updated:  04/12/18 0459    Narrative:       The following orders were created for panel order CBC & Differential.  Procedure                               Abnormality         Status                     ---------                               -----------         ------                     Scan Slide[[id number]]                                                                  CBC Auto Differential[[id number]]        Abnormal            Final result                 Please view results for these tests on the individual orders.    CBC Auto Differential [[id number]]  (Abnormal) Collected:  04/12/18 0414    Specimen:  Blood Updated:  04/12/18 0459     WBC 2.28 (L) 10*3/mm3      RBC 3.10 (L) 10*6/mm3      Hemoglobin 8.8 (L) g/dL      Hematocrit 29.6 (L) %      MCV 95.5 fL      MCH 28.4 pg      MCHC 29.7 (L) g/dL      RDW 17.0 (H) %      RDW-SD 59.2 (H) fl      MPV -- fL      Comment: .        Platelets 37 (C) 10*3/mm3      Neutrophil % 70.2 %      Lymphocyte % 12.3 (L) %      Monocyte % 15.4 (H) %      Eosinophil % 1.3 %      Basophil % 0.4 %      Immature Grans % 0.0 %      Neutrophils, Absolute 1.60 (L) 10*3/mm3      Lymphocytes, Absolute 0.28 (L) 10*3/mm3      Monocytes, Absolute 0.35 10*3/mm3      Eosinophils, Absolute 0.03 10*3/mm3      Basophils, Absolute 0.01 10*3/mm3      Immature Grans, Absolute 0.00 10*3/mm3      nRBC 1.1 (H) /100 WBC     Blood Culture - Blood, [[id number]]  (Normal) Collected:  04/08/18 2313    Specimen:  Blood from Arm, Left Updated:  04/11/18 6470      Blood Culture No growth at 3 days    Blood Culture - Blood, [[id number]]  (Normal) Collected:  04/08/18 2252    Specimen:  Blood from Arm, Right Updated:  04/11/18 2316     Blood Culture No growth at 3 days    POC Glucose Once [[id number]]  (Abnormal) Collected:  04/11/18 2029    Specimen:  Blood Updated:  04/11/18 2030     Glucose 188 (H) mg/dL     Narrative:       Meter: [id number] : [id number] Yanely Sheldon RN    BRYANT,PE and FLC, Serum [[id number]]  (Abnormal) Collected:  04/10/18 0329    Specimen:  Blood Updated:  04/11/18 1908     IgG 1312 mg/dL      IgA 201 mg/dL      IgM 192 (H) mg/dL      Total Protein 6.2 g/dL      Albumin 3.2 g/dL      Alpha-1-Globulin 0.2 g/dL      Alpha-2-Globulin 0.4 g/dL      Beta Globulin 0.8 g/dL      Gamma Globulin 1.7 g/dL      M-Tj Not Observed g/dL      Globulin 3.0 g/dL      A/G Ratio 1.1     Immunofixation Reflex, Serum Comment     Comment: No monoclonality detected.        Please note Comment     Comment: Protein electrophoresis scan will follow via computer, mail, or   delivery.        Free Light Chain, Kappa 56.8 (H) mg/L      Free Lambda Light Chains 31.1 (H) mg/L      Kappa/Lambda Ratio 1.83 (H)    Narrative:       Performed at:  [address] [place], [place] [id number]  : Felix Olvera PhD, Phone:  [phone]    POC Glucose Once [[id number]]  (Abnormal) Collected:  04/11/18 1628    Specimen:  Blood Updated:  04/11/18 1630     Glucose 259 (H) mg/dL     Narrative:       Meter: [id number] : [id number] Chela QUIROGA    POC Glucose Once [[id number]]  (Abnormal) Collected:  04/11/18 1116    Specimen:  Blood Updated:  04/11/18 1120     Glucose 256 (H) mg/dL     Narrative:       Meter: [id number] : [id number] Chela QUIROGA          Imaging: Plain film x-rays of the thoracic and lumbar spine are reviewed and he has a T12 60% compression fracture with slight kyphosis.  MRI scan of the thoracic and lumbar spines are available  and show what appears to be an essentially healed T12 fracture with slight retropulsion, minimal anterolisthesis at L4 5 without concomitant stenosis and multilevel minimal degenerative change and otherwise unremarkable.  I reviewed the radiologist's reports with which I agree.    Assessment: He has a subacute or chronic T12 compression fracture but his pain is lumbosacral.  He has disc degeneration fairly garden variety.  Given the health issues involved including the platelet count of 34, the lumbosacral location of the pain as opposed to the fracture site at T12, and the MRI appearance of the fracture would suggest this is closer to heal than new along with the retropulsed bone at the same level, I just don't think he is a candidate for kyphoplasty and it simply won't work.    Plan:  I don't recommend kyphoplasty at this time for T12.  I recommend physical therapy for lumbosacral pain, mobilization and ambulation with or without a Warm 'n Form corset which may give some sense of comfort for the lumbosacral spine.  If he should develop further fractures then kyphoplasty might be considered if his health per meds, to prevent further kyphosis.  He will follow-up with Dr. Tenorio.    Date: 4/12/2018    Ruben Hodge MD    CC: Zia Mcleod MD

## 2018-04-12 NOTE — PLAN OF CARE
Problem: Patient Care Overview  Goal: Plan of Care Review  Outcome: Ongoing (interventions implemented as appropriate)   04/12/18 0415   Coping/Psychosocial   Plan of Care Reviewed With patient   Plan of Care Review   Progress no change   OTHER   Outcome Summary pt alert and oriented/ up with standby assist. complains of back pain, refusing oral pain medcation. managed with IV dilaudid. VSS, resting well throughout the night. will continue to monitor.      Goal: Individualization and Mutuality  Outcome: Ongoing (interventions implemented as appropriate)    Goal: Discharge Needs Assessment  Outcome: Ongoing (interventions implemented as appropriate)      Problem: Fall Risk (Adult)  Goal: Absence of Fall  Outcome: Ongoing (interventions implemented as appropriate)      Problem: Pain, Chronic (Adult)  Goal: Acceptable Pain/Comfort Level and Functional Ability  Outcome: Ongoing (interventions implemented as appropriate)

## 2018-04-12 NOTE — PROGRESS NOTES
Subjective   REASONS FOR FOLLOWUP:       1.  Pancytopenia, especially with leukocytopenia and thrombocytopenia. Bone marrow aspiration and biopsy obtained on 01/21/2014, with no evidence of hematologic malignancy, no myelodysplastic syndrome.  He has liver cirrhosis and splenomegaly.      2.   Frequently recurrent iron deficiency anemia, not able to tolerate oral iron supplementation, status post multiple Feraheme treatments.  He also had transfusion of PRBC in October 2014.      3.  Empiric treatment for ITP using IVIG, in March 2014 and no response.    4.  Ongoing care for frequent iron deficiency required repeated intravenous iron therapy.   5. History of prostate cancer on Eligard and melena  6. osteoporosis  7.  HIV-positive 3 drugs with negative viral load as of 2/18-CD4 count 120 on Bactrim prophylaxis      REASON FOR CONSULTATION:  Pancytopenia and weight loss and low back pain with T12 compression fracture    Flank Pain     Palpitations        patient is a 72-year-old male with long-standing HIV infection under good control by Dr. Klarissa Thomas on 3 different medications including Isentress.  Prezista and Truvada.  He is been seen in our office for intermittent iron deficiency and receives IV iron last dose in January 2018  He is known to have osteoporosis and is on Prolia every 6 months alternating with Eligard the urologist for his prostate cancer     Is admitted this time with a 25 pound weight loss over the last month associated with low back pain was no obvious triggering event and was noted on imaging to have a compression fracture of the T12 vertebral body was no cord compression.  He denies lifting anything heavy or jumping off anything are hurting his back.  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 and portal hypertension and this is  "essentially stable     4/11  Complains of persistent back pain  We gave him a trial of 2 units of platelets yesterday and is post transfusion platelet count went to 44,000 and is back to 34,000 today  IIFE pending pending    Neurosurgery unwilling to do kyphoplasty unless his platelets are closer to 100 which I told him he could never achieve due to his splenomegaly-patient is willing to take the risk of bleeding with 2 units of platelets plus  third unit infusing during the procedure and he will discuss this with neurosurgery INR down to 1.47 was given additional vitamin K    4/12  ZEESHAN does not feel kyphoplasty would help iand is too risky based on his platelet count  GI did not think he could tolerate endoscopy with his back pain  His pain is not well controlled and he needs scheduled medicines.  I would suggest MS Contin 15 every 12+ Dilaudid for breakthrough pain he may need to go to rehabilitation-may consult pain management  Received Venofer today - immunofixation negative    Past Medical History, Past Surgical History, Social History, Family History have been reviewed and are without significant changes except as mentioned.    Review of Systems   Constitutional: Positive for unexpected weight change.   Cardiovascular: Positive for palpitations.   Genitourinary: Positive for flank pain.   Musculoskeletal: Positive for back pain.      A comprehensive 14 point review of systems was performed and was negative except as mentioned.    Medications:  The current medication list was reviewed in the EMR    ALLERGIES:    Allergies   Allergen Reactions   • Methylprednisolone Other (See Comments)     \"Everything shuts down\"       Objective      Vitals:    04/11/18 1942 04/12/18 0026 04/12/18 0746 04/12/18 1407   BP: 120/48 133/48 134/57 130/59   BP Location: Left arm Right arm Left arm Left arm   Patient Position: Lying Lying Lying Lying   Pulse: 62 56 64 65   Resp: 18 16 16 16   Temp: 97.9 °F (36.6 °C) 97.6 °F (36.4 °C) " 98.4 °F (36.9 °C) 98.9 °F (37.2 °C)   TempSrc: Oral Oral Oral Oral   SpO2: 96% 95% 93% 94%   Weight:       Height:         Current Status 1/24/2018   ECOG score 0       Physical Exam    GENERAL:  Well-developed, well-nourished in no acute distress.   SKIN:  Warm, dry without rashes, purpura or petechiae.  EYES:  Pupils equal, round and reactive to light.  EOMs intact.  Conjunctivae normal.  EARS:  Hearing intact.  NOSE:  Septum midline.  No excoriations or nasal discharge.  MOUTH:  Tongue is well-papillated; no stomatitis or ulcers.  Lips normal.  THROAT:  Oropharynx without lesions or exudates.  NECK:  Supple with good range of motion; no thyromegaly or masses, no JVD.  LYMPHATICS:  No cervical, supraclavicular, axillary or inguinal adenopathy.  CHEST:  Lungs clear to auscultation. Good airflow.  CARDIAC:  Regular rate and rhythm without murmurs, rubs or gallops. Normal S1,S2.  ABDOMEN:  Soft, nontender with splenomegaly 6cm no masses.  EXTREMITIES:  No clubbing, cyanosis or edema.  NEUROLOGICAL:  Cranial Nerves II-XII grossly intact.  No focal neurological deficits.  PSYCHIATRIC:  Normal affect and mood.     unchanged     RECENT LABS:  Hematology WBC   Date Value Ref Range Status   04/12/2018 2.28 (L) 4.50 - 10.70 10*3/mm3 Final     RBC   Date Value Ref Range Status   04/12/2018 3.10 (L) 4.60 - 6.00 10*6/mm3 Final     Hemoglobin   Date Value Ref Range Status   04/12/2018 8.8 (L) 13.7 - 17.6 g/dL Final     Hematocrit   Date Value Ref Range Status   04/12/2018 29.6 (L) 40.4 - 52.2 % Final     Platelets   Date Value Ref Range Status   04/12/2018 37 (C) 140 - 500 10*3/mm3 Final     Plat 44K POST 2 U PLAT PHERESIS     Lab Results   Component Value Date    NEUTROABS 1.60 (L) 04/12/2018          B2m 4.0  Ferritin 27.1  Immunofixation negative    Assessment/Plan   1.   T12 compression fracture without obvious injury in a patient with osteoporosis on Prolia         Doubt underlying malignancy but will need to get a biopsy  when kyphoplasty is done to be sure  2.  Chronic HIV infection on 3 drug treatment with negative viral load with low CD4 count on prophylactic Bactrim-Truvada has been known to worsen bone density.      3..  Severe thrombocytopenia and mild to moderate leukopenia/neutropenia related to his liver cirrhosis, hypersplenism  and HIV treatment. Both of his platelet and ANC count fluctuates,    He has no spontaneous bleeding.  Will continue to observe.      3.Recurrent Iron deficiency anemia required frequent IV iron therapy, roughly about every 3 months.  Patient complains of significant fatigue.   Last time he received Feraheme treatment was Jan 2018  He denies any visible bleeding besides may be occasional a bit of blood on toilet paper but nothing significant.       4 prostate cancer treated with radiation plus Eligard and Prolia last PSA in February 0.4     5 weight loss etiology unclear negative CT the abdomen except for nodules in the cirrhotic liver AFP pending     6.  Osteoporosis due to Eligard plus Truvada on Prolia per urology     . . Plan  1. Platelet count went from 32-44,000 and  back to 34,000 with 2 units of platelets  I don't think we will ever get above 100,000 and we may have to accept giving him platelets during the procedure -patient to discuss with neurosurgery  2. Consult pain management     Nothing to add we will not follow -I doubt we will be able to get his platelet count over 50,000 and stable there for any length of time and we will need 3 units of     of platelets to get over 50,000 and will likely drop back to 30,000   the next day       4/12/2018      CC:

## 2018-04-12 NOTE — PROGRESS NOTES
BGA/GI Progress Note   Chief Complaint:  Hematochezia, weight loss, abd pain    Subjective     Interval History:   Continues with back pain. Frustrated with outcome of surgery evaluations.  No melena/hematochezia    History taken from: patient chart RN    Review of Systems:    The following systems were reviewed and negative;  respiratory, cardiovascular and gastrointestinal    Objective     Vital Signs  Temp:  [97.6 °F (36.4 °C)-98.4 °F (36.9 °C)] 98.4 °F (36.9 °C)  Heart Rate:  [56-66] 64  Resp:  [16-18] 16  BP: (120-144)/(48-63) 134/57  Body mass index is 26.63 kg/m².    Intake/Output Summary (Last 24 hours) at 04/12/18 0911  Last data filed at 04/12/18 0746   Gross per 24 hour   Intake                0 ml   Output             1225 ml   Net            -1225 ml     I/O this shift:  In: -   Out: 300 [Urine:300]    Physical Exam:   General: patient awake, alert and cooperative   Cardiovascular: regular rhythm and rate, no murmurs auscultated   Pulm: clear to auscultation bilaterally, regular and unlabored   Abdomen: soft, nontender, nondistended; normal bowel sounds   Rectal: deferred   Extremities: no rash or edema   Neurologic: Normal mood and behavior    All Medications Have Been Reviewed     Results Review:       Results from last 7 days  Lab Units 04/12/18  0414 04/11/18  0412 04/10/18  1802   WBC 10*3/mm3 2.28* 2.09* 2.84*   HEMOGLOBIN g/dL 8.8* 8.2* 9.3*   HEMATOCRIT % 29.6* 27.2* 30.6*   PLATELETS 10*3/mm3 37* 34* 44*         Results from last 7 days  Lab Units 04/12/18  0414 04/11/18  0412 04/10/18  0330   SODIUM mmol/L 135* 137 134*   POTASSIUM mmol/L 4.4 4.0 3.9   CHLORIDE mmol/L 103 100 100   CO2 mmol/L 22.5 26.5 24.5   BUN mg/dL 11 14 12   CREATININE mg/dL 0.96 1.07 1.01   CALCIUM mg/dL 8.3* 8.7 8.4*   BILIRUBIN mg/dL 1.1 1.4* 1.6*   ALK PHOS U/L 162* 154* 168*   ALT (SGPT) U/L 37 36 45*   AST (SGOT) U/L 42* 37 49*   GLUCOSE mg/dL 165* 200* 185*         Results from last 7 days  Lab Units  04/11/18  0412   INR  1.47*       RADIOLOGY:    Imaging Results (last 72 hours)     Procedure Component Value Units Date/Time    MRI Lumbar Spine Without Contrast [[id number]] Collected:  04/09/18 2200     Updated:  04/10/18 0547    Narrative:       LUMBAR SPINE MRI      HISTORY:compression fracture eval; S22.080A-Wedge compression fracture  of t11-T12 vertebra, initial encounter for closed fracture;  S22.080A-Wedge compression fracture of t11-T12 vertebra, initial  encounter for closed fracture; E03.9-Hypothyroidism, unspecified;  E83.39-Other disorders of phosphorus metabolism; D61.818-Other  pancytopenia     COMPARISON: None.     FINDINGS: Multiplanar images of the lumbar spine were obtained without  the use of gadolinium. Axial images were obtained through the 7  lower-most disc levels which will be labeled L1-S3 for purposes of  counting.     There are Schmorl's type deformities of the L3 and L4 superior  endplates. Some mild edema persists along the more anterior and central  aspect of the L4 endplate deformity suggesting that it is fairly recent.  The L3 fracture is clearly old. There is some central depression but  overall no significant degree of vertebral height loss. The remaining  lumbar vertebrae are unremarkable. Mild multilevel spurring is present.  Most of the intervertebral discs exhibit some T2 signal loss suggesting  some degenerative desiccation. Some mild disc height loss is present at  L5-S1. Remaining disks are well-maintained in height. A focal disc  herniation/extrusion is not appreciated. Minor disc osteophyte complexes  are present posteriorly at L4-5 and L5-S1 but neither produces a  significant degree of canal narrowing. There are some arthritic changes  in the lower lumbar facets       Impression:       1. Mild Schmorl's type deformities of L3 and L4 superiorly. The L4  deformity appears to be fairly recent as evidenced by some associated  edema. No significant height loss or bony  retropulsion however.  2. Minor disc osteophyte complexes at L4-5 and L5-S1. Neither results in  significant canal narrowing.     This report was finalized on 4/10/2018 5:43 AM by Zia Wolf MD.       MRI Thoracic Spine Without Contrast [[id number]] Collected:  04/09/18 2153     Updated:  04/10/18 0546    Narrative:       THORACIC SPINE MRI     CLINICAL HISTORY: Fracture.     COMPARISON: None.     FINDINGS: Multiplanar images of the thoracic spine obtained without  gadolinium. There is a moderate-to-severe compression fracture of the  T12 vertebra with up to approximately 60% height loss along its most  severe aspect centrally. It is likely late subacute-early chronic in  timeframe as some mild patchy edema persists mostly along the superior  endplate and to a lesser degree centrally within the vertebra. Areas of  diminished T1 and T2 signal likely related to sclerosis from some  healing. It does not appear to be pathologic but follow-up is  recommended. There is some retropulsion of the posterior wall,  approximately 4 to 5 mm into the central canal, greater to the right of  midline. Canal narrowing is mild however.     The remaining thoracic vertebrae are well-maintained in height. Marrow  signal is somewhat heterogeneous with areas of fatty replacement but  without any additional fracture or evidence of pathologic marrow  replacement process.     Mild multilevel degenerative changes are present. Minor disc osteophyte  complex at T3-4 produces slight indentation along the ventral aspect of  the thecal sac but canal narrowing is minimal. Otherwise no significant  focal disc herniation/extrusion or significant protrusion is not  demonstrated at any level. There is no abnormal cord signal.     Advanced multilevel degenerative changes noted in the cervical spine.  Dedicated cervical imaging could better assess.       Impression:       T12 compression fracture, likely late subacute-early chronic  with some bony  retropulsion contributing to some mild narrowing of the  central canal, greater to the right of midline. Again it does not appear  pathologic but follow-up is recommended.     This report was finalized on 4/10/2018 5:43 AM by Zia Wolf MD.       CT Lumbar Spine With Contrast [[id number]] Collected:  04/09/18 0004     Updated:  04/09/18 0015    Narrative:       CONTRAST CT SCAN LUMBAR SPINE     CLINICAL HISTORY: Low back pain, rapidly progressive neuro deficit     COMPARISON: Correlation was made with a lumbar MRI from April 3, 2017     TECHNIQUE: Radiation dose reduction techniques were utilized, including  automated exposure control and exposure modulation based on body size.  Axial noncontrast images of the lumbar spine were obtained with  contrast. Sagittal reformatted images were supplemented.     FINDINGS:  There is a recent if not acute compression fracture of the  T12 vertebra mostly along its superior aspect. It is comminuted and  exhibits up to approximately 60% height loss centrally. The posterior  most aspect of the T12 vertebra is fairly well preserved in height.  There is some bony retropulsion extending approximately 4-5 mm into the  central canal, greater to the right of midline producing  mild-to-moderate canal narrowing. The bony canal narrowing is best seen  on axial images 14 and 15. There are milder and old appearing Schmorl's  type deformities of the L3 and L4 superior endplates without significant  height loss of either vertebra overall. The remaining lumbar vertebrae  are intact and normal in height.    .     The lumbar vertebrae are well aligned.  Multilevel degenerative changes  are present. Disc height loss is more severe at L4-5, with moderate disc  height loss and vacuum disc phenomenon. Spurring is more pronounced at  L2-3 and L3-4.     Minimal annular disc bulge present at L2-3 not contributing to any  significant canal narrowing.. Mild broad-based protrusion present at  L3-4,  asymmetric to the right. Canal narrowing is felt to be minimal.  Some mild facet arthropathy is present. Mild broad-based protrusion  present at L4-5 again with only minimal canal narrowing. There is  foraminal extension bilaterally. More advanced facet arthropathy present  bilaterally. There is bilateral foraminal stenosis due to the disc  protrusion and facet spurring milder facet arthropathy at L5-S1.     There is no abnormal contrast enhancement appreciated.                      Impression:       1. Recent/acute compression fracture of the T12 vertebra with 4-5 mm of  bony retropulsion contributing to mild-to-moderate canal narrowing,  slightly eccentric to the right.  2. Multilevel degenerative and arthritic changes as individually  discussed.  3. There is no abnormal enhancement     This report was finalized on 4/9/2018 12:12 AM by Zia Wolf MD.       CT Abdomen Pelvis With Contrast [[id number]] Collected:  04/08/18 2354     Updated:  04/09/18 0004    Narrative:       CT SCANS ABDOMEN AND PELVIS WITH IV CONTRAST.     HISTORY: Abd pain, fever, abscess suspected     COMPARISON: None.     TECHNIQUE: Radiation dose reduction techniques were utilized, including  automated exposure control and exposure modulation based on body size.  Axial images were obtained from the lung bases to the symphysis pubis  with IV contrast only.. Oral contrast was not administered per request.     FINDINGS ABDOMEN CT:  Left diaphragm is elevated with adjacent left lung  base consolidation favored to reflect atelectasis rather than pneumonia.  Liver is small, nodular and very heterogeneous with several tiny  hyperdense nodules likely as sequelae of cirrhosis and nodular  regeneration respectively. Continued surveillance will be needed to  exclude mass lesion. Gallbladder is partially contracted with wall  thickening but no calcified gallstones or pericholecystic inflammation.  There is evidence of portal hypertension including  Marked splenomegaly  noted at 19 cm in [id number] length but without mass lesion. There are  solitary small upper pole renal cysts. Remaining solid organs have an  unremarkable appearance.     FINDINGS PELVIS CT:  Mild ascites in both the abdomen and pelvis without  loculated fluid collection. Normal appendix. Mild colonic  diverticulosis. There are multifocal areas of mild colonic wall  thickening but this is likely due to nondistention. No convincing  pericolonic inflammation to suggest definite colitis. Endoscopy could  better evaluate. Mild prostate gland enlargement. Aorta is  nonaneurysmal.                Impression:       IMPRESSION :   1. Left lung base consolidation favored to reflect atelectasis rather  than pneumonia.  2. Combination of findings suggests cirrhosis and portal hypertension.  Follow-up of the liver is recommended due to the marked heterogeneity.  3. Tiny renal lesions felt to reflect cysts.  4. Mild colonic diverticulosis with areas of colonic wall thickening  favored to be due to nondistention. Endoscopy could better evaluate.  5. Nonspecific gallbladder wall thickening without calcified gallstones  or associated inflammation.  6. Mild prostate gland enlargement.  7. Mild ascites        This report was finalized on 4/9/2018 12:01 AM by Zia Wolf MD.       XR Chest 2 View [[id number]] Collected:  04/08/18 2305     Updated:  04/08/18 2309    Narrative:       PA AND LATERAL CHEST X-RAY     HISTORY: weakness     COMPARISON: None.     FINDINGS: PA and lateral views of the chest were obtained. Lungs poorly  aerated. There is minimal linear bibasilar atelectasis. There is no  convincing evidence of active air space disease process otherwise. No  edema or pleural fluid. Normal heart size. Faint vascular calculi.             Impression:       Poor inspiration with minimal bibasilar atelectasis     This report was finalized on 4/8/2018 11:06 PM by Zia Wolf MD.             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  Rectal bleeding-history of radiation proctitis treated with APC  2.  Pancytopenia  3.  Cirrhosis, cryptogenic but likely related to previous HIV medications  4.  T12 compression fracture   5.  Abnormal CT of the liver  6. HIV   7. Weight loss    Plan:  - ZEESHAN and ortho notes reviewed - ZEESHAN has declined performing surgery, and ortho feels surgery is not indicated.    - at this point further GI workup, including endoscopy and MRI of liver are on hold due to pts back pain.  He would not tolerate bowel prep or positioning for endoscopy, nor would he tolerate prolonged scanning      time required for MRI.  Hopefully things may improve over time with aggressive physical therapy.      At this point would recommend continuing as needed anusol suppository for suspected radiation proctitis, and keep platelets above 50K if possible.  GI will follow peripherally.              Jose Alfredo Haney M.D.  [facility]  [address] [place] [address] - [address] [place] [address]  Office: [phone]

## 2018-04-12 NOTE — PROGRESS NOTES
Dr. Hodge's 2nd opinion appreciated. He did not think that a kypho was indicated at this time.       ..    Results from last 7 days  Lab Units 04/12/18  0414 04/11/18  0412 04/10/18  1802   WBC 10*3/mm3 2.28* 2.09* 2.84*   HEMOGLOBIN g/dL 8.8* 8.2* 9.3*   HEMATOCRIT % 29.6* 27.2* 30.6*   PLATELETS 10*3/mm3 37* 34* 44*         Subacute T12 VCF  Osteoporosis  cirrhosis  HIV  Pancytopenia/coagulopathy      Again, we do not feel that a kyphoplasty is safe given the high potential for bleeding and possible subsequent neurologic damage. Dr. Hodge has ordered a [facility] brace for comfort.  We are certainly more than happy to evaluate him in the future if the pain does not improve or if he gets worse.  Call as needed.

## 2018-04-12 NOTE — PROGRESS NOTES
Name: Kye Aranda ADMIT: 2018   : 1945  PCP: Kendrick Griggs MD    MRN: [id number] LOS: 3 days   AGE/SEX: 72 y.o. male  ROOM: [facility]   Subjective   CC: Back pain  No acute events.  Patient continues to have severe back pain with any sort of movement.  Not getting much relief from norco but is using dilaudid.  Taking some PO.  No n/v.    Objective   Vital Signs  Temp:  [97.6 °F (36.4 °C)-98.9 °F (37.2 °C)] 98.9 °F (37.2 °C)  Heart Rate:  [56-65] 65  Resp:  [16-18] 16  BP: (120-134)/(48-59) 130/59  SpO2:  [93 %-96 %] 94 %  on   ;   Device (Oxygen Therapy): room air  Body mass index is 26.63 kg/m².    Physical Exam   Constitutional: He is oriented to person, place, and time. No distress.   HENT:   Head: Normocephalic and atraumatic.   Mouth/Throat: Oropharynx is clear and moist.   Eyes: Conjunctivae and EOM are normal. Pupils are equal, round, and reactive to light.   Neck: Normal range of motion. Neck supple.   Cardiovascular: Normal rate, regular rhythm and intact distal pulses.    Pulmonary/Chest: Effort normal and breath sounds normal.   Abdominal: Soft. Bowel sounds are normal.   Musculoskeletal: He exhibits tenderness (lower thoracic spine). He exhibits no edema.   Neurological: He is alert and oriented to person, place, and time. He has normal strength. He displays no tremor (no asterixis). No sensory deficit.   Skin: Skin is warm and dry. He is not diaphoretic.   Psychiatric: He has a normal mood and affect. His behavior is normal.   Nursing note and vitals reviewed.      Results Review:       I reviewed the patient's new clinical results.    Results from last 7 days  Lab Units 18  0414 18  0412 04/10/18  1802 04/10/18  0330   WBC 10*3/mm3 2.28* 2.09* 2.84* 2.04*   HEMOGLOBIN g/dL 8.8* 8.2* 9.3* 9.0*   PLATELETS 10*3/mm3 37* 34* 44* 28*     Results from last 7 days  Lab Units 18  0414 18  0412 04/10/18  0330 18  0916   SODIUM mmol/L 135* 137 134* 134*   POTASSIUM  mmol/L 4.4 4.0 3.9 3.6   CHLORIDE mmol/L 103 100 100 99   CO2 mmol/L 22.5 26.5 24.5 25.1   BUN mg/dL 11 14 12 12   CREATININE mg/dL 0.96 1.07 1.01 1.12   GLUCOSE mg/dL 165* 200* 185* 198*   Estimated Creatinine Clearance: 77 mL/min (by C-G formula based on SCr of 0.96 mg/dL).  Results from last 7 days  Lab Units 04/12/18  0414 04/11/18  0412 04/10/18  0330 04/10/18  0329 04/09/18  0916 04/08/18  2243   CALCIUM mg/dL 8.3* 8.7 8.4*  --  8.6 9.0   ALBUMIN g/dL 2.90* 3.00* 2.90* 3.2 3.00* 3.80   MAGNESIUM mg/dL  --   --   --   --   --  2.2   PHOSPHORUS mg/dL  --   --   --   --   --  1.6*         clonazePAM 0.5 mg Oral Daily   cobicistat 150 mg Oral Daily With Breakfast   darunavir 800 mg Oral Daily With Breakfast   emtricitabine-tenofovir 1 tablet Oral Q24H   fluconazole 100 mg Oral Every Other Day   insulin aspart 0-7 Units Subcutaneous 4x Daily With Meals & Nightly   insulin detemir 20 Units Subcutaneous Nightly   levothyroxine 300 mcg Oral Q AM   metoprolol succinate XL 50 mg Oral Q12H   raltegravir 400 mg Oral BID   rifaximin 550 mg Oral Q12H   spironolactone 50 mg Oral Daily   sulfamethoxazole-trimethoprim 1 tablet Oral Every Other Day   tamsulosin 0.4 mg Oral Nightly      Diet Regular; Consistent Carbohydrate      Assessment/Plan      Active Hospital Problems (** Indicates Principal Problem)    Diagnosis Date Noted   • **T12 compression fracture [S22.080A] 04/09/2018   • Mild protein-calorie malnutrition [E44.1] 04/10/2018   • Weight loss, abnormal [R63.4] 04/09/2018   • Hypothyroidism [E03.9] 04/09/2018   • Intractable back pain [M54.9] 04/09/2018   • Type 2 diabetes mellitus [E11.9] 04/09/2018   • Hypophosphatemia [E83.39] 04/09/2018   • Lumbar degenerative disc disease [M51.36] 12/05/2017   • Liver cirrhosis [K74.60] 08/14/2017   • Iron deficiency anemia due to chronic blood loss [D50.0] 05/06/2016   • Thrombocytopenia associated with AIDS [B20, D69.59] 05/06/2016   • Leukocytopenia [D72.819] 05/06/2016   •  Chronic coronary artery disease [I25.10] 04/27/2016   • Peripheral arterial occlusive disease [I77.9] 04/27/2016      Resolved Hospital Problems    Diagnosis Date Noted Date Resolved   No resolved problems to display.   T12 CompressionFx  - ZEESHAN and spine surgery have seen, recommending conservative tx given his risk of surgery  - will switch norco to morphine IR, encouraged him to use PO meds, dilaudid for breakthrough  - surgery recommending brace, which has been ordered  - PT consult    Hypothyroidism  - started back on synthroid  - follow up TSH as outpatient in 4-6 weeks    Type 2 DM  - continue levemir with ssi at current dose    HIV  - continue home meds  - on prophylactic bactrim and diflucan    Liver Cirrhosis  - has associated TCP which is stable  - appreciate heme/onc recs    Rectal Bleeding  - continue anusol suppository  - GI has seen, no plans for further workup at this time given his spine issues    DVT Prophylaxis  - SCDs      Zia Mcleod MD  [facility]  04/12/18  6:07 PM

## 2018-04-13 NOTE — PROGRESS NOTES
Name: Kye Aranda ADMIT: 2018   : 1945  PCP: Kendrick Griggs MD    MRN: [id number] LOS: 4 days   AGE/SEX: 72 y.o. male  ROOM: [facility]   Subjective   CC: Back pain  No acute events.  Patient continues to have back pain, had a dose of oral morphine this AM which improved this.  Taking some PO.  No n/v.    Objective   Vital Signs  Temp:  [97.6 °F (36.4 °C)-98.9 °F (37.2 °C)] 98 °F (36.7 °C)  Heart Rate:  [57-63] 63  Resp:  [16-18] 16  BP: ()/(48-57) 118/57  SpO2:  [94 %-96 %] 95 %  on   ;   Device (Oxygen Therapy): room air  Body mass index is 26.63 kg/m².    Physical Exam   Constitutional: He is oriented to person, place, and time. No distress.   HENT:   Head: Normocephalic and atraumatic.   Mouth/Throat: Oropharynx is clear and moist.   Eyes: Conjunctivae and EOM are normal. Pupils are equal, round, and reactive to light.   Neck: Normal range of motion. Neck supple.   Cardiovascular: Normal rate, regular rhythm and intact distal pulses.    Pulmonary/Chest: Effort normal and breath sounds normal.   Abdominal: Soft. Bowel sounds are normal.   Musculoskeletal: He exhibits tenderness (lower thoracic spine). He exhibits no edema.   Neurological: He is alert and oriented to person, place, and time. He has normal strength. He displays no tremor (no asterixis). No sensory deficit.   Skin: Skin is warm and dry. He is not diaphoretic.   Psychiatric: He has a normal mood and affect. His behavior is normal.   Nursing note and vitals reviewed.      Results Review:       I reviewed the patient's new clinical results.    Results from last 7 days  Lab Units 18  0339 18  0414 18  0412 04/10/18  1802   WBC 10*3/mm3 2.38* 2.28* 2.09* 2.84*   HEMOGLOBIN g/dL 8.5* 8.8* 8.2* 9.3*   PLATELETS 10*3/mm3 36* 37* 34* 44*       Results from last 7 days  Lab Units 18  0414 18  0412 04/10/18  0330 18  0916   SODIUM mmol/L 135* 137 134* 134*   POTASSIUM mmol/L 4.4 4.0 3.9 3.6   CHLORIDE  mmol/L 103 100 100 99   CO2 mmol/L 22.5 26.5 24.5 25.1   BUN mg/dL 11 14 12 12   CREATININE mg/dL 0.96 1.07 1.01 1.12   GLUCOSE mg/dL 165* 200* 185* 198*   Estimated Creatinine Clearance: 77 mL/min (by C-G formula based on SCr of 0.96 mg/dL).    Results from last 7 days  Lab Units 04/12/18  0414 04/11/18  0412 04/10/18  0330 04/10/18  0329 04/09/18  0916 04/08/18  2243   CALCIUM mg/dL 8.3* 8.7 8.4*  --  8.6 9.0   ALBUMIN g/dL 2.90* 3.00* 2.90* 3.2 3.00* 3.80   MAGNESIUM mg/dL  --   --   --   --   --  2.2   PHOSPHORUS mg/dL  --   --   --   --   --  1.6*         clonazePAM 0.5 mg Oral Daily   cobicistat 150 mg Oral Daily With Breakfast   darunavir 800 mg Oral Daily With Breakfast   emtricitabine-tenofovir 1 tablet Oral Q24H   fluconazole 100 mg Oral Every Other Day   insulin aspart 0-7 Units Subcutaneous 4x Daily With Meals & Nightly   insulin detemir 20 Units Subcutaneous Nightly   levothyroxine 300 mcg Oral Q AM   metoprolol succinate XL 50 mg Oral Q12H   raltegravir 400 mg Oral BID   rifaximin 550 mg Oral Q12H   spironolactone 50 mg Oral Daily   sulfamethoxazole-trimethoprim 1 tablet Oral Every Other Day   tamsulosin 0.4 mg Oral Nightly      Diet Regular; Consistent Carbohydrate      Assessment/Plan      Active Hospital Problems (** Indicates Principal Problem)    Diagnosis Date Noted   • **T12 compression fracture [S22.080A] 04/09/2018   • Mild protein-calorie malnutrition [E44.1] 04/10/2018   • Weight loss, abnormal [R63.4] 04/09/2018   • Hypothyroidism [E03.9] 04/09/2018   • Intractable back pain [M54.9] 04/09/2018   • Type 2 diabetes mellitus [E11.9] 04/09/2018   • Hypophosphatemia [E83.39] 04/09/2018   • Lumbar degenerative disc disease [M51.36] 12/05/2017   • Liver cirrhosis [K74.60] 08/14/2017   • Iron deficiency anemia due to chronic blood loss [D50.0] 05/06/2016   • 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   T12 CompressionFx  - ZEESHAN and spine surgery have seen, recommending conservative tx given his risk of surgery  - norco switched to morphine IR, encourage him to use PO meds, dilaudid for breakthrough  - surgery recommending brace, which has been ordered  - PT consulted, to eval later today     Hypothyroidism  - started back on synthroid  - follow up TSH as outpatient in 4-6 weeks    Type 2 DM  - continue levemir with ssi at current dose    HIV  - continue home meds  - on prophylactic bactrim and diflucan    Liver Cirrhosis  - has associated TCP which is stable  - appreciate heme/onc recs    Rectal Bleeding  - continue anusol suppository  - GI has seen, no plans for further workup at this time given his spine issues    DVT Prophylaxis  - SCDs      Zia Mcleod MD  [facility]  04/13/18  4:07 PM

## 2018-04-13 NOTE — PROGRESS NOTES
Continued Stay Note  [facility]     Patient Name: Kye Aranda  MRN: [id number]  Today's Date: 4/13/2018    Admit Date: 4/8/2018          Discharge Plan     Row Name 04/13/18 1052       Plan    Plan Plan home .  PRAKASH Herman    Plan Comments Spoke with pt at bedside.  Pt denies any discharge needs at present.  Plan home.  PRAKASH Herman              Discharge Codes    No documentation.           Marla Chery RN

## 2018-04-13 NOTE — PLAN OF CARE
Problem: Patient Care Overview  Goal: Plan of Care Review  Outcome: Ongoing (interventions implemented as appropriate)   04/13/18 7527   Coping/Psychosocial   Plan of Care Reviewed With patient   Plan of Care Review   Progress improving   OTHER   Outcome Summary around-the-clock pain meds given--pain well-controlled w/ IV Dilaudid & PO morphine; right groin excoriation--barrier cream applied PRN; good urine output, waiting to have BM; supplemental insulin given w/ meals; PT eval ordered--pt declined d/t grogginess from pain meds, will be seen tomorrow     Goal: Individualization and Mutuality  Outcome: Ongoing (interventions implemented as appropriate)    Goal: Discharge Needs Assessment  Outcome: Ongoing (interventions implemented as appropriate)    Goal: Interprofessional Rounds/Family Conf  Outcome: Ongoing (interventions implemented as appropriate)      Problem: Fall Risk (Adult)  Goal: Absence of Fall  Outcome: Ongoing (interventions implemented as appropriate)      Problem: Pain, Chronic (Adult)  Goal: Acceptable Pain/Comfort Level and Functional Ability  Outcome: Ongoing (interventions implemented as appropriate)      Problem: Activity Intolerance (Adult)  Goal: Identify Related Risk Factors and Signs and Symptoms  Outcome: Ongoing (interventions implemented as appropriate)    Goal: Activity Tolerance  Outcome: Ongoing (interventions implemented as appropriate)    Goal: Effective Energy Conservation Techniques  Outcome: Ongoing (interventions implemented as appropriate)

## 2018-04-13 NOTE — PROGRESS NOTES
"    Patient Name: Kye Aranda  :1945  72 y.o.      Patient Care Team:  Kendrick Griggs MD as PCP - General (Family Medicine)  Carrie Lucas MD PhD as Consulting Physician (Hematology and Oncology)  Travis Mckenzie MD as Referring Physician (Internal Medicine)  Baldo Conway MD as Consulting Physician (Endocrinology)  Robyn Butler MD as Consulting Physician (Gastroenterology)    Chief Complaint: follow up coronary artery disease    Interval History: Still with considerable back pain. No cardiac complaints.        Objective   Vital Signs  Temp:  [97.6 °F (36.4 °C)-98.9 °F (37.2 °C)] 98 °F (36.7 °C)  Heart Rate:  [57-63] 63  Resp:  [16-18] 16  BP: ()/(48-57) 118/57    Intake/Output Summary (Last 24 hours) at 18 1440  Last data filed at 18 0550   Gross per 24 hour   Intake              480 ml   Output              525 ml   Net              -45 ml     Flowsheet Rows    Flowsheet Row First Filed Value   Admission Height 170.2 cm (67\") Documented at 2018   Admission Weight 72.6 kg (160 lb) Documented at 2018          Physical Exam:   General Appearance:    Alert, cooperative, in no acute distress   Lungs:     Clear to auscultation.  Normal respiratory effort and rate.      Heart:    Regular rhythm and normal rate, normal S1 and S2, no murmurs, gallops or rubs.     Chest Wall:    No abnormalities observed   Abdomen:     Soft, nontender, positive bowel sounds.     Extremities:   no cyanosis, clubbing or edema.  No marked joint deformities.  Adequate musculoskeletal strength.       Results Review:      Results from last 7 days  Lab Units 18  0414   SODIUM mmol/L 135*   POTASSIUM mmol/L 4.4   CHLORIDE mmol/L 103   CO2 mmol/L 22.5   BUN mg/dL 11   CREATININE mg/dL 0.96   GLUCOSE mg/dL 165*   CALCIUM mg/dL 8.3*           Results from last 7 days  Lab Units 18  0339   WBC 10*3/mm3 2.38*   HEMOGLOBIN g/dL 8.5*   HEMATOCRIT % 28.3*   PLATELETS 10*3/mm3 36* "       Results from last 7 days  Lab Units 04/11/18  0412   INR  1.47*       Results from last 7 days  Lab Units 04/08/18  2243   MAGNESIUM mg/dL 2.2                   Medication Review:     clonazePAM 0.5 mg Oral Daily   cobicistat 150 mg Oral Daily With Breakfast   darunavir 800 mg Oral Daily With Breakfast   emtricitabine-tenofovir 1 tablet Oral Q24H   fluconazole 100 mg Oral Every Other Day   insulin aspart 0-7 Units Subcutaneous 4x Daily With Meals & Nightly   insulin detemir 20 Units Subcutaneous Nightly   levothyroxine 300 mcg Oral Q AM   metoprolol succinate XL 50 mg Oral Q12H   raltegravir 400 mg Oral BID   rifaximin 550 mg Oral Q12H   spironolactone 50 mg Oral Daily   sulfamethoxazole-trimethoprim 1 tablet Oral Every Other Day   tamsulosin 0.4 mg Oral Nightly             Assessment/Plan   1. Compression fracture - not a surgical candidate at this time due to thrombocytopenia and risk of bleeding. Nonsurgical management has been recommended with brace. Still with considerable back discomfort.     2. history of coronary artery disease with prior OM stent and  of RCA.  -stable. No angina. Outpatient follow up with Dr. Singh.     3. HIV    Nothing further to add from a cardiac standpoint. Will sign off. Please call with questions.     HANG Jones  [facility]  04/13/18  2:40 PM

## 2018-04-13 NOTE — SIGNIFICANT NOTE
04/13/18 0936   Rehab Time/Intention   Evaluation Not Performed patient/family declined evaluation  (has just taken pain meds, requests checking back in pm)   Rehab Treatment   Discipline physical therapist   Recommendation   PT - Next Appointment 04/13/18

## 2018-04-13 NOTE — SIGNIFICANT NOTE
04/13/18 1513   Rehab Time/Intention   Evaluation Not Performed patient/family declined evaluation  (states he is too groggy from his new medicine. pt was encouraged to participate with therapy & he stated he would tomorrow.)   Rehab Treatment   Discipline physical therapist   Recommendation   PT - Next Appointment 04/14/18

## 2018-04-14 NOTE — PLAN OF CARE
Problem: Patient Care Overview  Goal: Plan of Care Review   04/14/18 1543   Coping/Psychosocial   Plan of Care Reviewed With patient   Plan of Care Review   Progress improving   PATIENT AMBULATED INTO HALLWAY.  MOTIVATED TO AMBULATE FURTHER AND TO WORK WITH PT.

## 2018-04-14 NOTE — PLAN OF CARE
Problem: Patient Care Overview  Goal: Plan of Care Review  Outcome: Ongoing (interventions implemented as appropriate)   04/14/18 0429   Coping/Psychosocial   Plan of Care Reviewed With patient   Plan of Care Review   Progress no change   OTHER   Outcome Summary Pt pain is managed well with PO morphine as long as he is at rest. With activity the Morphine does not suffice. Pt is having trouble with BM since being on all the pain medicine. VSS. afebrile.      Goal: Individualization and Mutuality  Outcome: Ongoing (interventions implemented as appropriate)    Goal: Discharge Needs Assessment  Outcome: Ongoing (interventions implemented as appropriate)    Goal: Interprofessional Rounds/Family Conf  Outcome: Ongoing (interventions implemented as appropriate)      Problem: Fall Risk (Adult)  Goal: Absence of Fall  Outcome: Ongoing (interventions implemented as appropriate)      Problem: Pain, Chronic (Adult)  Goal: Acceptable Pain/Comfort Level and Functional Ability  Outcome: Ongoing (interventions implemented as appropriate)      Problem: Activity Intolerance (Adult)  Goal: Identify Related Risk Factors and Signs and Symptoms  Outcome: Ongoing (interventions implemented as appropriate)    Goal: Activity Tolerance  Outcome: Ongoing (interventions implemented as appropriate)    Goal: Effective Energy Conservation Techniques  Outcome: Ongoing (interventions implemented as appropriate)

## 2018-04-14 NOTE — THERAPY EVALUATION
Acute Care - Physical Therapy Initial Evaluation  [facility]     Patient Name: Kye Aranda  : 1945  MRN: [id number]  Today's Date: 2018   Onset of Illness/Injury or Date of Surgery: 18  Date of Referral to PT: 18  Referring Physician: PHOEBE GARCIA      Admit Date: 2018    Visit Dx:     ICD-10-CM ICD-9-CM   1. T12 compression fracture S22.080A 805.2   2. Compression fracture of T12 vertebra S22.080A 805.2   3. Hypothyroidism, unspecified type E03.9 244.9   4. Low blood phosphate E83.39 275.3   5. Pancytopenia D61.818 284.19   6. Decreased strength, endurance, and mobility R53.1 780.79    Z74.09 780.99     Patient Active Problem List   Diagnosis   • Chronic coronary artery disease   • Difficulty breathing   • Intermittent claudication   • Peripheral arterial occlusive disease   • Shortness of breath   • Iron deficiency anemia due to chronic blood loss   • Thrombocytopenia associated with AIDS   • Leukocytopenia   • Neutropenia   • Pancytopenia   • Iron and its compounds causing adverse effect in therapeutic use   • Liver cirrhosis   • Lumbar degenerative disc disease   • Secondary thrombocytopenia   • History of coronary artery stent placement   • T12 compression fracture   • Weight loss, abnormal   • Hypothyroidism   • Intractable back pain   • Type 2 diabetes mellitus   • Hypophosphatemia   • Mild protein-calorie malnutrition     Past Medical History:   Diagnosis Date   • Anemia    • Cirrhosis    • Coronary artery disease    • Diabetes mellitus    • Difficulty breathing    • H/O complete eye exam 10/2017   • H/O transfusion of packed red blood cells    • HIV infection, symptomatic    • Hyperlipidemia    • Hypertension    • Pancytopenia    • Peripheral artery disease    • Prostate cancer    • Thyroid disease      Past Surgical History:   Procedure Laterality Date   • BONE MARROW BIOPSY W/ ASPIRATION  2014   • CARDIAC CATHETERIZATION     • COLONOSCOPY  2012     non-thrombosed EH, sessile polyp in ascending colon 3 mm in size.   • CORONARY ANGIOPLASTY WITH STENT PLACEMENT  06/2013   • ENDOSCOPY  09/20/2012    grade 1 varicesmiddle and lower 3rd of esophagus. Moderate portal hypertensive gastropathy in entire stomach.   • MOUTH SURGERY     • PROSTATE BIOPSY     • TONSILLECTOMY     • UPPER GASTROINTESTINAL ENDOSCOPY  09/20/2012    grd 1 varices, portal hypertensive gastropathy        PT ASSESSMENT (last 12 hours)      Physical Therapy Evaluation     Row Name 04/14/18 1531          PT Evaluation Time/Intention    Subjective Information complains of;weakness;pain;fatigue  -JR     Document Type evaluation  -JR     Mode of Treatment physical therapy  -JR     Total Evaluation Minutes, Physical Therapy 25  -JR     Patient Effort good  -JR     Symptoms Noted During/After Treatment fatigue  -JR     Row Name 04/14/18 1531          General Information    Patient Profile Reviewed? yes  -JR     Onset of Illness/Injury or Date of Surgery 04/08/18  -JR     Referring Physician PER DR. GARCIA  -JR     Patient Observations cooperative;agree to therapy  -JR     Prior Level of Function independent:;gait;all household mobility   AMBULATED TO BATHROOM AND BACK.  SEDENTARY.   -JR     Equipment Currently Used at Home walker, rolling  -JR     Pertinent History of Current Functional Problem T 12 COMPRESSION FX. UNABLE TO OPERATE DUE TO HIGH RISK.   -JR     Risks Reviewed patient:;LOB;nausea/vomiting;dizziness;increased discomfort;change in vital signs;increased drainage;lines disloged  -JR     Benefits Reviewed patient:;improve function;increase independence;decrease pain;increase balance;increase strength;decrease risk of DVT;improve skin integrity;increase knowledge  -JR     Barriers to Rehab previous functional deficit  -JR     Row Name 04/14/18 1531          Relationship/Environment    Primary Source of Support/Comfort significant other  -JR     Lives With significant other  -JR     Row Name  04/14/18 1531          Resource/Environmental Concerns    Current Living Arrangements home/apartment/condo  -     Row Name 04/14/18 1531          Cognitive Assessment/Intervention- PT/OT    Orientation Status (Cognition) oriented x 3  -JR     Follows Commands (Cognition) follows multi-step commands;75-90% accuracy;increased processing time needed;delayed response/completion  -     Safety Deficit (Cognitive) mild deficit  -     Row Name 04/14/18 1531          Bed Mobility Assessment/Treatment    Bed Mobility Assessment/Treatment bed mobility (all) activities  -     [place] Level (Bed Mobility) minimum assist (75% patient effort);verbal cues;nonverbal cues (demo/gesture);1 person assist  -     Bed Mobility, Safety Issues decreased use of arms for pushing/pulling;decreased use of legs for bridging/pushing  -     Assistive Device (Bed Mobility) bed rails  -     Row Name 04/14/18 1531          Transfer Assessment/Treatment    Transfer Assessment/Treatment stand-sit transfer;sit-stand transfer  -     Sit-Stand [place] (Transfers) nonverbal cues (demo/gesture);verbal cues;minimum assist (75% patient effort);1 person assist  -     Stand-Sit [place] (Transfers) minimum assist (75% patient effort);nonverbal cues (demo/gesture);verbal cues;1 person assist  -     Row Name 04/14/18 1531          Sit-Stand Transfer    Assistive Device (Sit-Stand Transfers) walker, front-wheeled  -[facility] Name 04/14/18 1531          Stand-Sit Transfer    Assistive Device (Stand-Sit Transfers) walker, front-wheeled  -[facility] Name 04/14/18 1531          Gait/Stairs Assessment/Training    [place] Level (Gait) nonverbal cues (demo/gesture);minimum assist (75% patient effort)  -     Assistive Device (Gait) walker, front-wheeled  -     Distance in Feet (Gait) 100  -JR     Deviations/Abnormal Patterns (Gait) gait speed decreased;jackie decreased;stride length decreased  -JR     Bilateral Gait Deviations  forward flexed posture  -JR     Row Name 04/14/18 1531          General ROM    GENERAL ROM COMMENTS WFLs FOR AROM FOR UEs AND LEs   -JR     Row Name 04/14/18 1531          General Assessment (Manual Muscle Testing)    General Manual Muscle Testing (MMT) Assessment upper extremity strength deficits identified;lower extremity strength deficits identified   3/5 IN LEs AND 4/5 IN UEs.   -JR     Row Name 04/14/18 1531          Static Standing Balance    Level of [place] (Supported Standing, Static Balance) minimal assist, 75% patient effort  -JR     Time Able to Maintain Position (Supported Standing, Static Balance) 1 to 2 minutes  -JR     Assistive Device Utilized (Supported Standing, Static Balance) rolling walker  -JR     Comment (Supported Standing, Static Balance) STOOD IN ORDER TO URINATE IN URINAL AT BEDSIDE.   -JR     Level of [place] (Unsupported Standing, Static Balance) minimal assist, 75% patient effort  -JR     Row Name 04/14/18 1531          Pain Scale: Numbers Pre/Post-Treatment    Pain Scale: Numbers, Pretreatment 5/10  -JR     Pain Scale: Numbers, Post-Treatment 5/10  -JR     Pain Location back  -JR     Pain Intervention(s) Repositioned;Ambulation/increased activity   TOLERATED PT INTERVENTION.   -JR     Row Name 04/14/18 1531          Plan of Care Review    Plan of Care Reviewed With patient  -JR     Row Name 04/14/18 1531          Physical Therapy Clinical Impression    Date of Referral to PT 04/13/18  -JR     PT Diagnosis (PT Clinical Impression) DECREASED MOBILITY, STRENGTH. AND ENDURANCE.    -JR     Prognosis (PT Clinical Impression) GOOD  -JR     Functional Level at Time of Evaluation (PT Clinical Impression) MIN ASSIST.    -JR     Patient/Family Goals Statement (PT Clinical Impression) GO HOME  -JR     Criteria for Skilled Interventions Met (PT Clinical Impression) yes;treatment indicated  -JR     Pathology/Pathophysiology Noted (Describe Specifically for Each System) musculoskeletal   -JR     Impairments Found (describe specific impairments) aerobic capacity/endurance;gait, locomotion, and balance  -JR     Functional Limitations in Following Categories (Describe Specific Limitations) home management;community/leisure  -JR     Rehab Potential (PT Clinical Summary) fair, will monitor progress closely  -JR     Predicted Duration of Therapy (PT) 1-2 MONTHS.   -JR     Care Plan Review (PT) evaluation/treatment results reviewed;care plan/treatment goals reviewed;risks/benefits reviewed  -JR     Row Name 04/14/18 1531          Vital Signs    O2 Delivery Pre Treatment supplemental O2   2L  -JR     O2 Delivery Intra Treatment supplemental O2   2L  -JR     O2 Delivery Post Treatment supplemental O2   2L  -JR     Row Name 04/14/18 1531 04/14/18 0800       Physical Therapy Goals    Bed Mobility Goal Selection (PT) bed mobility, PT goal 1  -JR --  -JR    Transfer Goal Selection (PT) transfer, PT goal 1  -JR --  -JR    Gait Training Goal Selection (PT) gait training, PT goal 1  -JR --  -JR    Stairs Goal Selection (PT)  -- --  -JR    Additional Documentation  -- --  -JR    Row Name 04/14/18 1531          Bed Mobility Goal 1 (PT)    Activity/Assistive Device (Bed Mobility Goal 1, PT) bed mobility activities, all  -JR     [place] Level/Cues Needed (Bed Mobility Goal 1, PT) supervision required  -JR     Time Frame (Bed Mobility Goal 1, PT) 1 week  -JR     Row Name 04/14/18 1531          Transfer Goal 1 (PT)    Activity/Assistive Device (Transfer Goal 1, PT) transfers, all  -JR     [place] Level/Cues Needed (Transfer Goal 1, PT) supervision required  -JR     Time Frame (Transfer Goal 1, PT) 1 week  -JR     Row Name 04/14/18 1531          Gait Training Goal 1 (PT)    Activity/Assistive Device (Gait Training Goal 1, PT) gait (walking locomotion)  -JR     [place] Level (Gait Training Goal 1, PT) supervision required  -JR     Distance (Gait Goal 1, PT) 300  -JR     Time Frame (Gait Training Goal 1, PT)  1 week  -     Row Name 04/14/18 1531          Positioning and Restraints    Pre-Treatment Position in bed  -JR     Post Treatment Position bed  -JR     In Bed notified nsg;supine;call light within reach;encouraged to call for assist;exit alarm on;SCD pump applied;legs elevated  -JR     Row Name 04/14/18 1531          Living Environment    Home Accessibility other (see comments)   NO ISSUES WITH HOME ACCESSIBILITY.    -       User Key  (r) = Recorded By, (t) = Taken By, (c) = Cosigned By    Initials Name Provider Type    JR Mariano Núñez PT Physical Therapist          Physical Therapy Education     Title: PT OT SLP Therapies (Done)     Topic: Physical Therapy (Done)     Point: Mobility training (Done)    Learning Progress Summary     Learner Status Readiness Method Response Comment Documented by    Patient Done Acceptance ROMIE MARTINS DU JR 04/14/18 1543          Point: Home exercise program (Done)    Learning Progress Summary     Learner Status Readiness Method Response Comment Documented by    Patient Done Acceptance ROMIE MARTINS DU JR 04/14/18 1543          Point: Body mechanics (Done)    Learning Progress Summary     Learner Status Readiness Method Response Comment Documented by    Patient Done Acceptance ROMIE MARTINS DU JR 04/14/18 1543          Point: Precautions (Done)    Learning Progress Summary     Learner Status Readiness Method Response Comment Documented by    Patient Done Acceptance ROMIE MARTINS DU JR 04/14/18 1543                      User Key     Initials Effective Dates Name Provider Type Bernardino     04/03/18 -  Mariano Núñez PT Physical Therapist PT                PT Recommendation and Plan  Planned Therapy Interventions (PT Eval): balance training, bed mobility training, gait training, strengthening, transfer training  Plan of Care Reviewed With: patient  Progress: improving          Outcome Measures     Row Name 04/14/18 1544             How much help from another person do you currently need...     Turning from your back to your side while in flat bed without using bedrails? 3  -JR      Moving from lying on back to sitting on the side of a flat bed without bedrails? 3  -JR      Moving to and from a bed to a chair (including a wheelchair)? 3  -JR      Standing up from a chair using your arms (e.g., wheelchair, bedside chair)? 3  -JR      Climbing 3-5 steps with a railing? 1  -JR      To walk in hospital room? 3  -JR      AM-PAC 6 Clicks Score 16  -JR         Functional Assessment    Outcome Measure Options AM-PAC 6 Clicks Basic Mobility (PT)  -JR        User Key  (r) = Recorded By, (t) = Taken By, (c) = Cosigned By    Initials Name Provider Type    JR Mariano Núñez, PT Physical Therapist           Time Calculation:         PT Charges     Row Name 04/14/18 1544             Time Calculation    Start Time 1503  -JR      Stop Time 1544  -JR      Time Calculation (min) 41 min  -JR      PT Received On 04/14/18  -      PT - Next Appointment 04/15/18  -      PT Goal Re-Cert Due Date 04/21/18  -        User Key  (r) = Recorded By, (t) = Taken By, (c) = Cosigned By    Initials Name Provider Type     Mariano Núñez, PT Physical Therapist          Therapy Charges for Today     Code Description Service Date Service Provider Modifiers Qty    [id number] HC PT EVAL MOD COMPLEXITY 2 4/14/2018 Mariano Núñez, PT GP 2          PT G-Codes  Outcome Measure Options: (S) AM-PAC 6 Clicks Basic Mobility (PT)      Mariano Núñez, PT  4/14/2018

## 2018-04-14 NOTE — PROGRESS NOTES
[facility]  Inpatient Progress Note    Reason for Follow Up:  Rectal bleeding, cryptogenic cirrhosis, pancytopenia, compression fracture T12    Subjective     Interval History:   Patient complains of back pain, no other 24 hour significant events    Current Facility-Administered Medications:   •  acetaminophen (TYLENOL) tablet 650 mg, 650 mg, Oral, Q4H PRN, Mariano Delacruz MD  •  clonazePAM (KlonoPIN) tablet 0.5 mg, 0.5 mg, Oral, Daily, Pepe Montes De Oca MD, 0.5 mg at 04/14/18 1006  •  cobicistat (TYBOST) 150 mg, 150 mg, Oral, Daily With Breakfast, Pepe Montes De Oca MD, 150 mg at 04/14/18 1006  •  darunavir ethanolate (PREZISTA) tablet 800 mg, 800 mg, Oral, Daily With Breakfast, Mariano Delacruz MD, 800 mg at 04/14/18 1007  •  dextrose (D50W) solution 25 g, 25 g, Intravenous, Q15 Min PRN, Pepe Montes De Oca MD  •  dextrose (GLUTOSE) oral gel 15 g, 15 g, Oral, Q15 Min PRN, Pepe Montes De Oca MD  •  emtricitabine-tenofovir (TRUVADA) 200-300 MG per tablet 1 tablet, 1 tablet, Oral, Q24H, Mariano Delacruz MD, 1 tablet at 04/14/18 1007  •  fluconazole (DIFLUCAN) tablet 100 mg, 100 mg, Oral, Every Other Day, Mariano Delacruz MD, 100 mg at 04/14/18 1008  •  glucagon (human recombinant) (GLUCAGEN DIAGNOSTIC) injection 1 mg, 1 mg, Subcutaneous, PRN, Pepe Montes De Oca MD  •  HYDROmorphone (DILAUDID) injection 0.5 mg, 0.5 mg, Intravenous, Q4H PRN, Pepe Montes De Oca MD, 0.5 mg at 04/13/18 1753  •  HYDROmorphone (DILAUDID) tablet 4 mg, 4 mg, Oral, Q3H PRN, Luke Miranda MD, 4 mg at 04/14/18 0459  •  insulin aspart (novoLOG) injection 0-7 Units, 0-7 Units, Subcutaneous, 4x Daily With Meals & Nightly, Pepe Montes De Oca MD, 2 Units at 04/13/18 2108  •  insulin detemir (LEVEMIR) injection 20 Units, 20 Units, Subcutaneous, Nightly, Mariano Delacruz MD, 20 Units at 04/13/18 2108  •  lactulose (CHRONULAC) 10 GM/15ML solution 20 g, 20 g, Oral, BID, Zia Mcleod MD, 20 g at 04/14/18 1039  •   levothyroxine (SYNTHROID, LEVOTHROID) tablet 300 mcg, 300 mcg, Oral, Q AM, Pepe Montes De Oca MD, 300 mcg at 04/14/18 1009  •  metoprolol succinate XL (TOPROL-XL) 24 hr tablet 50 mg, 50 mg, Oral, Q12H, Pepe Montes De Oca MD, 50 mg at 04/14/18 1010  •  Morphine (MSIR) tablet 15 mg, 15 mg, Oral, Q4H PRN **OR** Morphine (MSIR) tablet 30 mg, 30 mg, Oral, Q4H PRN, Zia Mcleod MD  •  ondansetron (ZOFRAN) injection 4 mg, 4 mg, Intravenous, Q6H PRN, Mariano Delacruz MD  •  raltegravir (ISENTRESS) tablet 400 mg, 400 mg, Oral, BID, Pepe Montes De Oca MD, 400 mg at 04/14/18 1010  •  rifaximin (XIFAXAN) tablet 550 mg, 550 mg, Oral, Q12H, Pepe Montes De Oca MD, 550 mg at 04/14/18 1011  •  sennosides-docusate sodium (SENOKOT-S) 8.6-50 MG tablet 2 tablet, 2 tablet, Oral, BID, Zia Mcleod MD, 2 tablet at 04/14/18 1011  •  sodium chloride 0.9 % flush 1-10 mL, 1-10 mL, Intravenous, PRN, Mariano Delacruz MD  •  sodium chloride 0.9 % flush 10 mL, 10 mL, Intravenous, PRN, Balbir Hernandez MD  •  spironolactone (ALDACTONE) tablet 50 mg, 50 mg, Oral, Daily, Pepe Montes De Oca MD, 50 mg at 04/14/18 1011  •  sulfamethoxazole-trimethoprim (BACTRIM DS,SEPTRA DS) 800-160 MG per tablet 160 mg, 1 tablet, Oral, Every Other Day, Mariano Delacruz MD, 160 mg at 04/13/18 0846  •  tamsulosin (FLOMAX) 24 hr capsule 0.4 mg, 0.4 mg, Oral, Nightly, Pepe Montes De Oca MD, 0.4 mg at 04/12/18 2138  Review of Systems:    He endorses back pain, weakness and fatigue no bleeding all other systems reviewed and negative    Objective     Vital Signs  Temp:  [97.8 °F (36.6 °C)-98.5 °F (36.9 °C)] 97.8 °F (36.6 °C)  Heart Rate:  [63-74] 74  Resp:  [16-18] 16  BP: (118-164)/(50-76) 164/76  Body mass index is 26.63 kg/m².    Intake/Output Summary (Last 24 hours) at 04/14/18 1316  Last data filed at 04/14/18 1031   Gross per 24 hour   Intake              360 ml   Output             1385 ml   Net            -1025 ml     I/O this shift:  In: -   Out: 360  [Urine:360]     Physical Exam:   General: patient awake, alert and cooperative   Eyes: Normal lids and lashes, no scleral icterus   Neck: supple, normal ROM   Skin: warm and dry, not jaundiced   Cardiovascular: regular rhythm and rate, no murmurs auscultated   Pulm: clear to auscultation bilaterally, regular and unlabored   Abdomen: soft, nontender, nondistended; normal bowel sounds   Rectal: deferred   Extremities: no rash or edema   Psychiatric: Normal mood and behavior; memory intact     Results Review:     I reviewed the patient's new clinical results.      Results from last 7 days  Lab Units 04/14/18  0502 04/13/18  0339 04/12/18  0414   WBC 10*3/mm3 3.46* 2.38* 2.28*   HEMOGLOBIN g/dL 9.9* 8.5* 8.8*   HEMATOCRIT % 33.3* 28.3* 29.6*   PLATELETS 10*3/mm3 47* 36* 37*       Results from last 7 days  Lab Units 04/12/18  0414 04/11/18  0412 04/10/18  0330   SODIUM mmol/L 135* 137 134*   POTASSIUM mmol/L 4.4 4.0 3.9   CHLORIDE mmol/L 103 100 100   CO2 mmol/L 22.5 26.5 24.5   BUN mg/dL 11 14 12   CREATININE mg/dL 0.96 1.07 1.01   CALCIUM mg/dL 8.3* 8.7 8.4*   BILIRUBIN mg/dL 1.1 1.4* 1.6*   ALK PHOS U/L 162* 154* 168*   ALT (SGPT) U/L 37 36 45*   AST (SGOT) U/L 42* 37 49*   GLUCOSE mg/dL 165* 200* 185*       Results from last 7 days  Lab Units 04/11/18  0412   INR  1.47*       Lab Results  Lab Value Date/Time   LIPASE 139 (H) 04/10/2018 0330   LIPASE 161 (H) 04/08/2018 2243       Radiology:  XR Spine Lumbar AP & Lateral   Final Result       Stable appearing compression deformities, no new fractures are   identified. Degenerative changes. Follow-up evaluation can be obtained   as indicated.       This report was finalized on 4/10/2018 9:06 PM by Dr. Galol Soliman MD.          XR Spine Thoracic 2 View   Final Result       Stable appearing compression deformities, no new fractures are   identified. Degenerative changes. Follow-up evaluation can be obtained   as indicated.       This report was finalized on  4/10/2018 9:06 PM by Dr. Gallo Soliman MD.          MRI Lumbar Spine Without Contrast   Final Result   1. Mild Schmorl's type deformities of L3 and L4 superiorly. The L4   deformity appears to be fairly recent as evidenced by some associated   edema. No significant height loss or bony retropulsion however.   2. Minor disc osteophyte complexes at L4-5 and L5-S1. Neither results in   significant canal narrowing.       This report was finalized on 4/10/2018 5:43 AM by Zia Wolf MD.          MRI Thoracic Spine Without Contrast   Final Result   T12 compression fracture, likely late subacute-early chronic   with some bony retropulsion contributing to some mild narrowing of the   central canal, greater to the right of midline. Again it does not appear   pathologic but follow-up is recommended.       This report was finalized on 4/10/2018 5:43 AM by Zia Wolf MD.          CT Lumbar Spine With Contrast   Final Result   1. Recent/acute compression fracture of the T12 vertebra with 4-5 mm of   bony retropulsion contributing to mild-to-moderate canal narrowing,   slightly eccentric to the right.   2. Multilevel degenerative and arthritic changes as individually   discussed.   3. There is no abnormal enhancement       This report was finalized on 4/9/2018 12:12 AM by Zia Wolf MD.          CT Abdomen Pelvis With Contrast   Final Result   IMPRESSION :    1. Left lung base consolidation favored to reflect atelectasis rather   than pneumonia.   2. Combination of findings suggests cirrhosis and portal hypertension.   Follow-up of the liver is recommended due to the marked heterogeneity.   3. Tiny renal lesions felt to reflect cysts.   4. Mild colonic diverticulosis with areas of colonic wall thickening   favored to be due to nondistention. Endoscopy could better evaluate.   5. Nonspecific gallbladder wall thickening without calcified gallstones   or associated inflammation.   6. Mild prostate gland enlargement.    7. Mild ascites           This report was finalized on 4/9/2018 12:01 AM by Zia Wolf MD.          XR Chest 2 View   Final Result   Poor inspiration with minimal bibasilar atelectasis       This report was finalized on 4/8/2018 11:06 PM by Zia Wolf MD.              Assessment/Plan     Patient Active Problem List   Diagnosis   • Chronic coronary artery disease   • Difficulty breathing   • Intermittent claudication   • Peripheral arterial occlusive disease   • Shortness of breath   • Iron deficiency anemia due to chronic blood loss   • Thrombocytopenia associated with AIDS   • Leukocytopenia   • Neutropenia   • Pancytopenia   • Iron and its compounds causing adverse effect in therapeutic use   • Liver cirrhosis   • Lumbar degenerative disc disease   • Secondary thrombocytopenia   • History of coronary artery stent placement   • T12 compression fracture   • Weight loss, abnormal   • Hypothyroidism   • Intractable back pain   • Type 2 diabetes mellitus   • Hypophosphatemia   • Mild protein-calorie malnutrition     Impression:  1.  Rectal bleeding-history of radiation proctitis treated with APC  2.  Pancytopenia  3.  Cirrhosis, cryptogenic but likely related to previous HIV medications  4.  T12 compression fracture   5.  Abnormal CT of the liver  6. HIV   7. Weight loss     Plan:     - at this point further GI workup, including endoscopy and MRI of liver are on hold due to pts back pain.  He would not tolerate bowel prep or positioning for endoscopy, nor would he tolerate prolonged scanning      time required for MRI.  Hopefully things may improve over time with aggressive physical therapy.      At this point would recommend continuing as needed anusol suppository for suspected radiation proctitis, and keep platelets above 50K if possible.        I discussed the patients findings and my recommendations with patient and nursing staff.    Jose Alfredo Jacobsen MD

## 2018-04-14 NOTE — PROGRESS NOTES
Name: Kye Aranda ADMIT: 2018   : 1945  PCP: Kendrick Griggs MD    MRN: [id number] LOS: 5 days   AGE/SEX: 72 y.o. male  ROOM: [facility]/   Subjective   CC: Back pain  Patient has been very drowsy this AM after taking PO dilaudid.  Vitals normal, doing a little better this afternoon.  Still having a significant amount of pain with ay sort of movement. Taking some PO.  No n/v.    Objective   Vital Signs  Temp:  [97.8 °F (36.6 °C)-98.5 °F (36.9 °C)] 97.8 °F (36.6 °C)  Heart Rate:  [64-74] 74  Resp:  [16-18] 16  BP: (119-164)/(50-76) 164/76  SpO2:  [90 %-96 %] 96 %  on  Flow (L/min):  [2] 2;   Device (Oxygen Therapy): nasal cannula  Body mass index is 26.63 kg/m².    Physical Exam   Constitutional: He is oriented to person, place, and time. No distress.   HENT:   Head: Normocephalic and atraumatic.   Mouth/Throat: Oropharynx is clear and moist.   Eyes: Conjunctivae and EOM are normal. Pupils are equal, round, and reactive to light.   Neck: Normal range of motion. Neck supple.   Cardiovascular: Normal rate, regular rhythm and intact distal pulses.    Pulmonary/Chest: Effort normal and breath sounds normal.   Abdominal: Soft. Bowel sounds are normal.   Musculoskeletal: He exhibits tenderness (lower thoracic spine). He exhibits no edema.   Neurological: He is alert and oriented to person, place, and time. He has normal strength. He displays no tremor (no asterixis). No sensory deficit.   Skin: Skin is warm and dry. He is not diaphoretic.   Psychiatric: He has a normal mood and affect. His behavior is normal.   Nursing note and vitals reviewed.      Results Review:       I reviewed the patient's new clinical results.    Results from last 7 days  Lab Units 18  0502 18  0339 18  0414 18  0412   WBC 10*3/mm3 3.46* 2.38* 2.28* 2.09*   HEMOGLOBIN g/dL 9.9* 8.5* 8.8* 8.2*   PLATELETS 10*3/mm3 47* 36* 37* 34*       Results from last 7 days  Lab Units 18  0414 18  0412 04/10/18  0330  04/09/18 0916   SODIUM mmol/L 135* 137 134* 134*   POTASSIUM mmol/L 4.4 4.0 3.9 3.6   CHLORIDE mmol/L 103 100 100 99   CO2 mmol/L 22.5 26.5 24.5 25.1   BUN mg/dL 11 14 12 12   CREATININE mg/dL 0.96 1.07 1.01 1.12   GLUCOSE mg/dL 165* 200* 185* 198*   Estimated Creatinine Clearance: 77 mL/min (by C-G formula based on SCr of 0.96 mg/dL).    Results from last 7 days  Lab Units 04/12/18  0414 04/11/18  0412 04/10/18  0330 04/10/18  0329 04/09/18  0916 04/08/18  2243   CALCIUM mg/dL 8.3* 8.7 8.4*  --  8.6 9.0   ALBUMIN g/dL 2.90* 3.00* 2.90* 3.2 3.00* 3.80   MAGNESIUM mg/dL  --   --   --   --   --  2.2   PHOSPHORUS mg/dL  --   --   --   --   --  1.6*         clonazePAM 0.5 mg Oral Daily   cobicistat 150 mg Oral Daily With Breakfast   darunavir 800 mg Oral Daily With Breakfast   emtricitabine-tenofovir 1 tablet Oral Q24H   fluconazole 100 mg Oral Every Other Day   insulin aspart 0-7 Units Subcutaneous 4x Daily With Meals & Nightly   insulin detemir 20 Units Subcutaneous Nightly   lactulose 20 g Oral BID   levothyroxine 300 mcg Oral Q AM   metoprolol succinate XL 50 mg Oral Q12H   raltegravir 400 mg Oral BID   rifaximin 550 mg Oral Q12H   sennosides-docusate sodium 2 tablet Oral BID   spironolactone 50 mg Oral Daily   sulfamethoxazole-trimethoprim 1 tablet Oral Every Other Day   tamsulosin 0.4 mg Oral Nightly      Diet Regular; Consistent Carbohydrate      Assessment/Plan      Active Hospital Problems (** Indicates Principal Problem)    Diagnosis Date Noted   • **T12 compression fracture [S22.080A] 04/09/2018   • Mild protein-calorie malnutrition [E44.1] 04/10/2018   • Weight loss, abnormal [R63.4] 04/09/2018   • Hypothyroidism [E03.9] 04/09/2018   • Intractable back pain [M54.9] 04/09/2018   • Type 2 diabetes mellitus [E11.9] 04/09/2018   • Hypophosphatemia [E83.39] 04/09/2018   • Lumbar degenerative disc disease [M51.36] 12/05/2017   • Liver cirrhosis [K74.60] 08/14/2017   • Iron deficiency anemia due to chronic blood  loss [D50.0] 05/06/2016   • 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   T12 CompressionFx  - ZEESHAN and spine surgery have seen, recommending conservative tx given his risk of surgery  - norco switched to morphine IR, encourage him to use PO meds, IV dilaudid for breakthrough with caution  - surgery recommending brace, which has been ordered  - PT consulted, to eval later today     Hypothyroidism  - started back on synthroid  - follow up TSH as outpatient in 4-6 weeks    Type 2 DM  - continue levemir with ssi at current dose    HIV  - continue home meds  - on prophylactic bactrim and diflucan    Liver Cirrhosis  - has associated TCP which is stable  - appreciate heme/onc recs    Rectal Bleeding  - continue anusol suppository  - GI has seen, no plans for further workup at this time given his spine issues    DVT Prophylaxis  - SCDs      Zia Mcleod MD  [facility]  04/14/18  4:14 PM

## 2018-04-15 NOTE — PROGRESS NOTES
Name: Kye Aranda ADMIT: 2018   : 1945  PCP: Kendrick Griggs MD    MRN: [id number] LOS: 6 days   AGE/SEX: 72 y.o. male  ROOM: [facility]/   Subjective   CC: Back pain  No acute events. Patient continues to have pain with movement but morphine is helping.  Taking PO well.  No n/v.  Had non-bloody BM.    Objective   Vital Signs  Temp:  [97.8 °F (36.6 °C)-98.5 °F (36.9 °C)] 98 °F (36.7 °C)  Heart Rate:  [58-69] 58  Resp:  [16-18] 18  BP: (111-146)/(46-73) 129/51  SpO2:  [92 %-97 %] 92 %  on  Flow (L/min):  [2] 2;   Device (Oxygen Therapy): room air  Body mass index is 26.63 kg/m².    Physical Exam   Constitutional: He is oriented to person, place, and time. No distress.   HENT:   Head: Normocephalic and atraumatic.   Mouth/Throat: Oropharynx is clear and moist.   Eyes: Conjunctivae and EOM are normal. Pupils are equal, round, and reactive to light.   Neck: Normal range of motion. Neck supple.   Cardiovascular: Normal rate, regular rhythm and intact distal pulses.    Pulmonary/Chest: Effort normal and breath sounds normal.   Abdominal: Soft. Bowel sounds are normal.   Musculoskeletal: He exhibits tenderness (lower thoracic spine). He exhibits no edema.   Neurological: He is alert and oriented to person, place, and time. He has normal strength. He displays no tremor (no asterixis). No sensory deficit.   Skin: Skin is warm and dry. He is not diaphoretic.   Psychiatric: He has a normal mood and affect. His behavior is normal.   Nursing note and vitals reviewed.      Results Review:       I reviewed the patient's new clinical results.    Results from last 7 days  Lab Units 04/15/18  0509 18  0502 18  0339 18  0414   WBC 10*3/mm3 2.83* 3.46* 2.38* 2.28*   HEMOGLOBIN g/dL 8.8* 9.9* 8.5* 8.8*   PLATELETS 10*3/mm3 30* 47* 36* 37*       Results from last 7 days  Lab Units 18  0414 18  0412 04/10/18  0330 18  0916   SODIUM mmol/L 135* 137 134* 134*   POTASSIUM mmol/L 4.4 4.0 3.9 3.6    CHLORIDE mmol/L 103 100 100 99   CO2 mmol/L 22.5 26.5 24.5 25.1   BUN mg/dL 11 14 12 12   CREATININE mg/dL 0.96 1.07 1.01 1.12   GLUCOSE mg/dL 165* 200* 185* 198*   Estimated Creatinine Clearance: 77 mL/min (by C-G formula based on SCr of 0.96 mg/dL).    Results from last 7 days  Lab Units 04/12/18  0414 04/11/18  0412 04/10/18  0330 04/10/18  0329 04/09/18  0916 04/08/18  2243   CALCIUM mg/dL 8.3* 8.7 8.4*  --  8.6 9.0   ALBUMIN g/dL 2.90* 3.00* 2.90* 3.2 3.00* 3.80   MAGNESIUM mg/dL  --   --   --   --   --  2.2   PHOSPHORUS mg/dL  --   --   --   --   --  1.6*         clonazePAM 0.5 mg Oral Daily   cobicistat 150 mg Oral Daily With Breakfast   darunavir 800 mg Oral Daily With Breakfast   emtricitabine-tenofovir 1 tablet Oral Q24H   fluconazole 100 mg Oral Every Other Day   insulin aspart 0-7 Units Subcutaneous 4x Daily With Meals & Nightly   insulin detemir 25 Units Subcutaneous Nightly   lactulose 20 g Oral BID   levothyroxine 300 mcg Oral Q AM   metoprolol succinate XL 50 mg Oral Q12H   raltegravir 400 mg Oral BID   rifaximin 550 mg Oral Q12H   sennosides-docusate sodium 2 tablet Oral BID   spironolactone 50 mg Oral Daily   sulfamethoxazole-trimethoprim 1 tablet Oral Every Other Day   tamsulosin 0.4 mg Oral Nightly      Diet Regular; Consistent Carbohydrate      Assessment/Plan      Active Hospital Problems (** Indicates Principal Problem)    Diagnosis Date Noted   • **T12 compression fracture [S22.080A] 04/09/2018   • Mild protein-calorie malnutrition [E44.1] 04/10/2018   • Weight loss, abnormal [R63.4] 04/09/2018   • Hypothyroidism [E03.9] 04/09/2018   • Intractable back pain [M54.9] 04/09/2018   • Type 2 diabetes mellitus [E11.9] 04/09/2018   • Hypophosphatemia [E83.39] 04/09/2018   • Lumbar degenerative disc disease [M51.36] 12/05/2017   • Liver cirrhosis [K74.60] 08/14/2017   • Iron deficiency anemia due to chronic blood loss [D50.0] 05/06/2016   • 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   T12 CompressionFx  - ZEESHAN and spine surgery have seen, recommending conservative tx given his risk of surgery  - norco switched to morphine IR, encourage him to use PO meds, IV dilaudid for breakthrough with caution  - surgery recommending brace, which has been ordered  - PT following    Hypothyroidism  - started back on synthroid  - follow up TSH as outpatient in 4-6 weeks    Type 2 DM  - continue ssi  - increase levemir to 25 units nightly    HIV  - continue home meds  - on prophylactic bactrim and diflucan    Liver Cirrhosis  - has associated TCP which is stable  - appreciate heme/onc recs    Rectal Bleeding  - continue anusol suppository  - GI has seen, no plans for further workup at this time given his spine issues    DVT Prophylaxis  - SCDs    Disposition  - SNF in a couple days    Zia Mcleod MD  [facility]  04/15/18  5:54 PM

## 2018-04-15 NOTE — PLAN OF CARE
Problem: Patient Care Overview  Goal: Plan of Care Review  Outcome: Ongoing (interventions implemented as appropriate)   04/15/18 0804   Coping/Psychosocial   Plan of Care Reviewed With patient   Plan of Care Review   Progress improving     Goal: Individualization and Mutuality  Outcome: Ongoing (interventions implemented as appropriate)    Goal: Discharge Needs Assessment  Outcome: Ongoing (interventions implemented as appropriate)    Goal: Interprofessional Rounds/Family Conf  Outcome: Ongoing (interventions implemented as appropriate)      Problem: Fall Risk (Adult)  Goal: Absence of Fall  Outcome: Ongoing (interventions implemented as appropriate)      Problem: Pain, Chronic (Adult)  Goal: Acceptable Pain/Comfort Level and Functional Ability  Outcome: Ongoing (interventions implemented as appropriate)      Problem: Activity Intolerance (Adult)  Goal: Identify Related Risk Factors and Signs and Symptoms  Outcome: Ongoing (interventions implemented as appropriate)    Goal: Activity Tolerance  Outcome: Ongoing (interventions implemented as appropriate)    Goal: Effective Energy Conservation Techniques  Outcome: Ongoing (interventions implemented as appropriate)

## 2018-04-15 NOTE — PROGRESS NOTES
[facility]  Inpatient Progress Note    Reason for Follow Up:  Rectal bleeding, pancytopenia, cirrhosis    Subjective     Interval History:   No 24-hour acute events    Current Facility-Administered Medications:   •  acetaminophen (TYLENOL) tablet 650 mg, 650 mg, Oral, Q4H PRN, Mariano Delacruz MD  •  clonazePAM (KlonoPIN) tablet 0.5 mg, 0.5 mg, Oral, Daily, Pepe Montes De Oca MD, 0.5 mg at 04/15/18 0854  •  cobicistat (TYBOST) 150 mg, 150 mg, Oral, Daily With Breakfast, Pepe Montes De Oca MD, 150 mg at 04/15/18 0855  •  darunavir ethanolate (PREZISTA) tablet 800 mg, 800 mg, Oral, Daily With Breakfast, Mariano Delacruz MD, 800 mg at 04/15/18 0854  •  dextrose (D50W) solution 25 g, 25 g, Intravenous, Q15 Min PRN, Pepe Montes De Oca MD  •  dextrose (GLUTOSE) oral gel 15 g, 15 g, Oral, Q15 Min PRN, Pepe Montes De Oca MD  •  emtricitabine-tenofovir (TRUVADA) 200-300 MG per tablet 1 tablet, 1 tablet, Oral, Q24H, Mariano Delacruz MD, 1 tablet at 04/15/18 0854  •  fluconazole (DIFLUCAN) tablet 100 mg, 100 mg, Oral, Every Other Day, Mariano Delacruz MD, 100 mg at 04/14/18 1008  •  glucagon (human recombinant) (GLUCAGEN DIAGNOSTIC) injection 1 mg, 1 mg, Subcutaneous, PRN, Pepe Montes De Oca MD  •  HYDROmorphone (DILAUDID) injection 0.5 mg, 0.5 mg, Intravenous, Q4H PRN, Pepe Montes De Oca MD, 0.5 mg at 04/13/18 1753  •  insulin aspart (novoLOG) injection 0-7 Units, 0-7 Units, Subcutaneous, 4x Daily With Meals & Nightly, Pepe Montes De Oca MD, 4 Units at 04/15/18 1125  •  insulin detemir (LEVEMIR) injection 20 Units, 20 Units, Subcutaneous, Nightly, Mariano Delacruz MD, 20 Units at 04/14/18 2120  •  lactulose (CHRONULAC) 10 GM/15ML solution 20 g, 20 g, Oral, BID, Zia Mcleod MD, 20 g at 04/15/18 0855  •  levothyroxine (SYNTHROID, LEVOTHROID) tablet 300 mcg, 300 mcg, Oral, Q AM, Pepe Montes De Oca MD, 300 mcg at 04/15/18 0721  •  magnesium citrate solution 150 mL, 150 mL, Oral, Daily PRN,  Zia Mcleod MD, 150 mL at 04/14/18 1828  •  metoprolol succinate XL (TOPROL-XL) 24 hr tablet 50 mg, 50 mg, Oral, Q12H, Pepe Montes De Oca MD, 50 mg at 04/14/18 2120  •  Morphine (MSIR) tablet 15 mg, 15 mg, Oral, Q4H PRN **OR** Morphine (MSIR) tablet 30 mg, 30 mg, Oral, Q4H PRN, Zia Mcleod MD, 30 mg at 04/15/18 1129  •  ondansetron (ZOFRAN) injection 4 mg, 4 mg, Intravenous, Q6H PRN, Mariano Delacruz MD  •  raltegravir (ISENTRESS) tablet 400 mg, 400 mg, Oral, BID, Pepe Montes De Oca MD, 400 mg at 04/15/18 0854  •  rifaximin (XIFAXAN) tablet 550 mg, 550 mg, Oral, Q12H, Pepe Montes De Oca MD, 550 mg at 04/15/18 0854  •  sennosides-docusate sodium (SENOKOT-S) 8.6-50 MG tablet 2 tablet, 2 tablet, Oral, BID, Zia Mcleod MD, 2 tablet at 04/14/18 2120  •  sodium chloride 0.9 % flush 1-10 mL, 1-10 mL, Intravenous, PRN, Mariano Delacruz MD  •  sodium chloride 0.9 % flush 10 mL, 10 mL, Intravenous, PRN, Balbir Hernandez MD  •  spironolactone (ALDACTONE) tablet 50 mg, 50 mg, Oral, Daily, Pepe Montes De Oca MD, 50 mg at 04/14/18 1011  •  sulfamethoxazole-trimethoprim (BACTRIM DS,SEPTRA DS) 800-160 MG per tablet 160 mg, 1 tablet, Oral, Every Other Day, Mariano Delacruz MD, 160 mg at 04/15/18 0854  •  tamsulosin (FLOMAX) 24 hr capsule 0.4 mg, 0.4 mg, Oral, Nightly, Pepe Montes De Oca MD, 0.4 mg at 04/14/18 2120  Review of Systems:    Complaining of back pain all other systems reviewed and negative    Objective     Vital Signs  Temp:  [97.8 °F (36.6 °C)-98.5 °F (36.9 °C)] 98 °F (36.7 °C)  Heart Rate:  [58-69] 58  Resp:  [16-18] 18  BP: (111-146)/(46-73) 129/51  Body mass index is 26.63 kg/m².    Intake/Output Summary (Last 24 hours) at 04/15/18 1410  Last data filed at 04/15/18 1401   Gross per 24 hour   Intake              150 ml   Output             1800 ml   Net            -1650 ml     I/O this shift:  In: -   Out: 1300 [Urine:1300]     Physical Exam:   General: patient awake, alert and cooperative   Eyes:  Normal lids and lashes, no scleral icterus   Neck: supple, normal ROM   Skin: warm and dry, not jaundiced   Cardiovascular: regular rhythm and rate, no murmurs auscultated   Pulm: clear to auscultation bilaterally, regular and unlabored   Abdomen: soft, nontender, nondistended; normal bowel sounds   Rectal: deferred   Extremities: no rash or edema   Psychiatric: Normal mood and behavior; memory intact     Results Review:     I reviewed the patient's new clinical results.      Results from last 7 days  Lab Units 04/15/18  0509 04/14/18  0502 04/13/18  0339   WBC 10*3/mm3 2.83* 3.46* 2.38*   HEMOGLOBIN g/dL 8.8* 9.9* 8.5*   HEMATOCRIT % 29.6* 33.3* 28.3*   PLATELETS 10*3/mm3 30* 47* 36*       Results from last 7 days  Lab Units 04/12/18  0414 04/11/18  0412 04/10/18  0330   SODIUM mmol/L 135* 137 134*   POTASSIUM mmol/L 4.4 4.0 3.9   CHLORIDE mmol/L 103 100 100   CO2 mmol/L 22.5 26.5 24.5   BUN mg/dL 11 14 12   CREATININE mg/dL 0.96 1.07 1.01   CALCIUM mg/dL 8.3* 8.7 8.4*   BILIRUBIN mg/dL 1.1 1.4* 1.6*   ALK PHOS U/L 162* 154* 168*   ALT (SGPT) U/L 37 36 45*   AST (SGOT) U/L 42* 37 49*   GLUCOSE mg/dL 165* 200* 185*       Results from last 7 days  Lab Units 04/11/18  0412   INR  1.47*       Lab Results  Lab Value Date/Time   LIPASE 139 (H) 04/10/2018 0330   LIPASE 161 (H) 04/08/2018 2243       Radiology:  XR Spine Lumbar AP & Lateral   Final Result       Stable appearing compression deformities, no new fractures are   identified. Degenerative changes. Follow-up evaluation can be obtained   as indicated.       This report was finalized on 4/10/2018 9:06 PM by Dr. Gallo Soliman MD.          XR Spine Thoracic 2 View   Final Result       Stable appearing compression deformities, no new fractures are   identified. Degenerative changes. Follow-up evaluation can be obtained   as indicated.       This report was finalized on 4/10/2018 9:06 PM by Dr. Gallo Soliman MD.          MRI Lumbar Spine Without Contrast    Final Result   1. Mild Schmorl's type deformities of L3 and L4 superiorly. The L4   deformity appears to be fairly recent as evidenced by some associated   edema. No significant height loss or bony retropulsion however.   2. Minor disc osteophyte complexes at L4-5 and L5-S1. Neither results in   significant canal narrowing.       This report was finalized on 4/10/2018 5:43 AM by Zia Wolf MD.          MRI Thoracic Spine Without Contrast   Final Result   T12 compression fracture, likely late subacute-early chronic   with some bony retropulsion contributing to some mild narrowing of the   central canal, greater to the right of midline. Again it does not appear   pathologic but follow-up is recommended.       This report was finalized on 4/10/2018 5:43 AM by Zia Wolf MD.          CT Lumbar Spine With Contrast   Final Result   1. Recent/acute compression fracture of the T12 vertebra with 4-5 mm of   bony retropulsion contributing to mild-to-moderate canal narrowing,   slightly eccentric to the right.   2. Multilevel degenerative and arthritic changes as individually   discussed.   3. There is no abnormal enhancement       This report was finalized on 4/9/2018 12:12 AM by Zia Wolf MD.          CT Abdomen Pelvis With Contrast   Final Result   IMPRESSION :    1. Left lung base consolidation favored to reflect atelectasis rather   than pneumonia.   2. Combination of findings suggests cirrhosis and portal hypertension.   Follow-up of the liver is recommended due to the marked heterogeneity.   3. Tiny renal lesions felt to reflect cysts.   4. Mild colonic diverticulosis with areas of colonic wall thickening   favored to be due to nondistention. Endoscopy could better evaluate.   5. Nonspecific gallbladder wall thickening without calcified gallstones   or associated inflammation.   6. Mild prostate gland enlargement.   7. Mild ascites           This report was finalized on 4/9/2018 12:01 AM by Zia Wolf  MD.          XR Chest 2 View   Final Result   Poor inspiration with minimal bibasilar atelectasis       This report was finalized on 4/8/2018 11:06 PM by Zia Wolf MD.              Assessment/Plan     Patient Active Problem List   Diagnosis   • Chronic coronary artery disease   • Difficulty breathing   • Intermittent claudication   • Peripheral arterial occlusive disease   • Shortness of breath   • Iron deficiency anemia due to chronic blood loss   • Thrombocytopenia associated with AIDS   • Leukocytopenia   • Neutropenia   • Pancytopenia   • Iron and its compounds causing adverse effect in therapeutic use   • Liver cirrhosis   • Lumbar degenerative disc disease   • Secondary thrombocytopenia   • History of coronary artery stent placement   • T12 compression fracture   • Weight loss, abnormal   • Hypothyroidism   • Intractable back pain   • Type 2 diabetes mellitus   • Hypophosphatemia   • Mild protein-calorie malnutrition      Impression:  1.  Rectal bleeding-history of radiation proctitis treated with APC  2.  Pancytopenia  3.  Cirrhosis, cryptogenic but likely related to previous HIV medications  4.  T12 compression fracture   5.  Abnormal CT of the liver  6. HIV   7. Weight loss     Plan:      at this point further GI workup, including endoscopy and MRI of liver are on hold due to pts back pain.  He would not tolerate bowel prep or positioning for endoscopy, nor would he tolerate prolonged scanning     time required for MRI.  Hopefully things may improve over time with aggressive physical therapy.      At this point would recommend continuing as needed anusol suppository for suspected radiation proctitis, and keep platelets above 50K if possible.  We will see as needed    I discussed the patients findings and my recommendations with patient and nursing staff.    Jose Alfredo Jacobsen MD

## 2018-04-15 NOTE — PLAN OF CARE
Problem: Patient Care Overview  Goal: Plan of Care Review   04/15/18 1132   Coping/Psychosocial   Plan of Care Reviewed With patient   Plan of Care Review   Progress improving   AMBULATING FURTHER AND APPEARS TO BE GETTING STRONGER.  STILL IN A LOT OF PAIN.  NOW OFF O2.

## 2018-04-15 NOTE — THERAPY TREATMENT NOTE
Acute Care - Physical Therapy Treatment Note  [facility]     Patient Name: Kye Aranda  : 1945  MRN: [id number]  Today's Date: 4/15/2018  Onset of Illness/Injury or Date of Surgery: 18  Date of Referral to PT: 18  Referring Physician: PHOEBE GARCIA    Admit Date: 2018    Visit Dx:    ICD-10-CM ICD-9-CM   1. T12 compression fracture S22.080A 805.2   2. Compression fracture of T12 vertebra S22.080A 805.2   3. Hypothyroidism, unspecified type E03.9 244.9   4. Low blood phosphate E83.39 275.3   5. Pancytopenia D61.818 284.19   6. Decreased strength, endurance, and mobility R53.1 780.79    Z74.09 780.99     Patient Active Problem List   Diagnosis   • Chronic coronary artery disease   • Difficulty breathing   • Intermittent claudication   • Peripheral arterial occlusive disease   • Shortness of breath   • Iron deficiency anemia due to chronic blood loss   • Thrombocytopenia associated with AIDS   • Leukocytopenia   • Neutropenia   • Pancytopenia   • Iron and its compounds causing adverse effect in therapeutic use   • Liver cirrhosis   • Lumbar degenerative disc disease   • Secondary thrombocytopenia   • History of coronary artery stent placement   • T12 compression fracture   • Weight loss, abnormal   • Hypothyroidism   • Intractable back pain   • Type 2 diabetes mellitus   • Hypophosphatemia   • Mild protein-calorie malnutrition       Therapy Treatment          Rehabilitation Treatment Summary     Row Name 04/15/18 1131             Treatment Time/Intention    Discipline physical therapist  -JR      Document Type therapy note (daily note)  -JR      Subjective Information complains of;pain;fatigue  -JR      Mode of Treatment physical therapy  -JR      Total Minutes, Physical Therapy Treatment 24  -JR      Patient Effort good  -JR      Comment --   LESS PAIN NOTED WHEN AMBULATING. TOOK SOME MORPHINE EARLIER  -JR      Recorded by [JR] Mariano Núñez, PT 04/15/18 1135 04/15/18 1135      Row Name  04/15/18 1131             Cognitive Assessment/Intervention- PT/OT    Orientation Status (Cognition) oriented x 3  -JR      Follows Commands (Cognition) WNL  -JR      Recorded by [JR] Mariano Núñez, PT 04/15/18 1135 04/15/18 1135      Row Name 04/15/18 1131             Bed Mobility Assessment/Treatment    [place] Level (Bed Mobility) contact guard assist;minimum assist (75% patient effort);1 person assist  -JR      Bed Mobility, Safety Issues other (see comments)   INCREASED PAIN WITH MOBILITY.    -JR      Assistive Device (Bed Mobility) bed rails  -JR      Recorded by [JR] Mariano Núñez, PT 04/15/18 1135 04/15/18 1135      Row Name 04/15/18 1131             Transfer Assessment/Treatment    Sit-Stand [place] (Transfers) nonverbal cues (demo/gesture);verbal cues;minimum assist (75% patient effort);1 person assist  -JR      Stand-Sit [place] (Transfers) nonverbal cues (demo/gesture);verbal cues;minimum assist (75% patient effort);1 person assist  -JR      Recorded by [JR] Mariano Núñez, PT 04/15/18 1135 04/15/18 1135      Row Name 04/15/18 1131             Sit-Stand Transfer    Assistive Device (Sit-Stand Transfers) walker, front-wheeled  -JR      Recorded by [JR] Mariano Núñez, PT 04/15/18 1135 04/15/18 1135      Row Name 04/15/18 1131             Stand-Sit Transfer    Assistive Device (Stand-Sit Transfers) walker, front-wheeled  -JR      Recorded by [JR] Mariano Núñez, PT 04/15/18 1135 04/15/18 1135      Row Name 04/15/18 1131             Gait/Stairs Assessment/Training    [place] Level (Gait) nonverbal cues (demo/gesture);verbal cues;minimum assist (75% patient effort);1 person assist  -JR      Assistive Device (Gait) walker, front-wheeled  -JR      Distance in Feet (Gait) 130  -JR      Deviations/Abnormal Patterns (Gait) gait speed decreased;stride length decreased;other (see comments)   AMBULATES VERY SLOWLY.    -JR      Bilateral Gait Deviations forward flexed posture  -JR       Recorded by [JR] Mariano Núñez, PT 04/15/18 1135 04/15/18 1135      Row Name 04/15/18 1131             Positioning and Restraints    Pre-Treatment Position in bed  -JR      Post Treatment Position bed  -JR      In Bed notified nsg;supine;call light within reach;encouraged to call for assist;SCD pump applied  -JR      Recorded by [JR] Mariano Núñez, PT 04/15/18 1135 04/15/18 1135      Row Name 04/15/18 1131             Pain Scale: Numbers Pre/Post-Treatment    Pain Scale: Numbers, Pretreatment 7/10  -JR      Pain Scale: Numbers, Post-Treatment 7/10  -JR      Pain Location back  -JR      Recorded by [JR] Mariano Núñez, PT 04/15/18 1135 04/15/18 1135      Row Name 04/15/18 1131             Plan of Care Review    Plan of Care Reviewed With patient  -JR      Recorded by [JR] Mariano Núñez, PT 04/15/18 1135 04/15/18 1135        User Key  (r) = Recorded By, (t) = Taken By, (c) = Cosigned By    Initials Name Effective Dates Discipline    JR Mariano Núñez, PT 04/03/18 -  PT                     Physical Therapy Education     Title: PT OT SLP Therapies (Done)     Topic: Physical Therapy (Done)     Point: Mobility training (Done)    Learning Progress Summary     Learner Status Readiness Method Response Comment Documented by    Patient Done ROMIE Lr DU JR 04/15/18 1135     Done Acceptance ROMIE MARTINS DU JR 04/14/18 1543          Point: Home exercise program (Done)    Learning Progress Summary     Learner Status Readiness Method Response Comment Documented by    Patient Done ROMIE Lr DU JR 04/15/18 1135     Done Acceptance ROMIE MARTINS DU JR 04/14/18 1543          Point: Body mechanics (Done)    Learning Progress Summary     Learner Status Readiness Method Response Comment Documented by    Patient Done ROMIE Lr DU JR 04/15/18 1135     Done Acceptance ROMIE MARTINS DU JR 04/14/18 1543          Point: Precautions (Done)    Learning Progress Summary     Learner Status Readiness Method Response Comment Documented by     Patient Done Eager ROMIE MARTINSMARLENI   04/15/18 1135     Done Acceptance E,D MARLENI PAL   04/14/18 1543                      User Key     Initials Effective Dates Name Provider Type [facility] 04/03/18 -  Mariano Núñez, PT Physical Therapist PT                    PT Recommendation and Plan  Planned Therapy Interventions (PT Eval): balance training, bed mobility training, gait training, strengthening, transfer training  Plan of Care Reviewed With: patient  Progress: improving          Outcome Measures     Row Name 04/15/18 1136 04/14/18 1544          How much help from another person do you currently need...    Turning from your back to your side while in flat bed without using bedrails? 3  -JR 3  -JR     Moving from lying on back to sitting on the side of a flat bed without bedrails? 3  -JR 3  -JR     Moving to and from a bed to a chair (including a wheelchair)? 3  -JR 3  -JR     Standing up from a chair using your arms (e.g., wheelchair, bedside chair)? 3  -JR 3  -JR     Climbing 3-5 steps with a railing? 2  -JR 1  -JR     To walk in hospital room? 3  -JR 3  -JR     AM-PAC 6 Clicks Score 17  -JR 16  -JR        Functional Assessment    Outcome Measure Options  -- AM-PAC 6 Clicks Basic Mobility (PT)  -JR       User Key  (r) = Recorded By, (t) = Taken By, (c) = Cosigned By    Initials Name Provider Type     Mariano Núñez, PT Physical Therapist           Time Calculation:         PT Charges     Row Name 04/15/18 1136             Time Calculation    Start Time 1105  -JR      Stop Time 1136  -      Time Calculation (min) 31 min  -JR      PT Received On 04/15/18  -      PT - Next Appointment 04/16/18  -        User Key  (r) = Recorded By, (t) = Taken By, (c) = Cosigned By    Initials Name Provider Type     Mariano Núñez, PT Physical Therapist          Therapy Charges for Today     Code Description Service Date Service Provider Modifiers Qty    76320151436  PT EVAL MOD COMPLEXITY 2 4/14/2018 Mariano Núñez  PT GP 2    47710262453 HC PT THER PROC EA 15 MIN 4/15/2018 Mariano Núñez, PT GP 2          PT G-Codes  Outcome Measure Options: (S) AM-PAC 6 Clicks Basic Mobility (PT)    Mariano Núñez, PT  4/15/2018

## 2018-04-16 NOTE — PLAN OF CARE
Problem: Patient Care Overview  Goal: Plan of Care Review  Outcome: Ongoing (interventions implemented as appropriate)   04/16/18 0228   Coping/Psychosocial   Plan of Care Reviewed With patient   Plan of Care Review   Progress improving   OTHER   Outcome Summary Pt has not received any pain medication this shift. Pain stays at about a 3 at rest. VSS. afebrile.      Goal: Individualization and Mutuality  Outcome: Ongoing (interventions implemented as appropriate)    Goal: Discharge Needs Assessment  Outcome: Ongoing (interventions implemented as appropriate)    Goal: Interprofessional Rounds/Family Conf  Outcome: Ongoing (interventions implemented as appropriate)      Problem: Fall Risk (Adult)  Goal: Absence of Fall  Outcome: Ongoing (interventions implemented as appropriate)      Problem: Pain, Chronic (Adult)  Goal: Acceptable Pain/Comfort Level and Functional Ability  Outcome: Ongoing (interventions implemented as appropriate)      Problem: Activity Intolerance (Adult)  Goal: Identify Related Risk Factors and Signs and Symptoms  Outcome: Ongoing (interventions implemented as appropriate)    Goal: Activity Tolerance  Outcome: Ongoing (interventions implemented as appropriate)    Goal: Effective Energy Conservation Techniques  Outcome: Ongoing (interventions implemented as appropriate)

## 2018-04-16 NOTE — DISCHARGE PLACEMENT REQUEST
"Troy England (72 y.o. Male)     Date of Birth Social Security Number Address Home Phone MRN    1945  [address] [id number] [phone] [id number]    Jain Marital Status          Latter day Single       Admission Date Admission Type Admitting Provider Attending Provider Department, Room/Bed    4/8/18 Emergency Mariano Delacruz MD Lykins, Matthew D, MD [address], 419/1    Discharge Date Discharge Disposition Discharge Destination                       Attending Provider:  Zia Mcleod MD    Allergies:  Methylprednisolone    Isolation:  None   Infection:  None   Code Status:  FULL    Ht:  170.2 cm (67\")   Wt:  77.1 kg (170 lb)    Admission Cmt:  None   Principal Problem:  T12 compression fracture [S22.080A]                 Active Insurance as of 4/8/2018     Primary Coverage     Payor Plan Insurance Group Employer/Plan Group    MEDICARE MEDICARE A & B      Payor Plan Address Payor Plan Phone Number Effective From Effective To    [address] [phone] 8/1/2001     [place] [address]       Subscriber Name Subscriber Birth Date Member ID       TROY ENGLAND 1945 [id number]           Secondary Coverage     Payor Plan Insurance Group Employer/Plan Group    [employer] MED SUPP AAR HEALTH CARE OPTIONS      Payor Plan Address Payor Plan Phone Number Effective From Effective To    [facility] [phone] 1/1/2016     [address]       [place] [address]       Subscriber Name Subscriber Birth Date Member ID       TROY ENGLAND 1945 [id number]                 Emergency Contacts      (Rel.) Home Phone Work Phone Mobile Phone    Melissa Miller (Significant Other) -- -- [phone]              "

## 2018-04-16 NOTE — PROGRESS NOTES
Name: Kye Aranda ADMIT: 2018   : 1945  PCP: Kendrick Griggs MD    MRN: [id number] LOS: 7 days   AGE/SEX: 72 y.o. male  ROOM: [facility]/   Subjective   CC: Back pain  No acute events. Doing a little better with pain, able to work with PT.  Taking PO well.  No n/v.  Having non-bloody BMs.  C/o dry cough.    Objective   Vital Signs  Temp:  [98 °F (36.7 °C)-98.5 °F (36.9 °C)] 98 °F (36.7 °C)  Heart Rate:  [60-66] 60  Resp:  [18] 18  BP: (124-145)/() 129/56  SpO2:  [90 %-91 %] 91 %  on  Flow (L/min):  [2] 2;   Device (Oxygen Therapy): room air  Body mass index is 26.63 kg/m².    Physical Exam   Constitutional: He is oriented to person, place, and time. No distress.   HENT:   Head: Normocephalic and atraumatic.   Mouth/Throat: Oropharynx is clear and moist.   Eyes: Conjunctivae and EOM are normal. Pupils are equal, round, and reactive to light.   Neck: Normal range of motion. Neck supple.   Cardiovascular: Normal rate, regular rhythm and intact distal pulses.    Pulmonary/Chest: Effort normal and breath sounds normal.   Abdominal: Soft. Bowel sounds are normal.   Musculoskeletal: He exhibits tenderness (lower thoracic spine). He exhibits no edema.   Neurological: He is alert and oriented to person, place, and time. He has normal strength. He displays no tremor (no asterixis). No sensory deficit.   Skin: Skin is warm and dry. He is not diaphoretic.   Psychiatric: He has a normal mood and affect. His behavior is normal.   Nursing note and vitals reviewed.      Results Review:       I reviewed the patient's new clinical results.    Results from last 7 days  Lab Units 18  0815 04/15/18  0509 18  0502 18  0339   WBC 10*3/mm3 4.79 2.83* 3.46* 2.38*   HEMOGLOBIN g/dL 10.3* 8.8* 9.9* 8.5*   PLATELETS 10*3/mm3 48* 30* 47* 36*       Results from last 7 days  Lab Units 18  0414 18  0412 04/10/18  0330   SODIUM mmol/L 135* 137 134*   POTASSIUM mmol/L 4.4 4.0 3.9   CHLORIDE mmol/L 103  100 100   CO2 mmol/L 22.5 26.5 24.5   BUN mg/dL 11 14 12   CREATININE mg/dL 0.96 1.07 1.01   GLUCOSE mg/dL 165* 200* 185*   Estimated Creatinine Clearance: 77 mL/min (by C-G formula based on SCr of 0.96 mg/dL).    Results from last 7 days  Lab Units 04/12/18  0414 04/11/18  0412 04/10/18  0330 04/10/18  0329   CALCIUM mg/dL 8.3* 8.7 8.4*  --    ALBUMIN g/dL 2.90* 3.00* 2.90* 3.2         clonazePAM 0.5 mg Oral Daily   cobicistat 150 mg Oral Daily With Breakfast   darunavir 800 mg Oral Daily With Breakfast   emtricitabine-tenofovir 1 tablet Oral Q24H   fluconazole 100 mg Oral Every Other Day   insulin aspart 0-7 Units Subcutaneous 4x Daily With Meals & Nightly   insulin detemir 25 Units Subcutaneous Nightly   lactulose 20 g Oral BID   levothyroxine 300 mcg Oral Q AM   metoprolol succinate XL 50 mg Oral Q12H   raltegravir 400 mg Oral BID   rifaximin 550 mg Oral Q12H   sennosides-docusate sodium 2 tablet Oral BID   spironolactone 50 mg Oral Daily   sulfamethoxazole-trimethoprim 1 tablet Oral Every Other Day   tamsulosin 0.4 mg Oral Nightly      Diet Regular; Consistent Carbohydrate      Assessment/Plan      Active Hospital Problems (** Indicates Principal Problem)    Diagnosis Date Noted   • **T12 compression fracture [S22.080A] 04/09/2018   • Mild protein-calorie malnutrition [E44.1] 04/10/2018   • Weight loss, abnormal [R63.4] 04/09/2018   • Hypothyroidism [E03.9] 04/09/2018   • Intractable back pain [M54.9] 04/09/2018   • Type 2 diabetes mellitus [E11.9] 04/09/2018   • Hypophosphatemia [E83.39] 04/09/2018   • Lumbar degenerative disc disease [M51.36] 12/05/2017   • Liver cirrhosis [K74.60] 08/14/2017   • Iron deficiency anemia due to chronic blood loss [D50.0] 05/06/2016   • Thrombocytopenia associated with AIDS [B20, D69.59] 05/06/2016   • Leukocytopenia [D72.819] 05/06/2016   • Chronic coronary artery disease [I25.10] 04/27/2016   • Peripheral arterial occlusive disease [I77.9] 04/27/2016      [facility]  Problems    Diagnosis Date Noted Date Resolved   No resolved problems to display.   T12 CompressionFx  - ZEESHAN and spine surgery have seen, recommending conservative tx given his risk of surgery  - continue morphine IR PRN-not using much IV dilaudid  - surgery recommending brace- d/w RN who will contact office about this  - PT following    Cough  - check CXR  - will treat symptomatically    Hypothyroidism  - started back on synthroid  - follow up TSH as outpatient in 4-6 weeks    Type 2 DM  - continue ssi  - levemir increased last night, will monitor BG today, may need to go up to 30units    HIV  - continue home meds  - on prophylactic bactrim and diflucan    Liver Cirrhosis  - has associated TCP which is stable  - appreciate heme/onc recs    Rectal Bleeding  - continue anusol suppository  - GI has seen, no plans for further workup at this time given his spine issues    DVT Prophylaxis  - SCDs    Disposition  - SNF in 1-2d.  CCP and PT following.    Zia Mcleod MD  [facility]  04/16/18  12:54 PM

## 2018-04-16 NOTE — PROGRESS NOTES
Continued Stay Note  [facility]     Patient Name: Kye Aranda  MRN: [id number]  Today's Date: 4/16/2018    Admit Date: 4/8/2018          Discharge Plan     Row Name 04/16/18 1534       Plan    Plan Rehab - [facility] first choice    Patient/Family in Agreement with Plan yes    Plan Comments Met with patient at bedside.  Discussed discharge plans, patient is agreeable that he will need some rehab services.  Patient selected [facility], as his first choice, [place] with [facility] notified.  Left patient vendor list, he will review and let me know additional choices.  Will continue to monitor for discharge needs.                Discharge Codes    No documentation.           Steff Isbell RN

## 2018-04-16 NOTE — PLAN OF CARE
Problem: Patient Care Overview  Goal: Plan of Care Review  Outcome: Ongoing (interventions implemented as appropriate)   04/16/18 6098   Coping/Psychosocial   Plan of Care Reviewed With patient   OTHER   Outcome Summary still needs cues for posture and tfers; pt reports decr back pain w/log roll technique

## 2018-04-16 NOTE — TELEPHONE ENCOUNTER
RNYayo, from [address] [place] [address] called the office asking about the warm n'form brace that POOJA was suppose to get for the patient. I advised that POOJA recommended for comfort and we can order one for the patient if he prefers to have one. However, she states that the patient is declining at this time, stating that it will not be helpful to him.     I told Yayo that at this time we will leave this open ended and they can call us if the patient changes his mind, but we are not going to order a brace and send someone over to fit him for it if he does not plan on using it.

## 2018-04-16 NOTE — THERAPY TREATMENT NOTE
Acute Care - Physical Therapy Treatment Note  [facility]     Patient Name: Kye Aranda  : 1945  MRN: [id number]  Today's Date: 2018  Onset of Illness/Injury or Date of Surgery: 18  Date of Referral to PT: 18  Referring Physician: PHOEBE GARCIA    Admit Date: 2018    Visit Dx:    ICD-10-CM ICD-9-CM   1. T12 compression fracture S22.080A 805.2   2. Compression fracture of T12 vertebra S22.080A 805.2   3. Hypothyroidism, unspecified type E03.9 244.9   4. Low blood phosphate E83.39 275.3   5. Pancytopenia D61.818 284.19   6. Decreased strength, endurance, and mobility R53.1 780.79    Z74.09 780.99     Patient Active Problem List   Diagnosis   • Chronic coronary artery disease   • Difficulty breathing   • Intermittent claudication   • Peripheral arterial occlusive disease   • Shortness of breath   • Iron deficiency anemia due to chronic blood loss   • Thrombocytopenia associated with AIDS   • Leukocytopenia   • Neutropenia   • Pancytopenia   • Iron and its compounds causing adverse effect in therapeutic use   • Liver cirrhosis   • Lumbar degenerative disc disease   • Secondary thrombocytopenia   • History of coronary artery stent placement   • T12 compression fracture   • Weight loss, abnormal   • Hypothyroidism   • Intractable back pain   • Type 2 diabetes mellitus   • Hypophosphatemia   • Mild protein-calorie malnutrition       Therapy Treatment          Rehabilitation Treatment Summary     Row Name 18 1346             Treatment Time/Intention    Discipline physical therapy assistant  -      Document Type therapy note (daily note)  -      Subjective Information complains of;fatigue;pain  -      Care Plan Review patient/other agree to care plan  -      Comment educ on log roll technique and possible bracing and heat to help w/back pain  -      Existing Precautions/Restrictions fall  -      Treatment Considerations/Comments back protocol due to pain  -      Recorded  by [] Keri Greenfield, PTA 04/16/18 1353 04/16/18 1353      Row Name 04/16/18 1346             Bed Mobility Assessment/Treatment    Supine-Sit [place] (Bed Mobility) minimum assist (75% patient effort);moderate assist (50% patient effort);verbal cues;nonverbal cues (demo/gesture)  -      Sit-Supine [place] (Bed Mobility) minimum assist (75% patient effort);verbal cues;nonverbal cues (demo/gesture)  -      Bed Mobility, Safety Issues decreased use of arms for pushing/pulling;decreased use of legs for bridging/pushing  -      Assistive Device (Bed Mobility) bed rails  -JM      Comment (Bed Mobility) pt states already helping pain w/log roll  -JM      Recorded by [] Keri Greenfield, PTA 04/16/18 1353 04/16/18 1353      Row Name 04/16/18 1346             Transfer Assessment/Treatment    Sit-Stand [place] (Transfers) minimum assist (75% patient effort);contact guard  -      Stand-Sit [place] (Transfers) supervision;verbal cues  -JM      Recorded by [JM] Keri Greenfield, LENA 04/16/18 1353 04/16/18 1353      Row Name 04/16/18 1346             Sit-Stand Transfer    Assistive Device (Sit-Stand Transfers) walker, front-wheeled  -      Recorded by [JM] Keri Greenfield, PTA 04/16/18 1353 04/16/18 1353      Row Name 04/16/18 1346             Stand-Sit Transfer    Assistive Device (Stand-Sit Transfers) walker, front-wheeled  -      Recorded by [JM] Keri Greenfield, LENA 04/16/18 1353 04/16/18 1353      Row Name 04/16/18 1346             Gait/Stairs Assessment/Training    [place] Level (Gait) contact guard;verbal cues  -      Assistive Device (Gait) walker, front-wheeled  -      Distance in Feet (Gait) 100  -JM      Deviations/Abnormal Patterns (Gait) antalgic;jackie decreased;stride length decreased  -      Bilateral Gait Deviations forward flexed posture   improved w/cues and back supported by therapst during amb  -JM      Recorded by [JM] Keri Greenfield, PTA 04/16/18 1353 04/16/18  1353      Row Name 04/16/18 1346             Positioning and Restraints    Pre-Treatment Position in bed  -JM      In Bed supine;call light within reach;encouraged to call for assist;exit alarm on   pillow under knees to relieve back pain  -      Recorded by [] Keri Jean Pierre, [facility] 04/16/18 1353 04/16/18 1353      Row Name 04/16/18 1346             Pain Scale: Numbers Pre/Post-Treatment    Pain Scale: Numbers, Pretreatment 8/10  -JM      Pain Scale: Numbers, Post-Treatment 5/10  -JM      Pain Location back  -JM      Pain Intervention(s) Medication (See MAR);Ambulation/increased activity;Repositioned;Rest  -JM      Recorded by [] Keri Greenfield, LENA 04/16/18 1353 04/16/18 1353        User Key  (r) = Recorded By, (t) = Taken By, (c) = Cosigned By    Initials Name Effective Dates Discipline     Keri Greenfield, [facility] 03/07/18 -  PT                     Physical Therapy Education     Title: PT OT SLP Therapies (Done)     Topic: Physical Therapy (Done)     Point: Mobility training (Done)    Learning Progress Summary     Learner Status Readiness Method Response Comment Documented by    Patient Done Acceptance E,TB,D VU,ADRIEL   04/16/18 1354     Done ROMIE Lr DU JR 04/15/18 1135     Done Acceptance ED MARLENI PAL JR 04/14/18 1543          Point: Home exercise program (Done)    Learning Progress Summary     Learner Status Readiness Method Response Comment Documented by    Patient Done Acceptance E,TB,D VU,ADRIEL   04/16/18 1354     Done Nile MARTINSD MARLENI PAL JR 04/15/18 1135     Done Acceptance ED MARLENI PAL JR 04/14/18 1543          Point: Body mechanics (Done)    Learning Progress Summary     Learner Status Readiness Method Response Comment Documented by    Patient Done Acceptance E,TB,D VU,ADRIEL   04/16/18 1354     Done Eager E,D VUMARLENI JR 04/15/18 1135     Done Acceptance E,D VU,MARLENI CHANG 04/14/18 1543          Point: Precautions (Done)    Learning Progress Summary     Learner Status Readiness Method Response Comment Documented  by    Patient Done Acceptance E,TB,D DEMARCO,NR   04/16/18 1354     Done Eager ROMIE MARTINS,MARLENI   04/15/18 1135     Done Acceptance ED DEMARCO,MARLENI   04/14/18 1543                      User Key     Initials Effective Dates Name Provider Type [facility] 03/07/18 -  Keri Greenfield PTA Physical Therapy Assistant PT     04/03/18 -  Mariano Núñez, PT Physical Therapist PT                    PT Recommendation and Plan     Plan of Care Reviewed With: patient  Outcome Summary: still needs cues for posture and tfers; pt reports decr back pain w/log roll technique          Outcome Measures     Row Name 04/15/18 1136 04/14/18 1544          How much help from another person do you currently need...    Turning from your back to your side while in flat bed without using bedrails? 3  -JR 3  -JR     Moving from lying on back to sitting on the side of a flat bed without bedrails? 3  -JR 3  -JR     Moving to and from a bed to a chair (including a wheelchair)? 3  -JR 3  -JR     Standing up from a chair using your arms (e.g., wheelchair, bedside chair)? 3  -JR 3  -JR     Climbing 3-5 steps with a railing? 2  -JR 1  -JR     To walk in hospital room? 3  -JR 3  -JR     AM-PAC 6 Clicks Score 17  -JR 16  -JR        Functional Assessment    Outcome Measure Options  -- AM-PAC 6 Clicks Basic Mobility (PT)  -       User Key  (r) = Recorded By, (t) = Taken By, (c) = Cosigned By    Initials Name Provider Type    JR Mariano Núñez, PT Physical Therapist           Time Calculation:         PT Charges     Row Name 04/16/18 1345             Time Calculation    Start Time 1320  -      Stop Time 1340  -      Time Calculation (min) 20 min  -MADY      PT Received On 04/16/18  -MADY      PT - Next Appointment 04/17/18  -MADY        User Key  (r) = Recorded By, (t) = Taken By, (c) = Cosigned By    Initials Name Provider Type    MADY Greenfield PTA Physical Therapy Assistant          Therapy Charges for Today     Code Description Service Date Service  Provider Modifiers Qty    12160831833 HC PT THER PROC EA 15 MIN 4/16/2018 Keri Greenfield, PTA GP 1          PT G-Codes  Outcome Measure Options: (S) AM-PAC 6 Clicks Basic Mobility (PT)    Keri Greenfield PTA  4/16/2018

## 2018-04-17 PROBLEM — E43 SEVERE PROTEIN-CALORIE MALNUTRITION (HCC): Status: ACTIVE | Noted: 2018-01-01

## 2018-04-17 NOTE — PROGRESS NOTES
Continued Stay Note  [facility]     Patient Name: Kye Aranda  MRN: [id number]  Today's Date: 4/17/2018    Admit Date: 4/8/2018          Discharge Plan     Row Name 04/17/18 6660       Plan    Plan Home with [facility] and private pay caregivers.     Plan Comments Extensive conversation regarding discharge plans, rehab facilities are not accepting patient due to high medication costs. Discussed friend/ family support patient granted me permission to speak with his friend Melissa Miller [phone].  Melissa says that patient can stay with her address is [address] [place] [address].  She says that she can fix breakfast each AM and she is home for lunch every day from 12-1.  Discussed home health options and she supports [facility] for RN and therapy services.  Also left copy of the In-Home Personal Care Agencies that she and patient could review and determine the needs at home.  Notified [facility] spoke with Bree she will review and follow.  Will continue to monitor for discharge needs.               Discharge Codes    No documentation.           Steff Isbell RN

## 2018-04-17 NOTE — PROGRESS NOTES
Name: Kye Aranda ADMIT: 2018   : 1945  PCP: Kendrick Griggs MD    MRN: [id number] LOS: 8 days   AGE/SEX: 72 y.o. male  [facility]   Subjective   CC: Back pain  No acute events. Pain is improving, able to work with PT.  Taking PO well.  No n/v.  Having non-bloody BMs.  C/o dry cough.    Objective   Vital Signs  Temp:  [98.1 °F (36.7 °C)-98.7 °F (37.1 °C)] 98.5 °F (36.9 °C)  Heart Rate:  [51-60] 60  Resp:  [16-18] 16  BP: (114-137)/(49-64) 137/61  SpO2:  [90 %-94 %] 90 %  on   ;   Device (Oxygen Therapy): room air  Body mass index is 26.63 kg/m².    Physical Exam   Constitutional: He is oriented to person, place, and time. No distress.   HENT:   Head: Normocephalic and atraumatic.   Mouth/Throat: Oropharynx is clear and moist.   Eyes: Conjunctivae and EOM are normal. Pupils are equal, round, and reactive to light.   Neck: Normal range of motion. Neck supple.   Cardiovascular: Normal rate, regular rhythm and intact distal pulses.    Pulmonary/Chest: Effort normal and breath sounds normal.   Abdominal: Soft. Bowel sounds are normal.   Musculoskeletal: He exhibits tenderness (lower thoracic spine). He exhibits no edema.   Neurological: He is alert and oriented to person, place, and time. He has normal strength. He displays no tremor (no asterixis). No sensory deficit.   Skin: Skin is warm and dry. He is not diaphoretic.   Psychiatric: He has a normal mood and affect. His behavior is normal.   Nursing note and vitals reviewed.      Results Review:       I reviewed the patient's new clinical results.    Results from last 7 days  Lab Units 18  0419 18  0815 04/15/18  0509 18  0502   WBC 10*3/mm3 3.26* 4.79 2.83* 3.46*   HEMOGLOBIN g/dL 9.5* 10.3* 8.8* 9.9*   PLATELETS 10*3/mm3 34* 48* 30* 47*       Results from last 7 days  Lab Units 18  0414 18  0412   SODIUM mmol/L 135* 137   POTASSIUM mmol/L 4.4 4.0   CHLORIDE mmol/L 103 100   CO2 mmol/L 22.5 26.5   BUN mg/dL 11 14    CREATININE mg/dL 0.96 1.07   GLUCOSE mg/dL 165* 200*   Estimated Creatinine Clearance: 77 mL/min (by C-G formula based on SCr of 0.96 mg/dL).    Results from last 7 days  Lab Units 04/12/18  0414 04/11/18  0412   CALCIUM mg/dL 8.3* 8.7   ALBUMIN g/dL 2.90* 3.00*         clonazePAM 0.5 mg Oral Daily   cobicistat 150 mg Oral Daily With Breakfast   darunavir 800 mg Oral Daily With Breakfast   emtricitabine-tenofovir 1 tablet Oral Q24H   fluconazole 100 mg Oral Every Other Day   guaifenesin-dextromethorphan 2 tablet Oral BID   insulin aspart 0-7 Units Subcutaneous 4x Daily With Meals & Nightly   insulin detemir 30 Units Subcutaneous Nightly   lactulose 20 g Oral BID   levothyroxine 300 mcg Oral Q AM   metoprolol succinate XL 50 mg Oral Q12H   raltegravir 400 mg Oral BID   rifaximin 550 mg Oral Q12H   sennosides-docusate sodium 2 tablet Oral BID   spironolactone 50 mg Oral Daily   sulfamethoxazole-trimethoprim 1 tablet Oral Every Other Day   tamsulosin 0.4 mg Oral Nightly      Diet Regular; Consistent Carbohydrate      Assessment/Plan      Active Hospital Problems (** Indicates Principal Problem)    Diagnosis Date Noted   • **T12 compression fracture [S22.080A] 04/09/2018   • Mild protein-calorie malnutrition [E44.1] 04/10/2018   • Weight loss, abnormal [R63.4] 04/09/2018   • Hypothyroidism [E03.9] 04/09/2018   • Intractable back pain [M54.9] 04/09/2018   • Type 2 diabetes mellitus [E11.9] 04/09/2018   • Hypophosphatemia [E83.39] 04/09/2018   • Lumbar degenerative disc disease [M51.36] 12/05/2017   • Liver cirrhosis [K74.60] 08/14/2017   • Iron deficiency anemia due to chronic blood loss [D50.0] 05/06/2016   • Thrombocytopenia associated with AIDS [B20, D69.59] 05/06/2016   • Leukocytopenia [D72.819] 05/06/2016   • Chronic coronary artery disease [I25.10] 04/27/2016   • Peripheral arterial occlusive disease [I77.9] 04/27/2016      Resolved Hospital Problems    Diagnosis Date Noted Date Resolved   No resolved problems  to display.   T12 CompressionFx  - ZEESHAN and spine surgery have seen, recommending conservative tx given his risk of surgery  - continue morphine IR PRN-not using IV dilaudid anymore  - surgery recommending brace- d/w RN who will contact office about this  - PT following    Cough  - check CXR is negative, not hypoxic and labs are stabel  - treat symptomatically    Hypothyroidism  - started back on synthroid  - follow up TSH as outpatient in 4-6 weeks    Type 2 DM  - continue ssi  - increase levemir to 30 units nightly    HIV  - continue home meds  - on prophylactic bactrim and diflucan    Liver Cirrhosis  - has associated TCP which is stable  - mild ascites noted which is stable-on spironolactone and lasix PRN (leg swelling)  - on xifaxan and lactulose  - appreciate heme/onc recs    Rectal Bleeding  - continue anusol suppository  - GI has seen, no plans for further workup at this time given his spine issues    DVT Prophylaxis  - SCDs    Disposition  - SNF vs home with HH and assistance hopefully tomorrow.  CCP on board.    Zia Mcleod MD  [facility]  04/17/18  3:12 PM

## 2018-04-17 NOTE — PLAN OF CARE
Problem: Patient Care Overview  Goal: Plan of Care Review  Outcome: Ongoing (interventions implemented as appropriate)   04/16/18 0228 04/16/18 1353 04/17/18 0420   Coping/Psychosocial   Plan of Care Reviewed With --  --  patient   Plan of Care Review   Progress improving --  --    OTHER   Outcome Summary --  still needs cues for posture and tfers; pt reports decr back pain w/log roll technique --      Goal: Individualization and Mutuality  Outcome: Ongoing (interventions implemented as appropriate)    Goal: Discharge Needs Assessment  Outcome: Ongoing (interventions implemented as appropriate)      Problem: Fall Risk (Adult)  Goal: Absence of Fall  Outcome: Ongoing (interventions implemented as appropriate)      Problem: Pain, Chronic (Adult)  Goal: Acceptable Pain/Comfort Level and Functional Ability  Outcome: Ongoing (interventions implemented as appropriate)      Problem: Activity Intolerance (Adult)  Goal: Identify Related Risk Factors and Signs and Symptoms  Outcome: Ongoing (interventions implemented as appropriate)    Goal: Activity Tolerance  Outcome: Ongoing (interventions implemented as appropriate)    Goal: Effective Energy Conservation Techniques  Outcome: Ongoing (interventions implemented as appropriate)

## 2018-04-17 NOTE — PLAN OF CARE
Problem: Patient Care Overview  Goal: Plan of Care Review  Outcome: Ongoing (interventions implemented as appropriate)   04/17/18 1600   Coping/Psychosocial   Plan of Care Reviewed With patient   Plan of Care Review   Progress improving   OTHER   Outcome Summary Pt encouraged to increase activity. c/o pain this am. Pt was up in chair this afternoon. blood glucose monitored and improving. vss will continue to monitor.     Goal: Individualization and Mutuality  Outcome: Ongoing (interventions implemented as appropriate)    Goal: Discharge Needs Assessment  Outcome: Ongoing (interventions implemented as appropriate)    Goal: Interprofessional Rounds/Family Conf  Outcome: Ongoing (interventions implemented as appropriate)      Problem: Fall Risk (Adult)  Goal: Absence of Fall  Outcome: Ongoing (interventions implemented as appropriate)      Problem: Pain, Chronic (Adult)  Goal: Acceptable Pain/Comfort Level and Functional Ability  Outcome: Ongoing (interventions implemented as appropriate)      Problem: Activity Intolerance (Adult)  Goal: Identify Related Risk Factors and Signs and Symptoms  Outcome: Ongoing (interventions implemented as appropriate)    Goal: Activity Tolerance  Outcome: Ongoing (interventions implemented as appropriate)    Goal: Effective Energy Conservation Techniques  Outcome: Ongoing (interventions implemented as appropriate)

## 2018-04-17 NOTE — THERAPY TREATMENT NOTE
Acute Care - Physical Therapy Treatment Note  [facility]     Patient Name: Kye Aranda  : 1945  MRN: [id number]  Today's Date: 2018  Onset of Illness/Injury or Date of Surgery: 18  Date of Referral to PT: 18  Referring Physician: PHOEBE GARCIA    Admit Date: 2018    Visit Dx:    ICD-10-CM ICD-9-CM   1. T12 compression fracture S22.080A 805.2   2. Compression fracture of T12 vertebra S22.080A 805.2   3. Hypothyroidism, unspecified type E03.9 244.9   4. Low blood phosphate E83.39 275.3   5. Pancytopenia D61.818 284.19   6. Decreased strength, endurance, and mobility R53.1 780.79    Z74.09 780.99     Patient Active Problem List   Diagnosis   • Chronic coronary artery disease   • Difficulty breathing   • Intermittent claudication   • Peripheral arterial occlusive disease   • Shortness of breath   • Iron deficiency anemia due to chronic blood loss   • Thrombocytopenia associated with AIDS   • Leukocytopenia   • Neutropenia   • Pancytopenia   • Iron and its compounds causing adverse effect in therapeutic use   • Liver cirrhosis   • Lumbar degenerative disc disease   • Secondary thrombocytopenia   • History of coronary artery stent placement   • T12 compression fracture   • Weight loss, abnormal   • Hypothyroidism   • Intractable back pain   • Type 2 diabetes mellitus   • Hypophosphatemia   • Mild protein-calorie malnutrition       Therapy Treatment          Rehabilitation Treatment Summary     Row Name 18 1345             Treatment Time/Intention    Discipline physical therapy assistant  -      Document Type therapy note (daily note)  -      Subjective Information complains of;fatigue  -      Care Plan Review patient/other agree to care plan  -      Existing Precautions/Restrictions fall;brace worn when out of bed   now has WNF  -      Treatment Considerations/Comments cues for log roll, assist to don brace  -      Recorded by [] Keri Greenfield, PTA 18 [id number]  04/17/18 1503      Row Name 04/17/18 1345             Bed Mobility Assessment/Treatment    Supine-Sit [place] (Bed Mobility) minimum assist (75% patient effort);verbal cues;nonverbal cues (demo/gesture)  -      Sit-Supine [place] (Bed Mobility) minimum assist (75% patient effort);verbal cues;nonverbal cues (demo/gesture)  -      Bed Mobility, Safety Issues decreased use of arms for pushing/pulling;decreased use of legs for bridging/pushing  -      Assistive Device (Bed Mobility) bed rails  -      Comment (Bed Mobility) still req educ for log rolling   tactile cues req, not perf log roll w/o those cues  -      Recorded by [] Keri Greenfield, [facility] 04/17/18 1503 04/17/18 1503      Row Name 04/17/18 1345             Transfer Assessment/Treatment    Sit-Stand [place] (Transfers) minimum assist (75% patient effort);contact guard  -      Stand-Sit [place] (Transfers) supervision;verbal cues  -      Recorded by [] Keri Greenfield, [facility] 04/17/18 1503 04/17/18 1503      Row Name 04/17/18 1345             Sit-Stand Transfer    Assistive Device (Sit-Stand Transfers) walker, front-wheeled  -      Recorded by [] Keri Greenfield, [facility] 04/17/18 1503 04/17/18 1503      Row Name 04/17/18 1345             Stand-Sit Transfer    Assistive Device (Stand-Sit Transfers) walker, front-wheeled  -      Recorded by [] Keri Greenfield, [facility] 04/17/18 1503 04/17/18 1503      Row Name 04/17/18 1345             Gait/Stairs Assessment/Training    [place] Level (Gait) contact guard;verbal cues  -      Assistive Device (Gait) walker, front-wheeled  -      Distance in Feet (Gait) 90  -JM      Deviations/Abnormal Patterns (Gait) antalgic;jackie decreased;stride length decreased  -      Bilateral Gait Deviations forward flexed posture   improved w/cues and back supported by therapst during amb  -JM      Comment (Gait/Stairs) assist to guide rwx , more fatigued today  -      Recorded by [JM] Keri  Jean Pierre, PTA 04/17/18 1503 04/17/18 1503      Row Name 04/17/18 1345             Positioning and Restraints    Pre-Treatment Position in bed  -      Post Treatment Position chair  -JM      In Chair reclined;call light within reach;encouraged to call for assist   no alarm upon arrival  -      Recorded by [JM] Keri Greenfield, PTA 04/17/18 1503 04/17/18 1503      Row Name 04/17/18 1345             Pain Scale: Numbers Pre/Post-Treatment    Pain Scale: Numbers, Pretreatment 3/10  -JM      Pain Scale: Numbers, Post-Treatment 3/10  -JM      Pain Location back  -      Recorded by [JM] Keri Greenfield, PTA 04/17/18 1503 04/17/18 1503        User Key  (r) = Recorded By, (t) = Taken By, (c) = Cosigned By    Initials Name Effective Dates Discipline     Keri Greenfield, hospitals 03/07/18 -  PT                     Physical Therapy Education     Title: PT OT SLP Therapies (Done)     Topic: Physical Therapy (Done)     Point: Mobility training (Done)    Learning Progress Summary     Learner Status Readiness Method Response Comment Documented by    Patient Done Acceptance E,TB,D VU,NR educ re: back safety  04/17/18 1504     Done Acceptance E,TB,D VU,NR   04/16/18 1354     Done Eager E,D VU,DU   04/15/18 1135     Done Acceptance E,D VU,DU   04/14/18 1543          Point: Home exercise program (Done)    Learning Progress Summary     Learner Status Readiness Method Response Comment Documented by    Patient Done Acceptance E,TB,D VU,NR educ re: back safety  04/17/18 1504     Done Acceptance E,TB,D VU,NR   04/16/18 1354     Done Eager E,D VU,DU   04/15/18 1135     Done Acceptance E,D VU,DU  JR 04/14/18 1543          Point: Body mechanics (Done)    Learning Progress Summary     Learner Status Readiness Method Response Comment Documented by    Patient Done Acceptance E,TB,D VU,NR educ re: back safety  04/17/18 1504     Done Acceptance E,TB,D VU,NR   04/16/18 1354     Done Eager E,D VU,DU   04/15/18 1135     Done  Acceptance ROMIE MARTINSMARLENI CHANG 04/14/18 1543          Point: Precautions (Done)    Learning Progress Summary     Learner Status Readiness Method Response Comment Documented by    Patient Done Acceptance LAKSHMI,ROMIE GALAVIZ,NR educ re: back safety  04/17/18 1504     Done Acceptance ANASTACIA MARTINS D VU,NR   04/16/18 1354     Done Eager ROMIE MARTINS,MARLENI CHANG 04/15/18 1135     Done Acceptance ROMIE MARTINS DU JR 04/14/18 1543                      User Key     Initials Effective Dates Name Provider Type Discipline     03/07/18 -  Keri Greenfield PTA Physical Therapy Assistant PT     04/03/18 -  Mariano Núñez, PT Physical Therapist PT                    PT Recommendation and Plan     Plan of Care Reviewed With: patient  Outcome Summary: incr fatigue, incr educ to perf log roll properly          Outcome Measures     Row Name 04/17/18 1500 04/15/18 1136 04/14/18 1544       How much help from another person do you currently need...    Turning from your back to your side while in flat bed without using bedrails? 3  - 3  -JR 3  -JR    Moving from lying on back to sitting on the side of a flat bed without bedrails? 3  - 3  -JR 3  -JR    Moving to and from a bed to a chair (including a wheelchair)? 3  - 3  -JR 3  -JR    Standing up from a chair using your arms (e.g., wheelchair, bedside chair)? 3  - 3  -JR 3  -JR    Climbing 3-5 steps with a railing? 1  - 2  -JR 1  -JR    To walk in hospital room? 3  - 3  -JR 3  -JR    AM-PAC 6 Clicks Score 16  - 17  -JR 16  -JR       Functional Assessment    Outcome Measure Options  --  -- AM-PAC 6 Clicks Basic Mobility (PT)  -JR      User Key  (r) = Recorded By, (t) = Taken By, (c) = Cosigned By    Initials Name Provider Type    MADY Greenfield PTA Physical Therapy Assistant     Mariano Núñez, PT Physical Therapist           Time Calculation:         PT Charges     Row Name 04/17/18 1504             Time Calculation    Start Time 1310  -      Stop Time 1334  -      Time Calculation (min) 24 min   -MADY      PT Received On 04/17/18  -MADY      PT - Next Appointment 04/18/18  -MADY        User Key  (r) = Recorded By, (t) = Taken By, (c) = Cosigned By    Initials Name Provider Type    MADY Greenfield PTA Physical Therapy Assistant          Therapy Charges for Today     Code Description Service Date Service Provider Modifiers Qty    21529823824 HC PT THER PROC EA 15 MIN 4/16/2018 Keri Greenfield PTA GP 1    28368435505 HC PT THER PROC EA 15 MIN 4/17/2018 Keri Greenfield PTA GP 2          PT G-Codes  Outcome Measure Options: (S) AM-PAC 6 Clicks Basic Mobility (PT)    Keri Greenfield PTA  4/17/2018

## 2018-04-17 NOTE — DISCHARGE PLACEMENT REQUEST
"Troy England (72 y.o. Male)     Date of Birth Social Security Number Address Home Phone MRN    1945  [id number] [id number] [phone] [id number]    [employer] Marital Status          [employer] Single       Admission Date Admission Type Admitting Provider Attending Provider Department, Room/Bed    4/8/18 Emergency Mariano Delacruz MD Lykins, Matthew D, MD [address], 419/1    Discharge Date Discharge Disposition Discharge Destination                       Attending Provider:  Zia Mcleod MD    Allergies:  Methylprednisolone    Isolation:  None   Infection:  None   Code Status:  FULL    Ht:  170.2 cm (67\")   Wt:  77.1 kg (170 lb)    Admission Cmt:  None   Principal Problem:  T12 compression fracture [S22.080A]                 Active Insurance as of 4/8/2018     Primary Coverage     Payor Plan Insurance Group Employer/Plan Group    MEDICARE MEDICARE A & B      Payor Plan Address Payor Plan Phone Number Effective From Effective To    [address] [phone] 8/1/2001     [place] [address]       Subscriber Name Subscriber Birth Date Member ID       TROY ENGLAND 1945 [id number]           Secondary Coverage     Payor Plan Insurance Group Employer/Plan Group    AARP MED SUPP AAR HEALTH CARE OPTIONS      Payor Plan Address Payor Plan Phone Number Effective From Effective To    [facility] [phone] 1/1/2016     [address]       [place] [address]       Subscriber Name Subscriber Birth Date Member ID       TROY ENGLAND 1945 [id number]                 Emergency Contacts      (Rel.) Home Phone Work Phone Mobile Phone    Melissa Miller (Significant Other) -- -- [phone]              "

## 2018-04-17 NOTE — PLAN OF CARE
Problem: Patient Care Overview  Goal: Plan of Care Review  Outcome: Ongoing (interventions implemented as appropriate)   04/17/18 9350   Coping/Psychosocial   Plan of Care Reviewed With patient   OTHER   Outcome Summary incr fatigue, incr educ to perf log roll properly

## 2018-04-18 PROBLEM — E83.39 HYPOPHOSPHATEMIA: Chronic | Status: RESOLVED | Noted: 2018-01-01 | Resolved: 2018-01-01

## 2018-04-18 PROBLEM — M54.9 INTRACTABLE BACK PAIN: Status: RESOLVED | Noted: 2018-01-01 | Resolved: 2018-01-01

## 2018-04-18 NOTE — DISCHARGE SUMMARY
Date of Admission: 4/8/2018  Date of Discharge:  4/18/2018  Primary Care Physician: Kendrick Griggs MD     Discharge Diagnosis:  Active Hospital Problems (** Indicates Principal Problem)    Diagnosis Date Noted   • **T12 compression fracture [S22.080A] 04/09/2018   • Severe protein-calorie malnutrition [E43] 04/10/2018   • Weight loss, abnormal [R63.4] 04/09/2018   • Hypothyroidism [E03.9] 04/09/2018   • Type 2 diabetes mellitus [E11.9] 04/09/2018   • Hypophosphatemia [E83.39] 04/09/2018   • Lumbar degenerative disc disease [M51.36] 12/05/2017   • Liver cirrhosis [K74.60] 08/14/2017   • Iron deficiency anemia due to chronic blood loss [D50.0] 05/06/2016   • Thrombocytopenia associated with AIDS [B20, D69.59] 05/06/2016   • Leukocytopenia [D72.819] 05/06/2016   • Chronic coronary artery disease [I25.10] 04/27/2016   • Peripheral arterial occlusive disease [I77.9] 04/27/2016      Resolved Hospital Problems    Diagnosis Date Noted Date Resolved   • Intractable back pain [M54.9] 04/09/2018 04/18/2018       Presenting Problem/History of Present Illness:  Low blood phosphate [E83.39]  Pancytopenia [D61.818]  Compression fracture of T12 vertebra [S22.080A]  T12 compression fracture [S22.080A]  Hypothyroidism, unspecified type [E03.9]     Hospital Course:  The patient is a 72 y.o. male who presented with severe back pain following an injury several weeks prior.  Please see admission H&P from 4/9/18 for further details.  He was found to have a T12 compression fracture.  He was started on pain control medications.  Neurosurgery and spine surgery both evaluated the patient and decided that given his multiple medical problems including his liver cirrhosis with coagulopathy and chronically low platelet count in the mid-20s to low 30s that surgery would be very high risk.  Conservative therapy was recommended.  He was provided a brace as well as pain medications which were adjusted while he was in the hospital.  He did work with  "physical therapy and made some progress but overall he was still very weak and required much assistance with ambulation.  He will be discharged to a rehab facility to continue his strengthening which will help with his back pain.      He additionally had rectal bleeding and was evaluated by gastroenterology.  He did receive anusol suppositories, after which this had resolved.  They elected not to perform endoscopic evaluation given his current spine issues and resolution of this problem.  His hemoglobin remained stable.  He follows with Dr. Butler for cirrhosis and he should continue to follow up with her as scheduled.  He does have recurrent iron deficiency anemia and receives IV iron therapy with the [facility].  He is to follow with them as scheduled.    His leukocytopenia and thrombocytopenia remained stable.  He was continued on his HARRT medications for his HIV/AIDS (last CD4 count 95 3/30/18). He follows Dr. Thomas for this and should continue as scheduled.    Of note, he did have an elevated TSH to 43.790.  He had been on synthroid for hypothyroidism and had been out of this.  He was restarted on synthroid and tolerated this well.  He will need a follow up TSH in 4-6 weeks as an outpatient.       Exam Today:  Blood pressure 138/52, pulse 55, temperature 98.6 °F (37 °C), temperature source Oral, resp. rate 16, height 170.2 cm (67\"), weight 77.1 kg (170 lb), SpO2 93 %.  Constitutional: He is oriented to person, place, and time. No distress.   Head: Normocephalic and atraumatic.   Mouth/Throat: Oropharynx is clear and moist.   Eyes: Conjunctivae and EOM are normal. Pupils are equal, round, and reactive to light.   Neck: Normal range of motion. Neck supple.   Cardiovascular: Normal rate, regular rhythm and intact distal pulses.    Pulmonary/Chest: Effort normal and breath sounds normal.   Abdominal: Soft. Bowel sounds are normal.   Musculoskeletal: He exhibits tenderness over the lower thoracic spine. He exhibits " no edema.   Neurological: He is alert and oriented to person, place, and time. He has normal strength. He displays no tremor (no asterixis). No sensory deficit.   Skin: Skin is warm and dry. He is not diaphoretic.   Psychiatric: He has a normal mood and affect. His behavior is normal.   Nursing note and vitals reviewed.    Procedures Performed:  THORACIC SPINE MRI-4/9/18  T12 compression fracture, likely late subacute-early chronic with some bony retropulsion contributing to some mild narrowing of the central canal, greater to the right of midline. Again it does not appear pathologic but follow-up is recommended.    Consults:   Consults     Date and Time Order Name Status Description    4/11/2018 1501 Inpatient Orthopedic Surgery Consult      4/10/2018 1149 Inpatient Cardiology Consult Completed     4/9/2018 0904 Inpatient Gastroenterology Consult      4/9/2018 0904 Hematology & Oncology Inpatient Consult Completed     4/9/2018 0352 Inpatient Neurosurgery Consult Completed     4/9/2018 0032 [facility] (on-call MD unless specified) Completed            Discharge Disposition:  Skilled Nursing Facility    Discharge Medications:   Kye Aranda   Home Medication Instructions MADELEINE:[id number]    Printed on:04/18/18 2292   Medication Information                      baclofen (LIORESAL) 10 MG tablet  Take 1 tablet by mouth 3 (Three) Times a Day As Needed for Muscle Spasms.             BD PEN NEEDLE ONEAL U/F 32G X 4 MM misc               clonazePAM (KlonoPIN) 0.5 MG tablet  Take 1 tablet by mouth daily.             fluconazole (DIFLUCAN) 100 MG tablet  Take 100 mg by mouth Every Other Day.             furosemide (LASIX) 20 MG tablet  Take 1 tablet by mouth Daily.             Icosapent Ethyl (VASCEPA) 1 G capsule  Take 1 g by mouth 2 (Two) Times a Day.             ISENTRESS 400 MG tablet  Take 400 mg by mouth 2 (Two) Times a Day.             lactulose (CHRONULAC) 10 GM/15ML solution  Take 30 mL by mouth 2 (Two) Times a Day.              metoprolol succinate XL (TOPROL-XL) 50 MG 24 hr tablet  Take 1 tablet by mouth 2 (Two) Times a Day.             Morphine (MSIR) 15 MG tablet  Take 15 mg by mouth Every 6 (Six) Hours As Needed for Moderate Pain  or Severe Pain .             PREZISTA 800 MG tablet tablet  Take 800 mg by mouth.             PROCTOZONE-HC 2.5 % rectal cream  APPLY TO AFFECTED AREA THREE TIMES DAILY AFTER BOWEL MOVEMENT             rifaximin (XIFAXAN) 550 MG tablet  Take 1 tablet by mouth every 12 (twelve) hours.             sennosides-docusate sodium (SENOKOT-S) 8.6-50 MG tablet  Take 2 tablets by mouth 2 (Two) Times a Day.             spironolactone (ALDACTONE) 50 MG tablet  Take 1 tablet by mouth Daily.             sulfamethoxazole-trimethoprim (BACTRIM DS,SEPTRA DS) 800-160 MG per tablet  Take 1 tablet by mouth Every Other Day As Needed.             SYNTHROID 300 MCG tablet  TK 1 T PO QD             tamsulosin (FLOMAX) 0.4 MG capsule  Take 1 capsule by mouth nightly.             TRESIBA FLEXTOUCH 200 UNIT/ML solution pen-injector  INJECT 40-80 UNITS UNDER THE SKIN QD             TRUVADA 200-300 MG per tablet  Take 200-300 mg by mouth.             TYBOST 150 MG tablet  Take 150 mg by mouth.             vitamin D (ERGOCALCIFEROL) 14896 UNITS capsule capsule  TK 1 C PO ONCE A WEEK                 Discharge Diet:   Diet Instructions     Diet: Specialty Diet, Consistent Carbohydrate, Cardiac; Thin Liquids, No Restrictions; Low Sodium; Thin       Discharge Diet:   Specialty Diet  Consistent Carbohydrate  Cardiac       Fluid Consistency:  Thin Liquids, No Restrictions    Specialty Diets:  Low Sodium    Fluid Consistency:  Thin          Activity at Discharge:   Activity Instructions     Activity as Tolerated             Follow-up Appointments:  Future Appointments  Date Time Provider Department Center   4/25/2018 12:50 PM LAB CHAIR 4 BINA OG  LAB [facility]   4/25/2018 1:20 PM Carrie Lucas MD PhD [facility]   4/25/2018  2:00 PM CHAIR 04 BINA Lemus LG  [facility]   5/2/2018 1:00 PM CHAIR 08 BINA Lemus MD  [facility]   8/22/2018 11:30 AM David Singh MD [facility] None     Additional Instructions for the Follow-ups that You Need to Schedule     Discharge Follow-up with PCP    As directed      Follow Up Details:  1 week               Zia Mcleod MD  04/18/18  1:58 PM    Time Spent on Discharge Activities: Greater than 30 minutes.

## 2018-04-18 NOTE — PLAN OF CARE
Problem: Patient Care Overview  Goal: Plan of Care Review  Outcome: Ongoing (interventions implemented as appropriate)   04/18/18 1440   Coping/Psychosocial   Plan of Care Reviewed With patient   OTHER   Outcome Summary pt w/incr lethargy; concerned about home alone in daytime, pt unsafe at present for home alone; safest plan is SNU initially, unsure INS approval

## 2018-04-18 NOTE — PLAN OF CARE
Problem: Patient Care Overview  Goal: Plan of Care Review  Outcome: Ongoing (interventions implemented as appropriate)   04/18/18 5409   Coping/Psychosocial   Plan of Care Reviewed With patient;significant other;daughter   Plan of Care Review   Progress no change   OTHER   Outcome Summary d/c per [facility]--transitioning to acute rehab facility ([facility]). vss. morphine PRN--only x1 dose administered today.     Goal: Individualization and Mutuality  Outcome: Ongoing (interventions implemented as appropriate)    Goal: Discharge Needs Assessment  Outcome: Ongoing (interventions implemented as appropriate)    Goal: Interprofessional Rounds/Family Conf  Outcome: Ongoing (interventions implemented as appropriate)      Problem: Fall Risk (Adult)  Goal: Absence of Fall  Outcome: Ongoing (interventions implemented as appropriate)      Problem: Pain, Chronic (Adult)  Goal: Acceptable Pain/Comfort Level and Functional Ability  Outcome: Ongoing (interventions implemented as appropriate)      Problem: Activity Intolerance (Adult)  Goal: Identify Related Risk Factors and Signs and Symptoms  Outcome: Ongoing (interventions implemented as appropriate)    Goal: Activity Tolerance  Outcome: Ongoing (interventions implemented as appropriate)    Goal: Effective Energy Conservation Techniques  Outcome: Ongoing (interventions implemented as appropriate)

## 2018-04-18 NOTE — PLAN OF CARE
Problem: Patient Care Overview  Goal: Plan of Care Review  Outcome: Ongoing (interventions implemented as appropriate)   04/18/18 0545   Coping/Psychosocial   Plan of Care Reviewed With patient   Plan of Care Review   Progress improving     Goal: Individualization and Mutuality  Outcome: Ongoing (interventions implemented as appropriate)    Goal: Discharge Needs Assessment  Outcome: Ongoing (interventions implemented as appropriate)      Problem: Fall Risk (Adult)  Goal: Absence of Fall  Outcome: Ongoing (interventions implemented as appropriate)      Problem: Pain, Chronic (Adult)  Goal: Acceptable Pain/Comfort Level and Functional Ability  Outcome: Ongoing (interventions implemented as appropriate)      Problem: Activity Intolerance (Adult)  Goal: Identify Related Risk Factors and Signs and Symptoms  Outcome: Ongoing (interventions implemented as appropriate)    Goal: Activity Tolerance  Outcome: Ongoing (interventions implemented as appropriate)    Goal: Effective Energy Conservation Techniques  Outcome: Ongoing (interventions implemented as appropriate)

## 2018-04-18 NOTE — THERAPY TREATMENT NOTE
Acute Care - Physical Therapy Treatment Note  [facility]     Patient Name: Kye Aranda  : 1945  MRN: [id number]  Today's Date: 2018  Onset of Illness/Injury or Date of Surgery: 18  Date of Referral to PT: 18  Referring Physician: PHOEBE GARCIA    Admit Date: 2018    Visit Dx:    ICD-10-CM ICD-9-CM   1. T12 compression fracture S22.080A 805.2   2. Compression fracture of T12 vertebra S22.080A 805.2   3. Hypothyroidism, unspecified type E03.9 244.9   4. Low blood phosphate E83.39 275.3   5. Pancytopenia D61.818 284.19   6. Decreased strength, endurance, and mobility R53.1 780.79    Z74.09 780.99     Patient Active Problem List   Diagnosis   • Chronic coronary artery disease   • Difficulty breathing   • Intermittent claudication   • Peripheral arterial occlusive disease   • Shortness of breath   • Iron deficiency anemia due to chronic blood loss   • Thrombocytopenia associated with AIDS   • Leukocytopenia   • Neutropenia   • Pancytopenia   • Iron and its compounds causing adverse effect in therapeutic use   • Liver cirrhosis   • Lumbar degenerative disc disease   • Secondary thrombocytopenia   • History of coronary artery stent placement   • T12 compression fracture   • Weight loss, abnormal   • Hypothyroidism   • Type 2 diabetes mellitus   • Hypophosphatemia   • Severe protein-calorie malnutrition       Therapy Treatment          Rehabilitation Treatment Summary     Row Name 18 1418             Treatment Time/Intention    Discipline physical therapy assistant  -      Document Type therapy note (daily note)  -      Comment incr lethargy, not safe for activity alone  -      Existing Precautions/Restrictions fall;brace worn when out of bed   now has WNF, assist to don correctly  -2      Treatment Considerations/Comments pt concerned that friend works in daytime-several hrs w/o assist   not indep enough for home alone , MD may change some meds  -      Equipment Issued to  Patient --   will need rwx/bsc/bedrail if dc home  -2      Recorded by [JM] Keri Greenfield, PTA 04/18/18 1425 04/18/18 1425  [JM2] Keri Greenfield, [facility] 04/18/18 1437 04/18/18 1437      Row Name 04/18/18 1418             Vital Signs    Pre SpO2 (%) 95  -JM      O2 Delivery Pre Treatment room air  -JM      Post SpO2 (%) 96  -JM      O2 Delivery Post Treatment room air  -JM      Recorded by [JM] Keri Greenfield, PTA 04/18/18 1425 04/18/18 1425      Row Name 04/18/18 1418             Safety Issues, Functional Mobility    Safety Issues Affecting Function (Mobility) judgment;positioning of assistive device;safety precaution awareness;safety precautions follow-through/compliance  -      Impairments Affecting Function (Mobility) strength;pain;postural/trunk control  -JM      Recorded by [JM] Keri Greenfield, PTA 04/18/18 1435 04/18/18 1435      Row Name 04/18/18 1418             Bed Mobility Assessment/Treatment    Supine-Sit [place] (Bed Mobility) minimum assist (75% patient effort);verbal cues;nonverbal cues (demo/gesture)  -      Sit-Supine [place] (Bed Mobility) minimum assist (75% patient effort);verbal cues;nonverbal cues (demo/gesture)  -      Bed Mobility, Safety Issues decreased use of arms for pushing/pulling;decreased use of legs for bridging/pushing  -      Assistive Device (Bed Mobility) bed rails  -JM      Recorded by [JM] Keri Greenfield, PTA 04/18/18 1425 04/18/18 1425      Row Name 04/18/18 1418             Transfer Assessment/Treatment    Sit-Stand [place] (Transfers) minimum assist (75% patient effort);contact guard;verbal cues  -      Stand-Sit [place] (Transfers) supervision;verbal cues  -2      Recorded by [JM] Keri Greenfield, PTA 04/18/18 1435 04/18/18 1435  [JM2] Keri Greenfield, PTA 04/18/18 1425 04/18/18 1425      Row Name 04/18/18 1418             Sit-Stand Transfer    Assistive Device (Sit-Stand Transfers) walker, front-wheeled  -      Recorded by [JM]  Keri Greenfield, [facility] 04/18/18 1425 04/18/18 1425      Row Name 04/18/18 1418             Stand-Sit Transfer    Assistive Device (Stand-Sit Transfers) walker, front-wheeled  -      Recorded by [] Keri Greenfield, [facility] 04/18/18 1425 04/18/18 1425      Row Name 04/18/18 1418             Gait/Stairs Assessment/Training    [place] Level (Gait) contact guard;verbal cues  -      Assistive Device (Gait) walker, front-wheeled  -      Distance in Feet (Gait) 75  -JM2      Pattern (Gait) step-to  -JM2      Deviations/Abnormal Patterns (Gait) antalgic;jackie decreased;stride length decreased  -      Bilateral Gait Deviations forward flexed posture  -2      Recorded by [] Keri Greenfield, [facility] 04/18/18 1425 04/18/18 1425  [2] Keri Greenfield, [facility] 04/18/18 1435 04/18/18 1435      Row Name 04/18/18 1418             Positioning and Restraints    Pre-Treatment Position in bed  -      Post Treatment Position bed  -      In Bed supine;call light within reach;encouraged to call for assist;exit alarm on;with other staff   evaluator from [facility] [employer] present  -      Recorded by [] Keri Greenfield, [facility] 04/18/18 1435 04/18/18 1435      Row Name 04/18/18 1418             Pain Scale: Numbers Pre/Post-Treatment    Pain Scale: Numbers, Pretreatment 3/10  -JM      Pain Scale: Numbers, Post-Treatment 4/10  -JM      Pain Location back  -      Pain Intervention(s) Medication (See MAR);Repositioned;Ambulation/increased activity  -      Recorded by [] Keri Greenfield, [facility] 04/18/18 1425 04/18/18 1425        User Key  (r) = Recorded By, (t) = Taken By, (c) = Cosigned By    Initials Name Effective Dates Discipline     Keri Greenfield, [facility] 03/07/18 -  PT                     Physical Therapy Education     Title: PT OT SLP Therapies (Done)     Topic: Physical Therapy (Done)     Point: Mobility training (Done)    Learning Progress Summary     Learner Status Readiness Method Response Comment Documented by    Patient Done  Acceptance E,D VU,NR log roll techn educ again today-pt forgets day to day  04/18/18 1437     Done Acceptance E,TB,D VU,NR educ re: back safety  04/17/18 1504     Done Acceptance E,TB,D VU,NR   04/16/18 1354     Done Eager E,D VU,DU   04/15/18 1135     Done Acceptance E,D VU,DU   04/14/18 1543          Point: Home exercise program (Done)    Learning Progress Summary     Learner Status Readiness Method Response Comment Documented by    Patient Done Acceptance E,D VU,NR log roll techn educ again today-pt forgets day to day  04/18/18 1437     Done Acceptance E,TB,D VU,NR educ re: back safety  04/17/18 1504     Done Acceptance E,TB,D VU,NR   04/16/18 1354     Done Eager E,D VU,DU   04/15/18 1135     Done Acceptance E,D VU,DU   04/14/18 1543          Point: Body mechanics (Done)    Learning Progress Summary     Learner Status Readiness Method Response Comment Documented by    Patient Done Acceptance E,D VU,NR log roll techn educ again today-pt forgets day to day  04/18/18 1437     Done Acceptance E,TB,D VU,NR educ re: back safety  04/17/18 1504     Done Acceptance E,TB,D VU,NR   04/16/18 1354     Done Eager E,D VU,DU   04/15/18 1135     Done Acceptance E,D VU,DU   04/14/18 1543          Point: Precautions (Done)    Learning Progress Summary     Learner Status Readiness Method Response Comment Documented by    Patient Done Acceptance E,D VU,NR log roll techn educ again today-pt forgets day to day  04/18/18 1437     Done Acceptance E,TB,D VU,NR educ re: back safety  04/17/18 1504     Done Acceptance E,TB,D VU,NR   04/16/18 1354     Done Eager E,D VU,DU   04/15/18 1135     Done Acceptance E,D VU,DU   04/14/18 1543                      User Key     Initials Effective Dates Name Provider Type Discipline     03/07/18 -  Keri Greenfield PTA Physical Therapy Assistant PT     04/03/18 -  Mariano Núñez, PT Physical Therapist PT                    PT Recommendation and Plan     Plan of  Care Reviewed With: patient  Outcome Summary: incr fatigue, incr educ to perf log roll properly          Outcome Measures     Row Name 04/18/18 1400 04/17/18 1500          How much help from another person do you currently need...    Turning from your back to your side while in flat bed without using bedrails? 3  -JM 3  -JM     Moving from lying on back to sitting on the side of a flat bed without bedrails? 3  -JM 3  -JM     Moving to and from a bed to a chair (including a wheelchair)? 3  -JM 3  -JM     Standing up from a chair using your arms (e.g., wheelchair, bedside chair)? 3  -JM 3  -JM     Climbing 3-5 steps with a railing? 1  -JM 1  -JM     To walk in hospital room? 3  -JM 3  -JM     AM-PAC 6 Clicks Score 16  -JM 16  -JM       User Key  (r) = Recorded By, (t) = Taken By, (c) = Cosigned By    Initials Name Provider Type    MADY Greenfield PTA Physical Therapy Assistant           Time Calculation:         PT Charges     Row Name 04/18/18 1435             Time Calculation    Start Time 1325  -      Stop Time 1410  -      Time Calculation (min) 45 min  -MADY      PT Received On 04/18/18  -MADY      PT - Next Appointment 04/19/18  -MADY        User Key  (r) = Recorded By, (t) = Taken By, (c) = Cosigned By    Initials Name Provider Type    MADY Greenfield PTA Physical Therapy Assistant          Therapy Charges for Today     Code Description Service Date Service Provider Modifiers Qty    02217814803 HC PT THER PROC EA 15 MIN 4/17/2018 Keri Greenfield PTA GP 2    60605775098 HC PT THER PROC EA 15 MIN 4/18/2018 Keri Greenfield PTA GP 3          PT G-Codes  Outcome Measure Options: (S) AM-PAC 6 Clicks Basic Mobility (PT)    Keri Greenfield PTA  4/18/2018

## 2018-04-23 NOTE — PROGRESS NOTES
Case Management Discharge Note    Final Note: [facility],     Destination - Selection Complete     Service Request Status Selected Specialties Address Phone Number Fax Number    [facility] Selected Skilled Nursing Facility [address] [facility] [address] [phone]    [facility] Declined  med cost N/A [address] [facility] [address] [phone]      Durable Medical Equipment     No service coordination in this encounter.      Dialysis/Infusion     No service coordination in this encounter.      Home Medical Care     Service Request Status Selected Specialties Address Phone Number Fax Number    [facility] Pending - Request Sent N/A [address] [facility] [address] [address] [phone]      Social Care     No service coordination in this encounter.        Other:  (Private car )    Final Discharge Disposition Code: 03 - skilled nursing facility (SNF)

## 2018-04-23 NOTE — PROGRESS NOTES
Continued Stay Note  [facility]     Patient Name: Kye Aranda  MRN: [id number]  Today's Date: 4/23/2018    Admit Date: 4/8/2018          Discharge Plan     Row Name 04/23/18 1034       Plan    Plan Rehab - [facility]     Plan Comments Addendum:     Final Note Anitha Robert,     Row Name 04/23/18 1032       Plan    Final Discharge Disposition Code 03 - skilled nursing facility (SNF)              Discharge Codes    No documentation.       Expected Discharge Date and Time     Expected Discharge Date Expected Discharge Time    Apr 18, 2018             Steff Isbell RN

## 2018-04-25 PROBLEM — M84.48XD PATHOLOGICAL FRACTURE OF THORACIC VERTEBRA WITH ROUTINE HEALING: Status: ACTIVE | Noted: 2018-01-01

## 2018-04-25 NOTE — TELEPHONE ENCOUNTER
----- Message from Kerry Archibald RN sent at 4/25/2018  3:23 PM EDT -----  Appointment for next week feraheme can be cancelled per dr Lucas

## 2018-04-26 NOTE — TELEPHONE ENCOUNTER
I received a call from Dr. Lucas regarding this patient's complaints of continued severe thoracic pain while in his office yesterday.  He was hospitalized in early April and an MRI of the thoracic spine revealed a T12 compression fracture.  At that time he is platelet level was too low, INR too high to consider kyphoplasty. Dr. Lucas stated that he would be more than happy to assist with correcting platelets, INR if needed.  He is requesting that we re-evaluate Mr. Aranda ASAP for possible kyphoplasty procedure.    Could you please find a spot in Dr. Davis's schedule next week and contact the patient with the appointment?    Thank you

## 2018-04-27 PROBLEM — M54.6 ACUTE BILATERAL THORACIC BACK PAIN: Status: ACTIVE | Noted: 2018-01-01

## 2018-04-27 PROBLEM — M80.00XA AGE-RELATED OSTEOPOROSIS WITH CURRENT PATHOLOGICAL FRACTURE: Status: ACTIVE | Noted: 2018-01-01

## 2018-04-27 PROBLEM — M80.08XA AGE-RELATED OSTEOPOROSIS WITH CURRENT PATHOLOGICAL FRACTURE OF VERTEBRA (HCC): Status: ACTIVE | Noted: 2018-01-01

## 2018-04-27 NOTE — PROGRESS NOTES
Subjective   Patient ID: Kye Aranda is a 72 y.o. male is here today for follow-up on back pain.    Dr. Lucas spoke with Arabella on 4/26/18 regarding the patient's complaint of severe thoracic pain. The patient had an MRI this month and was found to have a T12 compression fracture. Per Arabella the patient needs to be re-evaluated by Dr. Davis.    Back Pain   This is a chronic problem. The current episode started more than 1 year ago. The problem occurs daily. The pain is present in the thoracic spine and lumbar spine. The pain does not radiate. Pertinent negatives include no bladder incontinence, bowel incontinence, leg pain, numbness or tingling.       The following portions of the patient's history were reviewed and updated as appropriate: allergies, current medications, past family history, past medical history, past social history, past surgical history and problem list.    Review of Systems   Gastrointestinal: Negative for bowel incontinence.   Genitourinary: Negative for bladder incontinence.   Musculoskeletal: Positive for back pain.   Neurological: Negative for tingling and numbness.   All other systems reviewed and are negative.    This patient has a number of medical problems including osteoporosis, iron deficiency anemia, thrombocytopenia, HIV disease. He was seen in the hospital with a T12 compression fracture but with a low platelet count in the 30,000s and 40,000s. Dr. Silva and Dr. Hodge saw him and did not feel he was a candidate for surgery. We were contacted at our office by Dr. Lucas who is his hematologist. He asked us if we would consider doing this surgery if he could transfuse him. I think that is certainly doable but I would need to have the platelet count close to 100,000. The highest it has been recently is 72,000. He does have a known history of osteoporosis and takes Prolia. He does say his pain is quite miserable. He has been in subacute rehabilitation. He is in a wheelchair today and  prior to this he said he was moving around quite well. The pain is in the thoracolumbar junction as expected without radiation of pain into the legs. I told him I would do the kyphoplasty but I do need to speak with Dr. Lucas and only if the platelet count can be elevated with platelet transfusions.         Objective   Physical Exam   Constitutional: He is oriented to person, place, and time. He appears well-developed and well-nourished.   HENT:   Head: Normocephalic and atraumatic.   Eyes: Conjunctivae and EOM are normal. Pupils are equal, round, and reactive to light.   Fundoscopic exam:       The right eye shows no papilledema. The right eye shows venous pulsations.        The left eye shows no papilledema. The left eye shows venous pulsations.   Neck: Carotid bruit is not present.   Neurological: He is oriented to person, place, and time. He has a normal Finger-Nose-Finger Test and a normal Heel to Shin Test. Gait normal.   Reflex Scores:       Tricep reflexes are 2+ on the right side and 2+ on the left side.       Bicep reflexes are 2+ on the right side and 2+ on the left side.       Brachioradialis reflexes are 2+ on the right side and 2+ on the left side.       Patellar reflexes are 2+ on the right side and 2+ on the left side.       Achilles reflexes are 2+ on the right side and 2+ on the left side.  Psychiatric: His speech is normal.     Neurologic Exam     Mental Status   Oriented to person, place, and time.   Registration of memory: Good recent and remote memory.   Attention: normal. Concentration: normal.   Speech: speech is normal   Level of consciousness: alert  Knowledge: consistent with education.     Cranial Nerves     CN II   Visual fields full to confrontation.   Visual acuity: normal    CN III, IV, VI   Pupils are equal, round, and reactive to light.  Extraocular motions are normal.     CN V   Facial sensation intact.   Right corneal reflex: normal  Left corneal reflex: normal    CN VII   Facial  expression full, symmetric.   Right facial weakness: none  Left facial weakness: none    CN VIII   Hearing: intact    CN IX, X   Palate: symmetric    CN XI   Right sternocleidomastoid strength: normal  Left sternocleidomastoid strength: normal    CN XII   Tongue: not atrophic  Tongue deviation: none    Motor Exam   Muscle bulk: normal  Right arm tone: normal  Left arm tone: normal  Right leg tone: normal  Left leg tone: normal    Strength   Strength 5/5 except as noted.     Sensory Exam   Light touch normal.     Gait, Coordination, and Reflexes     Gait  Gait: normal    Coordination   Finger to nose coordination: normal  Heel to shin coordination: normal    Reflexes   Right brachioradialis: 2+  Left brachioradialis: 2+  Right biceps: 2+  Left biceps: 2+  Right triceps: 2+  Left triceps: 2+  Right patellar: 2+  Left patellar: 2+  Right achilles: 2+  Left achilles: 2+  Right : 2+  Left : 2+      Assessment/Plan   Independent Review of Radiographic Studies:    I reviewed the initial MRI done earlier this month which does show an acute T12 osteoporotic compression fracture with minimal retropulsion.  It is acute.  Agree with the result.      Medical Decision Making:    I described and recommended a T12 kyphoplasty assuming Dr Lucas can transfuse this patient with platelets and get his platelet count above 100 or close to it.  If that can be achieved and I think we can move forward with a kyphoplasty and keep him overnight.  The goal of surgery is stabilization of the fracture to decrease pain and improve the quality of life.  The patient knows that the surgery will not prevent another fracture and that he or she may need additional surgery in the future.  The risks include, but are not limited to, infection, hemorrhage requiring transfusion or reoperation, extravasation of cement causing spinal cord injury, pulmonary embolus, incomplete relief of symptoms, potential need for additional surgeries in the future,  stroke, paralysis, coma, and death.  The patient agrees to proceed.    Kye was seen today for back pain.    Diagnoses and all orders for this visit:    Age-related osteoporosis with current pathological fracture with routine healing, subsequent encounter    Acute bilateral thoracic back pain      Return for 10 days after surgery with Jennifer

## 2018-04-27 NOTE — PROGRESS NOTES
Katy at Dr Roca office calling in regard to patient , Kye Manoj  States patient is to have Kyphoplasty on Thursday 05/03/18 and that Dr Lucas wanted patient to have platelets prior to procedure  Message to Dr Lucas to advise.

## 2018-04-29 PROBLEM — D69.6 THROMBOCYTOPENIA (HCC): Status: ACTIVE | Noted: 2018-01-01

## 2018-05-01 NOTE — TELEPHONE ENCOUNTER
Accordingly spoke to patient, he is scheduled for kyphoplasty on 5/3/2018 around noon time, however he is scheduled to report to surgery Center at 8:30 AM.     I called and spoke to automatic staff Mrs. Bullard, requested preparing 3 units of platelets, transfuse 2 units prior to kyphoplasty and 1 unit platelets post kyphoplasty on 5/3/2018.  I ordered premedication with both intravenous Pepcid and Benadryl.     JAMIE BOWMAN M.D., Ph.D.    5/1/2018 at 17:45 PM

## 2018-05-03 PROBLEM — S22.000A COMPRESSION FRACTURE OF BODY OF THORACIC VERTEBRA (HCC): Status: ACTIVE | Noted: 2018-01-01

## 2018-05-03 NOTE — PERIOPERATIVE NURSING NOTE
Notified Dr Amador of new platelet count of 70,000.  OK'd to proceed with surgery, no additional orders

## 2018-05-03 NOTE — PERIOPERATIVE NURSING NOTE
CALLED DR BOWMAN WITH AN UPDATE. NOTIFIED PTS HIVES HAVE RESOLVED AND NO LONGER C/O ITCHING.     CBC COMPLETED AFTER 2 UNITS OF PLATELETS AND PLATELETS CAME BACK AT 67. NOTIFIED PT IS TO GET A 3RD UNIT OF PLATELETS IN PACU. OKAY TO PROCEED WITH SURGERY.

## 2018-05-03 NOTE — PERIOPERATIVE NURSING NOTE
Chastity nurse at [facility] reviewed med list and confirmed all meds/dosages and last dose date/time. See med rec.

## 2018-05-03 NOTE — OP NOTE
Preoperative diagnosis: 1. Acute T12 osteoporotic compression fracture; 2. Thrombocytopenia    Postoperative diagnosis: Same as above    Procedures performed: T12 kyphoplasty    Surgeon: Ryan    First Assistant: none    Anesthesia: GET    EBL: 10 cc    Complications: none    Specimen sent: T12 bone    Drains: none    Findings: osteoporotic bone    Postoperative condition: stable    Indications for the operation:The patient is a 72-year-old gentleman with multiple medical problems including osteoporosis and chronic thrombocytopenia. He suffered an acute osteoporotic compression fracture with severe pain. He was a candidate for kyphoplasty, assuming we could get his platelets to a reasonable level prior to surgery, and after discussion with his hematologist and platelet transfusions we brought him to the operating room for a kyphoplasty.        Informed consent: He understood that the goal of surgery is stabilization of the fracture to decrease pain and improve the quality of life.  The patient knows that the surgery will not prevent another fracture and that he or she may need additional surgery in the future.  The risks include, but are not limited to, infection, hemorrhage requiring transfusion or reoperation, extravasation of cement causing spinal cord injury, pulmonary embolus, incomplete relief of symptoms, potential need for additional surgeries in the future, stroke, paralysis, coma, and death.  The patient agrees to proceed.    Details of the operation:DESCRIPTION OF PROCEDURE: The patient had been given today 3 units of platelets and the best that we could do was to get his platelet count up to 70,000. I felt that that was sufficient and we brought him to the operating room and he remained on his gurney in the supine position. After induction and endotracheal intubation he got 2 g of Kefzol as per the SCIP protocol. Venous access was secured. He was rolled over in the prone position on a radiolucent  [place] spinal table. All pressure points were padded including the brachial plexus. The thoracic lumbar region was then prepped and draped in the usual sterile fashion. We brought in 2 C-arms in the PA and lateral view and draped them in a sterile fashion. We aligned them orthogonal to the T12 level so we could see the pedicle entry sites bilaterally at T12. I injected each site with 5 mL of 0.25% Marcaine and made a small stab incision with a #11 skin knife at those levels. Using the modified Jamshidi needle I cannulated first the right and then the left T12 pedicle using a transpedicular technique, getting 2 mm in the posterior cortex and getting a bone biopsy on each side. The bone was obviously osteoporotic. Surprisingly, there was actually very little bleeding. I used 15 mm balloons and inflated them in sequence of 25 PSI up to 200 PSI, getting a moderate amount of elevation; I did not really want to overinflate and I did not want to fracture the endplate. The balloons were deflated and removed and I put in 2 cannulas' worth of cement right and left. That was about all I thought I could get in without causing extravasation, but I thought that was sufficient, and removed the needles and got permanent records. The incisions were closed with staples and a sterile, clean, dry, dressing was placed. The patient tolerated the procedure well. There was actually very minimal bleeding. He was rolled over in the supine position, extubated, and taken to the recovery room in satisfactory condition. He will be staying overnight and getting a platelet transfusion again afterwards.

## 2018-05-03 NOTE — PERIOPERATIVE NURSING NOTE
SPOKE WITH DR LEE PER PHONE. NOTIFIED PTS HIVES HAVE RESOLVED AND DR BOWMAN ORDERED 40MG SOLU-MEDROL. PLATELETS CAME BACK AT 67 AFTER THE 2 UNITS OF PLATELETS IN PREOP. 2 MORE UNITS OF PLATELETS TO BE GIVEN IN PREOP. CALL DR LEE WITH CBC RESULTS ONCE PLATELETS COMPLETE.

## 2018-05-03 NOTE — PERIOPERATIVE NURSING NOTE
NOE IN BLOOD BANK CALLED TO CLARIFY PLATELET ORDER. 1ST UNIT READY AND AWAITING AVAILABILITY OF 2ND UNIT. WILL CALL WHEN READY.

## 2018-05-03 NOTE — ANESTHESIA PREPROCEDURE EVALUATION
Anesthesia Evaluation     Patient summary reviewed and Nursing notes reviewed                Airway   Mallampati: III  Neck ROM: limited  Possible difficult intubation  Dental      Pulmonary    (+) a smoker Former, shortness of breath,   Cardiovascular     ECG reviewed  Rhythm: irregular  Rate: normal    (+) hypertension, CAD, cardiac stents PVD, hyperlipidemia,       Neuro/Psych- negative ROS  GI/Hepatic/Renal/Endo    (+)   liver disease, diabetes mellitus type 2, hypothyroidism,     Musculoskeletal     (+) back pain,   Abdominal    Substance History - negative use     OB/GYN negative ob/gyn ROS         Other   (+) arthritis   history of cancer                    Anesthesia Plan    ASA 3     general   (AIDS related pathos  CAD with stents  SR with LVH PVC's  Low platelets receiving pre op platelets  cirrhosis)  intravenous induction   Anesthetic plan and risks discussed with patient.

## 2018-05-03 NOTE — NURSING NOTE
"DR BOWMAN RETURNED CALL. NOTIFIED PT IS IN PREOP AND RECEIVED 2 UNITS OF PLATELETS. SHORTLY AFTER COMPLETION OF 2ND UNIT, PT BROKE OUT IN HIVES ON HIS FACE THEN C/O ITCHING ON HIS RIGHT SIDE. INFORMED HIM DR LEE WANTED HIM CALLED FOR ORDERS.     INFORMED DR BOWMAN  METHYLPREDNISOLONOE IS LISTED AS AN ALLERGY (REACTION--\"EVERYTHING SHUTS DOWN.\") BUT PT STATES HE IS ALLERGIC TO PREDNISOLONE. PT UNSURE WHICH IS AN ALLERGY AND THE REACTION. PER DR BOWMAN, GIVE 40MG IV X1 OF SOLU-MEDROL AND TO KEEP HIM UPDATED.   "

## 2018-05-03 NOTE — NURSING NOTE
SPOKE WITH DR MAYEN PER PHONE. INFORMED HIM PT RECEIVED HIS 2 UNITS OF PLATELETS IN PREOP AND SHORTLY AFTER THE COMPLETION OF THE 2ND, NOTICED HIVES ON PTS FACE. INFORMED HIM PT DENIES ANY COMPLAINTS. PT HAS METHYLPREDNISONE LISTED AS AN ALLERGY, BUT STATED HE IS ALLERGIC TO PREDNISOLONE. PT UNSURE OF WHICH ONE IS AN ALLERGY AND UNSURE OF THE REACTION.     PER DR LEE, CALL DR. BOWMAN FOR ORDERS TO TREAT HIVES.

## 2018-05-03 NOTE — PERIOPERATIVE NURSING NOTE
"NOTIFIED DR GRACIE LEE WANTS AN ADDITIONAL 2 UNITS IN PREOP. DR BOWMAN STATED HE \"IS OKAY WITH THAT\" AND TO CALL IF NEEDED WITH ANY OTHER PROBLEMS.   "

## 2018-05-03 NOTE — PERIOPERATIVE NURSING NOTE
Notified dr. Davis that patient has 2 hives on face after 3rd unit of platelets.  Orders noted.    Notified Dr. Lucas that patient has 2 hives on face after 3rd unit of platelets.  Orders noted.

## 2018-05-03 NOTE — ANESTHESIA PROCEDURE NOTES
Airway  Urgency: elective    Airway not difficult    General Information and Staff    Patient location during procedure: OR  Anesthesiologist: ELIZA MIDDLETON  CRNA: ABRAHAM TAYLOR    Indications and Patient Condition  Indications for airway management: airway protection    Preoxygenated: yes  MILS not maintained throughout  Mask difficulty assessment: 1 - vent by mask    Final Airway Details  Final airway type: endotracheal airway      Successful airway: ETT  Cuffed: yes   Successful intubation technique: direct laryngoscopy  Facilitating devices/methods: intubating stylet  Endotracheal tube insertion site: oral  Blade: Camryn  Blade size: #3  ETT size: 8.0 mm  Cormack-Lehane Classification: grade I - full view of glottis  Placement verified by: chest auscultation   Cuff volume (mL): 9  Measured from: lips  ETT to lips (cm): 23  Number of attempts at approach: 1    Additional Comments  PreO2 100% face mask, IV induction, easy mask, DVL x1, cords noted, tube through, cuff up, EBBSH, +etCO2, = chest movement, tube secured in place, atraumatic, teeth and lips intact as preop.

## 2018-05-03 NOTE — BRIEF OP NOTE
KYPHOPLASTY 1-2 LEVELS  Progress Note    Kye Aranda  5/3/2018    Pre-op Diagnosis:   Age-related osteoporosis with current pathological fracture of vertebra, initial encounter [M80.08XA] T12       Post-Op Diagnosis Codes:     * Age-related osteoporosis with current pathological fracture of vertebra, initial encounter [M80.08XA] same as above    Procedure/CPT® Codes:      Procedure(s):  T 12 KYPHOPLASTY     Surgeon(s):  Joey Davis MD    Anesthesia: General    Staff:   Circulator: Jolie Monge RN  Radiology Technologist: Keila Gnozalez; Ju Jacinto, RRT  Scrub Person: Fidelina Callaway  Vendor Representative: Jason Samuel    Estimated Blood Loss: 10 cc    Urine Voided: none    Specimens:                ID Type Source Tests Collected by Time   B : T12 VERTEBRAL BODY Bone Spine, Thoracic TISSUE PATHOLOGY EXAM Joey Davis MD 5/3/2018 1858         Drains:  none    Findings: osteoporotic bone    Complications: none      Joey Davis MD     Date: 5/3/2018  Time: 7:16 PM

## 2018-05-04 NOTE — CONSULTS
Name: Kye Aranda ADMIT: 5/3/2018   : 1945  PCP: Kendrick Griggs MD    MRN: [id number] LOS: 0 days   AGE/SEX: 72 y.o. male  ROOM: [facility]/     No chief complaint on file.  consult re: DM2    Subjective   Mr. Aranda is a 72 y.o. male with a history of diabetes was admitted to neurosurgery and underwent t12 kyphoplasty today. He reports BG has been well controlled at home recently and has not had any recent changes to his DM regimen. He denies hypoglycemic symptoms. He does report takes bactrim and fluconazole prophylaxis for HIV/Cirrhosis/Pancytopenia. He is followed by Dr Lucas for pancytopenia and Dr Thomas for HIV.    History of Present Illness    Past Medical History:   Diagnosis Date   • Anemia    • Cirrhosis    • Coronary artery disease    • Diabetes mellitus    • Difficulty breathing    • H/O complete eye exam 10/2017   • H/O transfusion of packed red blood cells    • HIV infection, symptomatic    • Hyperlipidemia    • Hypertension    • Pancytopenia    • Peripheral artery disease    • Prostate cancer    • Thyroid disease      Past Surgical History:   Procedure Laterality Date   • BONE MARROW BIOPSY W/ ASPIRATION  2014   • CARDIAC CATHETERIZATION     • COLONOSCOPY  2012    non-thrombosed EH, sessile polyp in ascending colon 3 mm in size.   • CORONARY ANGIOPLASTY WITH STENT PLACEMENT  2013   • ENDOSCOPY  2012    grade 1 varicesmiddle and lower 3rd of esophagus. Moderate portal hypertensive gastropathy in entire stomach.   • MOUTH SURGERY     • PROSTATE BIOPSY     • TONSILLECTOMY     • UPPER GASTROINTESTINAL ENDOSCOPY  2012    grd 1 varices, portal hypertensive gastropathy     Family History   Problem Relation Age of Onset   • Heart disease Other    • No Known Problems Mother    • No Known Problems Father    • No Known Problems Maternal Grandmother    • No Known Problems Maternal Grandfather    • No Known Problems Paternal Grandmother    • No Known Problems Paternal  Grandfather      Social History   Substance Use Topics   • Smoking status: Former Smoker     Packs/day: 1.50     Years: 30.00     Types: Cigarettes     Quit date: 1994   • Smokeless tobacco: Never Used   • Alcohol use No     Prescriptions Prior to Admission   Medication Sig Dispense Refill Last Dose   • BD PEN NEEDLE ONEAL U/F 32G X 4 MM misc    5/3/2018 at Unknown time   • fluconazole (DIFLUCAN) 100 MG tablet Take 100 mg by mouth Every Other Day.   5/1/2018   • furosemide (LASIX) 20 MG tablet Take 1 tablet by mouth Daily. 30 tablet 3 5/2/2018 at 0800   • gabapentin (NEURONTIN) 100 MG capsule Take 200 mg by mouth 3 (Three) Times a Day.   5/2/2018 at 0800   • hydrocortisone 2.5 % cream Apply 1 application topically As Needed.   5/1/2018   • Icosapent Ethyl (VASCEPA) 1 G capsule Take 1 g by mouth 2 (Two) Times a Day.   5/2/2018 at 2000   • ISENTRESS 400 MG tablet Take 400 mg by mouth 2 (Two) Times a Day.   5/2/2018 at 2000   • lactulose (CHRONULAC) 10 GM/15ML solution Take 30 mL by mouth 2 (Two) Times a Day. 1892 mL 0 5/1/2018 at 2000   • levothyroxine (SYNTHROID) 300 MCG tablet Take 300 mcg by mouth Every Morning.   5/2/2018 at 0800   • metoprolol succinate XL (TOPROL-XL) 50 MG 24 hr tablet Take 1 tablet by mouth 2 (Two) Times a Day. 60 tablet 11 5/2/2018 at 2000   • oxyCODONE-acetaminophen (PERCOCET) 7.5-325 MG per tablet Take 1 tablet by mouth Every 6 (Six) Hours As Needed.   5/2/2018 at 1320   • PREZISTA 800 MG tablet tablet Take 800 mg by mouth Daily.   5/2/2018 at 0800   • rifaximin (XIFAXAN) 550 MG tablet Take 1 tablet by mouth every 12 (twelve) hours. 60 tablet 5 5/2/2018 at 2000   • sennosides-docusate sodium (SENOKOT-S) 8.6-50 MG tablet Take 2 tablets by mouth 2 (Two) Times a Day.   5/2/2018 at 0800   • spironolactone (ALDACTONE) 50 MG tablet Take 50 mg by mouth Daily.   5/2/2018 at 0800   • tamsulosin (FLOMAX) 0.4 MG capsule Take 1 capsule by mouth nightly.   5/2/2018 at 2200   • TRESIBA FLEXTOUCH 200  "UNIT/ML solution pen-injector Inject 35 Units under the skin Every Night. (Patient taking differently: Inject 40 Units under the skin Every Morning.)  3 5/2/2018 at 0800   • TRUVADA 200-300 MG per tablet Take 200-300 mg by mouth Daily.   5/2/2018 at 0800   • TYBOST 150 MG tablet Take 150 mg by mouth Daily With Breakfast.   5/2/2018 at 0800   • vitamin D (ERGOCALCIFEROL) 81631 units capsule capsule Take 50,000 Units by mouth 1 (One) Time Per Week. thursdays 4/26/2018   • baclofen (LIORESAL) 10 MG tablet Take 10 mg by mouth 3 (Three) Times a Day As Needed for Muscle Spasms.   More than a month at Unknown time   • insulin aspart (novoLOG) 100 UNIT/ML injection Inject 0-7 Units under the skin 4 (Four) Times a Day With Meals & at Bedtime.  12 Taking   • sulfamethoxazole-trimethoprim (BACTRIM DS,SEPTRA DS) 800-160 MG per tablet Take 1 tablet by mouth Every Other Day As Needed.   SEVERAL WEEKS AGO     Allergies:    Allergies   Allergen Reactions   • Methylprednisolone Other (See Comments)     \"Everything shuts down\"   • Prednisolone Other (See Comments)     PT UNSURE--STATED HE WAS TOLD NOT TO TAKE IT AGAIN.        Review of Systems   Constitutional: Negative for chills and fever.   HENT: Negative for sore throat and trouble swallowing.    Eyes: Negative for pain and visual disturbance.   Respiratory: Negative for cough and shortness of breath.    Cardiovascular: Negative for chest pain and palpitations.   Gastrointestinal: Negative for diarrhea and nausea.   Endocrine: Negative for cold intolerance and heat intolerance.   Genitourinary: Negative for difficulty urinating and dysuria.   Musculoskeletal: Negative for neck pain and neck stiffness.   Skin: Negative for pallor and rash.   Allergic/Immunologic: Negative for environmental allergies and food allergies.   Neurological: Negative for seizures and syncope.   Hematological: Negative for adenopathy.   Psychiatric/Behavioral: Negative for agitation and confusion.    "     Objective    Vital Signs  Temp:  [97.6 °F (36.4 °C)-98.4 °F (36.9 °C)] 97.6 °F (36.4 °C)  Heart Rate:  [52-88] 84  Resp:  [14-18] 18  BP: (125-179)/(57-89) 152/71  SpO2:  [90 %-100 %] 98 %  on  Flow (L/min):  [2-4] 2;   Device (Oxygen Therapy): nasal cannula  Body mass index is 24.61 kg/m².    Physical Exam   Constitutional: He is oriented to person, place, and time. He appears well-developed. No distress.   HENT:   Head: Normocephalic.   Nose: Nose normal.   Eyes: Pupils are equal, round, and reactive to light.   left lid lag   Neck: Neck supple.   Cardiovascular: Normal rate and regular rhythm.    Pulmonary/Chest: Effort normal. No stridor. He has decreased breath sounds. He has no wheezes.   Abdominal: Soft. He exhibits no distension.   Musculoskeletal: He exhibits no edema or tenderness.   Neurological: He is alert and oriented to person, place, and time.   Skin: Skin is warm and dry. He is not diaphoretic.   Psychiatric: He has a normal mood and affect. His behavior is normal.   Nursing note and vitals reviewed.      Results Review:   I reviewed the patient's new clinical results. Reviewed imaging, agree with interpretation. Reviewed prior records.    Lab Results (last 24 hours)     Procedure Component Value Units Date/Time    POC Glucose Once [[id number]]  (Abnormal) Collected:  05/03/18 0932    Specimen:  Blood Updated:  05/03/18 0933     Glucose 151 (H) mg/dL     Narrative:       Meter: [id number] : [id number] Ozzy QUIROGA    CBC & Differential [[id number]] Collected:  05/03/18 1145    Specimen:  Blood Updated:  05/03/18 1214    Narrative:       The following orders were created for panel order CBC & Differential.  Procedure                               Abnormality         Status                     ---------                               -----------         ------                     CBC Auto Differential[[id number]]        Abnormal            Final result                 Please view results for  these tests on the individual orders.    CBC Auto Differential [[id number]]  (Abnormal) Collected:  05/03/18 1145    Specimen:  Blood Updated:  05/03/18 1214     WBC 3.46 (L) 10*3/mm3      RBC 3.93 (L) 10*6/mm3      Hemoglobin 11.3 (L) g/dL      Hematocrit 36.6 (L) %      MCV 93.1 fL      MCH 28.8 pg      MCHC 30.9 (L) g/dL      RDW 18.1 (H) %      RDW-SD 62.3 (H) fl      MPV 10.2 fL      Platelets 67 (L) 10*3/mm3      Neutrophil % 74.0 %      Lymphocyte % 14.7 (L) %      Monocyte % 10.4 %      Eosinophil % 0.9 %      Basophil % 0.0 %      Immature Grans % 0.0 %      Neutrophils, Absolute 2.56 10*3/mm3      Lymphocytes, Absolute 0.51 (L) 10*3/mm3      Monocytes, Absolute 0.36 10*3/mm3      Eosinophils, Absolute 0.03 10*3/mm3      Basophils, Absolute 0.00 10*3/mm3      Immature Grans, Absolute 0.00 10*3/mm3     POC Glucose Once [[id number]]  (Abnormal) Collected:  05/03/18 1344    Specimen:  Blood Updated:  05/03/18 1346     Glucose 139 (H) mg/dL     Narrative:       Meter: [id number] : [id number] Juve Oseguera RN    CBC (No Diff) [[id number]]  (Abnormal) Collected:  05/03/18 1657    Specimen:  Blood Updated:  05/03/18 1722     WBC 2.44 (L) 10*3/mm3      RBC 3.73 (L) 10*6/mm3      Hemoglobin 10.6 (L) g/dL      Hematocrit 34.9 (L) %      MCV 93.6 fL      MCH 28.4 pg      MCHC 30.4 (L) g/dL      RDW 18.1 (H) %      RDW-SD 62.6 (H) fl      MPV 10.3 fL      Platelets 70 (L) 10*3/mm3     Tissue Pathology Exam [[id number]] Collected:  05/03/18 1858    Specimen:  Bone from Spine, Thoracic Updated:  05/03/18 2054    POC Glucose Once [[id number]]  (Abnormal) Collected:  05/03/18 2002    Specimen:  Blood Updated:  05/03/18 2004     Glucose 212 (H) mg/dL     Narrative:       Meter: [id number] : [id number] Elysia Balderas RN    POC Glucose Once [[id number]]  (Abnormal) Collected:  05/03/18 2155    Specimen:  Blood Updated:  05/03/18 2159     Glucose 215 (H) mg/dL     Narrative:       Meter: [id number] : [id number] Little  Lili QUIROGA          Kyphoplasty Vertebroplasty   Final Result        Assessment/Plan      Active Hospital Problems (** Indicates Principal Problem)    Diagnosis Date Noted   • **Age-related osteoporosis with current pathological fracture of vertebra [M80.08XA] 04/27/2018   • Compression fracture of body of thoracic vertebra [M48.54XA] 05/03/2018      Resolved Hospital Problems    Diagnosis Date Noted Date Resolved   No resolved problems to display.     - DM2: A1c 6.9. Continue home regimen. Start SSI.  - HIV/Cirrhosis/Pancytopenia: Continue home prophylactic medications. Repeat CBC in morning. Consider ID, Hematology consults if needed.  - T12 compression fracture: Per primary service    Thank you for the consult and allowing me to participate in Mr Aranda's care. [facility] will continue to follow. Please call with any questions or concerns.  I discussed the patients findings and my recommendations with patient.    Bishnu Winston MD  [facility]  05/04/18  1:03 AM

## 2018-05-04 NOTE — DISCHARGE PLACEMENT REQUEST
"Troy England (72 y.o. Male)     Date of Birth Social Security Number Address Home Phone MRN    1945  [id number] [id number] [phone] [id number]    [employer] Marital Status          [place] Single       Admission Date Admission Type Admitting Provider Attending Provider Department, Room/Bed    5/3/18 Elective Joey Davis MD Villanueva, Wayne, MD [address], P584/1    Discharge Date Discharge Disposition Discharge Destination         Skilled Nursing Facility (DC - External)              Attending Provider:  Joey Davis MD    Allergies:  Methylprednisolone, Prednisolone    Isolation:  None   Infection:  None   Code Status:  FULL    Ht:  170.2 cm (67\")   Wt:  71.3 kg (157 lb 2 oz)    Admission Cmt:  None   Principal Problem:  Age-related osteoporosis with current pathological fracture of vertebra [M80.08XA] More...                 Active Insurance as of 5/3/2018     Primary Coverage     Payor Plan Insurance Group Employer/Plan Group    MEDICARE MEDICARE A & B      Payor Plan Address Payor Plan Phone Number Effective From Effective To    [address] [phone] 8/1/2001     [place] [address]       Subscriber Name Subscriber Birth Date Member ID       TROY ENGLAND 1945 [id number]           Secondary Coverage     Payor Plan Insurance Group Employer/Plan Group    AARP MED SUPP AARP HEALTH CARE OPTIONS      Payor Plan Address Payor Plan Phone Number Effective From Effective To    [facility] [phone] 1/1/2016     [address]       [place] [address]       Subscriber Name Subscriber Birth Date Member ID       TROY ENGLAND 1945 [id number]                 Emergency Contacts      (Rel.) Home Phone Work Phone Mobile Phone    Buchanan DamMelissa (Significant Other) -- -- [phone]    Olga England (Daughter) -- -- [phone]              "

## 2018-05-04 NOTE — ANESTHESIA POSTPROCEDURE EVALUATION
Patient: Kye Aranda    Procedure Summary     Date:  05/03/18 Room / Location:  [facility] OR [address] [place] [address] MAIN OR    Anesthesia Start:  1823 Anesthesia Stop:  1934    Procedure:  T 12 KYPHOPLASTY 1-2 LEVELS (Bilateral Spine Lumbar) Diagnosis:       Age-related osteoporosis with current pathological fracture of vertebra, initial encounter      (Age-related osteoporosis with current pathological fracture of vertebra, initial encounter [M80.08XA])    Surgeon:  Joey Davis MD Provider:  Breezy Landin MD    Anesthesia Type:  general ASA Status:  3          Anesthesia Type: general  Last vitals  BP   154/67 (05/03/18 2035)   Temp   36.6 °C (97.8 °F) (05/03/18 2020)   Pulse   79 (05/03/18 2035)   Resp   16 (05/03/18 2035)     SpO2   97 % (05/03/18 2035)     Post Anesthesia Care and Evaluation    Patient location during evaluation: PACU  Patient participation: complete - patient participated  Level of consciousness: awake  Pain score: 2  Pain management: adequate  Airway patency: patent  Anesthetic complications: No anesthetic complications  PONV Status: none  Cardiovascular status: acceptable  Respiratory status: acceptable  Hydration status: acceptable

## 2018-05-04 NOTE — PROGRESS NOTES
Continued Stay Note   [facility]     Patient Name: Kye Aranda  MRN: [id number]  Today's Date: 5/4/2018    Admit Date: 5/3/2018          Discharge Plan     Row Name 05/04/18 1218       Plan    Plan Follow for eval from [facility] and if no beds available he will return to [facility].     Patient/Family in Agreement with Plan yes    Plan Comments The patient was informed that the cost for [facility] transport is $369 plus $15 per mile and [facility] wheelchair transport is $45 dollars.  CCP will follow for eval from [facility] and will assist with the patient's d/c to either a skilled bed at [facility] or [facility] with [facility] HH following.  DOMENICO Evans    Row Name 05/04/18 1151       Plan    Plan The patient requested a referral to [facility] and if no beds available states that he will return to [facility].     Patient/Family in Agreement with Plan yes    Plan Comments Met with the patient at bedside; explained role of CCP, verified facesheet and discussed discharge planning needs.  The patient states that he is from [facility], which was confirmed by [facility] that he is from a skilled bed with no bedhold and can return.  The patient requested a referral be made to [facility] and voicemail was left for Mariah.  The patient states that if no bed is available at [facility] that he would return to [facility].  The patient resides at home alone, uses no DME, has 2 steps with no handrails to enter his 2 story home with 13 steps and 1 handrail.  The patient's PCP is Kendrick Griggs, [facility] and he denies any trouble remembering to take his medication or with affording his medication.  The patient denies any HH history, was provided with HH/SNF list and states that he would be agreeable to [facility] to follow for home needs upon completion of rehab and referral was made to [facility].  The patient states that he has grab bars in his shower, has no POA documents and was informed  that insurance coverage is not guaranteed for an ambulance transport.  The patient states that he may use a wheelchair transport or his friend Melissa will tranport him by private vehicle.  [facility] will follow up with referral made to [facility] and will assist with the patient's d/c to either a skilled bed at [facility] or [facility] with [facility] following.  DOMENICO Evans              Discharge Codes    No documentation.       Expected Discharge Date and Time     Expected Discharge Date Expected Discharge Time    May 4, 2018             DOMENICO Fitzpatrick

## 2018-05-04 NOTE — THERAPY EVALUATION
Acute Care - Physical Therapy Initial Evaluation   [facility]     Patient Name: Kye Aranda  : 1945  MRN: [id number]  Today's Date: 2018         Referring Physician: Ryan      Admit Date: 5/3/2018    Visit Dx:     ICD-10-CM ICD-9-CM   1. Decreased mobility R26.89 781.99   2. Thrombocytopenia D69.6 287.5   3. Age-related osteoporosis with current pathological fracture of vertebra, initial encounter M80.08XA 733.13     733.01     Patient Active Problem List   Diagnosis   • Chronic coronary artery disease   • Difficulty breathing   • Intermittent claudication   • Peripheral arterial occlusive disease   • Shortness of breath   • Iron deficiency anemia due to chronic blood loss   • Thrombocytopenia associated with AIDS   • Leukocytopenia   • Neutropenia   • Pancytopenia   • Iron and its compounds causing adverse effect in therapeutic use   • Liver cirrhosis   • Lumbar degenerative disc disease   • Secondary thrombocytopenia   • History of coronary artery stent placement   • T12 compression fracture   • Weight loss, abnormal   • Hypothyroidism   • Type 2 diabetes mellitus   • Severe protein-calorie malnutrition   • Pathological fracture of thoracic vertebra with routine healing   • Age-related osteoporosis with current pathological fracture   • Acute midline thoracic back pain   • Age-related osteoporosis with current pathological fracture of vertebra   • Thrombocytopenia   • Compression fracture of body of thoracic vertebra     Past Medical History:   Diagnosis Date   • Anemia    • Cirrhosis    • Coronary artery disease    • Diabetes mellitus    • Difficulty breathing    • H/O complete eye exam 10/2017   • H/O transfusion of packed red blood cells    • HIV infection, symptomatic    • Hyperlipidemia    • Hypertension    • Pancytopenia    • Peripheral artery disease    • Prostate cancer    • Thyroid disease      Past Surgical History:   Procedure Laterality Date   • BONE MARROW BIOPSY W/  ASPIRATION  01/21/2014   • CARDIAC CATHETERIZATION     • COLONOSCOPY  09/20/2012    non-thrombosed EH, sessile polyp in ascending colon 3 mm in size.   • CORONARY ANGIOPLASTY WITH STENT PLACEMENT  06/2013   • ENDOSCOPY  09/20/2012    grade 1 varicesmiddle and lower 3rd of esophagus. Moderate portal hypertensive gastropathy in entire stomach.   • MOUTH SURGERY     • PROSTATE BIOPSY     • TONSILLECTOMY     • UPPER GASTROINTESTINAL ENDOSCOPY  09/20/2012    grd 1 varices, portal hypertensive gastropathy        PT ASSESSMENT (last 12 hours)      Physical Therapy Evaluation     Row Name 05/04/18 0958          PT Evaluation Time/Intention    Subjective Information no complaints  -MG     Document Type evaluation  -MG     Mode of Treatment physical therapy  -MG     Patient Effort good  -MG     Symptoms Noted During/After Treatment none  -MG     Row Name 05/04/18 0958          General Information    Patient Profile Reviewed? yes  -MG     Referring Physician Ryan  -MG     Patient Observations alert;cooperative;agree to therapy  -MG     Patient/Family Observations supine in bed, no distress  -MG     Prior Level of Function --   was in rehab, walking with FWW, w/c for longer distances  -MG     Pertinent History of Current Functional Problem POD1 T12 kyphoplasty   -MG     Existing Precautions/Restrictions fall;spinal  -MG     Barriers to Rehab none identified  -MG     Row Name 05/04/18 0958          Relationship/Environment    Lives With facility resident  -MG     Row Name 05/04/18 0958          Resource/Environmental Concerns    Current Living Arrangements --   plans to return to rehab; lives alone   -MG     Row Name 05/04/18 0958          Cognitive Assessment/Intervention- PT/OT    Orientation Status (Cognition) oriented x 4  -MG     Follows Commands (Cognition) WNL  -MG     Safety Deficit (Cognitive) mild deficit;awareness of need for assistance;insight into deficits/self awareness;problem solving  -MG     Row Name  05/04/18 0958          Safety Issues, Functional Mobility    Safety Issues Affecting Function (Mobility) awareness of need for assistance;insight into deficits/self awareness  -MG     Impairments Affecting Function (Mobility) balance;endurance/activity tolerance;pain;strength  -MG     Row Name 05/04/18 0958          Bed Mobility Assessment/Treatment    Bed Mobility Assessment/Treatment supine-sit  -MG     Supine-Sit Locke (Bed Mobility) supervision  -MG     Assistive Device (Bed Mobility) bed rails;head of bed elevated  -MG     Comment (Bed Mobility) cues for log roll technique   -MG     Row Name 05/04/18 0958          Transfer Assessment/Treatment    Transfer Assessment/Treatment sit-stand transfer;stand-sit transfer  -MG     Sit-Stand Locke (Transfers) contact guard  -MG     Stand-Sit Locke (Transfers) contact guard  -MG     Row Name 05/04/18 0958          Sit-Stand Transfer    Assistive Device (Sit-Stand Transfers) walker, front-wheeled  -MG     Row Name 05/04/18 0958          Stand-Sit Transfer    Assistive Device (Stand-Sit Transfers) walker, front-wheeled  -MG     Row Name 05/04/18 0958          Gait/Stairs Assessment/Training    Locke Level (Gait) contact guard;1 person to manage equipment  -MG     Assistive Device (Gait) walker, front-wheeled  -MG     Distance in Feet (Gait) 150  -MG     Pattern (Gait) swing-through  -MG     Comment (Gait/Stairs) narrow base of support, slow jackie, frequently stops, difficulty multi-tasking   -MG     Row Name 05/04/18 0958          General ROM    GENERAL ROM COMMENTS LEs limited due to back pain   -MG     Row Name 05/04/18 0958          General Assessment (Manual Muscle Testing)    Comment, General Manual Muscle Testing (MMT) Assessment genearlized LE weakness   -MG     Row Name 05/04/18 0958          Pain Assessment    Additional Documentation Pain Scale: Word Pre/Post-Treatment (Group)  -MG     Row Name 05/04/18 0958          Pain Scale:  Numbers Pre/Post-Treatment    Pain Location back  -MG     Pain Intervention(s) Repositioned;Ambulation/increased activity  -MG     Row Name 05/04/18 0958          Pain Scale: Word Pre/Post-Treatment    Pain: Word Scale, Pretreatment 2 - mild pain  -MG     Pain: Word Scale, Post-Treatment 4 - moderate pain  -MG     Pre/Post Treatment Pain Comment notified RN to get pt pain meds  -MG     Row Name             Wound 05/03/18 1901 Other (See comments) back incision    Wound - Properties Group Date first assessed: 05/03/18  -SL Time first assessed: 1901  -SL Side: Other (See comments)  -SL Location: back  -SL Type: incision  -SL    Row Name 05/04/18 0958          Physical Therapy Clinical Impression    Patient/Family Goals Statement (PT Clinical Impression) get stronger, return to rehab  -MG     Criteria for Skilled Interventions Met (PT Clinical Impression) yes;treatment indicated  -MG     Impairments Found (describe specific impairments) aerobic capacity/endurance;gait, locomotion, and balance;muscle performance  -MG     Rehab Potential (PT Clinical Summary) good, to achieve stated therapy goals  -MG     Patient/Family Concerns, Anticipated Discharge Disposition (PT) pt wants to return to rehab  -MG     Row Name 05/04/18 0958          Vital Signs    O2 Delivery Pre Treatment room air  -MG     Row Name 05/04/18 0958          Physical Therapy Goals    Bed Mobility Goal Selection (PT) bed mobility, PT goal 1  -MG     Transfer Goal Selection (PT) transfer, PT goal 1  -MG     Gait Training Goal Selection (PT) gait training, PT goal 1  -MG     Row Name 05/04/18 0958          Bed Mobility Goal 1 (PT)    Activity/Assistive Device (Bed Mobility Goal 1, PT) sit to supine;supine to sit  -MG     Hico Level/Cues Needed (Bed Mobility Goal 1, PT) conditional independence  -MG     Time Frame (Bed Mobility Goal 1, PT) 5 days  -MG     Row Name 05/04/18 0958          Transfer Goal 1 (PT)    Activity/Assistive Device (Transfer Goal  1, PT) sit-to-stand/stand-to-sit;bed-to-chair/chair-to-bed;walker, rolling  -MG     Coffee Level/Cues Needed (Transfer Goal 1, PT) supervision required  -MG     Time Frame (Transfer Goal 1, PT) 5 days  -MG     Row Name 05/04/18 0958          Gait Training Goal 1 (PT)    Activity/Assistive Device (Gait Training Goal 1, PT) gait (walking locomotion);walker, rolling  -MG     Coffee Level (Gait Training Goal 1, PT) supervision required  -MG     Distance (Gait Goal 1, PT) 200  -MG     Time Frame (Gait Training Goal 1, PT) 5 days  -MG     Row Name 05/04/18 0958          Positioning and Restraints    Pre-Treatment Position in bed  -MG     Post Treatment Position chair  -MG     In Chair reclined;call light within reach;encouraged to call for assist;exit alarm on;notified nsg  -MG       User Key  (r) = Recorded By, (t) = Taken By, (c) = Cosigned By    Initials Name Provider Type     Jolie Monge, RN Registered Nurse     Megan Gosselin, PT Physical Therapist          Physical Therapy Education     Title: PT OT SLP Therapies (Active)     Topic: Physical Therapy (Active)     Point: Mobility training (Done)    Learning Progress Summary     Learner Status Readiness Method Response Comment Documented by    Patient Done Acceptance E VU,NR  MG 05/04/18 1004          Point: Precautions (Done)    Learning Progress Summary     Learner Status Readiness Method Response Comment Documented by    Patient Done Acceptance E VU,NR  MG 05/04/18 1004                      User Key     Initials Effective Dates Name Provider Type Discipline     04/03/18 -  Megan Gosselin, PT Physical Therapist PT                PT Recommendation and Plan  Anticipated Discharge Disposition (PT): skilled nursing facility (SNF)  Planned Therapy Interventions (PT Eval): balance training, bed mobility training, gait training, home exercise program, strengthening, transfer training  Therapy Frequency (PT Clinical Impression): daily  Outcome  Summary/Treatment Plan (PT)  Anticipated Discharge Disposition (PT): skilled nursing facility (SNF)  Patient/Family Concerns, Anticipated Discharge Disposition (PT): pt wants to return to rehab  Outcome Summary: Pt POD1 T12 kyphoplasty for compression fracture. Pt was previously at rehab and would like to return there as he lives alone. He is currently not safe to return home and would benefit  from short stay at SNF. He requires assist of 1 due to decerased balance and strength. Will see pt to improve independence. Will progress towards goals as tolerated.           Outcome Measures     Row Name 05/04/18 1000             How much help from another person do you currently need...    Turning from your back to your side while in flat bed without using bedrails? 3  -MG      Moving from lying on back to sitting on the side of a flat bed without bedrails? 3  -MG      Moving to and from a bed to a chair (including a wheelchair)? 3  -MG      Standing up from a chair using your arms (e.g., wheelchair, bedside chair)? 3  -MG      Climbing 3-5 steps with a railing? 2  -MG      To walk in hospital room? 3  -MG      AM-PAC 6 Clicks Score 17  -MG         Functional Assessment    Outcome Measure Options AM-PAC 6 Clicks Basic Mobility (PT)  -MG        User Key  (r) = Recorded By, (t) = Taken By, (c) = Cosigned By    Initials Name Provider Type    MG Megan Gosselin, PT Physical Therapist           Time Calculation:         PT Charges     Row Name 05/04/18 1006             Time Calculation    Start Time 0942  -MG      Stop Time 0956  -MG      Time Calculation (min) 14 min  -MG      PT Received On 05/04/18  -MG      PT - Next Appointment 05/05/18  -MG      PT Goal Re-Cert Due Date 05/09/18  -MG        User Key  (r) = Recorded By, (t) = Taken By, (c) = Cosigned By    Initials Name Provider Type    MG Megan Gosselin, PT Physical Therapist          Therapy Charges for Today     Code Description Service Date Service Provider Modifiers  Qty    93448267723 HC PT MOBILITY CURRENT 5/4/2018 Megan Gosselin, PT GP, CK 1    97726015184 HC PT MOBILITY PROJECTED 5/4/2018 Megan Gosselin, PT GP, CI 1    11491198643 HC PT EVAL LOW COMPLEXITY 2 5/4/2018 Megan Gosselin, PT GP 1    76093892313 HC PT THER PROC EA 15 MIN 5/4/2018 Megan Gosselin, PT GP 1          PT G-Codes  PT Professional Judgement Used?: Yes  Outcome Measure Options: AM-PAC 6 Clicks Basic Mobility (PT)  Score: 17  Functional Limitation: Mobility: Walking and moving around  Mobility: Walking and Moving Around Current Status (): At least 40 percent but less than 60 percent impaired, limited or restricted  Mobility: Walking and Moving Around Goal Status (): At least 1 percent but less than 20 percent impaired, limited or restricted      Megan Gosselin, PT  5/4/2018

## 2018-05-04 NOTE — PROGRESS NOTES
Continued Stay Note   [facility]     Patient Name: Kye Aranda  MRN: [id number]  Today's Date: 5/4/2018    Admit Date: 5/3/2018          Discharge Plan     Row Name 05/04/18 1415       Plan    Plan Skilled bed at [facility].     Patient/Family in Agreement with Plan yes    Plan Comments Spoke to Mariah Dorsey who stated that [facility] is unable to accept the patient due to the patient's Medicare reimbursement not covering the cost of the patient's medications.  The patient was informed and is agreeable to returning to [facility] and stated that his friend will transport him.  The patient states that he will also return the wheelchair in his room to [facility] if staff can assist him with putting the wheelchair in the car.  [facility] was informed that patient would be returning to a skilled bed at [facility].  DOMENICO Evans    Row Name 05/04/18 1218       Plan    Plan Follow for eval from [facility] and if no beds available he will return to [facility].     Patient/Family in Agreement with Plan yes    Plan Comments The patient was informed that the cost for [facility] transport is $369 plus $15 per mile and [facility] wheelchair transport is $45 dollars.  CCP will follow for eval from [facility] and will assist with the patient's d/c to either a skilled bed at [facility] or [facility] with [facility] following.  DOMENICO Evans    Row Name 05/04/18 3016       Plan    Plan The patient requested a referral to [facility] and if no beds available states that he will return to [facility].     Patient/Family in Agreement with Plan yes    Plan Comments Met with the patient at bedside; explained role of CCP, verified facesheet and discussed discharge planning needs.  The patient states that he is from [facility], which was confirmed by Shila that he is from a skilled bed with no bedhold and can return.  The patient requested a referral be made to [facility] and voicemail was left for Mariah.   The patient states that if no bed is available at [facility] that he would return to [facility].  The patient resides at home alone, uses no DME, has 2 steps with no handrails to enter his 2 story home with 13 steps and 1 handrail.  The patient's PCP is Kendrick Griggs, [facility] and he denies any trouble remembering to take his medication or with affording his medication.  The patient denies any HH history, was provided with HH/SNF list and states that he would be agreeable to [facility] to follow for home needs upon completion of rehab and referral was made to [facility].  The patient states that he has grab bars in his shower, has no POA documents and was informed that insurance coverage is not guaranteed for an ambulance transport.  The patient states that he may use a wheelchair transport or his friend Melissa will tranport him by private vehicle.  CCP will follow up with referral made to [facility] and will assist with the patient's d/c to either a skilled bed at [facility] or [facility] with [facility] following.  DOMENICO Evans              Discharge Codes    No documentation.       Expected Discharge Date and Time     Expected Discharge Date Expected Discharge Time    May 4, 2018             DOMENICO Fitzpatrick

## 2018-05-04 NOTE — PLAN OF CARE
Problem: Patient Care Overview  Goal: Plan of Care Review   05/04/18 1004   OTHER   Outcome Summary Pt POD1 T12 kyphoplasty for compression fracture. Pt was previously at rehab and would like to return there as he lives alone. He is currently not safe to return home and would benefit from short stay at SNF. He requires assist of 1 due to decerased balance and strength. Will see pt to improve independence. Will progress towards goals as tolerated.

## 2018-05-04 NOTE — PLAN OF CARE
Problem: Pain, Acute (Adult)  Goal: Identify Related Risk Factors and Signs and Symptoms  Outcome: Ongoing (interventions implemented as appropriate)    Goal: Acceptable Pain Control/Comfort Level  Outcome: Ongoing (interventions implemented as appropriate)   05/04/18 0511   Pain, Acute (Adult)   Acceptable Pain Control/Comfort Level making progress toward outcome

## 2018-05-04 NOTE — DISCHARGE SUMMARY
Date of admission: 4/3/18    Date of discharge: 4/4/18    Hospital course:  Mr. Aranda underwent an uncomplicated T12 kyphoplasty yesterday. He is doing well this morning. He is afebrile, ambulating, voiding ok.  His pain is better than it was preop and is well controlled on oral pain medication this am.     His plt count is also stable this am:  ..    Results from last 7 days  Lab Units 05/04/18  0538   WBC 10*3/mm3 2.67*   HEMOGLOBIN g/dL 9.7*   HEMATOCRIT % 32.1*   PLATELETS 10*3/mm3 62*         He will be discharged back to the NH today for continued rehab. He can mobilize as tolerated, just no lifting more than 10lbs or any repetitive bending or twisting.     His wound must be cleaned with peroxide bid, dressing changed daily. He can shower daily. Call immediately with any fever or chills, wound redness or swelling or discharge.    Please see the d/c med rec for a complete list of his discharge medications.

## 2018-05-04 NOTE — PLAN OF CARE
Problem: Fall Risk (Adult)  Goal: Identify Related Risk Factors and Signs and Symptoms   05/04/18 0512   Fall Risk (Adult)   Related Risk Factors (Fall Risk) environment unfamiliar;gait/mobility problems;age-related changes   Signs and Symptoms (Fall Risk) presence of risk factors     Goal: Absence of Fall   05/04/18 1234   Fall Risk (Adult)   Absence of Fall making progress toward outcome       Problem: Pain, Acute (Adult)  Goal: Identify Related Risk Factors and Signs and Symptoms   05/04/18 0511 05/04/18 1234   Pain, Acute (Adult)   Related Risk Factors (Acute Pain) surgery --    Signs and Symptoms (Acute Pain) --  verbalization of pain descriptors       Problem: Skin Injury Risk (Adult)  Goal: Skin Health and Integrity   05/04/18 1234   Skin Injury Risk (Adult)   Skin Health and Integrity making progress toward outcome

## 2018-05-04 NOTE — PLAN OF CARE
Problem: Patient Care Overview  Goal: Plan of Care Review  Outcome: Ongoing (interventions implemented as appropriate)   05/04/18 0512   Coping/Psychosocial   Plan of Care Reviewed With patient   Plan of Care Review   Progress improving   OTHER   Outcome Summary Rested quietly throughout the night. rates his pain a 3 out of 10. refuses pain medication at this time. vss. no drainage from surgical sites. there is some edema that remains on his rt mid back. ice pack aplied on and off since surgery. no other changes in assessment.        Problem: Fall Risk (Adult)  Goal: Identify Related Risk Factors and Signs and Symptoms  Outcome: Ongoing (interventions implemented as appropriate)   05/04/18 0512   Fall Risk (Adult)   Related Risk Factors (Fall Risk) environment unfamiliar;gait/mobility problems;age-related changes   Signs and Symptoms (Fall Risk) presence of risk factors     Goal: Absence of Fall  Outcome: Ongoing (interventions implemented as appropriate)   05/04/18 0512   Fall Risk (Adult)   Absence of Fall making progress toward outcome

## 2018-05-07 NOTE — PROGRESS NOTES
Case Management Discharge Note    Final Note: Skilled bed at [facility] by private auto.  DOMENICO Evans    Destination - Selection Complete     Service Request Status Selected Specialties Address Phone Number Fax Number    [facility] [address] [facility] [address] [phone]    [facility] Declined N/A [address] [address] [phone]        DOMENICO Fitzpatrick 5/4/2018 1414    The patient's Medicare reimbursement will not cover the cost of his medications.  Per Mariah Dorsey.                 Durable Medical Equipment     No service coordination in this encounter.      Dialysis/Infusion     No service coordination in this encounter.      Home Medical Care     Service Request Status Selected Specialties Address Phone Number Fax Number    [facility] Pending - Request Sent N/A [address] [id number] [phone]      Social Care     No service coordination in this encounter.        Other: Other (Private auto)    Final Discharge Disposition Code: 03 - skilled nursing facility (SNF)

## 2018-05-09 NOTE — PROGRESS NOTES
Subjective   Patient ID: Kye Aranda is a 72 y.o. male is here today for follow-up. He is 2 weeks out from having a T12 kyphoplasty by Dr. Davis on 05/03/2018    Today the patient states he is till having a lot of pain in his lower back. He is currently taking Percocet 7.5-325 MG every 4 hours. Patient said there has been some redness around his incision but other then that there hasn't been any problems . He denies having a fever.    The incision looks clean and dry. No swelling or drainage. Patient denies having fever.   Mr. Aranda is here for re-evaluation after T12 kyphoplasty. He is having new pain in the lumbar region. He denies any recent falls.       Back Pain   This is a new problem. The current episode started more than 1 year ago. The problem occurs daily. The problem has been waxing and waning since onset. The pain is present in the lumbar spine. The quality of the pain is described as shooting, stabbing and aching. The pain is at a severity of 7/10. The pain is worse during the night. The symptoms are aggravated by bending, coughing, position and twisting. Stiffness is present in the morning. Associated symptoms include weight loss. Pertinent negatives include no abdominal pain, bladder incontinence, bowel incontinence, chest pain, dysuria, fever, leg pain or numbness. Risk factors include history of cancer and history of osteoporosis. He has tried analgesics and muscle relaxant for the symptoms. The treatment provided no relief.       The following portions of the patient's history were reviewed and updated as appropriate: allergies, current medications, past family history, past medical history, past social history, past surgical history and problem list.    Review of Systems   Constitutional: Positive for weight loss. Negative for fever.   Respiratory: Negative for chest tightness and shortness of breath.    Cardiovascular: Negative for chest pain.   Gastrointestinal: Negative for abdominal pain  and bowel incontinence.   Genitourinary: Negative for bladder incontinence and dysuria.   Musculoskeletal: Positive for back pain.   Neurological: Negative for numbness.   All other systems reviewed and are negative.      Objective   Physical Exam   Constitutional: He is oriented to person, place, and time. He appears well-developed and well-nourished. He is cooperative. No distress.   HENT:   Head: Normocephalic and atraumatic.   Eyes: Conjunctivae are normal.   Neck: Normal range of motion. Neck supple.   Cardiovascular: Normal rate.    Pulmonary/Chest: Effort normal. No respiratory distress.   Abdominal: Soft. He exhibits no distension. There is no tenderness.   Musculoskeletal: Normal range of motion. He exhibits tenderness (Lumbar spine is tender with palpation. The tenderness is below the previous kyphoplasty site). He exhibits no edema.   Neurological: He is alert and oriented to person, place, and time. He has normal strength. He displays normal reflexes. No sensory deficit. He exhibits normal muscle tone. Coordination normal. GCS eye subscore is 4. GCS verbal subscore is 5. GCS motor subscore is 6.   No motor or sensory deficit. Negative Mann's; negative clonus.    Skin: Skin is warm and dry. No rash noted. He is not diaphoretic.   Psychiatric: He has a normal mood and affect. Thought content normal.   Vitals reviewed.      Assessment/Plan      Review of Pathology:    T12 bone pathology was negative for malignancy.     Independent Review of Radiographic Studies:      I reviewed the pre-operative MRI images of the lumbar spine today in the office. There was evidence of a compression deformity at L4 that appeared to be recent but there was no bone edema at that time.     Medical Decision Making:      Mr. Aranda is here for post surgical evaluation from T12 kyphoplasty. He has noticed new worsening pain in the lumbar region. It radiates into the both flank regions. The patient is currently out of pain  medication.     Given the findings of L4 deformity, I believe a new MRI of the lumbar spine is warranted. Mr. Aranda was given a refill on the Percocet for continued pain control. He will return to the office after MRI for re-evaluation with Dr. Davis.       Kye was seen today for follow-up.    Diagnoses and all orders for this visit:    Osteoporosis with current pathological fracture, unspecified osteoporosis type, initial encounter  -     MRI Lumbar Spine Without Contrast; Future    Lumbar spine pain  -     MRI Lumbar Spine Without Contrast; Future    Other orders  -     oxyCODONE-acetaminophen (PERCOCET) 7.5-325 MG per tablet; Take 1 tablet by mouth Every 12 (Twelve) Hours As Needed for Severe Pain .      Return for after radiographic imaging.

## 2018-05-21 NOTE — TELEPHONE ENCOUNTER
Taylor from [facility] calls and states that pt does not wish to have HH at this time [phone]

## 2018-05-22 NOTE — PROGRESS NOTES
Subjective   Patient ID: Kye Aranda is a 72 y.o. male is here today for follow-up on back pain. Mr. Aranda is 3 weeks out from having a T12 Kyphoplasty on 5/3/18 by Dr. Davis.    At the last visit the patient reported new onset low back pain.    Today the patient is here with a new MRI of the lumbar spine. He reports that there have been no changes with his symptoms since the last visit.    Back Pain   This is a chronic problem. The current episode started more than 1 year ago. The problem occurs daily. The problem is unchanged. The pain is present in the lumbar spine and thoracic spine. The pain does not radiate. Pertinent negatives include no bladder incontinence, bowel incontinence, leg pain, numbness, tingling or weakness.       The following portions of the patient's history were reviewed and updated as appropriate: allergies, current medications, past family history, past medical history, past social history, past surgical history and problem list.    Review of Systems   Gastrointestinal: Negative for bowel incontinence.   Genitourinary: Negative for bladder incontinence.   Musculoskeletal: Positive for back pain.   Neurological: Negative for tingling, weakness and numbness.   All other systems reviewed and are negative.    It has been just about 3 weeks since his T12 kyphoplasty. He needed a platelet transfusion beforehand, and it worked out from a technical standpoint, and he may have done well for a while, but apparently he has been having more pain, and a new MRI showed a new compression fracture at T11 and L1. These were definitely not there on the MRI prior to the past kyphoplasty. He is quite miserable, and the pain correlates in the thoracolumbar junction. He feels he might have gotten too much therapy at the subacute facility where he went back to after his overnight stay. Other than living with it and sending him to pain management, I do not think there is any other real choice other than to  repeat the kyphoplasty at T11 and L1 and hope for the best. He takes Prolia every 6 months. We will go ahead and do that next week with a preoperative platelet transfusion as we did before.      Objective   Physical Exam   Constitutional: He is oriented to person, place, and time. He appears well-developed and well-nourished.   HENT:   Head: Normocephalic and atraumatic.   Eyes: Conjunctivae and EOM are normal. Pupils are equal, round, and reactive to light.   Fundoscopic exam:       The right eye shows no papilledema. The right eye shows venous pulsations.        The left eye shows no papilledema. The left eye shows venous pulsations.   Neck: Carotid bruit is not present.   Neurological: He is oriented to person, place, and time. He has a normal Finger-Nose-Finger Test and a normal Heel to Shin Test. Gait normal.   Reflex Scores:       Tricep reflexes are 2+ on the right side and 2+ on the left side.       Bicep reflexes are 2+ on the right side and 2+ on the left side.       Brachioradialis reflexes are 2+ on the right side and 2+ on the left side.       Patellar reflexes are 2+ on the right side and 2+ on the left side.       Achilles reflexes are 2+ on the right side and 2+ on the left side.  Psychiatric: His speech is normal.     Neurologic Exam     Mental Status   Oriented to person, place, and time.   Registration of memory: Good recent and remote memory.   Attention: normal. Concentration: normal.   Speech: speech is normal   Level of consciousness: alert  Knowledge: consistent with education.     Cranial Nerves     CN II   Visual fields full to confrontation.   Visual acuity: normal    CN III, IV, VI   Pupils are equal, round, and reactive to light.  Extraocular motions are normal.     CN V   Facial sensation intact.   Right corneal reflex: normal  Left corneal reflex: normal    CN VII   Facial expression full, symmetric.   Right facial weakness: none  Left facial weakness: none    CN VIII   Hearing:  intact    CN IX, X   Palate: symmetric    CN XI   Right sternocleidomastoid strength: normal  Left sternocleidomastoid strength: normal    CN XII   Tongue: not atrophic  Tongue deviation: none    Motor Exam   Muscle bulk: normal  Right arm tone: normal  Left arm tone: normal  Right leg tone: normal  Left leg tone: normal    Strength   Strength 5/5 except as noted.     Sensory Exam   Light touch normal.     Gait, Coordination, and Reflexes     Gait  Gait: normal    Coordination   Finger to nose coordination: normal  Heel to shin coordination: normal    Reflexes   Right brachioradialis: 2+  Left brachioradialis: 2+  Right biceps: 2+  Left biceps: 2+  Right triceps: 2+  Left triceps: 2+  Right patellar: 2+  Left patellar: 2+  Right achilles: 2+  Left achilles: 2+  Right : 2+  Left : 2+      Assessment/Plan   Independent Review of Radiographic Studies:    The new MRI showed a new compression fracture both above and below at T11 and L1.  No significant retropulsion.      Medical Decision Making:    I described and recommended another kyphoplasty at both L1 and T11. I don't there is much other choice.  The goal of surgery is stabilization of the fracture to decrease pain and improve the quality of life.  The patient knows that the surgery will not prevent another fracture and that he or she may need additional surgery in the future.  The risks include, but are not limited to, infection, hemorrhage requiring transfusion or reoperation, extravasation of cement causing spinal cord injury, pulmonary embolus, incomplete relief of symptoms, potential need for additional surgeries in the future, stroke, paralysis, coma, and death.  The patient agrees to proceed.  Need to proceed with preoperative platelet transfusions again an overnight stay.  He doesn't want to use a brace afterwards and since she'll be going home we can back off on postoperative therapy.      Kye was seen today for back pain.    Diagnoses and all  orders for this visit:    Age-related osteoporosis with current pathological fracture, initial encounter  -     Case request; Standing  -     Case request    Lumbar spine pain    Acute bilateral thoracic back pain      Return for 10-12 days with Jennifer

## 2018-05-25 PROBLEM — M54.50 LUMBAR SPINE PAIN: Status: ACTIVE | Noted: 2018-01-01

## 2018-05-29 NOTE — ANESTHESIA PREPROCEDURE EVALUATION
Anesthesia Evaluation     Patient summary reviewed and Nursing notes reviewed   NPO Solid Status: > 8 hours  NPO Liquid Status: > 2 hours           Airway   Mallampati: II  no difficulty expected  Dental - normal exam     Comment: Protruding teeth    Pulmonary     breath sounds clear to auscultation  (+) shortness of breath,   Cardiovascular     ECG reviewed  Rhythm: regular  Rate: normal    (+) hypertension, CAD, cardiac stents PVD, hyperlipidemia,     ROS comment: Interpretation Summary     · Left ventricular ejection fraction is normal (Calculated EF = 56%).  · Myocardial perfusion imaging indicates a normal myocardial perfusion study with no evidence of ischemia.  · Impressions are consistent with a low risk study.        Neuro/Psych  GI/Hepatic/Renal/Endo    (+)   liver disease, hypothyroidism,     Musculoskeletal     (+) back pain,   Abdominal    Substance History      OB/GYN          Other                        Anesthesia Plan    ASA 3   (Successful airway: ETT  Cuffed: yes   Successful intubation technique: direct laryngoscopy  Facilitating devices/methods: intubating stylet  Endotracheal tube insertion site: oral  Blade: Camryn  Blade size: #3  ETT size: 8.0 mm  Cormack-Lehane Classification: grade I - full view of glottis  Placement verified by: chest auscultation   Cuff volume (mL): 9  Measured from: lips  ETT to lips (cm): 23  Number of attempts at approach: 1--3 weeks ago)  intravenous and inhalational induction   Anesthetic plan and risks discussed with patient.    · All left ventricular wall segments contract normally.  · Left ventricular wall thickness is consistent with borderline concentric hypertrophy.  · Left ventricular function is normal. Estimated EF = 59%.  · Left ventricular diastolic dysfunction (grade II) consistent with pseudonormalization.  Mild mitral valve regurgitation is present

## 2018-05-29 NOTE — ANESTHESIA PROCEDURE NOTES
Airway  Urgency: elective    Airway not difficult    General Information and Staff    Patient location during procedure: OR  Anesthesiologist: NINO BRADY    Indications and Patient Condition  Indications for airway management: airway protection    Preoxygenated: yes  Mask difficulty assessment: 1 - vent by mask    Final Airway Details  Final airway type: endotracheal airway      Successful airway: ETT  Cuffed: yes   Successful intubation technique: direct laryngoscopy  Blade: [place]  Blade size: #2  ETT size: 7.5 mm  Cormack-Lehane Classification: grade I - full view of glottis  Placement verified by: chest auscultation and capnometry   Number of attempts at approach: 1

## 2018-05-29 NOTE — ANESTHESIA POSTPROCEDURE EVALUATION
Patient: Kye Aranda    Procedure Summary     Date:  05/29/18 Room / Location:  [facility] OR 19 INV / [facility] HYBRID OR 18/19    Anesthesia Start:  1205 Anesthesia Stop:  1310    Procedure:  KYPHOPLASTY, L1, T11 kyphoplsty (N/A Spine Lumbar) Diagnosis:       Age-related osteoporosis with current pathological fracture, initial encounter      (Age-related osteoporosis with current pathological fracture, initial encounter [M80.00XA])    Provider:  Joey Davis MD Provider:  Bernice Ruby MD    Anesthesia Type:  Not recorded ASA Status:  3          Anesthesia Type: No value filed.  Last vitals  BP   127/96 (05/29/18 1340)   Temp   36.7 °C (98 °F) (05/29/18 1312)   Pulse   50 (05/29/18 1340)   Resp   20 (05/29/18 1340)     SpO2   96 % (05/29/18 1340)     Post Anesthesia Care and Evaluation    Patient location during evaluation: PACU  Patient participation: complete - patient participated  Level of consciousness: awake and alert  Pain management: adequate  Airway patency: patent  Anesthetic complications: No anesthetic complications    Cardiovascular status: acceptable  Respiratory status: acceptable  Hydration status: acceptable    Comments: --------------------            05/29/18               1340     --------------------   BP:       127/96     Pulse:      50       Resp:       20       Temp:                SpO2:      96%      --------------------

## 2018-06-01 NOTE — PLAN OF CARE
Problem: Patient Care Overview  Goal: Plan of Care Review   06/01/18 1150   Coping/Psychosocial   Plan of Care Reviewed With patient   Plan of Care Review   Progress improving   OTHER   Outcome Summary Pt progressing w ambulation this am. Pt requires less assistance to safely ambulate down hallway using RWx.

## 2018-06-01 NOTE — THERAPY TREATMENT NOTE
Acute Care - Physical Therapy Treatment Note   [facility]     Patient Name: Kye Aranda  : 1945  MRN: [id number]  Today's Date: 2018  Onset of Illness/Injury or Date of Surgery: 18  Date of Referral to PT: 18  Referring Physician: Ryan     Admit Date: 2018    Visit Dx:    ICD-10-CM ICD-9-CM   1. Age-related osteoporosis with current pathological fracture, initial encounter M80.00XA 733.10     733.01   2. Thrombocytopenia D69.6 287.5     Patient Active Problem List   Diagnosis   • Chronic coronary artery disease   • Difficulty breathing   • Intermittent claudication   • Peripheral arterial occlusive disease   • Shortness of breath   • Iron deficiency anemia due to chronic blood loss   • Thrombocytopenia associated with AIDS   • Leukocytopenia   • Neutropenia   • Pancytopenia   • Iron and its compounds causing adverse effect in therapeutic use   • Liver cirrhosis   • Lumbar degenerative disc disease   • Secondary thrombocytopenia   • History of coronary artery stent placement   • T12 compression fracture   • Weight loss, abnormal   • Hypothyroidism   • Type 2 diabetes mellitus   • Severe protein-calorie malnutrition   • Pathological fracture of thoracic vertebra with routine healing   • Age-related osteoporosis with current pathological fracture   • Acute bilateral thoracic back pain   • Age-related osteoporosis with current pathological fracture of vertebra   • Thrombocytopenia   • Compression fracture of body of thoracic vertebra   • Lumbar spine pain       Therapy Treatment          Rehabilitation Treatment Summary     Row Name 18 1148             Treatment Time/Intention    Discipline physical therapist  -MD      Document Type therapy note (daily note)  -MD      Subjective Information no complaints  -MD      Mode of Treatment physical therapy  -MD      Patient/Family Observations Pt standing in BR doorway showing no signs of acute distress.  -MD      Therapy Frequency  (PT Clinical Impression) daily  -MD      Patient Effort good  -MD      Existing Precautions/Restrictions fall;spinal  -MD      Recorded by [MD] Bernice Disla, PT 06/01/18 1149      Row Name 06/01/18 1148             Cognitive Assessment/Intervention- PT/OT    Orientation Status (Cognition) oriented x 4  -MD      Follows Commands (Cognition) WFL  -MD      Recorded by [MD] Bernice Disla, PT 06/01/18 1149      Row Name 06/01/18 1148             Transfer Assessment/Treatment    Stand-Sit [place] (Transfers) verbal cues;contact guard;stand by assist  -MD      Recorded by [MD] Bernice Disla, PT 06/01/18 1149      Row Name 06/01/18 1148             Stand-Sit Transfer    Assistive Device (Stand-Sit Transfers) walker, front-wheeled  -MD      Recorded by [MD] Bernice Disla, PT 06/01/18 1149      Row Name 06/01/18 1148             Gait/Stairs Assessment/Training    [place] Level (Gait) verbal cues;stand by assist  -MD      Assistive Device (Gait) walker, front-wheeled  -MD      Distance in Feet (Gait) 250  -MD      Pattern (Gait) step-through  -MD      Deviations/Abnormal Patterns (Gait) gait speed decreased;stride length decreased  -MD      Recorded by [MD] Bernice Disla, PT 06/01/18 1149      Row Name 06/01/18 1148             Positioning and Restraints    Pre-Treatment Position standing in room  -MD      Post Treatment Position chair  -MD      In Chair sitting;call light within reach  -MD      Recorded by [MD] Bernice Disla, PT 06/01/18 1149      Row Name 06/01/18 1148             Pain Scale: Numbers Pre/Post-Treatment    Pain Scale: Numbers, Pretreatment 0/10 - no pain  -MD      Recorded by [MD] Bernice Disla, PT 06/01/18 1149      Row Name                Wound 05/29/18 1326 Other (See comments) back incision    Wound - Properties Group Date first assessed: 05/29/18 [MC] Time first assessed: 1326 [MC] Side: Other (See comments) [MC] Location: back [MC] Type: incision [MC] Recorded by:  [MC] Carol Cho RN 05/29/18 1326    Row Name                 Wound 05/03/18 1901 Other (See comments) back incision    Wound - Properties Group Date first assessed: 05/03/18 [SL] Time first assessed: 1901 [SL] Side: Other (See comments) [SL] Location: back [SL] Type: incision [SL] Recorded by:  [SL] Jolie Monge RN 05/03/18 1901      User Key  (r) = Recorded By, (t) = Taken By, (c) = Cosigned By    Initials Name Effective Dates Discipline    MD Bernice Disla, PT 04/03/18 -  PT    SL Jolie Monge, PRAKASH 06/16/16 -  Nurse    RICK Cho RN 02/23/18 -  Nurse          Wound 05/03/18 1901 Other (See comments) back incision (Active)   Dressing Appearance dried drainage 6/1/2018  8:50 AM   Closure GERARDO 6/1/2018  8:50 AM   Base dressing in place, unable to visualize 6/1/2018  8:50 AM   Periwound intact 5/31/2018  8:58 PM   Periwound Temperature warm 5/31/2018  8:58 PM   Periwound Skin Turgor soft 5/31/2018  8:58 PM   Drainage Characteristics/Odor serosanguineous 5/31/2018  8:58 PM   Drainage Amount scant 5/31/2018  8:58 PM   Dressing Care, Wound gauze;transparent film 6/1/2018  8:50 AM       Wound 05/29/18 1326 Other (See comments) back incision (Active)   Dressing Appearance dried drainage 6/1/2018  8:50 AM   Closure GERARDO 6/1/2018  8:50 AM   Base dressing in place, unable to visualize 6/1/2018  8:50 AM   Periwound intact 6/1/2018  8:50 AM   Periwound Temperature warm 6/1/2018  8:50 AM   Periwound Skin Turgor soft 6/1/2018  8:50 AM   Drainage Characteristics/Odor serosanguineous 6/1/2018  8:50 AM   Drainage Amount scant 6/1/2018  8:50 AM   Dressing Care, Wound gauze;transparent film 6/1/2018  8:50 AM             Physical Therapy Education     Title: PT OT SLP Therapies (Active)     Topic: Physical Therapy (Active)     Point: Mobility training (Done)    Learning Progress Summary     Learner Status Readiness Method Response Comment Documented by    Patient Done Acceptance E,TB VU   05/30/18 7554          Point: Body mechanics (Done)    Learning  Progress Summary     Learner Status Readiness Method Response Comment Documented by    Patient Done Acceptance E,ANASTACIA PAL   05/30/18 1130          Point: Precautions (Done)    Learning Progress Summary     Learner Status Readiness Method Response Comment Documented by    Patient Done Acceptance E DEMARCO NEVILLE 06/01/18 1150     Done Acceptance E DEMARCO NEVILLE 05/31/18 1523     Done Acceptance EANASTACIA DEMARCO   05/30/18 1130                      User Key     Initials Effective Dates Name Provider Type Discipline    MD 04/03/18 -  Bernice Disla, PT Physical Therapist PT     05/15/18 -  SHAMIKA Olvera, PT Student PT Student PT                    PT Recommendation and Plan  Therapy Frequency (PT Clinical Impression): daily  Plan of Care Reviewed With: patient  Progress: improving  Outcome Summary: Pt progressing w ambulation this am.  Pt requires less assistance to safely ambulate down hallway using RWx.          Outcome Measures     Row Name 06/01/18 1100 05/31/18 1500 05/30/18 1100       How much help from another person do you currently need...    Turning from your back to your side while in flat bed without using bedrails? 3  -MD 3  -MD 3  -MD dyson) DELMI jenkins) MD (c)    Moving from lying on back to sitting on the side of a flat bed without bedrails? 3  -MD 3  -MD 3  -MD dyson) DELMI jenkins) MD (c)    Moving to and from a bed to a chair (including a wheelchair)? 3  -MD 3  -MD 3  -MD dyson) DELMI NEVILLE (t) (c)    Standing up from a chair using your arms (e.g., wheelchair, bedside chair)? 3  -MD 3  -MD 3  -MD dyson) DELMI bailey (t))    Climbing 3-5 steps with a railing? 3  -MD 2  -MD 2  -MD dyson) DELMI jenkins) MD (c)    To walk in hospital room? 3  -MD 3  -MD 4  -MD (r) DELMI (kelly) MD (c)    AM-PAC 6 Clicks Score 18  -MD 17  -MD 18  -MD (massiel) DELMI (kelly)       Functional Assessment    Outcome Measure Options AM-PAC 6 Clicks Basic Mobility (PT)  -MD AM-PAC 6 Clicks Basic Mobility (PT)  -MD AM-PAC 6 Clicks Basic Mobility (PT)  -MD (massiel) DELMI (kelly) MD (c)      User Key  (r) = Recorded By, (t) = Taken  By, (c) = Cosigned By    Initials Name Provider Type    MD Bernice Disla, PT Physical Therapist    DELMI Olvera, PT Student PT Student           Time Calculation:         PT Charges     Row Name 06/01/18 1147             Time Calculation    Start Time 1115  -MD      Stop Time 1130  -MD      Time Calculation (min) 15 min  -MD      PT Received On 06/01/18  -MD      PT - Next Appointment 06/02/18  -MD        User Key  (r) = Recorded By, (t) = Taken By, (c) = Cosigned By    Initials Name Provider Type    MD Bernice Disla, PT Physical Therapist          Therapy Charges for Today     Code Description Service Date Service Provider Modifiers Qty    24588672803 HC PT THER PROC EA 15 MIN 5/31/2018 Bernice Disla, PT GP 1    38676047192 HC PT THER PROC EA 15 MIN 6/1/2018 Bernice Disla, PT GP 1          PT G-Codes  PT Professional Judgement Used?: Yes  Outcome Measure Options: AM-PAC 6 Clicks Basic Mobility (PT)  Functional Limitation: Mobility: Walking and moving around  Mobility: Walking and Moving Around Current Status (): At least 40 percent but less than 60 percent impaired, limited or restricted  Mobility: Walking and Moving Around Goal Status (): At least 20 percent but less than 40 percent impaired, limited or restricted    Bernice Disla PT  6/1/2018

## 2018-06-01 NOTE — PROGRESS NOTES
Case Management Discharge Note    Final Note: Home w/ S.O.    Destination     No service coordination in this encounter.      Durable Medical Equipment     No service coordination in this encounter.      Dialysis/Infusion     No service coordination in this encounter.      Home Medical Care     No service coordination in this encounter.      Social Care     No service coordination in this encounter.        Other: Other    Final Discharge Disposition Code: 01 - home or self-care

## 2018-06-01 NOTE — DISCHARGE SUMMARY
Date of admission: May 29, 2018    Date of discharge: June 1, 2018    Hospital course: Mr. Aranda was admitted on May 29, 2018 with an acute T2 11 and L1 compression fracture.  He underwent a T11 and L1 kyphoplasty without complication.  Because of his chronic issues with pancytopenia, most specifically thrombocytopenia, he did receive platelets preoperatively.  Initially he refused additional platelets postoperatively but yesterday his platelet count was 33 and he did receive another unit of platelets.  His platelet count today was back to 42 and white blood cell was also up as well.  He was also followed by hematology and they have cleared him for discharge and he already has a follow-up appointment with them in July.  He was also followed by internal medicine for his history of hypertension, diabetes, cirrhosis, chronic kidney disease and HIV.  He is back on all of his usual medications for the aforementioned comorbidities and doing well.  Internal medicine also cleared him for discharge home.  He is afebrile and ambulating with therapy and although he still has pain it is improved compared to preoperatively and tolerable on oral Percocet.  He is aware of the fact that because of his baseline osteoporosis she is high risk for additional fractures.  We did discuss the possibility of using a Warm 'n Form brace but he has tried this in the past and felt that more bothersome than helpful.  He will be discharged home today and will follow up with us as scheduled next week.    In regards to restrictions she is aware of the fact that he must refrain from lifting over 5 pounds or doing any repetitive bending or twisting.  He may shower daily and will clean the incisions with soap and water daily as well as hydrogen peroxide twice a day and call with any fever or chills or incisional redness swelling or drainage.    ..    Results from last 7 days  Lab Units 06/01/18  0513 05/31/18  0558 05/30/18  0552   WBC 10*3/mm3 2.13*  1.77* 1.77*   HEMOGLOBIN g/dL 9.9* 9.6* 9.9*   HEMATOCRIT % 31.9* 30.6* 31.9*   PLATELETS 10*3/mm3 42* 33* 35*

## 2018-06-01 NOTE — PLAN OF CARE
Problem: Patient Care Overview  Goal: Plan of Care Review  Outcome: Ongoing (interventions implemented as appropriate)   06/01/18 0533   Coping/Psychosocial   Plan of Care Reviewed With patient   Plan of Care Review   Progress improving   OTHER   Outcome Summary patient remains alert and oriented. ambulating with one assist. reports that pain is well controlled with medications. cbc ordered this morning to recheck platelets. possible discharge home today. incision site remains dry and intact with no drainage. vitals remain stable. will continue to monitor.       Problem: Fall Risk (Adult)  Goal: Identify Related Risk Factors and Signs and Symptoms  Outcome: Outcome(s) achieved Date Met: 06/01/18    Goal: Absence of Fall  Outcome: Ongoing (interventions implemented as appropriate)

## 2018-06-01 NOTE — NURSING NOTE
Pt to be D/C'd home today. [facility] transportation home will be available at 6pm this evening. Pt currently resting in chair. Continue to monitor. REYMUNDO Aparicio RN

## 2018-06-01 NOTE — PROGRESS NOTES
"[facility] INPATIENT PROGRESS NOTE  Subjective     CC: Thrombocytopenia     Interval history: Feeling better today with improved pain control. No bleeding. No shortness of breath. Ambulating.     General ROS: negative for - chills, fatigue, fever or weight loss  Respiratory ROS: no cough, shortness of breath, or wheezing     Medications:  The current medication list was reviewed in the EMR.    Allergies:    Allergies   Allergen Reactions   • Methylprednisolone Other (See Comments)     \"Everything shuts down\"   • Prednisolone Other (See Comments)     PT UNSURE--STATED HE WAS TOLD NOT TO TAKE IT AGAIN.        Objective      Vitals:    06/01/18 0953   BP: 120/62   Pulse: 63   Resp: 18   Temp: 98.8 °F (37.1 °C)   SpO2: 92%        Physical exam:   GENERAL: male comfortable, no acute distress  SKIN:  Warm, without rashes, purpura or petechiae.   EYES:  EOMs intact.  Conjunctivae normal. Pupils equal and reactive to light.   EARS:  Hearing intact.  RESP:  Lungs clear to percussion and auscultation. Good airflow. Normal effort.   CARDIAC:  Regular rate and rhythm without murmurs, rubs or gallops. Normal S1,S2. Lower extremity edema:  No  PSYCHIATRIC:  Normal affect and mood; alert and oriented x 3; Insight and judgement appropriate        RECENT LABS:      Results from last 7 days  Lab Units 06/01/18  0513 05/31/18  0558 05/30/18  0552   WBC 10*3/mm3 2.13* 1.77* 1.77*   HEMOGLOBIN g/dL 9.9* 9.6* 9.9*   HEMATOCRIT % 31.9* 30.6* 31.9*   PLATELETS 10*3/mm3 42* 33* 35*       Results from last 7 days  Lab Units 05/30/18  0552 05/29/18  0654   SODIUM mmol/L 137 141   POTASSIUM mmol/L 3.8 4.5   CHLORIDE mmol/L 101 104   CO2 mmol/L 27.0 27.3   BUN mg/dL 8 11   CREATININE mg/dL 1.20 1.32*   CALCIUM mg/dL 9.3 10.0   GLUCOSE mg/dL 128* 108*           Assessment/Plan   This is a 72 y.o. male with:     1. Chronic thrombocytopenia and leukopenia from cirrhosis and HIV treatment              * status post 2 units platelets " pre-operatively and 1 unit post op              * No signs ongoing loss                2. Anemia, recurrent iron deficiency anemia requiring frequent IV iron therapy (about every 3 months)              * Iron low   * Given Venofer while here x 1 dose     3. Compression fractures secondary to osteoporosis              * status post kyphoplasty 5/29 of T11/L1              * status post kyphoplasty 5/3 of T12     4. HIV    OK for discharge from heme standpoint. Has follow up with Dr Lucas in July. He can call before hand if he has any issues.             Corina Snider MD  6/1/2018  11:29 AM

## 2018-06-01 NOTE — PROGRESS NOTES
" LOS: 0 days     Name: Kye Aranda  Age: 72 y.o.  Sex: male  :  1945  MRN: [id number]         Primary Care Physician: Kendrick Griggs MD    Subjective   Subjective  No new complaints.  Anxious for discharge today.    Objective   Vital Signs  Temp:  [98.1 °F (36.7 °C)-99.1 °F (37.3 °C)] 98.8 °F (37.1 °C)  Heart Rate:  [52-68] 63  Resp:  [14-18] 18  BP: (102-130)/(58-65) 120/62  Body mass index is 26.59 kg/m².    Objective:  General Appearance:  Comfortable and in no acute distress.    Vital signs: (most recent): Blood pressure 120/62, pulse 63, temperature 98.8 °F (37.1 °C), temperature source Oral, resp. rate 18, height 170.2 cm (67\"), weight 77 kg (169 lb 12.1 oz), SpO2 92 %.    Lungs:  Normal effort and normal respiratory rate.    Heart: Normal rate.  Regular rhythm.    Abdomen: Abdomen is soft.  Bowel sounds are normal.   There is no abdominal tenderness.     Extremities: There is no dependent edema or local swelling.    Neurological: Patient is alert and oriented to person, place and time.    Skin:  Warm and dry.              Results Review:       I reviewed the patient's new clinical results.      Results from last 7 days  Lab Units 18  0513 18  0558 18  0552 18  0654   WBC 10*3/mm3 2.13* 1.77* 1.77* 2.82*   HEMOGLOBIN g/dL 9.9* 9.6* 9.9* 12.0*   PLATELETS 10*3/mm3 42* 33* 35* 52*       Results from last 7 days  Lab Units 18  0552 18  0654   SODIUM mmol/L 137 141   POTASSIUM mmol/L 3.8 4.5   CHLORIDE mmol/L 101 104   CO2 mmol/L 27.0 27.3   BUN mg/dL 8 11   CREATININE mg/dL 1.20 1.32*   CALCIUM mg/dL 9.3 10.0   GLUCOSE mg/dL 128* 108*                 Scheduled Meds:     acetaminophen 650 mg Oral Once   cobicistat 150 mg Oral Daily With Breakfast   darunavir ethanolate 800 mg Oral Daily   diphenhydrAMINE 25 mg Oral Once   diphenhydrAMINE 25 mg Intravenous Once   docusate sodium 200 mg Oral BID   emtricitabine-tenofovir 1 tablet Oral Daily   famotidine 20 mg Intravenous " Once   fluconazole 100 mg Oral Every Other Day   furosemide 20 mg Oral Daily   icosapent ethyl 1 g Oral BID With Meals   insulin aspart 0-9 Units Subcutaneous 4x Daily With Meals & Nightly   Insulin Degludec 35 Units Subcutaneous Nightly   insulin detemir 30 Units Subcutaneous QAM   levothyroxine 300 mcg Oral QAM   metoprolol succinate XL 50 mg Oral BID   raltegravir 400 mg Oral BID   rifaximin 550 mg Oral Q12H   sulfamethoxazole-trimethoprim 1 tablet Oral Every Other Day     PRN Meds:   •  acetaminophen  •  dextrose  •  dextrose  •  furosemide  •  glucagon (human recombinant)  •  hydrocortisone  •  [DISCONTINUED] HYDROmorphone **AND** naloxone  •  ondansetron **OR** ondansetron ODT **OR** ondansetron  •  oxyCODONE-acetaminophen  •  sennosides-docusate sodium  •  sodium chloride  •  sodium chloride  •  tamsulosin  Continuous Infusions:       Assessment/Plan   Principal Problem:    Age-related osteoporosis with current pathological fracture  Active Problems:    Iron deficiency anemia due to chronic blood loss    Thrombocytopenia associated with AIDS    Leukocytopenia    Compression fracture of body of thoracic vertebra      Assessment & Plan    1. HIV/AIDS  - Continue outpatient medications for HIV as well as prophylactic fluconazole and Bactrim     2. Cirrhosis  - Back on lasix  - restarted xifaxan     3. HTN  - restarted metoprolol with hold parameters     4. DM2  - Blood sugars are controlled on current regimen     5. Pancytopenia  - Hematology managing     6. CKD3  - Creatinine at baseline.    Okay for discharge from medical standpoint.  Will sign off.      Larry Vance MD  [facility]  06/01/18  11:16 AM

## 2018-06-04 NOTE — TELEPHONE ENCOUNTER
BLAIRE RICCI WHILE BEING DISCHARGED FROM [facility] GIVEN POST OP APPT FOR 6/7/18 RADHAMES THINKS THAT IS TOO SOON FOR POST OP APPT. AND PATIENT WANTS IT MOVED FURTHER OUT HE IS STILL IN A LOT OF PAIN AND NOT VERY MOBLIE PLEASE ADVISE [phone]

## 2018-06-05 NOTE — TELEPHONE ENCOUNTER
Pt said you mentioned him needing his appointment moved out? He will only be 9 days out from T11 and L1 Kyphoplasty he is scheduled to see Arabella this Thursday.  He said he is still having pain in back as before the surgery. No leg pain, No issues with the incision.

## 2018-06-05 NOTE — TELEPHONE ENCOUNTER
I did not say that (maybe someone else did) but if he wants to move it to next week and there is room he can.

## 2018-06-06 NOTE — TELEPHONE ENCOUNTER
Patient called and complained of severe pain. He is 8 days out from a T11 and L1 kyphoplasty. He is scheduled to come into the office tomorrow  06/07/2018. He wanted to know if he could be directly admitted into the ER. I advised him that if he is having severe pain to go to the ER.     Per verbal conversation with Lacey, she says he can go to the ER or wait to see us tomorrow.       Called patient and he said he will see us tomorrow.

## 2018-06-06 NOTE — PROGRESS NOTES
Subjective   Patient ID: Kye Aranda is a 72 y.o. male is here today for follow-up. He is 9 days out from having a T11 and L1 kyphoplasty by Dr. Davis on 05/29/2018. Today the patient states he is sore around incision site and worsening pain at the previous kyphoplasty side as well as below the kyphoplasty site.  He is currently taking Percocet 7.5-325 mg 1-2 pills a day. On a scale of 0-10 when patient is moving he rates his pain an 8. When he is sitting he rates his pain a 3. The incision looks clean and dry. No redness, swelling or drainage. Patient denies having fever.    Mr. Aranda is here for post op evaluation after T11 and L1 kyphoplasty procedure.  He reports no relief in pain and is also beginning to notice some pain in lower in the lumbar region.  He denies any leg pain.  He is taking at least 2-3 Percocet a day.  He is asking for refill.  He is also asking for a repeat MRI of his lumbar spine to make sure that he has not developed another compression fracture.      Back Pain   This is a recurrent problem. The current episode started 1 to 4 weeks ago. The problem occurs constantly. The problem has been waxing and waning since onset. The pain is present in the lumbar spine. The quality of the pain is described as aching, shooting and stabbing. The pain does not radiate. The pain is at a severity of 9/10. The pain is the same all the time. The symptoms are aggravated by coughing and position. Stiffness is present in the morning. Associated symptoms include pelvic pain and weight loss. Pertinent negatives include no abdominal pain, bladder incontinence, bowel incontinence, chest pain, fever, headaches, leg pain, numbness, paresis, paresthesias, perianal numbness, tingling or weakness. Risk factors include history of cancer, history of osteoporosis and recent trauma.       The following portions of the patient's history were reviewed and updated as appropriate: allergies, current medications, past family  history, past medical history, past social history, past surgical history and problem list.    Review of Systems   Constitutional: Positive for weight loss. Negative for fever.   Respiratory: Negative for chest tightness and shortness of breath.    Cardiovascular: Negative for chest pain.   Gastrointestinal: Negative for abdominal pain and bowel incontinence.   Genitourinary: Positive for pelvic pain. Negative for bladder incontinence.   Musculoskeletal: Positive for back pain.   Neurological: Negative for tingling, weakness, numbness, headaches and paresthesias.   All other systems reviewed and are negative.      Objective   Physical Exam   Vitals reviewed.      Assessment/Plan     Medical Decision Making:      Mr. Aranda is 9 days out from T11, L1 kyphoplasty procedure for compression fractures.  He also had a T12 kyphoplasty for compression fracture 3 weeks prior to that.  He has severe osteoporosis and also thrombocytopenia.  He has required IV platelets in order to proceed with the kyphoplasty procedures in the past. Mr. Aranda Knisley recent falls or trauma.  He is complaining of continued low back pain even below L1.    Mr. Aranda was reminded that it is only been 9 days since his kyphoplasty procedure.  He is adamant that he have a repeat MRI of the lumbar spine to look for a recurrent fracture.  I explained that even if he were to have another fracture, the history is shown that the kyphoplasty procedures may in fact be hindering his progress especially if they are causing new fractures to occur.    I did order the MRI however I strongly encouraged Mr. Aranda and his wife to give it more time to see if his symptoms improve.  He was also encouraged again to use the warm and form brace that he was prescribed previously. He has enough Percocet for now but may need a refill in the next week or so.  I explained to Mr. Aranda that he may never be completely out of pain and for that reason I have recommended a referral to a  pain management specialist for further management of medications and any other treatments that may be helpful.    If he proceeds with the MRI, I encouraged Mr. Aranda to call the office for re-evaluation or for any other questions or concerns.     Kye was seen today for post-op.    Diagnoses and all orders for this visit:    Lumbar spine pain  -     Ambulatory Referral to Pain Management  -     MRI Lumbar Spine Without Contrast; Future    Surgical followup visit    Age-related osteoporosis with current pathological fracture with routine healing, subsequent encounter  -     Ambulatory Referral to Pain Management  -     MRI Lumbar Spine Without Contrast; Future      Return for after radiographic imaging.

## 2018-06-07 NOTE — TELEPHONE ENCOUNTER
Patients wife called today and she is unable to make it to his appointment today at 3:45. She says that she is at work all day and he is unable to take care of hisself. He ankle are swollen. She said yesterday that he was so sick and vomiting. She wanted to know if he could go to a rehab or home health care could come in.

## 2018-06-10 PROBLEM — R60.0 PEDAL EDEMA: Status: ACTIVE | Noted: 2018-01-01

## 2018-06-10 NOTE — ED NOTES
Attempted to obtain blood sample with no success. 2nd RN requested to obtain blood.      Deysi Olivo, PRAKASH  06/10/18 1945

## 2018-06-10 NOTE — ED PROVIDER NOTES
EMERGENCY DEPARTMENT ENCOUNTER    CHIEF COMPLAINT  Chief Complaint: BLE swelling  History given by: pt/ family is present at bedside  History limited by: N/A  Room Number: 36/36  PMD: Kendrick Griggs MD      HPI:  Pt is a 72 y.o. male who presents complaining of constant BLE swelling that began earlier today. Pt denies any known injury/ trauma that would have caused the current episode. Pt also c/o increased abdominal distension, but denies CP, SOA, or any other pertinent symptoms. Pt has a hx of cirrhosis. Pt takes Lasix daily and denies missing any recent doses of this medication.     Duration:  Began earlier today   Onset: gradual   Timing: constant   Location: BLE  Radiation: None reported  Quality: BLE swelling   Intensity/Severity: moderate  Progression: unchanged   Associated Symptoms: increased abdominal distension   Aggravating Factors: None reported  Alleviating Factors: None reported   Previous Episodes: Pt has a hx of cirrhosis.   Treatment before arrival: Pt takes Lasix daily and denies missing any recent doses of this medication.     PAST MEDICAL HISTORY  Active Ambulatory Problems     Diagnosis Date Noted   • Chronic coronary artery disease 04/27/2016   • Difficulty breathing 04/27/2016   • Intermittent claudication 04/27/2016   • Peripheral arterial occlusive disease 04/27/2016   • Shortness of breath 04/27/2016   • Iron deficiency anemia due to chronic blood loss 05/06/2016   • Thrombocytopenia associated with AIDS 05/06/2016   • Leukocytopenia 05/06/2016   • Neutropenia 05/06/2016   • Pancytopenia 02/19/2017   • Iron and its compounds causing adverse effect in therapeutic use 02/21/2017   • Liver cirrhosis 08/14/2017   • Lumbar degenerative disc disease 12/05/2017   • Secondary thrombocytopenia 01/28/2018   • History of coronary artery stent placement 02/12/2018   • T12 compression fracture 04/09/2018   • Weight loss, abnormal 04/09/2018   • Hypothyroidism 04/09/2018   • Type 2 diabetes mellitus  04/09/2018   • Severe protein-calorie malnutrition 04/10/2018   • Pathological fracture of thoracic vertebra with routine healing 04/25/2018   • Age-related osteoporosis with current pathological fracture 04/27/2018   • Acute bilateral thoracic back pain 04/27/2018   • Age-related osteoporosis with current pathological fracture of vertebra 04/27/2018   • Thrombocytopenia 04/29/2018   • Compression fracture of body of thoracic vertebra 05/03/2018   • Lumbar spine pain 05/25/2018     Resolved Ambulatory Problems     Diagnosis Date Noted   • Intractable back pain 04/09/2018   • Hypophosphatemia 04/09/2018     Past Medical History:   Diagnosis Date   • Anemia    • Cirrhosis    • Coronary artery disease    • Diabetes mellitus    • H/O complete eye exam 10/2017   • History of prostate cancer 2012   • HIV (human immunodeficiency virus infection)    • Hyperlipidemia    • Hypertension    • Lumbar stress fracture    • Osteoporosis    • Pancytopenia    • Peripheral artery disease    • SOB (shortness of breath) on exertion    • Thyroid disease        PAST SURGICAL HISTORY  Past Surgical History:   Procedure Laterality Date   • BONE MARROW BIOPSY W/ ASPIRATION  01/21/2014   • CARDIAC CATHETERIZATION     • COLONOSCOPY  09/20/2012    non-thrombosed EH, sessile polyp in ascending colon 3 mm in size.   • CORONARY ANGIOPLASTY WITH STENT PLACEMENT  06/2013   • ENDOSCOPY  09/20/2012    grade 1 varicesmiddle and lower 3rd of esophagus. Moderate portal hypertensive gastropathy in entire stomach.   • KYPHOPLASTY Bilateral 5/3/2018    Procedure: T 12 KYPHOPLASTY 1-2 LEVELS;  Surgeon: Joey Davis MD;  Location: [facility] OR;  Service: Neurosurgery   • KYPHOPLASTY N/A 5/29/2018    Procedure: KYPHOPLASTY, L1, T11 kyphoplsty;  Surgeon: Joey Davis MD;  Location: [facility] OR 18/19;  Service: Neurosurgery   • MOUTH SURGERY     • PROSTATE BIOPSY     • TONSILLECTOMY     • UPPER GASTROINTESTINAL ENDOSCOPY  09/20/2012    grd 1  varices, portal hypertensive gastropathy       FAMILY HISTORY  Family History   Problem Relation Age of Onset   • Heart disease Other    • No Known Problems Mother    • No Known Problems Father    • No Known Problems Maternal Grandmother    • No Known Problems Maternal Grandfather    • No Known Problems Paternal Grandmother    • No Known Problems Paternal Grandfather    • Malig Hyperthermia Neg Hx        SOCIAL HISTORY  Social History     Social History   • Marital status: Single     Spouse name: N/A   • Number of children: N/A   • Years of education: College     Occupational History   •  Retired     [employer]     Social History Main Topics   • Smoking status: Former Smoker     Packs/day: 1.50     Years: 30.00     Types: Cigarettes     Quit date: 1994   • Smokeless tobacco: Never Used   • Alcohol use No   • Drug use: No   • Sexual activity: Defer     Other Topics Concern   • Not on file     Social History Narrative   • No narrative on file       ALLERGIES  Methylprednisolone and Prednisolone    REVIEW OF SYSTEMS  Review of Systems   Constitutional: Negative.  Negative for chills and fever.   HENT: Negative.  Negative for sore throat.    Eyes: Negative.    Respiratory: Negative.  Negative for cough and shortness of breath.    Cardiovascular: Positive for leg swelling. Negative for chest pain.   Gastrointestinal: Positive for abdominal distention.   Genitourinary: Negative.  Negative for dysuria.   Musculoskeletal: Negative.  Negative for back pain.   Skin: Negative.  Negative for rash.   Neurological: Negative.  Negative for headaches.   All other systems reviewed and are negative.      PHYSICAL EXAM  ED Triage Vitals   Temp Heart Rate Resp BP SpO2   06/10/18 1820 06/10/18 1820 06/10/18 1820 06/10/18 1833 06/10/18 1820   97.4 °F (36.3 °C) 54 18 133/60 95 %      Temp src Heart Rate Source Patient Position BP Location FiO2 (%)   -- -- -- -- --              Physical Exam   Constitutional: He is oriented  to person, place, and time. No distress.   HENT:   Head: Normocephalic and atraumatic.   Eyes: EOM are normal. Pupils are equal, round, and reactive to light.   Neck: Normal range of motion. Neck supple.   Cardiovascular: Normal rate, regular rhythm, normal heart sounds and intact distal pulses.    Pulmonary/Chest: Effort normal and breath sounds normal. No respiratory distress.   Lungs clear to auscultation bilaterally    Abdominal: Soft. There is no tenderness. There is no rebound and no guarding.   Musculoskeletal: Normal range of motion. He exhibits edema (2+ BLE). He exhibits no tenderness (No calf tenderness ) or deformity (No gross deformity to BLE).   Neurological: He is alert and oriented to person, place, and time. He has normal sensation and normal strength.   Skin: Skin is warm and dry.   Discoloration to the anterior aspect of the medial malleolus bilaterally    Psychiatric: Mood and affect normal.   Nursing note and vitals reviewed.      LAB RESULTS  Lab Results (last 24 hours)     Procedure Component Value Units Date/Time    CBC & Differential [[id number]] Collected:  06/10/18 2050    Specimen:  Blood Updated:  06/10/18 2118    Narrative:       The following orders were created for panel order CBC & Differential.  Procedure                               Abnormality         Status                     ---------                               -----------         ------                     Scan Slide[[id number]]                                       Final result               CBC Auto Differential[[id number]]        Abnormal            Final result                 Please view results for these tests on the individual orders.    Comprehensive Metabolic Panel [[id number]]  (Abnormal) Collected:  06/10/18 2050    Specimen:  Blood Updated:  06/10/18 2129     Glucose 97 mg/dL      BUN 12 mg/dL      Creatinine 1.21 mg/dL      Sodium 139 mmol/L      Potassium 3.5 mmol/L      Chloride 102 mmol/L      CO2 26.8 mmol/L       Calcium 9.0 mg/dL      Total Protein 6.5 g/dL      Albumin 3.30 (L) g/dL      ALT (SGPT) 19 U/L      AST (SGOT) 47 (H) U/L      Alkaline Phosphatase 235 (H) U/L      Total Bilirubin 1.4 (H) mg/dL      eGFR Non African Amer 59 (L) mL/min/1.73      Globulin 3.2 gm/dL      A/G Ratio 1.0 g/dL      BUN/Creatinine Ratio 9.9     Anion Gap 10.2 mmol/L     Narrative:       The MDRD GFR formula is only valid for adults with stable renal function between ages 18 and 70.    Protime-INR [[id number]]  (Abnormal) Collected:  06/10/18 2050    Specimen:  Blood Updated:  06/10/18 2113     Protime 17.5 (H) Seconds      INR 1.47 (H)    CBC Auto Differential [[id number]]  (Abnormal) Collected:  06/10/18 2050    Specimen:  Blood Updated:  06/10/18 2118     WBC 1.44 (C) 10*3/mm3      RBC 3.53 (L) 10*6/mm3      Hemoglobin 10.2 (L) g/dL      Hematocrit 32.5 (L) %      MCV 92.1 fL      MCH 28.9 pg      MCHC 31.4 (L) g/dL      RDW 18.4 (H) %      RDW-SD 62.6 (H) fl      Platelets 26 (C) 10*3/mm3      Neutrophil % 72.9 %      Lymphocyte % 16.0 (L) %      Monocyte % 9.0 %      Eosinophil % 1.4 %      Basophil % 0.7 %      Immature Grans % 0.0 %      Neutrophils, Absolute 1.05 (L) 10*3/mm3      Lymphocytes, Absolute 0.23 (L) 10*3/mm3      Monocytes, Absolute 0.13 (L) 10*3/mm3      Eosinophils, Absolute 0.02 10*3/mm3      Basophils, Absolute 0.01 10*3/mm3      Immature Grans, Absolute 0.00 10*3/mm3     Scan Slide [[id number]] Collected:  06/10/18 2050    Specimen:  Blood Updated:  06/10/18 2118     Ovalocytes Slight/1+     Target Cells Slight/1+     WBC Morphology Normal     Platelet Morphology Normal          I ordered the above labs and reviewed the results    RADIOLOGY  BLE Duplex venous studies negative acute         I ordered the above noted radiological studies. Interpreted by radiologist. Reviewed by me in PACS.       PROCEDURES  Procedures      PROGRESS AND CONSULTS     1907  Ordered labs and BLE Duplex Venous Studies for further  evaluation.     2149  Ordered Lasix for BLE swelling.     2215  Rechecked pt who is resting comfortably and in no acute distress. Discussed lab/ radiology results- Doppler Studies show no evidence for a blood clot and pt's BLE is most likely secondary to his cirrhosis. Pt expresses desire to be admitted. Discussed plan to consult medicine for pending disposition.     2230  Placed consult to [facility].    2235  Discussed pt's case with Dr. Pedraza [[facility]] who agrees to admit.     2240  Rechecked pt who is in no acute distress. Discussed consult with [facility] who agrees to admit. Pt understands and agrees to plan, all questions addressed at this time.   ---Reassessment: Pt is stable at time of admission.     MEDICAL DECISION MAKING  Results were reviewed/discussed with the patient and they were also made aware of online access. Pt also made aware that some labs, such as cultures, will not be resulted during ER visit and follow up with PMD is necessary.     MDM  Number of Diagnoses or Management Options  Cirrhosis of liver with ascites, unspecified hepatic cirrhosis type:   Pedal edema:   Thrombocytopenia:      Amount and/or Complexity of Data Reviewed  Clinical lab tests: ordered and reviewed (WBC = 1.44  Platelets = 26)  Tests in the radiology section of CPT®: reviewed and ordered (Negative BLE Duplex Venous Studies  No evidence for a blood clot )  Discussion of test results with the performing providers: yes (Dr. Pedraza ([facility]))  Decide to obtain previous medical records or to obtain history from someone other than the patient: yes  Independent visualization of images, tracings, or specimens: yes           DIAGNOSIS  Final diagnoses:   Pedal edema   Cirrhosis of liver with ascites, unspecified hepatic cirrhosis type   Thrombocytopenia       DISPOSITION  ADMISSION    Discussed treatment plan and reason for admission with pt/family and admitting physician.  Pt/family voiced understanding of the plan for admission for further  testing/treatment as needed.     Latest Documented Vital Signs:  As of 10:36 PM  BP- 133/60 HR- 55 Temp- 97.4 °F (36.3 °C) O2 sat- 94%    --  Documentation assistance provided by martin Justice for Dr. Hernandez.  Information recorded by the scribe was done at my direction and has been verified and validated by me.            Jesus Manuel Justice  06/10/18 7514       Balbir Hernandez MD  06/10/18 6314

## 2018-06-11 PROBLEM — R18.8 CIRRHOSIS OF LIVER WITH ASCITES (HCC): Status: ACTIVE | Noted: 2017-08-14

## 2018-06-11 PROBLEM — R60.1 ANASARCA: Status: ACTIVE | Noted: 2018-01-01

## 2018-06-11 PROBLEM — B20 HIV (HUMAN IMMUNODEFICIENCY VIRUS INFECTION) (HCC): Status: ACTIVE | Noted: 2018-01-01

## 2018-06-11 PROBLEM — K76.6 PORTAL HYPERTENSION (HCC): Status: ACTIVE | Noted: 2018-01-01

## 2018-06-11 PROBLEM — N18.2 CKD (CHRONIC KIDNEY DISEASE) STAGE 2, GFR 60-89 ML/MIN: Status: ACTIVE | Noted: 2018-01-01

## 2018-06-11 NOTE — PLAN OF CARE
Problem: Patient Care Overview  Goal: Plan of Care Review  Outcome: Ongoing (interventions implemented as appropriate)   06/11/18 0210   Coping/Psychosocial   Plan of Care Reviewed With patient   Plan of Care Review   Progress no change   OTHER   Outcome Summary New ER admit from home with c/o soa/lower leg edema/given IV Lasix in ER/diuersing well/uses own walker and assist x 1/weak/s/p recent back surgery/falls protocol initated/poor appetite/Nutritional consult per database/also would like HH upon D/C-consult to [facility]/GI consulted/for am labs/skin fragile/bruised/acumax pump placed on bed/Receiving platelets/hx chronic pain/has po narcotic pain med ordered prn/will continue to monitor      06/11/18 0210   Coping/Psychosocial   Plan of Care Reviewed With patient   Plan of Care Review   Progress no change   OTHER   Outcome Summary New ER admit from home with c/o soa/lower leg edema/given IV Lasix in ER/diuersing well/uses own walker and assist x 1/weak/s/p recent back surgery/falls protocol initated/poor appetite/Nutritional consult per database/also would like HH upon D/C-consult to CCP/GI consulted/for am labs/skin fragile/bruised/acumax pump placed on bed/Receiving platelets/hx chronic pain/has po narcotic pain med ordered prn/will continue to monitor       Problem: Fall Risk (Adult)  Goal: Identify Related Risk Factors and Signs and Symptoms  Outcome: Outcome(s) achieved Date Met: 06/11/18    Goal: Absence of Fall  Outcome: Ongoing (interventions implemented as appropriate)      Problem: Skin Injury Risk (Adult)  Goal: Identify Related Risk Factors and Signs and Symptoms  Outcome: Outcome(s) achieved Date Met: 06/11/18    Goal: Skin Health and Integrity  Outcome: Ongoing (interventions implemented as appropriate)      Problem: Mobility, Physical Impaired (Adult)  Goal: Identify Related Risk Factors and Signs and Symptoms  Outcome: Outcome(s) achieved Date Met: 06/11/18    Goal: Enhanced Mobility Skills  Outcome:  Ongoing (interventions implemented as appropriate)    Goal: Enhanced Functional Ability  Outcome: Ongoing (interventions implemented as appropriate)      Problem: Nutrition, Imbalanced: Inadequate Oral Intake (Adult)  Goal: Identify Related Risk Factors and Signs and Symptoms  Outcome: Outcome(s) achieved Date Met: 06/11/18    Goal: Improved Oral Intake  Outcome: Ongoing (interventions implemented as appropriate)    Goal: Prevent Further Weight Loss  Outcome: Ongoing (interventions implemented as appropriate)      Problem: Pain, Chronic (Adult)  Goal: Identify Related Risk Factors and Signs and Symptoms  Outcome: Outcome(s) achieved Date Met: 06/11/18    Goal: Acceptable Pain/Comfort Level and Functional Ability  Outcome: Ongoing (interventions implemented as appropriate)

## 2018-06-11 NOTE — PROGRESS NOTES
Malnutrition Severity Assessment    Patient Name:  Kye Aranda  YOB: 1945  MRN: [id number]  Admit Date:  6/10/2018    Patient meets criteria for : Severe malnutrition    Comments:  Patient admitted with thrombocytopenia, pedal edema, cirrhosis with ascites and Type 2 Diabetes Mellitus. History includes anemia, CAD, prostate cancer, HIV, hyperlipidemia, hypertension and chronic renal insufficiency. Patient says his weight loss is not fluid but muscle. He is 88% his usual body weight of 180# based on stated admitting weight. He told nursing he has lost 25# (15.6%) x 3 months. I have requested a current weight.    Malnutrition Type: Chronic Illness Malnutrition     Malnutrition Type (last 8 hours)      Malnutrition Severity Assessment     Row Name 06/11/18 1024       Malnutrition Severity Assessment    Malnutrition Type Chronic Illness Malnutrition    Row Name 06/11/18 1024       Physical Signs of Malnutrition (Chronic)    Muscle Wasting Mild    Row Name 06/11/18 1024       Weight Status (Chronic)    %UBW Mild (86-90%)    Weight Loss Severe (>7.5% / 3 mo)    Row Name 06/11/18 1024       Criteria Met (Must meet criteria for severity in at least 2 of these categories: M Wasting, Fat Loss, Fluid, Secondary Signs, Wt. Status, Intake)    Patient meets criteria for  Severe malnutrition          Electronically signed by:  Cora Valencia RD  06/11/18 10:27 AM

## 2018-06-11 NOTE — CONSULTS
Adult Nutrition  Assessment/PES    Patient Name:  Kye Aranda  YOB: 1945  MRN: [id number]  Admit Date:  6/10/2018    Assessment Date:  6/11/2018    Comments:  Completed nutrition consult triggered by nurse admission screen.          Reason for Assessment     Row Name 06/11/18 1011          Reason for Assessment    Reason For Assessment nurse/nurse practitioner consult     Diagnosis liver disease     Identified At Risk by Screening Criteria MST SCORE 2+;unintentional loss of 10 lbs or more in the past 2 mos               Anthropometrics     Row Name 06/11/18 1012          Admit Weight    Admit Weight Method stated     Admit Weight 72.6 kg (160 lb 0.9 oz)        Usual Body Weight (UBW)    Usual Body Weight 81.8 kg (180 lb 5.7 oz)     % of Usual Body Weight Assessment 85-95% - mild deficit     Weight Loss unintentional     Weight Loss Time Frame Patient told nrsg 25# (15.6%) within the last 3 months        Body Mass Index (BMI)    BMI Assessment BMI 25-29.9: overweight             Labs/Tests/Procedures/Meds     Row Name 06/11/18 1016          Labs/Procedures/Meds    Lab Results Reviewed reviewed     Lab Results Comments crt, glu high; H/H, K+, phos, plts, WBC low        Diagnostic Tests/Procedures    Diagnostic Test/Procedure Reviewed reviewed        Medications    Pertinent Medications Reviewed reviewed     Pertinent Medications Comments antibiotic, diuretic, insulin             Physical Findings     Row Name 06/11/18 1017          Physical Findings    Overall Physical Appearance loss of muscle mass     Skin other (see comments)   bruised, skin tear; Galileo score 19             Estimated/Assessed Needs     Row Name 06/11/18 1018          Calculation Measurements    Weight Used For Calculations 72.6 kg (160 lb 0.9 oz)        Estimated/Assessed Needs    Additional Documentation Calorie Requirements (Group);Fluid Requirements (Group);Protein Requirements (Group)        Calorie Requirements    Estimated  Calorie Requirement (kcal/day) 1815     Estimated Calorie Need Method kcal/kg     Estimated Calorie Requirement Comment 25 carl/kg        KCAL/KG    14 Kcal/Kg (kcal) 1016.4     15 Kcal/Kg (kcal) 1089     18 Kcal/Kg (kcal) 1306.8     20 Kcal/Kg (kcal) 1452     25 Kcal/Kg (kcal) 1815     30 Kcal/Kg (kcal) 2178     35 Kcal/Kg (kcal) 2541     40 Kcal/Kg (kcal) 2904     45 Kcal/Kg (kcal) 3267     50 Kcal/Kg (kcal) 3630        St John-St. Jeor Equation    RMR (St John-St. Jeor Equation) 1439.63        Protein Requirements    Est Protein Requirement Amount (gms/kg) 1.0 gm protein     Estimated Protein Requirements (gms/day) 72.6        Fluid Requirements    Estimated Fluid Requirements (mL/day) 1815     Estimated Fluid Requirement Method RDA Method     RDA Method (mL) 1815     Janeth-Claribel Method (over 20 kg) 2952             Nutrition Prescription Ordered     Row Name 06/11/18 1020          Nutrition Prescription PO    Current PO Diet Regular             Evaluation of Received Nutrient/Fluid Intake     Row Name 06/11/18 1020 06/11/18 1018       Calculation Measurements    Weight Used For Calculations  -- 72.6 kg (160 lb 0.9 oz)       PO Evaluation    % PO Intake 100% snack 6/11  --            Evaluation of Prescribed Nutrient/Fluid Intake     Row Name 06/11/18 1018          Calculation Measurements    Weight Used For Calculations 72.6 kg (160 lb 0.9 oz)             Malnutrition Severity Assessment     Row Name 06/11/18 1024          Malnutrition Severity Assessment    Malnutrition Type Chronic Illness Malnutrition        Physical Signs of Malnutrition (Chronic)    Muscle Wasting Mild        Weight Status (Chronic)    %UBW Mild (86-90%)     Weight Loss Severe (>7.5% / 3 mo)        Criteria Met (Must meet criteria for severity in at least 2 of these categories: M Wasting, Fat Loss, Fluid, Secondary Signs, Wt. Status, Intake)    Patient meets criteria for  Severe malnutrition         Problem/Interventions:        Problem 1      Row Name 06/11/18 1021          Nutrition Diagnoses Problem 1    Problem 1 Unintended Weight Loss     Etiology (related to) Medical Diagnosis     Hepatic Cirrhosis     Infectious Disease HIV/AIDS     Signs/Symptoms (evidenced by) Unintended Weight Change     Unintended Weight Change Loss     Number of Pounds Lost 25#     Weight loss time period 3 months                     Intervention Goal     Row Name 06/11/18 1020          Intervention Goal    General Maintain nutrition     PO Tolerate PO;PO intake (%)     PO Intake % 75 %     Weight No significant weight loss             Nutrition Intervention     Row Name 06/11/18 1020          Nutrition Intervention    RD/Tech Action Supplement provided;Follow Tx progress;Care plan reviewd             Nutrition Prescription     Row Name 06/11/18 1021          Nutrition Prescription PO    PO Prescription Begin/change supplement     Supplement Ensure     Supplement Frequency 2 times a day     New PO Prescription Ordered? Yes        Other Orders    Obtain Weight Now     Obtain Weight Ordered? No, recommended             Education/Evaluation     Row Name 06/11/18 1021          Education    Education Will Instruct as appropriate        Monitor/Evaluation    Monitor Per protocol         Electronically signed by:  Cora Valencia RD  06/11/18 10:26 AM

## 2018-06-11 NOTE — H&P
Name: Kye Aranda ADMIT: 6/10/2018   : 1945  PCP: Kendrick Griggs MD    MRN: [id number] LOS: 0 days   AGE/SEX: 72 y.o. male  ROOM: 3/1     Chief Complaint   Patient presents with   • Leg Swelling     BILATERAL LEG SWELLING AND FOOT SWELLING        Subjective   Mr. Aranda is a 72 y.o. former smoker with a history of cirrhosis that presents to [facility] complaining of generalized swelling but mainly in legs. He was here early this month after kyphoplasty surgery. He was discharged on  and presents to the emergency department last night with swelling in his legs for the last 2-3 days. He takes Lasix as needed at home and has not been taking it regularly since surgery. He does report being compliant with his low-sodium diet. Previously taken off Aldactone due to worsening renal failure. Denies any pain or cramping in his legs. No chest pain or shortness of breath. He has ongoing back pain even following kyphoplasty surgery. He was seen by the neurosurgical nurse practitioner 4 days ago postop. No immediate obvious complications. He denies any fever or chills. No significant abdominal distention. No change in bowel or urinary habits.        History of Present Illness    Past Medical History:   Diagnosis Date   • Anemia    • Cirrhosis    • Coronary artery disease    • Diabetes mellitus    • H/O complete eye exam 10/2017   • History of prostate cancer    • HIV (human immunodeficiency virus infection)    • Hyperlipidemia    • Hypertension    • Lumbar stress fracture    • Osteoporosis    • Pancytopenia    • Peripheral artery disease    • SOB (shortness of breath) on exertion    • Thyroid disease      Past Surgical History:   Procedure Laterality Date   • BONE MARROW BIOPSY W/ ASPIRATION  2014   • CARDIAC CATHETERIZATION     • COLONOSCOPY  2012    non-thrombosed EH, sessile polyp in ascending colon 3 mm in size.   • CORONARY ANGIOPLASTY WITH STENT PLACEMENT  2013   •  ENDOSCOPY  09/20/2012    grade 1 varicesmiddle and lower 3rd of esophagus. Moderate portal hypertensive gastropathy in entire stomach.   • KYPHOPLASTY Bilateral 5/3/2018    Procedure: T 12 KYPHOPLASTY 1-2 LEVELS;  Surgeon: Joey Davis MD;  Location: [facility] MAIN OR;  Service: Neurosurgery   • KYPHOPLASTY N/A 5/29/2018    Procedure: KYPHOPLASTY, L1, T11 kyphoplsty;  Surgeon: Joey Davis MD;  [facility];  Service: Neurosurgery   • MOUTH SURGERY     • PROSTATE BIOPSY     • TONSILLECTOMY     • UPPER GASTROINTESTINAL ENDOSCOPY  09/20/2012    grd 1 varices, portal hypertensive gastropathy     Family History   Problem Relation Age of Onset   • Heart disease Other    • No Known Problems Mother    • No Known Problems Father    • No Known Problems Maternal Grandmother    • No Known Problems Maternal Grandfather    • No Known Problems Paternal Grandmother    • No Known Problems Paternal Grandfather    • Malig Hyperthermia Neg Hx      Social History   Substance Use Topics   • Smoking status: Former Smoker     Packs/day: 1.50     Years: 30.00     Types: Cigarettes     Quit date: 1994   • Smokeless tobacco: Never Used   • Alcohol use No     Prescriptions Prior to Admission   Medication Sig Dispense Refill Last Dose   • BD PEN NEEDLE ONEAL U/F 32G X 4 MM misc    Taking   • clonazePAM (KlonoPIN) 0.5 MG tablet Take 0.25 mg by mouth As Needed (TAKES ONCE EVERY 2-3 DAYS PRN).  0 Taking   • fluconazole (DIFLUCAN) 100 MG tablet Take 100 mg by mouth Every Other Day. TOOK 6/10/2018   Taking   • furosemide (LASIX) 20 MG tablet Take 1 tablet by mouth Daily. (Patient taking differently: Take 20 mg by mouth As Needed.) 30 tablet 3 Taking   • hydrocortisone 2.5 % cream Apply 1 application topically Daily. USES DAILY   Taking   • Icosapent Ethyl (VASCEPA) 1 G capsule Take 1 g by mouth 2 (Two) Times a Day.   Taking   • ISENTRESS 400 MG tablet Take 400 mg by mouth 2 (Two) Times a Day.   Taking   • levothyroxine  "(SYNTHROID) 300 MCG tablet Take 300 mcg by mouth Every Morning. HAS APPT WITH ENDOCRONOLOGIST/THINKS IT ILL BE CHANGD  [facility]   Taking   • metoprolol succinate XL (TOPROL-XL) 50 MG 24 hr tablet Take 1 tablet by mouth 2 (Two) Times a Day. 180 tablet 1 Taking   • oxyCODONE-acetaminophen (PERCOCET) 7.5-325 MG per tablet Take 1 tablet by mouth Every 4 (Four) Hours As Needed for Moderate Pain . 40 tablet 0 Taking   • PREZISTA 800 MG tablet tablet Take 800 mg by mouth Daily.   Taking   • rifaximin (XIFAXAN) 550 MG tablet Take 1 tablet by mouth every 12 (twelve) hours. 60 tablet 5 Taking   • sulfamethoxazole-trimethoprim (BACTRIM DS,SEPTRA DS) 800-160 MG per tablet Take 1 tablet by mouth Every Other Day As Needed. LAST TAKEN Saturday 6/9/2018   Taking   • tamsulosin (FLOMAX) 0.4 MG capsule Take 1 capsule by mouth As Needed.   Taking   • TRESIBA FLEXTOUCH 200 UNIT/ML solution pen-injector Inject 35 Units under the skin Every Night. (Patient taking differently: Inject 40 Units under the skin Every Morning.)  3 Taking   • TRUVADA 200-300 MG per tablet Take 200-300 mg by mouth Daily.   Taking   • TYBOST 150 MG tablet Take 150 mg by mouth Daily With Breakfast.   Taking   • vitamin D (ERGOCALCIFEROL) 25828 units capsule capsule Take 50,000 Units by mouth 1 (One) Time Per Week. TAKES EVERY SUNDAY   Taking     Allergies:    Allergies   Allergen Reactions   • Methylprednisolone Other (See Comments)     \"Everything shuts down\"   • Prednisolone Other (See Comments)     PT UNSURE--STATED HE WAS TOLD NOT TO TAKE IT AGAIN.        Review of Systems   Constitutional: Positive for unexpected weight change.   HENT: Negative.    Eyes: Negative.    Respiratory: Negative.    Cardiovascular: Positive for leg swelling.   Gastrointestinal: Positive for abdominal distention and blood in stool (Occasional - h/o radiation proctitis). Negative for abdominal pain.   Endocrine: Negative.    Genitourinary: Negative.    Musculoskeletal: Positive for " back pain.   Skin: Negative.    Neurological: Positive for weakness.   Hematological: Negative.    Psychiatric/Behavioral: Negative.         Objective    Vital Signs  Temp:  [97.4 °F (36.3 °C)-98.6 °F (37 °C)] 98.5 °F (36.9 °C)  Heart Rate:  [50-59] 59  Resp:  [16-18] 18  BP: (108-133)/(45-60) 110/45  SpO2:  [94 %-98 %] 95 %  on   ;   Device (Oxygen Therapy): room air  Body mass index is 25.06 kg/m².    Physical Exam   Constitutional: He is oriented to person, place, and time. He appears well-developed and well-nourished. He appears ill (chronically).   HENT:   Head: Normocephalic and atraumatic.   Eyes: Conjunctivae and EOM are normal. No scleral icterus.   Neck: Normal range of motion. Neck supple. No JVD present.   Cardiovascular: Normal rate and regular rhythm.    No murmur heard.  Pulmonary/Chest: Effort normal. No respiratory distress. He has decreased breath sounds.   Abdominal: Soft. Bowel sounds are normal. He exhibits distension, fluid wave and ascites (mild). There is no tenderness.   Musculoskeletal: He exhibits edema.   Neurological: He is alert and oriented to person, place, and time. No cranial nerve deficit.   Skin: Skin is warm and dry.   Psychiatric: He has a normal mood and affect. His behavior is normal.   Vitals reviewed.      Results Review:  I reviewed the patient's new clinical results.  I reviewed the patient's new imaging results and agree with the interpretation.  I reviewed the patient's other test results and agree with the interpretation  I personally viewed and interpreted the patient's EKG/Telemetry data  Lab Results (last 24 hours)     Procedure Component Value Units Date/Time    CBC & Differential [[id number]] Collected:  06/10/18 2050    Specimen:  Blood Updated:  06/10/18 2118    Narrative:       The following orders were created for panel order CBC & Differential.  Procedure                               Abnormality         Status                     ---------                                -----------         ------                     Scan Slide[[id number]]                                       Final result               CBC Auto Differential[[id number]]        Abnormal            Final result                 Please view results for these tests on the individual orders.    Comprehensive Metabolic Panel [[id number]]  (Abnormal) Collected:  06/10/18 2050    Specimen:  Blood Updated:  06/10/18 2129     Glucose 97 mg/dL      BUN 12 mg/dL      Creatinine 1.21 mg/dL      Sodium 139 mmol/L      Potassium 3.5 mmol/L      Chloride 102 mmol/L      CO2 26.8 mmol/L      Calcium 9.0 mg/dL      Total Protein 6.5 g/dL      Albumin 3.30 (L) g/dL      ALT (SGPT) 19 U/L      AST (SGOT) 47 (H) U/L      Alkaline Phosphatase 235 (H) U/L      Total Bilirubin 1.4 (H) mg/dL      eGFR Non African Amer 59 (L) mL/min/1.73      Globulin 3.2 gm/dL      A/G Ratio 1.0 g/dL      BUN/Creatinine Ratio 9.9     Anion Gap 10.2 mmol/L     Narrative:       The MDRD GFR formula is only valid for adults with stable renal function between ages 18 and 70.    Protime-INR [[id number]]  (Abnormal) Collected:  06/10/18 2050    Specimen:  Blood Updated:  06/10/18 2113     Protime 17.5 (H) Seconds      INR 1.47 (H)    CBC Auto Differential [[id number]]  (Abnormal) Collected:  06/10/18 2050    Specimen:  Blood Updated:  06/10/18 2118     WBC 1.44 (C) 10*3/mm3      RBC 3.53 (L) 10*6/mm3      Hemoglobin 10.2 (L) g/dL      Hematocrit 32.5 (L) %      MCV 92.1 fL      MCH 28.9 pg      MCHC 31.4 (L) g/dL      RDW 18.4 (H) %      RDW-SD 62.6 (H) fl      Platelets 26 (C) 10*3/mm3      Neutrophil % 72.9 %      Lymphocyte % 16.0 (L) %      Monocyte % 9.0 %      Eosinophil % 1.4 %      Basophil % 0.7 %      Immature Grans % 0.0 %      Neutrophils, Absolute 1.05 (L) 10*3/mm3      Lymphocytes, Absolute 0.23 (L) 10*3/mm3      Monocytes, Absolute 0.13 (L) 10*3/mm3      Eosinophils, Absolute 0.02 10*3/mm3      Basophils, Absolute 0.01 10*3/mm3      Immature  Grans, Absolute 0.00 10*3/mm3     Scan Slide [[id number]] Collected:  06/10/18 2050    Specimen:  Blood Updated:  06/10/18 2118     Ovalocytes Slight/1+     Target Cells Slight/1+     WBC Morphology Normal     Platelet Morphology Normal    Basic Metabolic Panel [[id number]]  (Abnormal) Collected:  06/11/18 0336    Specimen:  Blood Updated:  06/11/18 0431     Glucose 112 (H) mg/dL      BUN 12 mg/dL      Creatinine 1.41 (H) mg/dL      Sodium 141 mmol/L      Potassium 3.1 (L) mmol/L      Chloride 99 mmol/L      CO2 29.8 (H) mmol/L      Calcium 9.4 mg/dL      eGFR Non African Amer 50 (L) mL/min/1.73      BUN/Creatinine Ratio 8.5     Anion Gap 12.2 mmol/L     Narrative:       The MDRD GFR formula is only valid for adults with stable renal function between ages 18 and 70.    CBC (No Diff) [[id number]]  (Abnormal) Collected:  06/11/18 0336    Specimen:  Blood Updated:  06/11/18 0507     WBC 2.07 (L) 10*3/mm3      RBC 3.76 (L) 10*6/mm3      Hemoglobin 10.7 (L) g/dL      Hematocrit 34.3 (L) %      MCV 91.2 fL      MCH 28.5 pg      MCHC 31.2 (L) g/dL      RDW 18.4 (H) %      RDW-SD 61.8 (H) fl      MPV -- fL      Comment: .        Platelets 45 (L) 10*3/mm3     Magnesium [[id number]]  (Normal) Collected:  06/11/18 0336    Specimen:  Blood Updated:  06/11/18 0431     Magnesium 2.3 mg/dL     Phosphorus [[id number]]  (Abnormal) Collected:  06/11/18 0336    Specimen:  Blood Updated:  06/11/18 0431     Phosphorus 2.3 (L) mg/dL     Hemoglobin A1c [[id number]]  (Normal) Collected:  06/11/18 0336    Specimen:  Blood Updated:  06/11/18 0416     Hemoglobin A1C 5.00 %     Narrative:       Hemoglobin A1C Ranges:    Increased Risk for Diabetes  5.7% to 6.4%  Diabetes                     >= 6.5%  Diabetic Goal                < 7.0%    Lipid Panel [[id number]] Collected:  06/11/18 0336    Specimen:  Blood Updated:  06/11/18 0431     Total Cholesterol 133 mg/dL      Triglycerides 88 mg/dL      HDL Cholesterol 47 mg/dL      LDL Cholesterol  68 mg/dL       VLDL Cholesterol 17.6 mg/dL      LDL/HDL Ratio 1.46    Narrative:       Cholesterol Reference Ranges  (U.S. Department of Health and Human Services ATP III Classifications)    Desirable          <200 mg/dL  Borderline High    200-239 mg/dL  High Risk          >240 mg/dL      Triglyceride Reference Ranges  (U.S. Department of Health and Human Services ATP III Classifications)    Normal           <150 mg/dL  Borderline High  150-199 mg/dL  High             200-499 mg/dL  Very High        >500 mg/dL    HDL Reference Ranges  (U.S. Department of Health and Human Services ATP III Classifcations)    Low     <40 mg/dl (major risk factor for CHD)  High    >60 mg/dl ('negative' risk factor for CHD)        LDL Reference Ranges  (U.S. Department of Health and Human Services ATP III Classifcations)    Optimal          <100 mg/dL  Near Optimal     100-129 mg/dL  Borderline High  130-159 mg/dL  High             160-189 mg/dL  Very High        >189 mg/dL    POC Glucose Once [[id number]]  (Normal) Collected:  06/11/18 0540    Specimen:  Blood Updated:  06/11/18 0543     Glucose 70 mg/dL     Narrative:       Meter: [id number] : [id number] Roe DONALDSON RN          Imaging Results (last 24 hours)     ** No results found for the last 24 hours. **        No orders to display     Coding      Assessment/Plan      Active Hospital Problems (** Indicates Principal Problem)    Diagnosis Date Noted   • **Cirrhosis of liver with ascites [K74.60] 08/14/2017   • HIV (human immunodeficiency virus infection) [B20] 06/11/2018   • CKD (chronic kidney disease) stage 2, GFR 60-89 ml/min [N18.2] 06/11/2018   • Anasarca [R60.1] 06/11/2018   • Portal hypertension [K76.6] 06/11/2018   • Pedal edema [R60.0] 06/10/2018   • Severe protein-calorie malnutrition due to chronic illness [E43] 04/10/2018   • Type 2 diabetes mellitus [E11.9] 04/09/2018   • Thrombocytopenia associated with AIDS [B20, 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       Mr. Aranda is a 72 y.o. former smoker with a history of cirrhosis of the liver who presents with worsening edema mainly involving both legs.     · BLE venous dopplers negative for DVT.  · GI has been consulted. Ultrasound of liver ordered. Considering screening endoscopy.  · Platelets are roughly at baseline. He is followed by the CBC office. Did receive platelet transfusion prior to kyphoplasty. No signs of active bleeding.  · He received 80 mg IV Lasix last evening. Has diuresed overnight. We will restart oral Lasix and Aldactone has been added by Dr. Shelby. Potassium replacement ordered.  · His renal function has fluctuated making diuresis somewhat difficult. We will ask nephrology to see him and assist with this. Will need outpatient follow-up.  · We will monitor blood glucose.      I discussed the patients findings and my recommendations with patient and nursing staff.      Doron Olivas MD  [facility]  06/11/18  11:41 AM

## 2018-06-11 NOTE — PLAN OF CARE
Problem: Patient Care Overview  Goal: Plan of Care Review  Outcome: Ongoing (interventions implemented as appropriate)   06/11/18 1547   Coping/Psychosocial   Plan of Care Reviewed With patient   Plan of Care Review   Progress improving   OTHER   Outcome Summary VSS, order for US of liver, potassium of 3.1 treated, renal consult.     Goal: Individualization and Mutuality  Outcome: Ongoing (interventions implemented as appropriate)      Problem: Fall Risk (Adult)  Goal: Absence of Fall  Outcome: Ongoing (interventions implemented as appropriate)      Problem: Skin Injury Risk (Adult)  Goal: Skin Health and Integrity  Outcome: Ongoing (interventions implemented as appropriate)      Problem: Mobility, Physical Impaired (Adult)  Goal: Enhanced Mobility Skills  Outcome: Ongoing (interventions implemented as appropriate)    Goal: Enhanced Functional Ability  Outcome: Ongoing (interventions implemented as appropriate)      Problem: Nutrition, Imbalanced: Inadequate Oral Intake (Adult)  Goal: Improved Oral Intake  Outcome: Ongoing (interventions implemented as appropriate)    Goal: Prevent Further Weight Loss  Outcome: Ongoing (interventions implemented as appropriate)      Problem: Pain, Chronic (Adult)  Goal: Acceptable Pain/Comfort Level and Functional Ability  Outcome: Ongoing (interventions implemented as appropriate)

## 2018-06-11 NOTE — CONSULTS
[facility]                                                                                             Nephrology Initial Consult Note    Patient Identification:  Name: Kye Aranda MRN: [id number]  Age: 72 y.o. : 1945  Sex: male  Date:2018    Requesting Physician: As per consult order.  Reason for Consultation: Chronic kidney disease, diuretic management  Information from:patient/ family/ chart      History of Present Illness: This is a 72 y.o. year old male  with no known history of chronic kidney disease but looks like as if his creatinine baseline is around 1.2.  He has multiple medical problems including cirrhosis, chronic generalized swelling with some dependent edema.  Also with a history of diabetes mellitus, coronary artery disease, HIV, pancytopenia.  Recent discharge after kyphoplasty.  He states gradually worsening lower extremity edema for the last several weeks and especially worse over last several days, minimal response to oral Lasix that he was taking at home and he states he was compliant with low-salt diet.  Had previously been on spironolactone without.  Due to worsening renal function.  He's been given IV diuretics since admission with some improvement in his lower extremity edema.  Currently no cough or congestion, no fevers or chills.  He denies any dysuria and states adequate urine output since admission.     The following medical history and medications personally reviewed by me:    Problem List:   Patient Active Problem List    Diagnosis   • HIV (human immunodeficiency virus infection) [B20]   • CKD (chronic kidney disease) stage 2, GFR 60-89 ml/min [N18.2]   • Anasarca [R60.1]   • Portal hypertension [K76.6]   • Pedal edema [R60.0]   • Lumbar spine pain [M54.5]   • Compression fracture of body of thoracic vertebra [M48.54XA]   • Thrombocytopenia [D69.6]   • Age-related  osteoporosis with current pathological fracture [M80.00XA]   • Acute bilateral thoracic back pain [M54.6]   • Age-related osteoporosis with current pathological fracture of vertebra [M80.08XA]     Overview Note:     Added automatically from request for surgery [id number]     • Pathological fracture of thoracic vertebra with routine healing [M84.48XD]   • Severe protein-calorie malnutrition due to chronic illness [E43]   • T12 compression fracture [S22.080A]   • Weight loss, abnormal [R63.4]   • Hypothyroidism [E03.9]   • Type 2 diabetes mellitus [E11.9]   • History of coronary artery stent placement [Z95.5]   • 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]   • Lumbar degenerative disc disease [M51.36]   • Cirrhosis of liver with ascites [K74.60]   • Iron and its compounds causing adverse effect in therapeutic use [T45.4X5A]   • Pancytopenia [D61.818]   • Iron deficiency anemia due to chronic blood loss [D50.0]   • Thrombocytopenia associated with AIDS [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, 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.59]   • Leukocytopenia [D72.819]   • Neutropenia [D70.9]   • Chronic coronary artery disease [I25.10]   • Difficulty breathing [R06.89]   • Intermittent claudication [I73.9]   • Peripheral arterial occlusive disease [I77.9]   • Shortness of breath [R06.02]       Past Medical History:  Past Medical History:   Diagnosis Date   • Anemia    • Cirrhosis    • Coronary artery disease    • Diabetes mellitus    • H/O complete eye exam 10/2017   • History of prostate cancer 2012   • HIV (human immunodeficiency virus infection)    • Hyperlipidemia    • Hypertension    • Lumbar stress fracture    • Osteoporosis    • Pancytopenia    • Peripheral artery disease    • SOB (shortness of breath) on exertion    • Thyroid disease        Past Surgical History:  Past Surgical History:   Procedure Laterality Date   • BONE MARROW BIOPSY W/ ASPIRATION  01/21/2014   • CARDIAC CATHETERIZATION     • COLONOSCOPY  09/20/2012    non-thrombosed EH, sessile polyp in ascending colon 3 mm in size.   •  CORONARY ANGIOPLASTY WITH STENT PLACEMENT  06/2013   • ENDOSCOPY  09/20/2012    grade 1 varicesmiddle and lower 3rd of esophagus. Moderate portal hypertensive gastropathy in entire stomach.   • KYPHOPLASTY Bilateral 5/3/2018    Procedure: T 12 KYPHOPLASTY 1-2 LEVELS;  Surgeon: Joey Davis MD;  Location: [facility] MAIN OR;  Service: Neurosurgery   • KYPHOPLASTY N/A 5/29/2018    Procedure: KYPHOPLASTY, L1, T11 kyphoplsty;  Surgeon: Joey Davis MD;  Location: [facility] OR 18/19;  Service: Neurosurgery   • MOUTH SURGERY     • PROSTATE BIOPSY     • TONSILLECTOMY     • UPPER GASTROINTESTINAL ENDOSCOPY  09/20/2012    grd 1 varices, portal hypertensive gastropathy        Home Meds:   Prescriptions Prior to Admission   Medication Sig Dispense Refill Last Dose   • BD PEN NEEDLE ONEAL U/F 32G X 4 MM misc    Taking   • clonazePAM (KlonoPIN) 0.5 MG tablet Take 0.25 mg by mouth As Needed (TAKES ONCE EVERY 2-3 DAYS PRN).  0 Taking   • fluconazole (DIFLUCAN) 100 MG tablet Take 100 mg by mouth Every Other Day. TOOK 6/10/2018   Taking   • furosemide (LASIX) 20 MG tablet Take 1 tablet by mouth Daily. (Patient taking differently: Take 20 mg by mouth As Needed.) 30 tablet 3 Taking   • hydrocortisone 2.5 % cream Apply 1 application topically Daily. USES DAILY   Taking   • Icosapent Ethyl (VASCEPA) 1 G capsule Take 1 g by mouth 2 (Two) Times a Day.   Taking   • ISENTRESS 400 MG tablet Take 400 mg by mouth 2 (Two) Times a Day.   Taking   • levothyroxine (SYNTHROID) 300 MCG tablet Take 300 mcg by mouth Every Morning. HAS APPT WITH ENDOCRONOLOGIST/THINKS IT ILL BE [facility]  [facility]   Taking   • metoprolol succinate XL (TOPROL-XL) 50 MG 24 hr tablet Take 1 tablet by mouth 2 (Two) Times a Day. 180 tablet 1 Taking   • oxyCODONE-acetaminophen (PERCOCET) 7.5-325 MG per tablet Take 1 tablet by mouth Every 4 (Four) Hours As Needed for Moderate Pain . 40 tablet 0 Taking   • PREZISTA 800 MG tablet tablet Take 800 mg by mouth Daily.    Taking   • rifaximin (XIFAXAN) 550 MG tablet Take 1 tablet by mouth every 12 (twelve) hours. 60 tablet 5 Taking   • sulfamethoxazole-trimethoprim (BACTRIM DS,SEPTRA DS) 800-160 MG per tablet Take 1 tablet by mouth Every Other Day As Needed. LAST TAKEN Saturday 6/9/2018   Taking   • tamsulosin (FLOMAX) 0.4 MG capsule Take 1 capsule by mouth As Needed.   Taking   • TRESIBA FLEXTOUCH 200 UNIT/ML solution pen-injector Inject 35 Units under the skin Every Night. (Patient taking differently: Inject 40 Units under the skin Every Morning.)  3 Taking   • TRUVADA 200-300 MG per tablet Take 200-300 mg by mouth Daily.   Taking   • TYBOST 150 MG tablet Take 150 mg by mouth Daily With Breakfast.   Taking   • vitamin D (ERGOCALCIFEROL) 08735 units capsule capsule Take 50,000 Units by mouth 1 (One) Time Per Week. TAKES EVERY SUNDAY   Taking       Current Meds:   Current Facility-Administered Medications   Medication Dose Route Frequency Provider Last Rate Last Dose   • acetaminophen (TYLENOL) tablet 650 mg  650 mg Oral Q4H PRN Eyad Pedraza MD       • bisacodyl (DULCOLAX) EC tablet 5 mg  5 mg Oral Daily PRN Eyad Pedraza MD       • bisacodyl (DULCOLAX) suppository 10 mg  10 mg Rectal Daily PRN Eyad Pedraza MD       • clonazePAM (KlonoPIN) tablet 0.25 mg  0.25 mg Oral BID PRN Eyad Pedraza MD       • cobicistat (TYBOST) 150 mg  150 mg Oral Daily With Breakfast Eyad Pedraza MD   150 mg at 06/11/18 0935   • darunavir ethanolate (PREZISTA) tablet 800 mg  800 mg Oral Daily Eyad Pedraza MD   800 mg at 06/11/18 0933   • dextrose (D50W) solution 25 g  25 g Intravenous Q15 Min PRN Eyad Pedraza MD       • dextrose (GLUTOSE) oral gel 15 g  15 g Oral Q15 Min PRN Eyad Pedraza MD       • emtricitabine-tenofovir (TRUVADA) 200-300 MG per tablet 1 tablet  1 tablet Oral Daily Eyad Pedraza MD   1 tablet at 06/11/18 0934   • furosemide (LASIX) tablet 20 mg  20 mg Oral Daily Eyad CHERY  "MD Keila   20 mg at 06/11/18 0936   • glucagon (human recombinant) (GLUCAGEN DIAGNOSTIC) injection 1 mg  1 mg Subcutaneous PRN Eyad Pedraza MD       • insulin aspart (novoLOG) injection 0-14 Units  0-14 Units Subcutaneous 4x Daily With Meals & Nightly Eyad Pedraza MD       • insulin detemir (LEVEMIR) injection 40 Units  40 Units Subcutaneous QAM Eyad Pedraza MD       • levothyroxine (SYNTHROID, LEVOTHROID) tablet 300 mcg  300 mcg Oral QAM Eyad Pedraza MD   150 mcg at 06/11/18 0845   • metoprolol succinate XL (TOPROL-XL) 24 hr tablet 50 mg  50 mg Oral BID Eyad Pedraza MD   50 mg at 06/11/18 0934   • nitroglycerin (NITROSTAT) SL tablet 0.4 mg  0.4 mg Sublingual Q5 Min PRN Eyad Pedraza MD       • ondansetron (ZOFRAN) tablet 4 mg  4 mg Oral Q6H PRN Eyad Pedraza MD        Or   • ondansetron ODT (ZOFRAN-ODT) disintegrating tablet 4 mg  4 mg Oral Q6H PRN Eyad Pedraza MD        Or   • ondansetron (ZOFRAN) injection 4 mg  4 mg Intravenous Q6H PRN Eyad Pedraza MD       • oxyCODONE-acetaminophen (PERCOCET) 7.5-325 MG per tablet 1 tablet  1 tablet Oral Q4H PRN Eyad Pedraza MD   1 tablet at 06/11/18 1252   • potassium chloride (MICRO-K) CR capsule 20 mEq  20 mEq Oral Once Doron Olivas MD       • raltegravir (ISENTRESS) tablet 400 mg  400 mg Oral BID Eyad Pedraza MD   400 mg at 06/11/18 0934   • rifaximin (XIFAXAN) tablet 550 mg  550 mg Oral Q12H Eyad Pedraza MD   550 mg at 06/11/18 0935   • sodium chloride 0.9 % flush 1-10 mL  1-10 mL Intravenous PRN Eyad Pedraza MD       • spironolactone (ALDACTONE) tablet 50 mg  50 mg Oral Daily Garo Shelby MD   50 mg at 06/11/18 1013   • sulfamethoxazole-trimethoprim (BACTRIM DS,SEPTRA DS) 800-160 MG per tablet 160 mg  1 tablet Oral Q48H Eyad Pedraza MD           Allergies:  Allergies   Allergen Reactions   • Methylprednisolone Other (See Comments)     \"Everything shuts down\"   • " Prednisolone Other (See Comments)     PT UNSURE--STATED HE WAS TOLD NOT TO TAKE IT AGAIN.        Social History:   Social History     Social History   • Marital status: Single   • Years of education: College     Occupational History   •  Retired     [employer]     Social History Main Topics   • Smoking status: Former Smoker     Packs/day: 1.50     Years: 30.00     Types: Cigarettes     Quit date: 1994   • Smokeless tobacco: Never Used   • Alcohol use No   • Drug use: No   • Sexual activity: Defer     Other Topics Concern   • Not on file        Family History:  Family History   Problem Relation Age of Onset   • Heart disease Other    • No Known Problems Mother    • No Known Problems Father    • No Known Problems Maternal Grandmother    • No Known Problems Maternal Grandfather    • No Known Problems Paternal Grandmother    • No Known Problems Paternal Grandfather    • Malig Hyperthermia Neg Hx         Review of Systems: as per HPI, in addition:    General:     + weakness / fatigue,                       No fevers / chills                       no weight loss  HEENT:       no dysphagia / odynophagia  Neck:           normal range of motion, no swelling  Respiratory: no cough / congestion                      No shortness of air                       No wheezing  CV:              No chest pain                       No palpitations  Abdomen/GI: no nausea / vomiting                      No diarrhea / constipation                      No abdominal pain  :             no dysuria / urinary frequency                       No urgency, normal output  Endocrine:   no polyuria / polydipsia,                      No heat or cold intolerance  Skin:           no rashes or skin breakdown   Vascular:   + edema                     No claudication  Psych:        no depression/ anxiety  Neuro:        no focal weakness, no seizures  Musculoskeletal: no joint pain or deformities      Physical Exam:  Vitals:   Temp (24hrs),  "Av.1 °F (36.7 °C), Min:97.4 °F (36.3 °C), Max:98.6 °F (37 °C)    /62 (BP Location: Left arm, Patient Position: Lying)   Pulse 50   Temp 98.2 °F (36.8 °C) (Oral)   Resp 16   Ht 170.2 cm (67\")   Wt 72.6 kg (160 lb)   SpO2 95%   BMI 25.06 kg/m²   Intake/Output:     Intake/Output Summary (Last 24 hours) at 18 1451  Last data filed at 18 0852   Gross per 24 hour   Intake              925 ml   Output             1000 ml   Net              -75 ml        Wt Readings from Last 1 Encounters:   06/10/18 1820 72.6 kg (160 lb)       Exam:    General Appearance:  Awake, alert, oriented x3, no acute distress  Chronically ill-appearing    Head and Face:  Normocephalic, atraumatic, mucus membranes moist, oropharynx clear   Eyes:  No icterus, pupils equal round and reactive to light, extraocular movements intact    ENMT: Moist mucosa, tongue symmetric    Neck: Supple  no jugular venous distention  no thyromegaly   Pulmonary:  Respiratory effort: Normal  Auscultation of lungs: Clear bilaterally  No wheezes  No rhonchi  Good air movement, good expansion   Chest wall:  No tenderness or deformity   Cardiovascular:  Auscultation of the heart: Normal rhythm, no murmurs  1-2+ edema of extremities    Abdomen:  Abdomen: soft, non-tender, normal bowel sounds all four quadrants, no masses   Liver and spleen: no hepatosplenomegaly   Musculoskeletal: Digits and nails: normal  Normal range of motion  No joint swelling or gross deformities    Skin: Skin inspection: color normal, no visible rashes or lesions  Skin palpation: texture, turgor normal, no palpable lesions   Lymphatic:  no cervical lymphadenopathy    Psychiatric: Judgement and insight: normal  Orientation to person place and time: normal  Mood and affect: normal       DATA:  Radiology and Labs:  The following labs independently reviewed by me  Old records independently reviewed showing Baseline creatinine around 1.2, likely stage II to 3 chronic kidney " disease  The following radiologic studies independently viewed by me, findings previous abdominal ultrasound and March with normal size kidneys, no hydronephrosis masses or obstruction  Interval notes, chart personally reviewed by me.   New problems include worsening volume overload  Discussed with Dr. Olivas regarding management of his edema and outpatient follow-up  Platelet count low, pancytopenic likely from liver disease    Risk/ complexity of medical care/ medical decision making  High given his new renal failure and the need for IV diuretics          Labs:   Recent Results (from the past 24 hour(s))   Duplex Venous Lower Extremity - Bilateral    Collection Time: 06/10/18  8:19 PM   Result Value Ref Range    Right Common Femoral Spont Y     Right Common Femoral Phasic Y     Right Common Femoral Augment Y     Right Common Femoral Competent Y     Right Common Femoral Compress C     Right Saphenofemoral Junction Spont Y     Right Saphenofemoral Junction Phasic Y     Right Saphenofemoral Junction Augment Y     Right Saphenofemoral Junction Competent Y     Right Saphenofemoral Junction Compress C     Right Proximal Femoral Compress C     Right Mid Femoral Spont Y     Right Mid Femoral Phasic Y     Right Mid Femoral Augment Y     Right Mid Femoral Competent Y     Right Mid Femoral Compress C     Right Distal Femoral Compress C     Right Popliteal Spont Y     Right Popliteal Phasic Y     Right Popliteal Augment Y     Right Popliteal Competent Y     Right Popliteal Compress C     Right Posterior Tibial Compress C     Right Peroneal Compress C     Right GastronemiusSoleal Compress C     Right Greater Saph AK Compress C     Right Greater Saph BK Compress C     Right Lesser Saph Compress C     Left Common Femoral Spont Y     Left Common Femoral Phasic Y     Left Common Femoral Augment Y     Left Common Femoral Competent Y     Left Common Femoral Compress C     Left Saphenofemoral Junction Spont Y     Left Saphenofemoral  Junction Phasic Y     Left Saphenofemoral Junction Augment Y     Left Saphenofemoral Junction Competent Y     Left Saphenofemoral Junction Compress C     Left Proximal Femoral Compress C     Left Mid Femoral Spont Y     Left Mid Femoral Phasic Y     Left Mid Femoral Augment Y     Left Mid Femoral Competent Y     Left Mid Femoral Compress C     Left Distal Femoral Compress C     Left Popliteal Spont Y     Left Popliteal Phasic Y     Left Popliteal Augment Y     Left Popliteal Competent Y     Left Popliteal Compress C     Left Posterior Tibial Compress C     Left Peroneal Compress C     Left GastronemiusSoleal Compress C     Left Greater Saph AK Compress C     Left Greater Saph BK Compress C     Left Lesser Saph Compress C    Comprehensive Metabolic Panel    Collection Time: 06/10/18  8:50 PM   Result Value Ref Range    Glucose 97 65 - 99 mg/dL    BUN 12 8 - 23 mg/dL    Creatinine 1.21 0.76 - 1.27 mg/dL    Sodium 139 136 - 145 mmol/L    Potassium 3.5 3.5 - 5.2 mmol/L    Chloride 102 98 - 107 mmol/L    CO2 26.8 22.0 - 29.0 mmol/L    Calcium 9.0 8.6 - 10.5 mg/dL    Total Protein 6.5 6.0 - 8.5 g/dL    Albumin 3.30 (L) 3.50 - 5.20 g/dL    ALT (SGPT) 19 1 - 41 U/L    AST (SGOT) 47 (H) 1 - 40 U/L    Alkaline Phosphatase 235 (H) 39 - 117 U/L    Total Bilirubin 1.4 (H) 0.1 - 1.2 mg/dL    eGFR Non African Amer 59 (L) >60 mL/min/1.73    Globulin 3.2 gm/dL    A/G Ratio 1.0 g/dL    BUN/Creatinine Ratio 9.9 7.0 - 25.0    Anion Gap 10.2 mmol/L   Protime-INR    Collection Time: 06/10/18  8:50 PM   Result Value Ref Range    Protime 17.5 (H) 11.7 - 14.2 Seconds    INR 1.47 (H) 0.90 - 1.10   CBC Auto Differential    Collection Time: 06/10/18  8:50 PM   Result Value Ref Range    WBC 1.44 (C) 4.50 - 10.70 10*3/mm3    RBC 3.53 (L) 4.60 - 6.00 10*6/mm3    Hemoglobin 10.2 (L) 13.7 - 17.6 g/dL    Hematocrit 32.5 (L) 40.4 - 52.2 %    MCV 92.1 79.8 - 96.2 fL    MCH 28.9 27.0 - 32.7 pg    MCHC 31.4 (L) 32.6 - 36.4 g/dL    RDW 18.4 (H) 11.5 -  14.5 %    RDW-SD 62.6 (H) 37.0 - 54.0 fl    Platelets 26 (C) 140 - 500 10*3/mm3    Neutrophil % 72.9 42.7 - 76.0 %    Lymphocyte % 16.0 (L) 19.6 - 45.3 %    Monocyte % 9.0 5.0 - 12.0 %    Eosinophil % 1.4 0.3 - 6.2 %    Basophil % 0.7 0.0 - 1.5 %    Immature Grans % 0.0 0.0 - 0.5 %    Neutrophils, Absolute 1.05 (L) 1.90 - 8.10 10*3/mm3    Lymphocytes, Absolute 0.23 (L) 0.90 - 4.80 10*3/mm3    Monocytes, Absolute 0.13 (L) 0.20 - 1.20 10*3/mm3    Eosinophils, Absolute 0.02 0.00 - 0.70 10*3/mm3    Basophils, Absolute 0.01 0.00 - 0.20 10*3/mm3    Immature Grans, Absolute 0.00 0.00 - 0.03 10*3/mm3   Scan Slide    Collection Time: 06/10/18  8:50 PM   Result Value Ref Range    Ovalocytes Slight/1+ None Seen    Target Cells Slight/1+ None Seen    WBC Morphology Normal Normal    Platelet Morphology Normal Normal   Type & Screen    Collection Time: 06/10/18 11:07 PM   Result Value Ref Range    ABO Type AB     RH type Negative     Antibody Screen Negative     T&S Expiration Date 6/13/2018 11:59:59 PM    Prepare Platelet Pheresis, 1 Units    Collection Time: 06/11/18  1:44 AM   Result Value Ref Range    Product Code P8406Q14     Unit Number [id number]     UNIT  ABO AB     UNIT  RH NEG     Dispense Status IS     Blood Type ABNEG     Blood Expiration Date [id number]     Blood Type Barcode 2800    Basic Metabolic Panel    Collection Time: 06/11/18  3:36 AM   Result Value Ref Range    Glucose 112 (H) 65 - 99 mg/dL    BUN 12 8 - 23 mg/dL    Creatinine 1.41 (H) 0.76 - 1.27 mg/dL    Sodium 141 136 - 145 mmol/L    Potassium 3.1 (L) 3.5 - 5.2 mmol/L    Chloride 99 98 - 107 mmol/L    CO2 29.8 (H) 22.0 - 29.0 mmol/L    Calcium 9.4 8.6 - 10.5 mg/dL    eGFR Non African Amer 50 (L) >60 mL/min/1.73    BUN/Creatinine Ratio 8.5 7.0 - 25.0    Anion Gap 12.2 mmol/L   CBC (No Diff)    Collection Time: 06/11/18  3:36 AM   Result Value Ref Range    WBC 2.07 (L) 4.50 - 10.70 10*3/mm3    RBC 3.76 (L) 4.60 - 6.00 10*6/mm3    Hemoglobin 10.7 (L)  13.7 - 17.6 g/dL    Hematocrit 34.3 (L) 40.4 - 52.2 %    MCV 91.2 79.8 - 96.2 fL    MCH 28.5 27.0 - 32.7 pg    MCHC 31.2 (L) 32.6 - 36.4 g/dL    RDW 18.4 (H) 11.5 - 14.5 %    RDW-SD 61.8 (H) 37.0 - 54.0 fl    MPV  6.0 - 12.0 fL    Platelets 45 (L) 140 - 500 10*3/mm3   Magnesium    Collection Time: 06/11/18  3:36 AM   Result Value Ref Range    Magnesium 2.3 1.6 - 2.4 mg/dL   Phosphorus    Collection Time: 06/11/18  3:36 AM   Result Value Ref Range    Phosphorus 2.3 (L) 2.5 - 4.5 mg/dL   Hemoglobin A1c    Collection Time: 06/11/18  3:36 AM   Result Value Ref Range    Hemoglobin A1C 5.00 4.80 - 5.60 %   Lipid Panel    Collection Time: 06/11/18  3:36 AM   Result Value Ref Range    Total Cholesterol 133 0 - 200 mg/dL    Triglycerides 88 0 - 150 mg/dL    HDL Cholesterol 47 40 - 60 mg/dL    LDL Cholesterol  68 0 - 100 mg/dL    VLDL Cholesterol 17.6 5 - 40 mg/dL    LDL/HDL Ratio 1.46    POC Glucose Once    Collection Time: 06/11/18  5:40 AM   Result Value Ref Range    Glucose 70 70 - 130 mg/dL   POC Glucose Once    Collection Time: 06/11/18 11:52 AM   Result Value Ref Range    Glucose 132 (H) 70 - 130 mg/dL       Radiology:  Imaging Results (last 24 hours)     ** No results found for the last 24 hours. **               ASSESSMENT:   Problem List:   Stage III chronic kidney disease, creatinine fairly close to his usual baseline  Worsening volume overload, in part due to underlying liver disease, previously normal 2-D echocardiogram  Cirrhosis with ascites  Coronary artery disease  Thrombocytopenia  HIV/AIDS, last CD4 count was 95  Edema/volume overload  Mild protein malnutrition secondary to chronic liver disease      PLAN:   His edema is improved since admission  Agree with current diuretic regimen  Will check additional urine studies  Sent the abdominal ultrasound showed normal kidneys, no need for additional renal ultrasound at this time  Continue spironolactone, fasting replacement  He can follow-up with me after  discharge for additional diuretic management    Continue to monitor electrolytes and volume closely, avoid IV contrast and nephrotoxic medications       I appreciate the opportunity to participate in this patient's care.  Please call with any questions or concerns.       Malick Sheriff M.D  [facility]  Office phone number: [phone]  Answering service phone number: [phone]        6/11/2018        Dictation via Dragon dictation software

## 2018-06-11 NOTE — PROGRESS NOTES
Discharge Planning Assessment  [facility]     Patient Name: Kye Aranda  MRN: [id number]  Today's Date: 6/11/2018    Admit Date: 6/10/2018          Discharge Needs Assessment     Row Name 06/11/18 1723       Living Environment    Lives With alone    Current Living Arrangements home/apartment/condo    Primary Care Provided by self    Provides Primary Care For no one    Family Caregiver if Needed significant other    Family Caregiver Names Melissa Miller, significant other    Quality of Family Relationships helpful;involved;supportive    Able to Return to Prior Arrangements yes       Resource/Environmental Concerns    Resource/Environmental Concerns none    Transportation Concerns car, none       Transition Planning    Patient/Family Anticipates Transition to home    Patient/Family Anticipated Services at Transition none    Transportation Anticipated family or friend will provide       Discharge Needs Assessment    Concerns to be Addressed discharge planning    Equipment Currently Used at Home cane, straight;walker, rolling    Discharge Coordination/Progress Home            Discharge Plan     Row Name 06/11/18 1723       Plan    Plan Home    Patient/Family in Agreement with Plan yes    Plan Comments CCP met with pt to discuss d/c planning. Facesheet verified. CCP role explained. Pt resides alone in a single level home and reports use of cane or walker for mobility. Pt denies past home health and reports past sub-acute rehab at [facility]. Pt confirms pharmacy is [facility] in [place]. Pt denies known needs at this time, and declines home health referral, stating he would like assistance with errands and cleaning, not skilled nursing or PT. Pt declined information about private pay home care agencies or [facility] at this time. CCP to follow to assist should d/c needs arise. Dania Chong [facility]        Destination     No service coordination in this encounter.      Durable Medical Equipment     No service coordination  in this encounter.      Dialysis/Infusion     No service coordination in this encounter.      Home Medical Care     No service coordination in this encounter.      Social Care     No service coordination in this encounter.                Demographic Summary     Row Name 06/11/18 1723       General Information    Admission Type inpatient    Arrived From home    Referral Source nursing;physician    Reason for Consult discharge planning    Preferred Language English            Functional Status     Row Name 06/11/18 1721       Functional Status    Usual Activity Tolerance moderate    Current Activity Tolerance moderate       Functional Status, IADL    Medications assistive equipment    Meal Preparation assistive equipment    Housekeeping assistive equipment    Laundry assistive equipment    Shopping assistive equipment       Mental Status Summary    Recent Changes in Mental Status/Cognitive Functioning no changes            Psychosocial    No documentation.           Abuse/Neglect    No documentation.           Legal    No documentation.           Substance Abuse    No documentation.           Patient Forms    No documentation.         Melia Chong LCSW

## 2018-06-11 NOTE — CONSULTS
[facility]  Initial Inpatient Consult Note    Referring Provider: Dr. SHAMIKA Olivas    Reason for Consultation: Leg swelling in the setting of cirrhosis    Subjective     History of present illness:    72 y.o. male who is well known to Dr. Robyn Butler with a h/o cirrhosis that is felt to be cryptogenic or possibly from his HIV medicines (followed by Dr. Thomas). He was admitted 6/10/18 because of leg swelling that has been going on for 1-2 days. He has had gradual abdominal swelling despite being on a low salt diet and taking prn lasix. According to Dr. MARIANNE Butler there have been problems diuresing him because his kidney function will become abnormal during diuresis. Spironolactone has been stopped by Dr. MARIANNE Butler in the past. He does have back pain and on 5/3/18 and again 5/29/18 Dr. Davis performed kyphoplasty after getting platelet transfusions because of thrombocytopenia. He has a h/o iron deficiency anemia and gets IV iron infusions by Dr. Lucas with CBC. He has no abdominal or chest pain. NO nausea or vomiting. No fevers, chills, no diarrhea. He is constipated. Minimal rectal bleeding, no melena. His weight has decreased 25 pounds in the last 2 mos. He denies ever drinking much ETOH. He is on a low salt diet. His last EGD was in 2012 and no varices were found. HIs last colonoscopy was also in 2012 when multiple adenomas were removed. Radiation proctitis was found and burned with APC. He is overdue for EGD and colonoscopy. The plan was to do them in 4/18 but they decided not to do them because of his back pain then. HE has a h/o encephalopathy in the past.     Past Medical History:  Past Medical History:   Diagnosis Date   • Anemia    • Cirrhosis    • Coronary artery disease    • Diabetes mellitus    • H/O complete eye exam 10/2017   • History of prostate cancer 2012   • HIV (human immunodeficiency virus infection)    • Hyperlipidemia    • Hypertension    • Lumbar stress fracture    •  Osteoporosis    • Pancytopenia    • Peripheral artery disease    • SOB (shortness of breath) on exertion    • Thyroid disease      Past Surgical History:  Past Surgical History:   Procedure Laterality Date   • BONE MARROW BIOPSY W/ ASPIRATION  01/21/2014   • CARDIAC CATHETERIZATION     • COLONOSCOPY  09/20/2012    non-thrombosed EH, sessile polyp in ascending colon 3 mm in size.   • CORONARY ANGIOPLASTY WITH STENT PLACEMENT  06/2013   • ENDOSCOPY  09/20/2012    grade 1 varicesmiddle and lower 3rd of esophagus. Moderate portal hypertensive gastropathy in entire stomach.   • KYPHOPLASTY Bilateral 5/3/2018    Procedure: T 12 KYPHOPLASTY 1-2 LEVELS;  Surgeon: Joey Davis MD;  Location: [facility] MAIN OR;  Service: Neurosurgery   • KYPHOPLASTY N/A 5/29/2018    Procedure: KYPHOPLASTY, L1, T11 kyphoplsty;  Surgeon: Joey Davis MD;  Location: On license of [facility] OR 18/19;  Service: Neurosurgery   • MOUTH SURGERY     • PROSTATE BIOPSY     • TONSILLECTOMY     • UPPER GASTROINTESTINAL ENDOSCOPY  09/20/2012    grd 1 varices, portal hypertensive gastropathy      Social History:   Social History   Substance Use Topics   • Smoking status: Former Smoker     Packs/day: 1.50     Years: 30.00     Types: Cigarettes     Quit date: 1994   • Smokeless tobacco: Never Used   • Alcohol use No      Family History:  Family History   Problem Relation Age of Onset   • Heart disease Other    • No Known Problems Mother    • No Known Problems Father    • No Known Problems Maternal Grandmother    • No Known Problems Maternal Grandfather    • No Known Problems Paternal Grandmother    • No Known Problems Paternal Grandfather    • Malig Hyperthermia Neg Hx        Home Meds:  Prescriptions Prior to Admission   Medication Sig Dispense Refill Last Dose   • BD PEN NEEDLE ONEAL U/F 32G X 4 MM misc    Taking   • clonazePAM (KlonoPIN) 0.5 MG tablet Take 0.25 mg by mouth As Needed (TAKES ONCE EVERY 2-3 DAYS PRN).  0 Taking   • fluconazole (DIFLUCAN) 100 MG  tablet Take 100 mg by mouth Every Other Day. TOOK 6/10/2018   Taking   • furosemide (LASIX) 20 MG tablet Take 1 tablet by mouth Daily. (Patient taking differently: Take 20 mg by mouth As Needed.) 30 tablet 3 Taking   • hydrocortisone 2.5 % cream Apply 1 application topically Daily. USES DAILY   Taking   • Icosapent Ethyl (VASCEPA) 1 G capsule Take 1 g by mouth 2 (Two) Times a Day.   Taking   • ISENTRESS 400 MG tablet Take 400 mg by mouth 2 (Two) Times a Day.   Taking   • levothyroxine (SYNTHROID) 300 MCG tablet Take 300 mcg by mouth Every Morning. HAS APPT WITH ENDOCRONOLOGIST/THINKS IT ILL BE [facility]  [facility]   Taking   • metoprolol succinate XL (TOPROL-XL) 50 MG 24 hr tablet Take 1 tablet by mouth 2 (Two) Times a Day. 180 tablet 1 Taking   • oxyCODONE-acetaminophen (PERCOCET) 7.5-325 MG per tablet Take 1 tablet by mouth Every 4 (Four) Hours As Needed for Moderate Pain . 40 tablet 0 Taking   • PREZISTA 800 MG tablet tablet Take 800 mg by mouth Daily.   Taking   • rifaximin (XIFAXAN) 550 MG tablet Take 1 tablet by mouth every 12 (twelve) hours. 60 tablet 5 Taking   • sulfamethoxazole-trimethoprim (BACTRIM DS,SEPTRA DS) 800-160 MG per tablet Take 1 tablet by mouth Every Other Day As Needed. LAST TAKEN Saturday 6/9/2018   Taking   • tamsulosin (FLOMAX) 0.4 MG capsule Take 1 capsule by mouth As Needed.   Taking   • TRESIBA FLEXTOUCH 200 UNIT/ML solution pen-injector Inject 35 Units under the skin Every Night. (Patient taking differently: Inject 40 Units under the skin Every Morning.)  3 Taking   • TRUVADA 200-300 MG per tablet Take 200-300 mg by mouth Daily.   Taking   • TYBOST 150 MG tablet Take 150 mg by mouth Daily With Breakfast.   Taking   • vitamin D (ERGOCALCIFEROL) 92194 units capsule capsule Take 50,000 Units by mouth 1 (One) Time Per Week. TAKES EVERY SUNDAY   Taking     Current Meds:     cobicistat 150 mg Oral Daily With Breakfast   darunavir ethanolate 800 mg Oral Daily   emtricitabine-tenofovir 1 tablet  "Oral Daily   enoxaparin 40 mg Subcutaneous Q24H   furosemide 20 mg Oral Daily   insulin aspart 0-14 Units Subcutaneous 4x Daily With Meals & Nightly   insulin detemir 40 Units Subcutaneous QAM   levothyroxine 300 mcg Oral QAM   metoprolol succinate XL 50 mg Oral BID   raltegravir 400 mg Oral BID   rifaximin 550 mg Oral Q12H   sulfamethoxazole-trimethoprim 1 tablet Oral Q48H     Allergies:  Allergies   Allergen Reactions   • Methylprednisolone Other (See Comments)     \"Everything shuts down\"   • Prednisolone Other (See Comments)     PT UNSURE--STATED HE WAS TOLD NOT TO TAKE IT AGAIN.      Review of Systems  The following systems were reviewed and negative;  respiratory, cardiovascular, musculoskeletal and neurological     Objective     Vital Signs  Temp:  [97.4 °F (36.3 °C)-98.6 °F (37 °C)] 98.5 °F (36.9 °C)  Heart Rate:  [50-59] 59  Resp:  [16-18] 18  BP: (108-133)/(45-60) 110/45  Physical Exam:  General Appearance:    Alert, cooperative, in no acute distress   Head:    Normocephalic, without obvious abnormality, atraumatic   Eyes:            Lids and lashes normal, conjunctivae and sclerae normal, no   icterus   Throat:   No oral lesions, no thrush, oral mucosa moist   Neck:   No adenopathy, supple, trachea midline, no thyromegaly, no   carotid bruit, no JVD   Lungs:     Clear to auscultation,respirations regular, even and                   unlabored    Heart:    Regular rhythm and normal rate, normal S1 and S2, no            murmur, no gallop, no rub, no click   Chest Wall:    No abnormalities observed   Abdomen:     Normal bowel sounds, no masses, no organomegaly, soft        non-tender, mildy distended, no guarding, no rebound                 Tenderness. +fluid wave.   Rectal:     Deferred   Extremities:   2+ pitting pretibial edema, no cyanosis, no redness   Skin:   No bleeding, bruising or rash   Lymph nodes:   No palpable adenopathy   Psychiatric:  Judgement and insight: normal   Orientation to person place " and time: normal   Mood and affect: normal  Minimal asterixis.   Results Review:   I reviewed the patient's new clinical results.      Results from last 7 days  Lab Units 06/11/18  0336 06/10/18  2050   WBC 10*3/mm3 2.07* 1.44*   HEMOGLOBIN g/dL 10.7* 10.2*   HEMATOCRIT % 34.3* 32.5*   PLATELETS 10*3/mm3 45* 26*       Results from last 7 days  Lab Units 06/11/18  0336 06/10/18  2050   SODIUM mmol/L 141 139   POTASSIUM mmol/L 3.1* 3.5   CHLORIDE mmol/L 99 102   CO2 mmol/L 29.8* 26.8   BUN mg/dL 12 12   CREATININE mg/dL 1.41* 1.21   CALCIUM mg/dL 9.4 9.0   BILIRUBIN mg/dL  --  1.4*   ALK PHOS U/L  --  235*   ALT (SGPT) U/L  --  19   AST (SGOT) U/L  --  47*   GLUCOSE mg/dL 112* 97       Results from last 7 days  Lab Units 06/10/18  2050   INR  1.47*       Lab Results  Lab Value Date/Time   LIPASE 139 (H) 04/10/2018 0330   LIPASE 161 (H) 04/08/2018 2243       Radiology:  No orders to display       Assessment/Plan   Patient Active Problem List   Diagnosis   • Chronic coronary artery disease   • Difficulty breathing   • Intermittent claudication   • Peripheral arterial occlusive disease   • Shortness of breath   • Iron deficiency anemia due to chronic blood loss   • Thrombocytopenia associated with AIDS   • Leukocytopenia   • Neutropenia   • Pancytopenia   • Iron and its compounds causing adverse effect in therapeutic use   • Liver cirrhosis   • Lumbar degenerative disc disease   • Secondary thrombocytopenia   • History of coronary artery stent placement   • T12 compression fracture   • Weight loss, abnormal   • Hypothyroidism   • Type 2 diabetes mellitus   • Severe protein-calorie malnutrition   • Pathological fracture of thoracic vertebra with routine healing   • Age-related osteoporosis with current pathological fracture   • Acute bilateral thoracic back pain   • Age-related osteoporosis with current pathological fracture of vertebra   • Thrombocytopenia   • Compression fracture of body of thoracic vertebra   • Lumbar  spine pain   • Pedal edema       I discussed the patients findings and my recommendations with patient.    Assessment:  1) Cirrhosis (cryptogenic vs from HIV meds?). He is overdue to have another EGD to look for varices  2) HIV - followed by Dr. MELINA Thomas  3) CRI  4) h/o anasarca. It has been difficult to manage his diuresis vs keeping his kidney function under control. His legs now have swelling and he has some ascites on physical exam but has only been on prn lasix.  5) h/o mild hepatic encephalopathy. On admission he has been on Xifaxan. He has minimal asterixis today.  6) Pancytopenia  7) h/o iron defiency anemia  8) h/o recent compression fractures of the back with kyphoplasty 5/3/18 and 5/29/18 by Dr. Davis.  9) h/o colonic adenomas. He is overdue to have another colonoscopy but he is pancytopenic. He may need a transfusion of platelets prior to his next colonoscopy.  10) h/o radiation proctitis    Recommendations:  1) Keep on a low salt diet.  2) I will restart low dose aldactone and I agree with lasix but watch kidney function and electrolytes  3) Consider a Nephrology opinion  4) US liver  5) Recheck alpha fetoprotein.  6) Check ammonia level  7) Consider getting Hematology to see  8) I think we should perform an EGD and colonoscopy while he is an inpatient after Hematology sees and considers giving a platelet transfusion.    Garo Shelby MD

## 2018-06-11 NOTE — ED NOTES
Per MD, patient ok to eat. Patient provided with box lunch at this time.     Cate Griffith RN  06/10/18 3194

## 2018-06-12 PROBLEM — R60.0 PEDAL EDEMA: Status: RESOLVED | Noted: 2018-01-01 | Resolved: 2018-01-01

## 2018-06-12 PROBLEM — R60.1 ANASARCA: Status: RESOLVED | Noted: 2018-01-01 | Resolved: 2018-01-01

## 2018-06-12 NOTE — DISCHARGE SUMMARY
Name: Kye Aranda  Age: 72 y.o.  Sex: male  :  1945  MRN: [id number]         Primary Care Physician: Kendrick Griggs MD      Date of Admission:  6/10/2018  Date of Discharge:  2018      CHIEF COMPLAINT  Leg Swelling (BILATERAL LEG SWELLING AND FOOT SWELLING )      DISCHARGE DIAGNOSIS  Active Hospital Problems (** Indicates Principal Problem)    Diagnosis Date Noted   • **Cirrhosis of liver with ascites [K74.60] 2017   • HIV (human immunodeficiency virus infection) [B20] 2018   • CKD (chronic kidney disease) stage 2, GFR 60-89 ml/min [N18.2] 2018   • Anasarca [R60.1] 2018   • Portal hypertension [K76.6] 2018   • Pedal edema [R60.0] 06/10/2018   • Severe protein-calorie malnutrition due to chronic illness [E43] 04/10/2018   • Type 2 diabetes mellitus [E11.9] 2018   • Thrombocytopenia associated with AIDS [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       SECONDARY DIAGNOSES  Past Medical History:   Diagnosis Date   • Anemia    • Cirrhosis    • Coronary artery disease    • Diabetes mellitus    • H/O complete eye exam 10/2017   • History of prostate cancer    • HIV (human immunodeficiency virus infection)    • Hyperlipidemia    • Hypertension    • Lumbar stress fracture    • Osteoporosis    • Pancytopenia    • Peripheral artery disease    • SOB (shortness of breath) on exertion    • Thyroid disease        CONSULTS   Consulting Physician(s)     Provider Relationship Specialty    Malick Sheriff MD Consulting Physician Nephrology    Robyn Butler MD Consulting Physician Gastroenterology            PROCEDURES PERFORMED  Imaging Results (last 72 hours)     Procedure Component Value Units Date/Time    US Liver [[id number]] Collected:  18      Updated:  06/11/18 2121    Narrative:       RIGHT UPPER QUADRANT SONOGRAM     HISTORY: Cirrhosis. Abdominal swelling. Evaluate for ascites and hepatic  lesions.     TECHNIQUE: Sonogram of the right upper quadrant was performed in  standard fashion and is correlated with abdomen CT 04/08/2018.     FINDINGS: The pancreas is obscured on today's exam by bowel gas. The  gallbladder is in situ and appears normal. The common bile duct measures  5 mm. There is no sonographic Galdamez's sign.     Hepatic echotexture appears coarse and the surface contour of the liver  is nodular, compatible with known cirrhosis. No focal hepatic lesion is  identified. There is a tiny amount of ascites adjacent to the liver. The  right kidney appears normal. The kidney measures 11 cm long.       Impression:       Cirrhosis small volume of ascites of liver.     This report was finalized on 6/11/2018 9:18 PM by Dr. Mariano Pollard M.D.              BLE VENOUS DOPPLER   6/11/2018  Normal bilateral lower extremity venous duplex scan.            HOSPITAL COURSE  Mr. Aranda is a 72 y.o. former smoker with a history of cirrhosis of the liver who presented to [facility] initially complaining of worsening edema mainly involving both legs. Please see the admitting history and physical for further details. He was found to have moderate peripheral edema and was admitted to the hospital for further evaluation and treatment. This really was his only acute complaint. He states the swelling has been present for 2-3 days prior to admission. He had recent kyphoplasty surgery and had not been taking his Lasix daily as directed. He was given 80 mg of Lasix IV in the emergency department and had good diuresis. His edema has improved. Lower extremity Dopplers are negative for DVT. Requested to be admitted to the hospital. He did not require any additional IV diuretics. Given history of fluctuating creatinine with previous attempts at diuresis I asked  "nephrology to evaluate. He was seen by Dr. Bob who agrees with daily scheduled Lasix and the addition of Aldactone. He will follow him outpatient regarding his diuretics and edema. He was seen by gastroenterology and started on lactulose and zinc. No evidence of encephalopathy during hospitalization. He is overdue for screening endoscopy which will be arranged as an outpatient next month at his normal follow-up appointment.            PHYSICAL EXAM  Temp:  [98 °F (36.7 °C)-98.2 °F (36.8 °C)] 98.1 °F (36.7 °C)  Heart Rate:  [50-57] 53  Resp:  [16-18] 18  BP: (121-141)/(57-62) 141/57  Body mass index is 24.45 kg/m².  Physical Exam   Constitutional: He is oriented to person, place, and time. No distress.   Cardiovascular: Normal rate and regular rhythm.    No murmur heard.  Pulmonary/Chest: Effort normal and breath sounds normal.   Abdominal: Soft. Bowel sounds are normal. He exhibits no distension. There is no tenderness.   Musculoskeletal: Normal range of motion. He exhibits edema (1+).   Neurological: He is alert and oriented to person, place, and time.   Skin: Skin is warm and dry. He is not diaphoretic.         CONDITION ON DISCHARGE  Stable.      DISCHARGE DISPOSITION   Home with family      ALLERGIES  Allergies   Allergen Reactions   • Methylprednisolone Other (See Comments)     \"Everything shuts down\"   • Prednisolone Other (See Comments)     PT UNSURE--STATED HE WAS TOLD NOT TO TAKE IT AGAIN.          DISCHARGE MEDICATIONS     Your medication list      START taking these medications      Instructions Last Dose Given Next Dose Due   lactulose 10 GM/15ML solution  Commonly known as:  CHRONULAC      Take 30 mL by mouth 2 (Two) Times a Day.       spironolactone 50 MG tablet  Commonly known as:  ALDACTONE  Start taking on:  6/13/2018      Take 1 tablet by mouth Daily.       zinc sulfate 220 (50 Zn) MG capsule  Commonly known as:  ZINCATE      Take 1 capsule by mouth Daily.          CHANGE how you take these " medications      Instructions Last Dose Given Next Dose Due   furosemide 20 MG tablet  Commonly known as:  LASIX  What changed:  · when to take this  · reasons to take this      Take 1 tablet by mouth Daily.       levothyroxine 300 MCG tablet  Commonly known as:  SYNTHROID  What changed:  how much to take      Take 0.5 tablets by mouth Every Morning. HAS APPT WITH ENDOCRONOLOGIST/THINKS IT ILL BE CHANGD  [facility]       TRESIBA FLEXTOUCH 200 UNIT/ML solution pen-injector  Generic drug:  Insulin Degludec  What changed:  · how much to take  · when to take this      Inject 35 Units under the skin Every Night.          CONTINUE taking these medications      Instructions Last Dose Given Next Dose Due   BD PEN NEEDLE ONEAL U/F 32G X 4 MM misc  Generic drug:  Insulin Pen Needle           clonazePAM 0.5 MG tablet  Commonly known as:  KlonoPIN      Take 0.25 mg by mouth As Needed (TAKES ONCE EVERY 2-3 DAYS PRN).       fluconazole 100 MG tablet  Commonly known as:  DIFLUCAN      Take 100 mg by mouth Every Other Day. TOOK 6/10/2018       hydrocortisone 2.5 % cream      Apply 1 application topically Daily. USES DAILY       ISENTRESS 400 MG tablet  Generic drug:  raltegravir      Take 400 mg by mouth 2 (Two) Times a Day.       metoprolol succinate XL 50 MG 24 hr tablet  Commonly known as:  TOPROL-XL      Take 1 tablet by mouth 2 (Two) Times a Day.       oxyCODONE-acetaminophen 7.5-325 MG per tablet  Commonly known as:  PERCOCET      Take 1 tablet by mouth Every 4 (Four) Hours As Needed for Moderate Pain .       PREZISTA 800 MG tablet tablet  Generic drug:  darunavir ethanolate      Take 800 mg by mouth Daily.       rifaximin 550 MG tablet  Commonly known as:  XIFAXAN      Take 1 tablet by mouth every 12 (twelve) hours.       sulfamethoxazole-trimethoprim 800-160 MG per tablet  Commonly known as:  BACTRIM DS,SEPTRA DS      Take 1 tablet by mouth Every Other Day As Needed. LAST TAKEN Saturday 6/9/2018       tamsulosin 0.4 MG  capsule 24 hr capsule  Commonly known as:  FLOMAX      Take 1 capsule by mouth As Needed.       TRUVADA 200-300 MG per tablet  Generic drug:  emtricitabine-tenofovir      Take 200-300 mg by mouth Daily.       TYBOST 150 MG tablet  Generic drug:  cobicistat      Take 150 mg by mouth Daily With Breakfast.       VASCEPA 1 g capsule capsule  Generic drug:  icosapent ethyl      Take 1 g by mouth 2 (Two) Times a Day.       vitamin D 19261 units capsule capsule  Commonly known as:  ERGOCALCIFEROL      Take 50,000 Units by mouth 1 (One) Time Per Week. TAKES EVERY SUNDAY             Where to Get Your Medications      These medications were sent to [facility] - [address] [facility] [phone]  - [phone]   [address] [facility] [address]    Phone:  [phone]   · lactulose 10 GM/15ML solution  · spironolactone 50 MG tablet  · zinc sulfate 220 (50 Zn) MG capsule     Information about where to get these medications is not yet available    Ask your nurse or doctor about these medications  · furosemide 20 MG tablet  · levothyroxine 300 MCG tablet       Diet Instructions     Diet: Regular, Cardiac, Consistent Carbohydrate, Specialty Diet; Thin Liquids, No Restrictions; Low Sodium       Discharge Diet:   Regular  Cardiac  Consistent Carbohydrate  Specialty Diet       Fluid Consistency:  Thin Liquids, No Restrictions    Specialty Diets:  Low Sodium       Activity Instructions     Activity as Tolerated         Future Appointments  Date Time Provider Department Center   7/10/2018 1:00 PM HANG Saleh [facility] GE EA ROE None   7/25/2018 2:00 PM LAB CHAIR 2 [facility]  LAB [facility]   7/25/2018 2:40 PM Carrie Lucas MD PhD [facility] CBC KRES  CBC [facility]   8/22/2018 11:30 AM David Singh MD [facility] CD LCGKR None     Follow-up Information     Kendrick Griggs MD Follow up in 1 week(s).    Specialty:  Family Medicine  Contact information:  [address] [facility]  [address]  [facility] [address]  [phone]             Robyn Butler MD Follow up.    Specialty:  Gastroenterology  Why:  keep appt 7/10  Contact information:  [address]  [place] [id number] [phone]             Malick Sheriff MD Follow up in 1 month(s).    Specialty:  Nephrology  Contact information:  [address]  [facility] [address]  [phone]                   TEST  RESULTS PENDING AT DISCHARGE   Order Current Status    AFP Tumor Marker In process             CODE STATUS  Full Code        Doron Olivas MD  [facility]  06/12/18  12:31 PM

## 2018-06-12 NOTE — PROGRESS NOTES
BGA/GI Progress Note   Chief Complaint:  Leg swelling in the setting of cirrhosis    Subjective     Interval History:    No new GI complaints. We discussed endoscopy as outpatient. He has difficulty following conversation and repeats same questions multiple times.     History taken from: patient chart    Review of Systems:    The following systems were reviewed and negative;  respiratory, cardiovascular and gastrointestinal  Per HPI  Objective     Vital Signs  Temp:  [98 °F (36.7 °C)-98.2 °F (36.8 °C)] 98.1 °F (36.7 °C)  Heart Rate:  [50-57] 53  Resp:  [16-18] 18  BP: (121-141)/(57-62) 141/57  Body mass index is 24.45 kg/m².    Intake/Output Summary (Last 24 hours) at 06/12/18 1039  Last data filed at 06/11/18 2108   Gross per 24 hour   Intake                0 ml   Output              400 ml   Net             -400 ml     No intake/output data recorded.    Physical Exam:   General: patient awake, alert and cooperative, chronically ill appearing    Eyes: Normal lids and lashes, no scleral icterus, no conjunctival pallor   Neck: supple, normal ROM, no tracheal deviation, no thyromegaly   Skin: warm and dry, not jaundiced   Cardiovascular: regular rhythm and rate, no murmurs auscultated   Pulm: clear to auscultation bilaterally, regular and unlabored   Abdomen: soft, nontender, distended + ascites; normal bowel sounds   Rectal: deferred   Extremities: no rash or edema   Neurologic: Normal mood and behavior    All Medications Have Been Reviewed     Results Review:       Results from last 7 days  Lab Units 06/12/18  0541 06/11/18  0336 06/10/18  2050   WBC 10*3/mm3 2.14* 2.07* 1.44*   HEMOGLOBIN g/dL 10.5* 10.7* 10.2*   HEMATOCRIT % 33.9* 34.3* 32.5*   PLATELETS 10*3/mm3 42* 45* 26*         Results from last 7 days  Lab Units 06/12/18  0541 06/11/18  0336 06/10/18  2050   SODIUM mmol/L 139 141 139   POTASSIUM mmol/L 3.8 3.1* 3.5   CHLORIDE mmol/L 99 99 102   CO2 mmol/L 30.0* 29.8* 26.8   BUN mg/dL 11 12 12    CREATININE mg/dL 1.26 1.41* 1.21   CALCIUM mg/dL 9.9 9.4 9.0   BILIRUBIN mg/dL 1.8*  --  1.4*   ALK PHOS U/L 225*  --  235*   ALT (SGPT) U/L 17  --  19   AST (SGOT) U/L 41*  --  47*   GLUCOSE mg/dL 93 112* 97         Results from last 7 days  Lab Units 06/12/18  0541 06/10/18  2050   INR  1.45* 1.47*       RADIOLOGY:    Imaging Results (last 72 hours)     Procedure Component Value Units Date/Time    US Liver [[id number]] Collected:  06/11/18 2112     Updated:  06/11/18 2121    Narrative:       RIGHT UPPER QUADRANT SONOGRAM     HISTORY: Cirrhosis. Abdominal swelling. Evaluate for ascites and hepatic  lesions.     TECHNIQUE: Sonogram of the right upper quadrant was performed in  standard fashion and is correlated with abdomen CT 04/08/2018.     FINDINGS: The pancreas is obscured on today's exam by bowel gas. The  gallbladder is in situ and appears normal. The common bile duct measures  5 mm. There is no sonographic Galdamez's sign.     Hepatic echotexture appears coarse and the surface contour of the liver  is nodular, compatible with known cirrhosis. No focal hepatic lesion is  identified. There is a tiny amount of ascites adjacent to the liver. The  right kidney appears normal. The kidney measures 11 cm long.       Impression:       Cirrhosis small volume of ascites of liver.     This report was finalized on 6/11/2018 9:18 PM by Dr. Mariano Pollard M.D.             Assessment/Plan     Patient Active Problem List   Diagnosis Code   • Chronic coronary artery disease I25.10   • Difficulty breathing R06.89   • Intermittent claudication I73.9   • Peripheral arterial occlusive disease I77.9   • Shortness of breath R06.02   • Iron deficiency anemia due to chronic blood loss D50.0   • Thrombocytopenia associated with AIDS B20, D69.59   • Leukocytopenia D72.819   • Neutropenia D70.9   • Pancytopenia D61.818   • Iron and its compounds causing adverse effect in therapeutic use T45.4X5A   • Cirrhosis of liver with ascites  K74.60   • Lumbar degenerative disc disease M51.36   • 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   • History of coronary artery stent placement Z95.5   • T12 compression fracture S22.080A   • Weight loss, abnormal R63.4   • Hypothyroidism E03.9   • Type 2 diabetes mellitus E11.9   • Severe protein-calorie malnutrition due to chronic illness E43   • Pathological fracture of thoracic vertebra with routine healing M84.48XD   • Age-related osteoporosis with current pathological fracture M80.00XA   • Acute bilateral thoracic back pain M54.6   • Age-related osteoporosis with current pathological fracture of vertebra M80.08XA   • Thrombocytopenia D69.6   • Compression fracture of body of thoracic vertebra M48.54XA   • Lumbar spine pain M54.5   • Pedal edema R60.0   • HIV (human immunodeficiency virus infection) B20   • CKD (chronic kidney disease) stage 2, GFR 60-89 ml/min N18.2   • Anasarca R60.1   • Portal hypertension K76.6            Kaur Greenfield, APRN  06/12/18  10:39 AM          We are following for cirrhosis       Constitutional: He is oriented to person, place, and time. He appears well-developed and well-nourished.   HENT: anicteric and no thyromegaly  Head: Normocephalic and atraumatic.   Eyes: Conjunctivae and EOM are normal.   Neck: Normal range of motion. No tracheal deviation present.   Cardiovascular: Normal rate and regular rhythm.    Pulmonary/Chest: Effort normal and breath sounds normal. No respiratory distress.   Abdominal: Soft. Bowel sounds are normal. He exhibits no distension and no mass. There is no tenderness. There is no rebound and no guarding.   Musculoskeletal: Normal range of motion.   Neurological: He is alert and oriented to person, place, and time.   Skin: Skin is warm and dry. , mild edema bilateral   Psychiatric: He has a normal mood and affect. Judgment normal.   Nursing note and vitals reviewed.    Assessment:  1) Cirrhosis (cryptogenic vs from HIV meds?). He is overdue to have another  EGD to look for varices  2) HIV - followed by Dr. MELINA Thomas  3) CRI  4) h/o anasarca. It has been difficult to manage his diuresis vs keeping his kidney function under control.   5) h/o mild hepatic encephalopathy. On admission he has been on Xifaxan. He has minimal asterixis today.  6) Pancytopenia  7) h/o iron defiency anemia  8) h/o recent compression fractures of the back with kyphoplasty 5/3/18 and 5/29/18 by Dr. Davis.  9) h/o colonic adenomas. He is overdue to have another colonoscopy but he is pancytopenic. He may need a transfusion of platelets prior to his next colonoscopy.  10) h/o radiation proctitis     Recommendations:    2gm Na diet, daily weight   Add zinc  Appreciate Nephrology  assistance with diuretic management. Follow up with nephrology after discharge  Ammonia slightly elevated and he does  have difficulty following conversation- add lactulose with goal of 2-3 BMs per day      He needs an EGD and colonoscopy  but expresses desire to wait a few more weeks post hypoplasty    Follow up with Erika Putnam APRN 7/10 at 1pm to discuss endoscopy. He will likely need outpatient platelet transfusion prior to endoscopy   Continue current medications   Discharge home per primary team

## 2018-06-12 NOTE — PROGRESS NOTES
Continued Stay Note  [facility]     Patient Name: Kye Aranda  MRN: [id number]  Today's Date: 6/12/2018    Admit Date: 6/10/2018          Discharge Plan     Row Name 06/12/18 9117       Plan    Plan Comments DC orders noted.  RN states hospital followup PCP appt has been made.  Pt to be DC'd home              Discharge Codes    No documentation.       Expected Discharge Date and Time     Expected Discharge Date Expected Discharge Time    Jun 12, 2018             Tahmina Luna RN

## 2018-06-12 NOTE — PROGRESS NOTES
"   LOS: 1 day   Patient Care Team:  Kendrick Griggs MD as PCP - General (Family Medicine)  Carrie Lucas MD PhD as Consulting Physician (Hematology and Oncology)  Baldo Conway MD as Consulting Physician (Endocrinology)  Robyn Butler MD as Consulting Physician (Gastroenterology)    Chief Complaint/ Reason for encounter: Chronic kidney disease, diuretic management  Chief Complaint   Patient presents with   • Leg Swelling     BILATERAL LEG SWELLING AND FOOT SWELLING          Subjective     Medical history reviewed:  History of Present Illness    Subjective:  Symptoms:  Improved.  No shortness of breath or chest pain.  (No new complaints, no shortness of breath, decreased edema).    Diet:  Adequate intake.    Activity level: Normal.    Pain:  He reports no pain.          History taken from: Patient and chart    Objective     Vital Signs  Temp:  [98 °F (36.7 °C)-98.2 °F (36.8 °C)] 98.1 °F (36.7 °C)  Heart Rate:  [53-57] 53  Resp:  [16-18] 18  BP: (121-141)/(57-61) 141/57       Wt Readings from Last 1 Encounters:   06/12/18 0456 70.8 kg (156 lb 1.4 oz)   06/10/18 1820 72.6 kg (160 lb)       Objective:  General Appearance:  Comfortable, in no acute distress and not in pain.    Vital signs: (most recent): Blood pressure 141/57, pulse 53, temperature 98.1 °F (36.7 °C), temperature source Oral, resp. rate 18, height 170.2 cm (67\"), weight 70.8 kg (156 lb 1.4 oz), SpO2 91 %.  Vital signs are normal.    Output: Producing urine.    HEENT: Normal HEENT exam.    Lungs:  Normal effort and normal respiratory rate.  He is not in respiratory distress.  No decreased breath sounds.    Heart: Normal rate.  Regular rhythm.    Abdomen: Abdomen is soft.  There are signs of ascites. Bowel sounds are normal.   There is no abdominal tenderness.   There is no mass.   Extremities: Normal range of motion.  There is dependent edema.  There is no local swelling.  (1+ edema)  Neurological: Patient is alert and oriented to person, place " and time.    Skin:  Warm and dry.  No rash or cyanosis.             Results Review:    Intake/Output:     Intake/Output Summary (Last 24 hours) at 06/12/18 1335  Last data filed at 06/11/18 2108   Gross per 24 hour   Intake                0 ml   Output              400 ml   Net             -400 ml         DATA:  Radiology and Labs:  The following labs independently reviewed by me. Additional labs ordered for tomorrow a.m.  Interval notes, chart personally reviewed by me.   Old records independently reviewed showing stage 3 CKD   The following radiologic studies independently viewed by me, findings liver US: cirrhosis, nl right kidney  New problems include anemia of CKD  Discussed with Dr. Olivas re: D/C plans/ diuretics      Risk/ complexity of medical care/ medical decision making moderate      Labs:   Recent Results (from the past 24 hour(s))   POC Glucose Once    Collection Time: 06/11/18  5:32 PM   Result Value Ref Range    Glucose 114 70 - 130 mg/dL   Prepare Platelet Pheresis, 1 Units    Collection Time: 06/11/18  6:00 PM   Result Value Ref Range    Product Code T7590W10     Unit Number [id number]     UNIT  ABO AB     UNIT  RH NEG     Dispense Status PT     Blood Type ABNEG     Blood Expiration Date [id number]     Blood Type Barcode 2800    Chloride, Urine, Random -    Collection Time: 06/11/18  9:05 PM   Result Value Ref Range    Chloride, Urine 43 mmol/L   Creatinine, Urine, Random -    Collection Time: 06/11/18  9:05 PM   Result Value Ref Range    Creatinine, Urine 52.8 mg/dL   Eosinophil Smear - Urine, Urine, Clean Catch    Collection Time: 06/11/18  9:05 PM   Result Value Ref Range    Eosinophil Smear 0 0 - 0 % EOS/100 Cells   Urinalysis With / Microscopic If Indicated (No Culture) -    Collection Time: 06/11/18  9:05 PM   Result Value Ref Range    Color, UA Yellow Yellow, Straw    Appearance, UA Clear Clear    pH, UA 7.5 5.0 - 8.0    Specific Gravity, UA 1.015 1.005 - 1.030    Glucose,  mg/dL  (2+) (A) Negative    Ketones, UA Negative Negative    Bilirubin, UA Negative Negative    Blood, UA Negative Negative    Protein, UA 30 mg/dL (1+) (A) Negative    Leuk Esterase, UA Negative Negative    Nitrite, UA Negative Negative    Urobilinogen, UA 2.0 E.U./dL (A) 0.2 - 1.0 E.U./dL   Sodium, Urine, Random -    Collection Time: 06/11/18  9:05 PM   Result Value Ref Range    Sodium, Urine 111 mmol/L   Protein, Urine, Random -    Collection Time: 06/11/18  9:05 PM   Result Value Ref Range    Total Protein, Urine 41.0 mg/dL   Urea Nitrogen, Urine - Urine, Clean Catch    Collection Time: 06/11/18  9:05 PM   Result Value Ref Range    Urea Nitrogen, Urine 314 mg/dL   Urinalysis, Microscopic Only - Urine, Clean Catch    Collection Time: 06/11/18  9:05 PM   Result Value Ref Range    RBC, UA 0-2 None Seen, 0-2 /HPF    WBC, UA 0-2 None Seen, 0-2 /HPF    Bacteria, UA None Seen None Seen /HPF    Squamous Epithelial Cells, UA 0-2 None Seen, 0-2 /HPF    Hyaline Casts, UA None Seen None Seen /LPF    Methodology Automated Microscopy    POC Glucose Once    Collection Time: 06/11/18  9:33 PM   Result Value Ref Range    Glucose 170 (H) 70 - 130 mg/dL   Ammonia    Collection Time: 06/12/18  5:41 AM   Result Value Ref Range    Ammonia 70 (H) 16 - 60 umol/L   Comprehensive Metabolic Panel    Collection Time: 06/12/18  5:41 AM   Result Value Ref Range    Glucose 93 65 - 99 mg/dL    BUN 11 8 - 23 mg/dL    Creatinine 1.26 0.76 - 1.27 mg/dL    Sodium 139 136 - 145 mmol/L    Potassium 3.8 3.5 - 5.2 mmol/L    Chloride 99 98 - 107 mmol/L    CO2 30.0 (H) 22.0 - 29.0 mmol/L    Calcium 9.9 8.6 - 10.5 mg/dL    Total Protein 7.1 6.0 - 8.5 g/dL    Albumin 3.40 (L) 3.50 - 5.20 g/dL    ALT (SGPT) 17 1 - 41 U/L    AST (SGOT) 41 (H) 1 - 40 U/L    Alkaline Phosphatase 225 (H) 39 - 117 U/L    Total Bilirubin 1.8 (H) 0.1 - 1.2 mg/dL    eGFR Non African Amer 56 (L) >60 mL/min/1.73    Globulin 3.7 gm/dL    A/G Ratio 0.9 g/dL    BUN/Creatinine Ratio 8.7 7.0 -  25.0    Anion Gap 10.0 mmol/L   Protime-INR    Collection Time: 06/12/18  5:41 AM   Result Value Ref Range    Protime 17.4 (H) 11.7 - 14.2 Seconds    INR 1.45 (H) 0.90 - 1.10   Uric Acid    Collection Time: 06/12/18  5:41 AM   Result Value Ref Range    Uric Acid 2.9 (L) 3.4 - 7.0 mg/dL   CBC Auto Differential    Collection Time: 06/12/18  5:41 AM   Result Value Ref Range    WBC 2.14 (L) 4.50 - 10.70 10*3/mm3    RBC 3.65 (L) 4.60 - 6.00 10*6/mm3    Hemoglobin 10.5 (L) 13.7 - 17.6 g/dL    Hematocrit 33.9 (L) 40.4 - 52.2 %    MCV 92.9 79.8 - 96.2 fL    MCH 28.8 27.0 - 32.7 pg    MCHC 31.0 (L) 32.6 - 36.4 g/dL    RDW 18.6 (H) 11.5 - 14.5 %    RDW-SD 63.6 (H) 37.0 - 54.0 fl    Platelets 42 (L) 140 - 500 10*3/mm3    Neutrophil % 71.0 42.7 - 76.0 %    Lymphocyte % 15.4 (L) 19.6 - 45.3 %    Monocyte % 10.3 5.0 - 12.0 %    Eosinophil % 2.8 0.3 - 6.2 %    Basophil % 0.5 0.0 - 1.5 %    Immature Grans % 0.0 0.0 - 0.5 %    Neutrophils, Absolute 1.52 (L) 1.90 - 8.10 10*3/mm3    Lymphocytes, Absolute 0.33 (L) 0.90 - 4.80 10*3/mm3    Monocytes, Absolute 0.22 0.20 - 1.20 10*3/mm3    Eosinophils, Absolute 0.06 0.00 - 0.70 10*3/mm3    Basophils, Absolute 0.01 0.00 - 0.20 10*3/mm3    Immature Grans, Absolute 0.00 0.00 - 0.03 10*3/mm3   Phosphorus    Collection Time: 06/12/18  5:41 AM   Result Value Ref Range    Phosphorus 2.6 2.5 - 4.5 mg/dL   POC Glucose Once    Collection Time: 06/12/18  6:35 AM   Result Value Ref Range    Glucose 85 70 - 130 mg/dL   POC Glucose Once    Collection Time: 06/12/18 11:21 AM   Result Value Ref Range    Glucose 228 (H) 70 - 130 mg/dL       Radiology:  Imaging Results (last 24 hours)     Procedure Component Value Units Date/Time    US Liver [[id number]] Collected:  06/11/18 2112     Updated:  06/11/18 2121    Narrative:       RIGHT UPPER QUADRANT SONOGRAM     HISTORY: Cirrhosis. Abdominal swelling. Evaluate for ascites and hepatic  lesions.     TECHNIQUE: Sonogram of the right upper quadrant was performed  in  standard fashion and is correlated with abdomen CT 04/08/2018.     FINDINGS: The pancreas is obscured on today's exam by bowel gas. The  gallbladder is in situ and appears normal. The common bile duct measures  5 mm. There is no sonographic Galdamez's sign.     Hepatic echotexture appears coarse and the surface contour of the liver  is nodular, compatible with known cirrhosis. No focal hepatic lesion is  identified. There is a tiny amount of ascites adjacent to the liver. The  right kidney appears normal. The kidney measures 11 cm long.       Impression:       Cirrhosis small volume of ascites of liver.     This report was finalized on 6/11/2018 9:18 PM by Dr. Mariano Pollard M.D.                Medications have been reviewed:  Current Facility-Administered Medications   Medication Dose Route Frequency Provider Last Rate Last Dose   • acetaminophen (TYLENOL) tablet 650 mg  650 mg Oral Q4H PRN Eyad Pedraza MD       • bisacodyl (DULCOLAX) EC tablet 5 mg  5 mg Oral Daily PRN Eyad Pedraza MD       • bisacodyl (DULCOLAX) suppository 10 mg  10 mg Rectal Daily PRN Eyad Pedraza MD       • clonazePAM (KlonoPIN) tablet 0.25 mg  0.25 mg Oral BID PRN Eyad Pedraza MD       • cobicistat (TYBOST) 150 mg  150 mg Oral Daily With Breakfast Eyad Pedraza MD   150 mg at 06/12/18 0905   • darunavir ethanolate (PREZISTA) tablet 800 mg  800 mg Oral Daily Eyad Pedraza MD   800 mg at 06/12/18 0905   • dextrose (D50W) solution 25 g  25 g Intravenous Q15 Min PRN Eyad Pedraza MD       • dextrose (GLUTOSE) oral gel 15 g  15 g Oral Q15 Min PRN Eyad Pedraza MD       • emtricitabine-tenofovir (TRUVADA) 200-300 MG per tablet 1 tablet  1 tablet Oral Daily Eyad Pedraza MD   1 tablet at 06/12/18 0905   • fluconazole (DIFLUCAN) tablet 100 mg  100 mg Oral Every Other Day Doron Olivas MD   100 mg at 06/12/18 0906   • furosemide (LASIX) tablet 20 mg  20 mg Oral Daily Eyad Pedraza MD    20 mg at 06/12/18 0906   • glucagon (human recombinant) (GLUCAGEN DIAGNOSTIC) injection 1 mg  1 mg Subcutaneous PRN Eyad Pedraza MD       • insulin aspart (novoLOG) injection 0-14 Units  0-14 Units Subcutaneous 4x Daily With Meals & Nightly Eyad Pedraza MD   5 Units at 06/12/18 1219   • insulin detemir (LEVEMIR) injection 40 Units  40 Units Subcutaneous QAM Eyad Pedraza MD       • lactulose (CHRONULAC) 10 GM/15ML solution 20 g  20 g Oral BID HANG Bajwa       • levothyroxine (SYNTHROID, LEVOTHROID) tablet 150 mcg  150 mcg Oral QAM Doron Olivas MD   150 mcg at 06/12/18 0906   • metoprolol succinate XL (TOPROL-XL) 24 hr tablet 50 mg  50 mg Oral BID Eyad Pedraza MD   50 mg at 06/12/18 0906   • nitroglycerin (NITROSTAT) SL tablet 0.4 mg  0.4 mg Sublingual Q5 Min PRN Eyad Pedraza MD       • ondansetron (ZOFRAN) tablet 4 mg  4 mg Oral Q6H PRN Eyad Pedraza MD        Or   • ondansetron ODT (ZOFRAN-ODT) disintegrating tablet 4 mg  4 mg Oral Q6H PRN Eyad Pedraza MD        Or   • ondansetron (ZOFRAN) injection 4 mg  4 mg Intravenous Q6H PRN Eyad Pedraza MD       • oxyCODONE-acetaminophen (PERCOCET) 7.5-325 MG per tablet 1 tablet  1 tablet Oral Q4H PRN Eyad Pedraza MD   1 tablet at 06/12/18 1328   • raltegravir (ISENTRESS) tablet 400 mg  400 mg Oral BID Eyad Pedraza MD   400 mg at 06/12/18 0906   • rifaximin (XIFAXAN) tablet 550 mg  550 mg Oral Q12H Eyad Pedraza MD   550 mg at 06/12/18 0906   • sodium chloride 0.9 % flush 1-10 mL  1-10 mL Intravenous PRN Eyad Pedraza MD       • spironolactone (ALDACTONE) tablet 50 mg  50 mg Oral Daily Garo Shelby MD   50 mg at 06/12/18 0906   • sulfamethoxazole-trimethoprim (BACTRIM DS,SEPTRA DS) 800-160 MG per tablet 160 mg  1 tablet Oral Q48H Eyad Pedraza MD       • zinc sulfate (ZINCATE) capsule 220 mg  220 mg Oral Daily Kaur Greenfield, HANG           ASSESSMENT:  Stage III chronic  kidney disease, creatinine fairly close to his usual baseline, improved  volume overload, in part due to underlying liver disease, previously normal 2-D echocardiogram better today  Cirrhosis with ascites  Coronary artery disease  Thrombocytopenia  HIV/AIDS, last CD4 count was 95  Edema/volume overload: improved  Mild protein malnutrition secondary to chronic liver disease        PLAN:   His edema is improved since admission  Agree with current diuretic regimen  unremarkable additional urine studies  Continue spironolactone, K replacement  He can follow-up with me after discharge for additional diuretic management 4 weeks    Malick Sheriff MD   [facility]   Office phone number: [phone]  Answering service phone number: [phone]    06/12/18  1:35 PM      Dictation performed using Dragon dictation software

## 2018-06-12 NOTE — PLAN OF CARE
Problem: Patient Care Overview  Goal: Plan of Care Review  Outcome: Ongoing (interventions implemented as appropriate)   06/12/18 5754   Coping/Psychosocial   Plan of Care Reviewed With patient   Plan of Care Review   Progress improving   OTHER   Outcome Summary Discharge orders for home.     Goal: Individualization and Mutuality  Outcome: Ongoing (interventions implemented as appropriate)      Problem: Fall Risk (Adult)  Goal: Absence of Fall  Outcome: Ongoing (interventions implemented as appropriate)      Problem: Skin Injury Risk (Adult)  Goal: Skin Health and Integrity  Outcome: Ongoing (interventions implemented as appropriate)      Problem: Mobility, Physical Impaired (Adult)  Goal: Enhanced Mobility Skills  Outcome: Ongoing (interventions implemented as appropriate)    Goal: Enhanced Functional Ability  Outcome: Ongoing (interventions implemented as appropriate)      Problem: Nutrition, Imbalanced: Inadequate Oral Intake (Adult)  Goal: Improved Oral Intake  Outcome: Ongoing (interventions implemented as appropriate)    Goal: Prevent Further Weight Loss  Outcome: Ongoing (interventions implemented as appropriate)      Problem: Pain, Chronic (Adult)  Goal: Acceptable Pain/Comfort Level and Functional Ability  Outcome: Ongoing (interventions implemented as appropriate)

## 2018-06-12 NOTE — PLAN OF CARE
Problem: Patient Care Overview  Goal: Plan of Care Review  Outcome: Ongoing (interventions implemented as appropriate)   06/12/18 0500   Coping/Psychosocial   Plan of Care Reviewed With patient   Plan of Care Review   Progress improving   OTHER   Outcome Summary Uneventful night with stable vital signs, urine sample sent, medicated as ordered & tolerated well, I will continue to monitor.     Goal: Discharge Needs Assessment  Outcome: Ongoing (interventions implemented as appropriate)    Goal: Interprofessional Rounds/Family Conf  Outcome: Ongoing (interventions implemented as appropriate)      Problem: Fall Risk (Adult)  Goal: Absence of Fall  Outcome: Ongoing (interventions implemented as appropriate)      Problem: Skin Injury Risk (Adult)  Goal: Skin Health and Integrity  Outcome: Ongoing (interventions implemented as appropriate)      Problem: Mobility, Physical Impaired (Adult)  Goal: Enhanced Mobility Skills  Outcome: Ongoing (interventions implemented as appropriate)    Goal: Enhanced Functional Ability  Outcome: Ongoing (interventions implemented as appropriate)      Problem: Nutrition, Imbalanced: Inadequate Oral Intake (Adult)  Goal: Improved Oral Intake  Outcome: Ongoing (interventions implemented as appropriate)    Goal: Prevent Further Weight Loss  Outcome: Ongoing (interventions implemented as appropriate)      Problem: Pain, Chronic (Adult)  Goal: Acceptable Pain/Comfort Level and Functional Ability  Outcome: Ongoing (interventions implemented as appropriate)

## 2018-06-13 NOTE — PROGRESS NOTES
Case Management Discharge Note    Final Note: DC'd home    Destination     No service coordination in this encounter.      Durable Medical Equipment     No service coordination in this encounter.      Dialysis/Infusion     No service coordination in this encounter.      Home Medical Care     No service coordination in this encounter.      Social Care     No service coordination in this encounter.             Final Discharge Disposition Code: 01 - home or self-care

## 2018-06-21 NOTE — PROGRESS NOTES
Subjective   Kye Aranda is a 72 y.o. male.     History of Present Illness   Kye Aranda 72 y.o. male presents today for hosptial follow up.  he was treated [facility] for BLE edema secondary to liver cirrhosis.  I reviewed all of the labs and diagnostic testing.  Labs reviewed with pt today during visit. All questions answered.      Current outpatient and discharge medications have been reconciled for the patient.  Reviewed by: HANG Lopez    he does have a follow up appointment with a specialist:  With gastroenterology and Dr. Bob.      Hospital note as follows:    HOSPITAL COURSE  Mr. Aranda is a 72 y.o. former smoker with a history of cirrhosis of the liver who presented to [facility] initially complaining of worsening edema mainly involving both legs. Please see the admitting history and physical for further details. He was found to have moderate peripheral edema and was admitted to the hospital for further evaluation and treatment. This really was his only acute complaint. He states the swelling has been present for 2-3 days prior to admission. He had recent kyphoplasty surgery and had not been taking his Lasix daily as directed. He was given 80 mg of Lasix IV in the emergency department and had good diuresis. His edema has improved. Lower extremity Dopplers are negative for DVT. Requested to be admitted to the hospital. He did not require any additional IV diuretics. Given history of fluctuating creatinine with previous attempts at diuresis I asked nephrology to evaluate. He was seen by Dr. Bob who agrees with daily scheduled Lasix and the addition of Aldactone. He will follow him outpatient regarding his diuretics and edema. He was seen by gastroenterology and started on lactulose and zinc. No evidence of encephalopathy during hospitalization. He is overdue for screening endoscopy which will be arranged as an outpatient next month at his normal follow-up appointment.        Patient  reports swelling has improved significantly since hospital discharge.  He is still having some abdominal discomfort but states it has not worsened. His renal function had improved by time of discharge.  He has appt with Dr. Bob next month.  TSH and free T4 were abnormal.  He is due to see endocrinology to f/u on hypothyroidism.  His A1c was 5.  Patient has lost at least 10 lbs in the past few months.  He is concerned about this.  He has f/u with neurosurgery, GI , hematology next month. Has appt with Dr. Singh in August.  Patient also reports rash to bilateral groin area that he has had for weeks. He states he was given a powder in rehab to use but has not been using it regularly.    The following portions of the patient's history were reviewed and updated as appropriate: allergies, current medications, past family history, past medical history, past social history, past surgical history and problem list.    Review of Systems   Constitutional: Positive for appetite change and unexpected weight change. Negative for fatigue.   Respiratory: Negative for cough and shortness of breath.    Cardiovascular: Positive for leg swelling. Negative for chest pain and palpitations.   Endocrine: Negative for cold intolerance, heat intolerance, polydipsia, polyphagia and polyuria.   Skin: Negative for rash.   Neurological: Negative for dizziness and light-headedness.   Psychiatric/Behavioral: Negative for dysphoric mood and sleep disturbance. The patient is not nervous/anxious.        Objective   Physical Exam   Constitutional: He is oriented to person, place, and time. He appears well-developed and well-nourished.   Cardiovascular: Normal rate and regular rhythm.    1 plus pitting edema to BLE   Pulmonary/Chest: Effort normal and breath sounds normal.   Neurological: He is oriented to person, place, and time.   Skin: Skin is warm and dry.   Psychiatric: He has a normal mood and affect. His behavior is normal. Judgment and thought  content normal.   Nursing note and vitals reviewed.      Assessment/Plan   Kye was seen today for cirrhosis.    Diagnoses and all orders for this visit:    Cirrhosis of liver with ascites, unspecified hepatic cirrhosis type    Hospital discharge follow-up    Chronic coronary artery disease    Severe protein-calorie malnutrition due to chronic illness    CKD (chronic kidney disease) stage 2, GFR 60-89 ml/min    Candida rash of groin  -     nystatin (MYCOSTATIN) 839637 UNIT/GM powder; Apply  topically 4 (Four) Times a Day.          No change in medications today.  Refilled nystatin powder and discussed proper use.    Patient to keep f/u appts as scheduled.   He will contact Dr. Conway for appt.    Patient to drink boost with glucose control to add nutrition.

## 2018-06-22 NOTE — TELEPHONE ENCOUNTER
Left message on patient voicemail that [facility] has been trying to get a hold of him to schedule an appointment.  Left [facility]'s phone number and for him to give their office a call to schedule his appointment.

## 2018-06-27 NOTE — TELEPHONE ENCOUNTER
Did I mention anything about refills? When does he see pain management? Please call [facility] to find out.

## 2018-06-27 NOTE — TELEPHONE ENCOUNTER
I called [facility] spine and they had just spoken with Mr. Aranda. His appointment is 07/03/2018. After reviewing your note you said that he has enough Percocet for now but may need a refill in the next week or so. Also, you said any further management of medications and any other treatments that may be helpful with be handled by pain management.

## 2018-06-27 NOTE — TELEPHONE ENCOUNTER
Patient had a T11 and L1 kyphoplasty by Dr. Davis on 05/29/2018. You last saw patient on 06/07/2018.     Percocet 7.5-325 1 tab q 4 hours  #40    Patient is waiting to her back from [facility] to schedule an appointment with them.    Is this okay to refill?

## 2018-06-28 NOTE — TELEPHONE ENCOUNTER
Arabella,    Since the patient doesn't see Pain Management until 7/3 is it ok to refill the Percocet until that date? Or do we need to tell the patient they need to talk with their PCP?     Thanks

## 2018-07-02 NOTE — PROGRESS NOTES
Subjective   Kye Aranda is a 72 y.o. male.     CC: Hospital F/U for Hyponatremia/Abdominal Pain    History of Present Illness     Pt returns today after recent hospitalization. That visit was as follows:    HPI:  Pt is a 72 y.o. male who presents complaining of warmth to his bilateral lower abd and groin for the past several days. Pt also bilateral foot swelling for the past 4 days similar to previous episodes, and increased back pain and bilateral abd pain since tapering off percocet 5 days ago after a lumbar compression fracture kyphoplasty. Pt states a Hx of AIDS. Pt states that he is compliant with his current medication regimen, that his last viral load was less than 1000 and unable to state his last CD4 count.  PT denies cough, CP, SOA,  fever, nausea/vomiting, scrotal/testicle pain/swelling, difficulty urinating, blood/black color of stools, changes in bowel habits, new weakness/numbness/incontinence.    He had been admitted to the hospital 6/10 and that visit was as follows:    HOSPITAL COURSE  Mr. Aranda is a 72 y.o. former smoker with a history of cirrhosis of the liver who presented to [facility] initially complaining of worsening edema mainly involving both legs. Please see the admitting history and physical for further details. He was found to have moderate peripheral edema and was admitted to the hospital for further evaluation and treatment. This really was his only acute complaint. He states the swelling has been present for 2-3 days prior to admission. He had recent kyphoplasty surgery and had not been taking his Lasix daily as directed. He was given 80 mg of Lasix IV in the emergency department and had good diuresis. His edema has improved. Lower extremity Dopplers are negative for DVT. Requested to be admitted to the hospital. He did not require any additional IV diuretics. Given history of fluctuating creatinine with previous attempts at diuresis I asked nephrology to evaluate. He  "was seen by Dr. Bob who agrees with daily scheduled Lasix and the addition of Aldactone. He will follow him outpatient regarding his diuretics and edema. He was seen by gastroenterology and started on lactulose and zinc. No evidence of encephalopathy during hospitalization. He is overdue for screening endoscopy which will be arranged as an outpatient next month at his normal follow-up appointment.    Current medication list is compared to recent hospital d/c and the medications are reconciled.    He is seeing pain management tomorrow ([facility]).    Pt thinks he may have a hemorrhoid.    He has an appt next week with GI, and with cardiology and hematology end of this month and into next.    Pt is essentially homebound and is in need of some home PT.    The following portions of the patient's history were reviewed and updated as appropriate: allergies, current medications, past family history, past medical history, past social history, past surgical history and problem list.    Review of Systems   Constitutional: Negative for activity change, chills, fatigue and fever.   Respiratory: Negative for cough and shortness of breath.    Cardiovascular: Negative for chest pain and palpitations.   Gastrointestinal: Positive for abdominal pain.        Rectal bleeding   Endocrine: Negative for cold intolerance.   Psychiatric/Behavioral: Negative for behavioral problems and dysphoric mood. The patient is not nervous/anxious.      /62   Pulse 67   Temp 98.1 °F (36.7 °C) (Oral)   Resp 16   Ht 170.2 cm (67\")   Wt 67.6 kg (149 lb)   BMI 23.34 kg/m²     Objective   Physical Exam   Constitutional: He appears well-developed and well-nourished.   Neck: Neck supple. No thyromegaly present.   Cardiovascular: Normal rate and regular rhythm.    No murmur heard.  Pulmonary/Chest: Effort normal and breath sounds normal.   Abdominal: Bowel sounds are normal. There is no tenderness.   Large hemorrhoids noted   Psychiatric: " He has a normal mood and affect. His behavior is normal.   Nursing note and vitals reviewed.  Hospital records reviewed with pt confirming HPI.      Assessment/Plan   Kye was seen today for elevated liver enzymes, abdominal pain and bleeding from rectum area.    Diagnoses and all orders for this visit:    Cirrhosis of liver with ascites, unspecified hepatic cirrhosis type    Portal hypertension    Hemorrhoids, unspecified hemorrhoid type  -     hydrocortisone-pramoxine (PROCTOFOAM HC) 1-1 % rectal foam; Insert 1 applicator into the rectum 3 (Three) Times a Day.    T12 compression fracture  -     Ambulatory Referral to Physical Therapy

## 2018-07-10 NOTE — TELEPHONE ENCOUNTER
The nurse Melia from [facility] calls and states there is an interaction with tamulosin 0.4 and tybost 150 mg please advise?

## 2018-07-10 NOTE — TELEPHONE ENCOUNTER
Message to Dr David Singh re: cardiac clearance.    Message to Dr Carrie Lucas re: hematology clearance.

## 2018-07-10 NOTE — PROGRESS NOTES
Chief Complaint   Patient presents with   • Follow-up     from hospital visit   • Cirrhosis       Kye Aranda is a  72 y.o. male here for a hospital follow up visit for liver cirrhosis, hepatic encephalopathy and anemia.     HPI  71 yo m presents today for hospital follow up visit for worsening leg swelling with hx liver cirrhosis, hepatic encephalopathy and ARIANE. He is a patient of Dr. Butler. He was seen by our service on 6/10/18. He has hx cirrhosis (cryptogenic vs HIV meds), HE and ARIANE. He has had trouble with his kidneys while on lasix and aldactone. He was experiencing some possible HE in the hospital and was started on lactulose and Xifaxan. He was already on Zinc. He admits to me today that he had not been taking the lactulose because he wasn't sure what it was for or how best to take it. He reports having a BM every other day currently. He does feel confused and stays drowsy most of the time. He does feel his swelling in abdomen and legs is better since the hospital. He is taking lasix and aldactone. He is planning on making f/u appt with nephrologist Dr. Sheriff. He has f/u planned with his cardiologist and hematologist in a few weeks. He is trying to eat a low salt diet but admits he does not weight himself everyday. He finds it too depressing. He denies any jaundice or itching. He has chronic back pain and admits he is still trying to heal from his two recent back surgeries. He has hx adenomatous colon polyps and is over due for Colonoscopy. He is also due for screening EGD to check for esophageal varices. He uses walker to ambulate. He denies any dysphagia, reflux, N&V, diarrhea, rectal bleeding or melena. He does tell me he has hx hemorrhoids and will have occasional rectal bleeding but denies any issues today. He has chronic back and abd pain and sees pain management for that. He has hx low platelets and has f/u planned with hematologist. He has hx HIV and sees Dr. Thomas.     Past Medical History:  "  Diagnosis Date   • Anemia    • Cirrhosis (CMS/HCC)    • Coronary artery disease    • Diabetes mellitus (CMS/HCC)    • H/O complete eye exam 10/2017   • History of prostate cancer 2012   • HIV (human immunodeficiency virus infection) (CMS/HCC)    • Hyperlipidemia    • Hypertension    • Lumbar stress fracture    • Osteoporosis    • Pancytopenia (CMS/HCC)    • Peripheral artery disease (CMS/HCC)    • SOB (shortness of breath) on exertion    • Thyroid disease        Past Surgical History:   Procedure Laterality Date   • BONE MARROW BIOPSY W/ ASPIRATION  01/21/2014   • CARDIAC CATHETERIZATION     • COLONOSCOPY  09/20/2012    non-thrombosed EH, sessile polyp in ascending colon 3 mm in size.   • CORONARY ANGIOPLASTY WITH STENT PLACEMENT  06/2013   • ENDOSCOPY  09/20/2012    grade 1 varicesmiddle and lower 3rd of esophagus. Moderate portal hypertensive gastropathy in entire stomach.   • KYPHOPLASTY Bilateral 5/3/2018    Procedure: T 12 KYPHOPLASTY 1-2 LEVELS;  Surgeon: Joey Davis MD;  Location: [facility] MAIN OR;  Service: Neurosurgery   • KYPHOPLASTY N/A 5/29/2018    Procedure: KYPHOPLASTY, L1, T11 kyphoplsty;  Surgeon: Joey Davis MD;  Location: [facility] OR 18/19;  Service: Neurosurgery   • MOUTH SURGERY     • PROSTATE BIOPSY     • TONSILLECTOMY     • UPPER GASTROINTESTINAL ENDOSCOPY  09/20/2012    grd 1 varices, portal hypertensive gastropathy       Scheduled Meds:    Continuous Infusions:  No current facility-administered medications for this visit.     PRN Meds:.    Allergies   Allergen Reactions   • Dicyclomine Hallucinations   • Methylprednisolone Other (See Comments)     \"Everything shuts down\"   • Prednisolone Other (See Comments)     PT UNSURE--STATED HE WAS TOLD NOT TO TAKE IT AGAIN.        Social History     Social History   • Marital status: Single     Spouse name: N/A   • Number of children: N/A   • Years of education: College     Occupational History   •  Retired     [employer] "     Social History Main Topics   • Smoking status: Former Smoker     Packs/day: 1.50     Years: 30.00     Types: Cigarettes     Quit date: 1994   • Smokeless tobacco: Never Used   • Alcohol use No   • Drug use: No   • Sexual activity: Defer     Other Topics Concern   • Not on file     Social History Narrative   • No narrative on file       Family History   Problem Relation Age of Onset   • Heart disease Other    • No Known Problems Mother    • No Known Problems Father    • No Known Problems Maternal Grandmother    • No Known Problems Maternal Grandfather    • No Known Problems Paternal Grandmother    • No Known Problems Paternal Grandfather    • Malig Hyperthermia Neg Hx        Review of Systems   Constitutional: Positive for fatigue. Negative for appetite change, chills, diaphoresis, fever and unexpected weight change.   HENT: Negative for nosebleeds, postnasal drip, sore throat, trouble swallowing and voice change.    Respiratory: Negative for cough, choking, chest tightness, shortness of breath and wheezing.    Cardiovascular: Negative for chest pain.   Gastrointestinal: Positive for abdominal distention and abdominal pain. Negative for anal bleeding, blood in stool, constipation, diarrhea, nausea, rectal pain and vomiting.   Endocrine: Negative for polydipsia, polyphagia and polyuria.   Musculoskeletal: Negative for gait problem.   Skin: Negative for rash and wound.   Allergic/Immunologic: Negative for food allergies.   Neurological: Negative for dizziness, speech difficulty and light-headedness.   Psychiatric/Behavioral: Positive for confusion and decreased concentration. Negative for self-injury, sleep disturbance and suicidal ideas.       Vitals:    07/10/18 1308   BP: 124/60   Temp: 98.1 °F (36.7 °C)       Physical Exam   Constitutional: He is oriented to person, place, and time. He appears well-developed and well-nourished. He does not appear ill. No distress.   HENT:   Head: Normocephalic.   Eyes: Pupils  are equal, round, and reactive to light.   Cardiovascular: Normal rate, regular rhythm and normal heart sounds.    Pulmonary/Chest: Effort normal and breath sounds normal.   Abdominal: Soft. Bowel sounds are normal. He exhibits distension. He exhibits no mass. There is no hepatosplenomegaly. There is no tenderness. There is no rebound and no guarding. No hernia.   Abd is distended. Minimal ascites appreciated.    Musculoskeletal:   Ambulates with walker. Bilateral LE trace edema noted.    Neurological: He is alert and oriented to person, place, and time.   Does seem to repeat the same questions a few times today. Otherwise alert.    Skin: Skin is warm and dry.   Psychiatric: He has a normal mood and affect. His speech is normal and behavior is normal. Judgment normal.       No images are attached to the encounter.    Assessment & Plan     1. Cirrhosis of liver with ascites, unspecified hepatic cirrhosis type (CMS/HCC)  - zinc sulfate (ZINCATE) 220 (50 Zn) MG capsule; Take 1 capsule by mouth Daily.  Dispense: 30 capsule; Refill: 11  - Case Request; Standing  - Case Request    2. Hepatic encephalopathy (CMS/HCC)  - lactulose (CHRONULAC) 10 GM/15ML solution; Take 30 mL by mouth 2 (Two) Times a Day.  Dispense: 1892 mL; Refill: 2  - rifaximin (XIFAXAN) 550 MG tablet; Take 1 tablet by mouth Every 12 (Twelve) Hours.  Dispense: 60 tablet; Refill: 11    3. Iron deficiency anemia due to chronic blood loss  - Case Request; Standing  - Case Request    4. Adenomatous polyp of colon, unspecified part of colon  - Case Request; Standing  - Case Request    I reviewed hospital records with patient. He seems stable for the most part today. He is still having issues with confusion and drowsiness. He was not taking the lactulose routinely as directed. I had long discussion with him about how to take the lactulose and what it is given for. Continue lasix, aldactone and Zinc. He was also not taking any xifaxan. I will start this BID for  HE. Given hx adenomatous colon polyps and need to evaluate for possible esophageal varices patient needs EGD and Colonoscopy with Dr. Butler. I will schedule this. He will need clearance from cardiology and hematology. I will have nursing obtain this. Will schedule scopes for September since patient still feels he needs time to heal from back surgeries x 2. Discussed with him about low salt diet and daily weights. He had not been weighing himself at home. Reviewed labs from last week, stable for now. Patient to follow up with cardiology, hematology and nephrology soon. Follow up with Dr. Butler in 3 months. Call office with any issues.

## 2018-07-10 NOTE — TELEPHONE ENCOUNTER
----- Message from HANG Saleh sent at 7/10/2018  1:35 PM EDT -----  Patient will need EGD and Colonoscopy in Sept with Carrie. He needs cardiac and hematology clearance first. Please reach out to Dr. Singh and Dr. Lucas for clearance. Thanks.

## 2018-07-11 NOTE — TELEPHONE ENCOUNTER
It is noted that Lactulose, Zinc  and Xifaxan sent to [facility].  Re-escribed to [facility] - receipt confirmed.    Call to pt to advise of above.  Verb understanding.

## 2018-07-11 NOTE — TELEPHONE ENCOUNTER
----- Message from Shalini Clifton sent at 7/11/2018  3:29 PM EDT -----  Regarding: MEDICATION   Contact: [phone]  Pt is calling stating the medications that coleman prescribe to the pt yesterday their not at his pharm. Their was three of them     Pharm#[phone]

## 2018-07-12 NOTE — TELEPHONE ENCOUNTER
Call to Lance's office @ [phone] and spoke with Yesika.  She states to send message thru [employer] to her - did so.

## 2018-07-12 NOTE — TELEPHONE ENCOUNTER
Call to [facility] @ [phone] and spoke with July.  She states new Xifaxan rx needs to be faxed to .    Script prepared and to XIOMARA Putnam for signature.    Call to pt to advise of above.  Pt reports that [facility] does not have Lactulose rx.    Call to [facility] @ [phone] and spoke with Anette.  States Lactulose rx received.

## 2018-07-12 NOTE — TELEPHONE ENCOUNTER
----- Message from Monserrat White sent at 7/12/2018  9:06 AM EDT -----  Regarding: PT CALLED - REGARDING MEDS   Contact: [phone]  PT SAID THE XIFAXAN SCRIPT SHOULD HAVE BEEN SENT TO THE PHARM THAT HANDLE THE PT ASST PROGRAM. HE THOUGHT THERE WAS ALSO A SCRIPT FOR GAS. ADVISED PT SCRIPTS E SCRIBED TO [facility] YESTERDAY.

## 2018-07-12 NOTE — TELEPHONE ENCOUNTER
Call received from Cherie at Dr Lucas's office.  Requesting fax request for Hematology clearance for scopes to [phone].  Did so  - confirmation received.

## 2018-07-13 NOTE — TELEPHONE ENCOUNTER
Dr. Lucas, we received a fax from [facility] that patient needs clearance for EGD and Colonoscopy.  Will be done sometime in Sept.  Can you write something up and respond when done so we can fax.  Thank you.     Fax [phone]

## 2018-07-16 NOTE — TELEPHONE ENCOUNTER
"Fax received from Dr Lucas -pt \"needs preoperative transfusion of 1 unit platelets right before EGD and c/s exam to prevent bleeding during and after the procedure.  Pt has significant thrombocytopenia\".  (see under Letter Tab).    Message to Dr Butler and XIOMARA Putnam.  "

## 2018-07-16 NOTE — TELEPHONE ENCOUNTER
Schedule egd and c/s - he needs to have platelet transfusion in pre-op so pls let me know when scheduled so I can order transfusion

## 2018-07-17 PROBLEM — D12.6 ADENOMATOUS POLYP OF COLON: Status: ACTIVE | Noted: 2018-01-01

## 2018-07-17 PROBLEM — K74.69 OTHER CIRRHOSIS OF LIVER (HCC): Status: ACTIVE | Noted: 2018-01-01

## 2018-07-26 NOTE — TELEPHONE ENCOUNTER
Call from pt.  [facility] has spoken with [facility] and no Lasix rx on file.  [facility] [place] needs today.  Message to XIOMARA Putnam.

## 2018-07-26 NOTE — TELEPHONE ENCOUNTER
----- Message from Shalini Clifton sent at 7/26/2018  9:44 AM EDT -----  Regarding: REFILL MEDS - NEW PHARM   Contact: [phone]  PT IS CALLING FOR A REFILL ON FUROSEMIDE 20 MG     [facility]#[phone]

## 2018-07-26 NOTE — TELEPHONE ENCOUNTER
----- Message from Kyle Mendez sent at 7/26/2018 10:57 AM EDT -----  Regarding: MORE QUESTIONS   Contact: [phone]  PT CALLED BACK WITH MORE QUESTIONS

## 2018-07-26 NOTE — TELEPHONE ENCOUNTER
Sofya completed per order XIOMARA Putnam.     Call to pt to advise of same.  Verb understanding.  Asking what he should do about persistent RUQ pain and lower extremity edema.     Message to XIOMARA Putnam.

## 2018-07-26 NOTE — TELEPHONE ENCOUNTER
See rx of 6/12/18 - lasix 20 mg #30, R3 @ [facility].      Call to pt to advise of above.  Advise if any problems with refills - notify this office.  Verb understanding.    Reports that for past 1 1/2 weeks has noted increased RUQ pain.  Ranks at 8 on pain scale.  Lasts a few seconds and is alleviated by position change.  Notes that feets/ankles/legs have increased swelling.  Asking if should increase Lasix 20 mg to 2/day.      Question to XIOMARA Putnam.

## 2018-07-26 NOTE — TELEPHONE ENCOUNTER
I am not sure what to do for his abd pain. The Lasix should take care of the leg swelling in a day or two. He needs to call us Monday with update. Thanks. Tylenol is safe to take for pain.

## 2018-07-27 NOTE — TELEPHONE ENCOUNTER
Called pt and advised per Erika Np that she is not sure what to do for his abd pain.  The lasix should take are of the leg sweling in a day or two.  Advised pt to call us on Monday with an update.  Also advised the pt it is safe to take tylenol for pain. Pt verb understanding.

## 2018-07-31 NOTE — PROGRESS NOTES
REASONS FOR FOLLOWUP:      1.  Pancytopenia, especially with leukocytopenia and thrombocytopenia. Bone marrow aspiration and biopsy obtained on 01/21/2014, with no evidence of hematologic malignancy, no myelodysplastic syndrome.  He has liver cirrhosis and splenomegaly.      2.   Frequently recurrent iron deficiency anemia, not able to tolerate oral iron supplementation, status post multiple Feraheme treatments.  He also had transfusion of PRBC in October 2014.      3.  Empiric treatment for ITP using IVIG, in March 2014 and no response.    4.  Ongoing care for frequent iron deficiency required repeated intravenous iron therapy.       HISTORY OF PRESENT ILLNESS: Mr. Aranda is a 72-year-old  male returning today for 3-month re-evaluation of recurrent iron deficiency.      Patient reports significant fatigue, and the overall deteriorating health condition.  He sits in wheelchair.  His performance status is ECOG 3.  He denies fever sweating or chills.  Patient reports worsening lower extremity edema.    He reports profound fatigue, he denies any signs of bleeding, including no melena, he admits occasional fresh blood on toilet paper but nothing significant.       Laboratory study today showed a hemoglobin 9.7, platelets 51,000 and WBC 2200 including neutrophils 1800.  His TSH was elevated at 9.17, hemoglobin A1c 5.4%.  Chemistry reported ferritin 23.4 ng/mL, iron saturation 10%, free iron 39 and a TIBC 375.    PAST MEDICAL HISTORY:     1.  HIV diagnosed in 1997, on antiretroviral therapy; followed by Dr. Klarissa Thomas.     2.  Liver cirrhosis and splenomegaly.     3.  Diabetes.     4.  Coronary artery disease.     5.  Hypertension.     6.  Pancytopenia.     7.  Iron deficiency anemia.     8.  Hypothyroidism.      9.  History of prostate cancer, status post radiation therapy.     10.  Osteoporosis documented on DEXA bone density scan 02/29/2016.        HEMATOLOGIC HISTORY: History from previous dates can be found  in a separate document.        Laboratory study on 10/05/2015 reported hemoglobin of 9.2, platelets 41,000 and WBC 3320 including neutrophils 1600, lymphocytes 400. Serum ferritin was 12.6, down from 72.9 on 08/10/2015. Serum iron level was 39, also down from 69 on 08/10/2015. His HIV test was improved and negative PSA and improved lipid panel. CMP panel was unremarkable except marginally elevated bilirubin of 1.4 and glucose 135.      The patient was evaluated in our clinic on 10/15/2015. The patient is reporting fatigue. We will arrange for treatment of Feraheme. The patient will continue followup with Dr. Thomas every 3 months for laboratory studies, including iron panel.  Patient was given 2 doses of Feraheme.       04/19/2016 which showed iron deficiency, ferritin level was 10.4 NG/ML and iron level 36. She was given Feraheme treatment in May 2016.       His laboratory study last week on 08/16/2016 showed recurrent severe iron deficiency with a ferritin 6.52 NG/ML, and a serum iron 31. His hemoglobin was 8.8, platelets 57,000 and WBC of 3100. His B12 level was 1097 PG/ML. He also reported taking levothyroxine 300 µg daily. This patient also lost a significant weight about 20 pounds in the past 3 months, because he was on 2 different diuretics.  Patient was seen on 08/22/2016 and the schedule for 2 doses of Feraheme treatment.      Laboratory study on 02/14/2017 reported worsening hemoglobin 10.8, platelets 37,000, and neutrophils 1460 and lymphocytes 240.  He also had recurrent iron deficiency, with ferritin 25.8, iron saturation 16%.  His vitamin B12 level was supratherapeutic.     He received multiple IV iron therapy was in late August and early September 2016, then early November 2016 and early January and May 2017.     Laboratory study on 6/28/2017 showed a slightly improved ferritin at 40.8, and iron saturation 24% and improved hemoglobin at 11.8.     On 8/2/2017 laboratory study showed worsening iron with  ferritin 30, iron saturation 17, and worsening hemoglobin at 10.  He was given 2 doses ferriheme in early August 2017.      On 11/8/2017, ferritin 26.2 ng/mL, iron 66 TIBC 447 iron saturation 15%, hemoglobin 12.0, MCV 86, MCHC 32.0, and platelets 86,000, WBC 3200 including neutrophils 2400 and lymphocytes 300.  Feraheme 2 doses was given afterwards.     For the year of 2017, patient roughly receives IV Feraheme treatment about every 2-3 months.  He continues taking HIV medication. His diabetes is not tightly controlled. Patient reports no recent bacterial infection fever chills, etc.  No easy bleeding.     On 1/24/2018, laboratory study reporting hemoglobin 12.6, platelets 35,000 and WBC 3880 including neutrophils 3180, monocytes 380 and lymphocytes 240.  His serum ferritin was 30.9 ng/mL, iron 96 TIBC 458 iron saturation 21%.  Patient was given Feraheme 2 doses again.      Labs showed ferritin 27 ng/mL on 4/9/2018 and iron saturation 19% on 3/30/2018.  He was given 300 mg Venofer during recent hospitalization in April 2018.    MEDICATIONS: The current medication list was reviewed with the patient and updated in the EMR this date per the medical assistant. Medication dosages and frequencies were confirmed to be accurate.        ALLERGIES:    1. PENICILLIN.    2. PREDNISONE.        SOCIAL HISTORY: Remote smoking history. He does not drink. No history of drug use. He contracted HIV through sexual contact.        FAMILY HISTORY: Reviewed and is negative to this episode of care.        REVIEW OF SYSTEMS:    PAIN: severe back pain secondary to compression fracture of T12.   GENERAL: No change in appetite or weight, no fevers, chills or sweats.    SKIN: No rashes or nonhealing lesions.    HEME/LYMPH: pancytopenia with recurrent iron deficiency, frequent IV iron treatment.  His severe cytopenia and mild leukocytopenia.   EYES: No vision changes or diplopia.    ENT: No tinnitus, hearing loss, gum bleeding, epistaxis,  "hoarseness or dysphagia.    RESPIRATORY: No cough, shortness of breath, hemoptysis or wheezing.    CVS: No chest pain, palpitations, orthopnea, dyspnea on exertion.   GI: Abdomen distention secondary to ascites fluids. No nausea, vomiting, constipation, diarrhea, melena or hematochezia.    : No dysuria or hematuria.    MUSCULOSKELETAL: No bone pain or joint stiffness.  Has bilateral leg edema.  NEUROLOGICAL: No dizziness, global weakness, loss of consciousness or seizures.    PSYCHIATRIC: No increased nervousness, mood changes or depression.        VITAL SIGNS:    Vitals:    07/25/18 1441   BP: 130/70   Pulse: 74   Resp: 16   Temp: 98.6 °F (37 °C)   SpO2: 97%  Comment: at rest   Weight: 71.3 kg (157 lb 3.2 oz)   Height: 171 cm (67.32\")   PainSc: 8  Comment: back,abd and feet   ECOG: 3         PHYSICAL EXAMINATION:    GENERAL: Well-developed and well-nourished male, sits in wheelchair, looks weak.   SKIN: Warm and dry without rashes, No purpura.   HEAD: Normocephalic.    EYES:  Conjunctivae normal.    EARS: Hearing intact.    NOSE: No nasal discharge.    MOUTH: Tongue is well-papillated; no stomatitis or ulcers. Lips normal.    THROAT: Oropharynx without lesions or exudates.    CHEST: Lungs clear to auscultation.   no wheezing or crackles.    CARDIAC: Regular rate and rhythm without murmurs, rubs or gallops.    ABDOMEN: Soft, nontender with no organomegaly or masses. Bowel sounds present.   EXTREMITIES: No clubbing, cyanosis.  Bilateral 2+ leg edema.   NEURO: No focal neurological deficits.        LABORATORY DATA:    Lab Results   Component Value Date    WBC 2.23 (L) 07/25/2018    HGB 9.7 (L) 07/25/2018    HCT 30.3 (L) 07/25/2018    MCV 86 07/25/2018    PLT 51 (L) 07/25/2018     Lab Results   Component Value Date    NEUTROABS 1.8 07/25/2018     Lab Results   Component Value Date    IRON 39 (L) 07/25/2018    TIBC 375 07/25/2018    FERRITIN 23.40 (L) 07/25/2018     Saturation 10%          ASSESSMENT:      1.  " Recurrent Iron deficiency anemia required frequent IV iron therapy, roughly about every 3 months.  His labs today reported recurrent iron deficiency again.    So we'll give him 2 dose of Feraheme today and next week.  We'll continue to monitor his iron labs every 3 months.     2.  Severe thrombocytopenia and mild to moderate leukopenia/neutropenia related to his liver cirrhosis and HIV treatment.     His platelets count is 51,000 today.      3.  Compression fracture of T12 with severe pain.  During his hospitalization, kyphoplasty was not done due to his severe thrombocytopenia and the coagulopathy with INR 1.47 on 4/11/2018.        PLAN:     1.  Give one him dose Feraheme today and one dose next week.    2.  Return in 3 months for MD visit with lab studies including CBC, ferritin and iron level.  Likely he will need iv. Feraheme treatment again.       More than 25 minutes for patient care, over half of that time were for counseling to patient and coordinating his care.        JAMIE BOWMAN M.D., Ph.D.       CC:   Klarissa Thomas M.D.    Joey Davis M.D.       Dictated using Dragon Dictation.

## 2018-08-01 NOTE — PROGRESS NOTES
Chief Complaint   Patient presents with   • Follow-up     Cirrhosis of liver       Kye Aranda is a  72 y.o. male here for a follow up visit for liver cirrhosis and hepatic encephalopathy.     HPI  73 yo m presents today accompanied by his wife for follow up visit for liver cirrhosis, hepatic encephalopathy and iron deficiency anemia. He is a patient of Dr. Butler. He was last seen in the office by me on 7/10/18. He continues to have issues with lower extremity swelling and today he complains of redness on both lower legs. He has been taking the lasix. He has trouble increasing the dose because that bothers his kidneys. He tells me today he forgot again to call and schedule follow up with the nephrologist that saw him in the hospital. He needs me to give him the name and number again. He denies any abdominal swelling or SOB. He has been taking the lactulose daily and only having 1-2 Bms a day. He is still taking the xifaxan BID. He denies any worsening confusion or day time drowsiness. He denies any dysphagia, reflux, abd pain, N&V, diarrhea, constipation, rectal bleeding or melena. He admits its hard to stick to a low salt diet. He is trying to do better at reading labels. He does not drink much water.       Past Medical History:   Diagnosis Date   • Anemia    • Cancer (CMS/HCC) 2001    Prostate   • Cirrhosis (CMS/HCC)    • Coronary artery disease    • Diabetes mellitus (CMS/HCC)    • H/O complete eye exam 10/2017   • History of prostate cancer 2012   • HIV (human immunodeficiency virus infection) (CMS/HCC)    • Hyperlipidemia    • Hypertension    • Lactose intolerance    • Lumbar stress fracture    • Osteoporosis    • Pancytopenia (CMS/HCC)    • Peripheral artery disease (CMS/HCC)    • SOB (shortness of breath) on exertion    • Thyroid disease        Past Surgical History:   Procedure Laterality Date   • BONE MARROW BIOPSY W/ ASPIRATION  01/21/2014   • CARDIAC CATHETERIZATION     • COLONOSCOPY  09/20/2012     "non-thrombosed EH, sessile polyp in ascending colon 3 mm in size.   • CORONARY ANGIOPLASTY WITH STENT PLACEMENT  06/2013   • ENDOSCOPY  09/20/2012    grade 1 varicesmiddle and lower 3rd of esophagus. Moderate portal hypertensive gastropathy in entire stomach.   • KYPHOPLASTY Bilateral 5/3/2018    Procedure: T 12 KYPHOPLASTY 1-2 LEVELS;  Surgeon: Joey Davis MD;  Location: [facility] MAIN OR;  Service: Neurosurgery   • KYPHOPLASTY N/A 5/29/2018    Procedure: KYPHOPLASTY, L1, T11 kyphoplsty;  Surgeon: Joey Davis MD;  Location: [facility] OR 18/19;  Service: Neurosurgery   • MOUTH SURGERY     • PROSTATE BIOPSY     • TONSILLECTOMY     • UPPER GASTROINTESTINAL ENDOSCOPY  09/20/2012    grd 1 varices, portal hypertensive gastropathy       Scheduled Meds:    Continuous Infusions:  No current facility-administered medications for this visit.     PRN Meds:.    Allergies   Allergen Reactions   • Dicyclomine Hallucinations   • Methylprednisolone Other (See Comments)     \"Everything shuts down\"   • Prednisolone Other (See Comments)     PT UNSURE--STATED HE WAS TOLD NOT TO TAKE IT AGAIN.        Social History     Social History   • Marital status: Single     Spouse name: N/A   • Number of children: N/A   • Years of education: College     Occupational History   •  Retired     [employer]     Social History Main Topics   • Smoking status: Former Smoker     Packs/day: 1.00     Years: 32.00     Types: Cigarettes     Start date: 1/1/1974     Quit date: 2004   • Smokeless tobacco: Never Used   • Alcohol use No   • Drug use: No   • Sexual activity: Not Currently     Partners: Female     Birth control/ protection: Abstinence, Condom     Other Topics Concern   • Not on file     Social History Narrative   • No narrative on file       Family History   Problem Relation Age of Onset   • Heart disease Other    • No Known Problems Mother    • No Known Problems Father    • No Known Problems Maternal Grandmother    • No " Known Problems Maternal Grandfather    • No Known Problems Paternal Grandmother    • No Known Problems Paternal Grandfather    • Malig Hyperthermia Neg Hx        Review of Systems   Constitutional: Positive for fatigue. Negative for appetite change, chills, diaphoresis, fever and unexpected weight change.   HENT: Negative for nosebleeds, postnasal drip, sore throat, trouble swallowing and voice change.    Respiratory: Negative for cough, choking, chest tightness, shortness of breath and wheezing.    Cardiovascular: Positive for leg swelling. Negative for chest pain.   Gastrointestinal: Positive for abdominal distention. Negative for abdominal pain, anal bleeding, blood in stool, constipation, diarrhea, nausea, rectal pain and vomiting.   Endocrine: Negative for polydipsia, polyphagia and polyuria.   Musculoskeletal: Negative for gait problem.   Skin: Negative for rash and wound.   Allergic/Immunologic: Negative for food allergies.   Neurological: Negative for dizziness, speech difficulty and light-headedness.   Psychiatric/Behavioral: Negative for confusion, self-injury, sleep disturbance and suicidal ideas.       Vitals:    08/01/18 1311   BP: 110/70   Temp: 98.5 °F (36.9 °C)       Physical Exam   Constitutional: He is oriented to person, place, and time. He appears well-developed and well-nourished. He does not appear ill. No distress.   HENT:   Head: Normocephalic.   Eyes: Pupils are equal, round, and reactive to light.   Cardiovascular: Normal rate, regular rhythm and normal heart sounds.    Pulmonary/Chest: Effort normal and breath sounds normal.   Abdominal: Soft. Bowel sounds are normal. He exhibits distension. He exhibits no mass. There is no hepatosplenomegaly. There is no tenderness. There is no rebound and no guarding. No hernia.   Musculoskeletal: He exhibits edema.   Bilateral lower leg edema +2. Both lower legs are also erythematous.    Neurological: He is alert and oriented to person, place, and time.    Skin: Skin is warm and dry.   Psychiatric: He has a normal mood and affect. His speech is normal and behavior is normal. Judgment normal.       No images are attached to the encounter.    Assessment & Plan     1. Cirrhosis of liver with ascites, unspecified hepatic cirrhosis type (CMS/HCC)  - CBC & Differential; Future  - Comprehensive Metabolic Panel; Future  - Ammonia; Future  - furosemide (LASIX) 20 MG tablet; Take 2 tablets by mouth Daily.  Dispense: 30 tablet; Refill: 5    2. Severe protein-calorie malnutrition due to chronic illness    3. Hepatic encephalopathy (CMS/HCC)  - Ammonia; Future    4. Iron deficiency anemia due to chronic blood loss  - CBC & Differential; Future    I reviewed labs and imaging with patient and his wife. BLE edema has worsened to + 1-2 and legs are red. I will increase the lasix to 40 daily and add K+ 10 MEQ daily. Encourage patient to eat potassium rich foods like green leafy veggies and bananas. Bowels are not moving regularly enough (every other day) could be contributing to symptoms. Will increase the lactulose to BID.  Will check labs in 1 week. Follow up with me the next day after labs are done so I can review them with patient. Patient needs to follow up with Nephrology as planned. Will give patient contact info again. Continue low salt diet. Continue daily weights. Discussed signs and symptoms to watch out for regarding HE, clots and cellulitis. NO ascites noted today on exam. Lungs were clear. Planned for scopes in November with Dr. Butler. Call office Friday with update. Has appt planned with cardiology and pain

## 2018-08-07 NOTE — TELEPHONE ENCOUNTER
I agree that he needs to be seen ine er -  I am unsure what is causing his issues as he has not had much abdominal fluid on previous exams so to expedite evaluation that er is the fastest avenue to figure out what the problem is

## 2018-08-07 NOTE — TELEPHONE ENCOUNTER
"Call to pt.  States has increased swelling in legs - \"have really ballooned up\", accompanied by abd swelling accompanied by chest/back pain.  States water pills \"just not cutting it\".    Has appt 8/8 with XIOMARA Putnam, but states too uncomfortable to wait.  Advise if symptoms worsen - go to ER.     Update to DR Butler and XIOMARA Putnam.  "

## 2018-08-07 NOTE — TELEPHONE ENCOUNTER
Please call patient and let him know I am very concerned about him and want him to go to [facility] ER for immediate evaluation!!!! Thanks.

## 2018-08-07 NOTE — TELEPHONE ENCOUNTER
----- Message from Shalini Clifton sent at 8/7/2018  9:52 AM EDT -----  Regarding: PT CALLED   Contact: [phone]  PT IS CALLING ASKING TO SPEAK WITH BELÉN

## 2018-08-07 NOTE — TELEPHONE ENCOUNTER
Call to pt.  Advise per Dr Butler that needs to be seen in ER - unsure what causing his issues as has not had much abd fluid on previous exams, so to expedite eval that ER is the fastest avenue to figure out what out what the problem is.     Pt verb understanding.  Lengthy discussion re: above.  Stress to pt that direct admit is not effective way to obtain appropriate dx info -needs to go to ER.    Pt asking if Dr Butler would call him.

## 2018-08-07 NOTE — TELEPHONE ENCOUNTER
"Call to pt.  Advise per XIOMARA Rodriguez that very concerned and want to go to [facility] ER for immediate evaluation - per conversation with XIOMARA Putnam - concerned about kidney issues.    Pt verb understanding.  Requesting direct admit.  States just can't stand to sit in ER for up to 6 hours - \"they treat me so badly and don't do anything\".  Advise pt that needs immediate eval and this can only be done thru ER.  Reiterates request for direct admit.    Message to Dr Butler and XIOMARA Putnam.  "

## 2018-08-08 NOTE — ED NOTES
RN attempted two times to obtain an IV for patient and RN was unsuccessful.   RN was able to obtain blood from patient for laboratory tests.   RN stated she would be back after xray was finished and look at his other arm for an IV.      Alisson Sylvester RN  08/08/18 [id number]

## 2018-08-08 NOTE — ED PROVIDER NOTES
EMERGENCY DEPARTMENT ENCOUNTER    Room Number:  25/25  Date seen:  8/8/2018  Time seen: 7:48 AM  PCP: Kendrick Griggs MD    HPI:  Chief complaint: Swelling  Context:Kye Aranda is a 72 y.o. male with a a hx of liver cirrhosis, HIV, hepatic encephalopathy, chronic kidney disease, who presents to the ED with c/o leg abnd abdomen edema and dyspnea. He states he's had gradual increase in abdomen size and BLE edema x 3 weeks, as well as gradually increasing abdominal discomfort and dyspnea. He is taking Lasix without improvement. No hx of CHF. Small amount of ascites in the past, but he states never enough to drain.     Onset: gradual  Location: generalized  Radiation: none  Duration: 2-3 weeks  Timing: constant  Character: swelling  Aggravating Factors: none  Alleviating Factors: none  Severity: moderate to severe    MEDICAL RECORD REVIEW     04/27/16 - Transthoracic Echo    All left ventricular wall segments contract normally.  · Left ventricular wall thickness is consistent with borderline concentric hypertrophy.  · Left ventricular function is normal. Estimated EF = 59%.  · Left ventricular diastolic dysfunction (grade II) consistent with pseudonormalization.  · Mild mitral valve regurgitation is present      05/29/18 admitted with an acute T11 and L1 compression fracture.  He underwent a T11 and L1 kyphoplasty without complication.    06/10/2018 - Normal bilateral lower extremity venous duplex scan.      ALLERGIES  Dicyclomine; Methylprednisolone; and Prednisolone    PAST MEDICAL HISTORY  Active Ambulatory Problems     Diagnosis Date Noted   • Chronic coronary artery disease 04/27/2016   • Difficulty breathing 04/27/2016   • Intermittent claudication (CMS/HCC) 04/27/2016   • Peripheral arterial occlusive disease (CMS/[facility]) 04/27/2016   • Shortness of breath 04/27/2016   • Iron deficiency anemia due to chronic blood loss 05/06/2016   • Thrombocytopenia associated with AIDS (CMS/[facility]) 05/06/2016   • Leukocytopenia  05/06/2016   • Neutropenia (CMS/HCC) 05/06/2016   • Pancytopenia (CMS/HCC) 02/19/2017   • Iron and its compounds causing adverse effect in therapeutic use 02/21/2017   • Cirrhosis of liver with ascites (CMS/HCC) 08/14/2017   • Lumbar degenerative disc disease 12/05/2017   • Secondary thrombocytopenia 01/28/2018   • History of coronary artery stent placement 02/12/2018   • T12 compression fracture (CMS/HCC) 04/09/2018   • Weight loss, abnormal 04/09/2018   • Hypothyroidism 04/09/2018   • Type 2 diabetes mellitus (CMS/HCC) 04/09/2018   • Severe protein-calorie malnutrition due to chronic illness 04/10/2018   • Pathological fracture of thoracic vertebra with routine healing 04/25/2018   • Age-related osteoporosis with current pathological fracture 04/27/2018   • Acute bilateral thoracic back pain 04/27/2018   • Age-related osteoporosis with current pathological fracture of vertebra (CMS/HCC) 04/27/2018   • Thrombocytopenia (CMS/HCC) 04/29/2018   • Compression fracture of body of thoracic vertebra (CMS/HCC) 05/03/2018   • Lumbar spine pain 05/25/2018   • HIV (human immunodeficiency virus infection) (CMS/HCC) 06/11/2018   • CKD (chronic kidney disease) stage 2, GFR 60-89 ml/min 06/11/2018   • Portal hypertension (CMS/HCC) 06/11/2018   • Other cirrhosis of liver (CMS/HCC) 07/17/2018   • Adenomatous polyp of colon 07/17/2018     Resolved Ambulatory Problems     Diagnosis Date Noted   • Intractable back pain 04/09/2018   • Hypophosphatemia 04/09/2018   • Pedal edema 06/10/2018   • Anasarca 06/11/2018     Past Medical History:   Diagnosis Date   • Anemia    • Cancer (CMS/HCC) 2001   • Cirrhosis (CMS/HCC)    • Coronary artery disease    • Diabetes mellitus (CMS/HCC)    • H/O complete eye exam 10/2017   • History of prostate cancer 2012   • HIV (human immunodeficiency virus infection) (CMS/HCC)    • Hyperlipidemia    • Hypertension    • Lactose intolerance    • Lumbar stress fracture    • Osteoporosis    • Pancytopenia  (CMS/HCC)    • Peripheral artery disease (CMS/HCC)    • SOB (shortness of breath) on exertion    • Thyroid disease        PAST SURGICAL HISTORY  Past Surgical History:   Procedure Laterality Date   • BONE MARROW BIOPSY W/ ASPIRATION  01/21/2014   • CARDIAC CATHETERIZATION     • COLONOSCOPY  09/20/2012    non-thrombosed EH, sessile polyp in ascending colon 3 mm in size.   • CORONARY ANGIOPLASTY WITH STENT PLACEMENT  06/2013   • ENDOSCOPY  09/20/2012    grade 1 varicesmiddle and lower 3rd of esophagus. Moderate portal hypertensive gastropathy in entire stomach.   • KYPHOPLASTY Bilateral 5/3/2018    Procedure: T 12 KYPHOPLASTY 1-2 LEVELS;  Surgeon: Joey Davis MD;  Location: [facility] MAIN OR;  Service: Neurosurgery   • KYPHOPLASTY N/A 5/29/2018    Procedure: KYPHOPLASTY, L1, T11 kyphoplsty;  Surgeon: Joey Davis MD;  Location: [facility] OR 18/19;  Service: Neurosurgery   • MOUTH SURGERY     • PROSTATE BIOPSY     • TONSILLECTOMY     • UPPER GASTROINTESTINAL ENDOSCOPY  09/20/2012    grd 1 varices, portal hypertensive gastropathy       FAMILY HISTORY  Family History   Problem Relation Age of Onset   • Heart disease Other    • No Known Problems Mother    • No Known Problems Father    • No Known Problems Maternal Grandmother    • No Known Problems Maternal Grandfather    • No Known Problems Paternal Grandmother    • No Known Problems Paternal Grandfather    • Malig Hyperthermia Neg Hx        SOCIAL HISTORY  Social History     Social History   • Marital status: Single     Spouse name: N/A   • Number of children: N/A   • Years of education: College     Occupational History   •  Retired     [employer]     Social History Main Topics   • Smoking status: Former Smoker     Packs/day: 1.00     Years: 32.00     Types: Cigarettes     Start date: 1/1/1974     Quit date: 2004   • Smokeless tobacco: Never Used   • Alcohol use No   • Drug use: No   • Sexual activity: Not Currently     Partners: Female      Birth control/ protection: Abstinence, Condom     Other Topics Concern   • Not on file     Social History Narrative   • No narrative on file       REVIEW OF SYSTEMS  Review of Systems   Constitutional: Negative for chills and fever.   HENT: Negative.    Eyes: Negative.    Respiratory: Positive for shortness of breath.    Cardiovascular: Positive for leg swelling. Negative for chest pain and palpitations.   Gastrointestinal: Positive for abdominal distention and abdominal pain. Negative for nausea and vomiting.   Genitourinary: Negative.  Negative for dysuria and hematuria.   Musculoskeletal: Positive for back pain.   Skin: Negative.    Neurological: Negative.  Negative for weakness and numbness.   Psychiatric/Behavioral: Negative.        PHYSICAL EXAM  ED Triage Vitals   Temp Heart Rate Resp BP SpO2   08/08/18 0729 08/08/18 0729 08/08/18 0729 08/08/18 0737 08/08/18 0729   97.9 °F (36.6 °C) 66 16 171/73 91 %      Temp src Heart Rate Source Patient Position BP Location FiO2 (%)   08/08/18 0729 08/08/18 0729 -- -- --   Tympanic Monitor        Physical Exam   Constitutional: He is oriented to person, place, and time and well-developed, well-nourished, and in no distress.   HENT:   Head: Normocephalic and atraumatic.   Right Ear: External ear normal.   Left Ear: External ear normal.   Nose: Nose normal.   Eyes: Conjunctivae are normal.   Neck: Normal range of motion.   Cardiovascular: Normal rate and regular rhythm.    + BLE edema to the lower legs, 2+ pitting, distal pulses intact.   Pulmonary/Chest: Effort normal.   Diminished breath sounds   Abdominal: Soft. He exhibits distension. There is no tenderness. There is no rebound and no guarding.   Musculoskeletal: Normal range of motion.   Neurological: He is alert and oriented to person, place, and time.   Skin: Skin is warm and dry.   Psychiatric: Affect normal.   Nursing note and vitals reviewed.      LAB RESULTS  pending    RADIOLOGY  pending    EKG  Interpreted by ED  Physician     PROCEDURES  Procedures      PROGRESS AND CONSULTS    Progress Notes:           0805 Reviewed pt's history and workup with Dr. Dudley who will perform a bedside evaluation and is taking over course of care at this time.         Kerry Hernandez PA  08/08/18 0925

## 2018-08-08 NOTE — ED TRIAGE NOTES
Pt states that for the last several weeks he has had abdominal pain and distention.   Was sent by his GI doctor.   States he had a back surgery about 3-4 months ago.

## 2018-08-08 NOTE — ED PROVIDER NOTES
EMERGENCY DEPARTMENT ENCOUNTER    Room number:  25/25  Date Seen:  8/8/2018  Time of transfer: 0800  PCP:  Kendrick Griggs MD    Laboratory Results:  Recent Results (from the past 24 hour(s))   Comprehensive Metabolic Panel    Collection Time: 08/08/18  8:07 AM   Result Value Ref Range    Glucose 176 (H) 65 - 99 mg/dL    BUN 10 8 - 23 mg/dL    Creatinine 1.25 0.76 - 1.27 mg/dL    Sodium 138 136 - 145 mmol/L    Potassium 3.4 (L) 3.5 - 5.2 mmol/L    Chloride 103 98 - 107 mmol/L    CO2 25.0 22.0 - 29.0 mmol/L    Calcium 8.8 8.6 - 10.5 mg/dL    Total Protein 6.8 6.0 - 8.5 g/dL    Albumin 3.50 3.50 - 5.20 g/dL    ALT (SGPT) 26 1 - 41 U/L    AST (SGOT) 51 (H) 1 - 40 U/L    Alkaline Phosphatase 300 (H) 39 - 117 U/L    Total Bilirubin 1.5 (H) 0.1 - 1.2 mg/dL    eGFR Non African Amer 57 (L) >60 mL/min/1.73    Globulin 3.3 gm/dL    A/G Ratio 1.1 g/dL    BUN/Creatinine Ratio 8.0 7.0 - 25.0    Anion Gap 10.0 mmol/L   Protime-INR    Collection Time: 08/08/18  8:07 AM   Result Value Ref Range    Protime 17.3 (H) 11.7 - 14.2 Seconds    INR 1.44 (H) 0.90 - 1.10   aPTT    Collection Time: 08/08/18  8:07 AM   Result Value Ref Range    PTT 40.0 (H) 22.7 - 35.4 seconds   Lipase    Collection Time: 08/08/18  8:07 AM   Result Value Ref Range    Lipase 88 (H) 13 - 60 U/L   Troponin    Collection Time: 08/08/18  8:07 AM   Result Value Ref Range    Troponin T <0.010 0.000 - 0.030 ng/mL   BNP    Collection Time: 08/08/18  8:07 AM   Result Value Ref Range    proBNP 485.1 5.0 - 900.0 pg/mL   CBC Auto Differential    Collection Time: 08/08/18  8:07 AM   Result Value Ref Range    WBC 2.14 (L) 4.50 - 10.70 10*3/mm3    RBC 3.63 (L) 4.60 - 6.00 10*6/mm3    Hemoglobin 10.4 (L) 13.7 - 17.6 g/dL    Hematocrit 33.7 (L) 40.4 - 52.2 %    MCV 92.8 79.8 - 96.2 fL    MCH 28.7 27.0 - 32.7 pg    MCHC 30.9 (L) 32.6 - 36.4 g/dL    RDW 20.2 (H) 11.5 - 14.5 %    RDW-SD 68.3 (H) 37.0 - 54.0 fl    Platelets 32 (C) 140 - 500 10*3/mm3    Neutrophil % 76.7 (H) 42.7 -  76.0 %    Lymphocyte % 10.7 (L) 19.6 - 45.3 %    Monocyte % 11.2 5.0 - 12.0 %    Eosinophil % 1.4 0.3 - 6.2 %    Basophil % 0.0 0.0 - 1.5 %    Immature Grans % 0.0 0.0 - 0.5 %    Neutrophils, Absolute 1.64 (L) 1.90 - 8.10 10*3/mm3    Lymphocytes, Absolute 0.23 (L) 0.90 - 4.80 10*3/mm3    Monocytes, Absolute 0.24 0.20 - 1.20 10*3/mm3    Eosinophils, Absolute 0.03 0.00 - 0.70 10*3/mm3    Basophils, Absolute 0.00 0.00 - 0.20 10*3/mm3    Immature Grans, Absolute 0.00 0.00 - 0.03 10*3/mm3    nRBC 0.0 0.0 - 0.0 /100 WBC   Light Blue Top    Collection Time: 08/08/18  8:07 AM   Result Value Ref Range    Extra Tube hold for add-on    Green Top (Gel)    Collection Time: 08/08/18  8:07 AM   Result Value Ref Range    Extra Tube Hold for add-ons.    Lavender Top    Collection Time: 08/08/18  8:07 AM   Result Value Ref Range    Extra Tube hold for add-on    Gold Top - SST    Collection Time: 08/08/18  8:07 AM   Result Value Ref Range    Extra Tube Hold for add-ons.    Magnesium    Collection Time: 08/08/18  8:07 AM   Result Value Ref Range    Magnesium 2.3 1.6 - 2.4 mg/dL   TSH    Collection Time: 08/08/18  8:07 AM   Result Value Ref Range    TSH 29.910 (H) 0.270 - 4.200 mIU/mL     I reviewed the above results.    Radiology:  XR Chest 1 View   Final Result   Low lung volumes. Otherwise negative chest x-ray.       This report was finalized on 8/8/2018 10:08 AM by Dr. Mariano Pollard M.D.          CT Abdomen Pelvis Without Contrast   Preliminary Result   Cirrhosis with portal hypertension. There has been marginal interval   increase in splenic size and development of a small or small-to-moderate   volume of ascites throughout the abdomen and pelvis.       Discussed with Dr. Dudley.            Reviewed CT abd/pelvis which shows cirrhosis and splenomegaly that has increased. He has a small volume of ascites that has also increased. Independently viewed by me. Interpreted by radiologist. Discussed with Dr. May.      I reviewed the  above results    Medications ordered in ED:  Medications   furosemide (LASIX) injection 80 mg (80 mg Intravenous Given 8/8/18 1032)       Progress and Consult Notes:  0800    Patient care transferred from Larisa Hernandez PA-C pending lab and imaging results.    0824  Pt presents with BLE swelling and abdominal swelling. He also c/o SOA and CP with this. He states that he has been diagnosed with ascites before but never this bad.     Physical Exam  Constitutional: A&Ox3, mild distress  HENT: moist mucus membranes  Eyes: pale conjunctiva  Neck: JVD at 4 cm  Heart: RRR, rate=61, 3+ BLE edema bilaterally  Lungs: rales in bases bilaterally  Abdomen: softly distended with diminished bowel sounds  Neuro: normal    EKG    EKG time: 0754  Rhythm/Rate: SR, 57  PAC  No Acute Ischemia  Non-Specific ST-T changes    changed compared to prior on 4/18    OLD RECORD REVIEW  Pt admitted 6/10-6/12 for the same symptoms. Pt received one dose of lasix in [facility] ER. He was seen by Dr. Denise (nephrology) who agreed with lasix and added aldactone. Pt was back in [facility] ER 6/29/18 for similar symptoms. He presents again today wtih the same symptoms.    Interpreted Contemporaneously by me.  Independently viewed by me    0829  Ordered CT abd/pelvis for further evaluation.     1018  I reviewed Pt's labs and imaging. His labs appear to be at baseline, except for his TSH which is 29.9 and it was 40 four months ago.   Rechecked Pt who is resting comfortably. Informed him that his CT abd/pelvis that shows a small amount of ascites, but nothing that will require procedural intervention. Pt states that he was taken off of aldactone by Dr. Butler due to the effect that it has on his liver. Discussed plans to consult with Dr. Butler.     1021  Ordered lasix to treat. Placed call to GI for consult.     1052  Discussed Pt's case with Dr. Ayers (GI) who wants pt to restart his aldactone and keep him on 40mg of lasix per day. His PCP needs to address his TSH.      1058  Rechecked Pt who is resting comfortably. Informed Pt that I spoke with Dr. Ayers (GI) and that he would like Pt to be on 40mg of lasix per day and restart the aldactone. He would like Pt to see his PCP or endocrinologist to address his thyroid issues because his TSH is also elevated. He states that he is supposed to take 300mg of synthroid everyday, but he is almost out and he has been taking 125mg sometimes instead. Pt understands and agrees to all plans for discharge. All questions answered.         Diagnosis:  Final diagnoses:   Cirrhosis of liver with ascites, unspecified hepatic cirrhosis type (CMS/HCC)   Pedal edema   Hypothyroidism, unspecified type     DISCHARGE    Patient discharged in stable condition.    Reviewed implications of results, diagnosis, meds, responsibility to follow up, warning signs and symptoms of possible worsening, potential complications and reasons to return to ER, including new or worsening symptoms.    Patient/Family voiced understanding of above instructions.    Discussed plan for discharge, as there is no emergent indication for admission. Patient referred to primary care provider for BP management due to today's BP. Pt/family is agreeable and understands need for follow up and repeat testing.  Pt is aware that discharge does not mean that nothing is wrong but it indicates no emergency is present that requires admission and they must continue care with follow-up as given below or physician of their choice.     FOLLOW-UP  Robyn Butler MD  [address]  [place] [id number] [phone]    Schedule an appointment as soon as possible for a visit       Kendrick Griggs MD  [address]  [place] [id number] [phone]    Schedule an appointment as soon as possible for a visit   for follow up regarding elevated TSH         Medication List      New Prescriptions    spironolactone 50 MG tablet  Commonly known as:  ALDACTONE  1 pill Daily         Changed    levothyroxine 300 MCG tablet  Commonly known as:  SYNTHROID  Take 1 tablet by mouth Every Morning. HAS APPT WITH ENDOCRONOLOGIST/THINKS   IT ILL BE CHANGD  [facility]  What changed:  how much to take     TRESIBA FLEXTOUCH 200 UNIT/ML solution pen-injector  Generic drug:  Insulin Degludec  Inject 35 Units under the skin Every Night.  What changed:  how much to take  when to take this            Provider attestation:  I personally reviewed the past medical history, past surgical history, social history, family history, current medications, and allergies as they appear in the chart.    The patient was seen and examined by myself and Larisa Hernandez PA-C, who agree with plan.        Cindi Harrison  08/08/18 1115       Harry Dudley MD  08/08/18 1610

## 2018-08-08 NOTE — ED TRIAGE NOTES
Pt arrives with c/o SOA x2 days and edema that is present in his BLE and abdomen. He was instructed by Dr. Butler to come here and be seen.

## 2018-08-09 NOTE — TELEPHONE ENCOUNTER
Call to pt.  Advise per Dr Butler to continue lasix 40 mg/day and aldactone 50 mg/day.    Do not take potassium supplement.    Had trouble with aldactone in the past because potassium went up.  Will need to check labs in 1 week to make sure tolerating meds.    Needs to have an echo to look for other reasons why has so much ext swelling.     Narcotics in setting of cirrhosis can cause host of different problems, most notably confusion.  These are not a good idea.  Recommend tx pain conservatively with tylenol (no more than 2 g daily.    Appt with Dr Butler on 8/23 @ 12:30.    Pt verb understanding and accepts appt.  Lab appt scheduled for 8/16 @ 3pm.  Advise that [facility] will contact to arrange echo.    Message to Manager.

## 2018-08-09 NOTE — TELEPHONE ENCOUNTER
"Call from pt.  Reports seen in ER yesterday - \"it was a disaster\".   Given IV Lasix and script for Spironalactone.  States lower ext edema somewhat improved.    States has persistent sharp RUQ pain with any movement - ranks at 8-9 on pain scale.  Slept poorly.  Hungry, but not eating much 2nd difficulty moving.  Was advised to continue Lasix 40 mg day.    Pt asking if:     1) should take Spironalactone - understood should not take 2nd liver issues.  2) continue potassium  3)something for pain (oxycodone).    4)what to do from here    Offer to schedule appt - pt states difficult to ambulate, asking just for call back.    Advise pt that Dr Butler may be reticent to prescribe narcotic.    Update to Dr Butler.  "

## 2018-08-09 NOTE — TELEPHONE ENCOUNTER
Continue Lasix 40 mg a day and Aldactone 50 mg a day.     Do not take a potassium supplement    We have had trouble with Aldactone in the past because his potassium went up.  We will need to check labs in 1 week to make sure that he is tolerating the medication (he needs bmp 1 week)    I think that he needs to have an echocardiogram to look for other reasons why he has some much lower extremity swelling.  I have ordered this test.    Narcotics in the setting of cirrhosis can cause a host of different problems most notably confusion.  These are not a good idea.  Recommend treating pain conservatively with Tylenol (no more than 2 g daily).    Schedule f/u with me in 2 weeks at 1230 (8/23)

## 2018-08-10 NOTE — TELEPHONE ENCOUNTER
"Patient complaining of back pain and \"excruciating pain\" when moving his right arm.  He is insistent about me ordering a x-ray of his spine.  I did advise him to go to either the emergency room or urgent care for an actual evaluation as the x-ray of the spine may not show anything.  He denies that he had any fall to cause any specific trauma to his spine.  He denies any new numbness tingling or weakness.    He seems willing to go to urgent care for further evaluation.  He does have a friend who can drive him.  "

## 2018-08-10 NOTE — TELEPHONE ENCOUNTER
Call to pt.  States when in ER, had CT - position had to be in was very painful, and now has trouble moving R arm.  Would like to have xray of back - states has spoken with Radiology and they have advised that if order placed, may walk in any time.    Pt also reporting that no BM for 4-5 days.  Has not taken lactulose for 2-3 days.  Advise to resume lactulose schedule - states will do so.    Request to DR Butler.

## 2018-08-10 NOTE — TELEPHONE ENCOUNTER
----- Message from Shalini Clifton sent at 8/10/2018 11:35 AM EDT -----  Regarding: PT CALLED WITH JUST ONE MORE QUESTION  Contact: [phone]  ...

## 2018-08-13 NOTE — TELEPHONE ENCOUNTER
See note of 8/9.  Order placed for BMP - message to DR Butler.    Call to pt to advise lab order has been placed for [facility] - obtain on same day as echo. Pt verb understanding.    Lab appt for 8/16 cancelled.

## 2018-08-13 NOTE — TELEPHONE ENCOUNTER
----- Message from Shalini Clifton sent at 8/13/2018 10:51 AM EDT -----  Regarding: PT CALLED   Contact: [phone]  PT IS RL FOR A ECHO 2D COMPLETE ON Wednesday & THEN COMES TO OUR OFFICE FOR LABS THE NEXT DAY. PT IS ASKING CAN THAT ORDER GET FAXED TO [facility] SO HE CAN HAVE THE LABS DONE OVER THEIR ON Wednesday WHEN HE GOES FOR THAT TEST ??

## 2018-08-15 NOTE — TELEPHONE ENCOUNTER
His kidney function is stable and his potassium is normal on current dose of diuretics.  Continue to strictly follow a 2 g sodium diet.  Would continue the same medications for now.  His 2-D echocardiogram did not show any significant cardiac impairment.  Follow-up as scheduled on 8/23

## 2018-08-16 NOTE — TELEPHONE ENCOUNTER
Not safe to take aleve or ibuprofen due to increased risk of bleeding and kidney damage - can use tylenol 625 mg tid prn

## 2018-08-16 NOTE — TELEPHONE ENCOUNTER
Call to pt.  Advise per Dr Butler that kidney function is stable and potassium is normal on current dose of diuretics.  Continue to strictly follow a 2G sodium diet.  Would continue same meds for now.  2-D echo did not show any significant cardiac impairment.  F/u as scheduled on 8/23.    Pt verb understanding. States continues to have back pain as per call of 8/10 (see xray report).  Asking if safe to take Aleve or Ibuprofen.    Question to Dr Butler.

## 2018-08-17 NOTE — TELEPHONE ENCOUNTER
Call to pt.  Advise per Dr Butler that not safe to take aleve or ibuprofen due to increased risk of bleeding and kidney damage - can use tylenol 625 mg tid prn.  Pt verb understanding.

## 2018-08-23 NOTE — PROGRESS NOTES
Chief Complaint   Patient presents with   • Follow-up     Cirrhosis of liver       Kye Aranda is a  72 y.o. male here for a follow up visit for cirrhosis.     He complains of back pain today - he has a pain doctor following him and an upcoming appt but he has been fairly miserable.  Not getting relief   From lidocaine patch.    He has lost 40 lbs this year.  He reports easy and prolonged bruising.      LE edema is better with lasix/aldactone.  Repeat bmp showed normal K and stable kidney function.  His most recent imaging did show an increase in ascites which seems to have improved with increased diuretics as well.    He admits to feeling a little bit depressed about his current situation and how poorly he's been feeling.  His biggest complaint is back pain.    He reports some difficulty with short-term memory.  He has been compliant with lactulose and Xifaxan.  He has not seen any blood in the stool.  HPI  Past Medical History:   Diagnosis Date   • Anemia    • Cancer (CMS/HCC) 2001    Prostate   • Cirrhosis (CMS/HCC)    • Coronary artery disease    • Diabetes mellitus (CMS/HCC)    • H/O complete eye exam 10/2017   • History of prostate cancer 2012   • HIV (human immunodeficiency virus infection) (CMS/HCC)    • Hyperlipidemia    • Hypertension    • Lactose intolerance    • Lumbar stress fracture    • Osteoporosis    • Pancytopenia (CMS/HCC)    • Peripheral artery disease (CMS/HCC)    • SOB (shortness of breath) on exertion    • Thyroid disease      Past Surgical History:   Procedure Laterality Date   • BONE MARROW BIOPSY W/ ASPIRATION  01/21/2014   • CARDIAC CATHETERIZATION     • COLONOSCOPY  09/20/2012    non-thrombosed EH, sessile polyp in ascending colon 3 mm in size.   • CORONARY ANGIOPLASTY WITH STENT PLACEMENT  06/2013   • ENDOSCOPY  09/20/2012    grade 1 varicesmiddle and lower 3rd of esophagus. Moderate portal hypertensive gastropathy in entire stomach.   • KYPHOPLASTY Bilateral 5/3/2018    Procedure: T 12  KYPHOPLASTY 1-2 LEVELS;  Surgeon: Joey Davis MD;  Location: [facility] MAIN OR;  Service: Neurosurgery   • KYPHOPLASTY N/A 5/29/2018    Procedure: KYPHOPLASTY, L1, T11 kyphoplsty;  Surgeon: Joey Davis MD;  Location: [facility] HYBRID OR 18/19;  Service: Neurosurgery   • MOUTH SURGERY     • PROSTATE BIOPSY     • TONSILLECTOMY     • UPPER GASTROINTESTINAL ENDOSCOPY  09/20/2012    grd 1 varices, portal hypertensive gastropathy       Current Outpatient Prescriptions:   •  BD PEN NEEDLE ONEAL U/F 32G X 4 MM misc, , Disp: , Rfl:   •  clonazePAM (KlonoPIN) 0.5 MG tablet, Take 0.25 mg by mouth As Needed (TAKES ONCE EVERY 2-3 DAYS PRN)., Disp: , Rfl: 0  •  fluconazole (DIFLUCAN) 100 MG tablet, Take 100 mg by mouth Every Other Day. TOOK 6/10/2018, Disp: , Rfl:   •  furosemide (LASIX) 40 MG tablet, Take 40 mg by mouth Daily., Disp: , Rfl: 5  •  hydrocortisone 2.5 % cream, insert ONE applicator into THE rectum THREE TIMES DAILY, Disp: 28 g, Rfl: 0  •  Icosapent Ethyl (VASCEPA) 1 G capsule, Take 1 g by mouth 2 (Two) Times a Day., Disp: , Rfl:   •  ISENTRESS 400 MG tablet, Take 400 mg by mouth 2 (Two) Times a Day., Disp: , Rfl:   •  lactulose (CHRONULAC) 10 GM/15ML solution, Take 30 mL by mouth 2 (Two) Times a Day., Disp: 1892 mL, Rfl: 2  •  levothyroxine (SYNTHROID) 300 MCG tablet, Take 1 tablet by mouth Every Morning. HAS APPT WITH ENDOCRONOLOGIST/THINKS IT ILL BE [facility]  [facility], Disp: 30 tablet, Rfl: 0  •  lidocaine (LIDODERM) 5 %, Place 1 patch on the skin as directed by provider Daily for 15 days. Remove & Discard patch within 12 hours or as directed by MD, Disp: 15 each, Rfl: 0  •  metoprolol succinate XL (TOPROL-XL) 50 MG 24 hr tablet, Take 1 tablet by mouth 2 (Two) Times a Day., Disp: 180 tablet, Rfl: 1  •  nystatin (MYCOSTATIN) 659939 UNIT/GM powder, Apply  topically 4 (Four) Times a Day., Disp: 1 each, Rfl: 5  •  potassium chloride (K-DUR) 10 MEQ CR tablet, Take 1 tablet by mouth Daily., Disp: 30 tablet, Rfl:  "5  •  PREZISTA 800 MG tablet tablet, Take 800 mg by mouth Daily., Disp: , Rfl:   •  rifaximin (XIFAXAN) 550 MG tablet, Take 1 tablet by mouth Every 12 (Twelve) Hours., Disp: 60 tablet, Rfl: 11  •  spironolactone (ALDACTONE) 50 MG tablet, 1 pill Daily, Disp: 30 tablet, Rfl: 0  •  sulfamethoxazole-trimethoprim (BACTRIM DS,SEPTRA DS) 800-160 MG per tablet, Take 1 tablet by mouth Every Other Day As Needed. LAST TAKEN Saturday 6/9/2018, Disp: , Rfl:   •  tamsulosin (FLOMAX) 0.4 MG capsule, Take 1 capsule by mouth As Needed., Disp: , Rfl:   •  TRESIBA FLEXTOUCH 200 UNIT/ML solution pen-injector, Inject 35 Units under the skin Every Night. (Patient taking differently: Inject 40 Units under the skin Every Morning.), Disp: , Rfl: 3  •  TYBOST 150 MG tablet, Take 150 mg by mouth Daily With Breakfast., Disp: , Rfl:   •  vitamin D (ERGOCALCIFEROL) 39710 units capsule capsule, Take 50,000 Units by mouth 1 (One) Time Per Week. TAKES EVERY SUNDAY, Disp: , Rfl:   •  zinc sulfate (ZINCATE) 220 (50 Zn) MG capsule, Take 1 capsule by mouth Daily., Disp: 30 capsule, Rfl: 11  PRN Meds:.  Allergies   Allergen Reactions   • Dicyclomine Hallucinations   • Methylprednisolone Other (See Comments)     \"Everything shuts down\"   • Prednisolone Other (See Comments)     PT UNSURE--STATED HE WAS TOLD NOT TO TAKE IT AGAIN.      Social History     Social History   • Marital status: Single     Spouse name: N/A   • Number of children: N/A   • Years of education: College     Occupational History   •  Retired     [employer]     Social History Main Topics   • Smoking status: Former Smoker     Packs/day: 1.00     Years: 32.00     Types: Cigarettes     Start date: 1/1/1974     Quit date: 2004   • Smokeless tobacco: Never Used   • Alcohol use No   • Drug use: No   • Sexual activity: Not Currently     Partners: Female     Birth control/ protection: Abstinence, Condom     Other Topics Concern   • Not on file     Social History Narrative   • No " narrative on file     Family History   Problem Relation Age of Onset   • Heart disease Other    • No Known Problems Mother    • No Known Problems Father    • No Known Problems Maternal Grandmother    • No Known Problems Maternal Grandfather    • No Known Problems Paternal Grandmother    • No Known Problems Paternal Grandfather    • Malig Hyperthermia Neg Hx      Review of Systems   Constitutional: Positive for fatigue and unexpected weight change. Negative for appetite change.   Gastrointestinal: Negative for abdominal pain and blood in stool.   Musculoskeletal: Positive for back pain.   Neurological: Positive for weakness.   Hematological: Bruises/bleeds easily.   Psychiatric/Behavioral: Positive for dysphoric mood.   All other systems reviewed and are negative.    Vitals:    08/23/18 1216   BP: 142/82   Temp: 97.5 °F (36.4 °C)     1    08/23/18  1216   Weight: 64 kg (141 lb)     Physical Exam   Constitutional: He is oriented to person, place, and time. He appears well-developed and well-nourished.   HENT:   Head: Normocephalic and atraumatic.   Eyes: Conjunctivae are normal. No scleral icterus.   Neck: Normal range of motion. Neck supple.   Cardiovascular: Normal rate and regular rhythm.    Pulmonary/Chest: Effort normal and breath sounds normal.   Abdominal: Soft. Bowel sounds are normal. He exhibits distension. There is no tenderness.   Musculoskeletal: He exhibits edema.   Trace le edema   Neurological: He is alert and oriented to person, place, and time.   Skin: Skin is warm and dry.   Diffuse ue ecchymosis from recent venipuncture   Psychiatric:   Mildly depressed mood   Nursing note and vitals reviewed.    No images are attached to the encounter.  Diagnoses and all orders for this visit:    Cirrhosis of liver with ascites, unspecified hepatic cirrhosis type (CMS/HCC)    Iron deficiency anemia due to chronic blood loss    Severe protein-calorie malnutrition due to chronic illness    Adenomatous polyp of  colon, unspecified part of colon    Other ascites    Hepatic encephalopathy (CMS/HCC)    Other orders  -     furosemide (LASIX) 40 MG tablet; Take 40 mg by mouth Daily.  -     Discontinue: oxyCODONE (ROXICODONE) 10 MG tablet; Take 10 mg by mouth 2 (Two) Times a Day As Needed.  -     Discontinue: KLOR-CON 10 MEQ CR tablet;              Plan:  - recommend starting nutritional supplements twice daily and adding a yogurt (greek yogurt)  - EGD and colonoscopy scheduled for November-will need platelet transfusion prior-no blood in the stool or evidence of recent bleeding  - Lower extremity edema as well as abdominal swelling has improved on current dose diuretics-kidney function is stable  - Discuss transplant evaluation-his meld today is 13-have discussed with [facility] and they do transplant HIV-positive individuals.  Discussed with  and they do not.  Will refer to [facility] at [place] for consultation.  -Recommended flu shot when available  - Due for repeat imaging of his liver in February 2019 for HCC surveillance  - Recommended follow-up with his endocrinologist due to persistently elevated TSH since March of this year despite levothyroxin  - Recommended follow up with pain clinic due to poorly controlled back pain limiting his activity

## 2018-08-23 NOTE — PATIENT INSTRUCTIONS
- have 2 nutritional supplement daily + yogurt (greek) (can use lactaid with yogurt for lactose intolerance)  - f/u with pain management regarding back pain  - f/u with Dr Conway for elevated TSH since March  - f/u ith Dr Thomas and discuss chronic medications

## 2018-08-24 NOTE — TELEPHONE ENCOUNTER
Call to pt.  Advise per Dr Butler that case was discussed with [facility] - they do transplant HIV positive pts.  Complicated case and will need to be evaluated to see if candidate otherwise.  If interested, Dr Butler will set this up.  Compliance very important.  Once appt set up, if needs to cancel or reschedule - imperative to call and explain as this is seen as measure of compliance.    Pt verb understanding and states would like to proceed with referral.    Message to Dr Butler.

## 2018-08-24 NOTE — TELEPHONE ENCOUNTER
----- Message from Robyn Butler MD sent at 8/24/2018 12:10 PM EDT -----  Regarding: needs transplant referral to [facility] clinic  Please let the patient know that I discussed his case with [facility] and they do transplant HIV-positive individuals.  He is a complicated gentleman however will need to be evaluated to see if he is a candidate otherwise.  If he is interested in proceeding with a transplant evaluation I would like to get him set up to see the [facility] at [place].  Please stress to him how important compliance is with this appointment once it is set up.  If for some reason he would need to cancel the appointment please advise him to make sure that he calls and explains why he needs to cancel or reschedule as they use this as a measure of compliance.

## 2018-08-25 NOTE — PROGRESS NOTES
Problem: Patient Care Overview  Goal: Plan of Care/Patient Progress Review  Outcome: Declining  Status: POD#10 of direct laryngoscopy and control of hemorrhage. Recent dx carcinoma of the larynx and trachostomy.  VS: HR tachy 110-115bpm. Tachypnea. BP WNL. 21%FiO2 by trach dome at % (Shiley #6 cuff inflated). Afebrile.   Neuros: Alert and oriented. Writes with pen and paper with encouragement. Generalized weakness. Face symmetric at rest. Spontaneous and purposeful movement of all extremities. EOM intact. Pupils PERRLA.   GI: Denies nausea or fullness. NPO. PEG. Pt received 2/4 cans for bolus feed. Had one bm. Loose, dark brown. Incontinent of bowel.  : Incontinent of urine. Changing pad with repositioning   IV: PIV SL  Activity: Generalized weakness. Turn to reposition q2h. Up with lift to recliner.  Pain: Denied pain.   Respiratory/Trach: Shiley #6 cuffed (inflated). 21%fiO2 with oxygen saturation at %. Several scheduled nebs. RT called this afternoon after pt's breathing became more shallow and labored, and tripod positioning. Respiratory rate was 40. HR up to 123 bpm. Inner cannula changed and albuterol neb given, after which pt improved. Lung sounds clear in upper lobes. Diminished at based. Pt does not complain of SOB when asked. RN did not suction. RT has suctioned tracheostomy today. Try to limit to once a shift. Pt has strong cough. Pt coughed one pea-size blood clot. Bleeding appears to have improved from reports of bleeding yesterday evening and overnight. Afarin given once to swish and spit this morning.   Skin: Otic solution applied to area under trach plate. Dried blood around plate. Coccyx has pressure ulcer covered with mepilex. Pt is frequently wet. Changing pads with repositioning. O2 monitor on toe d/t nail polish.   Social: Spoke with son  on the phone at patient's requests. Son stated that he and son Dar would be visiting today.     Plan of care: Care conference on Monday  REASONS FOR FOLLOWUP:      1.  Pancytopenia, especially with leukocytopenia and thrombocytopenia. Bone marrow aspiration and biopsy obtained on 01/21/2014, with no evidence of hematologic malignancy, no myelodysplastic syndrome.  He has liver cirrhosis and splenomegaly.      2.   Frequently recurrent iron deficiency anemia, not able to tolerate oral iron supplementation, status post multiple Feraheme treatments.  He also had transfusion of PRBC in October 2014.      3.  Empiric treatment for ITP using IVIG, in March 2014 and no response.    4.  Ongoing care for frequent iron deficiency required repeated intravenous iron therapy.       HISTORY OF PRESENT ILLNESS: Mr. Aranda is a 72-year-old  male returning today for 3-month re-evaluation of recurrent iron deficiency.      However patient today sits in wheelchair, complains of severe back pain.  He's been taking morphine however patient is not properly controlled, severity 8-10/10.  He had recent hospitalization, was discharged a week ago on 4/18/2018 after 10 days stay at [facility].  I reviewed the medical records.  He had compression fracture of the T12 secondary to osteoporosis, however because of these severe thrombocytopenia and not responding 2 platelets transfusion and his mild coagulopathy secondary to liver cirrhosis, kyphoplasty was not done due to safety reason after he was evaluated by neurosurgery service.  He was also found having iron deficiency again, with a ferritin 27.1 ng/mL on 4/9/2018.  Prior to that on 3/30/2018 his iron saturation was 19%, with free iron 86 TIBC 462.  Patient was given one dose of Venofer 300 mg during hospitalization.      He reports profound fatigue, he denies any signs of bleeding, including no melena, he admits occasional fresh blood on toilet paper but nothing significant.         PAST MEDICAL HISTORY:    1. HIV diagnosed in 1997, on antiretroviral therapy; followed by Dr. Klarissa Thomas.    2. Liver cirrhosis  and splenomegaly.   3. Diabetes.    4. Coronary artery disease.    5. Hypertension.    6. Pancytopenia.    7. Iron deficiency anemia.    8. Hypothyroidism.    9. History of prostate cancer, status post radiation therapy   10. Osteoporosis documented on DEXA bone density scan 02/29/2016.        HEMATOLOGIC HISTORY: History from previous dates can be found in a separate document.        Laboratory study on 10/05/2015 reported hemoglobin of 9.2, platelets 41,000 and WBC 3320 including neutrophils 1600, lymphocytes 400. Serum ferritin was 12.6, down from 72.9 on 08/10/2015. Serum iron level was 39, also down from 69 on 08/10/2015. His HIV test was improved and negative PSA and improved lipid panel. CMP panel was unremarkable except marginally elevated bilirubin of 1.4 and glucose 135.      The patient was evaluated in our clinic on 10/15/2015. The patient is reporting fatigue. We will arrange for treatment of Feraheme. The patient will continue followup with Dr. Thomas every 3 months for laboratory studies, including iron panel.  Patient was given 2 doses of Feraheme.       04/19/2016 which showed iron deficiency, ferritin level was 10.4 NG/ML and iron level 36. She was given Feraheme treatment in May 2016.       His laboratory study last week on 08/16/2016 showed recurrent severe iron deficiency with a ferritin 6.52 NG/ML, and a serum iron 31. His hemoglobin was 8.8, platelets 57,000 and WBC of 3100. His B12 level was 1097 PG/ML. He also reported taking levothyroxine 300 µg daily. This patient also lost a significant weight about 20 pounds in the past 3 months, because he was on 2 different diuretics.  Patient was seen on 08/22/2016 and the schedule for 2 doses of Feraheme treatment.      Laboratory study on 02/14/2017 reported worsening hemoglobin 10.8, platelets 37,000, and neutrophils 1460 and lymphocytes 240.  He also had recurrent iron deficiency, with ferritin 25.8, iron saturation 16%.  His vitamin B12 level was  with family to discuss goals of care.          supratherapeutic.     He received multiple IV iron therapy was in late August and early September 2016, then early November 2016 and early January and May 2017.     Laboratory study on 6/28/2017 showed a slightly improved ferritin at 40.8, and iron saturation 24% and improved hemoglobin at 11.8.     On 8/2/2017 laboratory study showed worsening iron with ferritin 30, iron saturation 17, and worsening hemoglobin at 10.  He was given 2 doses ferriheme in early August 2017.      On 11/8/2017, ferritin 26.2 ng/mL, iron 66 TIBC 447 iron saturation 15%, hemoglobin 12.0, MCV 86, MCHC 32.0, and platelets 86,000, WBC 3200 including neutrophils 2400 and lymphocytes 300.  Feraheme 2 doses was given afterwards.     For the year of 2017, patient roughly receives IV Feraheme treatment about every 2-3 months.  He continues taking HIV medication. His diabetes is not tightly controlled. Patient reports no recent bacterial infection fever chills, etc.  No easy bleeding.     On 1/24/2018, laboratory study reporting hemoglobin 12.6, platelets 35,000 and WBC 3880 including neutrophils 3180, monocytes 380 and lymphocytes 240.  His serum ferritin was 30.9 ng/mL, iron 96 TIBC 458 iron saturation 21%.  Patient was given Feraheme 2 doses again.      MEDICATIONS: The current medication list was reviewed with the patient and updated in the EMR this date per the medical assistant. Medication dosages and frequencies were confirmed to be accurate.        ALLERGIES:    1. PENICILLIN.    2. PREDNISONE.        SOCIAL HISTORY: Remote smoking history. He does not drink. No history of drug use. He contracted HIV through sexual contact.        FAMILY HISTORY: Reviewed and is negative to this episode of care.        REVIEW OF SYSTEMS:    PAIN: severe back pain secondary to compression fracture of T12.   GENERAL: No change in appetite or weight, no fevers, chills or sweats.    SKIN: No rashes or nonhealing lesions.    HEME/LYMPH: pancytopenia with  "recurrent iron deficiency, frequent IV iron treatment.  His severe cytopenia and mild leukocytopenia.   EYES: No vision changes or diplopia.    ENT: No tinnitus, hearing loss, gum bleeding, epistaxis, hoarseness or dysphagia.    RESPIRATORY: No cough, shortness of breath, hemoptysis or wheezing.    CVS: No chest pain, palpitations, orthopnea, dyspnea on exertion.   GI: Abdomen distention secondary to ascites fluids. No nausea, vomiting, constipation, diarrhea, melena or hematochezia.    : No dysuria or hematuria.    MUSCULOSKELETAL: No bone pain or joint stiffness.    NEUROLOGICAL: No dizziness, global weakness, loss of consciousness or seizures.    PSYCHIATRIC: No increased nervousness, mood changes or depression.        VITAL SIGNS:    Vitals:    04/25/18 1336   BP: 140/78   Pulse: 59   Resp: 16   Temp: 97.9 °F (36.6 °C)   TempSrc: Oral   SpO2: 96%   Weight: 72.4 kg (159 lb 9.6 oz)   Height: 171 cm (67.32\")   PainSc: 10-Worst pain ever  Comment: compression fracture   PainLoc: Back   ECOG: 3         PHYSICAL EXAMINATION:    GENERAL: Well-developed and well-nourished male, sits in wheelchair, in moderate distress secondary to back pain.   SKIN: Warm and dry without rashes, No purpura.   HEAD: Normocephalic.    EYES: EOMs intact. Conjunctivae normal.    EARS: Hearing intact.    NOSE: No nasal discharge.    MOUTH: Tongue is well-papillated; no stomatitis or ulcers. Lips normal.    THROAT: Oropharynx without lesions or exudates.    CHEST: Lungs clear to auscultation.   no wheezing or crackles.    CARDIAC: Regular rate and rhythm without murmurs, rubs or gallops.    ABDOMEN: Soft, nontender with no organomegaly or masses. Bowel sounds present.   EXTREMITIES: No clubbing, cyanosis. No edema.   NEURO: No focal neurological deficits.        LABORATORY DATA:    Lab Results   Component Value Date    WBC 5.28 04/25/2018    HGB 11.3 (L) 04/25/2018    HCT 35.8 (L) 04/25/2018    MCV 89.3 04/25/2018    PLT 72 (L) 04/25/2018 "     Lab Results   Component Value Date    NEUTROABS 4.01 04/25/2018     Lab Results   Component Value Date    IRON 86 03/30/2018    TIBC 462 03/30/2018    FERRITIN 27.12 (L) 04/09/2018     Saturation 19%      THORACIC SPINE MRI      CLINICAL HISTORY: Fracture.     COMPARISON: None.     FINDINGS: Multiplanar images of the thoracic spine obtained without  gadolinium. There is a moderate-to-severe compression fracture of the  T12 vertebra with up to approximately 60% height loss along its most  severe aspect centrally. It is likely late subacute-early chronic in  timeframe as some mild patchy edema persists mostly along the superior  endplate and to a lesser degree centrally within the vertebra. Areas of  diminished T1 and T2 signal likely related to sclerosis from some  healing. It does not appear to be pathologic but follow-up is  recommended. There is some retropulsion of the posterior wall,  approximately 4 to 5 mm into the central canal, greater to the right of  midline. Canal narrowing is mild however.     The remaining thoracic vertebrae are well-maintained in height. Marrow  signal is somewhat heterogeneous with areas of fatty replacement but  without any additional fracture or evidence of pathologic marrow  replacement process.     Mild multilevel degenerative changes are present. Minor disc osteophyte  complex at T3-4 produces slight indentation along the ventral aspect of  the thecal sac but canal narrowing is minimal. Otherwise no significant  focal disc herniation/extrusion or significant protrusion is not  demonstrated at any level. There is no abnormal cord signal.     Advanced multilevel degenerative changes noted in the cervical spine.  Dedicated cervical imaging could better assess.     IMPRESSION:  T12 compression fracture, likely late subacute-early chronic  with some bony retropulsion contributing to some mild narrowing of the  central canal, greater to the right of midline. Again it does not  appear  pathologic but follow-up is recommended.        ASSESSMENT:      1.  Recurrent Iron deficiency anemia required frequent IV iron therapy, roughly about every 3 months.  His most recent labs reporting recurrent on deficiency again with a ferritin 27 ng/mL on 4/9/2018 and iron saturation 19% on 3/30/2018.  He was given 300 mg Venofer during recent hospitalization in April 2018.  So we'll give him 1 dose of Feraheme today as outpatient.  We'll continue to monitor his iron labs every 3 months.     2.  Severe thrombocytopenia and mild to moderate leukopenia/neutropenia related to his liver cirrhosis and HIV treatment.     His platelets has improved today at 72,000.       3.  Compression fracture of T12 with severe pain.  During his hospitalization, kyphoplasty was not done due to his severe thrombocytopenia and the coagulopathy with INR 1.47 on 4/11/2018.  His platelets today has much improved.  I discussed with patient, I will contact the neurosurgery service to see if he is a candidate for kyphoplasty now, if needed we can transfuse him with platelets perioperatively.      PLAN:     1.  Give one dose Feraheme today.    2.  I put a call to neurosurgery service today.     2.  Return in 3 months for MD visit with lab studies including CBC, ferritin and iron level.  Likely he will need iv. Feraheme treatment again.     Addendum:   Neurosurgery service Mrs. HarveyENEIDA, called back on 4/26/2018 and we discussed the case.  They will evaluate patient for kyphoplasty.      Neurosurgeon Dr. Davis called and spoke to me on 4/27/2018,  we'll arrange patient for kyphoplasty on this coming Thursday on 5/2/2018.  We'll transfuse patient with platelets perioperatively, 2 units prior to the procedure and 1 unit post kyphoplasty.      I sent a message to office staff to arrange perioperative transfusion of platelets.      More than 45 minutes for patient care, over half of that time were for counseling to patient and  coordinating his care.        JAMIE BOWMAN M.D., Ph.D.       CC:   Klarissa Thomas M.D.    Joey Davis M.D.       Dictated using Dragon Dictation.

## 2018-08-30 NOTE — TELEPHONE ENCOUNTER
Rita Lopez; Robyn Butler MD 12 minutes ago (9:51 AM)      Faxed records to [facility] (Routing comment)     Message from Elaine noted.

## 2018-09-10 NOTE — CONSULTS
Consult note  [facility]   LOS: 0 days   Patient Care Team:  Kendrick Griggs MD as PCP - General (Family Medicine)  Carrie Lucas MD PhD as PCP - Claims Attributed  Carrie Lucas MD PhD as Consulting Physician (Hematology and Oncology)  Baldo oCnway MD as Consulting Physician (Endocrinology)  Robyn Butler MD as Consulting Physician (Gastroenterology)  Marla Guevara, PRAKASH as Care Coordinator (Population Health)      Patient Identification:  Name: Key Aranda  Age: 72 y.o.  Sex: male  :  1945  MRN: [id number]         Primary Care Physician: Kendrick Griggs MD    Chief Complaint:   Worsening lower extremity edema, worsening pain, easy bruising and shortness of breath at exertion    History of present illness:  The patient is a 72-year-old male with HIV infection diagnosed in , alcoholic liver cirrhosis, ascites, splenomegaly and thrombocytopenia admitted with worsening lower extremity edema, shortness of breath, sense of bruises general decline in health.  He recently underwent kyphoplasty, but still complains of lots of back pain and difficulty to ambulate.  Lost about 20 pounds in the past 3 months.  In January of this year he is T-cell count was 18 and HIV viral load was 113 copies per mL, genotype archives revealed multiple resistant mutations  And he is antiretroviral therapy was changed to Edurant 25 mg daily, Isentress 400 mg BID, Prezcobix one tablet daily and Tenofovir 300 mg daily.  Epidemiology:  No pertinent epidemiological history  Past medical history:  HIV infection diagnosed in    Diabetes mellitus  Hypertension  Nonalcoholic liver cirrhosis, splenomegaly, pancytopenia  Chronic anemia, he requires iron infusion  Hypothyroidism,  Osteoporosis, stress fracture, status post kyphoplasty  Prostate cancer  Coronary artery disease status post MI  He is allergic to abacavir    Past surgical history:  Status post kyphoplasty    Family history:  No pertinent  "family history  Social history:   reports that he quit smoking about 14 years ago. His smoking use included Cigarettes. He started smoking about 44 years ago. He has a 32.00 pack-year smoking history. He has never used smokeless tobacco. He reports that he does not drink alcohol or use drugs.  Review of Systems:   General: Malaise, fatigue and weight loss  HEENT: No headaches, no bruits of vision, dry mouth and occasional mucosal bleeding  Respiratory: No no cough but he has exertional dyspnea  Cardiovascular: He reports a typical chest wall pains, that is worse when he moves or takes a deep breath  GI: His appetite is very poor, he has no nausea or diarrhea.  The patient has abdominal pain off and on that is not severe  : Dysuria  Extremity: Sitting bilateral lower extremity peaking edema and petechial rash bilaterally  Neurological: No asterixis  Scheduled Meds:  Continuous Infusions:  No current facility-administered medications for this encounter.     Vital signs in last 24 hours:  Temp:  [97.9 °F (36.6 °C)] 97.9 °F (36.6 °C)  Heart Rate:  [56] 56  Resp:  [16] 16  BP: (132)/(54) 132/54    Intake/Output:  No intake or output data in the 24 hours ending 09/10/18 1308    Exam:  /54 (BP Location: Left arm, Patient Position: Lying)   Pulse 56   Temp 97.9 °F (36.6 °C) (Oral)   Resp 16   Ht 167.6 cm (66\")   SpO2 94%     General Appearance:    Alert, cooperative, no distress, AAOx3                          Head:    Normocephalic, without obvious abnormality, atraumatic                           Eyes:     Conjunctiva clear                         Throat:   oral mucosa dry without any lesions                           Neck:   Supple, symmetrical, trachea midline, no JVD                         Lungs:    Decreased sounds at both bases                           Heart:    Regular rate and rhythm, S1 and S2 normal, no murmur,     Abdomen:     Soft, mildly tender over both upper quadrants, bowel sounds active, " splenomegaly,                 Extremities:   Lateral lower extremities peaking edema and petechia without cellulitis without cellulitis                        Pulses:   Pulses palpable in all extremities                            Skin:   Extensive hematoma and bruises over the flanks, chest wall and abdominal wall                  Neurologic:  No asterixis,  grossly intact               Assessment:  72-year-old male with complex medical history that includes nonalcoholic liver cirrhosis complicated by splenomegaly and thrombocytopenia venting with worsening bruises, lower extremity edema and petechial rash and general decline of health.  Insulin-dependent diabetes mellitus  Hypothyroidism  Coronary artery disease, history of MI  AIDS- treatment experienced, multiple resistant mutations    Plan:  The patient is admitted to the hospital workup of shortness of breath worsened peripheral edema bruises.  I suspect that he has worsening liver failure severe thrombocytopenia.  Ask GI Dr. Butler to see and and advice and management.  I will also order 2-D echocardiogram to evaluate ejection fraction and for any signs of heart failure.  Will continue home medications  Thank you    Klarissa Thomas MD  9/10/2018  1:08 PM

## 2018-09-10 NOTE — H&P
History and physical    Primary care physician  Dr. INGRAM    Chief complaint  Bilateral lower extremity swelling  Shortness of breath  Weight loss    History of present illness  72-year-old white male with history of HIV prostate cancer coronary artery disease and alcoholic cirrhosis osteoarthritis and hypothyroidism directly admitted to our service with bilateral lower extremity swelling shortness of breath and weight loss for last 1 month.  Patient denies any fever chills cough chest pain abdominal pain nausea vomiting diarrhea.  Patient directly admitted from infectious disease office when he presented with above complaint.     PAST MEDICAL HISTORY  • Anemia     • Cancer (CMS/HCC) 2001   • Cirrhosis (CMS/HCC)     • Coronary artery disease     • Diabetes mellitus (CMS/HCC)     • H/O complete eye exam 10/2017   • History of prostate cancer 2012   • HIV (human immunodeficiency virus infection) (CMS/HCC)     • Hyperlipidemia     • Hypertension     • Lactose intolerance     • Lumbar stress fracture     • Osteoporosis     • Pancytopenia (CMS/HCC)     • Peripheral artery disease (CMS/HCC)     • SOB (shortness of breath) on exertion     • Thyroid disease        PAST SURGICAL HISTORY  Surgical History         Past Surgical History:   Procedure Laterality Date   • BONE MARROW BIOPSY W/ ASPIRATION   01/21/2014   • CARDIAC CATHETERIZATION       • COLONOSCOPY   09/20/2012     non-thrombosed EH, sessile polyp in ascending colon 3 mm in size.   • CORONARY ANGIOPLASTY WITH STENT PLACEMENT   06/2013   • ENDOSCOPY   09/20/2012     grade 1 varicesmiddle and lower 3rd of esophagus. Moderate portal hypertensive gastropathy in entire stomach.   • KYPHOPLASTY Bilateral 5/3/2018     Procedure: T 12 KYPHOPLASTY 1-2 LEVELS;  Surgeon: Joey Davis MD;  Location: [facility];  Service: Neurosurgery   • KYPHOPLASTY N/A 5/29/2018     Procedure: KYPHOPLASTY, L1, T11 kyphoplsty;  Surgeon: Joey Davis MD;  Location: [facility]  OR 18/19;  Service: Neurosurgery   • MOUTH SURGERY       • PROSTATE BIOPSY       • TONSILLECTOMY       • UPPER GASTROINTESTINAL ENDOSCOPY   09/20/2012     grd 1 varices, portal hypertensive gastropathy         FAMILY HISTORY        Family History   Problem Relation Age of Onset   • Heart disease Other     • No Known Problems Mother     • No Known Problems Father     • No Known Problems Maternal Grandmother     • No Known Problems Maternal Grandfather     • No Known Problems Paternal Grandmother     • No Known Problems Paternal Grandfather     • Malig Hyperthermia Neg Hx        SOCIAL HISTORY  Social History   Social History            Social History   • Marital status: Single       Spouse name: N/A   • Number of children: N/A   • Years of education: College            Occupational History   •  Retired       [employer]            Social History Main Topics   • Smoking status: Former Smoker       Packs/day: 1.00       Years: 32.00       Types: Cigarettes       Start date: 1/1/1974       Quit date: 2004   • Smokeless tobacco: Never Used   • Alcohol use No   • Drug use: No   • Sexual activity: Not Currently       Partners: Female       Birth control/ protection: Abstinence, Condom           Other Topics Concern   • Not on file          Social History Narrative   • No narrative on file         ALLERGIES  Dicyclomine; Methylprednisolone; and Prednisolone  Home medications reviewed    REVIEW OF SYSTEMS  Review of Systems   Constitutional: Negative for chills and fever.   HENT: Negative.    Eyes: Negative.    Respiratory: Positive for shortness of breath.    Cardiovascular: Positive for leg swelling. Negative for chest pain and palpitations.   Gastrointestinal: Positive for abdominal distention and abdominal pain. Negative for nausea and vomiting.   Genitourinary: Negative.  Negative for dysuria and hematuria.   Musculoskeletal: Positive for back pain.   Skin: Negative.    Neurological: Negative.  Negative for  "weakness and numbness.   Psychiatric/Behavioral: Negative.       PHYSICAL EXAM  Blood pressure 132/54, pulse 56, temperature 97.9 °F (36.6 °C), temperature source Oral, resp. rate 16, height 167.6 cm (66\"), SpO2 94 %.    Constitutional: He is oriented to person, place, and time and well-developed, well-nourished, and in no distress.   Head: Normocephalic and atraumatic.   Right Ear: External ear normal.   Left Ear: External ear normal.   Nose: Nose normal.   Eyes: Conjunctivae are normal.   Neck: Normal range of motion.   Cardiovascular: Normal rate and regular rhythm.    + BLE edema to the lower legs, 2+ pitting, distal pulses intact.   Pulmonary/Chest: Effort normal.   Diminished breath sounds   Abdominal: Soft. He exhibits distension. There is no tenderness. There is no rebound and no guarding.   Musculoskeletal: Normal range of motion.   Neurological: He is alert and oriented to person, place, and time.   Skin: Skin is warm and dry.   Psychiatric: Affect normal.     LAB RESULTS  Lab Results (last 24 hours)     ** No results found for the last 24 hours. **        Imaging Results (last 24 hours)     ** No results found for the last 24 hours. **          Current Facility-Administered Medications:   •  cholecalciferol (VITAMIN D3) tablet 1,000 Units, 1,000 Units, Oral, Daily, Cody Rdz MD  •  clonazePAM (KlonoPIN) tablet 0.25 mg, 0.25 mg, Oral, PRN, Cody Rdz MD  •  cobicistat (TYBOST) 150 mg, 150 mg, Oral, Daily With Breakfast, Cody Rdz MD  •  darunavir ethanolate (PREZISTA) tablet 800 mg, 800 mg, Oral, Daily With Breakfast, Cody Rdz MD  •  fluconazole (DIFLUCAN) tablet 100 mg, 100 mg, Oral, Every Other Day, Cody Rdz MD  •  furosemide (LASIX) tablet 40 mg, 40 mg, Oral, Daily, Cody Rdz MD  •  ipratropium-albuterol (DUO-NEB) nebulizer solution 3 mL, 3 mL, Nebulization, Q4H - RT, Cody Rdz MD  •  lactulose (CHRONULAC) 10 GM/15ML solution 20 g, 20 g, Oral, BID, Cody Rdz MD  •  " [START ON 9/11/2018] levothyroxine (SYNTHROID, LEVOTHROID) tablet 300 mcg, 300 mcg, Oral, QAM, Elias Rdz MD  •  metoprolol succinate XL (TOPROL-XL) 24 hr tablet 50 mg, 50 mg, Oral, Q24H, Elias Rdz MD  •  [START ON 9/11/2018] pantoprazole (PROTONIX) EC tablet 40 mg, 40 mg, Oral, Q AM, Elias Rdz MD  •  potassium chloride (MICRO-K) CR capsule 10 mEq, 10 mEq, Oral, Daily, Elias Rdz MD  •  raltegravir (ISENTRESS) tablet 400 mg, 400 mg, Oral, Q12H, Elias Rdz MD  •  rifaximin (XIFAXAN) tablet 550 mg, 550 mg, Oral, Q12H, Elias Rdz MD  •  rilpivirine (EDURANT) tablet 25 mg, 25 mg, Oral, Daily With Breakfast, Elias Rdz MD  •  spironolactone (ALDACTONE) tablet 50 mg, 50 mg, Oral, Daily, Elias Rdz MD  •  tamsulosin (FLOMAX) 24 hr capsule 0.4 mg, 0.4 mg, Oral, Daily, Elias Rdz MD  •  tenofovir (VIREAD) tablet 300 mg, 300 mg, Oral, Daily With Breakfast, Elias Rdz MD  •  zinc sulfate (ZINCATE) capsule 220 mg, 220 mg, Oral, Daily, Elias Rdz MD     ASSESSMENT  72-year-old white male with multiple medical problem admitted directly to her service with bilateral lower extremity swelling and shortness of breath  HIV-positive  Alcoholic cirrhosis with ascites  Hypertension  Hypothyroidism  Anxiety disorder  Prostate cancer  Coronary artery disease  Gastroesophageal reflux disease  Chronic anemia and thrombocytopenia    PLAN  Admit  Diuresis with strict I's and O's and daily weight  Check 2-D echo  Gastroenterology to follow patient  Infectious disease to follow patient  Continue home medication and adjust doses  Stress ulcer DVT prophylaxis  Supportive care  Follow closely further recommendation chronic hospital course    ELIAS RDZ MD

## 2018-09-11 NOTE — PROGRESS NOTES
Malnutrition Severity Assessment    Patient Name:  Kye Aranda  YOB: 1945  MRN: [id number]  Admit Date:  9/10/2018    Patient meets criteria for : Severe malnutrition    Comments:  81.4% UBW, 35# (18.9%) weight loss x 6 months, decreased PO intake.    Physical exam completed for severe muscle wasting and fat loss.  Detailed in chart below.    Malnutrition Type: Chronic Illness Malnutrition     Malnutrition Type (last 8 hours)      Malnutrition Severity Assessment     Row Name 09/11/18 1601       Malnutrition Severity Assessment    Malnutrition Type Chronic Illness Malnutrition    Row Name 09/11/18 1601       Physical Signs of Malnutrition (Chronic)    Muscle Wasting Severe   moderate temporal region, acromion region, clavicle region; severe interosseous region, patellar region    Fat Loss Severe   moderate thoracic region; severe orbital region, triceps region    Fluid Accumulation Mild   moderate 3+ to legs; ascites    Row Name 09/11/18 1601       Weight Status (Chronic)    %UBW Mod (75-85%)    Weight Loss Severe (>10% / 6 mo)    Row Name 09/11/18 1601       Energy Intake Status (Chronic)    Energy Intake Severe (< or equal to 50% / > or equal to 1 mo)    Row Name 09/11/18 1601       Criteria Met (Must meet criteria for severity in at least 2 of these categories: M Wasting, Fat Loss, Fluid, Secondary Signs, Wt. Status, Intake)    Patient meets criteria for  Severe malnutrition          Electronically signed by:  Leny Varghese RD  09/11/18 4:09 PM

## 2018-09-11 NOTE — PROGRESS NOTES
DAILY PROGRESS NOTE  [facility]   LOS: 0 days   Patient Care Team:  Kendrick Griggs MD as PCP - General (Family Medicine)  Carrie Lucas MD PhD as PCP - Claims Attributed  Carrie Luacs MD PhD as Consulting Physician (Hematology and Oncology)  Baldo Conway MD as Consulting Physician (Endocrinology)  Robyn Butler MD as Consulting Physician (Gastroenterology)  Marla Guevara, PRAKASH as Care Coordinator (Population Health)      Patient Identification:  Name: Kye Aranda  Age: 72 y.o.  Sex: male  :  1945  MRN: [id number]         Primary Care Physician: Kendrick Griggs MD    Chief Complaint:   He feels poorly, poor appetite, back and abdominal pains, leg crampings    History of present illness:  The patient is a 72-year-old male with HIV infection diagnosed in , alcoholic liver cirrhosis, ascites, splenomegaly and thrombocytopenia admitted with worsening lower extremity edema, shortness of breath, sense of bruises general decline in health.  He recently underwent kyphoplasty, but still complains of lots of back pain and difficulty to ambulate.  Lost about 20 pounds in the past 3 months.  In January of this year he is T-cell count was 18 and HIV viral load was 113 copies per mL, genotype archives revealed multiple resistant mutations  And he is antiretroviral therapy was changed to Edurant 25 mg daily, Isentress 400 mg BID, Prezcobix one tablet daily and Tenofovir 300 mg daily.  So far hospital work up revealed severe protein malnutrition, pancytopenia and elevated PT c/w liver failure  Review of Systems:   General: fatigue, malaise  HEENT:no headache, no oral mucosal bleeding  Respiratory: no cough, mild SOB  Cardiovascular:no CP  GI:chronic abdominal distention  Extremity:pitting edema and petichiae    Scheduled Meds:  cholecalciferol 1,000 Units Oral Daily   cobicistat 150 mg Oral Daily With Breakfast   And      darunavir 800 mg Oral Daily With Breakfast   fluconazole 100 mg  "Oral Every Other Day   furosemide 40 mg Oral Daily   ipratropium-albuterol 3 mL Nebulization Q4H - RT   lactulose 20 g Oral TID   levothyroxine 300 mcg Oral QAM   metoprolol succinate XL 50 mg Oral Q24H   oxyCODONE 10 mg Oral Q12H   pantoprazole 40 mg Oral Q AM   potassium chloride 10 mEq Oral Daily   raltegravir 400 mg Oral Q12H   rifaximin 550 mg Oral Q12H   rilpivirine 25 mg Oral Daily With Breakfast   spironolactone 50 mg Oral Daily   tamsulosin 0.4 mg Oral Daily   tenofovir 300 mg Oral Daily With Breakfast   zinc sulfate 220 mg Oral Daily     Continuous Infusions:     Vital signs in last 24 hours:  Temp:  [97.4 °F (36.3 °C)-98.1 °F (36.7 °C)] 97.4 °F (36.3 °C)  Heart Rate:  [58-71] 58  Resp:  [18] 18  BP: (100-126)/(40-61) 100/40    Intake/Output:    Intake/Output Summary (Last 24 hours) at 09/11/18 1400  Last data filed at 09/11/18 0632   Gross per 24 hour   Intake              600 ml   Output             1350 ml   Net             -750 ml       Exam:  /40 (BP Location: Right arm, Patient Position: Lying)   Pulse 58   Temp 97.4 °F (36.3 °C) (Oral)   Resp 18   Ht 167.6 cm (66\")   Wt 68.3 kg (150 lb 9.2 oz)   SpO2 95%   BMI 24.30 kg/m²       General Appearance:    Alert, cooperative, no distress, AAOx3                          Head:    Normocephalic, without obvious abnormality, atraumatic                           Eyes:     Conjunctiva clear                         Throat:   oral mucosa dry without any lesions                           Neck:    Supple, symmetrical, trachea midline, no JVD                         Lungs:    Decreased sounds at both bases                           Heart:    Regular rate and rhythm, S1 and S2 normal, no murmur,                Abdomen:        Soft, mildly tender over both upper quadrants, bowel sounds active, splenomegaly,                 Extremities:      Lateral lower extremities peaking edema and petechia without cellulitis without cellulitis                        " Pulses:    Pulses palpable in all extremities                            Skin:    Extensive hematoma and bruises over the flanks, chest wall and abdominal wall                  Neurologic:   No asterixis,  grossly intact               Data Review:  Lab Results   Component Value Date    WBC 2.08 (L) 09/10/2018    HGB 11.0 (L) 09/10/2018    HCT 35.4 (L) 09/10/2018    PLT 43 (L) 09/10/2018     Lab Results   Component Value Date     09/11/2018    K 3.1 (L) 09/11/2018     09/11/2018    CO2 25.4 09/11/2018    BUN 13 09/11/2018    CREATININE 1.01 09/11/2018    GLUCOSE 149 (H) 09/11/2018     Lab Results   Component Value Date    AST 56 (H) 09/11/2018    ALT 28 09/11/2018    ALKPHOS 328 (H) 09/11/2018     Lab Results   Component Value Date    INR 1.34 (H) 09/10/2018     No results found for: COLORU, CLARITYU, SPECGRAV, PHUR, PROTEINUR, GLUCOSEU, KETONESU, BLOODU, NITRITE, LEUKOCYTESUR, BILIRUBINUR, UROBILINOGEN, RBCUA, WBCUA, BACTERIA, UACOMMENT  No results found for: TROPONINT, TROPONINI, BNP  No components found for: TSH;2     Assessment:  72-year-old male with complex medical history that includes nonalcoholic liver cirrhosis complicated by splenomegaly and thrombocytopenia venting with worsening bruises, lower extremity edema and petechial rash and general decline of health.  Insulin-dependent diabetes mellitus  Hypothyroidism- TSH- is OK  Coronary artery disease, history of MI; last 2D-Echo, EF-65%  AIDS- treatment experienced, multiple resistant mutations  So far hospital work up revealed worsening liver failure with poor syntectic function,  The patient told me that he was referred for liver transplantation. I will check this pal with Dr. Hudson   Plan:  Continue support  Appreciate GI input- will discuss further management plans.    Klarissa Thomas MD  9/11/2018  2:00 PM

## 2018-09-11 NOTE — CONSULTS
[facility]  Initial Inpatient Consult Note    Referring Provider: nasir    Reason for Consultation: cirrhosis    Subjective     History of present illness:    72 y.o. male followed by Dr. Robyn Butler, complicated history including liver cirrhosis, hepatic encephalopathy,splenomegaly, thrombocytopenia  iron deficiency anemia, and HIV followed by Dr. Thomas. He has been admitted with bilateral lower extremity swelling , bruising, shortness of breath and weight loss.  Patient has been directly admitted from infectious disease office, Dr. Thomas.     He underwent a kyphoplasty recently but still having back pain and trouble ambulating. He reports a 20lb weight loss in 3 months. He was 147lbs today and 157lbs in May 2018.  When seen in the office by Dr. Butler 8/23/18 he was complaining of similar complaints but at the time felt his lower extremity edema was better with lasix/ aldactone. He is not sure if he stopped taking Flomax or Lasix last month since he gets the pills confused. He has been having more trouble with urinary retention.     His platelet count is close to baseline  and Hgb is actually higher then baseline of 10s. Ammonia is within normal limits and he remains on Xifaxan and lactulose.     EGD and colonoscopy is scheduled for November with platelet transfusion prior to endoscopy. He has no blood in the stool or evidence of recent bleeding. At his office visit last month,  meld score was 13. Dr. Butler has referred him to [facility] as they do transplant HIV-positive individuals.  He is due for repeat imaging of his liver in February 2019 for HCC surveillance. He is also in need of a flu shot when available.          Past Medical History:  Past Medical History:   Diagnosis Date   • Anemia    • Cancer (CMS/HCC) 2001    Prostate   • Cirrhosis (CMS/HCC)    • Coronary artery disease    • Diabetes mellitus (CMS/HCC)    • H/O complete eye exam 10/2017   • History of prostate cancer  2012   • HIV (human immunodeficiency virus infection) (CMS/HCC)    • Hyperlipidemia    • Hypertension    • Lactose intolerance    • Lumbar stress fracture    • Osteoporosis    • Pancytopenia (CMS/HCC)    • Peripheral artery disease (CMS/HCC)    • SOB (shortness of breath) on exertion    • Thyroid disease      Past Surgical History:  Past Surgical History:   Procedure Laterality Date   • BONE MARROW BIOPSY W/ ASPIRATION  01/21/2014   • CARDIAC CATHETERIZATION     • COLONOSCOPY  09/20/2012    non-thrombosed EH, sessile polyp in ascending colon 3 mm in size.   • CORONARY ANGIOPLASTY WITH STENT PLACEMENT  06/2013   • ENDOSCOPY  09/20/2012    grade 1 varicesmiddle and lower 3rd of esophagus. Moderate portal hypertensive gastropathy in entire stomach.   • KYPHOPLASTY Bilateral 5/3/2018    Procedure: T 12 KYPHOPLASTY 1-2 LEVELS;  Surgeon: Joey Davis MD;  Location: [facility] OR;  Service: Neurosurgery   • KYPHOPLASTY N/A 5/29/2018    Procedure: KYPHOPLASTY, L1, T11 kyphoplsty;  Surgeon: Joey Davis MD;  Location: [facility] OR 18/19;  Service: Neurosurgery   • MOUTH SURGERY     • PROSTATE BIOPSY     • TONSILLECTOMY     • UPPER GASTROINTESTINAL ENDOSCOPY  09/20/2012    grd 1 varices, portal hypertensive gastropathy      Social History:   Social History   Substance Use Topics   • Smoking status: Former Smoker     Packs/day: 1.00     Years: 32.00     Types: Cigarettes     Start date: 1/1/1974     Quit date: 2004   • Smokeless tobacco: Never Used   • Alcohol use No      Family History:  Family History   Problem Relation Age of Onset   • Heart disease Other    • No Known Problems Mother    • No Known Problems Father    • No Known Problems Maternal Grandmother    • No Known Problems Maternal Grandfather    • No Known Problems Paternal Grandmother    • No Known Problems Paternal Grandfather    • Malig Hyperthermia Neg Hx        Home Meds:  Prescriptions Prior to Admission   Medication Sig Dispense Refill Last  Dose   • BD PEN NEEDLE ONEAL U/F 32G X 4 MM misc    9/9/2018 at Unknown time   • clonazePAM (KlonoPIN) 0.5 MG tablet Take 0.25 mg by mouth As Needed (TAKES ONCE EVERY 2-3 DAYS PRN).  0 9/9/2018 at Unknown time   • fluconazole (DIFLUCAN) 100 MG tablet Take 100 mg by mouth Every Other Day. TOOK 6/10/2018   Past Week at Unknown time   • furosemide (LASIX) 40 MG tablet Take 40 mg by mouth Daily.  5 9/9/2018 at Unknown time   • hydrocortisone 2.5 % cream insert ONE applicator into THE rectum THREE TIMES DAILY 28 g 0 9/9/2018 at Unknown time   • Icosapent Ethyl (VASCEPA) 1 G capsule Take 1 g by mouth 2 (Two) Times a Day.   9/9/2018 at Unknown time   • ISENTRESS 400 MG tablet Take 400 mg by mouth 2 (Two) Times a Day.   9/9/2018 at Unknown time   • lactulose (CHRONULAC) 10 GM/15ML solution Take 30 mL by mouth 2 (Two) Times a Day. 1892 mL 2 9/9/2018 at Unknown time   • levothyroxine (SYNTHROID) 300 MCG tablet Take 1 tablet by mouth Every Morning. HAS APPT WITH ENDOCRONOLOGIST/THINKS IT ILL BE CHANGD  [facility] 30 tablet 0 9/9/2018 at Unknown time   • metoprolol succinate XL (TOPROL-XL) 50 MG 24 hr tablet Take 1 tablet by mouth 2 (Two) Times a Day. 180 tablet 1 9/9/2018 at Unknown time   • nystatin (MYCOSTATIN) 026133 UNIT/GM powder Apply  topically 4 (Four) Times a Day. 1 each 5 9/9/2018 at Unknown time   • oxyCODONE (oxyCONTIN) 10 MG 12 hr tablet Take 10 mg by mouth Every 12 (Twelve) Hours.      • potassium chloride (K-DUR) 10 MEQ CR tablet Take 1 tablet by mouth Daily. 30 tablet 5 9/9/2018 at Unknown time   • PREZISTA 800 MG tablet tablet Take 800 mg by mouth Daily.   9/9/2018 at Unknown time   • rifaximin (XIFAXAN) 550 MG tablet Take 1 tablet by mouth Every 12 (Twelve) Hours. 60 tablet 11 9/9/2018 at Unknown time   • rilpivirine (EDURANT) 25 MG tablet tablet Take 25 mg by mouth Daily.      • spironolactone (ALDACTONE) 50 MG tablet 1 pill Daily 30 tablet 0 9/9/2018 at Unknown time   • sulfamethoxazole-trimethoprim (BACTRIM  "DS,SEPTRA DS) 800-160 MG per tablet Take 1 tablet by mouth Every Other Day As Needed. LAST TAKEN Saturday 6/9/2018 9/9/2018 at Unknown time   • tamsulosin (FLOMAX) 0.4 MG capsule Take 1 capsule by mouth As Needed.   Past Month at Unknown time   • TRESIBA FLEXTOUCH 200 UNIT/ML solution pen-injector Inject 35 Units under the skin Every Night.  3 9/9/2018 at Unknown time   • TYBOST 150 MG tablet Take 150 mg by mouth Daily With Breakfast.   9/9/2018 at Unknown time   • vitamin D (ERGOCALCIFEROL) 38525 units capsule capsule Take 50,000 Units by mouth 1 (One) Time Per Week. TAKES EVERY SUNDAY 9/9/2018 at Unknown time   • zinc sulfate (ZINCATE) 220 (50 Zn) MG capsule Take 1 capsule by mouth Daily. 30 capsule 11 9/9/2018 at Unknown time     Current Meds:     cholecalciferol 1,000 Units Oral Daily   cobicistat 150 mg Oral Daily With Breakfast   And      darunavir 800 mg Oral Daily With Breakfast   fluconazole 100 mg Oral Every Other Day   furosemide 40 mg Oral Daily   ipratropium-albuterol 3 mL Nebulization Q4H - RT   lactulose 20 g Oral TID   levothyroxine 300 mcg Oral QAM   metoprolol succinate XL 50 mg Oral Q24H   oxyCODONE 10 mg Oral Q12H   pantoprazole 40 mg Oral Q AM   potassium chloride 10 mEq Oral Daily   raltegravir 400 mg Oral Q12H   rifaximin 550 mg Oral Q12H   rilpivirine 25 mg Oral Daily With Breakfast   spironolactone 50 mg Oral Daily   tamsulosin 0.4 mg Oral Daily   tenofovir 300 mg Oral Daily With Breakfast   zinc sulfate 220 mg Oral Daily     Allergies:  Allergies   Allergen Reactions   • Dicyclomine Hallucinations   • Abacavir Unknown (See Comments)     Noted allergic per Dr. Thomas   • Methylprednisolone Other (See Comments)     \"Everything shuts down\"   • Prednisolone Other (See Comments)     PT UNSURE--STATED HE WAS TOLD NOT TO TAKE IT AGAIN.      Review of Systems  He endorses weakness and fatigue lower extremity edema WITH reviewed and negative     Objective     Vital Signs  Temp:  [97.4 °F (36.3 " °C)-98.1 °F (36.7 °C)] 97.4 °F (36.3 °C)  Heart Rate:  [56-71] 58  Resp:  [16-18] 18  BP: (100-132)/(40-61) 100/40  Physical Exam:  General Appearance:    Alert, cooperative, in no acute distress   Head:    Normocephalic, without obvious abnormality, atraumatic   Eyes:            Lids and lashes normal, conjunctivae and sclerae normal, no   icterus   Throat:   No oral lesions, no thrush, oral mucosa moist   Neck:   No adenopathy, supple, trachea midline, no thyromegaly, no   carotid bruit, no JVD   Lungs:     Clear to auscultation,respirations regular, even and                   unlabored    Heart:    Regular rhythm and normal rate, normal S1 and S2, no            murmur, no gallop, no rub, no click   Chest Wall:    No abnormalities observed   Abdomen:     Normal bowel sounds, no masses, no organomegaly, soft        non-tender, non-distended, no guarding, no rebound                 tenderness   Rectal:     Deferred   Extremities:   no edema, no cyanosis, no redness   Skin:   No bleeding, bruising or rash   Lymph nodes:   No palpable adenopathy   Psychiatric:  Judgement and insight: normal   Orientation to person place and time: normal   Mood and affect: normal   Results Review:   I reviewed the patient's new clinical results.      Results from last 7 days  Lab Units 09/10/18  1452   WBC 10*3/mm3 2.08*   HEMOGLOBIN g/dL 11.0*   HEMATOCRIT % 35.4*   PLATELETS 10*3/mm3 43*       Results from last 7 days  Lab Units 09/11/18  0530 09/10/18  1452   SODIUM mmol/L 136 137   POTASSIUM mmol/L 3.1* 3.9   CHLORIDE mmol/L 103 101   CO2 mmol/L 25.4 21.9*   BUN mg/dL 13 13   CREATININE mg/dL 1.01 1.06   CALCIUM mg/dL 8.8 9.5   BILIRUBIN mg/dL 1.6* 2.0*   ALK PHOS U/L 328* 344*   ALT (SGPT) U/L 28 31   AST (SGOT) U/L 56* 71*   GLUCOSE mg/dL 149* 134*       Results from last 7 days  Lab Units 09/10/18  1642   INR  1.34*       Lab Results  Lab Value Date/Time   LIPASE 88 (H) 08/08/2018 0807   LIPASE 111 (H) 06/29/2018 0707   LIPASE  139 (H) 04/10/2018 0330   LIPASE 161 (H) 04/08/2018 2243       Radiology:  XR Chest 1 View   Final Result      US Abdomen Limited    (Results Pending)       Assessment/Plan   Patient Active Problem List   Diagnosis   • Chronic coronary artery disease   • Difficulty breathing   • Intermittent claudication (CMS/HCC)   • Peripheral arterial occlusive disease (CMS/HCC)   • Shortness of breath   • Iron deficiency anemia due to chronic blood loss   • Thrombocytopenia associated with AIDS (CMS/HCC)   • Leukocytopenia   • Neutropenia (CMS/HCC)   • Pancytopenia (CMS/HCC)   • Iron and its compounds causing adverse effect in therapeutic use   • Cirrhosis of liver with ascites (CMS/HCC)   • Lumbar degenerative disc disease   • Secondary thrombocytopenia   • History of coronary artery stent placement   • T12 compression fracture (CMS/HCC)   • Weight loss, abnormal   • Hypothyroidism   • Type 2 diabetes mellitus (CMS/HCC)   • Severe protein-calorie malnutrition due to chronic illness   • Pathological fracture of thoracic vertebra with routine healing   • Age-related osteoporosis with current pathological fracture   • Acute bilateral thoracic back pain   • Age-related osteoporosis with current pathological fracture of vertebra (CMS/HCC)   • Thrombocytopenia (CMS/HCC)   • Compression fracture of body of thoracic vertebra (CMS/HCC)   • Lumbar spine pain   • HIV (human immunodeficiency virus infection) (CMS/HCC)   • CKD (chronic kidney disease) stage 2, GFR 60-89 ml/min   • Portal hypertension (CMS/HCC)   • Other cirrhosis of liver (CMS/HCC)   • Adenomatous polyp of colon     1. Cirrhosis of liver with ascites  2. Iron deficiency anemia    3. Severe protein-calorie malnutrition due to chronic illness  4. Hepatic encephalopathy   5. Thrombocytopenia   6. HIV   7. H/o Adenomatous polyp of colon      Continue current medications  Increase lactulose for some mild confusion  Recommend starting nutritional supplements - consult nutrition    Daily weights, low Na diet   Await pending labs and adjust diuretics accordingly   Check US abdomen to assess ascites fluid.   If indicated Paracentesis with albumin , our nurse practitioner will check this later he is due for today    I discussed the patients findings and my recommendations with patient and nursing staff.    Jose Alfredo Jacobsen MD

## 2018-09-11 NOTE — PROGRESS NOTES
Discharge Planning Assessment  [facility]     Patient Name: Kye Aranda  MRN: [id number]  Today's Date: 9/11/2018    Admit Date: 9/10/2018          Discharge Needs Assessment     Row Name 09/11/18 1633       Living Environment    Lives With alone    Current Living Arrangements home/apartment/condo    Primary Care Provided by self    Provides Primary Care For no one    Family Caregiver if Needed significant other    Quality of Family Relationships helpful;involved;supportive    Able to Return to Prior Arrangements yes       Resource/Environmental Concerns    Transportation Concerns car, none       Transition Planning    Patient/Family Anticipates Transition to home with family    Transportation Anticipated family or friend will provide       Discharge Needs Assessment    Readmission Within the Last 30 Days no previous admission in last 30 days    Concerns to be Addressed no discharge needs identified;denies needs/concerns at this time    Equipment Currently Used at Home walker, rolling    Equipment Needed After Discharge none            Discharge Plan     Row Name 09/11/18 1634       Plan    Plan Home     Patient/Family in Agreement with Plan yes    Plan Comments Spoke with pt at bedside. Facesheet info verified. Role of CCP explained. Pharmacy verified. Pt denies any issues or concerns obtaining his medications or remembering to take them. Pt lives alone in a S/S home with a basement. There are 2 steps to enter the home. Pt stated he does not go in the basement. Pt was [employer]'s PTA. Pt does his own cooking and cleaning with help from his S/O, Melissa. Pt no longer drives. He uses an uber or taxi for transportation. Pt also states Melissa will provide transportation after her working hours. Pt has used [facility] in the past. Pt has been to [facility]. Pt plans to return [place] at [place] and denies any discharge planning needs at this time. CCP to follow. Keri Barrios RN/CCP         Destination     No service  coordination in this encounter.      Durable Medical Equipment     No service coordination in this encounter.      Dialysis/Infusion     No service coordination in this encounter.      Home Medical Care     No service coordination in this encounter.      Social Care     No service coordination in this encounter.                Demographic Summary    No documentation.           Functional Status    No documentation.           Psychosocial    No documentation.           Abuse/Neglect    No documentation.           Legal    No documentation.           Substance Abuse    No documentation.           Patient Forms    No documentation.         Keri Barrios RN

## 2018-09-11 NOTE — PLAN OF CARE
Problem: Patient Care Overview  Goal: Plan of Care Review  Outcome: Ongoing (interventions implemented as appropriate)   09/11/18 0496   Coping/Psychosocial   Plan of Care Reviewed With patient   Plan of Care Review   Progress improving   OTHER   Outcome Summary pain med and nausea meds as needed, tolerating meals, vitals stable, ambulating in room, will continue to monitor.

## 2018-09-11 NOTE — PLAN OF CARE
Problem: Patient Care Overview  Goal: Plan of Care Review  Outcome: Ongoing (interventions implemented as appropriate)   09/11/18 0607   Coping/Psychosocial   Plan of Care Reviewed With patient   Plan of Care Review   Progress no change   OTHER   Outcome Summary patient bilateral lower extremities are octavio and swollen. pulses weak but palpable bilaterally. patient ambulating with one assist and walker. denies shortness of breath currently but complains of intermittent nausea. abdomen swollen and tender. patient lack knowledge about medications and own healthcare diagnoses. started education on importance of compliance with medications and how skipping doses could affect healthcare. patient also complains of chronic back pain. was started on q12h oxicodone, but contiues to complain of breatkthrough pain. attempted to call md for other medication options for pain, but no new orders given. vitals remain stable. will continue to monitor patient.       Problem: Skin Injury Risk (Adult)  Goal: Identify Related Risk Factors and Signs and Symptoms  Outcome: Outcome(s) achieved Date Met: 09/11/18    Goal: Skin Health and Integrity  Outcome: Ongoing (interventions implemented as appropriate)      Problem: Pain, Acute (Adult)  Goal: Identify Related Risk Factors and Signs and Symptoms  Outcome: Outcome(s) achieved Date Met: 09/11/18    Goal: Acceptable Pain Control/Comfort Level  Outcome: Ongoing (interventions implemented as appropriate)      Problem: Fluid Volume Excess (Adult)  Goal: Identify Related Risk Factors and Signs and Symptoms  Outcome: Outcome(s) achieved Date Met: 09/11/18    Goal: Optimal Fluid Balance  Outcome: Ongoing (interventions implemented as appropriate)      Problem: Fall Risk (Adult)  Goal: Identify Related Risk Factors and Signs and Symptoms  Outcome: Outcome(s) achieved Date Met: 09/11/18    Goal: Absence of Fall  Outcome: Ongoing (interventions implemented as appropriate)

## 2018-09-11 NOTE — PROGRESS NOTES
"Daily progress note    Chief complaint  Doing same  Still short of breath but no other complaint    History of present illness  72-year-old white male with history of HIV prostate cancer coronary artery disease and alcoholic cirrhosis osteoarthritis and hypothyroidism directly admitted to our service with bilateral lower extremity swelling shortness of breath and weight loss for last 1 month.  Patient denies any fever chills cough chest pain abdominal pain nausea vomiting diarrhea.  Patient directly admitted from infectious disease office when he presented with above complaint.     REVIEW OF SYSTEMS  Review of Systems   Constitutional: Negative for chills and fever.   HENT: Negative.    Eyes: Negative.    Respiratory: Positive for shortness of breath.    Cardiovascular: Positive for leg swelling. Negative for chest pain and palpitations.   Gastrointestinal: Positive for abdominal distention and abdominal pain. Negative for nausea and vomiting.   Genitourinary: Negative.  Negative for dysuria and hematuria.   Musculoskeletal: Positive for back pain.   Skin: Negative.    Neurological: Negative.  Negative for weakness and numbness.   Psychiatric/Behavioral: Negative.       PHYSICAL EXAM         Blood pressure 100/40, pulse 58, temperature 97.4 °F (36.3 °C), temperature source Oral, resp. rate 18, height 167.6 cm (66\"), weight 68.3 kg (150 lb 9.2 oz), SpO2 95 %.    Constitutional: He is oriented to person, place, and time and well-developed, well-nourished, and in no distress.   Head: Normocephalic and atraumatic.   Right Ear: External ear normal.   Left Ear: External ear normal.   Nose: Nose normal.   Eyes: Conjunctivae are normal.   Neck: Normal range of motion.   Cardiovascular: Normal rate and regular rhythm.    + BLE edema to the lower legs, 2+ pitting, distal pulses intact.   Pulmonary/Chest: Effort normal.   Diminished breath sounds   Abdominal: Soft. He exhibits distension. There is no tenderness. There is no " rebound and no guarding.   Musculoskeletal: Normal range of motion.   Neurological: He is alert and oriented to person, place, and time.   Skin: Skin is warm and dry.   Psychiatric: Affect normal.     LAB RESULTS  Lab Results (last 24 hours)     Procedure Component Value Units Date/Time    Comprehensive Metabolic Panel [[id number]]  (Abnormal) Collected:  09/11/18 0530    Specimen:  Blood Updated:  09/11/18 0709     Glucose 149 (H) mg/dL      BUN 13 mg/dL      Creatinine 1.01 mg/dL      Sodium 136 mmol/L      Potassium 3.1 (L) mmol/L      Chloride 103 mmol/L      CO2 25.4 mmol/L      Calcium 8.8 mg/dL      Total Protein 6.5 g/dL      Albumin 3.50 g/dL      ALT (SGPT) 28 U/L      AST (SGOT) 56 (H) U/L      Alkaline Phosphatase 328 (H) U/L      Total Bilirubin 1.6 (H) mg/dL      eGFR Non African Amer 73 mL/min/1.73      Globulin 3.0 gm/dL      A/G Ratio 1.2 g/dL      BUN/Creatinine Ratio 12.9     Anion Gap 7.6 mmol/L     Narrative:       The MDRD GFR formula is only valid for adults with stable renal function between ages 18 and 70.    Lipid Panel [[id number]] Collected:  09/11/18 0530    Specimen:  Blood Updated:  09/11/18 0709     Total Cholesterol 128 mg/dL      Triglycerides 62 mg/dL      HDL Cholesterol 52 mg/dL      LDL Cholesterol  64 mg/dL      VLDL Cholesterol 12.4 mg/dL      LDL/HDL Ratio 1.22    Narrative:       Cholesterol Reference Ranges  (U.S. Department of Health and Human Services ATP III Classifications)    Desirable          <200 mg/dL  Borderline High    200-239 mg/dL  High Risk          >240 mg/dL      Triglyceride Reference Ranges  (U.S. Department of Health and Human Services ATP III Classifications)    Normal           <150 mg/dL  Borderline High  150-199 mg/dL  High             200-499 mg/dL  Very High        >500 mg/dL    HDL Reference Ranges  (U.S. Department of Health and Human Services ATP III Classifcations)    Low     <40 mg/dl (major risk factor for CHD)  High    >60 mg/dl ('negative'  risk factor for CHD)        LDL Reference Ranges  (U.S. Department of Health and Human Services ATP III Classifcations)    Optimal          <100 mg/dL  Near Optimal     100-129 mg/dL  Borderline High  130-159 mg/dL  High             160-189 mg/dL  Very High        >189 mg/dL    Hemoglobin A1c [[id number]]  (Abnormal) Collected:  09/11/18 0530    Specimen:  Blood Updated:  09/11/18 0656     Hemoglobin A1C 4.70 (L) %     Narrative:       Hemoglobin A1C Ranges:    Increased Risk for Diabetes  5.7% to 6.4%  Diabetes                     >= 6.5%  Diabetic Goal                < 7.0%    Comprehensive Metabolic Panel [[id number]]  (Abnormal) Collected:  09/10/18 1452    Specimen:  Blood Updated:  09/10/18 1725     Glucose 134 (H) mg/dL      BUN 13 mg/dL      Creatinine 1.06 mg/dL      Sodium 137 mmol/L      Potassium 3.9 mmol/L      Chloride 101 mmol/L      CO2 21.9 (L) mmol/L      Calcium 9.5 mg/dL      Total Protein 7.3 g/dL      Albumin 3.50 g/dL      ALT (SGPT) 31 U/L      AST (SGOT) 71 (H) U/L      Comment: Specimen hemolyzed.  Results may be affected.        Alkaline Phosphatase 344 (H) U/L      Total Bilirubin 2.0 (H) mg/dL      eGFR Non African Amer 69 mL/min/1.73      Globulin 3.8 gm/dL      A/G Ratio 0.9 g/dL      BUN/Creatinine Ratio 12.3     Anion Gap 14.1 mmol/L     Narrative:       The MDRD GFR formula is only valid for adults with stable renal function between ages 18 and 70.    Protime-INR [[id number]]  (Abnormal) Collected:  09/10/18 1642    Specimen:  Blood Updated:  09/10/18 1719     Protime 16.3 (H) Seconds      INR 1.34 (H)    T3, Free [[id number]]  (Abnormal) Collected:  09/10/18 1452    Specimen:  Blood Updated:  09/10/18 1715     T3, Free 1.94 (L) pg/mL     TSH [[id number]]  (Normal) Collected:  09/10/18 1452    Specimen:  Blood Updated:  09/10/18 1610     TSH 1.710 mIU/mL     BNP [[id number]]  (Normal) Collected:  09/10/18 1452    Specimen:  Blood Updated:  09/10/18 1610     proBNP 362.8 pg/mL      Narrative:       Among patients with dyspnea, NT-proBNP is highly sensitive for the detection of acute congestive heart failure. In addition NT-proBNP of <300 pg/ml effectively rules out acute congestive heart failure with 99% negative predictive value.    T4, Free [[id number]]  (Normal) Collected:  09/10/18 1452    Specimen:  Blood Updated:  09/10/18 1610     Free T4 1.52 ng/dL     Ammonia [[id number]]  (Normal) Collected:  09/10/18 1452    Specimen:  Blood Updated:  09/10/18 1533     Ammonia 39 umol/L     CBC & Differential [[id number]] Collected:  09/10/18 1452    Specimen:  Blood Updated:  09/10/18 1532    Narrative:       The following orders were created for panel order CBC & Differential.  Procedure                               Abnormality         Status                     ---------                               -----------         ------                     Scan Slide[[id number]]                                                                  CBC Auto Differential[[id number]]        Abnormal            Final result                 Please view results for these tests on the individual orders.    CBC Auto Differential [[id number]]  (Abnormal) Collected:  09/10/18 1452    Specimen:  Blood Updated:  09/10/18 1531     WBC 2.08 (L) 10*3/mm3      RBC 3.78 (L) 10*6/mm3      Hemoglobin 11.0 (L) g/dL      Hematocrit 35.4 (L) %      MCV 93.7 fL      MCH 29.1 pg      MCHC 31.1 (L) g/dL      RDW 18.4 (H) %      RDW-SD 63.5 (H) fl      MPV -- fL      Comment: .        Platelets 43 (L) 10*3/mm3      Neutrophil % 79.8 (H) %      Lymphocyte % 9.6 (L) %      Monocyte % 9.1 %      Eosinophil % 1.0 %      Basophil % 0.5 %      Immature Grans % 0.0 %      Neutrophils, Absolute 1.66 (L) 10*3/mm3      Lymphocytes, Absolute 0.20 (L) 10*3/mm3      Monocytes, Absolute 0.19 (L) 10*3/mm3      Eosinophils, Absolute 0.02 10*3/mm3      Basophils, Absolute 0.01 10*3/mm3      Immature Grans, Absolute 0.00 10*3/mm3      nRBC 0.0 /100 WBC      T-helper Cells (CD4) Count [[id number]] Collected:  09/10/18 1452    Specimen:  Blood Updated:  09/10/18 1510    HIV-1 RNA, Quantitative, PCR (graph) [[id number]] Collected:  09/10/18 1452    Specimen:  Blood Updated:  09/10/18 1510        Imaging Results (last 24 hours)     Procedure Component Value Units Date/Time    US Abdomen Limited [[id number]] Collected:  09/11/18 1346     Updated:  09/11/18 1346    Narrative:       LIMITED ABDOMINAL SONOGRAM     HISTORY: 72-year-old male with history of abdominal distention  undergoing evaluation for possible ascites.     FINDINGS: Sonographic evaluation of the right upper, left upper, right  lower and left lower quadrant was performed. There is moderate ascites  seen within the right upper quadrant and right lower quadrant with  minimal ascites in the left lower quadrant.       Impression:       Moderate ascites in the right hemiabdomen as described.       XR Chest 1 View [[id number]] Collected:  09/10/18 1706     Updated:  09/10/18 1711    Narrative:       XR CHEST 1 VW-     Clinical:SOA     COMPARISON 8/8/2018     FINDINGS: There is a new patchy right upper lobe infiltrate. Shallow  inspiratory effort with crowding of the bronchovascular markings  bilaterally. No gross pulmonary edema or pleural effusion.  Cardiomediastinal silhouette is stable. There appears to be a  curvilinear area of likely atelectasis at the left lung base. There are  monitoring leads superimposing the chest in the remainder is  unremarkable.     This report was finalized on 9/10/2018 5:08 PM by Dr. Thaddeus Funk M.D.             Current Facility-Administered Medications:   •  cholecalciferol (VITAMIN D3) tablet 1,000 Units, 1,000 Units, Oral, Daily, Cody Rdz MD  •  clonazePAM (KlonoPIN) tablet 0.25 mg, 0.25 mg, Oral, PRN, Cody Rdz MD  •  cobicistat (TYBOST) 150 mg, 150 mg, Oral, Daily With Breakfast, 150 mg at 09/11/18 0851 **AND** darunavir ethanolate (PREZISTA) tablet 800 mg, 800 mg, Oral,  Daily With Breakfast, Cody Rdz MD, 800 mg at 09/11/18 0848  •  fluconazole (DIFLUCAN) tablet 100 mg, 100 mg, Oral, Every Other Day, Klarissa Thomas MD, 100 mg at 09/11/18 0848  •  furosemide (LASIX) tablet 40 mg, 40 mg, Oral, Daily, Cody Rdz MD, 40 mg at 09/11/18 0848  •  ipratropium-albuterol (DUO-NEB) nebulizer solution 3 mL, 3 mL, Nebulization, Q4H - RT, Cody Rdz MD, 3 mL at 09/11/18 0657  •  lactulose (CHRONULAC) 10 GM/15ML solution 20 g, 20 g, Oral, TID, Kaur Greenfield APRN, 20 g at 09/11/18 0849  •  levothyroxine (SYNTHROID, LEVOTHROID) tablet 300 mcg, 300 mcg, Oral, QAM, Cody Rdz MD, 300 mcg at 09/11/18 0618  •  metoprolol succinate XL (TOPROL-XL) 24 hr tablet 50 mg, 50 mg, Oral, Q24H, Cody Rdz MD, 50 mg at 09/10/18 1841  •  ondansetron (ZOFRAN) injection 4 mg, 4 mg, Intravenous, Q4H PRN, Cody Rdz MD, 4 mg at 09/11/18 1153  •  oxyCODONE (oxyCONTIN) 12 hr tablet 10 mg, 10 mg, Oral, Q12H, Cody Rdz MD, 10 mg at 09/11/18 0848  •  pantoprazole (PROTONIX) EC tablet 40 mg, 40 mg, Oral, Q AM, Cody Rdz MD, 40 mg at 09/11/18 0848  •  potassium chloride (MICRO-K) CR capsule 10 mEq, 10 mEq, Oral, Daily, Cody Rdz MD, 10 mEq at 09/11/18 0847  •  raltegravir (ISENTRESS) tablet 400 mg, 400 mg, Oral, Q12H, Cody Rdz MD, 400 mg at 09/11/18 0850  •  rifaximin (XIFAXAN) tablet 550 mg, 550 mg, Oral, Q12H, Cody Rdz MD, 550 mg at 09/11/18 0848  •  rilpivirine (EDURANT) tablet 25 mg, 25 mg, Oral, Daily With Breakfast, Cody Rdz MD, 25 mg at 09/11/18 0850  •  spironolactone (ALDACTONE) tablet 50 mg, 50 mg, Oral, Daily, Cody Rdz MD, 50 mg at 09/11/18 0848  •  tamsulosin (FLOMAX) 24 hr capsule 0.4 mg, 0.4 mg, Oral, Daily, Cody Rdz MD, 0.4 mg at 09/11/18 0848  •  tenofovir (VIREAD) tablet 300 mg, 300 mg, Oral, Daily With Breakfast, Cody Rdz MD, 300 mg at 09/11/18 0850  •  zinc sulfate (ZINCATE) capsule 220 mg, 220 mg, Oral, Daily, Cody Rdz MD      ASSESSMENT  72-year-old white male with multiple medical problem admitted directly to our service with bilateral lower extremity swelling and shortness of breath  HIV-positive  Alcoholic cirrhosis with ascites  Hypertension  Hypothyroidism  Anxiety disorder  Prostate cancer  Coronary artery disease  Gastroesophageal reflux disease  Chronic anemia and thrombocytopenia    PLAN  CPM  Diuresis   Gastroenterology to follow patient  Infectious disease to follow patient  Continue home medication and adjust doses  Stress ulcer DVT prophylaxis  Supportive care  Follow closely further recommendation chronic hospital course    ELIAS HUBER MD

## 2018-09-11 NOTE — PLAN OF CARE
Problem: Nutrition, Imbalanced: Inadequate Oral Intake (Adult)  Intervention: Promote/Optimize Nutrition   09/11/18 1601   Nutrition Interventions   Oral Nutrition Promotion calorie dense liquids provided;nutritional therapy counseling provided; MSA completed for severe malnutrition; encouraged PO intake; added Ensure Enlive TID

## 2018-09-11 NOTE — CONSULTS
Adult Nutrition  Assessment/PES    Patient Name:  Kye Aranda  YOB: 1945  MRN: [id number]  Admit Date:  9/10/2018    Assessment Date:  9/11/2018    Comments:  Consult d/t MST score of 7 per nurse admission screen.  Patient with HIV, cirrhosis, thrombocytopenia.  Admitted with BLE swelling, SOB, and weight loss.    Patient reports UBW around 185# back in March.  This is a 35# weight loss (18.9%) x 6 months.  Once edema is lessened (on lasix), suspect patient's weight will drop even further.      Patient reports poor appetite.  Tries to drink 1-2 nutrition supplements per day, drinks a variety of types.  Agrees to Ensure Enlive TID while in hospital.  Encouraged patient to drink an Ensure at bedtime so as to help prevent muscle wasting overnight.      Intermittent nausea, being given zofran.  Obtained a few food preferences from patient as well.    MSA completed for severe chronic illness malnutrition.    Will continue to follow.          Reason for Assessment     Row Name 09/11/18 3757          Reason for Assessment    Reason For Assessment nurse/nurse practitioner consult;identified at risk by screening criteria     Diagnosis cancer diagnosis/related complications;cardiac disease;liver disease;endocrine conditions;diabetes diagnosis/complications   HIV, prostate CA, CAD, cirrhosis, OA, hypothyroid, DM, lactose intolerant, OP, PAD     Identified At Risk by Screening Criteria MST SCORE 2+             Nutrition/Diet History     Row Name 09/11/18 6547          Nutrition/Diet History    Typical Food/Fluid Intake reports appetite not too great     Meal/Snack Patterns has nutribullet at home, sometimes makes own smoothies - talked about adding veggies and protein powder to this     Supplemental Drinks/Foods/Additives drinks 1-2 supplements per day (says sometimes drinks Glucerna, sometimes Ensure, and sometimes Boost)             Anthropometrics     Row Name 09/11/18 9461          Anthropometrics    Height  "167.6 cm (65.98\")     Weight 68.3 kg (150 lb 9.2 oz)        Admit Weight    Admit Weight --   150# 9/11        Ideal Body Weight (IBW)    Ideal Body Weight (IBW) (kg) 65.26     % Ideal Body Weight 104.66        Usual Body Weight (UBW)    Usual Body Weight 83.9 kg (185 lb)     % Usual Body Weight 81.39     % of Usual Body Weight Assessment 75-84% - moderate deficit   81.4%     Weight Loss unintentional   35# (18.9%)     Weight Loss Time Frame 6 months        Body Mass Index (BMI)    BMI (kg/m2) 24.37     BMI Assessment BMI 18.5-24.9: normal        IBW Adjustment, Para/Tetraplegia    5% Adjustment, Para (IBW) 62     10% Adjustment, Para (IBW) 58.73     10% Adjustment, Tetra (IBW) 58.73     15% Adjustment, Tetra (IBW) 55.47             Labs/Tests/Procedures/Meds     Row Name 09/11/18 1557          Labs/Procedures/Meds    Lab Results Reviewed reviewed, pertinent     Lab Results Comments Gluc, K+, AlkP, AST        Diagnostic Tests/Procedures    Diagnostic Test/Procedure Reviewed reviewed, pertinent        Medications    Pertinent Medications Reviewed reviewed, pertinent     Pertinent Medications Comments vit D3, lasix, lactulose, synthroid, protonix, KCl, ZnSO4             Physical Findings     Row Name 09/11/18 1559          Physical Findings    Overall Physical Appearance loss of subcutaneous fat;loss of muscle mass;generalized wasting;edematous     Gastrointestinal ascites     Skin edema;other (see comments)   B=17, bruised             Estimated/Assessed Needs     Row Name 09/11/18 1559 09/11/18 1555       Calculation Measurements    Weight Used For Calculations 64.4 kg (142 lb)   IBW  --    Height  -- 167.6 cm (65.98\")       Estimated/Assessed Needs    Additional Documentation KCAL/KG (Group);Protein Requirements (Group);Fluid Requirements (Group)  --       KCAL/KG    14 Kcal/Kg (kcal) 901.75  --    15 Kcal/Kg (kcal) 966.17  --    18 Kcal/Kg (kcal) 1159.4  --    20 Kcal/Kg (kcal) 1288.22  --    25 Kcal/Kg (kcal) " "1610.28  --    30 Kcal/Kg (kcal) 1932.33  --    35 Kcal/Kg (kcal) 2254.39  --    40 Kcal/Kg (kcal) 2576.44  --    45 Kcal/Kg (kcal) 2898.5  --    50 Kcal/Kg (kcal) 3220.55  --    kcal/kg (Specify) --   2254  --       Borden-St. Jeor Equation    RMR (Borden-St. Jeor Equation) 1341.61  --       Protein Requirements    Est Protein Requirement Amount (gms/kg) 1.5 gm protein   77-97  --    Estimated Protein Requirements (gms/day) 96.62  --       Fluid Requirements    Estimated Fluid Requirements (mL/day) 1932  --    Estimated Fluid Requirement Method RDA Method  --    RDA Method (mL) 1932  --    Larissa Method (over 20 kg) 2788.22  --            Nutrition Prescription Ordered     Row Name 09/11/18 1600          Nutrition Prescription PO    Current PO Diet Regular     Supplement Boost;Ensure;Glucerna Shake     Supplement Frequency 2 times a day     Common Modifiers Low Sodium             Evaluation of Received Nutrient/Fluid Intake     Row Name 09/11/18 1601 09/11/18 1559       Calculation Measurements    Weight Used For Calculations  -- 64.4 kg (142 lb)   IBW       PO Evaluation    Number of Meals 1  --    % PO Intake 50  --    Row Name 09/11/18 1555          Calculation Measurements    Height 167.6 cm (65.98\")             Evaluation of Prescribed Nutrient/Fluid Intake     Row Name 09/11/18 1559 09/11/18 1555       Calculation Measurements    Weight Used For Calculations 64.4 kg (142 lb)   IBW  --    Height  -- 167.6 cm (65.98\")            Malnutrition Severity Assessment     Row Name 09/11/18 1601          Malnutrition Severity Assessment    Malnutrition Type Chronic Illness Malnutrition        Physical Signs of Malnutrition (Chronic)    Muscle Wasting Severe   moderate temporal region, acromion region, clavicle region; severe interosseous region, patellar region     Fat Loss Severe   moderate thoracic region; severe orbital region, triceps region     Fluid Accumulation Mild   moderate 3+ to legs; ascites     "    Weight Status (Chronic)    %UBW Mod (75-85%)     Weight Loss Severe (>10% / 6 mo)        Energy Intake Status (Chronic)    Energy Intake Severe (< or equal to 50% / > or equal to 1 mo)        Criteria Met (Must meet criteria for severity in at least 2 of these categories: M Wasting, Fat Loss, Fluid, Secondary Signs, Wt. Status, Intake)    Patient meets criteria for  Severe malnutrition         Problem/Interventions:        Problem 1     Row Name 09/11/18 1603          Nutrition Diagnoses Problem 1    Problem 1 Malnutrition     Etiology (related to) Medical Diagnosis;Factors Affecting Nutrition     Hepatic Cirrhosis     Appetite Poor     Signs/Symptoms (evidenced by) % UBW;Unintended Weight Change;Report/Observation;Report of Mnimal PO Intake     Percent (%) UBW 81.4 %     Unintended Weight Change Loss     Number of Pounds Lost 35     Weight loss time period 6 months     Reported/Observed By Patient                     Intervention Goal     Row Name 09/11/18 1604          Intervention Goal    General Maintain nutrition;Reduce/improve symptoms;Disease management/therapy;Meet nutritional needs for age/condition     PO Tolerate PO;Increase intake;PO intake (%)     PO Intake % 80 %     Weight Appropriate weight gain             Nutrition Intervention     Row Name 09/11/18 1604          Nutrition Intervention    RD/Tech Action Follow Tx progress;Care plan reviewd;Encourage intake;Recommend/ordered     Recommended/Ordered Supplement             Nutrition Prescription     Row Name 09/11/18 1604          Nutrition Prescription PO    PO Prescription Begin/change supplement     Supplement Ensure Enlive     Supplement Frequency 3 times a day     New PO Prescription Ordered? Yes             Education/Evaluation     Row Name 09/11/18 1609          Education    Education Provided education regarding     Provided education regarding Other (comment)   ideas for getting more protein in at home; discussed supplement at bedtime to  help prevent muscle wasting        Monitor/Evaluation    Monitor Per protocol;PO intake;Supplement intake;Pertinent labs;Weight;Symptoms     Education Follow-up Reinforce PRN         Electronically signed by:  Leny Varghese RD  09/11/18 4:05 PM

## 2018-09-12 NOTE — ACP (ADVANCE CARE PLANNING)
Patient wanted information regarding Advance Directive.  I provided him an AD pamphlet and explained how it works.  He wanted to talk with his family first before completing.

## 2018-09-12 NOTE — PROGRESS NOTES
.     REASON FOR CONSULTATION:     Provide an opinion on any further workup or treatment of pancytopenia.                              REQUESTING PHYSICIAN: Cody Rdz MD     RECORDS OBTAINED:  Records of the patients history including those obtained from the referring provider were reviewed and summarized in detail.    HISTORY OF PRESENT ILLNESS:  The patient is a 72 y.o. year old male who was admitted on 09/10/2018 because of abdominal distention and pain. This patient is well known to me from previous encounters and he follows with me in the clinic mostly for his recurrent iron deficiency anemia requiring frequent intravenous iron therapy. This patient has pancytopenia secondary to liver cirrhosis. He also has HIV which is under control with oral cocktail medication. At the time of this admission he had hemoglobin 11.0, platelets 43,000 and WBC 2100 including neutrophils 1660, lymphocytes 200, monocytes 190. Chemistry lab reported alkaline phosphatase 344, AST 71, ALT 31 and total bilirubin 2.0. Electrolytes were normal. Glucose 134. Laboratory study today reported platelets 25,000, hemoglobin 9.8 and WBC 1390 including neutrophils 960 and lymphocytes 220, monocytes 190.     This patient had imaging studies; ultrasound of the abdomen on 09/10/2018 which reported moderate ascites in the right upper quadrant and right lower quadrant with minimal ascites in the left lower quadrant.     Patient reports no active bleeding, no hemoptysis. No epistaxis or gum bleeding. He does have some easy bruising. No melena or hematochezia.     I obtained laboratory study today, which reported iron 45, TIBC 314 and iron saturation 14% and ferritin 112. He does have element of iron deficiency again.        Past Medical History:   Diagnosis Date   • Anemia    • Cancer (CMS/HCC) 2001    Prostate   • Cirrhosis (CMS/HCC)    • Coronary artery disease    • Diabetes mellitus (CMS/HCC)    • H/O complete eye exam 10/2017   • History of  prostate cancer 2012   • HIV (human immunodeficiency virus infection) (CMS/HCC)    • Hyperlipidemia    • Hypertension    • Lactose intolerance    • Lumbar stress fracture    • Osteoporosis    • Pancytopenia (CMS/HCC)    • Peripheral artery disease (CMS/HCC)    • SOB (shortness of breath) on exertion    • Thyroid disease      Past Surgical History:   Procedure Laterality Date   • BONE MARROW BIOPSY W/ ASPIRATION  01/21/2014   • CARDIAC CATHETERIZATION     • COLONOSCOPY  09/20/2012    non-thrombosed EH, sessile polyp in ascending colon 3 mm in size.   • CORONARY ANGIOPLASTY WITH STENT PLACEMENT  06/2013   • ENDOSCOPY  09/20/2012    grade 1 varicesmiddle and lower 3rd of esophagus. Moderate portal hypertensive gastropathy in entire stomach.   • KYPHOPLASTY Bilateral 5/3/2018    Procedure: T 12 KYPHOPLASTY 1-2 LEVELS;  Surgeon: Joey Davis MD;  Location: [facility] OR;  Service: Neurosurgery   • KYPHOPLASTY N/A 5/29/2018    Procedure: KYPHOPLASTY, L1, T11 kyphoplsty;  Surgeon: Joey Davis MD;  Location: [facility] OR 18/19;  Service: Neurosurgery   • MOUTH SURGERY     • PROSTATE BIOPSY     • TONSILLECTOMY     • UPPER GASTROINTESTINAL ENDOSCOPY  09/20/2012    grd 1 varices, portal hypertensive gastropathy       HEMATOLOGIC/ONCOLOGIC HISTORY:  (History from previous dates can be found in the separate document.)  See my clinic note on 7/25/2018.    MEDICATIONS    Current Facility-Administered Medications:   •  cholecalciferol (VITAMIN D3) tablet 1,000 Units, 1,000 Units, Oral, Daily, Cody Rdz MD  •  clonazePAM (KlonoPIN) tablet 0.25 mg, 0.25 mg, Oral, PRN, Cody Rdz MD, 0.25 mg at 09/11/18 2237  •  cobicistat (TYBOST) 150 mg, 150 mg, Oral, Daily With Breakfast, 150 mg at 09/12/18 0928 **AND** darunavir ethanolate (PREZISTA) tablet 800 mg, 800 mg, Oral, Daily With Breakfast, Cody Rdz MD, 800 mg at 09/11/18 0848  •  fluconazole (DIFLUCAN) tablet 100 mg, 100 mg, Oral, Every Other Day, Martha  Klarissa MARVIN MD, 100 mg at 09/11/18 0848  •  furosemide (LASIX) tablet 40 mg, 40 mg, Oral, Daily, Cody Rdz MD, 40 mg at 09/12/18 0924  •  ipratropium-albuterol (DUO-NEB) nebulizer solution 3 mL, 3 mL, Nebulization, Q4H - RT, Cody Rdz MD, 3 mL at 09/12/18 0736  •  lactulose (CHRONULAC) 10 GM/15ML solution 20 g, 20 g, Oral, TID, Kaur Greenfield APRN, 20 g at 09/11/18 1615  •  levothyroxine (SYNTHROID, LEVOTHROID) tablet 300 mcg, 300 mcg, Oral, QAM, Cody Rdz MD, 300 mcg at 09/12/18 0925  •  metoprolol succinate XL (TOPROL-XL) 24 hr tablet 50 mg, 50 mg, Oral, Q24H, Cody Rdz MD, 50 mg at 09/12/18 0924  •  ondansetron (ZOFRAN) injection 4 mg, 4 mg, Intravenous, Q4H PRN, Cody Rdz MD, 4 mg at 09/11/18 1944  •  oxyCODONE (oxyCONTIN) 12 hr tablet 10 mg, 10 mg, Oral, Q12H, Cody Rdz MD, 10 mg at 09/12/18 0927  •  pantoprazole (PROTONIX) EC tablet 40 mg, 40 mg, Oral, Q AM, Cody Rdz MD, 40 mg at 09/11/18 0848  •  potassium chloride (KLOR-CON) packet 40 mEq, 40 mEq, Oral, PRN, Cody Rdz MD  •  potassium chloride (MICRO-K) CR capsule 10 mEq, 10 mEq, Oral, Daily, Cody Rdz MD, 10 mEq at 09/11/18 0847  •  potassium chloride (MICRO-K) CR capsule 40 mEq, 40 mEq, Oral, PRN, Cody Rdz MD, 40 mEq at 09/11/18 2234  •  raltegravir (ISENTRESS) tablet 400 mg, 400 mg, Oral, Q12H, Cody Rdz MD, 400 mg at 09/12/18 0927  •  rifaximin (XIFAXAN) tablet 550 mg, 550 mg, Oral, Q12H, Cody Rdz MD, 550 mg at 09/12/18 0925  •  rilpivirine (EDURANT) tablet 25 mg, 25 mg, Oral, Daily With Breakfast, Cody Rdz MD, 25 mg at 09/12/18 0928  •  [START ON 9/13/2018] spironolactone (ALDACTONE) tablet 100 mg, 100 mg, Oral, Daily, Kaur Greenfield APRN  •  tamsulosin (FLOMAX) 24 hr capsule 0.4 mg, 0.4 mg, Oral, Daily, Cody Rdz MD, 0.4 mg at 09/12/18 0925  •  tenofovir (VIREAD) tablet 300 mg, 300 mg, Oral, Daily With Breakfast, Cody Rdz MD, 300 mg at 09/12/18 0927  •  zinc sulfate (ZINCATE)  "capsule 220 mg, 220 mg, Oral, Daily, Cody Rdz MD    ALLERGIES:     Allergies   Allergen Reactions   • Dicyclomine Hallucinations   • Abacavir Unknown (See Comments)     Noted allergic per Dr. Thomas   • Methylprednisolone Other (See Comments)     \"Everything shuts down\"   • Prednisolone Other (See Comments)     PT UNSURE--STATED HE WAS TOLD NOT TO TAKE IT AGAIN.        SOCIAL HISTORY:       Social History     Social History   • Marital status: Single     Spouse name: N/A   • Number of children: N/A   • Years of education: College     Occupational History   •  Retired     [employer]     Social History Main Topics   • Smoking status: Former Smoker     Packs/day: 1.00     Years: 32.00     Types: Cigarettes     Start date: 1/1/1974     Quit date: 2004   • Smokeless tobacco: Never Used   • Alcohol use No   • Drug use: No   • Sexual activity: Not Currently     Partners: Female     Birth control/ protection: Abstinence, Condom     Other Topics Concern   • Not on file     Social History Narrative   • No narrative on file         FAMILY HISTORY:  Family History   Problem Relation Age of Onset   • Heart disease Other    • No Known Problems Mother    • No Known Problems Father    • No Known Problems Maternal Grandmother    • No Known Problems Maternal Grandfather    • No Known Problems Paternal Grandmother    • No Known Problems Paternal Grandfather    • Malig Hyperthermia Neg Hx        REVIEW OF SYSTEMS:  Review of Systems   Constitutional: Positive for activity change, appetite change and fatigue. Negative for chills, diaphoresis and fever.   HENT: Negative for congestion, nosebleeds and sore throat.    Eyes: Negative for visual disturbance.   Respiratory: Negative for cough and shortness of breath.    Cardiovascular: Positive for leg swelling. Negative for chest pain.   Gastrointestinal: Positive for abdominal distention and abdominal pain. Negative for blood in stool, constipation, diarrhea and nausea. "   Endocrine: Negative for cold intolerance.   Genitourinary: Negative for dysuria and hematuria.   Musculoskeletal: Positive for joint swelling. Negative for back pain.   Skin: Negative for color change.   Allergic/Immunologic: Positive for immunocompromised state.   Neurological: Negative for numbness and headaches.   Hematological: Negative for adenopathy. Bruises/bleeds easily.   Psychiatric/Behavioral: Negative for agitation and confusion.              Vitals:    09/12/18 0732 09/12/18 0736 09/12/18 0745 09/12/18 0924   BP: 133/46      BP Location: Right arm      Patient Position: Lying      Pulse:  65  80   Resp: 20 16 16    Temp: 98.2 °F (36.8 °C)      TempSrc: Oral      SpO2:       Weight:       Height:         Current Status 8/1/2018   ECOG score 1      PHYSICAL EXAM:      CONSTITUTIONAL:  Well-developed well-nourished male.  No distress, looks comfortable.  EYES:  Conjunctiva and lids unremarkable.  PERRLA  EARS,NOSE,MOUTH,THROAT:  Ears and nose appear unremarkable.  Lips appear unremarkable.  Oral mucosa moist..  RESPIRATORY:  Normal respiratory effort.  Lungs clear to auscultation bilaterally.  CARDIOVASCULAR: Regular rhythm and rate.  Normal S1, S2.  No murmurs rubs or gallops.   GASTROINTESTINAL: Abdomen distended.  Nontender.  Bowel sounds normal.   LYMPHATIC:  No cervical, supraclavicular, axillary lymphadenopathy.  MUSCULOSKELETAL:  Unremarkable digits/nails.  No cyanosis or clubbing.  There is 1+ bilateral lower extremity edema.  SKIN:  Warm.  No rashes.  PSYCHIATRIC:  Normal judgment and insight.  Normal mood and affect.      RECENT LABS:        WBC   Date Value Ref Range Status   09/12/2018 1.39 (C) 4.50 - 10.70 10*3/mm3 Final   09/10/2018 2.08 (L) 4.50 - 10.70 10*3/mm3 Final     Hemoglobin   Date Value Ref Range Status   09/12/2018 9.8 (L) 13.7 - 17.6 g/dL Final   09/10/2018 11.0 (L) 13.7 - 17.6 g/dL Final   09/10/2018 10.9 (L) 13.0 - 17.7 g/dL Final     Platelets   Date Value Ref Range Status    09/12/2018 25 (C) 140 - 500 10*3/mm3 Final   09/10/2018 43 (L) 140 - 500 10*3/mm3 Final   09/10/2018 47 (L) 150 - 379 x10E3/uL Final     Comment:     Platelet count verified by examination of peripheral blood smear.     Lab Results   Component Value Date    GLUCOSE 147 (H) 09/12/2018    BUN 10 09/12/2018    CREATININE 1.16 09/12/2018    EGFRIFNONA 62 09/12/2018    EGFRIFAFRI 73 02/28/2018    BCR 8.6 09/12/2018    K 3.9 09/12/2018    CO2 24.6 09/12/2018    CALCIUM 8.9 09/12/2018    PROTENTOTREF 6.2 04/10/2018    ALBUMIN 3.50 09/12/2018    LABIL2 1.1 04/10/2018    AST 67 (H) 09/12/2018    ALT 34 09/12/2018       IMAGE STUDY:  LIMITED ABDOMINAL SONOGRAM     HISTORY: 72-year-old male with history of abdominal distention  undergoing evaluation for possible ascites.     FINDINGS: Sonographic evaluation of the right upper, left upper, right  lower and left lower quadrant was performed. There is moderate ascites  seen within the right upper quadrant and right lower quadrant with  minimal ascites in the left lower quadrant.     IMPRESSION:  Moderate ascites in the right hemiabdomen as described.       Assessment/Plan     1. Liver cirrhosis with ascites followed by GI service.     2. HIV followed by Klarissa Thomas MD, Infectious Disease specialist. Patient is on cocktail, HAART treatment.     3. Iron deficiency anemia. This patient has recurrent iron deficiency and required repeat intravenous iron therapy. Laboratory study today showed low iron saturation however with reasonable ferritin level. Nevertheless, he is developing iron deficiency and his hemoglobin is decreasing. I proposed to treat him with Venofer 300 mg for 2 doses.     4. Pancytopenia secondary to liver cirrhosis mostly for his severe thrombocytopenia and leukocytopenia/neutropenia. I discussed with the patient and his significant other, there is no good treatment for his thrombocytopenia and neutropenia. Since he is not actively bleeding, I advised not to  transfuse platelets. Patient voiced understanding.        JAMIE BOWMAN M.D., Ph.D.    9/12/2018      Dictated using Dragon Dictation.

## 2018-09-12 NOTE — PROGRESS NOTES
[facility]/GI Progress Note   Chief Complaint:  cirrhosis     Subjective     Interval History:   US with moderate ascites. He feels poorly with fatigue, pain all over, and poor appetite.     History taken from: patient chart RN    Review of Systems:    all systems reviewed with pertinent items in History     Objective     Vital Signs  Temp:  [97.8 °F (36.6 °C)-98.2 °F (36.8 °C)] 98.2 °F (36.8 °C)  Heart Rate:  [65-73] 65  Resp:  [16-20] 16  BP: (131-133)/(46-55) 133/46  Body mass index is 23.64 kg/m².    Intake/Output Summary (Last 24 hours) at 09/12/18 0909  Last data filed at 09/12/18 0833   Gross per 24 hour   Intake              460 ml   Output              550 ml   Net              -90 ml     I/O this shift:  In: 360 [P.O.:360]  Out: -     Physical Exam:   General: patient awake, alert and cooperative   Eyes: Normal lids and lashes, no scleral icterus, no conjunctival pallor   Neck: supple, normal ROM, no tracheal deviation, no thyromegaly   Skin: warm and dry, not jaundiced   Cardiovascular: regular rhythm and rate, no murmurs auscultated   Pulm: clear to auscultation bilaterally, regular and unlabored   Abdomen: soft, nontender, nondistended; normal bowel sounds   Rectal: deferred   Extremities:   edema   Neurologic: Normal mood and behavior    All Medications Have Been Reviewed     Results Review:       Results from last 7 days  Lab Units 09/12/18  0803 09/10/18  1452   WBC 10*3/mm3 1.39* 2.08*   HEMOGLOBIN g/dL 9.8* 11.0*  10.9*   HEMATOCRIT % 31.4* 35.4*  31.8*   PLATELETS 10*3/mm3 25* 43*  47*         Results from last 7 days  Lab Units 09/11/18  0530 09/10/18  1452   SODIUM mmol/L 136 137   POTASSIUM mmol/L 3.1* 3.9   CHLORIDE mmol/L 103 101   CO2 mmol/L 25.4 21.9*   BUN mg/dL 13 13   CREATININE mg/dL 1.01 1.06   CALCIUM mg/dL 8.8 9.5   BILIRUBIN mg/dL 1.6* 2.0*   ALK PHOS U/L 328* 344*   ALT (SGPT) U/L 28 31   AST (SGOT) U/L 56* 71*   GLUCOSE mg/dL 149* 134*         Results from last 7 days  Lab  Units 09/10/18  1642   INR  1.34*       RADIOLOGY:    Imaging Results (last 72 hours)     Procedure Component Value Units Date/Time    US Abdomen Limited [[id number]] Collected:  09/11/18 1346     Updated:  09/11/18 1650    Narrative:       LIMITED ABDOMINAL SONOGRAM     HISTORY: 72-year-old male with history of abdominal distention  undergoing evaluation for possible ascites.     FINDINGS: Sonographic evaluation of the right upper, left upper, right  lower and left lower quadrant was performed. There is moderate ascites  seen within the right upper quadrant and right lower quadrant with  minimal ascites in the left lower quadrant.       Impression:       Moderate ascites in the right hemiabdomen as described.     This report was finalized on 9/11/2018 4:47 PM by Dr. Morris Campbell M.D.       XR Chest 1 View [[id number]] Collected:  09/10/18 1706     Updated:  09/10/18 1711    Narrative:       XR CHEST 1 VW-     Clinical:SOA     COMPARISON 8/8/2018     FINDINGS: There is a new patchy right upper lobe infiltrate. Shallow  inspiratory effort with crowding of the bronchovascular markings  bilaterally. No gross pulmonary edema or pleural effusion.  Cardiomediastinal silhouette is stable. There appears to be a  curvilinear area of likely atelectasis at the left lung base. There are  monitoring leads superimposing the chest in the remainder is  unremarkable.     This report was finalized on 9/10/2018 5:08 PM by Dr. Thaddeus Funk M.D.             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   • Leukocytopenia D72.819   • Neutropenia (CMS/HCC) D70.9   • Pancytopenia (CMS/HCC)  D61.818   • Iron and its compounds causing adverse effect in therapeutic use T45.4X5A   • Cirrhosis of liver with ascites (CMS/HCC) K74.60   • Lumbar degenerative disc disease M51.36   • 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   • History of coronary artery stent placement Z95.5   • T12 compression fracture (CMS/HCC) S22.080A   • Weight loss, abnormal R63.4   • Hypothyroidism E03.9   • Type 2 diabetes mellitus (CMS/HCC) E11.9   • Severe protein-calorie malnutrition due to chronic illness E43   • Pathological fracture of thoracic vertebra with routine healing M84.48XD   • Age-related osteoporosis with current pathological fracture M80.00XA   • Acute bilateral thoracic back pain M54.6   • Age-related osteoporosis with current pathological fracture of vertebra (CMS/HCC) M80.08XA   • Thrombocytopenia (CMS/HCC) D69.6   • Compression fracture of body of thoracic vertebra (CMS/HCC) M48.54XA   • Lumbar spine pain M54.5   • HIV (human immunodeficiency virus infection) (CMS/HCC) B20   • CKD (chronic kidney disease) stage 2, GFR 60-89 ml/min N18.2   • Portal hypertension (CMS/HCC) K76.6   • Other cirrhosis of liver (CMS/HCC) K74.69   • Adenomatous polyp of colon D12.6            Kaur Greenfield, APRN  09/12/18  9:09 AM    Not improving - poor appetite, pain continues    Temporal wasting  abd - distended  No jaundice  Alert, no confusion    Labs reviewed    Assessment    1. Cirrhosis of liver with ascites- moderate ascites on imaging   2. Iron deficiency anemia    3. Severe protein-calorie malnutrition due to chronic illness  4. Hepatic encephalopathy   5. Thrombocytopenia   6. HIV   7. H/o Adenomatous polyp of colon     8. Hypokalemia- refusing potassium per RN     Rec-  Dr Thomas and I met with the patient today - long discussion regarding recent decline, multiple comorbidities and worsening clinical status.  Discussed liver transplant -  does transplant HIV positive individuals but given his debilitated state, hx CAD/prostate  "cancer, HIV resistant to multiple meds with significantly worsening pancytopenia and lack of social support, he will not be an adequate candidate for transplant.  Furthermore, his meld is only 11 (too low for transplant currently).  I do not have any confidence that his clinical status will improve over time to improve his chance of becoming a transplant candidate.      We discussed transitioning the focus to comfort care given that we cannot \"fix\" the major issues he has at this point and he is suffering greatly.  He is agreeable to met with palliative care and Dr Thomas will be available to meet with his longtime  Melissa tomorrow.       "

## 2018-09-12 NOTE — PROGRESS NOTES
"Daily progress note    Chief complaint  Not doing too well  Still short of breath and weak    History of present illness  72-year-old white male with history of HIV prostate cancer coronary artery disease and alcoholic cirrhosis osteoarthritis and hypothyroidism directly admitted to our service with bilateral lower extremity swelling shortness of breath and weight loss for last 1 month.  Patient denies any fever chills cough chest pain abdominal pain nausea vomiting diarrhea.  Patient directly admitted from infectious disease office when he presented with above complaint.     REVIEW OF SYSTEMS  Review of Systems   Constitutional: Negative for chills and fever.   HENT: Negative.    Eyes: Negative.    Respiratory: Positive for shortness of breath.    Cardiovascular: Positive for leg swelling. Negative for chest pain and palpitations.   Gastrointestinal: Positive for abdominal distention and abdominal pain. Negative for nausea and vomiting.   Genitourinary: Negative.  Negative for dysuria and hematuria.   Musculoskeletal: Positive for back pain.   Skin: Negative.    Neurological: Negative.  Negative for weakness and numbness.   Psychiatric/Behavioral: Negative.       PHYSICAL EXAM         Blood pressure 133/46, pulse 78, temperature 98.2 °F (36.8 °C), temperature source Oral, resp. rate 16, height 167.6 cm (65.98\"), weight 66.4 kg (146 lb 6.2 oz), SpO2 100 %.    Constitutional: He is oriented to person, place, and time and well-developed, well-nourished, and in no distress.   Head: Normocephalic and atraumatic.   Right Ear: External ear normal.   Left Ear: External ear normal.   Nose: Nose normal.   Eyes: Conjunctivae are normal.   Neck: Normal range of motion.   Cardiovascular: Normal rate and regular rhythm.    + BLE edema to the lower legs, 2+ pitting, distal pulses intact.   Pulmonary/Chest: Effort normal.   Diminished breath sounds   Abdominal: Soft. He exhibits distension. There is no tenderness. There is no " rebound and no guarding.   Musculoskeletal: Normal range of motion.   Neurological: He is alert and oriented to person, place, and time.   Skin: Skin is warm and dry.   Psychiatric: Affect normal.     LAB RESULTS  Lab Results (last 24 hours)     Procedure Component Value Units Date/Time    HIV-1 RNA, Quantitative, PCR (graph) [[id number]] Collected:  09/10/18 1452    Specimen:  Blood Updated:  09/12/18 1312     HIV-1 RNA by PCR, Qn 60 copies/mL      Comment: The reportable range for this assay is 20 to 10,000,000  copies HIV-1 RNA/mL.        log10 HIV-1 RNA 1.778 axu90nscl/mL     Narrative:       Performed at:  [address] [place] [address]  [id number]  : Roman Watson MD, Phone:  [phone]    Ferritin [[id number]]  (Normal) Collected:  09/12/18 0803    Specimen:  Blood Updated:  09/12/18 1123     Ferritin 112.50 ng/mL     Immature Platelet Fraction [[id number]]  (Abnormal) Collected:  09/12/18 0803    Specimen:  Blood Updated:  09/12/18 1121     IPF 7.00 (H) %      Platelets 25 (C) 10*3/mm3     Iron Profile [[id number]]  (Abnormal) Collected:  09/12/18 0803    Specimen:  Blood Updated:  09/12/18 1117     Iron 45 (L) mcg/dL      Iron Saturation 14 (L) %      Transferrin 211 mg/dL      TIBC 314 mcg/dL     Protime-INR [[id number]]  (Abnormal) Collected:  09/12/18 1043    Specimen:  Blood Updated:  09/12/18 1106     Protime 17.4 (H) Seconds      INR 1.46 (H)    Comprehensive Metabolic Panel [[id number]]  (Abnormal) Collected:  09/12/18 0803    Specimen:  Blood Updated:  09/12/18 0910     Glucose 147 (H) mg/dL      BUN 10 mg/dL      Creatinine 1.16 mg/dL      Sodium 137 mmol/L      Potassium 3.9 mmol/L      Chloride 103 mmol/L      CO2 24.6 mmol/L      Calcium 8.9 mg/dL      Total Protein 6.8 g/dL      Albumin 3.50 g/dL      ALT (SGPT) 34 U/L      AST (SGOT) 67 (H) U/L      Alkaline Phosphatase 343 (H) U/L      Total Bilirubin 2.0 (H) mg/dL      eGFR Non African Amer 62 mL/min/1.73       Globulin 3.3 gm/dL      A/G Ratio 1.1 g/dL      BUN/Creatinine Ratio 8.6     Anion Gap 9.4 mmol/L     Narrative:       The MDRD GFR formula is only valid for adults with stable renal function between ages 18 and 70.    CBC & Differential [[id number]] Collected:  09/12/18 0803    Specimen:  Blood Updated:  09/12/18 0855    Narrative:       The following orders were created for panel order CBC & Differential.  Procedure                               Abnormality         Status                     ---------                               -----------         ------                     Scan Slide[[id number]]                                                                  CBC Auto Differential[[id number]]        Abnormal            Final result                 Please view results for these tests on the individual orders.    CBC Auto Differential [[id number]]  (Abnormal) Collected:  09/12/18 0803    Specimen:  Blood Updated:  09/12/18 0855     WBC 1.39 (C) 10*3/mm3      RBC 3.38 (L) 10*6/mm3      Hemoglobin 9.8 (L) g/dL      Hematocrit 31.4 (L) %      MCV 92.9 fL      MCH 29.0 pg      MCHC 31.2 (L) g/dL      RDW 17.5 (H) %      RDW-SD 59.5 (H) fl      Platelets 25 (C) 10*3/mm3      Neutrophil % 69.1 %      Lymphocyte % 15.8 (L) %      Monocyte % 13.7 (H) %      Eosinophil % 1.4 %      Basophil % 0.0 %      Immature Grans % 0.0 %      Neutrophils, Absolute 0.96 (C) 10*3/mm3      Lymphocytes, Absolute 0.22 (L) 10*3/mm3      Monocytes, Absolute 0.19 (L) 10*3/mm3      Eosinophils, Absolute 0.02 10*3/mm3      Basophils, Absolute 0.00 10*3/mm3      Immature Grans, Absolute 0.00 10*3/mm3      nRBC 0.0 /100 WBC     T-helper Cells (CD4) Count [[id number]]  (Abnormal) Collected:  09/10/18 1452    Specimen:  Blood Updated:  09/11/18 1519     Absolute CD 4 [place] 58 (L) /uL      CD4 Positive Lymph % 28.9 (L) %      WBC 2.1 (L) x10E3/uL      RBC 3.62 (L) x10E6/uL      Hemoglobin 10.9 (L) g/dL      Hematocrit 31.8 (L) %      MCV 88 fL       MCH 30.1 pg      MCHC 34.3 g/dL      RDW 17.3 (H) %      Platelets 47 (L) x10E3/uL      Comment: Platelet count verified by examination of peripheral blood smear.        Neutrophil Rel % 80 %      Comment: Occasional metamyelocyte seen on scan.        Lymphocyte Rel % 7 %      Monocyte Rel % 11 %      Eosinophil Rel % 1 %      Basophil Rel % 1 %      Neutrophils Absolute 1.7 x10E3/uL      Lymphocytes Absolute 0.2 (L) x10E3/uL      Monocytes Absolute 0.2 x10E3/uL      Eosinophils Absolute 0.0 x10E3/uL      Basophils Absolute 0.0 x10E3/uL      Immature Granulocyte Rel % 0 %      Immature Grans Absolute 0.0 x10E3/uL      Hematology Comments: Note:     Comment: Verified by microscopic examination.       Narrative:       Performed at:  [address] [place] [address]  [id number]  : Felix Olvera PhD, Phone:  [phone]        Imaging Results (last 24 hours)     Procedure Component Value Units Date/Time    US Abdomen Limited [[id number]] Collected:  09/11/18 1346     Updated:  09/11/18 1650    Narrative:       LIMITED ABDOMINAL SONOGRAM     HISTORY: 72-year-old male with history of abdominal distention  undergoing evaluation for possible ascites.     FINDINGS: Sonographic evaluation of the right upper, left upper, right  lower and left lower quadrant was performed. There is moderate ascites  seen within the right upper quadrant and right lower quadrant with  minimal ascites in the left lower quadrant.       Impression:       Moderate ascites in the right hemiabdomen as described.     This report was finalized on 9/11/2018 4:47 PM by Dr. Morris Campbell M.D.             Current Facility-Administered Medications:   •  cholecalciferol (VITAMIN D3) tablet 1,000 Units, 1,000 Units, Oral, Daily, Cody Rdz MD  •  clonazePAM (KlonoPIN) tablet 0.25 mg, 0.25 mg, Oral, PRN, Cody Rdz MD, 0.25 mg at 09/11/18 6763  •  cobicistat (TYBOST) 150 mg, 150 mg, Oral, Daily With Breakfast, 150 mg at  09/12/18 0928 **AND** darunavir ethanolate (PREZISTA) tablet 800 mg, 800 mg, Oral, Daily With Breakfast, Cody Rdz MD, 800 mg at 09/12/18 1227  •  fluconazole (DIFLUCAN) tablet 100 mg, 100 mg, Oral, Every Other Day, Klarissa Thomas MD, 100 mg at 09/11/18 0848  •  furosemide (LASIX) tablet 40 mg, 40 mg, Oral, Daily, Cody Rdz MD, 40 mg at 09/12/18 0924  •  ipratropium-albuterol (DUO-NEB) nebulizer solution 3 mL, 3 mL, Nebulization, Q4H - RT, Cody Rdz MD, 3 mL at 09/12/18 1136  •  lactulose (CHRONULAC) 10 GM/15ML solution 20 g, 20 g, Oral, TID, Kaur Greenfield APRN, 20 g at 09/11/18 1615  •  levothyroxine (SYNTHROID, LEVOTHROID) tablet 300 mcg, 300 mcg, Oral, QAM, Cody Rdz MD, 300 mcg at 09/12/18 0925  •  metoprolol succinate XL (TOPROL-XL) 24 hr tablet 50 mg, 50 mg, Oral, Q24H, Cody Rdz MD, 50 mg at 09/12/18 0924  •  ondansetron (ZOFRAN) injection 4 mg, 4 mg, Intravenous, Q4H PRN, Cody Rdz MD, 4 mg at 09/11/18 1944  •  oxyCODONE (oxyCONTIN) 12 hr tablet 10 mg, 10 mg, Oral, Q12H, Cody Rdz MD, 10 mg at 09/12/18 0927  •  pantoprazole (PROTONIX) EC tablet 40 mg, 40 mg, Oral, Q AM, Cody Rdz MD, 40 mg at 09/11/18 0848  •  potassium chloride (KLOR-CON) packet 40 mEq, 40 mEq, Oral, PRN, Cody Rdz MD  •  potassium chloride (MICRO-K) CR capsule 10 mEq, 10 mEq, Oral, Daily, Cody Rdz MD, 10 mEq at 09/11/18 0847  •  potassium chloride (MICRO-K) CR capsule 40 mEq, 40 mEq, Oral, PRN, Cody Rdz MD, 40 mEq at 09/11/18 2234  •  raltegravir (ISENTRESS) tablet 400 mg, 400 mg, Oral, Q12H, Cody Rdz MD, 400 mg at 09/12/18 0927  •  rifaximin (XIFAXAN) tablet 550 mg, 550 mg, Oral, Q12H, Cody Rdz MD, 550 mg at 09/12/18 0925  •  rilpivirine (EDURANT) tablet 25 mg, 25 mg, Oral, Daily With Breakfast, Cody Rdz MD, 25 mg at 09/12/18 0928  •  [START ON 9/13/2018] spironolactone (ALDACTONE) tablet 100 mg, 100 mg, Oral, Daily, Kaur Greenfield, HANG  •  tamsulosin (FLOMAX) 24  hr capsule 0.4 mg, 0.4 mg, Oral, Daily, Elias Rdz MD, 0.4 mg at 09/12/18 0925  •  tenofovir (VIREAD) tablet 300 mg, 300 mg, Oral, Daily With Breakfast, Elias Rdz MD, 300 mg at 09/12/18 0927  •  zinc sulfate (ZINCATE) capsule 220 mg, 220 mg, Oral, Daily, Elias Rdz MD     ASSESSMENT  72-year-old white male with multiple medical problem admitted directly to our service with bilateral lower extremity swelling and shortness of breath  HIV-positive  Alcoholic cirrhosis with ascites  Hepatic encephalopathy  Hypertension  Hypothyroidism  Anxiety disorder  Prostate cancer  Coronary artery disease  Gastroesophageal reflux disease  Chronic anemia and thrombocytopenia    PLAN  CPM  Diuresis   Continue home medication and adjust doses  Stress ulcer DVT prophylaxis  Supportive care  Poor prognosis  Palliative care consult  Follow closely further recommendation chronic hospital course    ELIAS RDZ MD

## 2018-09-12 NOTE — PLAN OF CARE
Problem: Patient Care Overview  Goal: Plan of Care Review  Outcome: Ongoing (interventions implemented as appropriate)   09/12/18 7631   Coping/Psychosocial   Plan of Care Reviewed With patient   Plan of Care Review   Progress no change   OTHER   Outcome Summary Ascites, c/o abd pain. Improved pain control with prn morphine. Prognosis poor. Awaiting palliatiive consult.       Problem: Skin Injury Risk (Adult)  Goal: Skin Health and Integrity  Outcome: Ongoing (interventions implemented as appropriate)      Problem: Pain, Acute (Adult)  Goal: Acceptable Pain Control/Comfort Level  Outcome: Ongoing (interventions implemented as appropriate)      Problem: Fluid Volume Excess (Adult)  Goal: Optimal Fluid Balance  Outcome: Ongoing (interventions implemented as appropriate)      Problem: Fall Risk (Adult)  Goal: Absence of Fall  Outcome: Ongoing (interventions implemented as appropriate)      Problem: Nutrition, Imbalanced: Inadequate Oral Intake (Adult)  Goal: Identify Related Risk Factors and Signs and Symptoms  Outcome: Outcome(s) achieved Date Met: 09/12/18    Goal: Improved Oral Intake  Outcome: Ongoing (interventions implemented as appropriate)    Goal: Prevent Further Weight Loss  Outcome: Ongoing (interventions implemented as appropriate)

## 2018-09-12 NOTE — PLAN OF CARE
Problem: Patient Care Overview  Goal: Plan of Care Review  Outcome: Ongoing (interventions implemented as appropriate)   09/12/18 0211   Coping/Psychosocial   Plan of Care Reviewed With patient   Plan of Care Review   Progress no change   OTHER   Outcome Summary no falls. vital signs stable. refusing to complete K+ protocol. await AM labs. will continue to monitor.        Problem: Skin Injury Risk (Adult)  Goal: Skin Health and Integrity  Outcome: Ongoing (interventions implemented as appropriate)      Problem: Pain, Acute (Adult)  Goal: Acceptable Pain Control/Comfort Level  Outcome: Ongoing (interventions implemented as appropriate)      Problem: Fall Risk (Adult)  Goal: Absence of Fall  Outcome: Ongoing (interventions implemented as appropriate)

## 2018-09-12 NOTE — PROGRESS NOTES
DAILY PROGRESS NOTE  [facility]   LOS: 1 day   Patient Care Team:  Kendrick Griggs MD as PCP - General (Family Medicine)  Carrie Lucas MD PhD as PCP - Claims Attributed  Carrie Lucas MD PhD as Consulting Physician (Hematology and Oncology)  Baldo Conway MD as Consulting Physician (Endocrinology)  Robyn Butler MD as Consulting Physician (Gastroenterology)  Marla Guevara, PRAKASH as Care Coordinator (Population Health)      Patient Identification:  Name: Kye Aranda  Age: 72 y.o.  Sex: male  :  1945  MRN: [id number]         Primary Care Physician: Kendrick Griggs MD    Chief Complaint:Continues feeling poorly, nausea, poor appetite   And pain    History of present illness:  The patient is a 72-year-old male with HIV infection diagnosed in , alcoholic liver cirrhosis, ascites, splenomegaly and thrombocytopenia admitted with worsening lower extremity edema, shortness of breath, sense of bruises general decline in health.  He recently underwent kyphoplasty, but still complains of lots of back pain and difficulty to ambulate.  Lost about 20 pounds in the past 3 months.  In January of this year he is T-cell count was 18 and HIV viral load was 113 copies per mL, genotype archives revealed multiple resistant mutations  And he is antiretroviral therapy was changed to Edurant 25 mg daily, Isentress 400 mg BID, Prezcobix one tablet daily and Tenofovir 300 mg daily.  So far hospital work up revealed severe protein malnutrition, pancytopenia and elevated PT c/w liver failure.  His condition continues deteriorating      Scheduled Meds:  cholecalciferol 1,000 Units Oral Daily   cobicistat 150 mg Oral Daily With Breakfast   And      darunavir 800 mg Oral Daily With Breakfast   fluconazole 100 mg Oral Every Other Day   furosemide 40 mg Oral Daily   ipratropium-albuterol 3 mL Nebulization Q4H - RT   lactulose 20 g Oral TID   levothyroxine 300 mcg Oral QAM   metoprolol succinate XL 50 mg  "Oral Q24H   oxyCODONE 10 mg Oral Q12H   pantoprazole 40 mg Oral Q AM   potassium chloride 10 mEq Oral Daily   raltegravir 400 mg Oral Q12H   rifaximin 550 mg Oral Q12H   rilpivirine 25 mg Oral Daily With Breakfast   [START ON 9/13/2018] spironolactone 100 mg Oral Daily   tamsulosin 0.4 mg Oral Daily   tenofovir 300 mg Oral Daily With Breakfast   zinc sulfate 220 mg Oral Daily     Continuous Infusions:     Vital signs in last 24 hours:  Temp:  [97.8 °F (36.6 °C)-98.2 °F (36.8 °C)] 98.2 °F (36.8 °C)  Heart Rate:  [65-80] 78  Resp:  [16-20] 16  BP: (131-133)/(46-55) 133/46    Intake/Output:    Intake/Output Summary (Last 24 hours) at 09/12/18 1231  Last data filed at 09/12/18 1100   Gross per 24 hour   Intake              460 ml   Output              625 ml   Net             -165 ml       Exam:  /46 (BP Location: Right arm, Patient Position: Lying)   Pulse 78   Temp 98.2 °F (36.8 °C) (Oral)   Resp 16   Ht 167.6 cm (65.98\")   Wt 66.4 kg (146 lb 6.2 oz)   SpO2 100%   BMI 23.64 kg/m²     General Appearance:   chronically ill appearing, pale  No gingival bleeding  Lungs-clear bilaterally  Heart- RRR  abdomen soft,ascitis, splenomegaly  Extremity- bilateral pitting edma and petechiae  Skin- extensive bruises, hematomas    Data Review:  Lab Results   Component Value Date    WBC 1.39 (C) 09/12/2018    HGB 9.8 (L) 09/12/2018    HCT 31.4 (L) 09/12/2018    PLT 25 (C) 09/12/2018    PLT 25 (C) 09/12/2018     Lab Results   Component Value Date     09/12/2018    K 3.9 09/12/2018     09/12/2018    CO2 24.6 09/12/2018    BUN 10 09/12/2018    CREATININE 1.16 09/12/2018    GLUCOSE 147 (H) 09/12/2018     Lab Results   Component Value Date    AST 67 (H) 09/12/2018    ALT 34 09/12/2018    ALKPHOS 343 (H) 09/12/2018      Abdominal U/S - reviewed  Assessment:  72-year-old male with complex medical history that includes nonalcoholic liver cirrhosis complicated by splenomegaly and thrombocytopenia presnting with " worsening bruises, lower extremity edema and petechial rash and general decline of health.  Insulin-dependent diabetes mellitus  Hypothyroidism- TSH- is OK  Coronary artery disease, history of MI; last 2D-Echo, EF-65%  AIDS- treatment experienced, multiple resistant mutations  Hospital work up revealed worsening liver failure with poor syntectic function,worsening pancytopenia and wasting.  Today Dr. Hudson, patient and myself discussed further management plan. He is not a liver transplant candidate.  We suggested comfort measures and care either in palliative unit or hospice at home.  He will discuss this suggestion with his friend and family.  Plan:  Palliative care an hospice evaluation  Continue support   Will revisit tomorrow, meet with care taker and make final disposition plan    Klarissa Thomas MD  9/12/2018  12:31 PM

## 2018-09-13 NOTE — CONSULTS
Purpose of the visit was to evaluate for: goals of care/advanced care planning, support for patient/family, hospice referral/discussion, pain/symptom management, transfer to comfort care bed/unit and comfort care. Spoke with RN as well as patient and family and discussed palliative care, goals of care, care options, resuscitation status, [facility], [facility] scattered bed status and discharge options.      Assessment:  Patient is palliative care appropriate for community based services with [facility] or SNF with palliative care due to cancer of prostate, HIV, cirrhosis with ascites, pancytopenia, PPS 50%. He is needing a lot of pain meds for comfort that he has had difficulty getting when he is home. Discussed that [facility] at home would be able to help with his pain medications and comfort.     Recommendations/Plan: [facility] evaluation for community based services.    Other Comments: Met with patient at bedside with his daughter on the phone. He wants to go home if he is not going to be a candidate for an transplant. His daughter would like for him to come to [place], but he wants to stay here in his own home. He is meeting doctors today around noon to make further decisions. He may want to come to Palliative Care before going home to make sure he has good pain and symptom management before being discharged home. [facility] has been ordered for consult.  We will follow for further decisions.     Total time: 30 minutes.

## 2018-09-13 NOTE — PLAN OF CARE
Problem: Patient Care Overview  Goal: Plan of Care Review  Outcome: Ongoing (interventions implemented as appropriate)   09/13/18 0927   Coping/Psychosocial   Plan of Care Reviewed With patient   Plan of Care Review   Progress no change   OTHER   Outcome Summary pt c/o pain throughout shift; up w/assist to BR + walker; VSS; no acute distress noted; will continue to monitor       Problem: Skin Injury Risk (Adult)  Goal: Skin Health and Integrity  Outcome: Ongoing (interventions implemented as appropriate)      Problem: Pain, Acute (Adult)  Goal: Acceptable Pain Control/Comfort Level  Outcome: Ongoing (interventions implemented as appropriate)      Problem: Fluid Volume Excess (Adult)  Goal: Optimal Fluid Balance  Outcome: Ongoing (interventions implemented as appropriate)      Problem: Fall Risk (Adult)  Goal: Absence of Fall  Outcome: Ongoing (interventions implemented as appropriate)      Problem: Nutrition, Imbalanced: Inadequate Oral Intake (Adult)  Goal: Improved Oral Intake  Outcome: Ongoing (interventions implemented as appropriate)    Goal: Prevent Further Weight Loss  Outcome: Ongoing (interventions implemented as appropriate)

## 2018-09-13 NOTE — PROGRESS NOTES
"Daily progress note    Chief complaint  Doing same  No new complaints    History of present illness  72-year-old white male with history of HIV prostate cancer coronary artery disease and alcoholic cirrhosis osteoarthritis and hypothyroidism directly admitted to our service with bilateral lower extremity swelling shortness of breath and weight loss for last 1 month.  Patient denies any fever chills cough chest pain abdominal pain nausea vomiting diarrhea.  Patient directly admitted from infectious disease office when he presented with above complaint.     REVIEW OF SYSTEMS  Review of Systems   Constitutional: Negative for chills and fever.   HENT: Negative.    Eyes: Negative.    Respiratory: Positive for shortness of breath.    Cardiovascular: Positive for leg swelling. Negative for chest pain and palpitations.   Gastrointestinal: Positive for abdominal distention and abdominal pain. Negative for nausea and vomiting.   Genitourinary: Negative.  Negative for dysuria and hematuria.   Musculoskeletal: Positive for back pain.   Skin: Negative.    Neurological: Negative.  Negative for weakness and numbness.   Psychiatric/Behavioral: Negative.       PHYSICAL EXAM         Blood pressure 141/60, pulse 73, temperature 98.1 °F (36.7 °C), temperature source Oral, resp. rate 16, height 167.6 cm (65.98\"), weight 65.8 kg (145 lb), SpO2 91 %.    Constitutional: He is oriented to person, place, and time and well-developed, well-nourished, and in no distress.   Head: Normocephalic and atraumatic.   Right Ear: External ear normal.   Left Ear: External ear normal.   Nose: Nose normal.   Eyes: Conjunctivae are normal.   Neck: Normal range of motion.   Cardiovascular: Normal rate and regular rhythm.    + BLE edema to the lower legs, 2+ pitting, distal pulses intact.   Pulmonary/Chest: Effort normal.   Diminished breath sounds   Abdominal: Soft. He exhibits distension. There is no tenderness. There is no rebound and no guarding. "   Musculoskeletal: Normal range of motion.   Neurological: He is alert and oriented to person, place, and time.   Skin: Skin is warm and dry.   Psychiatric: Affect normal.     LAB RESULTS  Lab Results (last 24 hours)     Procedure Component Value Units Date/Time    CBC & Differential [[id number]] Collected:  09/13/18 0647    Specimen:  Blood Updated:  09/13/18 0803    Narrative:       The following orders were created for panel order CBC & Differential.  Procedure                               Abnormality         Status                     ---------                               -----------         ------                     Scan Slide[[id number]]                                                                  CBC Auto Differential[[id number]]        Abnormal            Final result                 Please view results for these tests on the individual orders.    CBC Auto Differential [[id number]]  (Abnormal) Collected:  09/13/18 0647    Specimen:  Blood Updated:  09/13/18 0803     WBC 1.96 (L) 10*3/mm3      RBC 3.47 (L) 10*6/mm3      Hemoglobin 10.1 (L) g/dL      Hematocrit 32.5 (L) %      MCV 93.7 fL      MCH 29.1 pg      MCHC 31.1 (L) g/dL      RDW 18.1 (H) %      RDW-SD 62.3 (H) fl      Platelets 33 (C) 10*3/mm3      Neutrophil % 72.5 %      Lymphocyte % 10.2 (L) %      Monocyte % 14.8 (H) %      Eosinophil % 2.0 %      Basophil % 0.5 %      Immature Grans % 0.5 %      Neutrophils, Absolute 1.42 (L) 10*3/mm3      Lymphocytes, Absolute 0.20 (L) 10*3/mm3      Monocytes, Absolute 0.29 10*3/mm3      Eosinophils, Absolute 0.04 10*3/mm3      Basophils, Absolute 0.01 10*3/mm3      Immature Grans, Absolute 0.01 10*3/mm3      nRBC 0.0 /100 WBC     Comprehensive Metabolic Panel [[id number]]  (Abnormal) Collected:  09/13/18 0647    Specimen:  Blood Updated:  09/13/18 0736     Glucose 96 mg/dL      BUN 9 mg/dL      Creatinine 1.12 mg/dL      Sodium 137 mmol/L      Potassium 4.0 mmol/L      Chloride 102 mmol/L      CO2 27.3  mmol/L      Calcium 9.2 mg/dL      Total Protein 6.9 g/dL      Albumin 3.80 g/dL      ALT (SGPT) 34 U/L      AST (SGOT) 60 (H) U/L      Alkaline Phosphatase 352 (H) U/L      Total Bilirubin 1.7 (H) mg/dL      eGFR Non African Amer 65 mL/min/1.73      Globulin 3.1 gm/dL      A/G Ratio 1.2 g/dL      BUN/Creatinine Ratio 8.0     Anion Gap 7.7 mmol/L     Narrative:       The MDRD GFR formula is only valid for adults with stable renal function between ages 18 and 70.    Protime-INR [[id number]]  (Abnormal) Collected:  09/13/18 0647    Specimen:  Blood Updated:  09/13/18 0721     Protime 17.3 (H) Seconds      INR 1.44 (H)    Vitamin B12 [[id number]]  (Abnormal) Collected:  09/12/18 1638    Specimen:  Blood Updated:  09/12/18 1823     Vitamin B-12 1,065 (H) pg/mL     Folate [[id number]]  (Normal) Collected:  09/12/18 1638    Specimen:  Blood Updated:  09/12/18 1823     Folate 12.52 ng/mL         Imaging Results (last 24 hours)     ** No results found for the last 24 hours. **          Current Facility-Administered Medications:   •  cholecalciferol (VITAMIN D3) tablet 1,000 Units, 1,000 Units, Oral, Daily, Cody Rdz MD  •  clonazePAM (KlonoPIN) tablet 0.25 mg, 0.25 mg, Oral, PRN, Cody Rdz MD, 0.25 mg at 09/11/18 2237  •  cobicistat (TYBOST) 150 mg, 150 mg, Oral, Daily With Breakfast, 150 mg at 09/13/18 0929 **AND** darunavir ethanolate (PREZISTA) tablet 800 mg, 800 mg, Oral, Daily With Breakfast, Cody Rdz MD, 800 mg at 09/13/18 1315  •  diphenhydrAMINE (BENADRYL) capsule 25 mg, 25 mg, Oral, Daily, Carrie Lucas MD PhD, 25 mg at 09/12/18 1524  •  fluconazole (DIFLUCAN) tablet 100 mg, 100 mg, Oral, Every Other Day, Klarissa Thomas MD, 100 mg at 09/13/18 0927  •  furosemide (LASIX) tablet 40 mg, 40 mg, Oral, Daily, Cody Rdz MD, 40 mg at 09/13/18 0927  •  ipratropium-albuterol (DUO-NEB) nebulizer solution 3 mL, 3 mL, Nebulization, Q4H - RT, Cody Rdz MD, 3 mL at 09/13/18 0858  •  iron sucrose (VENOFER)  300 mg in sodium chloride 0.9 % 250 mL IVPB, 300 mg, Intravenous, Daily, Carrie Lucas MD PhD, 300 mg at 09/12/18 1603  •  lactulose (CHRONULAC) 10 GM/15ML solution 20 g, 20 g, Oral, TID, Kaur Greenfield APRN, 20 g at 09/13/18 0927  •  levothyroxine (SYNTHROID, LEVOTHROID) tablet 300 mcg, 300 mcg, Oral, QAM, Cody Rdz MD, 300 mcg at 09/13/18 0745  •  metoprolol succinate XL (TOPROL-XL) 24 hr tablet 50 mg, 50 mg, Oral, Q24H, Cody Rdz MD, 50 mg at 09/13/18 0927  •  morphine injection 1 mg, 1 mg, Intravenous, Q4H PRN **OR** morphine injection 2 mg, 2 mg, Intravenous, Q4H PRN, Cody Rdz MD, 2 mg at 09/13/18 1315  •  ondansetron (ZOFRAN) injection 4 mg, 4 mg, Intravenous, Q4H PRN, Cody Rdz MD, 4 mg at 09/11/18 1944  •  oxyCODONE (oxyCONTIN) 12 hr tablet 10 mg, 10 mg, Oral, Q12H, Cody Rdz MD, 10 mg at 09/13/18 0927  •  pantoprazole (PROTONIX) EC tablet 40 mg, 40 mg, Oral, Q AM, Cody Rdz MD, 40 mg at 09/13/18 0745  •  potassium chloride (KLOR-CON) packet 40 mEq, 40 mEq, Oral, PRN, Cody Rdz MD  •  potassium chloride (MICRO-K) CR capsule 10 mEq, 10 mEq, Oral, Daily, Cody Rdz MD, 10 mEq at 09/13/18 0927  •  potassium chloride (MICRO-K) CR capsule 40 mEq, 40 mEq, Oral, PRN, Cody Rdz MD, 40 mEq at 09/11/18 2234  •  raltegravir (ISENTRESS) tablet 400 mg, 400 mg, Oral, Q12H, Cody Rdz MD, 400 mg at 09/13/18 0926  •  rifaximin (XIFAXAN) tablet 550 mg, 550 mg, Oral, Q12H, Cody Rdz MD, 550 mg at 09/13/18 0927  •  rilpivirine (EDURANT) tablet 25 mg, 25 mg, Oral, Daily With Breakfast, Cody Rdz MD, 25 mg at 09/13/18 0930  •  spironolactone (ALDACTONE) tablet 100 mg, 100 mg, Oral, Daily, Kaur Greenfield APRN, 100 mg at 09/13/18 0926  •  tamsulosin (FLOMAX) 24 hr capsule 0.4 mg, 0.4 mg, Oral, Daily, Cody Rdz MD, 0.4 mg at 09/13/18 0926  •  tenofovir (VIREAD) tablet 300 mg, 300 mg, Oral, Daily With Breakfast, Cody Rdz MD, 300 mg at 09/13/18 0931  •  zinc  sulfate (ZINCATE) capsule 220 mg, 220 mg, Oral, Daily, Elias Rdz MD     ASSESSMENT  72-year-old white male with multiple medical problem admitted directly to our service with bilateral lower extremity swelling and shortness of breath  HIV-positive  Alcoholic cirrhosis with ascites  Hepatic encephalopathy  Hypertension  Hypothyroidism  Anxiety disorder  Prostate cancer  Coronary artery disease  Gastroesophageal reflux disease  Chronic anemia and thrombocytopenia    PLAN  CPM  Diuresis   Continue home medication and adjust doses  Stress ulcer DVT prophylaxis  Supportive care  Poor prognosis  Transfer to palliative care unit on current medication  Comfort Care only  Home with hospice in few days    ELIAS RDZ MD

## 2018-09-13 NOTE — PROGRESS NOTES
Continued Stay Note  [facility]     Patient Name: Kye Aranda  MRN: [id number]  Today's Date: 9/13/2018    Admit Date: 9/10/2018          Discharge Plan     Row Name 09/13/18 1323       Plan    Plan Home with Hospice     Plan Comments Spoke with pt at bedside. He plans on transferring to  and then going home with hospice. Pt denies any discharge planning needs at this time. CCP to follow. JChasteenRN/CCP               Discharge Codes    No documentation.           Keri Barrios RN

## 2018-09-13 NOTE — PLAN OF CARE
Problem: Patient Care Overview  Goal: Plan of Care Review   09/13/18 3166   Coping/Psychosocial   Plan of Care Reviewed With patient   Plan of Care Review   Progress no change   OTHER   Outcome Summary pain meds as needed, vitals stable, transferred to palliative, iron given, tolerating meals, will continue to monitor

## 2018-09-13 NOTE — PROGRESS NOTES
[facility]  Inpatient Progress Note    Reason for Follow Up: cirrhosis     Subjective     Interval History:   Wt 146 (down 4 pounds from yesterday). Discussed with patient.     Current Facility-Administered Medications:   •  cholecalciferol (VITAMIN D3) tablet 1,000 Units, 1,000 Units, Oral, Daily, Cody Rdz MD  •  clonazePAM (KlonoPIN) tablet 0.25 mg, 0.25 mg, Oral, PRN, Cody Rdz MD, 0.25 mg at 09/11/18 2237  •  cobicistat (TYBOST) 150 mg, 150 mg, Oral, Daily With Breakfast, 150 mg at 09/12/18 0928 **AND** darunavir ethanolate (PREZISTA) tablet 800 mg, 800 mg, Oral, Daily With Breakfast, Cody Rdz MD, 800 mg at 09/12/18 1227  •  diphenhydrAMINE (BENADRYL) capsule 25 mg, 25 mg, Oral, Daily, Carrie Lucas MD PhD, 25 mg at 09/12/18 1524  •  fluconazole (DIFLUCAN) tablet 100 mg, 100 mg, Oral, Every Other Day, Klarissa Thomas MD, 100 mg at 09/11/18 0848  •  furosemide (LASIX) tablet 40 mg, 40 mg, Oral, Daily, Cody Rdz MD, 40 mg at 09/12/18 0924  •  ipratropium-albuterol (DUO-NEB) nebulizer solution 3 mL, 3 mL, Nebulization, Q4H - RT, Cody Rdz MD, 3 mL at 09/13/18 0858  •  iron sucrose (VENOFER) 300 mg in sodium chloride 0.9 % 250 mL IVPB, 300 mg, Intravenous, Daily, Carrie Lucas MD PhD, 300 mg at 09/12/18 1603  •  lactulose (CHRONULAC) 10 GM/15ML solution 20 g, 20 g, Oral, TID, Kaur Greenfield APRN, 20 g at 09/12/18 2142  •  levothyroxine (SYNTHROID, LEVOTHROID) tablet 300 mcg, 300 mcg, Oral, QAM, Cody Rdz MD, 300 mcg at 09/13/18 0745  •  metoprolol succinate XL (TOPROL-XL) 24 hr tablet 50 mg, 50 mg, Oral, Q24H, Cody Rdz MD, 50 mg at 09/12/18 0924  •  morphine injection 1 mg, 1 mg, Intravenous, Q4H PRN **OR** morphine injection 2 mg, 2 mg, Intravenous, Q4H PRN, Cody Rdz MD, 2 mg at 09/13/18 0127  •  ondansetron (ZOFRAN) injection 4 mg, 4 mg, Intravenous, Q4H PRN, Cody Rdz MD, 4 mg at 09/11/18 1944  •  oxyCODONE (oxyCONTIN) 12 hr tablet 10  mg, 10 mg, Oral, Q12H, Cody Rdz MD, 10 mg at 09/12/18 2142  •  pantoprazole (PROTONIX) EC tablet 40 mg, 40 mg, Oral, Q AM, Cody Rdz MD, 40 mg at 09/13/18 0745  •  potassium chloride (KLOR-CON) packet 40 mEq, 40 mEq, Oral, PRN, Cody Rdz MD  •  potassium chloride (MICRO-K) CR capsule 10 mEq, 10 mEq, Oral, Daily, Cody Rdz MD, 10 mEq at 09/11/18 0847  •  potassium chloride (MICRO-K) CR capsule 40 mEq, 40 mEq, Oral, PRN, Cody Rdz MD, 40 mEq at 09/11/18 2234  •  raltegravir (ISENTRESS) tablet 400 mg, 400 mg, Oral, Q12H, Cody Rdz MD  •  rifaximin (XIFAXAN) tablet 550 mg, 550 mg, Oral, Q12H, Cody Rdz MD, 550 mg at 09/12/18 2142  •  rilpivirine (EDURANT) tablet 25 mg, 25 mg, Oral, Daily With Breakfast, Cody Rdz MD, 25 mg at 09/12/18 0928  •  spironolactone (ALDACTONE) tablet 100 mg, 100 mg, Oral, Daily, Kaur Greenfield APRN  •  tamsulosin (FLOMAX) 24 hr capsule 0.4 mg, 0.4 mg, Oral, Daily, Cody Rdz MD, 0.4 mg at 09/12/18 0925  •  tenofovir (VIREAD) tablet 300 mg, 300 mg, Oral, Daily With Breakfast, Cody Rdz MD, 300 mg at 09/12/18 0927  •  zinc sulfate (ZINCATE) capsule 220 mg, 220 mg, Oral, Daily, Cody Rdz MD  Review of Systems:    The following systems were reviewed and negative;  respiratory, cardiovascular, musculoskeletal and neurological    Objective     Vital Signs  Temp:  [98.1 °F (36.7 °C)-98.3 °F (36.8 °C)] 98.1 °F (36.7 °C)  Heart Rate:  [65-80] 73  Resp:  [16-20] 16  BP: (112-141)/(45-60) 141/60  Body mass index is 23.64 kg/m².    Intake/Output Summary (Last 24 hours) at 09/13/18 0912  Last data filed at 09/12/18 1900   Gross per 24 hour   Intake              480 ml   Output              575 ml   Net              -95 ml     No intake/output data recorded.     Physical Exam:   General: patient awake, alert and cooperative   Eyes: Normal lids and lashes, no scleral icterus   Neck: supple, normal ROM   Skin: warm and dry, not jaundiced   Cardiovascular:  regular rhythm and rate, no murmurs auscultated   Pulm: clear to auscultation bilaterally, regular and unlabored   Abdomen: soft, nontender, mildly distended; normal bowel sounds   Rectal: deferred   Extremities: no rash.  1+ pitting edema in pretibial area with some redness of skin   Psychiatric: Normal mood and behavior; memory intact     Results Review:     I reviewed the patient's new clinical results.      Results from last 7 days  Lab Units 09/13/18  0647 09/12/18  0803 09/10/18  1452   WBC 10*3/mm3 1.96* 1.39* 2.08*   HEMOGLOBIN g/dL 10.1* 9.8* 11.0*  10.9*   HEMATOCRIT % 32.5* 31.4* 35.4*  31.8*   PLATELETS 10*3/mm3 33* 25*  25* 43*  47*       Results from last 7 days  Lab Units 09/13/18  0647 09/12/18  0803 09/11/18  0530   SODIUM mmol/L 137 137 136   POTASSIUM mmol/L 4.0 3.9 3.1*   CHLORIDE mmol/L 102 103 103   CO2 mmol/L 27.3 24.6 25.4   BUN mg/dL 9 10 13   CREATININE mg/dL 1.12 1.16 1.01   CALCIUM mg/dL 9.2 8.9 8.8   BILIRUBIN mg/dL 1.7* 2.0* 1.6*   ALK PHOS U/L 352* 343* 328*   ALT (SGPT) U/L 34 34 28   AST (SGOT) U/L 60* 67* 56*   GLUCOSE mg/dL 96 147* 149*       Results from last 7 days  Lab Units 09/13/18  0647 09/12/18  1043 09/10/18  1642   INR  1.44* 1.46* 1.34*       Lab Results  Lab Value Date/Time   LIPASE 88 (H) 08/08/2018 0807   LIPASE 111 (H) 06/29/2018 0707   LIPASE 139 (H) 04/10/2018 0330   LIPASE 161 (H) 04/08/2018 2243       Radiology:  US Abdomen Limited   Final Result   Moderate ascites in the right hemiabdomen as described.       This report was finalized on 9/11/2018 4:47 PM by Dr. Morris Campbell M.D.          XR Chest 1 View   Final Result          Assessment/Plan     Patient Active Problem List   Diagnosis   • Chronic coronary artery disease   • Difficulty breathing   • Intermittent claudication (CMS/HCC)   • Peripheral arterial occlusive disease (CMS/HCC)   • Shortness of breath   • Iron deficiency anemia due to chronic blood loss   • Thrombocytopenia associated with AIDS  (CMS/HCC)   • Leukocytopenia   • Neutropenia (CMS/HCC)   • Pancytopenia (CMS/HCC)   • Iron and its compounds causing adverse effect in therapeutic use   • Cirrhosis of liver with ascites (CMS/HCC)   • Lumbar degenerative disc disease   • Secondary thrombocytopenia   • History of coronary artery stent placement   • T12 compression fracture (CMS/HCC)   • Weight loss, abnormal   • Hypothyroidism   • Type 2 diabetes mellitus (CMS/HCC)   • Severe protein-calorie malnutrition due to chronic illness   • Pathological fracture of thoracic vertebra with routine healing   • Age-related osteoporosis with current pathological fracture   • Acute bilateral thoracic back pain   • Age-related osteoporosis with current pathological fracture of vertebra (CMS/HCC)   • Thrombocytopenia (CMS/HCC)   • Compression fracture of body of thoracic vertebra (CMS/HCC)   • Lumbar spine pain   • HIV (human immunodeficiency virus infection) (CMS/HCC)   • CKD (chronic kidney disease) stage 2, GFR 60-89 ml/min   • Portal hypertension (CMS/HCC)   • Other cirrhosis of liver (CMS/HCC)   • Adenomatous polyp of colon       I discussed the patients findings and my recommendations with patient.    Assessment/Recommendations:  1. Cirrhosis of liver with ascites- moderate ascites on imaging. Continue low sodium diet, diuretics, watch electrolytes and kidney function   2. Iron deficiency anemia    3. Severe protein-calorie malnutrition due to chronic illness  4. Hepatic encephalopathy   5. Pancytopenia from cirrhosis - being seen by Dr. Lucas from [facility]   6. HIV - Followed by Dr. Thomas  7. H/o Adenomatous polyp of colon      ?Palliative care?    Garo Shelby MD

## 2018-09-13 NOTE — PROGRESS NOTES
Subjective     .      REASON FOR CONSULTATION:     Provide an opinion on any further workup or treatment of pancytopenia.                 INTERVAL HISTORY:   Patient reports no bleeding.  Laboratory study 9/13/2018 showed slightly better WBC including platelets 33,000 hemoglobin 10.1 and WBC 1900.  These are fluctuation as expected.  No meaningful improvement.                    HISTORY OF PRESENT ILLNESS:  The patient is a 72 y.o. year old male who was admitted on 09/10/2018 because of abdominal distention and pain. This patient is well known to me from previous encounters and he follows with me in the clinic mostly for his recurrent iron deficiency anemia requiring frequent intravenous iron therapy. This patient has pancytopenia secondary to liver cirrhosis. He also has HIV which is under control with oral cocktail medication. At the time of this admission he had hemoglobin 11.0, platelets 43,000 and WBC 2100 including neutrophils 1660, lymphocytes 200, monocytes 190. Chemistry lab reported alkaline phosphatase 344, AST 71, ALT 31 and total bilirubin 2.0. Electrolytes were normal. Glucose 134. Laboratory study today reported platelets 25,000, hemoglobin 9.8 and WBC 1390 including neutrophils 960 and lymphocytes 220, monocytes 190.      This patient had imaging studies; ultrasound of the abdomen on 09/10/2018 which reported moderate ascites in the right upper quadrant and right lower quadrant with minimal ascites in the left lower quadrant.      Patient reports no active bleeding, no hemoptysis. No epistaxis or gum bleeding. He does have some easy bruising. No melena or hematochezia.      I obtained laboratory study today, which reported iron 45, TIBC 314 and iron saturation 14% and ferritin 112. He does have element of iron deficiency again.           Medical History        Past Medical History:   Diagnosis Date   • Anemia     • Cancer (CMS/HCC) 2001     Prostate   • Cirrhosis (CMS/HCC)     • Coronary artery  "disease     • Diabetes mellitus (CMS/HCC)     • H/O complete eye exam 10/2017   • History of prostate cancer 2012   • HIV (human immunodeficiency virus infection) (CMS/HCC)     • Hyperlipidemia     • Hypertension     • Lactose intolerance     • Lumbar stress fracture     • Osteoporosis     • Pancytopenia (CMS/HCC)     • Peripheral artery disease (CMS/HCC)     • SOB (shortness of breath) on exertion     • Thyroid disease           Surgical History         Past Surgical History:   Procedure Laterality Date   • BONE MARROW BIOPSY W/ ASPIRATION   01/21/2014   • CARDIAC CATHETERIZATION       • COLONOSCOPY   09/20/2012     non-thrombosed EH, sessile polyp in ascending colon 3 mm in size.   • CORONARY ANGIOPLASTY WITH STENT PLACEMENT   06/2013   • ENDOSCOPY   09/20/2012     grade 1 varicesmiddle and lower 3rd of esophagus. Moderate portal hypertensive gastropathy in entire stomach.   • KYPHOPLASTY Bilateral 5/3/2018     Procedure: T 12 KYPHOPLASTY 1-2 LEVELS;  Surgeon: Joey Davis MD;  Location: [facility] OR;  Service: Neurosurgery   • KYPHOPLASTY N/A 5/29/2018     Procedure: KYPHOPLASTY, L1, T11 kyphoplsty;  Surgeon: Joey Davis MD;  Location: [facility] OR 18/19;  Service: Neurosurgery   • MOUTH SURGERY       • PROSTATE BIOPSY       • TONSILLECTOMY       • UPPER GASTROINTESTINAL ENDOSCOPY   09/20/2012     grd 1 varices, portal hypertensive gastropathy            HEMATOLOGIC/ONCOLOGIC HISTORY:  (History from previous dates can be found in the separate document.)  See my clinic note on 7/25/2018.     MEDICATIONS: See EMR.      ALLERGIES:           Allergies   Allergen Reactions   • Dicyclomine Hallucinations   • Abacavir Unknown (See Comments)       Noted allergic per Dr. Thomas   • Methylprednisolone Other (See Comments)       \"Everything shuts down\"   • Prednisolone Other (See Comments)       PT UNSURE--STATED HE WAS TOLD NOT TO TAKE IT AGAIN.          SOCIAL HISTORY:    No changes, see note 9/12/2018     "   FAMILY HISTORY:        Family History   Problem Relation Age of Onset   • Heart disease Other     • No Known Problems Mother     • No Known Problems Father     • No Known Problems Maternal Grandmother     • No Known Problems Maternal Grandfather     • No Known Problems Paternal Grandmother     • No Known Problems Paternal Grandfather     • Malig Hyperthermia Neg Hx           REVIEW OF SYSTEMS:  Review of Systems   Constitutional: Positive for activity change, appetite change and fatigue. Negative for chills, diaphoresis and fever.   HENT: Negative for congestion, nosebleeds and sore throat.    Eyes: Negative for visual disturbance.   Respiratory: Negative for cough and shortness of breath.    Cardiovascular: Positive for leg swelling. Negative for chest pain.   Gastrointestinal: Positive for abdominal distention and abdominal pain. Negative for blood in stool, constipation, diarrhea and nausea.   Endocrine: Negative for cold intolerance.   Genitourinary: Negative for dysuria and hematuria.   Musculoskeletal: Positive for joint swelling. Negative for back pain.   Skin: Negative for color change.   Allergic/Immunologic: Positive for immunocompromised state.   Neurological: Negative for numbness and headaches.   Hematological: Negative for adenopathy. Bruises/bleeds easily.   Psychiatric/Behavioral: Negative for agitation and confusion.               Objective        Vitals:    09/13/18 0742 09/13/18 0858 09/13/18 0900 09/13/18 1041   BP: 141/60      BP Location: Left arm      Patient Position: Lying      Pulse: 65 73     Resp: 18 16     Temp: 98.1 °F (36.7 °C)      TempSrc: Oral      SpO2:       Weight:   65.8 kg (145 lb) 65.8 kg (145 lb)   Height:       ECOG 3      PHYSICAL EXAM:       CONSTITUTIONAL:  Well-developed well-nourished male.  No distress, looks weak.   EYES:  Conjunctiva and lids unremarkable.   EARS,NOSE,MOUTH,THROAT:  Ears and nose appear unremarkable.  Lips appear unremarkable.  Oral mucosa  moist..  RESPIRATORY:  Normal respiratory effort.  Lungs clear to auscultation bilaterally.  CARDIOVASCULAR: Regular rhythm and rate.  Normal S1, S2.  No murmurs rubs or gallops.   GASTROINTESTINAL: Distended abdomen.  Nontender.    LYMPHATIC:  No cervical lymphadenopathy.  MUSCULOSKELETAL:  Unremarkable digits/nails.  No cyanosis or clubbing.  There is 1+ bilateral lower extremity edema.  SKIN:  Warm.  No rashes.  PSYCHIATRIC:  Normal judgment and insight.  Normal mood and affect.        RECENT LABS:    Results from last 7 days  Lab Units 09/13/18  0647 09/12/18  0803 09/10/18  1452   WBC 10*3/mm3 1.96* 1.39* 2.08*   HEMOGLOBIN g/dL 10.1* 9.8* 11.0*  10.9*   HEMATOCRIT % 32.5* 31.4* 35.4*  31.8*   PLATELETS 10*3/mm3 33* 25*  25* 43*  47*     Lab Results   Component Value Date    NEUTROABS 1.42 (L) 09/13/2018       Results from last 7 days  Lab Units 09/13/18  0647 09/12/18  0803 09/11/18  0530   SODIUM mmol/L 137 137 136   POTASSIUM mmol/L 4.0 3.9 3.1*   CHLORIDE mmol/L 102 103 103   CO2 mmol/L 27.3 24.6 25.4   BUN mg/dL 9 10 13   CREATININE mg/dL 1.12 1.16 1.01   CALCIUM mg/dL 9.2 8.9 8.8   BILIRUBIN mg/dL 1.7* 2.0* 1.6*   ALK PHOS U/L 352* 343* 328*   ALT (SGPT) U/L 34 34 28   AST (SGOT) U/L 60* 67* 56*   GLUCOSE mg/dL 96 147* 149*         Assessment/Plan            1. Liver cirrhosis with ascites followed by GI service.  Per GI service Dr. Butler who contacted [facility] liver transplantation team, this patient is not a good candidate for liver transplant.     2. HIV followed by Klarissa Thomas MD, Infectious Disease specialist. Patient is on cocktail, HAART treatment.  Patient has multiple mutations for his HIV virus.      3. Iron deficiency anemia. This patient has recurrent iron deficiency and required repeat intravenous iron therapy. Laboratory study today showed low iron saturation however with reasonable ferritin level. Nevertheless, he is developing iron deficiency and his hemoglobin is decreasing. I proposed  to treat him with Venofer 300 mg for 2 doses.     Patient is getting second those Venofer today.       4. Pancytopenia secondary to liver cirrhosis mostly for his severe thrombocytopenia and leukocytopenia/neutropenia. I discussed with the patient and his significant other, there is no good treatment for his thrombocytopenia and neutropenia.     Since he is not actively bleeding, I advised not to transfuse platelets. Patient voiced understanding.       Very poor prognosis.      We'll sign off.  Please call if having any questions.     JAMIE BOWMAN M.D., Ph.D.    9/13/2018        Dictated using Dragon Dictation.

## 2018-09-14 NOTE — CONSULTS
**Late entry** Met with patient yesterday to explain hospice services. Patient is eligible for hospice benefits, and states he would like to go home with our services at discharge. He states the plan is for him to transfer to  for a few days while he adjust some of his pain medication. Daughter states patient will need to hire caregivers to assist patient at home. Will have TOI Shah Cho speak with her about arranging this prior to discharge. Liaison will follow as appropriate. Thank you for referral, and for allowing me to participate in the care of this patient.     Yue Wells, RN  [phone]

## 2018-09-14 NOTE — PROGRESS NOTES
"Daily progress note    Chief complaint  Doing same  No new complaints    History of present illness  72-year-old white male with history of HIV prostate cancer coronary artery disease and alcoholic cirrhosis osteoarthritis and hypothyroidism directly admitted to our service with bilateral lower extremity swelling shortness of breath and weight loss for last 1 month.  Patient denies any fever chills cough chest pain abdominal pain nausea vomiting diarrhea.  Patient directly admitted from infectious disease office when he presented with above complaint.     REVIEW OF SYSTEMS  Review of Systems   Constitutional: Negative for chills and fever.   HENT: Negative.    Eyes: Negative.    Respiratory: Positive for shortness of breath.    Cardiovascular: Positive for leg swelling. Negative for chest pain and palpitations.   Gastrointestinal: Positive for abdominal distention and abdominal pain. Negative for nausea and vomiting.   Genitourinary: Negative.  Negative for dysuria and hematuria.   Musculoskeletal: Positive for back pain.   Skin: Negative.    Neurological: Negative.  Negative for weakness and numbness.   Psychiatric/Behavioral: Negative.       PHYSICAL EXAM         Blood pressure 102/45, pulse 63, temperature 98.8 °F (37.1 °C), temperature source Oral, resp. rate 16, height 167.6 cm (65.98\"), weight 65.8 kg (145 lb), SpO2 90 %.    Constitutional: He is oriented to person, place, and time and well-developed, well-nourished, and in no distress.   Head: Normocephalic and atraumatic.   Right Ear: External ear normal.   Left Ear: External ear normal.   Nose: Nose normal.   Eyes: Conjunctivae are normal.   Neck: Normal range of motion.   Cardiovascular: Normal rate and regular rhythm.    + BLE edema to the lower legs, 2+ pitting, distal pulses intact.   Pulmonary/Chest: Effort normal.   Diminished breath sounds   Abdominal: Soft. He exhibits distension. There is no tenderness. There is no rebound and no guarding. "   Musculoskeletal: Normal range of motion.   Neurological: He is alert and oriented to person, place, and time.   Skin: Skin is warm and dry.   Psychiatric: Affect normal.     LAB RESULTS  Lab Results (last 24 hours)     ** No results found for the last 24 hours. **        Imaging Results (last 24 hours)     ** No results found for the last 24 hours. **          Current Facility-Administered Medications:   •  cholecalciferol (VITAMIN D3) tablet 1,000 Units, 1,000 Units, Oral, Daily, Cody Rdz MD  •  clonazePAM (KlonoPIN) tablet 0.25 mg, 0.25 mg, Oral, PRN, Cody Rdz MD, 0.25 mg at 09/11/18 2237  •  cobicistat (TYBOST) 150 mg, 150 mg, Oral, Daily With Breakfast, 150 mg at 09/14/18 1052 **AND** darunavir ethanolate (PREZISTA) tablet 800 mg, 800 mg, Oral, Daily With Breakfast, Cody Rdz MD, 800 mg at 09/14/18 1055  •  fluconazole (DIFLUCAN) tablet 100 mg, 100 mg, Oral, Every Other Day, Klarissa Thomas MD, 100 mg at 09/13/18 0927  •  furosemide (LASIX) tablet 40 mg, 40 mg, Oral, Daily, Cody Rdz MD, 40 mg at 09/14/18 1052  •  ipratropium-albuterol (DUO-NEB) nebulizer solution 3 mL, 3 mL, Nebulization, Q4H PRN, Cody Rdz MD  •  lactulose (CHRONULAC) 10 GM/15ML solution 20 g, 20 g, Oral, TID, Kaur Greenfield APRN, 20 g at 09/13/18 1520  •  levothyroxine (SYNTHROID, LEVOTHROID) tablet 300 mcg, 300 mcg, Oral, QAM, Cody Rdz MD, 300 mcg at 09/14/18 0653  •  metoprolol succinate XL (TOPROL-XL) 24 hr tablet 50 mg, 50 mg, Oral, Q24H, Cody Rdz MD, 50 mg at 09/14/18 1056  •  morphine injection 1 mg, 1 mg, Intravenous, Q4H PRN **OR** morphine injection 2 mg, 2 mg, Intravenous, Q4H PRN, Cody Rdz MD, 2 mg at 09/13/18 1830  •  Morphine sulfate (PF) injection 1 mg, 1 mg, Intravenous, Q4H PRN, Cody Rdz MD  •  Morphine sulfate (PF) injection 2 mg, 2 mg, Intravenous, Q4H PRN, Cody Rdz MD, 2 mg at 09/14/18 0851  •  nystatin (MYCOSTATIN) powder, , Topical, Q12H, Cody Rdz MD  •   ondansetron (ZOFRAN) injection 4 mg, 4 mg, Intravenous, Q4H PRN, Elias Rdz MD, 4 mg at 09/14/18 1251  •  oxyCODONE (oxyCONTIN) 12 hr tablet 10 mg, 10 mg, Oral, Q12H, Elias Rdz MD, 10 mg at 09/14/18 1053  •  pantoprazole (PROTONIX) EC tablet 40 mg, 40 mg, Oral, Q AM, Elias Rdz MD, 40 mg at 09/13/18 0745  •  potassium chloride (KLOR-CON) packet 40 mEq, 40 mEq, Oral, PRN, Elias Rdz MD  •  potassium chloride (MICRO-K) CR capsule 10 mEq, 10 mEq, Oral, Daily, Elias Rdz MD, 10 mEq at 09/14/18 1056  •  potassium chloride (MICRO-K) CR capsule 40 mEq, 40 mEq, Oral, PRN, Elias Rdz MD, 40 mEq at 09/11/18 2234  •  raltegravir (ISENTRESS) tablet 400 mg, 400 mg, Oral, Q12H, Elias Rdz MD, 400 mg at 09/14/18 1056  •  rifaximin (XIFAXAN) tablet 550 mg, 550 mg, Oral, Q12H, Elias Rdz MD, 550 mg at 09/14/18 1055  •  rilpivirine (EDURANT) tablet 25 mg, 25 mg, Oral, Daily With Breakfast, Elias Rdz MD, 25 mg at 09/14/18 1052  •  spironolactone (ALDACTONE) tablet 100 mg, 100 mg, Oral, Daily, Kaur Greenfield APRN, 100 mg at 09/14/18 1054  •  tamsulosin (FLOMAX) 24 hr capsule 0.4 mg, 0.4 mg, Oral, Daily, Elias Rdz MD, 0.4 mg at 09/14/18 1053  •  tenofovir (VIREAD) tablet 300 mg, 300 mg, Oral, Daily With Breakfast, Elias Rdz MD, 300 mg at 09/14/18 1052  •  zinc sulfate (ZINCATE) capsule 220 mg, 220 mg, Oral, Daily, Elias Rdz MD     ASSESSMENT  72-year-old white male with multiple medical problem admitted directly to our service with bilateral lower extremity swelling and shortness of breath  HIV-positive  Alcoholic cirrhosis with ascites  Hepatic encephalopathy  Hypertension  Hypothyroidism  Anxiety disorder  Prostate cancer  Coronary artery disease  Gastroesophageal reflux disease  Chronic anemia and thrombocytopenia    PLAN  CPM  Comfort Care only  Allow natural death  Patient wants to continue all the medications at this time and wants to go home with hospice on Monday    ELIAS RDZ  MD

## 2018-09-14 NOTE — PROGRESS NOTES
Discharge Planning Assessment  [facility]     Patient Name: Kye Aranda  MRN: [id number]  Today's Date: 9/14/2018    Admit Date: 9/10/2018          Discharge Needs Assessment    No documentation.             Discharge Plan     Row Name 09/14/18 1350       Plan    Plan Comments The patient transferred to [facility] from [address] [place] [address] on 9/13/18. The patient is palliative. [facility] met with the patient and family and the discharge plan is home with [facility] and some additional help at discharge. [facility] is following and when they receive [facility] orders for discharge they will arrange. MELINA Cho, RN, CCP        Destination     No service coordination in this encounter.      Durable Medical Equipment     No service coordination in this encounter.      Dialysis/Infusion     No service coordination in this encounter.      Home Medical Care     No service coordination in this encounter.      Social Care     No service coordination in this encounter.        Expected Discharge Date and Time     Expected Discharge Date Expected Discharge Time    Sep 14, 2018               Demographic Summary    No documentation.           Functional Status    No documentation.           Psychosocial    No documentation.           Abuse/Neglect    No documentation.           Legal    No documentation.           Substance Abuse    No documentation.           Patient Forms    No documentation.         Klarissa Cho RN

## 2018-09-14 NOTE — PLAN OF CARE
Problem: Patient Care Overview  Goal: Plan of Care Review  Outcome: Ongoing (interventions implemented as appropriate)   09/14/18 0358   Coping/Psychosocial   Plan of Care Reviewed With patient;spouse   Plan of Care Review   Progress no change   OTHER   Outcome Summary Pt transferred to  this shift. Palliative care discussed with patient and spouse at bedside. Pt medicated with scheduled pain med x1. No PRN meds needed. Pt refused bed alarm, educated on importance of calling out when needing to get up. Pt appears comfortable at this time. Will continue to monitor per comfort care.      Goal: Individualization and Mutuality  Outcome: Ongoing (interventions implemented as appropriate)    Goal: Discharge Needs Assessment  Outcome: Ongoing (interventions implemented as appropriate)    Goal: Interprofessional Rounds/Family Conf  Outcome: Ongoing (interventions implemented as appropriate)      Problem: Skin Injury Risk (Adult)  Goal: Skin Health and Integrity  Outcome: Ongoing (interventions implemented as appropriate)      Problem: Pain, Acute (Adult)  Goal: Acceptable Pain Control/Comfort Level  Outcome: Ongoing (interventions implemented as appropriate)      Problem: Fall Risk (Adult)  Goal: Absence of Fall  Outcome: Ongoing (interventions implemented as appropriate)      Problem: Palliative Care (Adult)  Goal: Identify Related Risk Factors and Signs and Symptoms  Outcome: Ongoing (interventions implemented as appropriate)    Goal: Maximized Comfort  Outcome: Ongoing (interventions implemented as appropriate)    Goal: Enhanced Quality of Life  Outcome: Ongoing (interventions implemented as appropriate)

## 2018-09-14 NOTE — PLAN OF CARE
Problem: Patient Care Overview  Goal: Plan of Care Review  Outcome: Ongoing (interventions implemented as appropriate)   09/14/18 1801   Coping/Psychosocial   Plan of Care Reviewed With patient   Plan of Care Review   Progress improving   OTHER   Outcome Summary Picked pt up at 1500. VSs. Pt changed code status this shift. Pain controlled with PRN IV pain med. Plans to d/c home with hospice tomorrow.        Problem: Skin Injury Risk (Adult)  Goal: Skin Health and Integrity  Outcome: Ongoing (interventions implemented as appropriate)      Problem: Pain, Acute (Adult)  Goal: Acceptable Pain Control/Comfort Level  Outcome: Ongoing (interventions implemented as appropriate)      Problem: Fall Risk (Adult)  Goal: Absence of Fall  Outcome: Ongoing (interventions implemented as appropriate)      Problem: Palliative Care (Adult)  Goal: Identify Related Risk Factors and Signs and Symptoms  Outcome: Ongoing (interventions implemented as appropriate)    Goal: Maximized Comfort  Outcome: Ongoing (interventions implemented as appropriate)    Goal: Enhanced Quality of Life  Outcome: Ongoing (interventions implemented as appropriate)

## 2018-09-14 NOTE — PROGRESS NOTES
Discharge Planning Assessment  [facility]     Patient Name: Kye Aranda  MRN: [id number]  Today's Date: 9/14/2018    Admit Date: 9/10/2018          Discharge Needs Assessment    No documentation.             Discharge Plan     Row Name 09/14/18 1422       Plan    Plan Comments Spoke with the patient and he states the discharge plan is for tomorrow home with [facility] and he will talk with [facility] to set up additional help at home. Called [facility] at [phone] and spoke with Arabella and Shaista will come to [facility] tomorrow to arrange the discharge to home. Called Beatrice with [facility] and she will call Melissa Miller to set up an appointment to meet with her. MELINA Cho RN, CCP    Row Name 09/14/18 1229       Plan    Plan Comments The [place] [facility] [place] [address] [place] [address] on 9/13/18. The patient is palliative. [facility] met with the patient and family and the discharge plan is home with [facility] and some additional help at discharge. [facility] and when they receive [facility] orders for discharge they will arrange. MELINA Cho RN, CCP        Destination - Selection Complete     Service Request Status Selected Specialties Address Phone Number Fax Number    [facility] Selected Hospice [address], [facility] [address] [phone]      Durable Medical Equipment     No service coordination in this encounter.      Dialysis/Infusion     No service coordination in this encounter.      Home Medical Care     No service coordination in this encounter.      Social Care     No service coordination in this encounter.        Expected Discharge Date and Time     Expected Discharge Date Expected Discharge Time    Sep 14, 2018               Demographic Summary    No documentation.           Functional Status    No documentation.           Psychosocial    No documentation.           Abuse/Neglect    No documentation.           Legal    No documentation.           Substance  Abuse    No documentation.           Patient Forms    No documentation.         Klarissa Cho RN

## 2018-09-14 NOTE — CONSULTS
"Advance Care:  Assisted pt with enacting his advance directive.  Secured a notary.  Clarified with Dr. Rdz that pt stated, \"If something happens, I want to be a full code, on the ventilator for two weeks, and then if I don't get better, then pull the plug.\"  Chaplain Karel Lowery  "

## 2018-09-14 NOTE — CONSULTS
Spiritual Care:  Met with pt, prayed, pt has been visited by a local [facility] clergyperson.  I called pt's significant other who agrees to be pt's healthcare surrogate.  Will follow up later today and have pt sign his advance directive and ensure it is notorized.  Chaplain Karel Lowery

## 2018-09-15 NOTE — NURSING NOTE
Pt refusing discharge. He wants to stay in the hospital until his pain is under control on PO meds. Dr Rdz notified. CCP notified. Pt and family want to go through process to appeal discharge through medicare. Paperwork given with information.

## 2018-09-15 NOTE — DISCHARGE SUMMARY
Discharge summary    Date of admission 9/10/2018  Date of discharge 9/16/2018    Final diagnosis  72-year-old white male with multiple medical problem admitted directly to our service with bilateral lower extremity swelling and shortness of breath  HIV-positive  Alcoholic cirrhosis with ascites  Hepatic encephalopathy  Hypertension  Hypothyroidism  Anxiety disorder  Prostate cancer  Coronary artery disease  Gastroesophageal reflux disease  Chronic anemia and thrombocytopenia    Discharge medications    Current Facility-Administered Medications:   •  aluminum-magnesium hydroxide-simethicone (MAALOX MAX) 400-400-40 MG/5ML suspension 15 mL, 15 mL, Oral, Q6H PRN, Cody Rdz MD  •  cholecalciferol (VITAMIN D3) tablet 1,000 Units, 1,000 Units, Oral, Daily, Cody Rdz MD  •  cobicistat (TYBOST) 150 mg, 150 mg, Oral, Daily With Breakfast, 150 mg at 09/15/18 1044 **AND** darunavir ethanolate (PREZISTA) tablet 800 mg, 800 mg, Oral, Daily With Breakfast, Cody Rdz MD, 800 mg at 09/15/18 1048  •  fluconazole (DIFLUCAN) tablet 100 mg, 100 mg, Oral, Every Other Day, Cody Rdz MD, 100 mg at 09/15/18 1042  •  furosemide (LASIX) tablet 40 mg, 40 mg, Oral, Daily, Cody Rdz MD, 40 mg at 09/15/18 1042  •  HYDROcodone-acetaminophen (NORCO) 5-325 MG per tablet 1 tablet, 1 tablet, Oral, Q6H PRN, Cody Rdz MD  •  lactulose (CHRONULAC) 10 GM/15ML solution 20 g, 20 g, Oral, TID, Kaur Greenfield APRN, 20 g at 09/13/18 1520  •  levothyroxine (SYNTHROID, LEVOTHROID) tablet 300 mcg, 300 mcg, Oral, QAM, Cody Rdz MD, 300 mcg at 09/15/18 0650  •  metoprolol succinate XL (TOPROL-XL) 24 hr tablet 50 mg, 50 mg, Oral, Q24H, Cody Rdz MD, 50 mg at 09/15/18 1041  •  nystatin (MYCOSTATIN) powder, , Topical, Q12H, Cody Rdz MD  •  oxyCODONE (oxyCONTIN) 12 hr tablet 10 mg, 10 mg, Oral, Q12H, Cody Rdz MD, 10 mg at 09/15/18 1052  •  pantoprazole (PROTONIX) EC tablet 40 mg, 40 mg, Oral, Q AM, Cody Rdz MD, 40 mg  at 09/15/18 1042  •  potassium chloride (MICRO-K) CR capsule 10 mEq, 10 mEq, Oral, Daily, Cody Rdz MD, 10 mEq at 09/15/18 1042  •  raltegravir (ISENTRESS) tablet 400 mg, 400 mg, Oral, Q12H, Cody Rdz MD, 400 mg at 09/15/18 1049  •  rifaximin (XIFAXAN) tablet 550 mg, 550 mg, Oral, Q12H, Cody Rdz MD, 550 mg at 09/15/18 1048  •  rilpivirine (EDURANT) tablet 25 mg, 25 mg, Oral, Daily With Breakfast, Cody Rdz MD, 25 mg at 09/15/18 1043  •  spironolactone (ALDACTONE) tablet 100 mg, 100 mg, Oral, Daily, Kaur Greenfield APRN, 100 mg at 09/15/18 1042  •  tamsulosin (FLOMAX) 24 hr capsule 0.4 mg, 0.4 mg, Oral, Daily, Cody Rdz MD, 0.4 mg at 09/15/18 1041  •  tenofovir (VIREAD) tablet 300 mg, 300 mg, Oral, Daily With Breakfast, Cody Rdz MD, 300 mg at 09/15/18 1043  •  zinc sulfate (ZINCATE) capsule 220 mg, 220 mg, Oral, Daily, Cody Rdz MD     Consult obtained  Infectious disease  Gastroenterology  Nutrition  Palliative care  Hospice  Spiritual care    Procedures  None    Hospital course  72-year-old white male with history of HIV prostate cancer alcoholic cirrhosis anemia and thrombocytopenia admitted directly to her service with shortness of breath bilateral lower extremity swelling and weight loss.  Patient admitted his workup actually negative for any heart failure and further evaluated by gastroenterology that he is not a good candidate for your transplant.  Patient has poor prognosis and transferred to palliative care unit for comfort care but then he decided that he will go home with hospice and end remain a full code.  Patient will remain on all home medication and will be discharged on current medications.  Patient clear with infectious disease and gastroenterology to be discharged and he will follow closely with his primary care doctor.  Patient will continue all the current medication at this time.  I have discuss the case with family who was available to talk to me  today.    Discharge diet regular    Activity as tolerated    Medication as above    Follow-up with primary doctor in 1 week and follow with infectious disease and gastroenterology per the instruction and take medication as directed and hospice will follow at home.    ELIAS HUBER MD

## 2018-09-15 NOTE — PROGRESS NOTES
"Daily progress note    Chief complaint  Doing same  No new complaints    History of present illness  72-year-old white male with history of HIV prostate cancer coronary artery disease and alcoholic cirrhosis osteoarthritis and hypothyroidism directly admitted to our service with bilateral lower extremity swelling shortness of breath and weight loss for last 1 month.  Patient denies any fever chills cough chest pain abdominal pain nausea vomiting diarrhea.  Patient directly admitted from infectious disease office when he presented with above complaint.     REVIEW OF SYSTEMS  Review of Systems   Constitutional: Negative for chills and fever.   HENT: Negative.    Eyes: Negative.    Respiratory: Positive for shortness of breath.    Cardiovascular: Positive for leg swelling. Negative for chest pain and palpitations.   Gastrointestinal: Positive for abdominal distention and abdominal pain. Negative for nausea and vomiting.   Genitourinary: Negative.  Negative for dysuria and hematuria.   Musculoskeletal: Positive for back pain.   Skin: Negative.    Neurological: Negative.  Negative for weakness and numbness.   Psychiatric/Behavioral: Negative.       PHYSICAL EXAM         Blood pressure 113/55, pulse 72, temperature 98.8 °F (37.1 °C), temperature source Oral, resp. rate 14, height 167.6 cm (65.98\"), weight 65.8 kg (145 lb), SpO2 90 %.    Constitutional: He is oriented to person, place, and time and well-developed, well-nourished, and in no distress.   Head: Normocephalic and atraumatic.   Right Ear: External ear normal.   Left Ear: External ear normal.   Nose: Nose normal.   Eyes: Conjunctivae are normal.   Neck: Normal range of motion.   Cardiovascular: Normal rate and regular rhythm.    + BLE edema to the lower legs, 2+ pitting, distal pulses intact.   Pulmonary/Chest: Effort normal.   Diminished breath sounds   Abdominal: Soft. He exhibits distension. There is no tenderness. There is no rebound and no guarding. "   Musculoskeletal: Normal range of motion.   Neurological: He is alert and oriented to person, place, and time.   Skin: Skin is warm and dry.   Psychiatric: Affect normal.     LAB RESULTS  Lab Results (last 24 hours)     ** No results found for the last 24 hours. **        Imaging Results (last 24 hours)     ** No results found for the last 24 hours. **          Current Facility-Administered Medications:   •  aluminum-magnesium hydroxide-simethicone (MAALOX MAX) 400-400-40 MG/5ML suspension 15 mL, 15 mL, Oral, Q6H PRN, Cody Rdz MD  •  cholecalciferol (VITAMIN D3) tablet 1,000 Units, 1,000 Units, Oral, Daily, Cody Rdz MD  •  cobicistat (TYBOST) 150 mg, 150 mg, Oral, Daily With Breakfast, 150 mg at 09/15/18 1044 **AND** darunavir ethanolate (PREZISTA) tablet 800 mg, 800 mg, Oral, Daily With Breakfast, Cody Rdz MD, 800 mg at 09/15/18 1048  •  fluconazole (DIFLUCAN) tablet 100 mg, 100 mg, Oral, Every Other Day, Cody Rdz MD, 100 mg at 09/15/18 1042  •  furosemide (LASIX) tablet 40 mg, 40 mg, Oral, Daily, Cody Rdz MD, 40 mg at 09/15/18 1042  •  HYDROcodone-acetaminophen (NORCO) 5-325 MG per tablet 1 tablet, 1 tablet, Oral, Q6H PRN, Cody Rdz MD  •  lactulose (CHRONULAC) 10 GM/15ML solution 20 g, 20 g, Oral, TID, Kaur Greenfield APRN, 20 g at 09/13/18 1520  •  levothyroxine (SYNTHROID, LEVOTHROID) tablet 300 mcg, 300 mcg, Oral, QAM, Cody Rdz MD, 300 mcg at 09/15/18 0650  •  metoprolol succinate XL (TOPROL-XL) 24 hr tablet 50 mg, 50 mg, Oral, Q24H, Cody Rdz MD, 50 mg at 09/15/18 1041  •  nystatin (MYCOSTATIN) powder, , Topical, Q12H, Cody Rdz MD  •  oxyCODONE (oxyCONTIN) 12 hr tablet 10 mg, 10 mg, Oral, Q12H, Cody Rdz MD, 10 mg at 09/15/18 1052  •  pantoprazole (PROTONIX) EC tablet 40 mg, 40 mg, Oral, Q AM, Cody Rdz MD, 40 mg at 09/15/18 1042  •  potassium chloride (MICRO-K) CR capsule 10 mEq, 10 mEq, Oral, Daily, Cody Rdz MD, 10 mEq at 09/15/18 1042  •   raltegravir (ISENTRESS) tablet 400 mg, 400 mg, Oral, Q12H, Elias Rdz MD, 400 mg at 09/15/18 1049  •  rifaximin (XIFAXAN) tablet 550 mg, 550 mg, Oral, Q12H, Elias Rdz MD, 550 mg at 09/15/18 1048  •  rilpivirine (EDURANT) tablet 25 mg, 25 mg, Oral, Daily With Breakfast, Elias Rdz MD, 25 mg at 09/15/18 1043  •  spironolactone (ALDACTONE) tablet 100 mg, 100 mg, Oral, Daily, Kaur Greenfield APRN, 100 mg at 09/15/18 1042  •  tamsulosin (FLOMAX) 24 hr capsule 0.4 mg, 0.4 mg, Oral, Daily, Elias Rdz MD, 0.4 mg at 09/15/18 1041  •  tenofovir (VIREAD) tablet 300 mg, 300 mg, Oral, Daily With Breakfast, Elias Rdz MD, 300 mg at 09/15/18 1043  •  zinc sulfate (ZINCATE) capsule 220 mg, 220 mg, Oral, Daily, Elias Rdz MD     ASSESSMENT  72-year-old white male with multiple medical problem admitted directly to our service with bilateral lower extremity swelling and shortness of breath  HIV-positive  Alcoholic cirrhosis with ascites  Hepatic encephalopathy  Hypertension  Hypothyroidism  Anxiety disorder  Prostate cancer  Coronary artery disease  Gastroesophageal reflux disease  Chronic anemia and thrombocytopenia    PLAN  Discharge home with hospice  Discharge summary dictated    ELIAS RDZ MD

## 2018-09-15 NOTE — PLAN OF CARE
"Problem: Patient Care Overview  Goal: Plan of Care Review  Outcome: Ongoing (interventions implemented as appropriate)   09/15/18 0434   Coping/Psychosocial   Plan of Care Reviewed With patient   Plan of Care Review   Progress no change   OTHER   Outcome Summary Pt c/o SOA, PRN breathing tx ordered, pt reported no relief from breathing treatment. After breathing treatment pt stated that he felt \"pressure\" in his chest, VSS. MD notified. IV Morphine given, tolerated. Will continue to monitor per protocol.       Goal: Individualization and Mutuality  Outcome: Ongoing (interventions implemented as appropriate)    Goal: Discharge Needs Assessment  Outcome: Ongoing (interventions implemented as appropriate)    Goal: Interprofessional Rounds/Family Conf  Outcome: Ongoing (interventions implemented as appropriate)      Problem: Skin Injury Risk (Adult)  Goal: Skin Health and Integrity  Outcome: Ongoing (interventions implemented as appropriate)      Problem: Pain, Acute (Adult)  Goal: Acceptable Pain Control/Comfort Level  Outcome: Ongoing (interventions implemented as appropriate)      Problem: Fall Risk (Adult)  Goal: Absence of Fall  Outcome: Ongoing (interventions implemented as appropriate)      Problem: Palliative Care (Adult)  Goal: Identify Related Risk Factors and Signs and Symptoms  Outcome: Ongoing (interventions implemented as appropriate)    Goal: Maximized Comfort  Outcome: Ongoing (interventions implemented as appropriate)    Goal: Enhanced Quality of Life  Outcome: Ongoing (interventions implemented as appropriate)        "

## 2018-09-15 NOTE — PLAN OF CARE
Problem: Patient Care Overview  Goal: Plan of Care Review  Outcome: Ongoing (interventions implemented as appropriate)   09/15/18 1927   Coping/Psychosocial   Plan of Care Reviewed With patient;family   Plan of Care Review   Progress no change   OTHER   Outcome Summary Dr Rdz discharged patient home, but patient and family feel he is not ready to go home yet. He has been receiving IV Morphine and Zofran, and they want him to see how he does on PO meds only before he goes home to be sure he can do without the IV meds. Dr Rdz notified, but discharge not canceled. Patient's family helping with medicare appeal. PO PRN pain meds given today, none IV. [facility] met with patient, they will follow up when he goes home. Will monitor.

## 2018-09-16 NOTE — PROGRESS NOTES
Discharge Planning Assessment  [facility]     Patient Name: Kye Aranda  MRN: [id number]  Today's Date: 9/16/2018    Admit Date: 9/10/2018          Discharge Needs Assessment    No documentation.             Discharge Plan     Row Name 09/16/18 1432       Plan    Plan Comments Spoke with Ras/PRAKASH and she states Dr. Rdz placed order for home Health. Per [facility] the patient is not appropriate for [facility] at this time. Left message for Timothy/CARLOS ALBERTO at ext. [phone] that the patient is being discharged to home today. Spoke with [facility] and they will follow up with the patient next week. Family is here to provide the transportation to home. MELINA Cho RN CCP    Final Discharge Disposition Code 06 - home with home health care    Final Note Home with [facility] to follow. MELINA Cho RN, CCP        Destination     Service Request Status Selected Specialties Address Phone Number Fax Number    [facility] Declined  not appropriate at this time per [facility] [id number] [facility] [address] [phone]      Durable Medical Equipment     No service coordination in this encounter.      Dialysis/Infusion     No service coordination in this encounter.      Home Medical Care     Service Request Status Selected Specialties Address Phone Number Fax Number    [facility] Accepted N/A [id number] [address] [phone]      Social Care     No service coordination in this encounter.        Expected Discharge Date and Time     Expected Discharge Date Expected Discharge Time    Sep 15, 2018               Demographic Summary    No documentation.           Functional Status    No documentation.           Psychosocial    No documentation.           Abuse/Neglect    No documentation.           Legal    No documentation.           Substance Abuse    No documentation.           Patient Forms    No documentation.          Klarissa Cho RN

## 2018-09-16 NOTE — PROGRESS NOTES
"Daily progress note    Chief complaint  Events noted  Doing same  Pain well controlled  Wants to go home today    History of present illness  72-year-old white male with history of HIV prostate cancer coronary artery disease and alcoholic cirrhosis osteoarthritis and hypothyroidism directly admitted to our service with bilateral lower extremity swelling shortness of breath and weight loss for last 1 month.  Patient denies any fever chills cough chest pain abdominal pain nausea vomiting diarrhea.  Patient directly admitted from infectious disease office when he presented with above complaint.     REVIEW OF SYSTEMS  Review of Systems   Constitutional: Negative for chills and fever.   HENT: Negative.    Eyes: Negative.    Respiratory: Positive for shortness of breath.    Cardiovascular: Positive for leg swelling. Negative for chest pain and palpitations.   Gastrointestinal: Positive for abdominal distention and abdominal pain. Negative for nausea and vomiting.   Genitourinary: Negative.  Negative for dysuria and hematuria.   Musculoskeletal: Positive for back pain.   Skin: Negative.    Neurological: Negative.  Negative for weakness and numbness.   Psychiatric/Behavioral: Negative.       PHYSICAL EXAM         Blood pressure 138/62, pulse 63, temperature 97.3 °F (36.3 °C), temperature source Oral, resp. rate 16, height 167.6 cm (65.98\"), weight 65.8 kg (145 lb), SpO2 93 %.    Constitutional: He is oriented to person, place, and time and well-developed, well-nourished, and in no distress.   Head: Normocephalic and atraumatic.   Right Ear: External ear normal.   Left Ear: External ear normal.   Nose: Nose normal.   Eyes: Conjunctivae are normal.   Neck: Normal range of motion.   Cardiovascular: Normal rate and regular rhythm.    + BLE edema to the lower legs, 2+ pitting, distal pulses intact.   Pulmonary/Chest: Effort normal.   Diminished breath sounds   Abdominal: Soft. He exhibits distension. There is no tenderness. " There is no rebound and no guarding.   Musculoskeletal: Normal range of motion.   Neurological: He is alert and oriented to person, place, and time.   Skin: Skin is warm and dry.   Psychiatric: Affect normal.     LAB RESULTS  Lab Results (last 24 hours)     ** No results found for the last 24 hours. **        Imaging Results (last 24 hours)     ** No results found for the last 24 hours. **          Current Facility-Administered Medications:   •  aluminum-magnesium hydroxide-simethicone (MAALOX MAX) 400-400-40 MG/5ML suspension 15 mL, 15 mL, Oral, Q6H PRN, Cody Rdz MD, 15 mL at 09/16/18 1212  •  cholecalciferol (VITAMIN D3) tablet 1,000 Units, 1,000 Units, Oral, Daily, Cody Rdz MD  •  cobicistat (TYBOST) 150 mg, 150 mg, Oral, Daily With Breakfast, 150 mg at 09/16/18 0856 **AND** darunavir ethanolate (PREZISTA) tablet 800 mg, 800 mg, Oral, Daily With Breakfast, Cody Rdz MD, 800 mg at 09/16/18 0859  •  fluconazole (DIFLUCAN) tablet 100 mg, 100 mg, Oral, Every Other Day, Cody Rdz MD, 100 mg at 09/15/18 1042  •  furosemide (LASIX) tablet 40 mg, 40 mg, Oral, Daily, Cody Rdz MD, 40 mg at 09/16/18 0900  •  lactulose (CHRONULAC) 10 GM/15ML solution 20 g, 20 g, Oral, TID, Kaur Greenfield APRN, 20 g at 09/13/18 1520  •  levothyroxine (SYNTHROID, LEVOTHROID) tablet 300 mcg, 300 mcg, Oral, QAM, Cody Rdz MD, 300 mcg at 09/16/18 0645  •  metoprolol succinate XL (TOPROL-XL) 24 hr tablet 50 mg, 50 mg, Oral, Q24H, Cody Rdz MD, 50 mg at 09/16/18 0900  •  nystatin (MYCOSTATIN) powder, , Topical, Q12H, Cody Rdz MD  •  oxyCODONE (oxyCONTIN) 12 hr tablet 10 mg, 10 mg, Oral, Q12H, Cody Rdz MD, 10 mg at 09/16/18 0911  •  oxyCODONE (ROXICODONE) immediate release tablet 5 mg, 5 mg, Oral, Q4H PRN, Cody Rdz MD, 5 mg at 09/16/18 0645  •  pantoprazole (PROTONIX) EC tablet 40 mg, 40 mg, Oral, Q AM, Cody Rdz MD, 40 mg at 09/16/18 0645  •  potassium chloride (MICRO-K) CR capsule 10 mEq, 10  mEq, Oral, Daily, Elias Rdz MD, 10 mEq at 09/16/18 0855  •  raltegravir (ISENTRESS) tablet 400 mg, 400 mg, Oral, Q12H, Elias Rdz MD, 400 mg at 09/16/18 0859  •  rifaximin (XIFAXAN) tablet 550 mg, 550 mg, Oral, Q12H, Elias Rdz MD, 550 mg at 09/16/18 0858  •  rilpivirine (EDURANT) tablet 25 mg, 25 mg, Oral, Daily With Breakfast, Elias Rdz MD, 25 mg at 09/16/18 0855  •  spironolactone (ALDACTONE) tablet 100 mg, 100 mg, Oral, Daily, Kaur Greenfield APRN, 100 mg at 09/16/18 0900  •  tamsulosin (FLOMAX) 24 hr capsule 0.4 mg, 0.4 mg, Oral, Daily, Elias Rdz MD, 0.4 mg at 09/16/18 0858  •  tenofovir (VIREAD) tablet 300 mg, 300 mg, Oral, Daily With Breakfast, Elias Rdz MD, 300 mg at 09/16/18 0856  •  zinc sulfate (ZINCATE) capsule 220 mg, 220 mg, Oral, Daily, Elias Rdz MD     ASSESSMENT  72-year-old white male with multiple medical problem admitted directly to our service with bilateral lower extremity swelling and shortness of breath  HIV-positive  Alcoholic cirrhosis with ascites  Hepatic encephalopathy  Hypertension  Hypothyroidism  Anxiety disorder  Prostate cancer  Coronary artery disease  Gastroesophageal reflux disease  Chronic anemia and thrombocytopenia    PLAN  Discharge home with hospice  Discharge summary dictated    ELIAS RDZ MD

## 2018-09-16 NOTE — NURSING NOTE
Family requesting [facility] to re-eval and go home with [facility] tonight rather than Home Health. [facility] notified. [facility] meeting planned tonight at 1730.

## 2018-09-16 NOTE — PLAN OF CARE
Problem: Patient Care Overview  Goal: Plan of Care Review  Outcome: Ongoing (interventions implemented as appropriate)   09/15/18 2126 09/16/18 0454   Coping/Psychosocial   Plan of Care Reviewed With patient --    Plan of Care Review   Progress --  declining   OTHER   Outcome Summary --  Pt was up most of the night. Pt refuses bed alarm and walks around his room with his walker. Medicated with scheduled pain meds. Malox X1 for stomach discomfort. Continue to monitor.      Goal: Individualization and Mutuality  Outcome: Ongoing (interventions implemented as appropriate)    Goal: Discharge Needs Assessment  Outcome: Ongoing (interventions implemented as appropriate)      Problem: Skin Injury Risk (Adult)  Goal: Skin Health and Integrity  Outcome: Ongoing (interventions implemented as appropriate)      Problem: Pain, Acute (Adult)  Goal: Acceptable Pain Control/Comfort Level  Outcome: Ongoing (interventions implemented as appropriate)      Problem: Fall Risk (Adult)  Goal: Absence of Fall  Outcome: Ongoing (interventions implemented as appropriate)

## 2018-09-16 NOTE — PROGRESS NOTES
Case Management Discharge Note    [facility], [facility]    Destination     No service has been selected for the patient.      Durable Medical Equipment     No service has been selected for the patient.      Dialysis/Infusion     No service has been selected for the patient.      Home Medical Care     No service has been selected for the patient.      Social Care     No service has been selected for the patient.        Other: Other (private auto)    Final Discharge Disposition Code: 06 - home with home health care

## 2018-09-16 NOTE — CONSULTS
[facility] referral and admissions nurse Sangeeta VERAS arrived to the unit and received report from the pt's nurse Ras.  [facility] nurse then met with the pt and his daus Mckenna (via phone) and Olga at bedside in room P482. Explanation of services done, disease progression/health history obtained, care guide reviewed, goals of care discussed.  Debi NEVILLE on call Dr. Yumi Champion did not find the pt to be appropriate for services at this time due to: medical eligibility and goals of care.  The pt wishes to be a full medical code, continue HIV treatment, puruse extraordinary measures.  Pt family is aggregable to palliative consult and [facility] to continue to follow pt and pt dc plans.  Thank you for the referral, please call with any questions or needs at [phone]    [facility] Referral and Admissions Coordinator   Sangeeta Salinas RN, BSN

## 2018-09-16 NOTE — PROGRESS NOTES
Discharge Planning Assessment  [facility]     Patient Name: Kye Aranda  MRN: [id number]  Today's Date: 9/16/2018    Admit Date: 9/10/2018          Discharge Needs Assessment    No documentation.             Discharge Plan     Row Name 09/16/18 1601       Plan    Plan Comments The family requested a notary for POA and living will. Spoke with the patient and he was confused and oriented x 2 only. They will follow up with this after he gets home per joey Cho RN, [facility]    Row Name 09/16/18 1574       Plan    Plan Comments Was called from Ras/PRAKASH that the patient has changed his mind and only wants comfort and palliative care with Home with [facility]. [facility] and a [facility] RN will come and arrange for discharge home tonight at 17:30. The patient's family states he will need a hospital bed, night stand, 3 in 1 commode, oxygen and a wheel chair for home. Notified Ras/PRAKASH of this. MELINA Cho RN, [facility]    Row Name 09/16/18 9987       Plan    Plan Comments Spoke with Wei and she states Dr. Rdz placed order for home Health. Per [facility] the patient is not appropriate for [facility] at this time. Left message for Timothy/[facility] at ext. [phone] that the patient is being discharged to home today. [facility] and they will follow up with the patient next week. Family is here to provide the transportation to home. MELINA Cho RN [facility]    Final Discharge Disposition Code --    Final Note --        Destination     Service Request Status Selected Specialties Address Phone Number Fax Number    [facility]  not appropriate at this time per [facility] N/A [address], [facility] [id number] [phone]      Durable Medical Equipment     No service coordination in this encounter.      Dialysis/Infusion     No service coordination in this encounter.      Home Medical Care     Service Request Status Selected Specialties Address Phone Number Fax  Number    [facility] Accepted N/A [address] , [facility] [address] [phone]    [facility] Accepted N/A [address], [facility] [address] [phone]      Social Care     No service coordination in this encounter.        Expected Discharge Date and Time     Expected Discharge Date Expected Discharge Time    Sep 15, 2018               Demographic Summary    No documentation.           Functional Status    No documentation.           Psychosocial    No documentation.           Abuse/Neglect    No documentation.           Legal    No documentation.           Substance Abuse    No documentation.           Patient Forms    No documentation.         Klarissa Cho, PRAKASH

## 2018-09-16 NOTE — PROGRESS NOTES
Discharge Planning Assessment  [facility]     Patient Name: Kye Aranda  MRN: [id number]  Today's Date: 9/16/2018    Admit Date: 9/10/2018          Discharge Needs Assessment    No documentation.             Discharge Plan     Row Name 09/16/18 5100       Plan    Plan Comments Was called from Ras/PRAKASH that the patient has changed his mind and only wants comfort and palliative care with Home with [facility]. Ras/PRAKASH placed call to [facility] and a [facility] RN will come and arrange for discharge home tonight at 17:30. The patient's family states he will need a hospital bed, night stand, 3 in 1 commode, oxygen and a wheel chair for home. Notified Ras/PRAKASH of this. MELINA Cho RN, CCP    Row Name 09/16/18 6239       Plan    Plan Comments Spoke with Wei and she states Dr. Rdz placed order for home Health. Per [facility] the patient is not appropriate for [facility] at this time. Left message for Timothy/[facility] at ext. [phone] that the patient is being discharged to home today. Spoke with [facility] and they will follow up with the patient next week. Family is here to provide the transportation to home. MELINA Cho RN CCP    Final Discharge Disposition Code --    Final Note --        Destination     Service Request Status Selected Specialties Address Phone Number Fax Number    [facility] Declined  not appropriate at this time per [facility] N/A [address], [facility] [address] [phone]      Durable Medical Equipment     No service coordination in this encounter.      Dialysis/Infusion     No service coordination in this encounter.      Home Medical Care     Service Request Status Selected Specialties Address Phone Number Fax Number    [facility] Accepted N/A [address] , [facility] [address] [phone]    [facility] Accepted N/A [address], [facility] [address] [phone]       Social Care     No service coordination in this encounter.        Expected Discharge Date and Time     Expected Discharge Date Expected Discharge Time    Sep 15, 2018               Demographic Summary    No documentation.           Functional Status    No documentation.           Psychosocial    No documentation.           Abuse/Neglect    No documentation.           Legal    No documentation.           Substance Abuse    No documentation.           Patient Forms    No documentation.         Klarissa Cho RN

## 2018-09-17 NOTE — PROGRESS NOTES
Case Management Discharge Note    Final Note: Home with [facility]. Confirmed with [facility].    Destination     No service has been selected for the patient.      Durable Medical Equipment     No service has been selected for the patient.      Dialysis/Infusion     No service has been selected for the patient.      Home Medical Care     No service has been selected for the patient.      Social Care     No service has been selected for the patient.        Other: Other (private auto)    Final Discharge Disposition Code: 50 - home with hospice

## 2018-09-17 NOTE — OUTREACH NOTE
Prep Survey      Responses   Facility patient discharged from?  [facility]   Is patient eligible?  No   What are the reasons patient is not eligible?  Hospice/Pallative Care   Discharge diagnosis  HIV,  cirrhosis,  hepatic encephalopathy,  HTN   Does the patient have one of the following disease processes/diagnoses(primary or secondary)?  Other   Prep survey completed?  Yes          Maria T Sylvester RN

## 2018-10-11 PROBLEM — G89.11 PAIN, ACUTE DUE TO TRAUMA: Status: ACTIVE | Noted: 2018-01-01

## 2018-10-11 PROBLEM — S72.142A CLOSED COMMINUTED INTERTROCHANTERIC FRACTURE OF PROXIMAL END OF LEFT FEMUR (HCC): Status: ACTIVE | Noted: 2018-01-01

## 2018-10-11 NOTE — H&P
HISTORY AND PHYSICAL   [facility]        Patient Identification:  Name: Kye Aranda  Age: 72 y.o.  Sex: male  :  1945  MRN: [id number]                     Primary Care Physician: Kendrick Griggs MD    Chief Complaint:  Left hip pain status post fall    History of Present Illness:   Mr Aranda is a pleasant 72-year-old male who states he had been in his usual health up until today.  He states he's had no recent acute issues and he normally ambulates with use of a walker.  He had bit his tongue the other day and unfortunately has been dealing with the little bit of blood loss from that and when he went to grab a rag he dropped on the ground he bent over and ultimately suffered a mechanical fall.  He did not have any loss of consciousness or any preceding chest pain.  He states he's been doing fine for myocardial pulmonary perspective.  He states he has a little bit of chest discomfort at this juncture and I think it's probably secondary to the size of his belly as he's laying completely supine.  Secondary to these complaints that have a troponin and EKG pending for OR clearance.  Other than that he denies any recent fever chills night sweats.  Denies any nausea vomiting or changes to his abdomen in regards to size and swelling.  He is followed by Dr. Butler with GI.  In the ER was found to have an acute left-sided intertrochanteric and some trochanteric fracture of the hip and orthopedics Dr. Harrison was consulted.    Past Medical History:  Past Medical History:   Diagnosis Date   • Anemia    • Cancer (CMS/HCC)     Prostate   • Cirrhosis (CMS/HCC)    • Coronary artery disease    • Diabetes mellitus (CMS/HCC)    • H/O complete eye exam 10/2017   • History of prostate cancer    • HIV (human immunodeficiency virus infection) (CMS/HCC)    • Hyperlipidemia    • Hypertension    • Lactose intolerance    • Lumbar stress fracture    • Osteoporosis    • Pancytopenia (CMS/HCC)    • Peripheral artery  disease (CMS/HCC)    • SOB (shortness of breath) on exertion    • Thyroid disease      Past Surgical History:  Past Surgical History:   Procedure Laterality Date   • BONE MARROW BIOPSY W/ ASPIRATION  01/21/2014   • CARDIAC CATHETERIZATION     • COLONOSCOPY  09/20/2012    non-thrombosed EH, sessile polyp in ascending colon 3 mm in size.   • CORONARY ANGIOPLASTY WITH STENT PLACEMENT  06/2013   • ENDOSCOPY  09/20/2012    grade 1 varicesmiddle and lower 3rd of esophagus. Moderate portal hypertensive gastropathy in entire stomach.   • KYPHOPLASTY Bilateral 5/3/2018    Procedure: T 12 KYPHOPLASTY 1-2 LEVELS;  Surgeon: Joey Davis MD;  Location: [facility] MAIN OR;  Service: Neurosurgery   • KYPHOPLASTY N/A 5/29/2018    Procedure: KYPHOPLASTY, L1, T11 kyphoplsty;  Surgeon: Joey Davis MD;  Location: [facility] OR 18/19;  Service: Neurosurgery   • MOUTH SURGERY     • PROSTATE BIOPSY     • TONSILLECTOMY     • UPPER GASTROINTESTINAL ENDOSCOPY  09/20/2012    grd 1 varices, portal hypertensive gastropathy      Home Meds:  Prescriptions Prior to Admission   Medication Sig Dispense Refill Last Dose   • LORazepam (ATIVAN) 0.5 MG tablet Take 0.5 mg by mouth Every 6 (Six) Hours As Needed for Anxiety.      • oxyCODONE (ROXICODONE) 15 MG immediate release tablet Take 10 mg by mouth Every 4 (Four) Hours As Needed for Moderate Pain .      • prochlorperazine (COMPAZINE) 10 MG tablet Take 10 mg by mouth Every 6 (Six) Hours As Needed for Nausea or Vomiting.      • tenofovir (VIREAD) 300 MG tablet Take 300 mg by mouth Daily.      • BD PEN NEEDLE ONEAL U/F 32G X 4 MM misc    9/9/2018 at Unknown time   • clonazePAM (KlonoPIN) 0.5 MG tablet Take 0.5 mg by mouth At Night As Needed (TAKES ONCE EVERY 2-3 DAYS PRN).  0 9/9/2018 at Unknown time   • fluconazole (DIFLUCAN) 100 MG tablet Take 100 mg by mouth Every Other Day. TOOK 6/10/2018   Past Week at Unknown time   • furosemide (LASIX) 40 MG tablet Take 20 mg by mouth Daily.  5 9/9/2018  at Unknown time   • hydrocortisone 2.5 % cream insert ONE applicator into THE rectum THREE TIMES DAILY 28 g 0 9/9/2018 at Unknown time   • Icosapent Ethyl (VASCEPA) 1 G capsule Take 1 g by mouth 2 (Two) Times a Day.   9/9/2018 at Unknown time   • ISENTRESS 400 MG tablet Take 400 mg by mouth 2 (Two) Times a Day.   9/9/2018 at Unknown time   • lactulose (CHRONULAC) 10 GM/15ML solution Take 30 mL by mouth 2 (Two) Times a Day. 1892 mL 2 9/9/2018 at Unknown time   • levothyroxine (SYNTHROID) 300 MCG tablet Take 1 tablet by mouth Every Morning. HAS APPT WITH ENDOCRONOLOGIST/THINKS IT ILL BE CHANGD  [facility] 30 tablet 0 9/9/2018 at Unknown time   • metoprolol succinate XL (TOPROL-XL) 50 MG 24 hr tablet Take 1 tablet by mouth 2 (Two) Times a Day. 180 tablet 1 9/9/2018 at Unknown time   • nystatin (MYCOSTATIN) 272587 UNIT/GM powder Apply  topically 4 (Four) Times a Day. 1 each 5 9/9/2018 at Unknown time   • oxyCODONE (oxyCONTIN) 10 MG 12 hr tablet Take 20 mg by mouth At Night As Needed.      • pantoprazole (PROTONIX) 40 MG EC tablet Take 1 tablet by mouth Daily for 30 days. 30 tablet 0    • potassium chloride (K-DUR) 10 MEQ CR tablet Take 1 tablet by mouth Daily. (Patient taking differently: Take 20 mEq by mouth Daily.) 30 tablet 5 9/9/2018 at Unknown time   • PREZISTA 800 MG tablet tablet Take 800 mg by mouth Daily.   9/9/2018 at Unknown time   • rifaximin (XIFAXAN) 550 MG tablet Take 1 tablet by mouth Every 12 (Twelve) Hours. 60 tablet 11 9/9/2018 at Unknown time   • rilpivirine (EDURANT) 25 MG tablet tablet Take 25 mg by mouth Daily.      • spironolactone (ALDACTONE) 50 MG tablet 1 pill Daily (Patient taking differently: 25 mg. 1 pill Daily) 30 tablet 0 9/9/2018 at Unknown time   • sulfamethoxazole-trimethoprim (BACTRIM DS,SEPTRA DS) 800-160 MG per tablet Take 1 tablet by mouth Every Other Day As Needed. LAST TAKEN Saturday 6/9/2018 9/9/2018 at Unknown time   • tamsulosin (FLOMAX) 0.4 MG capsule Take 1 capsule by  "mouth As Needed.   Past Month at Unknown time   • TRESIBA FLEXTOUCH 200 UNIT/ML solution pen-injector Inject 35 Units under the skin Every Night.  3 9/9/2018 at Unknown time   • TYBOST 150 MG tablet Take 150 mg by mouth Daily With Breakfast.   9/9/2018 at Unknown time   • vitamin D (ERGOCALCIFEROL) 20228 units capsule capsule Take 50,000 Units by mouth 1 (One) Time Per Week. TAKES EVERY SUNDAY 9/9/2018 at Unknown time   • zinc sulfate (ZINCATE) 220 (50 Zn) MG capsule Take 1 capsule by mouth Daily. 30 capsule 11 9/9/2018 at Unknown time       Allergies:  Allergies   Allergen Reactions   • Dicyclomine Hallucinations   • Abacavir Unknown (See Comments)     Noted allergic per Dr. Thomas   • Methylprednisolone Other (See Comments)     \"Everything shuts down\"   • Prednisolone Other (See Comments)     PT UNSURE--STATED HE WAS TOLD NOT TO TAKE IT AGAIN.      Immunizations:    There is no immunization history on file for this patient.  Social History:   Social History     Social History Narrative   • No narrative on file     Social History     Social History   • Marital status: Single     Spouse name: N/A   • Number of children: N/A   • Years of education: College     Occupational History   •  Retired     [employer]     Social History Main Topics   • Smoking status: Former Smoker     Packs/day: 1.00     Years: 32.00     Types: Cigarettes     Start date: 1/1/1974     Quit date: 2004   • Smokeless tobacco: Never Used   • Alcohol use No   • Drug use: No   • Sexual activity: Not Currently     Partners: Female     Birth control/ protection: Abstinence, Condom     Other Topics Concern   • Not on file     Social History Narrative   • No narrative on file       Family History:  Family History   Problem Relation Age of Onset   • Heart disease Other    • No Known Problems Mother    • No Known Problems Father    • No Known Problems Maternal Grandmother    • No Known Problems Maternal Grandfather    • No Known Problems " "Paternal Grandmother    • No Known Problems Paternal Grandfather    • Malig Hyperthermia Neg Hx         Review of Systems  See history of present illness and past medical history.  Patient denies any focal changes to vision spelled taste or sound.  Denies any headache dizziness head trauma or loss of consciousness.  Denies any nausea vomiting chest pain palpitations cough shortness of breath abdominal pain or distention above baseline.  Denies any issues with dysuria bruising or focal loss of function.  Admits to pain in his left hip.  Admits to biting his tongue the other day area though he denies any chest pain he doesn't with to some chest discomfort when lying supine.  Remainder of ROS is negative.    Objective:  tMax 24 hrs: Temp (24hrs), Av.7 °F (36.5 °C), Min:97.4 °F (36.3 °C), Max:97.9 °F (36.6 °C)    Vitals Ranges:   Temp:  [97.4 °F (36.3 °C)-97.9 °F (36.6 °C)] 97.4 °F (36.3 °C)  Heart Rate:  [78-98] 98  Resp:  [15-18] 16  BP: (118-146)/(78-84) 132/81      Exam:  /81 (BP Location: Right arm, Patient Position: Lying)   Pulse 98   Temp 97.4 °F (36.3 °C) (Oral)   Resp 16   Ht 167.6 cm (66\")   Wt 73.8 kg (162 lb 9.6 oz)   SpO2 94%   BMI 26.24 kg/m²     General Appearance:    Alert, cooperative, no distress, Aox3, not toxic, GF at bs   Head:    Normocephalic, without obvious abnormality, atraumatic   Eyes:    PERRL, conjunctiva/corneas clear, EOM's intact, both eyes   Ears:    Normal external ear canals, both ears   Nose:   Nares normal, septum midline, mucosa normal, no drainage    or sinus tenderness   Throat:   Lips, mucosa, and tongue normal though dry    Neck:   Supple, no JVD       Lungs:     Clear to auscultation bilaterally, respirations unlabored   Chest Wall:    No tenderness or deformity    Heart:    Regular rate and rhythm, S1 and S2 normal   Abdomen:     Soft, non-tender, bowel sounds active all four quadrants,     Distention at baseline per patient, no rebound/guarding  "   Extremities:   Left lower extremity shortened and laterally fixed consistent with fracture, no cyanosis or edema   Pulses:   2+ and symmetric all extremities           Neurologic:   CNII-XII intact, moving all without focal deficit       .    Data Review:  Labs in chart were reviewed.             Imaging Results (all)     Procedure Component Value Units Date/Time    XR Chest 1 View [[id number]] Collected:  10/11/18 0906     Updated:  10/11/18 0906    Narrative:       PORTABLE CHEST, LEFT KNEE, LEFT HIP     HISTORY: Fall, left knee and left hip pain.     FINDINGS:      PELVIS AND LEFT HIP: An AP view of the pelvis as well as AP and  crosstable lateral views of the left hip demonstrate a comminuted  intertrochanteric and subtrochanteric fracture on the left with moderate  displacement. There is no evidence of dislocation of the femoral head.  No other fractures are identified.     LEFT KNEE: 2 views of the left knee show no evidence of fracture or  dislocation. Moderate vascular calcification is noted. There is mild  loss of joint space involving both medial and lateral compartments.     CHEST: A supine view of the chest demonstrates the heart to be at the  upper limits of normal in size. There is mild bibasilar  atelectasis/infiltrate. There is no evidence of consolidation or of  congestive failure. Nodular density is noted likely representing a  nipple shadow at the right lung base. This was not present on  09/10/2018.     The above information was called to Dr. Ramirez.       XR Hip With or Without Pelvis 2 - 3 View Left [[id number]] Collected:  10/11/18 0906     Updated:  10/11/18 0906    Narrative:       PORTABLE CHEST, LEFT KNEE, LEFT HIP     HISTORY: Fall, left knee and left hip pain.     FINDINGS:      PELVIS AND LEFT HIP: An AP view of the pelvis as well as AP and  crosstable lateral views of the left hip demonstrate a comminuted  intertrochanteric and subtrochanteric fracture on the left with  moderate  displacement. There is no evidence of dislocation of the femoral head.  No other fractures are identified.     LEFT KNEE: 2 views of the left knee show no evidence of fracture or  dislocation. Moderate vascular calcification is noted. There is mild  loss of joint space involving both medial and lateral compartments.     CHEST: A supine view of the chest demonstrates the heart to be at the  upper limits of normal in size. There is mild bibasilar  atelectasis/infiltrate. There is no evidence of consolidation or of  congestive failure. Nodular density is noted likely representing a  nipple shadow at the right lung base. This was not present on  09/10/2018.     The above information was called to Dr. Ramirez.       XR Knee 1 or 2 View Left [[id number]] Collected:  10/11/18 0906     Updated:  10/11/18 0906    Narrative:       PORTABLE CHEST, LEFT KNEE, LEFT HIP     HISTORY: Fall, left knee and left hip pain.     FINDINGS:      PELVIS AND LEFT HIP: An AP view of the pelvis as well as AP and  crosstable lateral views of the left hip demonstrate a comminuted  intertrochanteric and subtrochanteric fracture on the left with moderate  displacement. There is no evidence of dislocation of the femoral head.  No other fractures are identified.     LEFT KNEE: 2 views of the left knee show no evidence of fracture or  dislocation. Moderate vascular calcification is noted. There is mild  loss of joint space involving both medial and lateral compartments.     CHEST: A supine view of the chest demonstrates the heart to be at the  upper limits of normal in size. There is mild bibasilar  atelectasis/infiltrate. There is no evidence of consolidation or of  congestive failure. Nodular density is noted likely representing a  nipple shadow at the right lung base. This was not present on  09/10/2018.     The above information was called to Dr. Ramirez.               Assessment:    Closed comminuted intertrochanteric fracture of proximal  end of left femur (CMS/HCC)    Iron deficiency anemia due to chronic blood loss    Pancytopenia (CMS/HCC)    Cirrhosis of liver with ascites (CMS/HCC)    Type 2 diabetes mellitus (CMS/HCC)    HIV (human immunodeficiency virus infection) (CMS/HCC)    CKD (chronic kidney disease) stage 2, GFR 60-89 ml/min    Portal hypertension (CMS/HCC)    Pain, acute due to trauma      Plan:  L-Int/subtrochanteric Frxs   -NPO as orthopedics will likely take to the OR today as he is medically cleared if stat troponin and EKG pending are within reason and I will further clear this patient for the OR today - d/w RN    Acute pain secondary to trauma superimposed on chronic pain   -Not opiate naïve as takes long-acting and short-acting opiates prior to admission    Pancytopenia secondary to cirrhosis of the liver   -Stable/monitor - managed as an outpatient by Dr. Butler    HIV - continue Viread    DM2 - ssi     CKD2 - stable     SCDs for DVT prophylaxis - postoperative per orthopedics - will need to watch blood counts and platelets closely as I anticipate we will see a drop in both postoperatively    Addendum - cardiac troponin negative and EKG without any acute findings.  He does have a chronic issue with prolonged QT.  Patient is medically stable for OR - discussed with RN    Brad Davidson MD  10/11/2018  12:19 PM

## 2018-10-11 NOTE — ANESTHESIA PREPROCEDURE EVALUATION
Anesthesia Evaluation     Patient summary reviewed and Nursing notes reviewed   no history of anesthetic complications:               Airway   Mallampati: II  TM distance: >3 FB  Neck ROM: full  no difficulty expected  Dental - normal exam     Pulmonary - normal exam   (+) a smoker Former, shortness of breath,   (-) COPD, asthma, lung cancer  Cardiovascular - normal exam  Exercise tolerance: excellent (>7 METS)    ECG reviewed  Patient on routine beta blocker and Beta blocker given within 24 hours of surgery  Rhythm: regular  Rate: normal    (+) hypertension well controlled 2 medications or greater, CAD, PVD, hyperlipidemia,   (-) valvular problems/murmurs, past MI, dysrhythmias, angina, CHF, cardiac stents, CABG      Neuro/Psych- negative ROS  (-) seizures, TIA, CVA  GI/Hepatic/Renal/Endo    (+)   liver disease, renal disease CRI, diabetes mellitus type 2 well controlled, hypothyroidism,     Musculoskeletal     Abdominal  - normal exam   Substance History - negative use     OB/GYN negative ob/gyn ROS         Other   (+) arthritis   history of cancer      Other Comment: HIV    Thrombocytopenia associated with AIDS (CMS/HCC)  Leukocytopenia  Neutropenia (CMS/HCC)  Pancytopenia (CMS/HCC)                      Anesthesia Plan    ASA 4     general     intravenous induction   Anesthetic plan, all risks, benefits, and alternatives have been provided, discussed and informed consent has been obtained with: patient.    Plan discussed with CRNA and attending.

## 2018-10-11 NOTE — ED TRIAGE NOTES
Pt called from home for fall and pain of L hip. Pt's L leg rotated outward and shorten compared to R leg. Pulses present but weak on L foot.  Pt reports pain 10/10. Pt currently in [facility] care.

## 2018-10-11 NOTE — ED NOTES
Pt reports walking from bathroom with walker at home and tripping over something and falling and landing on his left hip. States pain 8/10, denies hitting his head. A/O X3, skin pink, warm and dry. No acute distress noted at this time. Caregiver at bedside      Karla Hurd RN  10/11/18 5900

## 2018-10-11 NOTE — CONSULTS
"  Orthopaedic Consult      Patient: Kye Aranda    Date of Admission: 10/11/2018  6:29 AM    YOB: 1945    Medical Record Number: [id number]    Attending Physician: Brad Davidson MD  Consulting Physician: Chadd Stern PA-C      Chief Complaints: Cirrhosis of liver with ascites, unspecified hepatic cirrhosis type (CMS/[facility]) [K74.60, R18.8]  Closed comminuted intertrochanteric fracture of left femur, initial encounter (CMS/[facility]) [S72.253A]      History of Present Illness:       The patient is a 72-year-old gentleman with a past medical history of cirrhosis of the liver with ascites.  Patient is accompanied by another was giving some of the history.  States that morning he was bending over to try to  an object.  He was using a walker.  However when he bent over to pickup the object he fell.  Complained of left hip pain.  Was brought to [facility].  Was found to have an intertrochanteric and subtrochanteric femur fracture of the left hip.  Patient is currently in [facility].  Orthopedic surgery has been consults it for surgical intervention.     Allergies:   Allergies   Allergen Reactions   • Dicyclomine Hallucinations   • Abacavir Unknown (See Comments)     Noted allergic per Dr. Thomas   • Methylprednisolone Other (See Comments)     \"Everything shuts down\"   • Prednisolone Other (See Comments)     PT UNSURE--STATED HE WAS TOLD NOT TO TAKE IT AGAIN.        Medications:   Home Medications:  Prescriptions Prior to Admission   Medication Sig Dispense Refill Last Dose   • LORazepam (ATIVAN) 0.5 MG tablet Take 0.5 mg by mouth Every 6 (Six) Hours As Needed for Anxiety.      • oxyCODONE (ROXICODONE) 15 MG immediate release tablet Take 10 mg by mouth Every 4 (Four) Hours As Needed for Moderate Pain .      • prochlorperazine (COMPAZINE) 10 MG tablet Take 10 mg by mouth Every 6 (Six) Hours As Needed for Nausea or Vomiting.      • tenofovir (VIREAD) 300 MG tablet Take 300 mg by mouth Daily.      • " BD PEN NEEDLE ONEAL U/F 32G X 4 MM misc    9/9/2018 at Unknown time   • clonazePAM (KlonoPIN) 0.5 MG tablet Take 0.5 mg by mouth At Night As Needed (TAKES ONCE EVERY 2-3 DAYS PRN).  0 9/9/2018 at Unknown time   • fluconazole (DIFLUCAN) 100 MG tablet Take 100 mg by mouth Every Other Day. TOOK 6/10/2018   Past Week at Unknown time   • furosemide (LASIX) 40 MG tablet Take 20 mg by mouth Daily.  5 9/9/2018 at Unknown time   • hydrocortisone 2.5 % cream insert ONE applicator into THE rectum THREE TIMES DAILY 28 g 0 9/9/2018 at Unknown time   • Icosapent Ethyl (VASCEPA) 1 G capsule Take 1 g by mouth 2 (Two) Times a Day.   9/9/2018 at Unknown time   • ISENTRESS 400 MG tablet Take 400 mg by mouth 2 (Two) Times a Day.   9/9/2018 at Unknown time   • lactulose (CHRONULAC) 10 GM/15ML solution Take 30 mL by mouth 2 (Two) Times a Day. 1892 mL 2 9/9/2018 at Unknown time   • levothyroxine (SYNTHROID) 300 MCG tablet Take 1 tablet by mouth Every Morning. HAS APPT WITH ENDOCRONOLOGIST/THINKS IT ILL BE CHANGD  [facility] 30 tablet 0 9/9/2018 at Unknown time   • metoprolol succinate XL (TOPROL-XL) 50 MG 24 hr tablet Take 1 tablet by mouth 2 (Two) Times a Day. 180 tablet 1 9/9/2018 at Unknown time   • nystatin (MYCOSTATIN) 867598 UNIT/GM powder Apply  topically 4 (Four) Times a Day. 1 each 5 9/9/2018 at Unknown time   • oxyCODONE (oxyCONTIN) 10 MG 12 hr tablet Take 20 mg by mouth At Night As Needed.      • pantoprazole (PROTONIX) 40 MG EC tablet Take 1 tablet by mouth Daily for 30 days. 30 tablet 0    • potassium chloride (K-DUR) 10 MEQ CR tablet Take 1 tablet by mouth Daily. (Patient taking differently: Take 20 mEq by mouth Daily.) 30 tablet 5 9/9/2018 at Unknown time   • PREZISTA 800 MG tablet tablet Take 800 mg by mouth Daily.   9/9/2018 at Unknown time   • rifaximin (XIFAXAN) 550 MG tablet Take 1 tablet by mouth Every 12 (Twelve) Hours. 60 tablet 11 9/9/2018 at Unknown time   • rilpivirine (EDURANT) 25 MG tablet tablet Take 25 mg by  mouth Daily.      • spironolactone (ALDACTONE) 50 MG tablet 1 pill Daily (Patient taking differently: 25 mg. 1 pill Daily) 30 tablet 0 9/9/2018 at Unknown time   • sulfamethoxazole-trimethoprim (BACTRIM DS,SEPTRA DS) 800-160 MG per tablet Take 1 tablet by mouth Every Other Day As Needed. LAST TAKEN Saturday 6/9/2018 9/9/2018 at Unknown time   • tamsulosin (FLOMAX) 0.4 MG capsule Take 1 capsule by mouth As Needed.   Past Month at Unknown time   • TRESIBA FLEXTOUCH 200 UNIT/ML solution pen-injector Inject 35 Units under the skin Every Night.  3 9/9/2018 at Unknown time   • TYBOST 150 MG tablet Take 150 mg by mouth Daily With Breakfast.   9/9/2018 at Unknown time   • vitamin D (ERGOCALCIFEROL) 15970 units capsule capsule Take 50,000 Units by mouth 1 (One) Time Per Week. TAKES EVERY SUNDAY 9/9/2018 at Unknown time   • zinc sulfate (ZINCATE) 220 (50 Zn) MG capsule Take 1 capsule by mouth Daily. 30 capsule 11 9/9/2018 at Unknown time       Current Medications:  Scheduled Meds:  [MAR Hold] cobicistat 150 mg Oral Daily With Breakfast   [MAR Hold] darunavir ethanolate 800 mg Oral Daily   [MAR Hold] furosemide 20 mg Oral Daily   [MAR Hold] lactulose 20 g Oral BID   potassium chloride 20 mEq Oral Daily   [MAR Hold] raltegravir 400 mg Oral BID   [MAR Hold] rifaximin 550 mg Oral Q12H   [MAR Hold] rilpivirine 25 mg Oral Daily   [MAR Hold] sodium chloride 3 mL Intravenous Q12H   [MAR Hold] spironolactone 25 mg Oral Daily   [MAR Hold] tenofovir 300 mg Oral Daily     Continuous Infusions:  lactated ringers 9 mL/hr Last Rate: 9 mL/hr (10/11/18 0867)   sodium chloride 75 mL/hr      PRN Meds:.•  [MAR Hold] acetaminophen  •  clonazePAM  •  fentanyl  •  [MAR Hold] HYDROmorphone  •  lidocaine PF 1%  •  [MAR Hold] LORazepam  •  midazolam **OR** midazolam  •  [MAR Hold] ondansetron **OR** [MAR Hold] ondansetron ODT **OR** [MAR Hold] ondansetron  •  [MAR Hold] oxyCODONE  •  sodium chloride  •  [MAR Hold] sodium chloride    Past Medical  History:   Diagnosis Date   • Anemia    • Cancer (CMS/HCC) 2001    Prostate   • Cirrhosis (CMS/HCC)    • Coronary artery disease    • Diabetes mellitus (CMS/HCC)    • H/O complete eye exam 10/2017   • History of prostate cancer 2012   • HIV (human immunodeficiency virus infection) (CMS/HCC)    • Hyperlipidemia    • Hypertension    • Lactose intolerance    • Lumbar stress fracture    • Osteoporosis    • Pancytopenia (CMS/HCC)    • Peripheral artery disease (CMS/HCC)    • SOB (shortness of breath) on exertion    • Thyroid disease      Past Surgical History:   Procedure Laterality Date   • BONE MARROW BIOPSY W/ ASPIRATION  01/21/2014   • CARDIAC CATHETERIZATION     • COLONOSCOPY  09/20/2012    non-thrombosed EH, sessile polyp in ascending colon 3 mm in size.   • CORONARY ANGIOPLASTY WITH STENT PLACEMENT  06/2013   • ENDOSCOPY  09/20/2012    grade 1 varicesmiddle and lower 3rd of esophagus. Moderate portal hypertensive gastropathy in entire stomach.   • KYPHOPLASTY Bilateral 5/3/2018    Procedure: T 12 KYPHOPLASTY 1-2 LEVELS;  Surgeon: Joey Davis MD;  Location: [facility] OR;  Service: Neurosurgery   • KYPHOPLASTY N/A 5/29/2018    Procedure: KYPHOPLASTY, L1, T11 kyphoplsty;  Surgeon: Joey Davis MD;  Location: [facility] OR 18/19;  Service: Neurosurgery   • MOUTH SURGERY     • PROSTATE BIOPSY     • TONSILLECTOMY     • UPPER GASTROINTESTINAL ENDOSCOPY  09/20/2012    grd 1 varices, portal hypertensive gastropathy     Social History     Occupational History   •  Retired     [employer]     Social History Main Topics   • Smoking status: Former Smoker     Packs/day: 1.00     Years: 32.00     Types: Cigarettes     Start date: 1/1/1974     Quit date: 2004   • Smokeless tobacco: Never Used   • Alcohol use No   • Drug use: No   • Sexual activity: Not Currently     Partners: Female     Birth control/ protection: Abstinence, Condom    Social History     Social History Narrative   • No narrative on  file     Family History   Problem Relation Age of Onset   • Heart disease Other    • No Known Problems Mother    • No Known Problems Father    • No Known Problems Maternal Grandmother    • No Known Problems Maternal Grandfather    • No Known Problems Paternal Grandmother    • No Known Problems Paternal Grandfather    • Malig Hyperthermia Neg Hx          Review of Systems:   Constitutional: Negative for fatigue, fever, or weight loss  HEENT: Patient denies any headaches, vision changes, sore throat. Admits to wearing glasses  Pulmonary: Patient denies any cough, congestion, SOA, or wheezing  Cardiovascular: Patient denies any chest pain, palpitations, weakness,  Gastrointestinal:  Patient denies nausea, vomiting, diarrhea, constipation  Genital/Urinary: Patient denies dysuria, urinary frequency, urgency, incontinence, retention  Musculoskeletal: Positive for left hip pain. Negative for muscle cramps  Neurological: Patient denies dizziness, paresthesia, loss of sensation, seizures, syncope  Endocrine system: Patient denies tremors, cold or heat intolerance  Psychological: Patient denies thoughts/plans or harming self or other; hallucinations,  insomnia  Skin: Patient denies any bruising, rashes, lesions, or ulcers   Hematopoietic: Patient denies history of MRSA or slow wound healing,  spontaneous or excessive bleeding    Physical Exam: 72 y.o. male  Vitals:    10/11/18 0959 10/11/18 1003 10/11/18 1036 10/11/18 1416   BP:   132/81 148/85   BP Location:   Right arm Left arm   Patient Position:   Lying Lying   Pulse:   98 95   Resp:  15 16 24   Temp:   97.4 °F (36.3 °C) 98.1 °F (36.7 °C)   TempSrc:   Oral Oral   SpO2: 94%  94% 92%   Weight:       Height:                                General Appearance:  Alert, cooperative, in no acute distress    HEENT:    Normocephalic,  Atraumatic, Pupils are equal   Neck:   No adenopathy, supple, trachea midline, no thyromegaly       Lungs:     Clear to auscultation, equal expansion  bilaterally, respirations regular, even and               unlabored    Heart:    Regular rhythm and normal rate, normal S1 and S2, no murmur, no gallops   Chest Wall:    Within normal limits   Abdomen:     Normal bowel sounds, no masses,  soft non-tender, non-distended,       Rectal:  Musculoskeletal:     Deferred    Focused physical examination of the left lower extremity was performed.  Patient is resting comfortably in the hospital bed.  Left lower extremity is shortened and externally rotated.  Positive ecchymosis.  Positive effusion.  Slight tenderness on palpation.  Positive logroll.  Extensor hallucis longus, Flexor hallucis longus, tibialis anterior, and gastrocsoleus are intact.  Pulses intact distally.  Sensation intact.  Compartments are soft.     Extremities:   No clubbing, no edema, no cyanosis   Pulses  Neurovascular:   Pulses palpable and equal bilaterally in all 4 extremities    Patient was alert and oriented x 3 spheres   Skin:   No lesions, ulcers or rashes   Neurologic:   Cranial nerves 2 - 12 grossly intact, sensation intact            Diagnostic Tests:    Admission on 10/11/2018   Component Date Value Ref Range Status   • Glucose 10/11/2018 154* 65 - 99 mg/dL Final   • BUN 10/11/2018 11  8 - 23 mg/dL Final   • Creatinine 10/11/2018 0.96  0.76 - 1.27 mg/dL Final   • Sodium 10/11/2018 136  136 - 145 mmol/L Final   • Potassium 10/11/2018 4.2  3.5 - 5.2 mmol/L Final   • Chloride 10/11/2018 105  98 - 107 mmol/L Final   • CO2 10/11/2018 24.5  22.0 - 29.0 mmol/L Final   • Calcium 10/11/2018 8.7  8.6 - 10.5 mg/dL Final   • Total Protein 10/11/2018 6.8  6.0 - 8.5 g/dL Final   • Albumin 10/11/2018 3.40* 3.50 - 5.20 g/dL Final   • ALT (SGPT) 10/11/2018 33  1 - 41 U/L Final   • AST (SGOT) 10/11/2018 55* 1 - 40 U/L Final   • Alkaline Phosphatase 10/11/2018 413* 39 - 117 U/L Final   • Total Bilirubin 10/11/2018 2.9* 0.1 - 1.2 mg/dL Final   • eGFR Non African Amer 10/11/2018 77  >60 mL/min/1.73 Final   •  Globulin 10/11/2018 3.4  gm/dL Final   • A/G Ratio 10/11/2018 1.0  g/dL Final   • BUN/Creatinine Ratio 10/11/2018 11.5  7.0 - 25.0 Final   • Anion Gap 10/11/2018 6.5  mmol/L Final   • Protime 10/11/2018 16.4* 11.7 - 14.2 Seconds Final   • INR 10/11/2018 1.35* 0.90 - 1.10 Final   • PTT 10/11/2018 41.5* 22.7 - 35.4 seconds Final   • WBC 10/11/2018 4.33* 4.50 - 10.70 10*3/mm3 Final   • RBC 10/11/2018 4.10* 4.60 - 6.00 10*6/mm3 Final   • Hemoglobin 10/11/2018 11.7* 13.7 - 17.6 g/dL Final   • Hematocrit 10/11/2018 36.8* 40.4 - 52.2 % Final   • MCV 10/11/2018 89.8  79.8 - 96.2 fL Final   • MCH 10/11/2018 28.5  27.0 - 32.7 pg Final   • MCHC 10/11/2018 31.8* 32.6 - 36.4 g/dL Final   • RDW 10/11/2018 17.3* 11.5 - 14.5 % Final   • RDW-SD 10/11/2018 56.5* 37.0 - 54.0 fl Final   • MPV 10/11/2018   6.0 - 12.0 fL Final    .   • Platelets 10/11/2018 47* 140 - 500 10*3/mm3 Final   • Neutrophil % 10/11/2018 81.6* 42.7 - 76.0 % Final   • Lymphocyte % 10/11/2018 10.4* 19.6 - 45.3 % Final   • Monocyte % 10/11/2018 5.5  5.0 - 12.0 % Final   • Eosinophil % 10/11/2018 1.8  0.3 - 6.2 % Final   • Basophil % 10/11/2018 0.2  0.0 - 1.5 % Final   • Immature Grans % 10/11/2018 0.5  0.0 - 0.5 % Final   • Neutrophils, Absolute 10/11/2018 3.53  1.90 - 8.10 10*3/mm3 Final   • Lymphocytes, Absolute 10/11/2018 0.45* 0.90 - 4.80 10*3/mm3 Final   • Monocytes, Absolute 10/11/2018 0.24  0.20 - 1.20 10*3/mm3 Final   • Eosinophils, Absolute 10/11/2018 0.08  0.00 - 0.70 10*3/mm3 Final   • Basophils, Absolute 10/11/2018 0.01  0.00 - 0.20 10*3/mm3 Final   • Immature Grans, Absolute 10/11/2018 0.02  0.00 - 0.03 10*3/mm3 Final   • nRBC 10/11/2018 0.0  0.0 - 0.0 /100 WBC Final   • Glucose 10/11/2018 144* 70 - 130 mg/dL Final   • Troponin T 10/11/2018 0.022  0.000 - 0.030 ng/mL Final       PORTABLE CHEST, LEFT KNEE, LEFT HIP     HISTORY: Fall, left knee and left hip pain.     FINDINGS:      PELVIS AND LEFT HIP: An AP view of the pelvis as well as AP  and  crosstable lateral views of the left hip demonstrate a comminuted  intertrochanteric and subtrochanteric fracture on the left with moderate  displacement. There is no evidence of dislocation of the femoral head.  No other fractures are identified.     LEFT KNEE: 2 views of the left knee show no evidence of fracture or  dislocation. Moderate vascular calcification is noted. There is mild  loss of joint space involving both medial and lateral compartments.     CHEST: A supine view of the chest demonstrates the heart to be at the  upper limits of normal in size. There is mild bibasilar  atelectasis/infiltrate. There is no evidence of consolidation or of  congestive failure. Nodular density is noted likely representing a  nipple shadow at the right lung base. This was not present on  09/10/2018.     The above information was called to Dr. Ramirez.    Assessment:  Patient Active Problem List   Diagnosis   • Chronic coronary artery disease   • Difficulty breathing   • Intermittent claudication (CMS/HCC)   • Peripheral arterial occlusive disease (CMS/HCC)   • Shortness of breath   • Iron deficiency anemia due to chronic blood loss   • Thrombocytopenia associated with AIDS (CMS/HCC)   • Leukocytopenia   • Neutropenia (CMS/HCC)   • Pancytopenia (CMS/HCC)   • Iron and its compounds causing adverse effect in therapeutic use   • Cirrhosis of liver with ascites (CMS/HCC)   • Lumbar degenerative disc disease   • Secondary thrombocytopenia   • History of coronary artery stent placement   • T12 compression fracture (CMS/HCC)   • Weight loss, abnormal   • Hypothyroidism   • Type 2 diabetes mellitus (CMS/HCC)   • Severe protein-calorie malnutrition due to chronic illness   • Pathological fracture of thoracic vertebra with routine healing   • Age-related osteoporosis with current pathological fracture   • Acute bilateral thoracic back pain   • Age-related osteoporosis with current pathological fracture of vertebra (CMS/HCC)   •  Thrombocytopenia (CMS/HCC)   • Compression fracture of body of thoracic vertebra (CMS/HCC)   • Lumbar spine pain   • HIV (human immunodeficiency virus infection) (CMS/HCC)   • CKD (chronic kidney disease) stage 2, GFR 60-89 ml/min   • Portal hypertension (CMS/HCC)   • Other cirrhosis of liver (CMS/HCC)   • Adenomatous polyp of colon   • Closed comminuted intertrochanteric fracture of proximal end of left femur (CMS/HCC)   • Pain, acute due to trauma           Plan:  I did have an extensive discussion with the patient as well as his significant other in the hospital today.  I have reviewed the radiographic findings.  The patient does have a intertrochanteric and subtrochanteric femur fracture of the left hip.  Status post fall.  Surgical intervention will be warranted.  However in further review and consultation with the anesthesia team.  The patient's platelets are currently 47,000.  He will be at high risk for increased mortality with any type of surgical intervention at this point.  His INR was elevated as well.  At 1.3.  He is not on any anticoagulants.  The patient is a very ill patient.  He will need surgical intervention for the fracture.  However at this time we need to get the patient medically stable for surgery.  Therefore he will get 2 units of platelets tonight.  Surgery will be postponed. Platelet count to be performed after transfusion.  We will allow the patient to eat.  He will be nothing by mouth after midnight.  Consent for left hip intramedullary nail for Dr. Campa.  If the patient is found to be medically cleared for surgery we may plan on performing the surgery tomorrow. The above findings were discussed with my supervising physician Dr. Harrison.  He agrees with the above.  Further recommendations to follow pending the above.    Date: 10/11/2018  Chadd Stern PA-C

## 2018-10-11 NOTE — ED PROVIDER NOTES
EMERGENCY DEPARTMENT ENCOUNTER    Room Number:  14/14  Date seen:  10/11/2018  Time seen: 7:07 AM  PCP: Kendrick Griggs MD    HPI:  Chief complaint: Fall, left hip pain  Context:Kye Aranda is a 72 y.o. male with hx of AIDS, CAD, Pancytopenia, Cirrhosis, CKD who presents to the ED with left hip pain after falling this morning.  He states that after using the restroom he dropped a towel on the floor and when he bent over to to grab it his walker tipped and he landed on his left hip.  He denies any head injury, neck pain or back pain.  He also has some pain in the left knee. He denies any nausea, vomiting, abdominal pain, or increase in his baseline dyspnea. He also notes he bit his tongue yesterday and it is still bleeding some.    Onset: sudden  Location:left hip  Radiation: left knee  Duration: x this am  Timing: constant  Character:pain  Aggravating Factors: movement of the left leg  Alleviating Factors: being still  Severity: severe    MEDICAL RECORD REVIEW   09/10/18 - admitted until 09/16 for dyspnea and swelling    ALLERGIES  Dicyclomine; Abacavir; Methylprednisolone; and Prednisolone    PAST MEDICAL HISTORY  Active Ambulatory Problems     Diagnosis Date Noted   • Chronic coronary artery disease 04/27/2016   • Difficulty breathing 04/27/2016   • Intermittent claudication (CMS/HCC) 04/27/2016   • Peripheral arterial occlusive disease (CMS/HCC) 04/27/2016   • Shortness of breath 04/27/2016   • Iron deficiency anemia due to chronic blood loss 05/06/2016   • Thrombocytopenia associated with AIDS (CMS/HCC) 05/06/2016   • Leukocytopenia 05/06/2016   • Neutropenia (CMS/HCC) 05/06/2016   • Pancytopenia (CMS/HCC) 02/19/2017   • Iron and its compounds causing adverse effect in therapeutic use 02/21/2017   • Cirrhosis of liver with ascites (CMS/HCC) 08/14/2017   • Lumbar degenerative disc disease 12/05/2017   • Secondary thrombocytopenia 01/28/2018   • History of coronary artery stent placement 02/12/2018   • T12  compression fracture (CMS/HCC) 04/09/2018   • Weight loss, abnormal 04/09/2018   • Hypothyroidism 04/09/2018   • Type 2 diabetes mellitus (CMS/HCC) 04/09/2018   • Severe protein-calorie malnutrition due to chronic illness 04/10/2018   • Pathological fracture of thoracic vertebra with routine healing 04/25/2018   • Age-related osteoporosis with current pathological fracture 04/27/2018   • Acute bilateral thoracic back pain 04/27/2018   • Age-related osteoporosis with current pathological fracture of vertebra (CMS/HCC) 04/27/2018   • Thrombocytopenia (CMS/HCC) 04/29/2018   • Compression fracture of body of thoracic vertebra (CMS/HCC) 05/03/2018   • Lumbar spine pain 05/25/2018   • HIV (human immunodeficiency virus infection) (CMS/HCC) 06/11/2018   • CKD (chronic kidney disease) stage 2, GFR 60-89 ml/min 06/11/2018   • Portal hypertension (CMS/HCC) 06/11/2018   • Other cirrhosis of liver (CMS/HCC) 07/17/2018   • Adenomatous polyp of colon 07/17/2018     Resolved Ambulatory Problems     Diagnosis Date Noted   • Intractable back pain 04/09/2018   • Hypophosphatemia 04/09/2018   • Pedal edema 06/10/2018   • Anasarca 06/11/2018     Past Medical History:   Diagnosis Date   • Anemia    • Cancer (CMS/HCC) 2001   • Cirrhosis (CMS/HCC)    • Coronary artery disease    • Diabetes mellitus (CMS/HCC)    • H/O complete eye exam 10/2017   • History of prostate cancer 2012   • HIV (human immunodeficiency virus infection) (CMS/HCC)    • Hyperlipidemia    • Hypertension    • Lactose intolerance    • Lumbar stress fracture    • Osteoporosis    • Pancytopenia (CMS/HCC)    • Peripheral artery disease (CMS/HCC)    • SOB (shortness of breath) on exertion    • Thyroid disease        PAST SURGICAL HISTORY  Past Surgical History:   Procedure Laterality Date   • BONE MARROW BIOPSY W/ ASPIRATION  01/21/2014   • CARDIAC CATHETERIZATION     • COLONOSCOPY  09/20/2012    non-thrombosed EH, sessile polyp in ascending colon 3 mm in size.   • CORONARY  ANGIOPLASTY WITH STENT PLACEMENT  06/2013   • ENDOSCOPY  09/20/2012    grade 1 varicesmiddle and lower 3rd of esophagus. Moderate portal hypertensive gastropathy in entire stomach.   • KYPHOPLASTY Bilateral 5/3/2018    Procedure: T 12 KYPHOPLASTY 1-2 LEVELS;  Surgeon: Joey Davis MD;  Location: [facility] MAIN OR;  Service: Neurosurgery   • KYPHOPLASTY N/A 5/29/2018    Procedure: KYPHOPLASTY, L1, T11 kyphoplsty;  Surgeon: Joey Davis MD;  Location: [facility] OR 18/19;  Service: Neurosurgery   • MOUTH SURGERY     • PROSTATE BIOPSY     • TONSILLECTOMY     • UPPER GASTROINTESTINAL ENDOSCOPY  09/20/2012    grd 1 varices, portal hypertensive gastropathy       FAMILY HISTORY  Family History   Problem Relation Age of Onset   • Heart disease Other    • No Known Problems Mother    • No Known Problems Father    • No Known Problems Maternal Grandmother    • No Known Problems Maternal Grandfather    • No Known Problems Paternal Grandmother    • No Known Problems Paternal Grandfather    • Malig Hyperthermia Neg Hx        SOCIAL HISTORY  Social History     Social History   • Marital status: Single     Spouse name: N/A   • Number of children: N/A   • Years of education: College     Occupational History   •  Retired     [employer]     Social History Main Topics   • Smoking status: Former Smoker     Packs/day: 1.00     Years: 32.00     Types: Cigarettes     Start date: 1/1/1974     Quit date: 2004   • Smokeless tobacco: Never Used   • Alcohol use No   • Drug use: No   • Sexual activity: Not Currently     Partners: Female     Birth control/ protection: Abstinence, Condom     Other Topics Concern   • Not on file     Social History Narrative   • No narrative on file       REVIEW OF SYSTEMS  Review of Systems   Constitutional: Negative for chills and fever.   HENT: Negative.    Eyes: Negative.    Respiratory: Negative for shortness of breath (no increase).    Cardiovascular: Negative for chest pain (chronic, no  change).   Gastrointestinal: Negative for abdominal pain, nausea and vomiting.   Genitourinary: Negative.    Musculoskeletal: Negative.    Skin: Negative.    Neurological: Negative.    Psychiatric/Behavioral: Negative.        PHYSICAL EXAM  ED Triage Vitals   Temp Heart Rate Resp BP SpO2   10/11/18 0614 10/11/18 0614 10/11/18 0614 10/11/18 0614 10/11/18 0614   97.9 °F (36.6 °C) 78 18 146/78 94 %      Temp src Heart Rate Source Patient Position BP Location FiO2 (%)   10/11/18 0614 10/11/18 0643 10/11/18 0643 10/11/18 0643 --   Tympanic Monitor Lying Right arm      Physical Exam   Constitutional:   Appears chronically ill   HENT:   Head: Normocephalic and atraumatic.   Right Ear: External ear normal.   Left Ear: External ear normal.   Mouth is dry. There is some oozing of blood from the left inferior tongue.   Eyes: Pupils are equal, round, and reactive to light. EOM are normal.   Neck: Normal range of motion.   Cardiovascular: Normal rate and regular rhythm.    Pulmonary/Chest: Effort normal and breath sounds normal.   94% on 2L. Lungs diminished, no rales   Abdominal:   Abdomen round, distended with fluid wave. Nontender, no guarding or rigidity.   Musculoskeletal:   + BLE  LLE shortened and externally rotated with tenderness over the lateral left hip and also to the distal femur. Feet are warm and dry. Sensation intact in the feet. Good ROM of the RLE. BUE are nontender with FROM. No c-sine tenderness       LAB RESULTS  Recent Results (from the past 24 hour(s))   CBC Auto Differential    Collection Time: 10/11/18  6:53 AM   Result Value Ref Range    WBC 4.33 (L) 4.50 - 10.70 10*3/mm3    RBC 4.10 (L) 4.60 - 6.00 10*6/mm3    Hemoglobin 11.7 (L) 13.7 - 17.6 g/dL    Hematocrit 36.8 (L) 40.4 - 52.2 %    MCV 89.8 79.8 - 96.2 fL    MCH 28.5 27.0 - 32.7 pg    MCHC 31.8 (L) 32.6 - 36.4 g/dL    RDW 17.3 (H) 11.5 - 14.5 %    RDW-SD 56.5 (H) 37.0 - 54.0 fl    MPV  6.0 - 12.0 fL    Platelets 47 (L) 140 - 500 10*3/mm3     Neutrophil % 81.6 (H) 42.7 - 76.0 %    Lymphocyte % 10.4 (L) 19.6 - 45.3 %    Monocyte % 5.5 5.0 - 12.0 %    Eosinophil % 1.8 0.3 - 6.2 %    Basophil % 0.2 0.0 - 1.5 %    Immature Grans % 0.5 0.0 - 0.5 %    Neutrophils, Absolute 3.53 1.90 - 8.10 10*3/mm3    Lymphocytes, Absolute 0.45 (L) 0.90 - 4.80 10*3/mm3    Monocytes, Absolute 0.24 0.20 - 1.20 10*3/mm3    Eosinophils, Absolute 0.08 0.00 - 0.70 10*3/mm3    Basophils, Absolute 0.01 0.00 - 0.20 10*3/mm3    Immature Grans, Absolute 0.02 0.00 - 0.03 10*3/mm3    nRBC 0.0 0.0 - 0.0 /100 WBC       I ordered the above labs and reviewed the results    RADIOLOGY  PORTABLE CHEST, LEFT KNEE, LEFT HIP     HISTORY: Fall, left knee and left hip pain.     FINDINGS:      PELVIS AND LEFT HIP: An AP view of the pelvis as well as AP and  crosstable lateral views of the left hip demonstrate a comminuted  intertrochanteric and subtrochanteric fracture on the left with moderate  displacement. There is no evidence of dislocation of the femoral head.  No other fractures are identified.     LEFT KNEE: 2 views of the left knee show no evidence of fracture or  dislocation. Moderate vascular calcification is noted. There is mild  loss of joint space involving both medial and lateral compartments.     CHEST: A supine view of the chest demonstrates the heart to be at the  upper limits of normal in size. There is mild bibasilar  atelectasis/infiltrate. There is no evidence of consolidation or of  congestive failure. Nodular density is noted likely representing a  nipple shadow at the right lung base. This was not present on  09/10/2018.  I ordered the above noted radiological studies and reviewed the images on the PACS system.      MEDICATIONS GIVEN IN ER  Medications   ondansetron (ZOFRAN) injection 4 mg (not administered)   HYDROmorphone (DILAUDID) injection 1 mg (not administered)         PROCEDURES  Procedures      PROGRESS AND CONSULTS    Progress Notes:           0700 Reviewed pt's  "history and workup with Dr. Ramirez.  After a bedside evaluation; Dr Ramirez agrees with the plan of care and will disposition the patient appropriately.       Disposition vitals:  /82 (BP Location: Right arm, Patient Position: Lying)   Pulse 82   Temp 97.9 °F (36.6 °C) (Tympanic)   Resp 18   Ht 167.6 cm (66\")   Wt 73.8 kg (162 lb 9.6 oz)   SpO2 94%   BMI 26.24 kg/m²       DIAGNOSIS  Final diagnoses:   Closed comminuted intertrochanteric fracture of left femur, initial encounter (CMS/HCC)   Cirrhosis of liver with ascites, unspecified hepatic cirrhosis type (CMS/HCC)          Kerry Hernandez PA  10/17/18 2019    "

## 2018-10-11 NOTE — PLAN OF CARE
Problem: Patient Care Overview  Goal: Plan of Care Review  Outcome: Ongoing (interventions implemented as appropriate)   10/11/18 9452   Coping/Psychosocial   Plan of Care Reviewed With patient   Plan of Care Review   Progress no change   OTHER   Outcome Summary lots of pain, sent down for surgery and they sent him back due to low platelets, 2 units platelets administered, redraw orered for 2100, educated on bp meds and monitoring     Goal: Individualization and Mutuality  Outcome: Ongoing (interventions implemented as appropriate)      Problem: Fall Risk (Adult)  Goal: Identify Related Risk Factors and Signs and Symptoms  Outcome: Outcome(s) achieved Date Met: 10/11/18    Goal: Absence of Fall  Outcome: Ongoing (interventions implemented as appropriate)      Problem: Skin Injury Risk (Adult)  Goal: Identify Related Risk Factors and Signs and Symptoms  Outcome: Ongoing (interventions implemented as appropriate)    Goal: Skin Health and Integrity  Outcome: Ongoing (interventions implemented as appropriate)      Problem: Fractured Hip (Adult)  Goal: Signs and Symptoms of Listed Potential Problems Will be Absent, Minimized or Managed (Fractured Hip)  Outcome: Ongoing (interventions implemented as appropriate)

## 2018-10-11 NOTE — ED PROVIDER NOTES
MD ATTESTATION NOTE  HPI: Pt states that he fell at 0500 this morning. Pt states that he was bent down trying to  an object when he fell, and now he is complaining of left hip pain. Pt is currently on [facility] care.     PE:3+ edema BL, 2+ DPP, tenderness to the left greater trochanter, pelvis is stable, Left leg is shortened and externally rotated.    Plan: 0710- Will check XR of hip.    EKG           EKG time: 0641  Rhythm/Rate: 80,NSR  P waves and CA: nml  QRS, axis: PRWP   ST and T waves: Prolonged QT interval and T wave flataneing     Interpreted Contemporaneously by me, independently viewed  Unchanged compared to prior 08/8/2018 0914- Discussed pt case with Dr. Davidson (A) who will admit the pt    0914- Discussed pt case with Dr. Vaughn (Ortho) who will see the pt for consult.    Diagnosis   Final diagnoses:   Closed comminuted intertrochanteric fracture of left femur, initial encounter (CMS/HCC)   Cirrhosis of liver with ascites, unspecified hepatic cirrhosis type (CMS/HCC)       Disposition  ADMISSION    Discussed treatment plan and reason for admission with pt/family and admitting physician.  Pt/family voiced understanding of the plan for admission for further testing/treatment as needed.          The ANY and I have discussed this patient's history, physical exam, and treatment plan.  I have reviewed the documentation and personally had a face to face interaction with the patient. I affirm the documentation and agree with the treatment and plan.  The attached note describes my personal findings.    Documentation assistance provided by martin Aparicio for Dr. Ramirez.  Information recorded by the martin was done at my direction and has been verified and validated by me.            Eliu Aparicio  10/11/18 1118       David Ramirez II, MD  10/11/18 3137

## 2018-10-11 NOTE — PERIOPERATIVE NURSING NOTE
Report called to floor RN.Patient transferred back to [facility] via bed with wife to accompany.

## 2018-10-11 NOTE — ED NOTES
Pt sating 89-90% RA, placed on 2L O2 via N/C for comfort. Patient tolerated well.      Karla Hurd RN  10/11/18 4296

## 2018-10-12 NOTE — PROGRESS NOTES
"Adult Nutrition  Assessment/PES    Patient Name:  Kye Aranda  YOB: 1945  MRN: [id number]  Admit Date:  10/11/2018    Assessment Date:  10/12/2018    Comments:  Assessed due to MST 4 per nursing admission screen  - decreased appetite & weight status indicated. Currently NPO for possible surgery (hip fx). Will cont to follow, monitor intake once diet adv.           Reason for Assessment     Row Name 10/12/18 0932          Reason for Assessment    Reason For Assessment identified at risk by screening criteria     Diagnosis infection/sepsis;trauma;hematological/related complications;liver disease;renal disease   Closed comminuted intertrochanteric fracture of proximal end of left femur; hx HIV, prostate cancer, alcoholic cirrhosis with chronic anemia and thrombocytopen     Identified At Risk by Screening Criteria MST SCORE 2+             Nutrition/Diet History     Row Name 10/12/18 0937          Nutrition/Diet History    Factors Affecting Nutritional Intake other (see comments)   pt had been a hospice pt for mult disease states             Anthropometrics     Row Name 10/12/18 0934          Anthropometrics    Height 167.6 cm (66\")        Admit Weight    Admit Weight 73.5 kg (162 lb)        Ideal Body Weight (IBW)    Ideal Body Weight (IBW) (kg) 65.3        Usual Body Weight (UBW)    Usual Body Weight --   150# 6/2018 (body wt has varied 145-150 lb)     Weight Loss Time Frame ^ 12 lb/3-4 months          Labs/Tests/Procedures/Meds     Row Name 10/12/18 0978          Labs/Procedures/Meds    Lab Results Reviewed reviewed     Lab Results Comments Glu 176-187, PLT , Alb 3.4, Tbili 2.9        Diagnostic Tests/Procedures    Diagnostic Test/Procedure Reviewed reviewed     Diagnostic Test/Procedures Comments xray hip/pelvis: comminuted fx        Medications    Pertinent Medications Reviewed reviewed     Pertinent Medications Comments lasix, lactulose, Viread             Physical Findings     Row Name " 10/12/18 0937          Physical Findings    Skin other (see comments);jaundice   no impairment             Nutrition Prescription Ordered     Row Name 10/12/18 0937          Nutrition Prescription PO    Current PO Diet NPO   for poss surgery today         Problem/Interventions:        Problem 1     Row Name 10/12/18 0938          Nutrition Diagnoses Problem 1    Problem 1 Predicted Suboptimal Intake     Etiology (related to) Medical Diagnosis     Endocrine DM2     Hematological Thrombocytopenia;Anemia     Hepatic Cirrhosis     Oncology Prostate cancer     Ortho Fracture     Renal CKD     Signs/Symptoms (evidenced by) NPO                     Intervention Goal     Row Name 10/12/18 0939          Intervention Goal    General Maintain nutrition     PO Establish PO;Advance diet             Nutrition Intervention     Row Name 10/12/18 0940          Nutrition Intervention    RD/Tech Action Await begin PO;Follow Tx progress               Education/Evaluation     Row Name 10/12/18 0940          Education    Education Will Instruct as appropriate        Monitor/Evaluation    Monitor Per protocol         Electronically signed by:  Nisha Cloud RD  10/12/18 9:40 AM

## 2018-10-12 NOTE — PLAN OF CARE
Problem: Patient Care Overview  Goal: Plan of Care Review  Outcome: Ongoing (interventions implemented as appropriate)   10/12/18 0343   Coping/Psychosocial   Plan of Care Reviewed With patient   Plan of Care Review   Progress improving   OTHER   Outcome Summary Pt has been stable through the night. On bedrest and NPO at [facility] for possible sx this afternoon. Awaiting platelet count after rec 2 units. Pain being controlled with IV dialudid and PO pain meds as well. Did c/o chest pain and SOA, orders rec from [facility], will continue to monitor.      Goal: Individualization and Mutuality  Outcome: Ongoing (interventions implemented as appropriate)    Goal: Discharge Needs Assessment  Outcome: Ongoing (interventions implemented as appropriate)   10/11/18 1100   Disability   Equipment Currently Used at Home walker, rolling;cane, straight;commode;hospital bed;negative pressure wound therapy device   Discharge Needs Assessment   Patient/Family Anticipates Transition to home with family;home with help/services   Patient/Family Anticipated Services at Transition ;home health care;hospice care   Transportation Concerns car, none   Transportation Anticipated family or friend will provide     Goal: Interprofessional Rounds/Family Conf  Outcome: Ongoing (interventions implemented as appropriate)   10/12/18 0343   Interdisciplinary Rounds/Family Conf   Participants nursing;patient       Problem: Fall Risk (Adult)  Goal: Absence of Fall  Outcome: Ongoing (interventions implemented as appropriate)   10/12/18 0343   Fall Risk (Adult)   Absence of Fall achieves outcome       Problem: Skin Injury Risk (Adult)  Goal: Identify Related Risk Factors and Signs and Symptoms  Outcome: Outcome(s) achieved Date Met: 10/12/18   10/12/18 0343   Skin Injury Risk (Adult)   Related Risk Factors (Skin Injury Risk) advanced age;mobility impaired     Goal: Skin Health and Integrity  Outcome: Ongoing (interventions implemented as appropriate)    10/12/18 0343   Skin Injury Risk (Adult)   Skin Health and Integrity making progress toward outcome       Problem: Fractured Hip (Adult)  Goal: Signs and Symptoms of Listed Potential Problems Will be Absent, Minimized or Managed (Fractured Hip)  Outcome: Ongoing (interventions implemented as appropriate)   10/12/18 0343   Goal/Outcome Evaluation   Problems Assessed (Fractured Hip) all   Problems Present (Fractured Hip) functional deficit/self-care deficit;pain;situational response

## 2018-10-12 NOTE — ANESTHESIA PROCEDURE NOTES
Airway  Urgency: elective    Airway not difficult    General Information and Staff    Patient location during procedure: OR  Anesthesiologist: TROY TYLER    Indications and Patient Condition  Indications for airway management: airway protection    Preoxygenated: yes  MILS not maintained throughout  Mask difficulty assessment: 0 - not attempted    Final Airway Details  Final airway type: supraglottic airway      Successful airway: classic  Size 5    Number of attempts at approach: 1

## 2018-10-12 NOTE — PROGRESS NOTES
Discharge Planning Assessment  [facility]     Patient Name: Kye Aranda  MRN: [id number]  Today's Date: 10/12/2018    Admit Date: 10/11/2018          Discharge Needs Assessment     Row Name 10/12/18 2099       Living Environment    Lives With significant other    Name(s) of Who Lives With Patient Melissa Miller [phone]    Current Living Arrangements home/apartment/condo    Primary Care Provided by spouse/significant other    Provides Primary Care For no one    Family Caregiver if Needed significant other    Quality of Family Relationships helpful;involved;supportive    Able to Return to Prior Arrangements yes    Living Arrangement Comments Current with [facility] prior to admission.        Resource/Environmental Concerns    Resource/Environmental Concerns none    Transportation Concerns car, none       Transition Planning    Patient/Family Anticipates Transition to home with family;home with help/services    Patient/Family Anticipated Services at Transition hospice care    Transportation Anticipated family or friend will provide       Discharge Needs Assessment    Equipment Currently Used at Home --   Hospital bed    Anticipated Changes Related to Illness inability to care for self    Equipment Needed After Discharge none            Discharge Plan     Row Name 10/12/18 4096       Plan    Plan Plan is home with significant other and resume [facility] at home.     Patient/Family in Agreement with Plan yes    Plan Comments CCP role explained and face sheet verified; emergency contact is significant other Melissa Miller [phone], she prefers ambulance transfer when patient discharges; DME includes a cane, walker, raised commode and a hospital bed; patient has a living will; patient has been to [facility] in the past, declined to return; patient is current with [facility] at home, CCP spoke with [facility] nurse at bedside to verify services (Amanda Grant RN) , significant other declined SNF, prefers to return home and  continue [facility] services. PRAKASH Jones    Row Name 10/12/18 1257       Plan    Plan Comments The patient was current with [facility] prior to admission. This hospital stay is not covered by [facility] at this time. The [facility] team will follow and assist with any discharage needs that may arise. MELINA Cho RN, CCP.         Destination     No service coordination in this encounter.      Durable Medical Equipment     No service coordination in this encounter.      Dialysis/Infusion     No service coordination in this encounter.      Home Medical Care     No service coordination in this encounter.      Social Care     No service coordination in this encounter.                Demographic Summary     Row Name 10/12/18 1352       General Information    Admission Type inpatient    Arrived From home    Required Notices Provided Important Message from Medicare IMM 10/11    Reason for Consult discharge planning            Functional Status     Row Name 10/12/18 7816       Functional Status    Usual Activity Tolerance fair    Current Activity Tolerance poor       Functional Status, IADL    Medications assistive person    Meal Preparation assistive person    Housekeeping assistive person    Laundry assistive person    Shopping assistive person    IADL Comments Significant Other- (Melissa Miller [phone]) assists with ADL's and patient is current with [facility] at home.             Psychosocial    No documentation.           Abuse/Neglect    No documentation.           Legal     Row Name 10/12/18 6769       Financial/Legal    Finance Comments Patient has a living will.             Substance Abuse    No documentation.           Patient Forms    No documentation.         Awa Liu RN

## 2018-10-12 NOTE — PROGRESS NOTES
"    DAILY PROGRESS NOTE  [facility]    Patient Identification:  Name: Kye Aranda  Age: 72 y.o.  Sex: male  :  1945  MRN: [id number]         Primary Care Physician: Kendrick Griggs MD    Subjective:  Interval History: c/o hip pain - breathing fine today at baseline - IVF dc'd and received volume from platelet infusion and received IV lasix x1 - denies cp/ams/n/v - troponin neg and EKG w/out change    Objective:gf at     Scheduled Meds:  cobicistat 150 mg Oral Daily With Breakfast   darunavir 800 mg Oral Daily With Breakfast   furosemide 20 mg Oral Daily   lactulose 20 g Oral BID   potassium chloride 20 mEq Oral Daily   raltegravir 400 mg Oral Q12H   rifAXIMin 550 mg Oral Q12H   rilpivirine 25 mg Oral Daily   spironolactone 25 mg Oral Daily   tenofovir 300 mg Oral Daily     Continuous Infusions:  lactated ringers 9 mL/hr Last Rate: 9 mL/hr (10/11/18 1457)       Vital signs in last 24 hours:  Temp:  [98.1 °F (36.7 °C)-98.9 °F (37.2 °C)] 98.5 °F (36.9 °C)  Heart Rate:  [76-95] 76  Resp:  [16-24] 16  BP: (117-152)/(61-85) 117/66    Intake/Output:    Intake/Output Summary (Last 24 hours) at 10/12/18 1312  Last data filed at 10/12/18 0700   Gross per 24 hour   Intake              543 ml   Output             1100 ml   Net             -557 ml       Exam:  /66 (BP Location: Right arm, Patient Position: Lying)   Pulse 76   Temp 98.5 °F (36.9 °C) (Oral)   Resp 16   Ht 167.6 cm (66\")   Wt 73.8 kg (162 lb 9.6 oz)   SpO2 92%   BMI 26.24 kg/m²     General Appearance:    Alert, cooperative, AAOx3                         Throat:   Lips, tongue, gums normal; oral mucosa pink and dry                           Neck:   Supple, symmetrical, trachea midline, no JVD                         Lungs:    Clear to auscultation bilaterally, respirations unlabored                          Heart:    Regular rate and rhythm, S1 and S2 normal                  Abdomen:     Soft, non-tender, bowel sounds active, " chronic distension                 Extremities:   No cyanosis or edema                  Neurologic:   CNII-XII intact, moving all      Data Review:  Labs in chart were reviewed.    Assessment:  Active Hospital Problems    Diagnosis Date Noted   • **Closed comminuted intertrochanteric fracture of proximal end of left femur (CMS/HCC) [S72.142A] 10/11/2018   • Pain, acute due to trauma [G89.11] 10/11/2018   • CKD (chronic kidney disease) stage 2, GFR 60-89 ml/min [N18.2] 06/11/2018   • Portal hypertension (CMS/HCC) [K76.6] 06/11/2018   • HIV (human immunodeficiency virus infection) (CMS/HCC) [B20] 06/11/2018   • Type 2 diabetes mellitus (CMS/HCC) [E11.9] 04/09/2018   • Cirrhosis of liver with ascites (CMS/HCC) [K74.60, R18.8] 08/14/2017   • Pancytopenia (CMS/HCC) [D61.818] 02/19/2017   • Iron deficiency anemia due to chronic blood loss [D50.0] 05/06/2016      Resolved Hospital Problems    Diagnosis Date Noted Date Resolved   No resolved problems to display.       Plan:  He has severe comorbidities - most notably Cirrhosis/HIV w/ resulting sequelae. He is about as medically stable as he is going to get - surgical choice for platelet transfusion noted which will be short lived but now up to 104 - will most closely postop as anticipate will need pRBC and more platelets. Coagulopathy secondary to cirrhosis - INR 1.35-1.48    D/w code status - he requests DNR/DNI - in the event he is unable to make medical decisions he appoints lucinaecade tyesha Miller at bedside to make medical decisions on his behalf    Brad Davidson MD  10/12/2018  1:12 PM

## 2018-10-12 NOTE — ANESTHESIA PREPROCEDURE EVALUATION
Anesthesia Evaluation     Patient summary reviewed and Nursing notes reviewed   NPO Solid Status: > 8 hours  NPO Liquid Status: > 8 hours           Airway   Mallampati: III  Possible difficult intubation  Dental      Comment: implants    Pulmonary     breath sounds clear to auscultation  (+) shortness of breath,   Cardiovascular     Rhythm: regular    (+) hypertension, CAD, PVD, hyperlipidemia,       Neuro/Psych  GI/Hepatic/Renal/Endo    (+)   liver disease, diabetes mellitus, hypothyroidism,     ROS Comment: cirrhosis    Musculoskeletal     Abdominal    Substance History      OB/GYN          Other   (+) blood dyscrasia (low platelets), arthritis   history of cancer      Other Comment: HIV                  Anesthesia Plan    ASA 4     general     intravenous induction   Anesthetic plan, all risks, benefits, and alternatives have been provided, discussed and informed consent has been obtained with: patient.

## 2018-10-12 NOTE — PROGRESS NOTES
Discharge Planning Assessment  [facility]     Patient Name: Kye Aranda  MRN: [id number]  Today's Date: 10/12/2018    Admit Date: 10/11/2018          Discharge Needs Assessment    No documentation.             Discharge Plan     Row Name 10/12/18 1257       Plan    Plan Comments The patient was current with [facility] prior to admission. This hospital stay is not covered by [facility] at this time. The [facility] team will follow and assist with any discharage needs that may arise. MELINA Cho RN, CCP.         Destination     No service coordination in this encounter.      Durable Medical Equipment     No service coordination in this encounter.      Dialysis/Infusion     No service coordination in this encounter.      Home Medical Care     No service coordination in this encounter.      Social Care     No service coordination in this encounter.                Demographic Summary    No documentation.           Functional Status    No documentation.           Psychosocial    No documentation.           Abuse/Neglect    No documentation.           Legal    No documentation.           Substance Abuse    No documentation.           Patient Forms    No documentation.         Klarissa Cho RN

## 2018-10-12 NOTE — SIGNIFICANT NOTE
OR called to update nursing that the patient received a pre-op antibiotic from anesthiologist in OR. This nurse called the number that OR gave the floor to inquire about the time at which the antibiotic was given to properly time the next dose accordingly. This nurse called and left a message for Kye Hines to call back to the nursing station because the antibiotic was not charted in the MAR. Notified night shift RN about call made.

## 2018-10-12 NOTE — DISCHARGE PLACEMENT REQUEST
"Kye England (72 y.o. Male)     Date of Birth Social Security Number Address Home Phone MRN    1945  [address] [id number] [phone] [id number]    [place] Marital Status          [employer] Single       Admission Date Admission Type Admitting Provider Attending Provider Department, Room/Bed    10/11/18 Emergency Brad Davidson MD Masden, Troy Andrew, MD [address], P894/1    Discharge Date Discharge Disposition Discharge Destination                       Attending Provider:  Brad Davidson MD    Allergies:  Dicyclomine, Abacavir, Methylprednisolone, Prednisolone    Isolation:  None   Infection:  None   Code Status:  No CPR    Ht:  167.6 cm (66\")   Wt:  73.8 kg (162 lb 9.6 oz)    Admission Cmt:  None   Principal Problem:  Closed comminuted intertrochanteric fracture of proximal end of left femur (CMS/[facility]) [S72.142A]                 Active Insurance as of 10/11/2018     Primary Coverage     Payor Plan Insurance Group Employer/Plan Group    MEDICARE MEDICARE A & B      Payor Plan Address Payor Plan Phone Number Effective From Effective To    [address] [phone] 8/1/2001     [facility] [id number]       Subscriber Name Subscriber Birth Date Member ID       KYE ENGLAND 1945 [id number]           Secondary Coverage     Payor Plan Insurance Group Employer/Plan Group    AARP MED SUPP AAR HEALTH CARE OPTIONS      Payor Plan Address Payor Plan Phone Number Effective From Effective To    [facility] [phone] 1/1/2016     [address]       [facility] [id number]       Subscriber Name Subscriber Birth Date Member ID       KYE ENGLAND 1945 [id number]           Tertiary Coverage     Payor Plan Insurance Group Employer/Plan Group    MISC NURSING HOME MISC NURSING HOME      Coverage Address Coverage Phone Number Effective From Effective To    [address] [phone] 4/20/2018     [place] [address]       Subscriber Name Subscriber " Birth Date Member ID       TROY ENGLAND 1945 [id number]                 Emergency Contacts      (Rel.) Home Phone Work Phone Mobile Phone    Melissa Miller (Significant Other) [phone] -- [phone]    Mckenna Santiago (Daughter) -- -- [phone]

## 2018-10-12 NOTE — PROGRESS NOTES
Called by nursing staff for complaints of shortness of breath and some mild chest discomfort.  He did not receive the IV fluids that were originally listed.  The nursing staff indicated that he was having some wheezing present.  Earlier in the day he did have IV fluids.  A an EKG and troponin have been ordered and the patient will receive 1 dose of IV Lasix and an albuterol treatment.  In the most recent past as patient was a hospice patient due to HIV, prostate cancer, alcoholic cirrhosis with chronic anemia and thrombocytopenia.  According to the Nursing staff he is no longer a hospice patient.

## 2018-10-13 PROBLEM — D62 POSTOPERATIVE ANEMIA DUE TO ACUTE BLOOD LOSS: Status: ACTIVE | Noted: 2018-01-01

## 2018-10-13 NOTE — PLAN OF CARE
Problem: Patient Care Overview  Goal: Plan of Care Review  Outcome: Ongoing (interventions implemented as appropriate)   10/13/18 0630   Coping/Psychosocial   Plan of Care Reviewed With patient   Plan of Care Review   Progress no change   OTHER   Outcome Summary client POD 0 L hip nailing. VSS, pain control achoeved wit h prn oxy IR. discussed BP monitoriung r/t hx HTN.      Goal: Individualization and Mutuality  Outcome: Ongoing (interventions implemented as appropriate)    Goal: Discharge Needs Assessment  Outcome: Ongoing (interventions implemented as appropriate)      Problem: Fall Risk (Adult)  Goal: Absence of Fall  Outcome: Ongoing (interventions implemented as appropriate)      Problem: Skin Injury Risk (Adult)  Goal: Skin Health and Integrity  Outcome: Ongoing (interventions implemented as appropriate)      Problem: Fractured Hip (Adult)  Goal: Signs and Symptoms of Listed Potential Problems Will be Absent, Minimized or Managed (Fractured Hip)  Outcome: Ongoing (interventions implemented as appropriate)

## 2018-10-13 NOTE — NURSING NOTE
Patient complained of shortness of breath. Dr de leon called. Told to give lasix now and respiratory called for breathing treatment. Patient unablt to void and dr de leon stated to place a fc

## 2018-10-13 NOTE — PROGRESS NOTES
"[facility] Visit Report    Kye Aranda  [id number]  10/13/2018    Admission R/T [facility] Dx: No    Reason for [facility] Admission: HIV/Cirrhosis    Symptom  Management: S/P IM nailing after sustaining left hip fx d/t a fall    Nursing/Medication Recommendations: Receiving 2 untis PRBC's today for HGB 7.0. Patient has received a 1x dose of IV Lasix 40mg today    Psychosocial Issues and Recommendations:    Spiritual Concerns and Recommendations:    [facility] Discharge Plans: No plans for discharge at this time; patient is s/p IM nailing for left hip fx after sustaining a fall at home. Patient receiving 2 units of PRBC's today for a HGB 7.0. Will help facilitate discharge as appropriate     Review of Visit: Patient lying in bed with eyes closed, awakens easily when name called. RN was at bedside when I entered room, but there is a physician at the desk to speak to her. I introduced myself to the patient, he is drowsy. He c/o pain to his left leg, \"because of the surgery\". He also tells me he has had some breathing issues today. However, he is receiving his 2nd of 2 units of PRBC's at this time. There is a nurse at the bedside assessing for any reaction and she tells me that patient did also receive a dose of IV Lasix 40mg before the 2nd unit of blood was started. Patient is noted to be on O2 at 2L/NC, upon auscultation lungs are diminished throughout. Abdomen is distended and with hypoactive bowel sounds. F/C is noted with more than adequate binu urine to bedside drain. Patient is receiving his antiretorviral medications here in the hospital. In the last 24 hours patient has received Roxicodone 10mg IR x3 doses and also has available Dilaudid 1mg IV q2h PRN and Ativan 0.5mg po q6h PRN. He has not received any Dilaudid or Ativan in the last 24 hours. After I visited the patient I saw his nurse, Cora, outside of the room. She was not aware that he is a [facility] patient. I updated her and explained that Mr Aranda is one of our " home patients and when they are hospitalized we follow along and help with any discharge needs, she v/u. Will continue to visit daily, assesss needs of patient/family and provide support        Mariah Galarza RN  [facility] Visit Nurse

## 2018-10-13 NOTE — NURSING NOTE
Patient stated that he is no longer having trouble breathing and is feeling much better after lasix and breathing treatment.

## 2018-10-13 NOTE — PROGRESS NOTES
Procedure(s):  LEFT HIP INTRAMEDULLARY NAILING     LOS: 2 days     Subjective :   Complains of pain, though better today.    Objective :    Vital signs in last 24 hours:  Vitals:    10/12/18 1909 10/12/18 2319 10/13/18 0321 10/13/18 0710   BP: 138/77 134/65 124/58 118/52   BP Location: Right arm Left arm Left arm Right arm   Patient Position: Lying Lying Lying Lying   Pulse: 96 86 87 73   Resp: 14 14 14 14   Temp: Comment: PATIENT EATING ICE CREAM 98.1 °F (36.7 °C) 98.8 °F (37.1 °C) 97.8 °F (36.6 °C)   TempSrc:  Oral Oral Oral   SpO2: 91% 92% 93% 93%   Weight:       Height:           PHYSICAL EXAM:  Patient is calm, in no acute distress, awake and oriented x 3.  Dressing is clean, dry and intact.  No signs of infection.  Swelling is appropriate in amount.  Ecchymosis is appropriate in amount.  Homans test is negative.  Patient is neurovascularly intact distally.    LABS:    Results from last 7 days  Lab Units 10/13/18  0623 10/13/18  0348   WBC 10*3/mm3  --  3.59*   HEMOGLOBIN g/dL 7.0* 7.1*   HEMATOCRIT % 22.2* 22.3*   PLATELETS 10*3/mm3  --  53*       Results from last 7 days  Lab Units 10/13/18  0348   SODIUM mmol/L 133*   POTASSIUM mmol/L 4.3   CHLORIDE mmol/L 99   CO2 mmol/L 21.3*   BUN mg/dL 22   CREATININE mg/dL 1.10   GLUCOSE mg/dL 260*   CALCIUM mg/dL 7.5*       Results from last 7 days  Lab Units 10/12/18  0640 10/11/18  0653   INR  1.48* 1.35*   APTT seconds  --  41.5*       ASSESSMENT:  Status post Procedure(s):  LEFT HIP INTRAMEDULLARY NAILING     Acute on chronic blood loss anemia  thrombocytopenia    Plan:  Blood transfusion 2 units ordered.  Continue Physical Therapy, increase mobility and range of motion as tolerated, WBAT.  Continue SCDs, Continue Lovenox for DVT prophylaxis while inpatient.  Dispo planning.    Wade Campa MD    Date: 10/13/2018  Time: 7:12 AM

## 2018-10-13 NOTE — PROGRESS NOTES
"    DAILY PROGRESS NOTE  [facility]    Patient Identification:  Name: Kye Aranda  Age: 72 y.o.  Sex: male  :  1945  MRN: [id number]         Primary Care Physician: Kendrick Griggs MD    Subjective:  Interval History: chest discomfort w/ cough/congestion - no n/v - tolerating PO - still w/ pain though improved     Objective:no fm - d/w rn     Scheduled Meds:    cobicistat 150 mg Oral Daily With Breakfast   darunavir 800 mg Oral Daily With Breakfast   famotidine 40 mg Oral Daily   fondaparinux 2.5 mg Subcutaneous Q24H   furosemide 40 mg Intravenous Once   furosemide 20 mg Oral Daily   lactulose 20 g Oral BID   raltegravir 400 mg Oral Q12H   rifAXIMin 550 mg Oral Q12H   rilpivirine 25 mg Oral Daily   sodium chloride 3 mL Intravenous Q12H   spironolactone 25 mg Oral Daily   tenofovir 300 mg Oral Daily     Continuous Infusions:     Vital signs in last 24 hours:  Temp:  [97.6 °F (36.4 °C)-98.8 °F (37.1 °C)] 97.7 °F (36.5 °C)  Heart Rate:  [72-96] 72  Resp:  [12-20] 20  BP: ()/(52-77) 108/53    Intake/Output:    Intake/Output Summary (Last 24 hours) at 10/13/18 1038  Last data filed at 10/13/18 0405   Gross per 24 hour   Intake             1478 ml   Output              225 ml   Net             1253 ml       Exam:  /53   Pulse 72   Temp 97.7 °F (36.5 °C) (Oral)   Resp 20   Ht 167.6 cm (66\")   Wt 73.8 kg (162 lb 9.6 oz)   SpO2 93%   BMI 26.24 kg/m²     General Appearance:    Alert, cooperative, no distress, AAOx3, clinically better                          Throat:   Lips, tongue, gums normal; oral mucosa pink and moist                           Neck:   Supple, symmetrical, trachea midline, no JVD                         Lungs:    Decreased basses w/ diffuse rale/rhonchi to auscultation bilaterally, respirations unlabored                 Chest Wall:    No tenderness or deformity                          Heart:    Regular rate and rhythm, S1 and S2 normal                  Abdomen:    "  Soft, non-tender, bowel sounds active, distension stable                 Extremities:   No cyanosis or edema                  Neurologic:   CNII-XII intact, moving all w/o focal deficits noted     Data Review:  Labs in chart were reviewed.    Assessment:  Active Hospital Problems    Diagnosis Date Noted   • **Postoperative anemia due to acute blood loss [D62] 10/13/2018   • Closed comminuted intertrochanteric fracture of proximal end of left femur (CMS/HCC) [S72.142A] 10/11/2018   • Pain, acute due to trauma [G89.11] 10/11/2018   • CKD (chronic kidney disease) stage 2, GFR 60-89 ml/min [N18.2] 06/11/2018   • Portal hypertension (CMS/HCC) [K76.6] 06/11/2018   • HIV (human immunodeficiency virus infection) (CMS/HCC) [B20] 06/11/2018   • Type 2 diabetes mellitus (CMS/HCC) [E11.9] 04/09/2018   • Cirrhosis of liver with ascites (CMS/HCC) [K74.60, R18.8] 08/14/2017   • Pancytopenia (CMS/HCC) [D61.818] 02/19/2017   • Iron deficiency anemia due to chronic blood loss [D50.0] 05/06/2016      Resolved Hospital Problems    Diagnosis Date Noted Date Resolved   No resolved problems to display.       Plan:  POD1 L- IM nailing    -increase Pulm toilet w/ IS and Duonebs     ABLA - transfuse 2u for Hgb 7.0- Lasix 20mg IV between units     CP - associated w/ cough/anemia - hold further Cards w/up and transfuse/diurese    Pancytopenia - monitor platelets as will likely require additional transfusion    -DC Lovenox - asked RN to inform Ortho - transition to Arixtra     HIV - on meds     DM2 - stable - ssi     Code status DNR    Brad Davidson MD  10/13/2018  10:38 AM

## 2018-10-13 NOTE — PLAN OF CARE
Problem: Patient Care Overview  Goal: Plan of Care Review  Outcome: Ongoing (interventions implemented as appropriate)   10/13/18 7917   Coping/Psychosocial   Plan of Care Reviewed With patient   Plan of Care Review   Progress improving   OTHER   Outcome Summary pain under control with pain medcation. patient to recieve 2 units of prbc today. patient had indwelling catheter placed due to urinary retention. patient and significant other educated about dm and dm medcaiotns.      Goal: Discharge Needs Assessment  Outcome: Ongoing (interventions implemented as appropriate)    Goal: Interprofessional Rounds/Family Conf  Outcome: Ongoing (interventions implemented as appropriate)      Problem: Fall Risk (Adult)  Goal: Absence of Fall  Outcome: Ongoing (interventions implemented as appropriate)      Problem: Skin Injury Risk (Adult)  Goal: Skin Health and Integrity  Outcome: Ongoing (interventions implemented as appropriate)      Problem: Fractured Hip (Adult)  Goal: Signs and Symptoms of Listed Potential Problems Will be Absent, Minimized or Managed (Fractured Hip)  Outcome: Ongoing (interventions implemented as appropriate)

## 2018-10-13 NOTE — SIGNIFICANT NOTE
10/13/18 0936   Rehab Time/Intention   Evaluation Not Performed patient/family decline, not feeling well  (hgb 7.0, to receive blood soon, also reporting chest pain, will attempt back as able this pm, spke to RN. )   Rehab Treatment   Discipline physical therapist   Recommendation   PT - Next Appointment 10/14/18

## 2018-10-13 NOTE — ANESTHESIA POSTPROCEDURE EVALUATION
Patient: Kye Aranda    Procedure Summary     Date:  10/12/18 Room / Location:  [facility] OR [address] [place] [address] MAIN OR    Anesthesia Start:  1532 Anesthesia Stop:  1640    Procedure:  LEFT HIP INTRAMEDULLARY NAILING (Left Thigh) Diagnosis:      Surgeon:  Wade Campa MD Provider:  Kye Hines MD    Anesthesia Type:  general ASA Status:  4          Anesthesia Type: general  Last vitals  BP   138/77 (10/12/18 1909)   Temp   36.4 °C (97.6 °F) (10/12/18 1826)   Pulse   96 (10/12/18 1909)   Resp   14 (10/12/18 1909)     SpO2   91 % (10/12/18 1909)     Post Anesthesia Care and Evaluation    Patient location during evaluation: bedside  Patient participation: complete - patient participated  Level of consciousness: awake  Pain score: 1  Pain management: adequate  Airway patency: patent  Anesthetic complications: No anesthetic complications    Cardiovascular status: acceptable  Respiratory status: acceptable  Hydration status: acceptable    Comments: --------------------            10/12/18               1909     --------------------   BP:       138/77     Pulse:      96       Resp:       14       Temp:                SpO2:      91%      --------------------

## 2018-10-14 NOTE — PROGRESS NOTES
Procedure(s):  LEFT HIP INTRAMEDULLARY NAILING     LOS: 3 days     Subjective :   Complains of pain controlled with meds.    Objective :    Vital signs in last 24 hours:  Vitals:    10/13/18 2221 10/13/18 2311 10/14/18 0308 10/14/18 0713   BP:  128/56 150/74 125/57   BP Location:  Left arm Right arm Right arm   Patient Position:  Lying Lying Lying   Pulse: 79 82 93 82   Resp: 16 16 16 16   Temp:  98.2 °F (36.8 °C) 98.7 °F (37.1 °C) 97.3 °F (36.3 °C)   TempSrc:  Oral Oral Oral   SpO2: 94% 96% 92% 94%   Weight:       Height:           PHYSICAL EXAM:  Patient is calm, in no acute distress.  Dressing is clean, dry and intact.  No signs of infection.  Swelling is appropriate in amount.  Ecchymosis is appropriate in amount.  Homans test is negative.  Patient is neurovascularly intact distally.    LABS:    Results from last 7 days  Lab Units 10/14/18  0406   WBC 10*3/mm3 5.34   HEMOGLOBIN g/dL 9.1*   HEMATOCRIT % 27.7*   PLATELETS 10*3/mm3 42*       Results from last 7 days  Lab Units 10/14/18  0406   SODIUM mmol/L 134*   POTASSIUM mmol/L 3.8   CHLORIDE mmol/L 98   CO2 mmol/L 23.7   BUN mg/dL 21   CREATININE mg/dL 1.04   GLUCOSE mg/dL 216*   CALCIUM mg/dL 7.5*       Results from last 7 days  Lab Units 10/12/18  0640 10/11/18  0653   INR  1.48* 1.35*   APTT seconds  --  41.5*       ASSESSMENT:  Status post Procedure(s):  LEFT HIP INTRAMEDULLARY NAILING     Acute post-op on chronic blood loss anemia, improved after 2 units of Prbc.    Plan:  D/C julian.  Continue Physical Therapy, increase mobility and range of motion as tolerated.  WBAT.  Continue SCDs, Continue Arixtra for DVT prophylaxis.  Dispo planning per primary.  Change dressing prn.  If goes home would continue home health P.T./[facility]AK[facility]  [place] to be d/c in 2 weeks.    Follow up in office in 2 weeks with Olga Garcia PA-C [phone].  Call if questions.    Wade Campa MD    Date: 10/14/2018  Time: 8:16 AM

## 2018-10-14 NOTE — PROGRESS NOTES
[facility] Visit Report    Kye Aranda  [id number]  10/14/2018    Admission R/T [facility] Dx: No    Reason for [facility] Admission: HIV/Cirrhosis    Symptom  Management: left hip fx s/p fall now s/p IM nailing    Nursing/Medication Recommendations: Continue pain control    Psychosocial Issues and Recommendations:    Spiritual Concerns and Recommendations:    [facility] Discharge Plans:  No discharge plans at the time of this visit. Per notes physical therapy is recommending [facility] however it is noted that patient says he wants to go home. Will follow up for plan    Review of Visit: Collaborated with facility [facility]; no issues/concerns reported. Patient lying in bed sleeping very soundly. No s/s distress noted. Respirations are even and unlabored on RA. Patient appears comfortable. No family member is present. Per notes patient walked with physical therapy today; notes indicate that P.T. recommends [facility], however it is noted that patient said he wishes to go home.Labs today reveal HGB now 9.1 after receiving 2 units PRBC's yesterday. In the last 24 hours patient has received Roxicodone 10mg x3 doses for pain control.  Will follow up with discharge plan on Monday. Will continue to visit daily, assess needs of patient/family  and provide support        Mariah Galarza RN  [facility] Visit Nurse

## 2018-10-14 NOTE — PLAN OF CARE
Problem: Patient Care Overview  Goal: Plan of Care Review  Mr. Aranda is a 73 y/o male 2 days s/p L hip IM nailing. He is WBAT LLE.  Reports independence with mobility using RW prior to admit, lives with SO.  He requires significant encouragement to participate with therapy despite education re: benefits of activity/risks of immobility. He requires assistance for all mobility efforts and demonstrates very short step length and stop to pattern with gait requiring excessive time between steps. Mr. Aranda will benefit from skilled physical therapy. He wishes to discharge home, however at this time I recommend DC to SNF.

## 2018-10-14 NOTE — PLAN OF CARE
Problem: Patient Care Overview  Goal: Plan of Care Review  Outcome: Ongoing (interventions implemented as appropriate)   10/14/18 0159 10/14/18 1600 10/14/18 1615   Coping/Psychosocial   Plan of Care Reviewed With --  patient --    Plan of Care Review   Progress improving --  --    OTHER   Outcome Summary --  --  pt POD 2 confused at times. VSS. dressing CDI. labs stable. F/C D/C'd. voiding per urinal, no difficulty noted. family at bedside. pain is controlled with PO pain medication. educated on the importance of BP monitoring and the use of medications as ordered for the HO HTN. reinforcement needed. will cont to monitor.      Goal: Individualization and Mutuality  Outcome: Ongoing (interventions implemented as appropriate)    Goal: Discharge Needs Assessment  Outcome: Ongoing (interventions implemented as appropriate)    Goal: Interprofessional Rounds/Family Conf  Outcome: Ongoing (interventions implemented as appropriate)      Problem: Fall Risk (Adult)  Goal: Identify Related Risk Factors and Signs and Symptoms  Outcome: Ongoing (interventions implemented as appropriate)    Goal: Absence of Fall  Outcome: Ongoing (interventions implemented as appropriate)      Problem: Skin Injury Risk (Adult)  Goal: Identify Related Risk Factors and Signs and Symptoms  Outcome: Ongoing (interventions implemented as appropriate)    Goal: Skin Health and Integrity  Outcome: Ongoing (interventions implemented as appropriate)      Problem: Fractured Hip (Adult)  Goal: Signs and Symptoms of Listed Potential Problems Will be Absent, Minimized or Managed (Fractured Hip)  Outcome: Ongoing (interventions implemented as appropriate)

## 2018-10-14 NOTE — THERAPY EVALUATION
Acute Care - Physical Therapy Initial Evaluation  [facility]     Patient Name: Kye Aranda  : 1945  MRN: [id number]  Today's Date: 10/14/2018   Onset of Illness/Injury or Date of Surgery: 10/12/18  Date of Referral to PT: 10/12/18         Admit Date: 10/11/2018    Visit Dx:     ICD-10-CM ICD-9-CM   1. Closed comminuted intertrochanteric fracture of left femur, initial encounter (CMS/HCC) S72.142A 820.21   2. Cirrhosis of liver with ascites, unspecified hepatic cirrhosis type (CMS/HCC) K74.60 571.5    R18.8    3. Impaired mobility Z74.09 799.89     Patient Active Problem List   Diagnosis   • Chronic coronary artery disease   • Difficulty breathing   • Intermittent claudication (CMS/HCC)   • Peripheral arterial occlusive disease (CMS/HCC)   • Shortness of breath   • Iron deficiency anemia due to chronic blood loss   • Thrombocytopenia associated with AIDS (CMS/HCC)   • Leukocytopenia   • Neutropenia (CMS/HCC)   • Pancytopenia (CMS/HCC)   • Iron and its compounds causing adverse effect in therapeutic use   • Cirrhosis of liver with ascites (CMS/HCC)   • Lumbar degenerative disc disease   • Secondary thrombocytopenia   • History of coronary artery stent placement   • T12 compression fracture (CMS/HCC)   • Weight loss, abnormal   • Hypothyroidism   • Type 2 diabetes mellitus (CMS/HCC)   • Severe protein-calorie malnutrition due to chronic illness   • Pathological fracture of thoracic vertebra with routine healing   • Age-related osteoporosis with current pathological fracture   • Acute bilateral thoracic back pain   • Age-related osteoporosis with current pathological fracture of vertebra (CMS/HCC)   • Thrombocytopenia (CMS/HCC)   • Compression fracture of body of thoracic vertebra (CMS/HCC)   • Lumbar spine pain   • HIV (human immunodeficiency virus infection) (CMS/HCC)   • CKD (chronic kidney disease) stage 2, GFR 60-89 ml/min   • Portal hypertension (CMS/HCC)   • Other cirrhosis of liver (CMS/HCC)   •  Adenomatous polyp of colon   • Closed comminuted intertrochanteric fracture of proximal end of left femur (CMS/HCC)   • Pain, acute due to trauma   • Postoperative anemia due to acute blood loss     Past Medical History:   Diagnosis Date   • Anemia    • Cancer (CMS/HCC) 2001    Prostate   • Cirrhosis (CMS/HCC)    • Coronary artery disease    • Diabetes mellitus (CMS/HCC)    • H/O complete eye exam 10/2017   • History of prostate cancer 2012   • HIV (human immunodeficiency virus infection) (CMS/HCC)    • Hyperlipidemia    • Hypertension    • Lactose intolerance    • Lumbar stress fracture    • Osteoporosis    • Pancytopenia (CMS/HCC)    • Peripheral artery disease (CMS/HCC)    • SOB (shortness of breath) on exertion    • Thyroid disease      Past Surgical History:   Procedure Laterality Date   • BONE MARROW BIOPSY W/ ASPIRATION  01/21/2014   • CARDIAC CATHETERIZATION     • COLONOSCOPY  09/20/2012    non-thrombosed EH, sessile polyp in ascending colon 3 mm in size.   • CORONARY ANGIOPLASTY WITH STENT PLACEMENT  06/2013   • ENDOSCOPY  09/20/2012    grade 1 varicesmiddle and lower 3rd of esophagus. Moderate portal hypertensive gastropathy in entire stomach.   • KYPHOPLASTY Bilateral 5/3/2018    Procedure: T 12 KYPHOPLASTY 1-2 LEVELS;  Surgeon: Joey Davis MD;  Location: [facility] OR;  Service: Neurosurgery   • KYPHOPLASTY N/A 5/29/2018    Procedure: KYPHOPLASTY, L1, T11 kyphoplsty;  Surgeon: Joey Davis MD;  Location: [facility] OR 18/19;  Service: Neurosurgery   • MOUTH SURGERY     • PROSTATE BIOPSY     • TONSILLECTOMY     • UPPER GASTROINTESTINAL ENDOSCOPY  09/20/2012    grd 1 varices, portal hypertensive gastropathy        PT ASSESSMENT (last 12 hours)      Physical Therapy Evaluation     Row Name 10/14/18 1100          PT Evaluation Time/Intention    Subjective Information complains of;weakness;fatigue;pain  -GJ     Document Type evaluation  -GJ     Mode of Treatment individual therapy;physical  therapy  -GJ     Total Evaluation Minutes, Physical Therapy 35  -GJ     Patient Effort fair  -GJ     Symptoms Noted During/After Treatment fatigue  -GJ     Comment pt requiries max encourgament for participatio  -GJ     Row Name 10/14/18 1100          General Information    Patient Profile Reviewed? yes  -GJ     Onset of Illness/Injury or Date of Surgery 10/12/18  -GJ     Patient Observations agree to therapy;poorly cooperative  -GJ     General Observations of Patient pt UIC  -GJ     Prior Level of Function independent:;all household mobility;community mobility;gait;transfer  -GJ     Equipment Currently Used at Home walker, rolling;cane, quad  -GJ     Pertinent History of Current Functional Problem Mr. Aranda is 2 days s/p L hip IM nailing. He lives with his SO, 2 steps to enter, lives first floor, uses RW at home.  He wishes to go home.  -GJ     Existing Precautions/Restrictions fall  -GJ     Risks Reviewed patient:;LOB;nausea/vomiting;dizziness;increased discomfort;change in vital signs;increased drainage;lines disloged  -GJ     Benefits Reviewed patient:;improve function;increase independence;increase strength;increase balance;decrease pain;decrease risk of DVT;improve skin integrity;increase knowledge  -GJ     Barriers to Rehab none identified  -GJ     Row Name 10/14/18 1100          Relationship/Environment    Primary Source of Support/Comfort significant other  -GJ     Lives With significant other  -GJ     Expected Impact of Illness/Hospitalization impaired houshold/community mobility  -GJ     Row Name 10/14/18 1100          Resource/Environmental Concerns    Current Living Arrangements home/apartment/condo  -GJ     Row Name 10/14/18 1100          Home Main Entrance    Number of Stairs, Main Entrance two  -GJ     Row Name 10/14/18 1100          Cognitive Assessment/Intervention- PT/OT    Orientation Status (Cognition) oriented x 4  -GJ     Follows Commands (Cognition) WNL  -GJ     Row Name 10/14/18 1100           Mobility Assessment/Treatment    Extremity Weight-bearing Status left lower extremity  -GJ     Left Lower Extremity (Weight-bearing Status) weight-bearing as tolerated (WBAT)  -GJ     Row Name 10/14/18 1100          Bed Mobility Assessment/Treatment    Bed Mobility Assessment/Treatment supine-sit-supine  -GJ     Supine-Sit-Supine Jones (Bed Mobility) not tested   UIC  -GJ     Row Name 10/14/18 1100          Transfer Assessment/Treatment    Transfer Assessment/Treatment sit-stand transfer;stand-sit transfer  -GJ     Maintains Weight-bearing Status (Transfers) able to maintain  -GJ     Sit-Stand Jones (Transfers) set up;verbal cues;nonverbal cues (demo/gesture);minimum assist (75% patient effort);moderate assist (50% patient effort)  -GJ     Stand-Sit Jones (Transfers) set up;verbal cues;nonverbal cues (demo/gesture);minimum assist (75% patient effort)  -GJ     Row Name 10/14/18 1100          Sit-Stand Transfer    Assistive Device (Sit-Stand Transfers) walker, front-wheeled  -GJ     Row Name 10/14/18 1100          Stand-Sit Transfer    Assistive Device (Stand-Sit Transfers) walker, front-wheeled  -GJ     Row Name 10/14/18 1100          Gait/Stairs Assessment/Training    Jones Level (Gait) set up;verbal cues;nonverbal cues (demo/gesture);minimum assist (75% patient effort)  -     Assistive Device (Gait) walker, front-wheeled  -GJ     Distance in Feet (Gait) 15  -GJ     Pattern (Gait) step-to  -GJ     Deviations/Abnormal Patterns (Gait) base of support, narrow;jackie decreased;gait speed decreased;stride length decreased  -     Row Name 10/14/18 1100          General ROM    GENERAL ROM COMMENTS BUE grossly WFL, RLE grossly WFL  -GJ     Row Name 10/14/18 1100          MMT (Manual Muscle Testing)    General MMT Comments BUE grossly 3+/5 (in available range), RLE grossly 4/5, L DF 4+/5  -     Row Name 10/14/18 1100          Motor Assessment/Intervention    Additional Documentation  Therapeutic Exercise (Group);Therapeutic Exercise Interventions (Group)  -GJ     Row Name 10/14/18 1100          Therapeutic Exercise    Lower Extremity (Therapeutic Exercise) gluteal sets;LAQ (long arc quad), bilateral;quad sets, bilateral   ankle pumps  -GJ     Exercise Type (Therapeutic Exercise) AAROM (active assistive range of motion);AROM (active range of motion)  -GJ     Sets/Reps (Therapeutic Exercise) 15  -GJ     Row Name             Wound 10/12/18 1612 Left hip incision    Wound - Properties Group Date first assessed: 10/12/18  -BM Time first assessed: 1612  -BM Side: Left  -BM Location: hip  -BM Type: incision  -BM    Row Name 10/14/18 1100          Plan of Care Review    Plan of Care Reviewed With patient  -GJ     Row Name 10/14/18 1100          Physical Therapy Clinical Impression    Date of Referral to PT 10/12/18  -GJ     PT Diagnosis (PT Clinical Impression) impaired mobility  -GJ     Patient/Family Goals Statement (PT Clinical Impression) go home  -GJ     Criteria for Skilled Interventions Met (PT Clinical Impression) yes;treatment indicated  -GJ     Pathology/Pathophysiology Noted (Describe Specifically for Each System) musculoskeletal  -GJ     Functional Limitations in Following Categories (Describe Specific Limitations) self-care;home management;community/leisure  -GJ     Rehab Potential (PT Clinical Summary) fair, will monitor progress closely  -GJ     Predicted Duration of Therapy (PT) 4 days  -GJ     Care Plan Review (PT) evaluation/treatment results reviewed;care plan/treatment goals reviewed;risks/benefits reviewed;current/potential barriers reviewed;patient/other agree to care plan  -GJ     Patient/Family Concerns, Anticipated Discharge Disposition (PT) home  -GJ     Row Name 10/14/18 1100          Physical Therapy Goals    Bed Mobility Goal Selection (PT) bed mobility, PT goal 1  -GJ     Transfer Goal Selection (PT) transfer, PT goal 1  -GJ     Gait Training Goal Selection (PT) gait  training, PT goal 1  -GJ     Row Name 10/14/18 1100          Bed Mobility Goal 1 (PT)    Activity/Assistive Device (Bed Mobility Goal 1, PT) sit to supine/supine to sit  -GJ     Gibson Level/Cues Needed (Bed Mobility Goal 1, PT) conditional independence  -GJ     Time Frame (Bed Mobility Goal 1, PT) 4 days  -GJ     Row Name 10/14/18 1100          Transfer Goal 1 (PT)    Activity/Assistive Device (Transfer Goal 1, PT) sit-to-stand/stand-to-sit;walker, rolling  -GJ     Gibson Level/Cues Needed (Transfer Goal 1, PT) conditional independence  -GJ     Time Frame (Transfer Goal 1, PT) 4 days  -GJ     Row Name 10/14/18 1100          Gait Training Goal 1 (PT)    Activity/Assistive Device (Gait Training Goal 1, PT) gait (walking locomotion);walker, rolling  -GJ     Gibson Level (Gait Training Goal 1, PT) conditional independence  -GJ     Distance (Gait Goal 1, PT) 150  -GJ     Time Frame (Gait Training Goal 1, PT) 4 days  -GJ     Row Name 10/14/18 1100          Positioning and Restraints    Pre-Treatment Position sitting in chair/recliner  -GJ     Post Treatment Position chair  -GJ     In Chair notified nsg;reclined;call light within reach;encouraged to call for assist;with family/caregiver  -GJ     Row Name 10/14/18 1100          Living Environment    Home Accessibility stairs to enter home  -GJ       User Key  (r) = Recorded By, (t) = Taken By, (c) = Cosigned By    Initials Name Provider Type    GJ Gallo Schneider, PT Physical Therapist    Omaira Feliciano, RN Registered Nurse          Physical Therapy Education     Title: PT OT SLP Therapies (Done)     Topic: Physical Therapy (Done)     Point: Mobility training (Done)    Learning Progress Summary     Learner Status Readiness Method Response Comment Documented by    Patient Done Acceptance E,D MARLENI,DEMARCO strong encouragement to participate with therapy, reviewed risks of imobility, benefits of activiyt  10/14/18 115          Point: Home exercise program  (Done)    Learning Progress Summary     Learner Status Readiness Method Response Comment Documented by    Patient Done Acceptance ROMIE MARTINS,VU strong encouragement to participate with therapy, reviewed risks of imobility, benefits of activiyt GJ 10/14/18 1157          Point: Body mechanics (Done)    Learning Progress Summary     Learner Status Readiness Method Response Comment Documented by    Patient Done Acceptance ROMIE MARTINS,VU strong encouragement to participate with therapy, reviewed risks of imobility, benefits of activiyt GJ 10/14/18 1157          Point: Precautions (Done)    Learning Progress Summary     Learner Status Readiness Method Response Comment Documented by    Patient Done Acceptance ROMIE MARTINS,VU strong encouragement to participate with therapy, reviewed risks of imobility, benefits of activiyt GJ 10/14/18 1157                      User Key     Initials Effective Dates Name Provider Type Discipline     03/07/18 -  Gallo Schneider, PT Physical Therapist PT                PT Recommendation and Plan  Anticipated Discharge Disposition (PT): skilled nursing facility  Planned Therapy Interventions (PT Eval): balance training, bed mobility training, gait training, home exercise program, patient/family education, strengthening, transfer training  Therapy Frequency (PT Clinical Impression): daily  Outcome Summary/Treatment Plan (PT)  Anticipated Equipment Needs at Discharge (PT):  (none)  Anticipated Discharge Disposition (PT): skilled nursing facility  Patient/Family Concerns, Anticipated Discharge Disposition (PT): home  Plan of Care Reviewed With: patient          Outcome Measures     Row Name 10/14/18 1200             How much help from another person do you currently need...    Turning from your back to your side while in flat bed without using bedrails? 2  -GJ      Moving from lying on back to sitting on the side of a flat bed without bedrails? 2  -GJ      Moving to and from a bed to a chair (including a  wheelchair)? 3  -GJ      Standing up from a chair using your arms (e.g., wheelchair, bedside chair)? 3  -GJ      Climbing 3-5 steps with a railing? 1  -GJ      To walk in hospital room? 3  -GJ      AM-PAC 6 Clicks Score 14  -GJ         Functional Assessment    Outcome Measure Options AM-PAC 6 Clicks Basic Mobility (PT)  -GJ        User Key  (r) = Recorded By, (t) = Taken By, (c) = Cosigned By    Initials Name Provider Type    Gallo Crowley, PT Physical Therapist           Time Calculation:         PT Charges     Row Name 10/14/18 1201 10/14/18 0848          Time Calculation    Start Time 1100  -GJ  --     Stop Time 1135  -GJ  --     Time Calculation (min) 35 min  -GJ  --     PT Received On 10/14/18  -GJ  --     PT - Next Appointment 10/15/18  -GJ 10/14/18  -GJ        Timed Charges    70968 - PT Therapeutic Activity Minutes 15  -GJ  --       User Key  (r) = Recorded By, (t) = Taken By, (c) = Cosigned By    Initials Name Provider Type    Gallo Crowley, PT Physical Therapist        Therapy Suggested Charges     Code   Minutes Charges    71848 (CPT®) Hc Pt Neuromusc Re Education Ea 15 Min      29617 (CPT®) Hc Pt Ther Proc Ea 15 Min      82773 (CPT®) Hc Gait Training Ea 15 Min      61476 (CPT®) Hc Pt Therapeutic Act Ea 15 Min 15 1    12230 (CPT®) Hc Pt Manual Therapy Ea 15 Min      69635 (CPT®) Hc Pt Iontophoresis Ea 15 Min      73150 (CPT®) Hc Pt Elec Stim Ea-Per 15 Min      58564 (CPT®) Hc Pt Ultrasound Ea 15 Min      97886 (CPT®) Hc Pt Self Care/Mgmt/Train Ea 15 Min      76270 (CPT®) Hc Pt Prosthetic (S) Train Initial Encounter, Each 15 Min      40398 (CPT®) Hc Pt Orthotic(S)/Prosthetic(S) Encounter, Each 15 Min      00325 (CPT®) Hc Orthotic(S) Mgmt/Train Initial Encounter, Each 15min      Total  15 1        Therapy Charges for Today     Code Description Service Date Service Provider Modifiers Qty    86303256246 HC PT THERAPEUTIC ACT EA 15 MIN 10/14/2018 Gallo Schneider, PT GP 1    30377227751 HC PT EVAL MOD  COMPLEXITY 1 10/14/2018 Gallo Schneider, PT GP 1          PT G-Codes  Outcome Measure Options: AM-PAC 6 Clicks Basic Mobility (PT)  AM-PAC 6 Clicks Score: 14      Gallo Schneider, PT  10/14/2018

## 2018-10-14 NOTE — SIGNIFICANT NOTE
10/14/18 0848   Rehab Time/Intention   Evaluation Not Performed patient/family declined evaluation  (attempted eval this AM, pt. sleeping, reporting lots of pain and just took pain med, wishes PT to attempt back latera.  WIll attempt back as able this PM)   Rehab Treatment   Discipline physical therapist   Recommendation   PT - Next Appointment 10/14/18

## 2018-10-14 NOTE — PROGRESS NOTES
"    DAILY PROGRESS NOTE  [facility]    Patient Identification:  Name: Kye Aranda  Age: 72 y.o.  Sex: male  :  1945  MRN: [id number]         Primary Care Physician: Kendrick Griggs MD    Subjective:  Interval History: no new issues - feeling better each day - still w/ congestion though better     Objective:no fm - d/w rn     Scheduled Meds:  cobicistat 150 mg Oral Daily With Breakfast   darunavir 800 mg Oral Daily With Breakfast   famotidine 40 mg Oral Daily   fondaparinux 2.5 mg Subcutaneous Q24H   furosemide 20 mg Oral Daily   insulin aspart 0-7 Units Subcutaneous TID With Meals   ipratropium-albuterol 3 mL Nebulization 4x Daily - RT   lactulose 20 g Oral BID   raltegravir 400 mg Oral Q12H   rifAXIMin 550 mg Oral Q12H   rilpivirine 25 mg Oral Daily   sodium chloride 3 mL Intravenous Q12H   spironolactone 25 mg Oral Daily   tenofovir 300 mg Oral Daily     Continuous Infusions:     Vital signs in last 24 hours:  Temp:  [97.1 °F (36.2 °C)-98.7 °F (37.1 °C)] 97.3 °F (36.3 °C)  Heart Rate:  [69-93] 84  Resp:  [16-20] 16  BP: (121-150)/(56-75) 125/57    Intake/Output:    Intake/Output Summary (Last 24 hours) at 10/14/18 1108  Last data filed at 10/14/18 0308   Gross per 24 hour   Intake           630.33 ml   Output             1350 ml   Net          -719.67 ml       Exam:  /57 (BP Location: Right arm, Patient Position: Lying)   Pulse 84   Temp 97.3 °F (36.3 °C) (Oral)   Resp 16   Ht 167.6 cm (66\")   Wt 73.8 kg (162 lb 9.6 oz)   SpO2 94%   BMI 26.24 kg/m²     General Appearance:    Alert, cooperative, no distress, AAOx3                         Throat:   Lips, tongue, gums normal; oral mucosa pink and moist                           Neck:   Supple, symmetrical, trachea midline, no JVD                         Lungs:    Referred bronchial sounds to auscultation bilaterally - likely congestion related to recent intubation, respirations unlabored                 Chest Wall:    No " tenderness or deformity                          Heart:    Regular rate and rhythm, S1 and S2 normal                  Abdomen:     Soft, non-tender, bowel sounds active                 Extremities:   No cyanosis or edema                        Pulses:   Pulses palpable in lower extremities                               Data Review:  Labs in chart were reviewed.    Assessment:  Active Hospital Problems    Diagnosis Date Noted   • **Postoperative anemia due to acute blood loss [D62] 10/13/2018   • Closed comminuted intertrochanteric fracture of proximal end of left femur (CMS/HCC) [S72.142A] 10/11/2018   • Pain, acute due to trauma [G89.11] 10/11/2018   • CKD (chronic kidney disease) stage 2, GFR 60-89 ml/min [N18.2] 06/11/2018   • Portal hypertension (CMS/HCC) [K76.6] 06/11/2018   • HIV (human immunodeficiency virus infection) (CMS/HCC) [B20] 06/11/2018   • Type 2 diabetes mellitus (CMS/HCC) [E11.9] 04/09/2018   • Cirrhosis of liver with ascites (CMS/HCC) [K74.60, R18.8] 08/14/2017   • Pancytopenia (CMS/HCC) [D61.818] 02/19/2017   • Iron deficiency anemia due to chronic blood loss [D50.0] 05/06/2016      Resolved Hospital Problems    Diagnosis Date Noted Date Resolved   No resolved problems to display.       Plan:  POD2 L- IM nailing               -increased Pulm toilet w/ IS and Duonebs      ABLA - transfused 2u for Hgb 7.0-9.1       Pancytopenia - monitor platelets as will likely require additional transfusion - at baseline in 40's              -transitioned to Arixtra      HIV - on meds      DM2 - stable - ssi      Code status DNR    Dispo - resume Flomax at increased dosage - VT in am - plans for HH tomorrow pending am labs/vitals     Brad Davidson MD  10/14/2018  11:08 AM

## 2018-10-14 NOTE — PLAN OF CARE
Problem: Patient Care Overview  Goal: Plan of Care Review  Outcome: Ongoing (interventions implemented as appropriate)   10/14/18 0159   Coping/Psychosocial   Plan of Care Reviewed With patient   Plan of Care Review   Progress improving   OTHER   Outcome Summary POD # 2. VSS. PATIENT CONFUSED. PO PAIN MEDICATION HELPING WITH PAIN. BAILEY TO BEDSIDE. EDUCATION ON THE IMPORTANCE OG BP MONITORING AND TAKING BP MEDICATION AS ORDERED.     Goal: Individualization and Mutuality  Outcome: Ongoing (interventions implemented as appropriate)    Goal: Discharge Needs Assessment  Outcome: Ongoing (interventions implemented as appropriate)      Problem: Fall Risk (Adult)  Goal: Absence of Fall  Outcome: Ongoing (interventions implemented as appropriate)      Problem: Skin Injury Risk (Adult)  Goal: Skin Health and Integrity  Outcome: Ongoing (interventions implemented as appropriate)      Problem: Fractured Hip (Adult)  Goal: Signs and Symptoms of Listed Potential Problems Will be Absent, Minimized or Managed (Fractured Hip)  Outcome: Ongoing (interventions implemented as appropriate)

## 2018-10-15 NOTE — PROGRESS NOTES
"[facility] Visit Report    Kye Aranda  [id number]  10/15/2018    Admission R/T [facility] Dx: No    Reason for [facility] Admission: HIV/Cirrhosis    Symptom  Management: S/P IM nailing d/t left hip fx after fall    Nursing/Medication Recommendations: Comfort care for declining patient    Psychosocial Issues and Recommendations:    Spiritual Concerns and Recommendations:    [facility] Discharge Plans:  Possibility of discharge home tomorrow per Dr Davidson's note. MD also spoke to patient and significant other about the possibility of transferrig to the pallative unit per his notes. Patient is adament on going home. I did also speak to patient about transferring to pallative unit, he said he would think about it    Review of Visit:Patient sitting up in chair, significant other, Melissa is at the bedside. Patient is clearly short of air sitting up in the chair. I put patient back on 2L O2 for comfort and ease of breathing. Patient with very large and distended abdomen. I asked patient if he has ever had a paracentesis; he told me \"they looked at doing one 2 months ago but told me there was not enough fluid to remove\". Patient with ascites and appears to be pushing up on diaphragm. Patient noted with 2+ edema to BLE/feet, hips and abdomen. Left hip in dressing with serosang. drainage noted. Upon auscultation lungs are clear, diminished at bases. Patient placed on O2 at 2L/NC. Abdomen distended but with positive bowel sounds, patient states he had a bowel movement on 10/12/18. While I am visiting patient wants to stand, Melissa, get his walker and I help him stand to walker. Once patient was up he passes alot of gas, which he said did make his belly feel a little bit better. He was able to stand for about a minute and a half then needed to sit back down in the chair. His nurse, Ju, came into the room while he was standing and I was able to speak to her regarding his ascitic abdomen. Ju did say she would call Dr Davidson " to see if he would order IV Lasix to help remove some of the fluid. I walked out into the hallway to speak to RN. We discussed the possibility of pallative paracentesis, I feel like this may be appropriate for Mr Aranda at this time. He appears to be declining; I also spoke to nurse about the pallative unit and told her I thought the patient should at least think about being transferred. Will follow up tomorrow to see if patient has made any other decisions than being discharged home. Will leave report for team and leave update for home team. Will visit daily, assess needs of patient/family and provide support         Mariah Galarza RN  [facility] Visit Nurse

## 2018-10-15 NOTE — THERAPY TREATMENT NOTE
Acute Care - Physical Therapy Treatment Note  [facility]     Patient Name: Kye Aranda  : 1945  MRN: [id number]  Today's Date: 10/15/2018  Onset of Illness/Injury or Date of Surgery: 10/12/18  Date of Referral to PT: 10/12/18       Admit Date: 10/11/2018    Visit Dx:    ICD-10-CM ICD-9-CM   1. Closed comminuted intertrochanteric fracture of left femur, initial encounter (CMS/HCC) S72.142A 820.21   2. Cirrhosis of liver with ascites, unspecified hepatic cirrhosis type (CMS/HCC) K74.60 571.5    R18.8    3. Impaired mobility Z74.09 799.89     Patient Active Problem List   Diagnosis   • Chronic coronary artery disease   • Difficulty breathing   • Intermittent claudication (CMS/HCC)   • Peripheral arterial occlusive disease (CMS/HCC)   • Shortness of breath   • Iron deficiency anemia due to chronic blood loss   • Thrombocytopenia associated with AIDS (CMS/HCC)   • Leukocytopenia   • Neutropenia (CMS/HCC)   • Pancytopenia (CMS/HCC)   • Iron and its compounds causing adverse effect in therapeutic use   • Cirrhosis of liver with ascites (CMS/HCC)   • Lumbar degenerative disc disease   • Secondary thrombocytopenia   • History of coronary artery stent placement   • T12 compression fracture (CMS/HCC)   • Weight loss, abnormal   • Hypothyroidism   • Type 2 diabetes mellitus (CMS/HCC)   • Severe protein-calorie malnutrition due to chronic illness   • Pathological fracture of thoracic vertebra with routine healing   • Age-related osteoporosis with current pathological fracture   • Acute bilateral thoracic back pain   • Age-related osteoporosis with current pathological fracture of vertebra (CMS/HCC)   • Thrombocytopenia (CMS/HCC)   • Compression fracture of body of thoracic vertebra (CMS/HCC)   • Lumbar spine pain   • HIV (human immunodeficiency virus infection) (CMS/HCC)   • CKD (chronic kidney disease) stage 2, GFR 60-89 ml/min   • Portal hypertension (CMS/HCC)   • Other cirrhosis of liver (CMS/HCC)   • Adenomatous  polyp of colon   • Closed comminuted intertrochanteric fracture of proximal end of left femur (CMS/HCC)   • Pain, acute due to trauma   • Postoperative anemia due to acute blood loss       Therapy Treatment          Rehabilitation Treatment Summary     Row Name 10/15/18 0822             Treatment Time/Intention    Discipline physical therapy assistant  -CW      Document Type therapy note (daily note)  -CW      Subjective Information complains of;weakness;fatigue;pain  -CW      Mode of Treatment physical therapy  -CW      Therapy Frequency (PT Clinical Impression) daily  -CW      Patient Effort fair  -CW      Existing Precautions/Restrictions fall  -CW      Recorded by [CW] Gilbert Lang, PTA 10/15/18 0839      Row Name 10/15/18 0822             Vital Signs    O2 Delivery Pre Treatment room air  -CW      Recorded by [CW] Gilbert Lang, PTA 10/15/18 0839      Row Name 10/15/18 0822             Cognitive Assessment/Intervention- PT/OT    Orientation Status (Cognition) oriented x 4  -CW      Follows Commands (Cognition) WNL  -CW      Personal Safety Interventions fall prevention program maintained;gait belt;muscle strengthening facilitated;nonskid shoes/slippers when out of bed  -CW      Recorded by [CW] Gilbert Lang, PTA 10/15/18 0839      Row Name 10/15/18 0822             Mobility Assessment/Intervention    Extremity Weight-bearing Status left lower extremity  -CW      Left Lower Extremity (Weight-bearing Status) weight-bearing as tolerated (WBAT)  -CW      Recorded by [CW] Gilbert Lang, PTA 10/15/18 0839      Row Name 10/15/18 0822             Bed Mobility Assessment/Treatment    Bed Mobility Assessment/Treatment supine-sit;sit-supine  -CW      Supine-Sit [place] (Bed Mobility) minimum assist (75% patient effort)  -CW      Sit-Supine [place] (Bed Mobility) minimum assist (75% patient effort)  -CW      Assistive Device (Bed Mobility) bed rails  -CW      Recorded by [CW] Rickey  Gilbert IDCK, PTA 10/15/18 0839      Row Name 10/15/18 0822             Transfer Assessment/Treatment    Transfer Assessment/Treatment sit-stand transfer;stand-sit transfer  -CW      Recorded by [CW] Gilbert Lang, PTA 10/15/18 0839      Row Name 10/15/18 0822             Sit-Stand Transfer    Sit-Stand [place] (Transfers) minimum assist (75% patient effort)  -CW      Assistive Device (Sit-Stand Transfers) walker, front-wheeled  -CW      Recorded by [CW] Gilbert Lang, PTA 10/15/18 0839      Row Name 10/15/18 0822             Stand-Sit Transfer    Stand-Sit [place] (Transfers) minimum assist (75% patient effort)  -CW      Assistive Device (Stand-Sit Transfers) walker, front-wheeled  -CW      Recorded by [CW] Gilbert Lang, PTA 10/15/18 0839      Row Name 10/15/18 0822             Gait/Stairs Assessment/Training    [place] Level (Gait) not tested  -CW      Comment (Gait/Stairs) Pt refused to amb stated that he was wanting to get back in bed and sleep   -CW      Recorded by [CW] Gilbert Lang, PTA 10/15/18 0839      Row Name 10/15/18 0822             Positioning and Restraints    Pre-Treatment Position in bed  -CW      Post Treatment Position bed  -CW      In Bed notified nsg;supine;call light within reach;encouraged to call for assist;exit alarm on  -CW      Recorded by [CW] Gilbert Lang, PTA 10/15/18 0839      Row Name 10/15/18 0822             Pain Assessment    Additional Documentation Pain Scale: Numbers Pre/Post-Treatment (Group)  -CW      Recorded by [CW] Gilbert Lang, PTA 10/15/18 0839      Row Name 10/15/18 0822             Pain Scale: Numbers Pre/Post-Treatment    Pain Scale: Numbers, Pretreatment 6/10  -CW      Pain Scale: Numbers, Post-Treatment 6/10  -CW      Recorded by [CW] Gilbert Lang, PTA 10/15/18 0839      Row Name                Wound 10/12/18 1612 Left hip incision    Wound - Properties Group Date first assessed: 10/12/18 [BM] Time first  assessed: 1612 [BM] Side: Left [BM] Location: hip [BM] Type: incision [BM] Recorded by:  [BM] Omaira Blanchard RN 10/12/18 1612    Row Name 10/15/18 0822             Outcome Summary/Treatment Plan (PT)    Anticipated Discharge Disposition (PT) skilled nursing facility  -CW      Recorded by [CW] Gilbert Lang, PTA 10/15/18 0839        User Key  (r) = Recorded By, (t) = Taken By, (c) = Cosigned By    Initials Name Effective Dates Discipline    BM Omaira Blanchard RN 06/16/16 -  Nurse    CW Gilbert Lang, PTA 03/07/18 -  PT          Wound 10/12/18 1612 Left hip incision (Active)   Dressing Appearance intact;dressing loose;moist drainage 10/14/2018  8:59 PM   Closure GERARDO 10/14/2018  8:59 PM   Drainage Amount small 10/14/2018  8:59 PM   Dressing Care, Wound dressing changed 10/14/2018  8:59 PM             Physical Therapy Education     Title: PT OT SLP Therapies (Done)     Topic: Physical Therapy (Done)     Point: Mobility training (Done)    Learning Progress Summary     Learner Status Readiness Method Response Comment Documented by    Patient Done Acceptance ANASTACIA MARTINS DU  CW 10/15/18 0839     Done Acceptance EROMIE VU strong encouragement to participate with therapy, reviewed risks of imobility, benefits of activiyt GJ 10/14/18 1157          Point: Home exercise program (Done)    Learning Progress Summary     Learner Status Readiness Method Response Comment Documented by    Patient Done Acceptance ANASTACIA MARTINS DU  CW 10/15/18 0839     Done Acceptance ROMIE MARTINSVU strong encouragement to participate with therapy, reviewed risks of imobility, benefits of activiyt GJ 10/14/18 1157          Point: Body mechanics (Done)    Learning Progress Summary     Learner Status Readiness Method Response Comment Documented by    Patient Done Acceptance ANASTACIA MARTINS DU  CW 10/15/18 0839     Done Acceptance ROMIE MARTINS,VU strong encouragement to participate with therapy, reviewed risks of imobility, benefits of activiyt GJ 10/14/18 4297           Point: Precautions (Done)    Learning Progress Summary     Learner Status Readiness Method Response Comment Documented by    Patient Done Acceptance E,TB MARLENI PAL   10/15/18 0839     Done Acceptance LAKSHMI,DEMARCO MILLAN strong encouragement to participate with therapy, reviewed risks of imobility, benefits of activiyt  10/14/18 1157                      User Key     Initials Effective Dates Name Provider Type Discipline     03/07/18 -  Gallo Schneider, PT Physical Therapist PT     03/07/18 -  Gilbert Lang, PTA Physical Therapy Assistant PT                    PT Recommendation and Plan  Anticipated Discharge Disposition (PT): skilled nursing facility  Therapy Frequency (PT Clinical Impression): daily  Outcome Summary/Treatment Plan (PT)  Anticipated Discharge Disposition (PT): skilled nursing facility  Plan of Care Reviewed With: patient  Progress: improving  Outcome Summary: Pt increasign with bed mobility and transfer safety limited activity due to being sleepy          Outcome Measures     Row Name 10/15/18 0800 10/14/18 1200          How much help from another person do you currently need...    Turning from your back to your side while in flat bed without using bedrails? 3  -CW 2  -GJ     Moving from lying on back to sitting on the side of a flat bed without bedrails? 3  -CW 2  -GJ     Moving to and from a bed to a chair (including a wheelchair)? 3  -CW 3  -GJ     Standing up from a chair using your arms (e.g., wheelchair, bedside chair)? 3  -CW 3  -GJ     Climbing 3-5 steps with a railing? 1  -CW 1  -GJ     To walk in hospital room? 3  -CW 3  -GJ     AM-PAC 6 Clicks Score 16  -CW 14  -GJ        Functional Assessment    Outcome Measure Options AM-PAC 6 Clicks Basic Mobility (PT)  -CW AM-PAC 6 Clicks Basic Mobility (PT)  -GJ       User Key  (r) = Recorded By, (t) = Taken By, (c) = Cosigned By    Initials Name Provider Type     Gallo Schneider, PT Physical Therapist    CW Gilbert Lang, PTA Physical  Therapy Assistant           Time Calculation:         PT Charges     Row Name 10/15/18 0841             Time Calculation    Start Time 0827  -CW      Stop Time 0841  -CW      Time Calculation (min) 14 min  -CW      PT Received On 10/15/18  -CW      PT - Next Appointment 10/15/18  -CW        User Key  (r) = Recorded By, (t) = Taken By, (c) = Cosigned By    Initials Name Provider Type    CW Gilbert Lang, PTA Physical Therapy Assistant        Therapy Suggested Charges     Code   Minutes Charges    41045 (CPT®) Hc Pt Neuromusc Re Education Ea 15 Min      24235 (CPT®) Hc Pt Ther Proc Ea 15 Min      09967 (CPT®) Hc Gait Training Ea 15 Min      49416 (CPT®) Hc Pt Therapeutic Act Ea 15 Min 15 1    14918 (CPT®) Hc Pt Manual Therapy Ea 15 Min      07742 (CPT®) Hc Pt Iontophoresis Ea 15 Min      60097 (CPT®) Hc Pt Elec Stim Ea-Per 15 Min      83005 (CPT®) Hc Pt Ultrasound Ea 15 Min      31497 (CPT®) Hc Pt Self Care/Mgmt/Train Ea 15 Min      29484 (CPT®) Hc Pt Prosthetic (S) Train Initial Encounter, Each 15 Min      58061 (CPT®) Hc Pt Orthotic(S)/Prosthetic(S) Encounter, Each 15 Min      33083 (CPT®) Hc Orthotic(S) Mgmt/Train Initial Encounter, Each 15min      Total  15 1        Therapy Charges for Today     Code Description Service Date Service Provider Modifiers Qty    06618078480 HC PT THER PROC EA 15 MIN 10/15/2018 Gilbert Lang PTA GP 1          PT G-Codes  Outcome Measure Options: AM-PAC 6 Clicks Basic Mobility (PT)  AM-PAC 6 Clicks Score: 16    Gilbert Lang PTA  10/15/2018

## 2018-10-15 NOTE — PROGRESS NOTES
Continued Stay Note  [facility]     Patient Name: Kye Aranda  MRN: [id number]  Today's Date: 10/15/2018    Admit Date: 10/11/2018          Discharge Plan     Row Name 10/15/18 1029       Plan    Plan home with significant other and resume [facility] @ home    Patient/Family in Agreement with Plan yes    Plan Comments PT notes reviewed.  Spoke with patient in room and he confirms that he is not interested in SNF, but wants to go home with significant other, Melissa Miller, and continue [facility] services. He thinks he may need ambulance services at [place].   CCP will follow. Kerry Jain RN              Discharge Codes    No documentation.           Kerry Jain RN

## 2018-10-15 NOTE — NURSING NOTE
Call placed to [facility] regarding ongoing retention issues. Spoke with HANG Camargo, continue straight cath order and will reassess later this a.m. Need to give Flomax more time to have an effect on patient.

## 2018-10-15 NOTE — PLAN OF CARE
Problem: Patient Care Overview  Goal: Plan of Care Review  Outcome: Ongoing (interventions implemented as appropriate)   10/15/18 1927   Coping/Psychosocial   Plan of Care Reviewed With patient   Plan of Care Review   Progress improving   OTHER   Outcome Summary lots of pain today-in chest in particular, medicate prn, slept all morning and into afternoon, platelets administered, may go home with [facility] or to palliative care tomorrow     Goal: Individualization and Mutuality  Outcome: Ongoing (interventions implemented as appropriate)      Problem: Fall Risk (Adult)  Goal: Absence of Fall  Outcome: Ongoing (interventions implemented as appropriate)      Problem: Skin Injury Risk (Adult)  Goal: Skin Health and Integrity  Outcome: Ongoing (interventions implemented as appropriate)

## 2018-10-15 NOTE — PLAN OF CARE
Problem: Patient Care Overview  Goal: Plan of Care Review  Outcome: Ongoing (interventions implemented as appropriate)   10/15/18 0840   Coping/Psychosocial   Plan of Care Reviewed With patient   Plan of Care Review   Progress improving   OTHER   Outcome Summary Pt increasing with bed mobility and transfer safety limited activity due to being sleepy

## 2018-10-15 NOTE — PROGRESS NOTES
Continued Stay Note  [facility]     Patient Name: Kye Aranda  MRN: [id number]  Today's Date: 10/15/2018    Admit Date: 10/11/2018          Discharge Plan     Row Name 10/15/18 1452       Plan    Plan Comments Spoke with Dr Davidson and he plans to dc tomorrow.  States that patient wishes to focus on pain control.  Call placed to [facility] ( [phone]) to update.  Spoke with nurse Sofia about potential dc and patient goals.  She will update the nurse that follows the patient.  CCP will follow. Kerry Jain RN              Discharge Codes    No documentation.           Kerry Jain RN

## 2018-10-15 NOTE — THERAPY TREATMENT NOTE
Acute Care - Physical Therapy Treatment Note  [facility]     Patient Name: Kye Aranda  : 1945  MRN: [id number]  Today's Date: 10/15/2018  Onset of Illness/Injury or Date of Surgery: 10/12/18  Date of Referral to PT: 10/12/18       Admit Date: 10/11/2018    Visit Dx:    ICD-10-CM ICD-9-CM   1. Closed comminuted intertrochanteric fracture of left femur, initial encounter (CMS/HCC) S72.142A 820.21   2. Cirrhosis of liver with ascites, unspecified hepatic cirrhosis type (CMS/HCC) K74.60 571.5    R18.8    3. Impaired mobility Z74.09 799.89     Patient Active Problem List   Diagnosis   • Chronic coronary artery disease   • Difficulty breathing   • Intermittent claudication (CMS/HCC)   • Peripheral arterial occlusive disease (CMS/HCC)   • Shortness of breath   • Iron deficiency anemia due to chronic blood loss   • Thrombocytopenia associated with AIDS (CMS/HCC)   • Leukocytopenia   • Neutropenia (CMS/HCC)   • Pancytopenia (CMS/HCC)   • Iron and its compounds causing adverse effect in therapeutic use   • Cirrhosis of liver with ascites (CMS/HCC)   • Lumbar degenerative disc disease   • Secondary thrombocytopenia   • History of coronary artery stent placement   • T12 compression fracture (CMS/HCC)   • Weight loss, abnormal   • Hypothyroidism   • Type 2 diabetes mellitus (CMS/HCC)   • Severe protein-calorie malnutrition due to chronic illness   • Pathological fracture of thoracic vertebra with routine healing   • Age-related osteoporosis with current pathological fracture   • Acute bilateral thoracic back pain   • Age-related osteoporosis with current pathological fracture of vertebra (CMS/HCC)   • Thrombocytopenia (CMS/HCC)   • Compression fracture of body of thoracic vertebra (CMS/HCC)   • Lumbar spine pain   • HIV (human immunodeficiency virus infection) (CMS/HCC)   • CKD (chronic kidney disease) stage 2, GFR 60-89 ml/min   • Portal hypertension (CMS/HCC)   • Other cirrhosis of liver (CMS/HCC)   • Adenomatous  polyp of colon   • Closed comminuted intertrochanteric fracture of proximal end of left femur (CMS/HCC)   • Pain, acute due to trauma   • Postoperative anemia due to acute blood loss       Therapy Treatment          Rehabilitation Treatment Summary     Row Name 10/15/18 1400 10/15/18 0840 10/15/18 0822       Treatment Time/Intention    Discipline physical therapy assistant  -CW  -- physical therapy assistant  -CW    Document Type therapy note (daily note)  -CW  -- therapy note (daily note)  -CW    Subjective Information complains of;weakness;fatigue;pain  -CW  -- complains of;weakness;fatigue;pain  -CW    Mode of Treatment physical therapy  -CW  -- physical therapy  -CW    Therapy Frequency (PT Clinical Impression) 2 times/day  -CW 2 times/day  -RD daily  -CW    Patient Effort adequate  -CW  -- fair  -CW    Existing Precautions/Restrictions fall  -CW  -- fall  -CW    Recorded by [CW] Gilbert Lang P, PTA 10/15/18 1431 [RD] Deborah Jonas, PT 10/15/18 1007 [CW] Gilbert Lang P, PTA 10/15/18 0839    Row Name 10/15/18 1400 10/15/18 0822          Vital Signs    O2 Delivery Pre Treatment room air  -CW room air  -CW     Recorded by [CW] Gilbert Lang P, PTA 10/15/18 1431 [CW] Gilbert Lang P, PTA 10/15/18 0839     Row Name 10/15/18 1400 10/15/18 0822          Cognitive Assessment/Intervention- PT/OT    Orientation Status (Cognition) oriented x 4  -CW oriented x 4  -CW     Follows Commands (Cognition) WNL  -CW WNL  -CW     Personal Safety Interventions fall prevention program maintained;gait belt;muscle strengthening facilitated;nonskid shoes/slippers when out of bed  -CW fall prevention program maintained;gait belt;muscle strengthening facilitated;nonskid shoes/slippers when out of bed  -CW     Recorded by [CW] Gilbert Lang P, PTA 10/15/18 1431 [CW] Gilbert Lang P, PTA 10/15/18 0839     Row Name 10/15/18 1400 10/15/18 0822          Mobility Assessment/Intervention    Extremity Weight-bearing  Status left lower extremity  -CW left lower extremity  -CW     Left Lower Extremity (Weight-bearing Status) weight-bearing as tolerated (WBAT)  -CW weight-bearing as tolerated (WBAT)  -CW     Recorded by [CW] Gilbert Lang P, PTA 10/15/18 1431 [CW] Gilbert Lang, PTA 10/15/18 0839     Row Name 10/15/18 1400 10/15/18 0822          Bed Mobility Assessment/Treatment    Bed Mobility Assessment/Treatment supine-sit  -CW supine-sit;sit-supine  -CW     Supine-Sit [place] (Bed Mobility) minimum assist (75% patient effort)  -CW minimum assist (75% patient effort)  -CW     Sit-Supine [place] (Bed Mobility)  -- minimum assist (75% patient effort)  -CW     Assistive Device (Bed Mobility) bed rails  -CW bed rails  -CW     Comment (Bed Mobility) up to bathroom  -CW  --     Recorded by [CW] Gilbert Lang P, PTA 10/15/18 1431 [CW] Gilbert Lang, PTA 10/15/18 0839     Row Name 10/15/18 1400 10/15/18 0822          Transfer Assessment/Treatment    Transfer Assessment/Treatment sit-stand transfer;stand-sit transfer  -CW sit-stand transfer;stand-sit transfer  -CW     Recorded by [CW] Gilbert Lang P, PTA 10/15/18 1431 [CW] Gilbert Lang, PTA 10/15/18 0839     Row Name 10/15/18 1400 10/15/18 0822          Sit-Stand Transfer    Sit-Stand [place] (Transfers) contact guard  -CW minimum assist (75% patient effort)  -CW     Assistive Device (Sit-Stand Transfers) walker, front-wheeled  -CW walker, front-wheeled  -CW     Recorded by [CW] Gilbert Lang P, PTA 10/15/18 1431 [CW] Gilbert Lang P, PTA 10/15/18 0839     Row Name 10/15/18 1400 10/15/18 0822          Stand-Sit Transfer    Stand-Sit [place] (Transfers) contact guard  -CW minimum assist (75% patient effort)  -CW     Assistive Device (Stand-Sit Transfers) walker, front-wheeled  -CW walker, front-wheeled  -CW     Recorded by [CW] Gilbert Lang P, PTA 10/15/18 1431 [CW] Gilbert Lang, PTA 10/15/18 0843     Row Name  10/15/18 1400 10/15/18 0822          Gait/Stairs Assessment/Training    [place] Level (Gait) contact guard  -CW not tested  -CW     Assistive Device (Gait) walker, front-wheeled  -CW  --     Distance in Feet (Gait) 30  -CW  --     Pattern (Gait) step-to  -CW  --     Deviations/Abnormal Patterns (Gait) base of support, narrow;jackie decreased;gait speed decreased;stride length decreased  -CW  --     Comment (Gait/Stairs)  -- Pt refused to amb stated that he was wanting to get back in bed and sleep   -CW     Recorded by [CW] Gilbert Lang P, PTA 10/15/18 1431 [CW] Gilbert Lang, PTA 10/15/18 0839     Row Name 10/15/18 1400 10/15/18 0822          Therapeutic Exercise    Lower Extremity (Therapeutic Exercise) gluteal sets;LAQ (long arc quad), bilateral;marching while seated;quad sets, bilateral  -CW  --     Lower Extremity Range of Motion (Therapeutic Exercise) hip abduction/adduction, bilateral;ankle dorsiflexion/plantar flexion, bilateral  -CW  --     Exercise Type (Therapeutic Exercise) AROM (active range of motion)  -CW  --     Position (Therapeutic Exercise) seated;supine  -CW  --     Sets/Reps (Therapeutic Exercise) 15  -CW  --     Comment (Therapeutic Exercise)  -- Pt refused to do ther ex in the AM said he would in the PM  -CW     Recorded by [CW] Gilbert Lang P, PTA 10/15/18 1431 [CW] Gilbert Lang, PTA 10/15/18 1037     Row Name 10/15/18 1400 10/15/18 0822          Positioning and Restraints    Pre-Treatment Position in bed  -CW in bed  -CW     Post Treatment Position bathroom  -CW bed  -CW     In Bed  -- notified nsg;supine;call light within reach;encouraged to call for assist;exit alarm on  -CW     Bathroom notified nsg;sitting;call light within reach;encouraged to call for assist  -CW  --     Recorded by [CW] Gilbert Lang P, PTA 10/15/18 1431 [CW] Gilbert Lang P, PTA 10/15/18 0839     Row Name 10/15/18 1400 10/15/18 0822          Pain Assessment    Additional  Documentation Pain Scale: Numbers Pre/Post-Treatment (Group)  -CW Pain Scale: Numbers Pre/Post-Treatment (Group)  -CW     Recorded by [CW] Gilbert Lang P, PTA 10/15/18 1431 [CW] Gilbert Lang, PTA 10/15/18 0839     Row Name 10/15/18 1400 10/15/18 0822          Pain Scale: Numbers Pre/Post-Treatment    Pain Scale: Numbers, Pretreatment 6/10  -CW 6/10  -CW     Pain Scale: Numbers, Post-Treatment 6/10  -CW 6/10  -CW     Pain Location - Side Left  -CW  --     Pain Location - Orientation lower  -CW  --     Pain Location extremity  -CW  --     Pain Intervention(s) Repositioned;Ambulation/increased activity  -CW  --     Recorded by [CW] Gilbert Lang P, PTA 10/15/18 1431 [CW] Gilbert Lang P, PTA 10/15/18 0839     Row Name 10/15/18 1400             Respiratory WDL    Respiratory WDL --  -CW      Cough Frequency --  -CW      Cough Type --  -CW      Recorded by [CW] Gilbert Lang P, PTA 10/15/18 1431      Row Name 10/15/18 1400             Breath Sounds    Breath Sounds --  -CW      All Lung Fields Breath Sounds --  -CW      Recorded by [CW] Gilbert Lang P, PTA 10/15/18 1431      Row Name                Wound 10/12/18 1612 Left hip incision    Wound - Properties Group Date first assessed: 10/12/18 [BM] Time first assessed: 1612 [BM] Side: Left [BM] Location: hip [BM] Type: incision [BM] Recorded by:  [BM] Omaira Blanchard RN 10/12/18 1612    Row Name 10/15/18 1400 10/15/18 0822          Outcome Summary/Treatment Plan (PT)    Anticipated Discharge Disposition (PT) skilled nursing facility  -CW skilled nursing facility  -CW     Recorded by [CW] Gilbert Lang P, PTA 10/15/18 1431 [CW] Gilbert Lang, PTA 10/15/18 0839       User Key  (r) = Recorded By, (t) = Taken By, (c) = Cosigned By    Initials Name Effective Dates Discipline    Deborah Hemphill, PT 04/03/18 -  PT    BM Omaira Blanchard RN 06/16/16 -  Nurse    CW Gilbert Lang, LENA 03/07/18 -  PT          Wound 10/12/18  1612 Left hip incision (Active)   Dressing Appearance intact;dry;no drainage 10/15/2018 12:19 PM   Closure GERARDO 10/15/2018 12:19 PM   Drainage Amount none 10/15/2018 12:19 PM   Dressing Care, Wound low-adherent 10/15/2018  8:17 AM             Physical Therapy Education     Title: PT OT SLP Therapies (Done)     Topic: Physical Therapy (Done)     Point: Mobility training (Done)    Learning Progress Summary     Learner Status Readiness Method Response Comment Documented by    Patient Done Acceptance E,TB MARLENI PAL  CW 10/15/18 0839     Done Acceptance E,D DU,VU strong encouragement to participate with therapy, reviewed risks of imobility, benefits of activiyt GJ 10/14/18 1157          Point: Home exercise program (Done)    Learning Progress Summary     Learner Status Readiness Method Response Comment Documented by    Patient Done Acceptance E,TB MARLENI PAL  CW 10/15/18 0839     Done Acceptance E,D DU,VU strong encouragement to participate with therapy, reviewed risks of imobility, benefits of activiyt GJ 10/14/18 1157          Point: Body mechanics (Done)    Learning Progress Summary     Learner Status Readiness Method Response Comment Documented by    Patient Done Acceptance E,TB MARLENI PAL  CW 10/15/18 0839     Done Acceptance E,D DU,VU strong encouragement to participate with therapy, reviewed risks of imobility, benefits of activiyt GJ 10/14/18 1157          Point: Precautions (Done)    Learning Progress Summary     Learner Status Readiness Method Response Comment Documented by    Patient Done Acceptance E,TB MARLENI PAL  CW 10/15/18 0839     Done Acceptance E,D DU,VU strong encouragement to participate with therapy, reviewed risks of imobility, benefits of activiyt GJ 10/14/18 1157                      User Key     Initials Effective Dates Name Provider Type Discipline    GJ 03/07/18 -  Gallo Schneider, PT Physical Therapist PT    CW 03/07/18 -  Gilbert Lang, PTA Physical Therapy Assistant PT                    PT  Recommendation and Plan  Anticipated Discharge Disposition (PT): skilled nursing facility  Therapy Frequency (PT Clinical Impression): 2 times/day  Outcome Summary/Treatment Plan (PT)  Anticipated Discharge Disposition (PT): skilled nursing facility  Plan of Care Reviewed With: patient  Progress: improving  Outcome Summary: Pt increasign with bed mobility and transfer safety limited activity due to being sleepy          Outcome Measures     Row Name 10/15/18 0800 10/14/18 1200          How much help from another person do you currently need...    Turning from your back to your side while in flat bed without using bedrails? 3  -CW 2  -GJ     Moving from lying on back to sitting on the side of a flat bed without bedrails? 3  -CW 2  -GJ     Moving to and from a bed to a chair (including a wheelchair)? 3  -CW 3  -GJ     Standing up from a chair using your arms (e.g., wheelchair, bedside chair)? 3  -CW 3  -GJ     Climbing 3-5 steps with a railing? 1  -CW 1  -GJ     To walk in hospital room? 3  -CW 3  -GJ     AM-PAC 6 Clicks Score 16  -CW 14  -GJ        Functional Assessment    Outcome Measure Options AM-PAC 6 Clicks Basic Mobility (PT)  -CW AM-PAC 6 Clicks Basic Mobility (PT)  -GJ       User Key  (r) = Recorded By, (t) = Taken By, (c) = Cosigned By    Initials Name Provider Type    Gallo Crowley, PT Physical Therapist    CW Gilbert Lang, LENA Physical Therapy Assistant           Time Calculation:         PT Charges     Row Name 10/15/18 1431 10/15/18 0841          Time Calculation    Start Time 1400  -CW 0827  -CW     Stop Time 1417  -CW 0841  -CW     Time Calculation (min) 17 min  -CW 14 min  -CW     PT Received On 10/15/18  -CW 10/15/18  -CW     PT - Next Appointment 10/16/18  -CW 10/15/18  -CW       User Key  (r) = Recorded By, (t) = Taken By, (c) = Cosigned By    Initials Name Provider Type    Gilbert Reza, PTA Physical Therapy Assistant        Therapy Suggested Charges     Code   Minutes Charges     73916 (CPT®) Hc Pt Neuromusc Re Education Ea 15 Min      13354 (CPT®) Hc Pt Ther Proc Ea 15 Min      85185 (CPT®) Hc Gait Training Ea 15 Min      61626 (CPT®) Hc Pt Therapeutic Act Ea 15 Min 15 1    89911 (CPT®) Hc Pt Manual Therapy Ea 15 Min      12368 (CPT®) Hc Pt Iontophoresis Ea 15 Min      55801 (CPT®) Hc Pt Elec Stim Ea-Per 15 Min      81930 (CPT®) Hc Pt Ultrasound Ea 15 Min      41816 (CPT®) Hc Pt Self Care/Mgmt/Train Ea 15 Min      78056 (CPT®) Hc Pt Prosthetic (S) Train Initial Encounter, Each 15 Min      81663 (CPT®) Hc Pt Orthotic(S)/Prosthetic(S) Encounter, Each 15 Min      00044 (CPT®) Hc Orthotic(S) Mgmt/Train Initial Encounter, Each 15min      Total  15 1        Therapy Charges for Today     Code Description Service Date Service Provider Modifiers Qty    88269380982 HC PT THER PROC EA 15 MIN 10/15/2018 Gilbert Lang, PTA GP 1    88698086660 HC PT THER PROC EA 15 MIN 10/15/2018 Gilbert Lang, PTA GP 1          PT G-Codes  Outcome Measure Options: AM-PAC 6 Clicks Basic Mobility (PT)  AM-PAC 6 Clicks Score: 16    Gilbert Lang PTA  10/15/2018

## 2018-10-15 NOTE — NURSING NOTE
Spoke with Dr Davidson, watch urine output (hold off on cathing for now, try to mobilize), ok to give Arixtra with platelets at 34, baseline in 40's.

## 2018-10-16 NOTE — PLAN OF CARE
Problem: Patient Care Overview  Goal: Plan of Care Review  Outcome: Ongoing (interventions implemented as appropriate)   10/16/18 0538   Coping/Psychosocial   Plan of Care Reviewed With patient   Plan of Care Review   Progress no change   OTHER   Outcome Summary meds per order, IV and po pain meds given per request, ativan x1 for anxiety, co soa, o2 sats wnl, difficult to get comfortable, pt up to side of bed c walker asst x2. voiding s difficulty this shift, no falls, no skin breakdown noted, see vs and labs, educated about BG monitoring     Goal: Individualization and Mutuality  Outcome: Ongoing (interventions implemented as appropriate)    Goal: Discharge Needs Assessment  Outcome: Ongoing (interventions implemented as appropriate)    Goal: Interprofessional Rounds/Family Conf  Outcome: Ongoing (interventions implemented as appropriate)      Problem: Fall Risk (Adult)  Goal: Absence of Fall  Outcome: Ongoing (interventions implemented as appropriate)      Problem: Skin Injury Risk (Adult)  Goal: Skin Health and Integrity  Outcome: Ongoing (interventions implemented as appropriate)      Problem: Fractured Hip (Adult)  Goal: Signs and Symptoms of Listed Potential Problems Will be Absent, Minimized or Managed (Fractured Hip)  Outcome: Ongoing (interventions implemented as appropriate)

## 2018-10-16 NOTE — PLAN OF CARE
Problem: Fall Risk (Adult)  Goal: Absence of Fall  Outcome: Ongoing (interventions implemented as appropriate)      Problem: Skin Injury Risk (Adult)  Goal: Skin Health and Integrity  Outcome: Ongoing (interventions implemented as appropriate)      Problem: Patient Care Overview  Goal: Plan of Care Review  Outcome: Ongoing (interventions implemented as appropriate)   10/16/18 0990   Coping/Psychosocial   Plan of Care Reviewed With patient;spouse   Plan of Care Review   Progress declining   OTHER   Outcome Summary Welcomed patient and significant other to [address]. Discussed goals of care/comfort care. Medicated x2 for pain and Robinol for secretions. Urinal at bedside. Significant other wants to be called with any changes. Continue comfort care.       Problem: Skin Injury Risk (Adult)  Goal: Skin Health and Integrity  Outcome: Ongoing (interventions implemented as appropriate)      Problem: Palliative Care (Adult)  Goal: Identify Related Risk Factors and Signs and Symptoms  Outcome: Ongoing (interventions implemented as appropriate)    Goal: Maximized Comfort  Outcome: Ongoing (interventions implemented as appropriate)    Goal: Enhanced Quality of Life  Outcome: Ongoing (interventions implemented as appropriate)

## 2018-10-16 NOTE — SIGNIFICANT NOTE
10/16/18 1112   Rehab Treatment   Discipline physical therapy assistant   Reason Treatment Not Performed patient/family declined treatment, not feeling well  (Pt refused morning PT due to nausea and feeling bad this morning.  PRAKASH Garcia is aware and knows that pt is not allowed to do much per DR Davidson.  PT to follow up this PM)   Recommendation   PT - Next Appointment 10/16/18

## 2018-10-16 NOTE — PROGRESS NOTES
"    DAILY PROGRESS NOTE  [facility]    Patient Identification:  Name: Kye Aranda  Age: 72 y.o.  Sex: male  :  1945  MRN: [id number]         Primary Care Physician: Kendrick Griggs MD    Subjective:  Interval History: Further clinical decline with increasing complaints of pain.  Patient still wishes to go home as well as be pain-free.  He has a difficult time keeping his eyes open as he artery seems quite sedated but he answers your questions appropriately and is otherwise oriented.  Significant other at bedside offering support.    Objective:    Scheduled Meds:  cobicistat 150 mg Oral Daily With Breakfast   darunavir 800 mg Oral Daily With Breakfast   guaiFENesin 1,200 mg Oral Q12H   ipratropium-albuterol 3 mL Nebulization 4x Daily - RT   raltegravir 400 mg Oral Q12H   rifAXIMin 550 mg Oral Q12H   rilpivirine 25 mg Oral Daily   sodium chloride 3 mL Intravenous Q12H   tamsulosin 0.4 mg Oral BID   tenofovir 300 mg Oral Daily     Continuous Infusions:     Vital signs in last 24 hours:  Temp:  [96.8 °F (36 °C)-98.4 °F (36.9 °C)] 97.9 °F (36.6 °C)  Heart Rate:  [84-99] 84  Resp:  [16-22] 22  BP: (136-173)/(68-84) 136/74    Intake/Output:    Intake/Output Summary (Last 24 hours) at 10/16/18 1344  Last data filed at 10/16/18 1016   Gross per 24 hour   Intake              773 ml   Output             1400 ml   Net             -627 ml       Exam:  /74 (BP Location: Left arm, Patient Position: Lying)   Pulse 84   Temp 97.9 °F (36.6 °C) (Oral)   Resp 22   Ht 167.6 cm (66\")   Wt 73.8 kg (162 lb 9.6 oz)   SpO2 92%   BMI 26.24 kg/m²     General Appearance:    Alert, cooperative, no distress, clinically declining, more jaundiced/sedated                          Head:    Normocephalic, without obvious abnormality, atraumatic                         Throat:   Lips, tongue, gums normal; oral mucosa pink and dry                           Neck:   Supple, symmetrical, trachea midline, no JVD          "                Lungs:    Diffuse rhonchi with decreasing bases to auscultation bilaterally, respirations unlabored                 Chest Wall:    No tenderness or deformity                          Heart:    Regular rate and rhythm, S1 and S2 normal                  Abdomen:     Soft, non-tender, bowel sounds active, distended                 Extremities:   1+ pitting edema                        Pulses:   Pulses palpable in lower extremities                  Neurologic:   CNII-XII intact, moving all     Data Review:  Labs in chart were reviewed.    Assessment:  Active Hospital Problems    Diagnosis Date Noted   • **Postoperative anemia due to acute blood loss [D62] 10/13/2018   • Closed comminuted intertrochanteric fracture of proximal end of left femur (CMS/HCC) [S72.142A] 10/11/2018   • Pain, acute due to trauma [G89.11] 10/11/2018   • CKD (chronic kidney disease) stage 2, GFR 60-89 ml/min [N18.2] 06/11/2018   • Portal hypertension (CMS/HCC) [K76.6] 06/11/2018   • HIV (human immunodeficiency virus infection) (CMS/HCC) [B20] 06/11/2018   • Type 2 diabetes mellitus (CMS/HCC) [E11.9] 04/09/2018   • Cirrhosis of liver with ascites (CMS/HCC) [K74.60, R18.8] 08/14/2017   • Pancytopenia (CMS/HCC) [D61.818] 02/19/2017   • Iron deficiency anemia due to chronic blood loss [D50.0] 05/06/2016      Resolved Hospital Problems    Diagnosis Date Noted Date Resolved   No resolved problems to display.       Plan:  Long discussion with patient as well as significant other at bedside with nursing present.  Palliative care RN met with patient significant other as well as daughter earlier in the morning.  I discussed the case with palliative care RN and at that juncture the general consensus was to move to hospice scattered bed and go full palliation.  I went to the floor and counseled patient as well as significant other with nursing present at least 20-30 minutes.  I went over goals of care as well as patient wishes.  I'm saddened  that this patient still wants to go home at this juncture and unfortunately family cannot come together to provide additional support as I would prefer to send this gentleman home with hospice since those are HIS wishes.  Family at bedside stating they cannot take care of him as there are worried by repeated falls.  Secondary to these above issues, I am transitioning this patient to the palliative care floor for full palliation at this juncture.  CODE STATUS changed to an AND.  I trimmed up some of his home meds as well as when necessary meds and all previous orders.  Patient will be transferred to [facility] for full palliation.  He's had issues with off and on urinary retention and I will suggest going ahead and placing a Vital once the patient is adequately sedated.  He will also require placement a scopolamine patch and Robinul to help with some of his secretions.  I discussed all the above with the nurse to convey to palliative care upon transfer.  I will continue to follow as attending physician post transfer.  We'll place consult for hospice in the next day or two to obtain to hospice scattered bed.    Brad Davidson MD  10/16/2018  1:44 PM

## 2018-10-16 NOTE — CONSULTS
Purpose of the visit was to evaluate for: goals of care/advanced care planning, support for patient/family, hospice referral/discussion, pain/symptom management, transfer to comfort care bed/unit and comfort care. Spoke with RN as well as patient and family and discussed palliative care, goals of care, care options, [facility], [facility] scattered bed status and discharge options.      Assessment:  Patient is palliative care appropriate for inpatient care given recent fracture hip, unable to participate in physical therapy, advanced cirrhosis/HIV, abdomen very large and uncomfortable. States he is in pain, nausea, and just wants to be comfortable. Also states he just wants to go home. Patient is under [facility] services at home.     Recommendations/Plan: transfer to [facility] for palliative care and admit as [facility] Scattered [facility] ([facility]) patient on [facility].    Other Comments: Talked with patient and Melissa, significant other at bedside. Explained how Palliative Care can focus on his comfort and symptom management with the support of [facility]. Also called his daughter, Mckenna, and she feels that Palliative Care is best for him now. She is going to call his room and talk to him about moving to Palliative Care.  We will follow.     Total time: 30 minutes.

## 2018-10-17 PROBLEM — Z51.5 ADMISSION FOR HOSPICE CARE: Status: ACTIVE | Noted: 2018-01-01

## 2018-10-17 NOTE — PROGRESS NOTES
[facility] Visit Report    Kye Aranda  [id number]  10/17/2018    Admission R/T [facility] Dx: YES      Reason for [facility] Admission: HIV, ETOH induced Hepatitis with ascites      Symptom  Management: Pain, restlessness and congestion       Nursing/Medication Recommendations: No recommendations at this time      Psychosocial Issues and Recommendations: Provide support to patient and family      Spiritual Concerns and Recommendations: None at present      [facility] Discharge Plans:  None, patient declining steadily and receiving IV medications for symptom management of pain, restlessness and congestion and is meeting criteria for GIP      Review of Visit: Close monitoring for safety/falls, symptom management of pain, restlessness and congestion, comfort care for steadily declining patient. Patient lying in bed, unresponsive, color pale, breathing with increased congestion. Left hip incision intact. Abdomen distended and firm. No family present during visit. Discussed care needs with staff RN and patient receiving IV Dilaudid 1-2mg x 3 doses, IV Ativan 0.5mg x 2 doses and IV Robinul 0.4mg x 4 doses since midnight. Scopolamine patch in place. Will see daily to assess needs, monitor status and offer support.        Amber Pino RN  [facility] Visit Nurse

## 2018-10-17 NOTE — H&P
HISTORY AND PHYSICAL   [facility]        Patient Identification:  Name: Kye Aranda  Age: 72 y.o.  Sex: male  :  1945  MRN: [id number]                     Primary Care Physician: eKndrick Griggs MD    Chief Complaint:  Pain     History of Present Illness:   Mr. Aranda is a pleasant 72-year-old male who was initially admitted for a fall which resulted in acute left-sided hip pain.  Patient also we went to the OR on 10/12/2018 and underwent surgical correction.  Postoperatively we had issues with acute blood loss anemia as well as difficulties controlling pain as well as exacerbation of thrombocytopenia secondary to consumption.  Patient was aggressively managed but ultimately due to his chronic comorbidities he did not thrive postoperatively.  Preoperatively was a conditional DNR.  Ultimately we had a sit down and have family conversations with patient and his significant other as well as consulted palliative care.  As of 10/16/2018 patient very clearly was not interested in going to any type of rehabilitation facility but was considering home health.  His participation with physical therapy while here was minimal at best.  He made statements that he did not want to suffer in pain any longer but he also still wanted to get better.  After discussing different modalities in regards to which way he would like to go for his clinical pathway, he ended up choosing comfort care.  Ultimately his wishes were to get home and I really would like to have gotten him home with hospice but unfortunately his significant other who does majority of his caretaking is unable to manage him.  Secondary to this we had long discussions on 10/16/2018 about considering hospice scattered bed and staying here for pain control.  Patient elected to proceed with this and have the support of a believe his daughter as well as his significant other.  Patient was transferred to the palliative care floor and hospice approved  hospice scattered bed.    Past Medical History:  Past Medical History:   Diagnosis Date   • Anemia    • Cancer (CMS/HCC) 2001    Prostate   • Cirrhosis (CMS/HCC)    • Coronary artery disease    • Diabetes mellitus (CMS/HCC)    • H/O complete eye exam 10/2017   • History of prostate cancer 2012   • HIV (human immunodeficiency virus infection) (CMS/HCC)    • Hyperlipidemia    • Hypertension    • Lactose intolerance    • Lumbar stress fracture    • Osteoporosis    • Pancytopenia (CMS/HCC)    • Peripheral artery disease (CMS/HCC)    • SOB (shortness of breath) on exertion    • Thyroid disease      Past Surgical History:  Past Surgical History:   Procedure Laterality Date   • BONE MARROW BIOPSY W/ ASPIRATION  01/21/2014   • CARDIAC CATHETERIZATION     • COLONOSCOPY  09/20/2012    non-thrombosed EH, sessile polyp in ascending colon 3 mm in size.   • CORONARY ANGIOPLASTY WITH STENT PLACEMENT  06/2013   • ENDOSCOPY  09/20/2012    grade 1 varicesmiddle and lower 3rd of esophagus. Moderate portal hypertensive gastropathy in entire stomach.   • FEMUR IM NAILING/RODDING Left 10/12/2018    Procedure: LEFT HIP INTRAMEDULLARY NAILING;  Surgeon: Wade Campa MD;  Location: [facility];  Service: Orthopedics   • KYPHOPLASTY Bilateral 5/3/2018    Procedure: T 12 KYPHOPLASTY 1-2 LEVELS;  Surgeon: Joey Davis MD;  Location: [facility];  Service: Neurosurgery   • KYPHOPLASTY N/A 5/29/2018    Procedure: KYPHOPLASTY, L1, T11 kyphoplsty;  Surgeon: Joey Davis MD;  Location: [facility] OR 18/19;  Service: Neurosurgery   • MOUTH SURGERY     • PROSTATE BIOPSY     • TONSILLECTOMY     • UPPER GASTROINTESTINAL ENDOSCOPY  09/20/2012    grd 1 varices, portal hypertensive gastropathy      Home Meds:  Prescriptions Prior to Admission   Medication Sig Dispense Refill Last Dose   • BD PEN NEEDLE ONEAL U/F 32G X 4 MM misc    9/9/2018 at Unknown time   • clonazePAM (KlonoPIN) 0.5 MG tablet Take 0.5 mg by mouth At Night As Needed  (TAKES ONCE EVERY 2-3 DAYS PRN).  0 2018 at Unknown time   • fluconazole (DIFLUCAN) 100 MG tablet Take 100 mg by mouth Every Other Day. TOOK 6/10/2018   Past Week at Unknown time   • furosemide (LASIX) 40 MG tablet Take 20 mg by mouth Daily.  5 2018 at Unknown time   • hydrocortisone 2.5 % cream insert ONE applicator into THE rectum THREE TIMES DAILY 28 g 0 2018 at Unknown time   • Icosapent Ethyl (VASCEPA) 1 G capsule Take 1 g by mouth 2 (Two) Times a Day.   2018 at Unknown time   • ISENTRESS 400 MG tablet Take 400 mg by mouth 2 (Two) Times a Day.   2018 at Unknown time   • lactulose (CHRONULAC) 10 GM/15ML solution Take 30 mL by mouth 2 (Two) Times a Day. 1892 mL 2 2018 at Unknown time   • levothyroxine (SYNTHROID) 300 MCG tablet Take 1 tablet by mouth Every Morning. HAS APPT WITH ENDOCRONOLOGIST/THINKS IT ILL BE CHANGD  [facility] 30 tablet 0 2018 at Unknown time   • LORazepam (ATIVAN) 0.5 MG tablet Take 0.5 mg by mouth Every 6 (Six) Hours As Needed for Anxiety.      • metoprolol succinate XL (TOPROL-XL) 50 MG 24 hr tablet Take 1 tablet by mouth 2 (Two) Times a Day. 180 tablet 1 Past Week at Unknown time   • nystatin (MYCOSTATIN) 519355 UNIT/GM powder Apply  topically 4 (Four) Times a Day. 1 each 5 2018 at Unknown time   • oxyCODONE (oxyCONTIN) 10 MG 12 hr tablet Take 20 mg by mouth At Night As Needed.      • oxyCODONE (ROXICODONE) 15 MG immediate release tablet Take 10 mg by mouth Every 4 (Four) Hours As Needed for Moderate Pain .      • [] pantoprazole (PROTONIX) 40 MG EC tablet Take 1 tablet by mouth Daily for 30 days. 30 tablet 0    • potassium chloride (K-DUR) 10 MEQ CR tablet Take 1 tablet by mouth Daily. (Patient taking differently: Take 20 mEq by mouth Daily.) 30 tablet 5 2018 at Unknown time   • PREZISTA 800 MG tablet tablet Take 800 mg by mouth Daily.   2018 at Unknown time   • prochlorperazine (COMPAZINE) 10 MG tablet Take 10 mg by mouth Every 6 (Six) Hours  "As Needed for Nausea or Vomiting.      • rifaximin (XIFAXAN) 550 MG tablet Take 1 tablet by mouth Every 12 (Twelve) Hours. 60 tablet 11 9/9/2018 at Unknown time   • rilpivirine (EDURANT) 25 MG tablet tablet Take 25 mg by mouth Daily.      • spironolactone (ALDACTONE) 50 MG tablet 1 pill Daily (Patient taking differently: 25 mg. 1 pill Daily) 30 tablet 0 9/9/2018 at Unknown time   • sulfamethoxazole-trimethoprim (BACTRIM DS,SEPTRA DS) 800-160 MG per tablet Take 1 tablet by mouth Every Other Day As Needed. LAST TAKEN Saturday 6/9/2018 9/9/2018 at Unknown time   • tamsulosin (FLOMAX) 0.4 MG capsule Take 1 capsule by mouth As Needed.   Past Month at Unknown time   • tenofovir (VIREAD) 300 MG tablet Take 300 mg by mouth Daily.      • TRESIBA FLEXTOUCH 200 UNIT/ML solution pen-injector Inject 35 Units under the skin Every Night.  3 9/9/2018 at Unknown time   • TYBOST 150 MG tablet Take 150 mg by mouth Daily With Breakfast.   9/9/2018 at Unknown time   • vitamin D (ERGOCALCIFEROL) 64041 units capsule capsule Take 50,000 Units by mouth 1 (One) Time Per Week. TAKES EVERY SUNDAY 9/9/2018 at Unknown time   • zinc sulfate (ZINCATE) 220 (50 Zn) MG capsule Take 1 capsule by mouth Daily. 30 capsule 11 9/9/2018 at Unknown time       Allergies:  Allergies   Allergen Reactions   • Dicyclomine Hallucinations   • Abacavir Unknown (See Comments)     Noted allergic per Dr. Thomas   • Methylprednisolone Other (See Comments)     \"Everything shuts down\"   • Prednisolone Other (See Comments)     PT UNSURE--STATED HE WAS TOLD NOT TO TAKE IT AGAIN.      Immunizations:    There is no immunization history on file for this patient.  Social History:   Social History     Social History Narrative   • No narrative on file     Social History     Social History   • Marital status: Single     Spouse name: N/A   • Number of children: N/A   • Years of education: College     Occupational History   •  Retired     [employer]     Social " History Main Topics   • Smoking status: Former Smoker     Packs/day: 1.00     Years: 32.00     Types: Cigarettes     Start date: 1974     Quit date:    • Smokeless tobacco: Never Used   • Alcohol use No   • Drug use: No   • Sexual activity: Not Currently     Partners: Female     Birth control/ protection: Abstinence, Condom     Other Topics Concern   • Not on file     Social History Narrative   • No narrative on file       Family History:  Family History   Problem Relation Age of Onset   • Heart disease Other    • No Known Problems Mother    • No Known Problems Father    • No Known Problems Maternal Grandmother    • No Known Problems Maternal Grandfather    • No Known Problems Paternal Grandmother    • No Known Problems Paternal Grandfather    • Malig Hyperthermia Neg Hx         Review of Systems  See history of present illness and past medical history.  Unobtainable currently    Objective:  tMax 24 hrs: Temp (24hrs), Av.8 °F (36.6 °C), Min:97.7 °F (36.5 °C), Max:97.9 °F (36.6 °C)    Vitals Ranges:   Temp:  [97.7 °F (36.5 °C)-97.9 °F (36.6 °C)] 97.7 °F (36.5 °C)  Heart Rate:  [79-84] 79  Resp:  [20-22] 22  BP: (129-136)/(62-74) 129/62      Exam:  /62 (BP Location: Left arm, Patient Position: Lying)   Pulse 79   Temp 97.7 °F (36.5 °C) (Axillary)   Resp 22   SpO2 92%     General Appearance:    Alerts but more sedated, no fm at bs, no distress, d/w RN/CCP, appears well controlled regarding pain    Head:    Normocephalic, without obvious abnormality, atraumatic       Ears:    Normal external ear canals, both ears   Nose:   Nares normal, septum midline, mucosa normal, no drainage    or sinus tenderness   Throat:   Lips, mucosa, and tongue normal though dry    Neck:   Supple, no JVD       Lungs:     Coarse rhonchi to auscultation bilaterally, respirations unlabored   Chest Wall:    No tenderness or deformity    Heart:    Regular rate and rhythm, S1 and S2 normal   Abdomen:     Soft, non-tender, bowel  sounds active all four quadrants,     Distended    Extremities:   Trace edema               Neurologic:   CNII-XII intact, moving all       .    Data Review:  Labs in chart were reviewed.             Imaging Results (all)     None            Assessment:    Closed comminuted intertrochanteric fracture of proximal end of left femur (CMS/HCC)    Pancytopenia (CMS/HCC)    Cirrhosis of liver with ascites (CMS/HCC)    HIV (human immunodeficiency virus infection) (CMS/HCC)    CKD (chronic kidney disease) stage 2, GFR 60-89 ml/min    Admission for hospice care      Plan:  HSB approved - continue current palliation    Brad Davidson MD  10/17/2018  10:17 AM

## 2018-10-17 NOTE — DISCHARGE SUMMARY
Date of dictation 10/17/2018    Date of admission 10/11/2018    Date of discharge 10/16/2018    Attending physician Dr. Brad Davidson    Consults orthopedics Dr. Campa    Discharge diagnoses:  Closed comminuted intertrochanteric fracture of the left femur  Acute on chronic pain  Postoperative acute blood loss anemia/ARIANE  Cirrhosis of the liver with ascites and portal hypertension  HIV  Diabetes mellitus type 2  Pancytopenia  CKD stage II    Hospital course:  Mr. Aranda is a pleasant 72-year-old male who was initially admitted for a fall which resulted in acute left-sided hip pain.  Patient also we went to the OR on 10/12/2018 and underwent surgical correction.  Postoperatively we had issues with acute blood loss anemia as well as difficulties controlling pain as well as exacerbation of thrombocytopenia secondary to consumption.  Patient was aggressively managed but ultimately due to his chronic comorbidities he did not thrive postoperatively.  Preoperatively was a conditional DNR.  Ultimately we had a sit down and have family conversations with patient and his significant other as well as consulted palliative care.  As of 10/16/2018 patient very clearly was not interested in going to any type of rehabilitation facility but was considering home health.  His participation with physical therapy while here was minimal at best.  He made statements that he did not want to suffer in pain any longer but he also still wanted to get better.  After discussing different modalities in regards to which way he would like to go for his clinical pathway, he ended up choosing comfort care.  Ultimately his wishes were to get home and I really would like to have gotten him home with hospice but unfortunately his significant other who does majority of his caretaking is unable to manage him.  Secondary to this we had long discussions on 10/16/2018 about considering hospice scattered bed and staying here for pain control.  Patient elected to  proceed with this and have the support of a believe his daughter as well as his significant other.  Patient was transferred to the palliative care floor and hospice approved hospice scattered bed.    Discharge medications:  Will continue use of palliative order care set and multiple home medications have been removed keeping what is of the utmost necessity at this juncture.

## 2018-10-17 NOTE — PLAN OF CARE
Problem: Fall Risk (Adult)  Goal: Identify Related Risk Factors and Signs and Symptoms  Outcome: Outcome(s) achieved Date Met: 10/17/18    Goal: Absence of Fall  Outcome: Ongoing (interventions implemented as appropriate)      Problem: Skin Injury Risk (Adult)  Goal: Identify Related Risk Factors and Signs and Symptoms  Outcome: Outcome(s) achieved Date Met: 10/17/18      Problem: Patient Care Overview  Goal: Plan of Care Review  Outcome: Ongoing (interventions implemented as appropriate)   10/17/18 5153   Coping/Psychosocial   Plan of Care Reviewed With patient;significant other   OTHER   Outcome Summary Pt medicated x 3 for medication and anxiety. Pt also medicated for terminal congestion x 2 and placed a scopalamine patch. Pt was A&O at beginning of shift which was reduced to oriented but not alert by end of shift. Pt is comfortable at this time. Will continue with comfort care and monitoring.DW Significant other active dying and predict he has days to weeks left. She vu. Would like called if needed tonight       Problem: Skin Injury Risk (Adult)  Goal: Skin Health and Integrity  Outcome: Ongoing (interventions implemented as appropriate)      Problem: Palliative Care (Adult)  Goal: Identify Related Risk Factors and Signs and Symptoms  Outcome: Outcome(s) achieved Date Met: 10/17/18    Goal: Maximized Comfort  Outcome: Ongoing (interventions implemented as appropriate)    Goal: Enhanced Quality of Life  Outcome: Ongoing (interventions implemented as appropriate)

## 2018-10-17 NOTE — PLAN OF CARE
Problem: Fall Risk (Adult)  Goal: Identify Related Risk Factors and Signs and Symptoms  Outcome: Ongoing (interventions implemented as appropriate)    Goal: Absence of Fall  Outcome: Ongoing (interventions implemented as appropriate)      Problem: Skin Injury Risk (Adult)  Goal: Identify Related Risk Factors and Signs and Symptoms  Outcome: Ongoing (interventions implemented as appropriate)    Goal: Skin Health and Integrity  Outcome: Ongoing (interventions implemented as appropriate)      Problem: Patient Care Overview  Goal: Plan of Care Review  Outcome: Ongoing (interventions implemented as appropriate)   10/17/18 0417   Coping/Psychosocial   Plan of Care Reviewed With patient;spouse   Plan of Care Review   Progress declining   OTHER   Outcome Summary Pt medicated x1 for pain and anxiety, tolerated well. Pt still alert and oriented, able to verbalize discomfort. FC placed for retention/comfort. Pt appears comfortable at this time. Plan of care reviewed with significant other. Will continue to monitor per comfort care.      Goal: Individualization and Mutuality  Outcome: Ongoing (interventions implemented as appropriate)    Goal: Discharge Needs Assessment  Outcome: Ongoing (interventions implemented as appropriate)    Goal: Interprofessional Rounds/Family Conf  Outcome: Ongoing (interventions implemented as appropriate)      Problem: Skin Injury Risk (Adult)  Goal: Skin Health and Integrity  Outcome: Ongoing (interventions implemented as appropriate)      Problem: Palliative Care (Adult)  Goal: Identify Related Risk Factors and Signs and Symptoms  Outcome: Ongoing (interventions implemented as appropriate)    Goal: Maximized Comfort  Outcome: Ongoing (interventions implemented as appropriate)    Goal: Enhanced Quality of Life  Outcome: Ongoing (interventions implemented as appropriate)

## 2018-10-17 NOTE — PROGRESS NOTES
Discharge Planning Assessment  [facility]     Patient Name: Kye Aranda  MRN: [id number]  Today's Date: 10/17/2018    Admit Date: 10/16/2018          Discharge Needs Assessment    No documentation.             Discharge Plan     Row Name 10/17/18 1511       Plan    Plan Comments The patient transferred to [facility] from [facility] on 10/16/18 @  17:28. The patient is palliative. The patient was current with [facility] prior to admission. [facility] states he is appropriate for [facility] coverage on 10/16/18. Spoke with MD and he is aware that [facility] started covering on 10/16/18 for a GIP/[facility] scattered bed. CCP and the [facility] team will follow for any needs that may arise.  MELINA Cho RN, CCP.         Destination - Selection Complete     Service Request Status Selected Specialties Address Phone Number Fax Number    [facility] [address] [facility] [address] [phone]      Durable Medical Equipment     No service coordination in this encounter.      Dialysis/Infusion     No service coordination in this encounter.      Home Medical Care     No service coordination in this encounter.      Social Care     No service coordination in this encounter.                Demographic Summary    No documentation.           Functional Status    No documentation.           Psychosocial    No documentation.           Abuse/Neglect    No documentation.           Legal    No documentation.           Substance Abuse    No documentation.           Patient Forms    No documentation.         Klarissa Cho, PRAKASH

## 2018-10-18 NOTE — PROGRESS NOTES
DAILY PROGRESS NOTE  [facility]    Patient Identification:  Name: Kye Aranda  Age: 72 y.o.  Sex: male  :  1945  MRN: [id number]         Primary Care Physician: Kendrick Griggs MD    Subjective:  Interval History: Now more sedated and not really conversing - unable to obtain any history or review of systems     Objective: Girlfriend present at bedside    Scheduled Meds:   Continuous Infusions:     Vital signs in last 24 hours:  Temp:  [98.5 °F (36.9 °C)-99.3 °F (37.4 °C)] 99.3 °F (37.4 °C)  Heart Rate:  [79-85] 85  Resp:  [10-20] 10  BP: (113-118)/(64-73) 113/64    Intake/Output:    Intake/Output Summary (Last 24 hours) at 10/18/18 1251  Last data filed at 10/18/18 0553   Gross per 24 hour   Intake              155 ml   Output              950 ml   Net             -795 ml       Exam:  /64 (BP Location: Left arm, Patient Position: Lying)   Pulse 85   Temp 99.3 °F (37.4 °C) (Axillary)   Resp 10   SpO2 93%     General Appearance:    Palliative appropriate, no distress, not conversational                         Throat:   Lips, tongue, gums normal; oral mucosa pink and dry                           Neck:   Supple, symmetrical, trachea midline, no JVD                         Lungs:    Decreased bases but improved diffuse rhonchi to auscultation bilaterally, respirations unlabored                 Chest Wall:    No tenderness or deformity                          Heart:    Irregularly irregular rate and rhythm, S1 and S2 normal                  Abdomen:     Soft, non-tender, bowel sounds not heard, distention with ascites                 Extremities:   No cyanosis or edema                         Data Review:  Labs in chart were reviewed.    Assessment:  Active Hospital Problems    Diagnosis Date Noted   • **Closed comminuted intertrochanteric fracture of proximal end of left femur (CMS/HCC) [S72.142A] 10/11/2018   • Admission for hospice care [Z51.5] 10/17/2018   • CKD (chronic kidney  disease) stage 2, GFR 60-89 ml/min [N18.2] 06/11/2018   • HIV (human immunodeficiency virus infection) (CMS/HCC) [B20] 06/11/2018   • Cirrhosis of liver with ascites (CMS/HCC) [K74.60, R18.8] 08/14/2017   • Pancytopenia (CMS/HCC) [D61.818] 02/19/2017      Resolved Hospital Problems    Diagnosis Date Noted Date Resolved   No resolved problems to display.       Plan:  Continue current palliation    HSB approved    Death likely due to be imminent the next couple days    Brad Davidson MD  10/18/2018  12:51 PM

## 2018-10-18 NOTE — PLAN OF CARE
"Problem: Fall Risk (Adult)  Goal: Absence of Fall  Outcome: Ongoing (interventions implemented as appropriate)      Problem: Patient Care Overview  Goal: Plan of Care Review  Outcome: Ongoing (interventions implemented as appropriate)   10/18/18 8842   Coping/Psychosocial   Plan of Care Reviewed With patient;significant other;daughter   Plan of Care Review   Progress declining   OTHER   Outcome Summary Pt medicated x 4 for pain today. Pt premedicated for turns and activity today. Pt dtr called today to check on pt status. Dtr given report on pt current status and decline and she feels it is due to over medicating him. She would requests we change his pain medication back to Morphine only so that pt is more responsive to food and when she calls. She states pt went through decline like this last hospitalization and that he made a full recovery once he was off of pain meds and home. Educated dtr that there are end of life sypmtoms happening such as terminal congestion and mottling. She would like to hold out \"hope\" he will improve. [facility] notified ; see [facility] RN note. PT given 4mg Morphine for pain and SOA. Will continue comfort measures and monitoring       Problem: Skin Injury Risk (Adult)  Goal: Skin Health and Integrity  Outcome: Ongoing (interventions implemented as appropriate)      Problem: Palliative Care (Adult)  Goal: Maximized Comfort  Outcome: Ongoing (interventions implemented as appropriate)    Goal: Enhanced Quality of Life  Outcome: Ongoing (interventions implemented as appropriate)        "

## 2018-10-18 NOTE — PLAN OF CARE
Problem: Fall Risk (Adult)  Goal: Absence of Fall  Outcome: Ongoing (interventions implemented as appropriate)      Problem: Skin Injury Risk (Adult)  Goal: Skin Health and Integrity  Outcome: Ongoing (interventions implemented as appropriate)      Problem: Patient Care Overview  Goal: Plan of Care Review  Outcome: Ongoing (interventions implemented as appropriate)   10/18/18 9503   Coping/Psychosocial   Plan of Care Reviewed With patient;significant other   Plan of Care Review   Progress declining   OTHER   Outcome Summary Pt medicated x3 for pain and restlessness, tolerated well. Pt less responsive this shift, still arousing to pain and some stimulation. Robinul given d/t secretion build up. Pt appears comfortable at this time. Will continue to monitor per comfort care.      Goal: Individualization and Mutuality  Outcome: Ongoing (interventions implemented as appropriate)    Goal: Discharge Needs Assessment  Outcome: Ongoing (interventions implemented as appropriate)    Goal: Interprofessional Rounds/Family Conf  Outcome: Ongoing (interventions implemented as appropriate)      Problem: Skin Injury Risk (Adult)  Goal: Skin Health and Integrity  Outcome: Ongoing (interventions implemented as appropriate)      Problem: Palliative Care (Adult)  Goal: Maximized Comfort  Outcome: Ongoing (interventions implemented as appropriate)    Goal: Enhanced Quality of Life  Outcome: Ongoing (interventions implemented as appropriate)

## 2018-10-18 NOTE — NURSING NOTE
"Provided visit to pt at [facility]. Pt unresponsive upn assessment. His abdomen is distended and use of accessory muscles is observed while breathing. His repirations are even and agonal @12 /min. 02 sat is 93% on 2 Lpm oxygen via nasal cannula. He has audible terminal congestion and is in the recovery position. He has mottling begining to appear on bilateral hands and feet despite being warm to touch. His temp is 99.3, pulse 85, b/p= 113/64. He has recieved robinul 0.4mg IV, dilaudid 2mg IV, and ativan 1mg x 4 doses each since midnight for symptom management of pain, restlessness/agitation, and terminal congestion. He also has a scopolamine patch in place. Upon speaking with PRAKASH Henderson, she informed me that the pt's reji who lives out of state had called and asked that the pt's felicia medication be reduced as she feels it is causing him to sleep all the time. Beatriz explained s/s of the dying process and she did not seem to accept this. She asked that his pain medication be reduced, so morhine 2mg IV was given as opposed to dilaudid upon the last time meds were given. I spoke with pt's sig benita Torres and elinor PAL of his dying process. Phone call made to pt's reji/BRIDGET Andres and explained pt's condition as well as s/s of the dying process. Provided opportunity for asking questions as needed. She says that she feels since transferring to the palliative unit, she feels as though he is jsut being medicated to \"speed up the process\". I explained that hastening death is not what palliative care is. She VU. Mckenna expressed that she spoke to the pt on the phone on Tuesday and he was able to communicate normally. She feels that his unresponsiveness and lack of eating is due to being \"over medicated\". She says he was in the hospital one month ago and had a smiliar experience and upon taking him home he \"bounced back\". I expressed that his current presentation does not indicate that. Explained s/s of imminent death. She VU but says she still " wants to hold out hope that he will improve. She plans to come to visit the pt on 10/27 and I encouraged her to come sooner if she could due to his rapid decline. She VU and says she plans to come tomorrow or Saturday if she can arrange that. VM left for IDT members to f/u as needed. Will continue to provide daily visits to assess pt and provide support to pt/family and staff as needed.

## 2018-10-19 NOTE — PLAN OF CARE
Problem: Fall Risk (Adult)  Goal: Absence of Fall  Outcome: Ongoing (interventions implemented as appropriate)      Problem: Skin Injury Risk (Adult)  Goal: Skin Health and Integrity  Outcome: Ongoing (interventions implemented as appropriate)      Problem: Patient Care Overview  Goal: Plan of Care Review  Outcome: Ongoing (interventions implemented as appropriate)   10/19/18 0446   Coping/Psychosocial   Plan of Care Reviewed With patient   Plan of Care Review   Progress declining   OTHER   Outcome Summary Pt medicated x3 for s/s of discomfort. Tolerated well. Pt appears comfortable at this time. Robinul given for secretion build up. Will continue to monitor per comfort care.      Goal: Individualization and Mutuality  Outcome: Ongoing (interventions implemented as appropriate)    Goal: Discharge Needs Assessment  Outcome: Ongoing (interventions implemented as appropriate)    Goal: Interprofessional Rounds/Family Conf  Outcome: Ongoing (interventions implemented as appropriate)      Problem: Skin Injury Risk (Adult)  Goal: Skin Health and Integrity  Outcome: Ongoing (interventions implemented as appropriate)      Problem: Palliative Care (Adult)  Goal: Maximized Comfort  Outcome: Ongoing (interventions implemented as appropriate)    Goal: Enhanced Quality of Life  Outcome: Ongoing (interventions implemented as appropriate)      Problem: Dying Patient, Actively (Adult)  Goal: Identify Related Risk Factors and Signs and Symptoms  Outcome: Ongoing (interventions implemented as appropriate)    Goal: Comfort/Pain Control  Outcome: Ongoing (interventions implemented as appropriate)    Goal: Peace/Preservation of Dignity During the Dying Process  Outcome: Ongoing (interventions implemented as appropriate)

## 2018-10-19 NOTE — PLAN OF CARE
Problem: Fall Risk (Adult)  Goal: Absence of Fall  Outcome: Ongoing (interventions implemented as appropriate)      Problem: Skin Injury Risk (Adult)  Goal: Skin Health and Integrity  Outcome: Ongoing (interventions implemented as appropriate)      Problem: Patient Care Overview  Goal: Plan of Care Review  Outcome: Ongoing (interventions implemented as appropriate)   10/19/18 1941   Coping/Psychosocial   Plan of Care Reviewed With patient;family   Plan of Care Review   Progress declining   OTHER   Outcome Summary Pt responds to pain only. Daughter at bedside and refusing to medicate pt. Discussed medication options and disease progression. S.O. verbalizes understanding but defers to daughter for medications. Pt transferred to low air loss mattress. Will continue to monitor.     Goal: Individualization and Mutuality  Outcome: Ongoing (interventions implemented as appropriate)    Goal: Discharge Needs Assessment  Outcome: Ongoing (interventions implemented as appropriate)      Problem: Skin Injury Risk (Adult)  Goal: Skin Health and Integrity  Outcome: Ongoing (interventions implemented as appropriate)      Problem: Dying Patient, Actively (Adult)  Goal: Identify Related Risk Factors and Signs and Symptoms  Outcome: Outcome(s) achieved Date Met: 10/19/18    Goal: Comfort/Pain Control  Outcome: Ongoing (interventions implemented as appropriate)    Goal: Peace/Preservation of Dignity During the Dying Process  Outcome: Ongoing (interventions implemented as appropriate)

## 2018-10-19 NOTE — NURSING NOTE
Provided visit to pt at [facility]. Pt unresponsive and lying in recovery position. His sig other Melissa is present at bedside. She reports that the pt's father, brother and reji, all from out of state are here for visit currently, but are off the unit at this time. Melissa says the pt's reji Mckenna asked that the nurse withhold his morphine and ativan, so at this time the pt has not been medicated for 8 hours, yet he remains unresponsive. Discussed this with Melissa and she VU. VSS as pt continues to be actively dying. Melissa denies any further questions or concerns at this time. Will continue to provide daily visit to assess pt and support pt/famiyl and staff as needed.

## 2018-10-19 NOTE — PROGRESS NOTES
DAILY PROGRESS NOTE  [facility]    Patient Identification:  Name: Kye Aranda  Age: 72 y.o.  Sex: male  :  1945  MRN: [id number]         Primary Care Physician: Kendrick Griggs MD      Subjective  Pt not communicating.  Comfortable per fm members at bedside.     Objective:  General Appearance:  Comfortable, well-appearing, in no acute distress and not in pain.    Vital signs: (most recent): Blood pressure 121/55, pulse 76, temperature 97.5 °F (36.4 °C), temperature source Oral, resp. rate 14, SpO2 92 %.    Lungs:  Normal effort.  He is not in respiratory distress.  No stridor.  There are rhonchi.  No rales, decreased breath sounds or wheezes.    Heart: Normal rate.  Regular rhythm.    Extremities: There is dependent edema (Trace pretib. ).    Neurological: (Minimally responsive. ).    Skin:  Warm and dry.                Vital signs in last 24 hours:  Temp:  [97.5 °F (36.4 °C)-99.5 °F (37.5 °C)] 97.5 °F (36.4 °C)  Heart Rate:  [76-80] 76  Resp:  [14-16] 14  BP: (121)/(55-57) 121/55    Intake/Output:    Intake/Output Summary (Last 24 hours) at 10/19/18 1450  Last data filed at 10/19/18 0600   Gross per 24 hour   Intake                0 ml   Output              700 ml   Net             -700 ml           Results from last 7 days  Lab Units 10/15/18  0324 10/14/18  0406 10/13/18  0623 10/13/18  0348   WBC 10*3/mm3 3.80* 5.34  --  3.59*   HEMOGLOBIN g/dL 8.8* 9.1* 7.0* 7.1*   PLATELETS 10*3/mm3 34* 42*  --  53*     Results from last 7 days  Lab Units 10/14/18  0406 10/13/18  0348   SODIUM mmol/L 134* 133*   POTASSIUM mmol/L 3.8 4.3   CHLORIDE mmol/L 98 99   CO2 mmol/L 23.7 21.3*   BUN mg/dL 21 22   CREATININE mg/dL 1.04 1.10   GLUCOSE mg/dL 216* 260*   Estimated Creatinine Clearance: 68 mL/min (by C-G formula based on SCr of 1.04 mg/dL).  Results from last 7 days  Lab Units 10/14/18  0406 10/13/18  0348   CALCIUM mg/dL 7.5* 7.5*   ALBUMIN g/dL 3.10*  --      Results from last 7 days  Lab Units  10/14/18  0406   ALBUMIN g/dL 3.10*   BILIRUBIN mg/dL 4.0*   ALK PHOS U/L 260*   AST (SGOT) U/L 43*   ALT (SGPT) U/L 18       Assessment:    Closed comminuted intertrochanteric fracture of proximal end of left femur (CMS/HCC)    Pancytopenia (CMS/HCC)    Cirrhosis of liver with ascites (CMS/HCC)    HIV (human immunodeficiency virus infection) (CMS/HCC)    CKD (chronic kidney disease) stage 2, GFR 60-89 ml/min    Admission for hospice care        Plan:  Cont comfort measures.     Stalin Cao MD  10/19/2018  2:50 PM

## 2018-10-20 NOTE — PLAN OF CARE
Problem: Fall Risk (Adult)  Goal: Absence of Fall  Outcome: Ongoing (interventions implemented as appropriate)      Problem: Skin Injury Risk (Adult)  Goal: Skin Health and Integrity  Outcome: Ongoing (interventions implemented as appropriate)      Problem: Patient Care Overview  Goal: Plan of Care Review  Outcome: Ongoing (interventions implemented as appropriate)   10/20/18 0427   Coping/Psychosocial   Plan of Care Reviewed With patient   Plan of Care Review   Progress declining   OTHER   Outcome Summary Pt moans to painful stimuli, but has shown no signs of arousing. Daughter at BS through the night. Medicated with morphine x1 at daughter's request, pt was moaning and appeared restless. Will continue palliative measures       Problem: Skin Injury Risk (Adult)  Goal: Skin Health and Integrity  Outcome: Ongoing (interventions implemented as appropriate)      Problem: Dying Patient, Actively (Adult)  Goal: Comfort/Pain Control  Outcome: Ongoing (interventions implemented as appropriate)    Goal: Peace/Preservation of Dignity During the Dying Process  Outcome: Ongoing (interventions implemented as appropriate)

## 2018-10-20 NOTE — CONSULTS
Spiritual Care: Patient sleeping; spoke with family who appears to be doing alright. I know them from having spoken to them about an AD while patient was on another unit.  I let them know that I was available to support them and pray with them and could be paged if they needed me.  Chaplain Suzanne Smyth

## 2018-10-20 NOTE — PROGRESS NOTES
DAILY PROGRESS NOTE  [facility]    Patient Identification:  Name: Kye Aranda  Age: 72 y.o.  Sex: male  :  1945  MRN: [id number]         Primary Care Physician: Kendrick Griggs MD      Subjective  Pt not communicating.   Spoke w RN and there have been issues w the daughter impeding comfort measures and pt had been displaying signs of pain.   Pt recently medicated and appearing more comfortable again.      Objective:  General Appearance:  Comfortable, well-appearing, in no acute distress and not in pain.    Vital signs: (most recent): Blood pressure 121/63, pulse 78, temperature 99.7 °F (37.6 °C), temperature source Oral, resp. rate 14, SpO2 (!) 84 %.    Lungs:  Normal effort and normal respiratory rate.    Heart: Normal rate.  Regular rhythm.    Neurological: (Sleeping deeply and appears comfortable but grimaces if moved. ).    Skin:  Warm and dry.                Vital signs in last 24 hours:  Temp:  [97.2 °F (36.2 °C)-99.7 °F (37.6 °C)] 99.7 °F (37.6 °C)  Heart Rate:  [78-81] 78  Resp:  [14] 14  BP: (121-125)/(63-68) 121/63    Intake/Output:    Intake/Output Summary (Last 24 hours) at 10/20/18 1905  Last data filed at 10/20/18 1855   Gross per 24 hour   Intake                0 ml   Output              700 ml   Net             -700 ml           Results from last 7 days  Lab Units 10/15/18  0324 10/14/18  0406   WBC 10*3/mm3 3.80* 5.34   HEMOGLOBIN g/dL 8.8* 9.1*   PLATELETS 10*3/mm3 34* 42*     Results from last 7 days  Lab Units 10/14/18  0406   SODIUM mmol/L 134*   POTASSIUM mmol/L 3.8   CHLORIDE mmol/L 98   CO2 mmol/L 23.7   BUN mg/dL 21   CREATININE mg/dL 1.04   GLUCOSE mg/dL 216*   Estimated Creatinine Clearance: 68 mL/min (by C-G formula based on SCr of 1.04 mg/dL).  Results from last 7 days  Lab Units 10/14/18  0406   CALCIUM mg/dL 7.5*   ALBUMIN g/dL 3.10*     Results from last 7 days  Lab Units 10/14/18  0406   ALBUMIN g/dL 3.10*   BILIRUBIN mg/dL 4.0*   ALK PHOS U/L 260*   AST  (SGOT) U/L 43*   ALT (SGPT) U/L 18       Assessment:    Closed comminuted intertrochanteric fracture of proximal end of left femur (CMS/HCC)    Pancytopenia (CMS/HCC)    Cirrhosis of liver with ascites (CMS/HCC)    HIV (human immunodeficiency virus infection) (CMS/HCC)    CKD (chronic kidney disease) stage 2, GFR 60-89 ml/min    Admission for hospice care        Plan:  Cont comfort measures.  Per records his S.O. Is the health care surrogate.     Stalin Cao MD  10/20/2018  7:05 PM

## 2018-10-20 NOTE — PROGRESS NOTES
[facility] Visit Report    Kye Aranda  [id number]  10/20/2018    Admission [facility]T [facility] Dx: YES      Reason for [facility] Admission: HIV, ETOH induced Hepatitis with ascites, Left hip fracture      Symptom  Management: Pain, restlessness and congestion    Nursing/Medication Recommendations: Recommend that nursing staff defer to RADHA Torres Angela for questions regarding health care as she is Healthcare Surrogate, copy in soft chart dated 9/14/2018.         Psychosocial Issues and Recommendations: Provide support to patient, significant other and family.      Spiritual Concerns and Recommendations: None at present      [facility] Discharge Plans:  None, patient actively dying and not safe for transport. Patient is receiving IV medication for symptom management of pain, restlessness and congestion and is meeting criteria now for GIP.       Review of Visit: Close monitoring for safety/falls, symptom management of pain, restlessness and congestion, comfort care for actively dying patient. Patient lying in bed, unresponsive, color jaundiced to ashen, breathing slightly agonal. 02 sat 80%. Spoke to RADHA Torres, who is healthcare surrogate, regarding imminent status and she is aware and accepting just wants him comfortable. Emotional support provided. Informed her that per the healthcare surrogate form she has authority to make health decisions for patient including the use of comfort medications. Discussed this with the staff RN Vicenta as daughter requesting medications for comfort to be withheld at times even when patient showing signs of discomfort. Daughter is not present during my visit and patient has been medicated with IV Morphine x 4 doses since 0500 and IV Ativan x 1 dose since 0500. He only had 1 dose of IV Morphine in an 8 hour period overnight while daughter was in room even when staff felt patient needed comfort medications. Staff from [facility] and [facility] have tried to educate her on patient's need for comfort  medications in the dying process. Will continue to see patient daily to assess needs, monitor status and offer support.        Amber Pino RN  [facility] Visit Nurse

## 2018-10-20 NOTE — PLAN OF CARE
Problem: Fall Risk (Adult)  Goal: Absence of Fall  Outcome: Ongoing (interventions implemented as appropriate)      Problem: Skin Injury Risk (Adult)  Goal: Skin Health and Integrity  Outcome: Ongoing (interventions implemented as appropriate)      Problem: Patient Care Overview  Goal: Plan of Care Review  Outcome: Ongoing (interventions implemented as appropriate)   10/20/18 8879   Coping/Psychosocial   Plan of Care Reviewed With patient;significant other;daughter   Plan of Care Review   Progress declining   OTHER   Outcome Summary Patient grimaces and moans out in pain when repositioned, respirations labored, pt holds onto bedrails at times and has increased secretions. Despite educating the daugher at the bedside she refuses to let us medicate routinely for better comfort management. Addressed this with significant other as well and she did allow us to medicate prior to the last turn for comfort. Per Living Will, the significant other is medical surrogate and should be addressed regarding healthcare needs. Document is copied and in the chart. Continue current plan of care.        Problem: Skin Injury Risk (Adult)  Goal: Skin Health and Integrity  Outcome: Ongoing (interventions implemented as appropriate)      Problem: Dying Patient, Actively (Adult)  Goal: Comfort/Pain Control  Outcome: Ongoing (interventions implemented as appropriate)    Goal: Peace/Preservation of Dignity During the Dying Process  Outcome: Ongoing (interventions implemented as appropriate)

## 2018-10-21 NOTE — PLAN OF CARE
Problem: Fall Risk (Adult)  Goal: Absence of Fall  Outcome: Ongoing (interventions implemented as appropriate)      Problem: Skin Injury Risk (Adult)  Goal: Skin Health and Integrity  Outcome: Ongoing (interventions implemented as appropriate)      Problem: Patient Care Overview  Goal: Plan of Care Review  Outcome: Ongoing (interventions implemented as appropriate)   10/21/18 5268   Coping/Psychosocial   Plan of Care Reviewed With patient;significant other;daughter   Plan of Care Review   Progress declining   OTHER   Outcome Summary Pre-medicated patient prior to turns for better pain/symptom management. Patient still grimaces with turns, please turn slowly and easy per family request. Patient placed in recovery position on his right side only to help with secretions. Family at bedside and very attentive to patient needs. Continue comfort care.        Problem: Skin Injury Risk (Adult)  Goal: Skin Health and Integrity  Outcome: Ongoing (interventions implemented as appropriate)      Problem: Dying Patient, Actively (Adult)  Goal: Comfort/Pain Control  Outcome: Ongoing (interventions implemented as appropriate)    Goal: Peace/Preservation of Dignity During the Dying Process  Outcome: Ongoing (interventions implemented as appropriate)

## 2018-10-21 NOTE — PROGRESS NOTES
DAILY PROGRESS NOTE  [facility]    Patient Identification:  Name: Kye Aranda  Age: 72 y.o.  Sex: male  :  1945  MRN: [id number]         Primary Care Physician: Kendrick Griggs MD      Subjective  Pt non-responsive.     Objective:  General Appearance:  Comfortable, in no acute distress and not in pain.    Vital signs: (most recent): Blood pressure 147/75, pulse 75, temperature 97.3 °F (36.3 °C), temperature source Oral, resp. rate 16, SpO2 (!) 84 %.    Lungs:  Normal effort and normal respiratory rate.  He is not in respiratory distress.  There are rhonchi.  (Breath more shallow.  )  Heart: Normal rate.  Regular rhythm.    Neurological: (Unresponsive. ).    Skin:  Warm and dry.                Vital signs in last 24 hours:  Temp:  [97.3 °F (36.3 °C)-99.7 °F (37.6 °C)] 97.3 °F (36.3 °C)  Heart Rate:  [75-78] 75  Resp:  [14-16] 16  BP: (121-147)/(63-75) 147/75    Intake/Output:    Intake/Output Summary (Last 24 hours) at 10/21/18 1338  Last data filed at 10/21/18 0526   Gross per 24 hour   Intake                0 ml   Output              550 ml   Net             -550 ml           Results from last 7 days  Lab Units 10/15/18  0324   WBC 10*3/mm3 3.80*   HEMOGLOBIN g/dL 8.8*   PLATELETS 10*3/mm3 34*     Estimated Creatinine Clearance: 68 mL/min (by C-G formula based on SCr of 1.04 mg/dL).        Assessment:    Closed comminuted intertrochanteric fracture of proximal end of left femur (CMS/HCC)    Pancytopenia (CMS/HCC)    Cirrhosis of liver with ascites (CMS/HCC)    HIV (human immunodeficiency virus infection) (CMS/HCC)    CKD (chronic kidney disease) stage 2, GFR 60-89 ml/min    Admission for hospice care        Plan:  Cont comfort measures.  Discussed w fm at bedside and RN.     Stalin Cao MD  10/21/2018  1:38 PM

## 2018-10-21 NOTE — PROGRESS NOTES
[facility] Visit Report    Kye Aranda  [id number]  10/21/2018    Admission R/T [facility] Dx: YES      Reason for [facility] Admission: HIV, ETOH induced Hepatitis with ascites, left hip fracture      Symptom  Management: Pain, restlessness and congestion      Nursing/Medication Recommendations:No recommendations at this time      Psychosocial Issues and Recommendations: Provide support to patient and family      Spiritual Concerns and Recommendations: None at present      [facility] Discharge Plans:  None, patient very actively dying and not safe for transport. Patient receiving IV medications for symptom management of pain, restlessness and congestion and is meeting criteria for GIP.      Review of Visit: Close monitoring for safety/falls, symptom management of pain, restlessness and congestion, comfort care for actively dying patient. Patient lying in bed, on side in recovery position, unresponsive, color ashen to jaundiced, breathing agonal, mottling to hands noted. 02 sat 84%. Spoke to RADHA Torres regarding imminent status and she is aware and accepting. Other family members have been here, daughters and father from [place]. Emotional support provided. Discussed care needs with staff RN and patient receiving IV Dilaudid 1mg x 5 doses, IV Ativan 1-2mg x 4 doses and IV Robinul 0.2mf x 1 dose. Also has a Scopolamine patch in place. Will continue to see daily to assess needs, monitor status and offer support.:        Amber Pino RN  [facility]

## 2018-10-21 NOTE — PLAN OF CARE
Problem: Fall Risk (Adult)  Goal: Absence of Fall  Outcome: Ongoing (interventions implemented as appropriate)      Problem: Skin Injury Risk (Adult)  Goal: Identify Related Risk Factors and Signs and Symptoms  Outcome: Ongoing (interventions implemented as appropriate)    Goal: Skin Health and Integrity  Outcome: Ongoing (interventions implemented as appropriate)      Problem: Patient Care Overview  Goal: Plan of Care Review  Outcome: Ongoing (interventions implemented as appropriate)   10/21/18 0558   Coping/Psychosocial   Plan of Care Reviewed With patient   Plan of Care Review   Progress declining   OTHER   Outcome Summary Mediacted with turns, continues to have significant pain with movement. Congestion increasing through the night. Placed new scopolamine patch and added robinul. Discussed plan of care with daughter at [place]. Will continue palliative measures       Problem: Skin Injury Risk (Adult)  Goal: Skin Health and Integrity  Outcome: Ongoing (interventions implemented as appropriate)      Problem: Dying Patient, Actively (Adult)  Goal: Comfort/Pain Control  Outcome: Ongoing (interventions implemented as appropriate)    Goal: Peace/Preservation of Dignity During the Dying Process  Outcome: Ongoing (interventions implemented as appropriate)

## 2018-10-22 NOTE — PROGRESS NOTES
[facility] Visit Report    Kye Aranda  [id number]  10/22/2018    Admission R/T [facility] Dx: yes    Reason for [facility] Admission:  HIV; ETOH Hepatitis with ascites    Symptom  Management: Pain Control    Nursing/Medication Recommendations:  No recommendations at this time.    Psychosocial Issues and Recommendations:  Continue to offer/provide support to patient and family.    Spiritual Concerns and Recommendations:    [facility] Discharge Plans:  incomplete; patient remains [facility] and [facility] will round daily    Review of Visit (Include All Collaboration- including names of hospital and family involved during admission/visit):   No issues or concerns reported and no plans for discharge/transfer.  In the last 24 hrs he has received IV Dilaudid 1mg x4, IV Ativan 1mg x1, and IV Robinul 0.4mg x3.  Pt is unresponsive, respirations are shallow and irregular with periods of apnea.  No audible congestion noted.  Skin is pale/dusky in appearance.  He appears to be comfortable at this time, no distress noted.  Pt's S.O., brother, and dad are at bedside.  They report pt is comfortable and deny any questions or concerns at this time.  Provided support.  Will continue to visit daily to assess needs and offer support to patient and family.         Kerry Butcher RN

## 2018-10-22 NOTE — PLAN OF CARE
Problem: Fall Risk (Adult)  Goal: Absence of Fall  Outcome: Ongoing (interventions implemented as appropriate)      Problem: Patient Care Overview  Goal: Plan of Care Review  Outcome: Ongoing (interventions implemented as appropriate)   10/22/18 1407   Coping/Psychosocial   Plan of Care Reviewed With patient;family   OTHER   Outcome Summary Pt. continued with palliative care. Family encouraged to reposition patient as needed. Medicated once for pain per family request. Medicated for congestion as needed. Will continue to monitor.     Goal: Individualization and Mutuality  Outcome: Ongoing (interventions implemented as appropriate)    Goal: Discharge Needs Assessment  Outcome: Ongoing (interventions implemented as appropriate)      Problem: Skin Injury Risk (Adult)  Goal: Skin Health and Integrity  Outcome: Ongoing (interventions implemented as appropriate)      Problem: Dying Patient, Actively (Adult)  Goal: Comfort/Pain Control  Outcome: Ongoing (interventions implemented as appropriate)    Goal: Peace/Preservation of Dignity During the Dying Process  Outcome: Ongoing (interventions implemented as appropriate)

## 2018-10-22 NOTE — PLAN OF CARE
Problem: Patient Care Overview  Goal: Plan of Care Review  Outcome: Ongoing (interventions implemented as appropriate)   10/22/18 9702   Coping/Psychosocial   Plan of Care Reviewed With patient;daughter   Plan of Care Review   Progress declining   OTHER   Outcome Summary Pt premedicated prior to first turn to control pain, Per family, kept patient on right side, shifted the tilt due to pts comfort and breathing. family stayed at bedside through out the night, will continue to keep comfortable      Goal: Individualization and Mutuality  Outcome: Ongoing (interventions implemented as appropriate)      Problem: Dying Patient, Actively (Adult)  Goal: Comfort/Pain Control  Outcome: Ongoing (interventions implemented as appropriate)    Goal: Peace/Preservation of Dignity During the Dying Process  Outcome: Ongoing (interventions implemented as appropriate)

## 2018-10-23 NOTE — PROGRESS NOTES
DAILY PROGRESS NOTE  [facility]    Patient Identification:  Name: Kye Aranda  Age: 72 y.o.  Sex: male  :  1945  MRN: [id number]         Primary Care Physician: Kendrick Griggs MD      Subjective  Pt unresponsive    Objective:  General Appearance:  Comfortable, in no acute distress, not in pain and ill-appearing.    Vital signs: (most recent): Blood pressure 132/65, pulse 91, temperature (!) 101.2 °F (38.4 °C), temperature source Oral, resp. rate 20, SpO2 (!) 79 %.    Lungs:  (Breaths are shallow w paradoxical abd movement.  )  Heart: Tachycardia.    Neurological: (Unresponsive.).    Skin:  Warm and dry.                Vital signs in last 24 hours:  Temp:  [101.2 °F (38.4 °C)] 101.2 °F (38.4 °C)  Heart Rate:  [89-91] 91  Resp:  [20] 20  BP: (132)/(65-66) 132/65    Intake/Output:    Intake/Output Summary (Last 24 hours) at 10/22/18 2042  Last data filed at 10/22/18 1841   Gross per 24 hour   Intake                0 ml   Output              600 ml   Net             -600 ml             Estimated Creatinine Clearance: 68 mL/min (by C-G formula based on SCr of 1.04 mg/dL).        Assessment:    Closed comminuted intertrochanteric fracture of proximal end of left femur (CMS/HCC)    Pancytopenia (CMS/HCC)    Cirrhosis of liver with ascites (CMS/HCC)    HIV (human immunodeficiency virus infection) (CMS/HCC)    CKD (chronic kidney disease) stage 2, GFR 60-89 ml/min    Admission for hospice care        Plan:  Pt appears terminal.  Discussed w daughter at bedside.  Cont comfort measures.     Stalin Cao MD  10/22/2018  8:42 PM

## 2018-10-23 NOTE — PLAN OF CARE
Problem: Fall Risk (Adult)  Goal: Absence of Fall  Outcome: Ongoing (interventions implemented as appropriate)      Problem: Patient Care Overview  Goal: Plan of Care Review  Outcome: Ongoing (interventions implemented as appropriate)   10/22/18 0405 10/23/18 0625   Coping/Psychosocial   Plan of Care Reviewed With --  patient   Plan of Care Review   Progress declining --    OTHER   Outcome Summary --  medicated due to dyspnea, family refused most turns today. gave robinul for congestion, suctioned pt, will continue to monitor and keep comfortable      Goal: Individualization and Mutuality  Outcome: Ongoing (interventions implemented as appropriate)      Problem: Skin Injury Risk (Adult)  Goal: Skin Health and Integrity  Outcome: Ongoing (interventions implemented as appropriate)      Problem: Dying Patient, Actively (Adult)  Goal: Comfort/Pain Control  Outcome: Ongoing (interventions implemented as appropriate)    Goal: Peace/Preservation of Dignity During the Dying Process  Outcome: Ongoing (interventions implemented as appropriate)

## 2018-10-23 NOTE — CONSULTS
Spiritual Care:  Patient ; had a theological conversation with the family; we read Scripture and prayed together.  We discussed  arrangements and I let them know what the next steps would be.  I went and spoke with the nursing team and discussed the  procedures and let them know that the family was ready to sign the paperwork for his release to the  home. I asked the nurses to page me if they needed me for further assistance.  Chaplain Suzanne Smyth

## 2018-10-23 NOTE — PROGRESS NOTES
Case Management Discharge Note    Final Note: The patient  on 10/23/18 @ 07:12. BCaden Cho RN, CCP.     Destination - Selection Complete     Service Request Status Selected Specialties Address Phone Number Fax Number    [facility] [id number] [facility] [address] [phone]      Durable Medical Equipment     No service has been selected for the patient.      Dialysis/Infusion     No service has been selected for the patient.      Home Medical Care     No service has been selected for the patient.      Social Care     No service has been selected for the patient.             Final Discharge Disposition Code: 41 -  in medical facility

## 2019-06-17 NOTE — PROGRESS NOTES
DAILY PROGRESS NOTE  [facility]   LOS: 2 days   Patient Care Team:  Kendrick Griggs MD as PCP - General (Family Medicine)  Carrie Lucas MD PhD as PCP - Claims Attributed  Carrie Lucas MD PhD as Consulting Physician (Hematology and Oncology)  Baldo Conway MD as Consulting Physician (Endocrinology)  Robyn Butler MD as Consulting Physician (Gastroenterology)  Marla Guevara, PRAKSAH as Care Coordinator ([facility])      Patient Identification:  Name: Kye Aranda  Age: 72 y.o.  Sex: male  :  1945  MRN: [id number]         Primary Care Physician: Kendrick Griggs MD    Chief Complaint:    The patient slept well, his pain is much better controlled    History of present illness:  The patient is a 72-year-old male with HIV infection diagnosed in , alcoholic liver cirrhosis, ascites, splenomegaly and thrombocytopenia admitted with worsening lower extremity edema, shortness of breath, sense of bruises general decline in health.  He recently underwent kyphoplasty, but still complains of lots of back pain and difficulty to ambulate.  Lost about 20 pounds in the past 3 months.  In January of this year he is T-cell count was 18 and HIV viral load was 113 copies per mL, genotype archives revealed multiple resistant mutations  And he is antiretroviral therapy was changed to Edurant 25 mg daily, Isentress 400 mg BID, Prezcobix one tablet daily and Tenofovir 300 mg daily.  So far hospital work up revealed severe protein malnutrition, pancytopenia and elevated PT c/w liver failure.  Yesterday we discussed comfort measures, he was seen by palliative care team and is agreeable to transfer to [facility]    Scheduled Meds:  cholecalciferol 1,000 Units Oral Daily   cobicistat 150 mg Oral Daily With Breakfast   And      darunavir 800 mg Oral Daily With Breakfast   diphenhydrAMINE 25 mg Oral Daily   fluconazole 100 mg Oral Every Other Day   furosemide 40 mg Oral Daily   ipratropium-albuterol 3 mL  "Nebulization Q4H - RT   iron sucrose 300 mg Intravenous Daily   lactulose 20 g Oral TID   levothyroxine 300 mcg Oral QAM   metoprolol succinate XL 50 mg Oral Q24H   oxyCODONE 10 mg Oral Q12H   pantoprazole 40 mg Oral Q AM   potassium chloride 10 mEq Oral Daily   raltegravir 400 mg Oral Q12H   rifaximin 550 mg Oral Q12H   rilpivirine 25 mg Oral Daily With Breakfast   spironolactone 100 mg Oral Daily   tamsulosin 0.4 mg Oral Daily   tenofovir 300 mg Oral Daily With Breakfast   zinc sulfate 220 mg Oral Daily     Continuous Infusions:     Vital signs in last 24 hours:  Temp:  [98.1 °F (36.7 °C)-98.3 °F (36.8 °C)] 98.1 °F (36.7 °C)  Heart Rate:  [65-78] 73  Resp:  [16-20] 16  BP: (112-141)/(45-60) 141/60    Intake/Output:    Intake/Output Summary (Last 24 hours) at 09/13/18 1223  Last data filed at 09/13/18 1041   Gross per 24 hour   Intake              480 ml   Output              725 ml   Net             -245 ml       Exam:  /60 (BP Location: Left arm, Patient Position: Lying)   Pulse 73   Temp 98.1 °F (36.7 °C) (Oral)   Resp 16   Ht 167.6 cm (65.98\")   Wt 65.8 kg (145 lb)   SpO2 91%   BMI 23.41 kg/m²   General Appearance:   chronically ill appearing, pale  No gingival bleeding  Lungs-clear bilaterally  Heart- RRR  abdomen soft,ascitis, splenomegaly  Extremity- bilateral pitting edma and petechiae  Skin- extensive bruises, hematomas  Assessment:  72-year-old male with complex medical history that includes nonalcoholic liver cirrhosis complicated by splenomegaly and thrombocytopenia presnting with worsening bruises, lower extremity edema and petechial rash and general decline of health.  Insulin-dependent diabetes mellitus  Hypothyroidism- TSH- is OK  Coronary artery disease, history of MI; last 2D-Echo, EF-65%  AIDS- treatment experienced, multiple resistant mutations  Hospital work up revealed worsening liver failure with poor syntectic function,worsening pancytopenia and wasting.  He was evaluated by " palliative care team yesterday  and is agreeable to transfer to Palliative care and possibly home with Hospice.  His , Melissa visited today and we discussed further care  Plan:  Supportive, comfort care      Klarissa Thomas MD  9/13/2018  12:23 PM   none

## 2020-12-04 NOTE — PROGRESS NOTES
Case Management Discharge Note    Final Note: Admitted to a [facility] bed on 10/16/18.  MELINA oleary RN, CCP.     Destination - Selection Complete     Service Request Status Selected Specialties Address Phone Number Fax Number    [facility] Selected Hospice [id number], [facility] [address] [phone]      Durable Medical Equipment     No service has been selected for the patient.      Dialysis/Infusion     No service has been selected for the patient.      Home Medical Care     Service Request Status Selected Specialties Address Phone Number Fax Number    [facility] Selected Hospice [address], [facility] [address] [phone]      Social Care     No service has been selected for the patient.             Final Discharge Disposition Code: 51 - hospice medical facility   Date Of Previous Biopsy (Optional): 10/23/2020

## 2022-12-11 NOTE — ED TRIAGE NOTES
Pt states that he had had bilateral flank pain for the past five months.   States that he has also seen blood when he wipes his bottom.  Pt states that he also will have palpitations with exertion.    (E4) spontaneous

## 2023-05-03 NOTE — CONSULTS
Adult Nutrition  Assessment/PES    Patient Name:  Kye Aranda  YOB: 1945  MRN: [id number]  Admit Date:  4/8/2018    Assessment Date:  4/9/2018    Comments:  Completed nutrition assessment earlier. Patient meets criteria for MSA based on physical exam and interview.          Adult Nutrition Assessment     Row Name 04/09/18 1442       Malnutrition Severity Assessment    Malnutrition Type Acute Illness/Injury Malnutrition       Physical Signs of Malnutrition (Acute)    Muscle Wasting Mild       Weight Status (Acute)    Weight Loss Severe (>5% / 1 mo)    Energy Intake Severe (< or equal to 50% / > or equal to 5d)       Criteria Met (Must meet criteria for severity in at least 2 of these categories: M Wasting, Fat Loss, Fluid, Secondary Signs, Wt. Status, Intake)    Patient meets criteria for  Severe malnutrition    Row Name 04/09/18 1441       Nutrition Prescription PO    Current PO Diet Regular    Common Modifiers Consistent Carbohydrate       PO Evaluation    Number of Days PO Intake Evaluated 1 day   4/9    Number of Meals 2    % PO Intake averaged 88%    Row Name 04/09/18 1440       Physical Findings    Overall Physical Appearance loss of muscle mass    Skin other (see comments)   intact with bruises; Galileo score 20    Row Name 04/09/18 1439       Labs/Procedures/Meds    Lab Results Reviewed reviewed    Lab Results Comments alb, H/H, Na+, plts, WBC low; glu high     Row Name 04/09/18 1438       Reason for Assessment    Reason For Assessment physician consult    Row Name 04/09/18 0812       Nutrition Prescription PO    Current PO Diet Regular    Common Modifiers Consistent Carbohydrate       PO Evaluation    Number of Days PO Intake Evaluated Insufficient Data   no intake recorded    Row Name 04/09/18 0811       Physical Findings    Skin other (see comments)   intact with bruises; Galileo score 19       Calculation Measurements    Weight Used For Calculations 75.3 kg (166 lb 0.1 oz)        Estimated/Assessed Needs    Additional Documentation Calorie Requirements (Group);Fluid Requirements (Group);Protein Requirements (Group)       Calorie Requirements    Estimated Calorie Requirement (kcal/day) 1883    Estimated Calorie Need Method kcal/kg    Estimated Calorie Requirement Comment 25 carl/kg       KCAL/KG    14 Kcal/Kg (kcal) 1054.2    15 Kcal/Kg (kcal) 1129.5    18 Kcal/Kg (kcal) 1355.4    20 Kcal/Kg (kcal) 1506    25 Kcal/Kg (kcal) 1882.5    30 Kcal/Kg (kcal) 2259    35 Kcal/Kg (kcal) 2635.5    40 Kcal/Kg (kcal) 3012    45 Kcal/Kg (kcal) 3388.5    50 Kcal/Kg (kcal) 3765       Covington-St. Jeor Equation    RMR (Covington-St. Jeor Equation) 1466.63       Protein Requirements    Est Protein Requirement Amount (gms/kg) 1.0 gm protein    Estimated Protein Requirements (gms/day) 75.3       Fluid Requirements    Estimated Fluid Requirements (mL/day) 1883    Estimated Fluid Requirement Method RDA Method    RDA Method (mL) 1883    Janeth-Claribel Method (over 20 kg) 3006    Row Name 04/09/18 0810       Labs/Procedures/Meds    Lab Results Reviewed reviewed    Lab Results Comments glu high; imaging and vitals reviewed; meds: insulin    Row Name 04/09/18 0809       Admit Weight    Admit Weight 72.6 kg (160 lb 0.9 oz)       Usual Body Weight (UBW)    Weight Loss unintentional    Weight Loss Time Frame Weight February, 2018 188#. Today's weight 166#. 22# weight loss (13,25%) x 2 months       Body Mass Index (BMI)    BMI Assessment BMI 25-29.9: overweight    Row Name 04/09/18 0806       Reason for Assessment    Reason For Assessment nurse/nurse practitioner consult    Diagnosis other (see comments)   compression fx T12 vertebra    Identified At Risk by Screening Criteria MST SCORE 2+;unintentional loss of 10 lbs or more in the past 2 mos          Problem/Interventions:        Problem 1     Row Name 04/09/18 0813       Nutrition Diagnoses Problem 1    Problem 1 Nutrition Appropriate for Condition at this Time    Etiology  (related to) Medical Diagnosis    Endocrine DM    Signs/Symptoms (evidenced by) Report/Observation    Reported/Observed By MD            Problem 2     Row Name 04/09/18 1446       Nutrition Diagnoses Problem 2    Problem 2 Malnutrition    Etiology (related to) Factors Affecting Nutrition    Appetite Poor    Signs/Symptoms (evidenced by) Unintended Weight Change    Unintended Weight Change Loss    Number of Pounds Lost 19#    Weight loss time period 1 month                  Intervention Goal     Row Name 04/09/18 1448       Intervention Goal    General Maintain nutrition    PO Tolerate PO;Maintain intake;PO intake (%)    PO Intake % 75 %    Weight No significant weight loss    Row Name 04/09/18 0813       Intervention Goal    General Maintain nutrition    PO Tolerate PO;PO intake (%)    PO Intake % 75 %    Weight No significant weight loss            Nutrition Intervention     Row Name 04/09/18 1448       Nutrition Intervention    RD/Tech Action Encourage intake;Supplement provided;Follow Tx progress;Care plan reviewd    Row Name 04/09/18 0813       Nutrition Intervention    RD/Tech Action Follow Tx progress;Care plan reviewd            Nutrition Prescription     Row Name 04/09/18 1448       Nutrition Prescription PO    PO Prescription Begin/change supplement    Supplement Glucerna Shake    Supplement Frequency 3 times a day    New PO Prescription Ordered? Yes            Education/Evaluation     Row Name 04/09/18 1449       Education    Education Will Instruct as appropriate       Monitor/Evaluation    Monitor Per protocol;PO intake;Supplement intake;Pertinent labs;Weight;Skin status    Row Name 04/09/18 0813       Education    Education Will Instruct as appropriate       Monitor/Evaluation    Monitor Per protocol        Electronically signed by:  Cora Valencia RD  04/09/18 2:50 PM   No

## 2023-07-07 NOTE — PLAN OF CARE
Problem: Patient Care Overview  Goal: Plan of Care Review  Outcome: Ongoing (interventions implemented as appropriate)   10/15/18 0509   Coping/Psychosocial   Plan of Care Reviewed With patient   Plan of Care Review   Progress improving   OTHER   Outcome Summary Pt has been stable through the night. Very restless with c/o pain and SOA. Has mucinex and scheduled breathing txs. Oxy 10mg for pain prn. Is able to ambulate with walker and 1 assist. Voiding trial has been unsuccessful thus far. Retneion issues remain, did straight cath x1, [facility] informed of continuing problem. Will continue to monitor. Call to [facility] placed regarding critical low platelet count, waiting for response.      Goal: Individualization and Mutuality  Outcome: Ongoing (interventions implemented as appropriate)    Goal: Discharge Needs Assessment  Outcome: Ongoing (interventions implemented as appropriate)   10/12/18 7000 10/14/18 7829   Discharge Needs Assessment   Readmission Within the Last 30 Days --  no previous admission in last 30 days   Concerns to be Addressed --  basic needs;discharge planning   Patient/Family Anticipates Transition to --  inpatient rehabilitation facility   Patient/Family Anticipated Services at Transition --     Transportation Concerns car, none --    Transportation Anticipated --  family or friend will provide;health plan transportation   Anticipated Changes Related to Illness inability to care for self --    Equipment Needed After Discharge none --    Disability   Equipment Currently Used at Home --  walker, standard;wheelchair     Goal: Interprofessional Rounds/Family Conf  Outcome: Ongoing (interventions implemented as appropriate)   10/15/18 4429   Interdisciplinary Rounds/Family Conf   Participants nursing;patient;;family       Problem: Fall Risk (Adult)  Goal: Identify Related Risk Factors and Signs and Symptoms  Outcome: Outcome(s) achieved Date Met: 10/15/18   10/15/18 2889   Fall Risk  (Adult)   Related Risk Factors (Fall Risk) age-related changes;culprit medication(s);gait/mobility problems;history of falls   Signs and Symptoms (Fall Risk) presence of risk factors     Goal: Absence of Fall  Outcome: Ongoing (interventions implemented as appropriate)   10/15/18 0509   Fall Risk (Adult)   Absence of Fall achieves outcome       Problem: Skin Injury Risk (Adult)  Goal: Identify Related Risk Factors and Signs and Symptoms  Outcome: Outcome(s) achieved Date Met: 10/15/18   10/15/18 0509   Skin Injury Risk (Adult)   Related Risk Factors (Skin Injury Risk) advanced age;mobility impaired     Goal: Skin Health and Integrity  Outcome: Ongoing (interventions implemented as appropriate)   10/15/18 0509   Skin Injury Risk (Adult)   Skin Health and Integrity making progress toward outcome       Problem: Fractured Hip (Adult)  Goal: Signs and Symptoms of Listed Potential Problems Will be Absent, Minimized or Managed (Fractured Hip)  Outcome: Ongoing (interventions implemented as appropriate)   10/15/18 0509   Goal/Outcome Evaluation   Problems Assessed (Fractured Hip) all   Problems Present (Fractured Hip) pain;voiding dysfunction;functional deficit/self-care deficit;situational response          Topical Sulfur Applications Pregnancy And Lactation Text: This medication is considered safe during pregnancy and breast feeding secondary to limited systemic absorption.

## 2023-08-14 NOTE — PROGRESS NOTES
Adult Nutrition  Assessment/PES    Patient Name:  Kye Aranda  YOB: 1945  MRN: [id number]  Admit Date:  4/8/2018    Assessment Date:  4/18/2018    Comments:  Completed follow up triggered by length of stay.          Adult Nutrition Assessment     Row Name 04/18/18 1511       Nutrition Prescription PO    Current PO Diet Regular    Supplement Glucerna Shake    Supplement Frequency 3 times a day    Common Modifiers Consistent Carbohydrate       Intake Assessment    Tolerance tolerating       PO Evaluation    Number of Days PO Intake Evaluated Other (comment)   7 days    Number of Meals 14    % PO Intake averaged 88%    Row Name 04/18/18 1510       Physical Findings    Skin other (see comments)   cuts, scab, bruised; Galileo score 20    Row Name 04/18/18 1509       Reason for Assessment    Reason For Assessment follow-up protocol       Labs/Procedures/Meds    Lab Results Reviewed reviewed    Lab Results Comments glu high; H/H, plts, WBC low; chest XR 4/16; vitals reviewed; meds: antibiotic, insulin, laxative          Problem/Interventions:                  Intervention Goal     Row Name 04/18/18 1512       Intervention Goal    General Maintain nutrition    PO Tolerate PO;Maintain intake;PO intake (%)    PO Intake % 88 %    Weight No significant weight loss            Nutrition Intervention     Row Name 04/18/18 1512       Nutrition Intervention    RD/Tech Action Follow Tx progress;Care plan reviewd              Education/Evaluation     Row Name 04/18/18 1512       Education    Education Will Instruct as appropriate       Monitor/Evaluation    Monitor Per protocol;PO intake;Supplement intake;Pertinent labs;Weight;Skin status        Electronically signed by:  Cora Valencia RD  04/18/18 3:14 PM   Subjective   Patient ID: Waldo is a 57 year old male.    Chief Complaint   Patient presents with   • Follow-up     HPI a 57 yr old male presents to clinic for f/u HTN, high chol, tobacco smoke, and anxiety. States he is doing well. Tries to monitor diet now. He still smoking cigarettes but not as much as before. He does feel the bupropion helps. He did have a relapse in his drinking this past thursday d/t a friend passing. He has not had any since then. Denies any concerns this visit.     Patient's medications, allergies, past medical, surgical, social and family histories were reviewed and updated as appropriate.    Review of Systems   Constitutional: Negative.    HENT: Negative.    Respiratory: Negative.    Cardiovascular: Negative.    Gastrointestinal: Positive for abdominal pain, diarrhea and nausea. Negative for anal bleeding, blood in stool and vomiting.   Genitourinary:        Erectile dysfunction    Neurological: Negative.    Psychiatric/Behavioral: The patient is nervous/anxious.        Objective   Physical Exam  Constitutional:       Appearance: Normal appearance.   Cardiovascular:      Rate and Rhythm: Normal rate and regular rhythm.      Heart sounds: Normal heart sounds.   Pulmonary:      Effort: Pulmonary effort is normal.      Breath sounds: Normal breath sounds.   Abdominal:      Tenderness: There is no abdominal tenderness.   Musculoskeletal:         General: Normal range of motion.   Skin:     General: Skin is warm and dry.   Neurological:      General: No focal deficit present.      Mental Status: He is alert and oriented to person, place, and time.      Cranial Nerves: No cranial nerve deficit.   Psychiatric:         Mood and Affect: Mood normal.         Behavior: Behavior normal.         Thought Content: Thought content normal.         Judgment: Judgment normal.         Assessment   Problem List Items Addressed This Visit        Cardiac and Vasculature    Hypertension - Primary     Stable    CPM  DASH diet  Exercise  F/u in 3 mon          Relevant Medications    sildenafil (VIAGRA) 100 MG tablet       Endocrine and Metabolic    Overweight with body mass index (BMI) of 26 to 26.9 in adult      Here are some of the ways to make the change to a more Mediterranean style diet; note it emphasizes fruits and vegetables, lean protein (avoids processed meats) and non-fat dairy; good fats such as olive and canola oil, and nuts. Also note it is a lower salt, lower type carbohydrate diet that avoids salty snack foods like chips, deep dish pizza or pizza with meats on top.    Eat your veggies and fruits -- and switch to whole grains. Avariety of plant foodsshould make up the majority of your meals. They should be minimally processed -- fresh and whole are best.Include veggies and fruits in every meal and eat them for snacks as well. Switch to whole-grain bread and cereal, and begin to eat more whole-grain rice and pasta products. Keep baby carrots, apples and bananas on hand forquick, satisfying snacks. Fruit salads are a wonderful way to eat a variety of healthy fruit.    Go nuts. Nuts and seeds are good sources of fiber, protein and healthy fats. Keep almonds, cashews, pistachios and walnuts on hand for a quick snack. Choose natural peanut butter, rather thanthe kind with hydrogenated fat added. Try blended sesame seeds (tahini) as a dip or spread for bread.    Pass on the butter,deep fried foods and egg yolks. Try olive or canola oil as a healthy replacement for butter or margarine. Lightly drizzle it over vegetables. After cooking pasta, add atouch of olive oil, some garlic and green onions for flavoring. Dip bread in flavored olive oil or lightly spread it on whole-grain bread for a tasty alternative to butter. Try tahini as a dip or spread for bread too.    Spice it up. Herbs and spices make food tasty and can  for salt and fat in recipes.    Go fish. Eat fish at least twice a week. Fresh  orwater-packed tuna, salmon, trout, mackerel and herring are healthy choices. [place], bake or broil fish for great taste and easy cleanup. Avoid breaded and fried fish.    Limit red meat. no more than a few times a month. Substitute fish and poultry for red meat. When choosing red meat, make sure it's lean and keep portions small (about the size of a deck of cards). Also avoid sausage, fletcher and other high-fat, processed meats.    Choose low-fat dairy. Limit higher fat dairy products, such as whole or 2 percent milk, cheese and ice cream. Switch to skim milk, fat-free yogurt and low-fat cheese.    Adapted from [facility] Website [email or web address]-diet/art-15799723 which you should consult for further details.               Genitourinary and Reproductive    Primary erectile dysfunction     Stable   Uses sildenafil prn   Monitor          Relevant Medications    sildenafil (VIAGRA) 100 MG tablet       Mental Health    Anxiety     Stable  Feels bupropion helps  Counseled on stress reduction activities  Monitor            Alcohol dependence with unspecified alcohol-induced disorder (CMD)     Had a few drinks last wk d/t a friend passing away  No drinks since  Counseled on abstaining from ETOH            Musculoskeletal and Injuries    Neck pain     Chronic   Takes gabapentin w/relief  Encouraged daily stretches  Monitor          Relevant Medications    gabapentin (NEURONTIN) 600 MG tablet       Neuro    Migraine without status migrainosus, not intractable    Relevant Medications    sildenafil (VIAGRA) 100 MG tablet    SUMAtriptan (IMITREX) 50 MG tablet       Pulmonary and Pneumonias    COPD (chronic obstructive pulmonary disease) (CMD)     Stable  No exacerbations  Continue to emphasis importance of smoking cessation  Monitor             Sleep    Insomnia     Mirtazapine helps  Sleeps better now  Counseled on exercise and stress reduction  activities  Monitor             Tobacco    Tobacco dependence     Has cut down on smoking   Continue bupropion   Counseled on importance of smoking cessation          Relevant Medications    buPROPion (ZYBAN) 150 MG 12 hr tablet   Other Visit Diagnoses     Overweight (BMI 25.0-29.9)               Health Maintenance Summary     Hepatitis B Vaccine (1 of 3 - 3-dose series)  Overdue - never done    COVID-19 Vaccine (1)  Overdue - never done    Pneumococcal Vaccine 0-64 (1 - PCV)  Overdue - never done    DTaP/Tdap/Td Vaccine (1 - Tdap)  Overdue - never done    Shingles Vaccine (1 of 2)  Overdue - never done    Depression Screening (Yearly)  Due soon on 1/24/2024    Influenza Vaccine (1)  Next due on 9/1/2023    Lung Cancer Screening (Yearly)  Next due on 4/14/2024    Colorectal Cancer Screen- (Colonoscopy - Every 10 Years)  Next due on 7/21/2033    Meningococcal Vaccine   Aged Out    HPV Vaccine   Aged Out          Schedule follow up: in 3 months

## (undated) DEVICE — Device

## (undated) DEVICE — DRSNG TELFA PAD NONADH STR 1S 3X4IN

## (undated) DEVICE — SOL ISO/ALC RUB 70PCT 4OZ

## (undated) DEVICE — ADHS SKIN DERMABOND

## (undated) DEVICE — TOWEL,OR,DSP,ST,BLUE,STD,4/PK,20PK/CS: Brand: MEDLINE

## (undated) DEVICE — TRANSPARENT DRESSINGOPSITE FLEXIGRID 6CM X 7CM CARTON 100: Brand: OPSITE FLEXIGRID USA 6X7CM-CARTON OF 100

## (undated) DEVICE — MIXER A07A CEMENT

## (undated) DEVICE — TRY SKINPREP DRYPREP

## (undated) DEVICE — ANTIBACTERIAL UNDYED BRAIDED (POLYGLACTIN 910), SYNTHETIC ABSORBABLE SUTURE: Brand: COATED VICRYL

## (undated) DEVICE — ENCORE® LATEX ORTHO SIZE 8, STERILE LATEX POWDER-FREE SURGICAL GLOVE: Brand: ENCORE

## (undated) DEVICE — GOWN,NON-REINFORCED,SIRUS,SET IN SLV,XXL: Brand: MEDLINE

## (undated) DEVICE — PK HIP PINNING 40

## (undated) DEVICE — PK KYPHOPLASTY 40

## (undated) DEVICE — STPLR SKIN VISISTAT WD 35CT

## (undated) DEVICE — LEGGINGS, PAIR, CLEAR, STERILE: Brand: MEDLINE

## (undated) DEVICE — DRSNG SURESITE WNDW 2.38X2.75

## (undated) DEVICE — 1010 S-DRAPE TOWEL DRAPE 10/BX: Brand: STERI-DRAPE™

## (undated) DEVICE — SUT VIC 0 CT1 36IN J946H

## (undated) DEVICE — GLV SURG SENSICARE W/ALOE PF LF 8 STRL

## (undated) DEVICE — MAT FLR ABSORBENT LG 4FT 10 2.5FT

## (undated) DEVICE — GLV SURG BIOGEL LTX PF 8

## (undated) DEVICE — GW TEAR DROP NAT 3X100CM

## (undated) DEVICE — BONE TAMP KIT KPX153PB FF X2 15/3 1 STP: Brand: KYPHOPAK™ TRAY

## (undated) DEVICE — GLV SURG TRIUMPH CLASSIC PF LTX 8.5 STRL

## (undated) DEVICE — NDL SPINE 22G 31/2IN BLK

## (undated) DEVICE — APPL CHLORAPREP W/TINT 26ML ORNG

## (undated) DEVICE — CVR EQ IMG 48X27

## (undated) DEVICE — PAD,ABDOMINAL,8"X10",ST,LF: Brand: MEDLINE

## (undated) DEVICE — BNDG ELAS CO-FLEX SLF ADHR 6IN 5YD LF STRL

## (undated) DEVICE — DRSNG GZ PETROLTM XEROFORM CURAD 1X8IN STRL

## (undated) DEVICE — BANDAGE,GAUZE,BULKEE II,4.5"X4.1YD,STRL: Brand: MEDLINE

## (undated) DEVICE — BNDG ELAS CO-FLEX SLF ADHR 4IN5YD LF STRL

## (undated) DEVICE — DEV CUT BIOP BONE KYPHX

## (undated) DEVICE — GLV SURG BIOGEL LTX PF 8 1/2

## (undated) DEVICE — DRSNG PAD ABD 8X10IN STRL

## (undated) DEVICE — BONE TAMP KIT KPX153NB AF X2 15/3

## (undated) DEVICE — SPNG GZ WOVN 4X4IN 12PLY 10/BX STRL

## (undated) DEVICE — DRAPE,REIN 53X77,STERILE: Brand: MEDLINE

## (undated) DEVICE — DRSNG TELFA PAD NONADH STR 1S 3X8IN

## (undated) DEVICE — GUIDEPIN TEMP THRD 3.2X508MM DISP